# Patient Record
Sex: MALE | Race: WHITE | Employment: OTHER | URBAN - METROPOLITAN AREA
[De-identification: names, ages, dates, MRNs, and addresses within clinical notes are randomized per-mention and may not be internally consistent; named-entity substitution may affect disease eponyms.]

---

## 2017-06-30 ENCOUNTER — HOSPITAL ENCOUNTER (INPATIENT)
Age: 62
LOS: 4 days | Discharge: HOME | DRG: 291 | End: 2017-07-05
Attending: EMERGENCY MEDICINE | Admitting: INTERNAL MEDICINE
Payer: MEDICARE

## 2017-06-30 DIAGNOSIS — I50.23 SYSTOLIC CHF, ACUTE ON CHRONIC (HCC): ICD-10-CM

## 2017-06-30 DIAGNOSIS — I50.33 ACUTE ON CHRONIC DIASTOLIC CONGESTIVE HEART FAILURE (HCC): Primary | ICD-10-CM

## 2017-06-30 PROCEDURE — 0BH17EZ INSERTION OF ENDOTRACHEAL AIRWAY INTO TRACHEA, VIA NATURAL OR ARTIFICIAL OPENING: ICD-10-PCS | Performed by: INTERNAL MEDICINE

## 2017-06-30 PROCEDURE — 51702 INSERT TEMP BLADDER CATH: CPT

## 2017-06-30 PROCEDURE — 99285 EMERGENCY DEPT VISIT HI MDM: CPT

## 2017-06-30 PROCEDURE — 31500 INSERT EMERGENCY AIRWAY: CPT

## 2017-06-30 PROCEDURE — 2500000003 HC RX 250 WO HCPCS

## 2017-06-30 PROCEDURE — 6360000002 HC RX W HCPCS

## 2017-06-30 PROCEDURE — 5A1935Z RESPIRATORY VENTILATION, LESS THAN 24 CONSECUTIVE HOURS: ICD-10-PCS | Performed by: INTERNAL MEDICINE

## 2017-07-01 ENCOUNTER — APPOINTMENT (OUTPATIENT)
Dept: GENERAL RADIOLOGY | Age: 62
DRG: 291 | End: 2017-07-01
Payer: MEDICARE

## 2017-07-01 PROBLEM — E87.0 HYPERNATREMIA: Status: ACTIVE | Noted: 2017-07-01

## 2017-07-01 PROBLEM — N39.0 UTI (URINARY TRACT INFECTION): Status: RESOLVED | Noted: 2017-07-01 | Resolved: 2017-07-01

## 2017-07-01 PROBLEM — N39.0 UTI (URINARY TRACT INFECTION): Status: ACTIVE | Noted: 2017-07-01

## 2017-07-01 PROBLEM — N17.9 AKI (ACUTE KIDNEY INJURY) (HCC): Status: ACTIVE | Noted: 2017-07-01

## 2017-07-01 PROBLEM — E87.0 HYPERNATREMIA: Status: RESOLVED | Noted: 2017-07-01 | Resolved: 2017-07-01

## 2017-07-01 PROBLEM — J96.02 ACUTE RESPIRATORY FAILURE WITH HYPERCAPNIA (HCC): Status: ACTIVE | Noted: 2017-07-01

## 2017-07-01 LAB
-: ABNORMAL
ABSOLUTE EOS #: 0.1 K/UL (ref 0–0.4)
ABSOLUTE LYMPH #: 2.8 K/UL (ref 1–4.8)
ABSOLUTE MONO #: 0.8 K/UL (ref 0.1–1.2)
ALLEN TEST: ABNORMAL
ALLEN TEST: ABNORMAL
ALLEN TEST: POSITIVE
AMORPHOUS: ABNORMAL
ANION GAP SERPL CALCULATED.3IONS-SCNC: 13 MMOL/L (ref 9–17)
ANION GAP SERPL CALCULATED.3IONS-SCNC: 17 MMOL/L (ref 9–17)
BACTERIA: ABNORMAL
BASOPHILS # BLD: 0 %
BASOPHILS ABSOLUTE: 0 K/UL (ref 0–0.2)
BILIRUBIN URINE: NEGATIVE
BNP INTERPRETATION: ABNORMAL
BUN BLDV-MCNC: 16 MG/DL (ref 8–23)
BUN BLDV-MCNC: 20 MG/DL (ref 8–23)
BUN/CREAT BLD: ABNORMAL (ref 9–20)
BUN/CREAT BLD: ABNORMAL (ref 9–20)
CALCIUM SERPL-MCNC: 9.2 MG/DL (ref 8.6–10.4)
CALCIUM SERPL-MCNC: 9.2 MG/DL (ref 8.6–10.4)
CASTS UA: ABNORMAL /LPF (ref 0–2)
CHLORIDE BLD-SCNC: 102 MMOL/L (ref 98–107)
CHLORIDE BLD-SCNC: 107 MMOL/L (ref 98–107)
CHOLESTEROL/HDL RATIO: 3.6
CHOLESTEROL: 147 MG/DL
CO2: 23 MMOL/L (ref 20–31)
CO2: 25 MMOL/L (ref 20–31)
COLOR: YELLOW
COMMENT UA: ABNORMAL
CREAT SERPL-MCNC: 1.04 MG/DL (ref 0.7–1.2)
CREAT SERPL-MCNC: 1.29 MG/DL (ref 0.7–1.2)
CREATININE URINE: 30.5 MG/DL (ref 39–259)
CRYSTALS, UA: ABNORMAL /HPF
DIFFERENTIAL TYPE: ABNORMAL
EOSINOPHILS RELATIVE PERCENT: 1 %
EPITHELIAL CELLS UA: ABNORMAL /HPF (ref 0–5)
FIO2: 40
FIO2: 50
FIO2: ABNORMAL
GFR AFRICAN AMERICAN: >60 ML/MIN
GFR AFRICAN AMERICAN: >60 ML/MIN
GFR NON-AFRICAN AMERICAN: 56 ML/MIN
GFR NON-AFRICAN AMERICAN: >60 ML/MIN
GFR SERPL CREATININE-BSD FRML MDRD: ABNORMAL ML/MIN/{1.73_M2}
GLUCOSE BLD-MCNC: 177 MG/DL (ref 75–110)
GLUCOSE BLD-MCNC: 200 MG/DL (ref 70–99)
GLUCOSE BLD-MCNC: 206 MG/DL (ref 75–110)
GLUCOSE BLD-MCNC: 266 MG/DL (ref 75–110)
GLUCOSE BLD-MCNC: 287 MG/DL (ref 70–99)
GLUCOSE URINE: ABNORMAL
HCO3 VENOUS: 23.7 MMOL/L (ref 22–29)
HCT VFR BLD CALC: 35.8 % (ref 41–53)
HCT VFR BLD CALC: 38.6 % (ref 41–53)
HDLC SERPL-MCNC: 41 MG/DL
HEMOGLOBIN: 11.9 G/DL (ref 13.5–17.5)
HEMOGLOBIN: 12.2 G/DL (ref 13.5–17.5)
KETONES, URINE: NEGATIVE
LDL CHOLESTEROL: 92 MG/DL (ref 0–130)
LEUKOCYTE ESTERASE, URINE: NEGATIVE
LV EF: 33 %
LVEF MODALITY: NORMAL
LYMPHOCYTES # BLD: 22 %
MAGNESIUM: 2.3 MG/DL (ref 1.6–2.6)
MCH RBC QN AUTO: 30.1 PG (ref 26–34)
MCH RBC QN AUTO: 31 PG (ref 26–34)
MCHC RBC AUTO-ENTMCNC: 31.5 G/DL (ref 31–37)
MCHC RBC AUTO-ENTMCNC: 33.2 G/DL (ref 31–37)
MCV RBC AUTO: 93.4 FL (ref 80–100)
MCV RBC AUTO: 95.5 FL (ref 80–100)
MODE: ABNORMAL
MONOCYTES # BLD: 6 %
MRSA, DNA, NASAL: NORMAL
MUCUS: ABNORMAL
NEGATIVE BASE EXCESS, ART: ABNORMAL (ref 0–2)
NEGATIVE BASE EXCESS, ART: ABNORMAL (ref 0–2)
NEGATIVE BASE EXCESS, VEN: 6 (ref 0–2)
NITRITE, URINE: NEGATIVE
O2 DEVICE/FLOW/%: ABNORMAL
O2 SAT, VEN: 80 % (ref 60–85)
OTHER OBSERVATIONS UA: ABNORMAL
PATIENT TEMP: ABNORMAL
PCO2, VEN: 63.3 MM HG (ref 41–51)
PDW BLD-RTO: 15.5 % (ref 12.5–15.4)
PDW BLD-RTO: 16.2 % (ref 12.5–15.4)
PH UA: 5.5 (ref 5–8)
PH VENOUS: 7.18 (ref 7.32–7.43)
PLATELET # BLD: 176 K/UL (ref 140–450)
PLATELET # BLD: 261 K/UL (ref 140–450)
PLATELET ESTIMATE: ABNORMAL
PMV BLD AUTO: 8.3 FL (ref 6–12)
PMV BLD AUTO: 8.4 FL (ref 6–12)
PO2, VEN: 56.4 MM HG (ref 30–50)
POC HCO3: 27.8 MMOL/L (ref 21–28)
POC HCO3: 27.9 MMOL/L (ref 21–28)
POC O2 SATURATION: 94 % (ref 94–98)
POC O2 SATURATION: 97 % (ref 94–98)
POC PCO2 TEMP: ABNORMAL MM HG
POC PCO2: 47.1 MM HG (ref 35–48)
POC PCO2: 60.3 MM HG (ref 35–48)
POC PH TEMP: ABNORMAL
POC PH: 7.27 (ref 7.35–7.45)
POC PH: 7.38 (ref 7.35–7.45)
POC PO2 TEMP: ABNORMAL MM HG
POC PO2: 106.4 MM HG (ref 83–108)
POC PO2: 72.6 MM HG (ref 83–108)
POC TROPONIN I: 0.06 NG/ML (ref 0–0.1)
POC TROPONIN INTERP: NORMAL
POSITIVE BASE EXCESS, ART: 0 (ref 0–3)
POSITIVE BASE EXCESS, ART: 2 (ref 0–3)
POSITIVE BASE EXCESS, VEN: ABNORMAL (ref 0–3)
POTASSIUM SERPL-SCNC: 5 MMOL/L (ref 3.7–5.3)
POTASSIUM SERPL-SCNC: 5.1 MMOL/L (ref 3.7–5.3)
PRO-BNP: 6216 PG/ML
PROTEIN UA: ABNORMAL
RBC # BLD: 3.83 M/UL (ref 4.5–5.9)
RBC # BLD: 4.05 M/UL (ref 4.5–5.9)
RBC # BLD: ABNORMAL 10*6/UL
RBC UA: ABNORMAL /HPF (ref 0–2)
RENAL EPITHELIAL, UA: ABNORMAL /HPF
SAMPLE SITE: ABNORMAL
SEG NEUTROPHILS: 71 %
SEGMENTED NEUTROPHILS ABSOLUTE COUNT: 9.4 K/UL (ref 1.8–7.7)
SODIUM BLD-SCNC: 138 MMOL/L (ref 135–144)
SODIUM BLD-SCNC: 149 MMOL/L (ref 135–144)
SPECIFIC GRAVITY UA: 1.02 (ref 1–1.03)
SPECIMEN DESCRIPTION: NORMAL
TCO2 (CALC), ART: 29 MMOL/L (ref 22–29)
TCO2 (CALC), ART: 30 MMOL/L (ref 22–29)
THYROXINE, FREE: 1 NG/DL (ref 0.93–1.7)
TOTAL CO2, VENOUS: 26 MMOL/L (ref 23–30)
TRICHOMONAS: ABNORMAL
TRIGL SERPL-MCNC: 72 MG/DL
TROPONIN INTERP: NORMAL
TROPONIN INTERP: NORMAL
TROPONIN T: <0.03 NG/ML
TROPONIN T: <0.03 NG/ML
TSH SERPL DL<=0.05 MIU/L-ACNC: 7.59 MIU/L (ref 0.3–5)
TURBIDITY: ABNORMAL
UREA NITROGEN, UR: 205 MG/DL
URINE HGB: ABNORMAL
UROBILINOGEN, URINE: NORMAL
VLDLC SERPL CALC-MCNC: NORMAL MG/DL (ref 1–30)
WBC # BLD: 13.2 K/UL (ref 3.5–11)
WBC # BLD: 9.4 K/UL (ref 3.5–11)
WBC # BLD: ABNORMAL 10*3/UL
WBC UA: ABNORMAL /HPF (ref 0–5)
YEAST: ABNORMAL

## 2017-07-01 PROCEDURE — 6370000000 HC RX 637 (ALT 250 FOR IP): Performed by: STUDENT IN AN ORGANIZED HEALTH CARE EDUCATION/TRAINING PROGRAM

## 2017-07-01 PROCEDURE — 6360000002 HC RX W HCPCS: Performed by: EMERGENCY MEDICINE

## 2017-07-01 PROCEDURE — 36415 COLL VENOUS BLD VENIPUNCTURE: CPT

## 2017-07-01 PROCEDURE — 82803 BLOOD GASES ANY COMBINATION: CPT

## 2017-07-01 PROCEDURE — 84156 ASSAY OF PROTEIN URINE: CPT

## 2017-07-01 PROCEDURE — 71010 XR CHEST PORTABLE: CPT

## 2017-07-01 PROCEDURE — 94770 HC ETCO2 MONITOR DAILY: CPT

## 2017-07-01 PROCEDURE — 85027 COMPLETE CBC AUTOMATED: CPT

## 2017-07-01 PROCEDURE — 80048 BASIC METABOLIC PNL TOTAL CA: CPT

## 2017-07-01 PROCEDURE — 80061 LIPID PANEL: CPT

## 2017-07-01 PROCEDURE — 94640 AIRWAY INHALATION TREATMENT: CPT

## 2017-07-01 PROCEDURE — 82570 ASSAY OF URINE CREATININE: CPT

## 2017-07-01 PROCEDURE — 6360000002 HC RX W HCPCS: Performed by: STUDENT IN AN ORGANIZED HEALTH CARE EDUCATION/TRAINING PROGRAM

## 2017-07-01 PROCEDURE — 87641 MR-STAPH DNA AMP PROBE: CPT

## 2017-07-01 PROCEDURE — 81001 URINALYSIS AUTO W/SCOPE: CPT

## 2017-07-01 PROCEDURE — 93306 TTE W/DOPPLER COMPLETE: CPT

## 2017-07-01 PROCEDURE — 94644 CONT INHLJ TX 1ST HOUR: CPT

## 2017-07-01 PROCEDURE — 96375 TX/PRO/DX INJ NEW DRUG ADDON: CPT

## 2017-07-01 PROCEDURE — 84540 ASSAY OF URINE/UREA-N: CPT

## 2017-07-01 PROCEDURE — 84166 PROTEIN E-PHORESIS/URINE/CSF: CPT

## 2017-07-01 PROCEDURE — 96368 THER/DIAG CONCURRENT INF: CPT

## 2017-07-01 PROCEDURE — 82947 ASSAY GLUCOSE BLOOD QUANT: CPT

## 2017-07-01 PROCEDURE — 83735 ASSAY OF MAGNESIUM: CPT

## 2017-07-01 PROCEDURE — 84439 ASSAY OF FREE THYROXINE: CPT

## 2017-07-01 PROCEDURE — 94645 CONT INHLJ TX EACH ADDL HOUR: CPT

## 2017-07-01 PROCEDURE — 2580000003 HC RX 258: Performed by: STUDENT IN AN ORGANIZED HEALTH CARE EDUCATION/TRAINING PROGRAM

## 2017-07-01 PROCEDURE — 2500000003 HC RX 250 WO HCPCS: Performed by: EMERGENCY MEDICINE

## 2017-07-01 PROCEDURE — 2500000003 HC RX 250 WO HCPCS: Performed by: STUDENT IN AN ORGANIZED HEALTH CARE EDUCATION/TRAINING PROGRAM

## 2017-07-01 PROCEDURE — 99291 CRITICAL CARE FIRST HOUR: CPT | Performed by: INTERNAL MEDICINE

## 2017-07-01 PROCEDURE — 83036 HEMOGLOBIN GLYCOSYLATED A1C: CPT

## 2017-07-01 PROCEDURE — 87086 URINE CULTURE/COLONY COUNT: CPT

## 2017-07-01 PROCEDURE — 85025 COMPLETE CBC W/AUTO DIFF WBC: CPT

## 2017-07-01 PROCEDURE — 93005 ELECTROCARDIOGRAM TRACING: CPT

## 2017-07-01 PROCEDURE — 84484 ASSAY OF TROPONIN QUANT: CPT

## 2017-07-01 PROCEDURE — 31500 INSERT EMERGENCY AIRWAY: CPT

## 2017-07-01 PROCEDURE — 96365 THER/PROPH/DIAG IV INF INIT: CPT

## 2017-07-01 PROCEDURE — 36600 WITHDRAWAL OF ARTERIAL BLOOD: CPT

## 2017-07-01 PROCEDURE — 2060000000 HC ICU INTERMEDIATE R&B

## 2017-07-01 PROCEDURE — 94762 N-INVAS EAR/PLS OXIMTRY CONT: CPT

## 2017-07-01 PROCEDURE — 83880 ASSAY OF NATRIURETIC PEPTIDE: CPT

## 2017-07-01 PROCEDURE — 94003 VENT MGMT INPAT SUBQ DAY: CPT

## 2017-07-01 PROCEDURE — 84443 ASSAY THYROID STIM HORMONE: CPT

## 2017-07-01 PROCEDURE — 96366 THER/PROPH/DIAG IV INF ADDON: CPT

## 2017-07-01 PROCEDURE — 94002 VENT MGMT INPAT INIT DAY: CPT

## 2017-07-01 PROCEDURE — 94660 CPAP INITIATION&MGMT: CPT

## 2017-07-01 RX ORDER — METHYLPREDNISOLONE SODIUM SUCCINATE 125 MG/2ML
40 INJECTION, POWDER, LYOPHILIZED, FOR SOLUTION INTRAMUSCULAR; INTRAVENOUS EVERY 12 HOURS
Status: DISCONTINUED | OUTPATIENT
Start: 2017-07-01 | End: 2017-07-02

## 2017-07-01 RX ORDER — LISINOPRIL 2.5 MG/1
2.5 TABLET ORAL DAILY
Status: DISCONTINUED | OUTPATIENT
Start: 2017-07-01 | End: 2017-07-03

## 2017-07-01 RX ORDER — ALBUTEROL SULFATE 2.5 MG/3ML
2.5 SOLUTION RESPIRATORY (INHALATION) EVERY 4 HOURS PRN
Status: DISCONTINUED | OUTPATIENT
Start: 2017-07-01 | End: 2017-07-03

## 2017-07-01 RX ORDER — SODIUM CHLORIDE 0.9 % (FLUSH) 0.9 %
10 SYRINGE (ML) INJECTION PRN
Status: DISCONTINUED | OUTPATIENT
Start: 2017-07-01 | End: 2017-07-05 | Stop reason: HOSPADM

## 2017-07-01 RX ORDER — FUROSEMIDE 10 MG/ML
40 INJECTION INTRAMUSCULAR; INTRAVENOUS ONCE
Status: COMPLETED | OUTPATIENT
Start: 2017-07-01 | End: 2017-07-01

## 2017-07-01 RX ORDER — MAGNESIUM SULFATE 1 G/100ML
1 INJECTION INTRAVENOUS PRN
Status: DISCONTINUED | OUTPATIENT
Start: 2017-07-01 | End: 2017-07-05 | Stop reason: HOSPADM

## 2017-07-01 RX ORDER — CARVEDILOL 3.12 MG/1
3.12 TABLET ORAL 2 TIMES DAILY
Status: DISCONTINUED | OUTPATIENT
Start: 2017-07-01 | End: 2017-07-01

## 2017-07-01 RX ORDER — NITROGLYCERIN 20 MG/100ML
5 INJECTION INTRAVENOUS CONTINUOUS
Status: DISCONTINUED | OUTPATIENT
Start: 2017-07-01 | End: 2017-07-01

## 2017-07-01 RX ORDER — ACETAMINOPHEN 325 MG/1
650 TABLET ORAL EVERY 4 HOURS PRN
Status: DISCONTINUED | OUTPATIENT
Start: 2017-07-01 | End: 2017-07-05 | Stop reason: HOSPADM

## 2017-07-01 RX ORDER — ONDANSETRON 2 MG/ML
4 INJECTION INTRAMUSCULAR; INTRAVENOUS EVERY 6 HOURS PRN
Status: DISCONTINUED | OUTPATIENT
Start: 2017-07-01 | End: 2017-07-05 | Stop reason: HOSPADM

## 2017-07-01 RX ORDER — POTASSIUM CHLORIDE 20MEQ/15ML
40 LIQUID (ML) ORAL PRN
Status: DISCONTINUED | OUTPATIENT
Start: 2017-07-01 | End: 2017-07-05 | Stop reason: HOSPADM

## 2017-07-01 RX ORDER — DEXTROSE MONOHYDRATE 50 MG/ML
100 INJECTION, SOLUTION INTRAVENOUS PRN
Status: DISCONTINUED | OUTPATIENT
Start: 2017-07-01 | End: 2017-07-05 | Stop reason: HOSPADM

## 2017-07-01 RX ORDER — ATORVASTATIN CALCIUM 20 MG/1
20 TABLET, FILM COATED ORAL NIGHTLY
Status: DISCONTINUED | OUTPATIENT
Start: 2017-07-01 | End: 2017-07-05 | Stop reason: HOSPADM

## 2017-07-01 RX ORDER — NICOTINE POLACRILEX 4 MG
15 LOZENGE BUCCAL PRN
Status: DISCONTINUED | OUTPATIENT
Start: 2017-07-01 | End: 2017-07-05 | Stop reason: HOSPADM

## 2017-07-01 RX ORDER — FUROSEMIDE 10 MG/ML
40 INJECTION INTRAMUSCULAR; INTRAVENOUS 2 TIMES DAILY
Status: DISCONTINUED | OUTPATIENT
Start: 2017-07-01 | End: 2017-07-02

## 2017-07-01 RX ORDER — HEPARIN SODIUM 5000 [USP'U]/ML
5000 INJECTION, SOLUTION INTRAVENOUS; SUBCUTANEOUS EVERY 8 HOURS SCHEDULED
Status: DISCONTINUED | OUTPATIENT
Start: 2017-07-01 | End: 2017-07-05 | Stop reason: HOSPADM

## 2017-07-01 RX ORDER — PROPOFOL 10 MG/ML
INJECTION, EMULSION INTRAVENOUS
Status: DISCONTINUED
Start: 2017-07-01 | End: 2017-07-01

## 2017-07-01 RX ORDER — METHYLPREDNISOLONE SODIUM SUCCINATE 125 MG/2ML
40 INJECTION, POWDER, LYOPHILIZED, FOR SOLUTION INTRAMUSCULAR; INTRAVENOUS EVERY 8 HOURS
Status: DISCONTINUED | OUTPATIENT
Start: 2017-07-01 | End: 2017-07-01

## 2017-07-01 RX ORDER — ALBUTEROL SULFATE 2.5 MG/3ML
2.5 SOLUTION RESPIRATORY (INHALATION) 2 TIMES DAILY
Status: DISCONTINUED | OUTPATIENT
Start: 2017-07-02 | End: 2017-07-02

## 2017-07-01 RX ORDER — SODIUM CHLORIDE 9 MG/ML
INJECTION, SOLUTION INTRAVENOUS CONTINUOUS
Status: DISCONTINUED | OUTPATIENT
Start: 2017-07-01 | End: 2017-07-02

## 2017-07-01 RX ORDER — POTASSIUM CHLORIDE 20 MEQ/1
40 TABLET, EXTENDED RELEASE ORAL PRN
Status: DISCONTINUED | OUTPATIENT
Start: 2017-07-01 | End: 2017-07-05 | Stop reason: HOSPADM

## 2017-07-01 RX ORDER — ACETAMINOPHEN 325 MG/1
650 TABLET ORAL EVERY 4 HOURS PRN
Status: DISCONTINUED | OUTPATIENT
Start: 2017-07-01 | End: 2017-07-01 | Stop reason: SDUPTHER

## 2017-07-01 RX ORDER — IPRATROPIUM BROMIDE AND ALBUTEROL SULFATE 2.5; .5 MG/3ML; MG/3ML
1 SOLUTION RESPIRATORY (INHALATION) 4 TIMES DAILY
Status: DISCONTINUED | OUTPATIENT
Start: 2017-07-01 | End: 2017-07-03

## 2017-07-01 RX ORDER — ASPIRIN 81 MG/1
81 TABLET ORAL DAILY
Status: DISCONTINUED | OUTPATIENT
Start: 2017-07-01 | End: 2017-07-05 | Stop reason: HOSPADM

## 2017-07-01 RX ORDER — POTASSIUM CHLORIDE 7.45 MG/ML
10 INJECTION INTRAVENOUS PRN
Status: DISCONTINUED | OUTPATIENT
Start: 2017-07-01 | End: 2017-07-05 | Stop reason: HOSPADM

## 2017-07-01 RX ORDER — ATORVASTATIN CALCIUM 20 MG/1
20 TABLET, FILM COATED ORAL
Status: DISCONTINUED | OUTPATIENT
Start: 2017-07-01 | End: 2017-07-01

## 2017-07-01 RX ORDER — PROPOFOL 10 MG/ML
10 INJECTION, EMULSION INTRAVENOUS
Status: DISCONTINUED | OUTPATIENT
Start: 2017-07-01 | End: 2017-07-01

## 2017-07-01 RX ORDER — SODIUM CHLORIDE 0.9 % (FLUSH) 0.9 %
10 SYRINGE (ML) INJECTION EVERY 12 HOURS SCHEDULED
Status: DISCONTINUED | OUTPATIENT
Start: 2017-07-01 | End: 2017-07-05 | Stop reason: HOSPADM

## 2017-07-01 RX ORDER — DEXTROSE MONOHYDRATE 25 G/50ML
12.5 INJECTION, SOLUTION INTRAVENOUS PRN
Status: DISCONTINUED | OUTPATIENT
Start: 2017-07-01 | End: 2017-07-05 | Stop reason: HOSPADM

## 2017-07-01 RX ADMIN — IPRATROPIUM BROMIDE AND ALBUTEROL SULFATE 1 AMPULE: .5; 3 SOLUTION RESPIRATORY (INHALATION) at 20:32

## 2017-07-01 RX ADMIN — NITROGLYCERIN 50 MCG/MIN: 20 INJECTION INTRAVENOUS at 01:07

## 2017-07-01 RX ADMIN — HEPARIN SODIUM 5000 UNITS: 5000 INJECTION, SOLUTION INTRAVENOUS; SUBCUTANEOUS at 21:52

## 2017-07-01 RX ADMIN — HEPARIN SODIUM 5000 UNITS: 5000 INJECTION, SOLUTION INTRAVENOUS; SUBCUTANEOUS at 13:55

## 2017-07-01 RX ADMIN — METHYLPREDNISOLONE SODIUM SUCCINATE 40 MG: 125 INJECTION, POWDER, FOR SOLUTION INTRAMUSCULAR; INTRAVENOUS at 04:57

## 2017-07-01 RX ADMIN — PROPOFOL 30 MCG/KG/MIN: 10 INJECTION, EMULSION INTRAVENOUS at 01:22

## 2017-07-01 RX ADMIN — LISINOPRIL 2.5 MG: 2.5 TABLET ORAL at 08:43

## 2017-07-01 RX ADMIN — SODIUM CHLORIDE 250 ML: 4.5 INJECTION, SOLUTION INTRAVENOUS at 10:22

## 2017-07-01 RX ADMIN — INSULIN LISPRO 4 UNITS: 100 INJECTION, SOLUTION INTRAVENOUS; SUBCUTANEOUS at 08:51

## 2017-07-01 RX ADMIN — ALBUTEROL SULFATE 2.5 MG: 2.5 SOLUTION RESPIRATORY (INHALATION) at 23:45

## 2017-07-01 RX ADMIN — Medication 10 ML: at 08:45

## 2017-07-01 RX ADMIN — INSULIN LISPRO 2 UNITS: 100 INJECTION, SOLUTION INTRAVENOUS; SUBCUTANEOUS at 18:16

## 2017-07-01 RX ADMIN — METHYLPREDNISOLONE SODIUM SUCCINATE 40 MG: 125 INJECTION, POWDER, FOR SOLUTION INTRAMUSCULAR; INTRAVENOUS at 20:22

## 2017-07-01 RX ADMIN — CARVEDILOL 3.12 MG: 3.12 TABLET, FILM COATED ORAL at 09:22

## 2017-07-01 RX ADMIN — ALBUTEROL SULFATE 5 MG: 5 SOLUTION RESPIRATORY (INHALATION) at 03:00

## 2017-07-01 RX ADMIN — FUROSEMIDE 40 MG: 10 INJECTION, SOLUTION INTRAMUSCULAR; INTRAVENOUS at 20:22

## 2017-07-01 RX ADMIN — HEPARIN SODIUM 5000 UNITS: 5000 INJECTION, SOLUTION INTRAVENOUS; SUBCUTANEOUS at 08:51

## 2017-07-01 RX ADMIN — ALBUTEROL SULFATE 15 MG/HR: 5 SOLUTION RESPIRATORY (INHALATION) at 00:31

## 2017-07-01 RX ADMIN — INSULIN LISPRO 3 UNITS: 100 INJECTION, SOLUTION INTRAVENOUS; SUBCUTANEOUS at 20:30

## 2017-07-01 RX ADMIN — MICONAZORB AF ANTIFUNGAL POWDER: 1.42 POWDER TOPICAL at 20:22

## 2017-07-01 RX ADMIN — FUROSEMIDE 40 MG: 10 INJECTION, SOLUTION INTRAMUSCULAR; INTRAVENOUS at 00:59

## 2017-07-01 RX ADMIN — ASPIRIN 81 MG: 81 TABLET, COATED ORAL at 08:42

## 2017-07-01 RX ADMIN — INSULIN LISPRO 2 UNITS: 100 INJECTION, SOLUTION INTRAVENOUS; SUBCUTANEOUS at 13:53

## 2017-07-01 RX ADMIN — FUROSEMIDE 40 MG: 10 INJECTION, SOLUTION INTRAMUSCULAR; INTRAVENOUS at 08:42

## 2017-07-01 RX ADMIN — ATORVASTATIN CALCIUM 20 MG: 20 TABLET, FILM COATED ORAL at 21:50

## 2017-07-01 RX ADMIN — IPRATROPIUM BROMIDE AND ALBUTEROL SULFATE 1 AMPULE: .5; 3 SOLUTION RESPIRATORY (INHALATION) at 11:55

## 2017-07-01 RX ADMIN — Medication 5 MG/HR: at 03:29

## 2017-07-01 RX ADMIN — IPRATROPIUM BROMIDE AND ALBUTEROL SULFATE 1 AMPULE: .5; 3 SOLUTION RESPIRATORY (INHALATION) at 08:15

## 2017-07-01 RX ADMIN — Medication 10 ML: at 20:23

## 2017-07-01 RX ADMIN — IPRATROPIUM BROMIDE AND ALBUTEROL SULFATE 1 AMPULE: .5; 3 SOLUTION RESPIRATORY (INHALATION) at 15:14

## 2017-07-01 RX ADMIN — METOPROLOL TARTRATE 12.5 MG: 25 TABLET ORAL at 20:23

## 2017-07-01 RX ADMIN — SODIUM CHLORIDE: 9 INJECTION, SOLUTION INTRAVENOUS at 08:42

## 2017-07-01 ASSESSMENT — PULMONARY FUNCTION TESTS
PIF_VALUE: 12
PIF_VALUE: 31
PIF_VALUE: 21
PIF_VALUE: 12
PIF_VALUE: 31
PIF_VALUE: 12
PIF_VALUE: 21

## 2017-07-01 ASSESSMENT — PAIN SCALES - GENERAL
PAINLEVEL_OUTOF10: 2
PAINLEVEL_OUTOF10: 4

## 2017-07-01 ASSESSMENT — PAIN DESCRIPTION - PAIN TYPE: TYPE: CHRONIC PAIN

## 2017-07-01 ASSESSMENT — PAIN DESCRIPTION - LOCATION: LOCATION: BACK

## 2017-07-02 ENCOUNTER — APPOINTMENT (OUTPATIENT)
Dept: GENERAL RADIOLOGY | Age: 62
DRG: 291 | End: 2017-07-02
Payer: MEDICARE

## 2017-07-02 LAB
ANION GAP SERPL CALCULATED.3IONS-SCNC: 14 MMOL/L (ref 9–17)
BUN BLDV-MCNC: 27 MG/DL (ref 8–23)
BUN/CREAT BLD: ABNORMAL (ref 9–20)
CALCIUM SERPL-MCNC: 9.2 MG/DL (ref 8.6–10.4)
CHLORIDE BLD-SCNC: 97 MMOL/L (ref 98–107)
CO2: 29 MMOL/L (ref 20–31)
CREAT SERPL-MCNC: 1.12 MG/DL (ref 0.7–1.2)
CULTURE: NO GROWTH
CULTURE: NORMAL
ESTIMATED AVERAGE GLUCOSE: 143 MG/DL
GFR AFRICAN AMERICAN: >60 ML/MIN
GFR NON-AFRICAN AMERICAN: >60 ML/MIN
GFR SERPL CREATININE-BSD FRML MDRD: ABNORMAL ML/MIN/{1.73_M2}
GFR SERPL CREATININE-BSD FRML MDRD: ABNORMAL ML/MIN/{1.73_M2}
GLUCOSE BLD-MCNC: 185 MG/DL (ref 75–110)
GLUCOSE BLD-MCNC: 208 MG/DL (ref 70–99)
GLUCOSE BLD-MCNC: 235 MG/DL (ref 75–110)
GLUCOSE BLD-MCNC: 294 MG/DL (ref 75–110)
GLUCOSE BLD-MCNC: 393 MG/DL (ref 75–110)
HBA1C MFR BLD: 6.6 % (ref 4–6)
Lab: NORMAL
POTASSIUM SERPL-SCNC: 4.3 MMOL/L (ref 3.7–5.3)
SODIUM BLD-SCNC: 140 MMOL/L (ref 135–144)
SPECIMEN DESCRIPTION: NORMAL
STATUS: NORMAL

## 2017-07-02 PROCEDURE — 94762 N-INVAS EAR/PLS OXIMTRY CONT: CPT

## 2017-07-02 PROCEDURE — 97161 PT EVAL LOW COMPLEX 20 MIN: CPT

## 2017-07-02 PROCEDURE — 6370000000 HC RX 637 (ALT 250 FOR IP): Performed by: STUDENT IN AN ORGANIZED HEALTH CARE EDUCATION/TRAINING PROGRAM

## 2017-07-02 PROCEDURE — 6370000000 HC RX 637 (ALT 250 FOR IP): Performed by: EMERGENCY MEDICINE

## 2017-07-02 PROCEDURE — 2580000003 HC RX 258: Performed by: STUDENT IN AN ORGANIZED HEALTH CARE EDUCATION/TRAINING PROGRAM

## 2017-07-02 PROCEDURE — G8978 MOBILITY CURRENT STATUS: HCPCS

## 2017-07-02 PROCEDURE — 71010 XR CHEST PORTABLE: CPT

## 2017-07-02 PROCEDURE — 86376 MICROSOMAL ANTIBODY EACH: CPT

## 2017-07-02 PROCEDURE — 97530 THERAPEUTIC ACTIVITIES: CPT

## 2017-07-02 PROCEDURE — 6370000000 HC RX 637 (ALT 250 FOR IP): Performed by: INTERNAL MEDICINE

## 2017-07-02 PROCEDURE — 6360000002 HC RX W HCPCS: Performed by: STUDENT IN AN ORGANIZED HEALTH CARE EDUCATION/TRAINING PROGRAM

## 2017-07-02 PROCEDURE — 94660 CPAP INITIATION&MGMT: CPT

## 2017-07-02 PROCEDURE — 1200000000 HC SEMI PRIVATE

## 2017-07-02 PROCEDURE — 99291 CRITICAL CARE FIRST HOUR: CPT | Performed by: INTERNAL MEDICINE

## 2017-07-02 PROCEDURE — 36415 COLL VENOUS BLD VENIPUNCTURE: CPT

## 2017-07-02 PROCEDURE — 80048 BASIC METABOLIC PNL TOTAL CA: CPT

## 2017-07-02 PROCEDURE — 82947 ASSAY GLUCOSE BLOOD QUANT: CPT

## 2017-07-02 PROCEDURE — 94640 AIRWAY INHALATION TREATMENT: CPT

## 2017-07-02 PROCEDURE — G8979 MOBILITY GOAL STATUS: HCPCS

## 2017-07-02 RX ORDER — ALBUTEROL SULFATE 90 UG/1
2 AEROSOL, METERED RESPIRATORY (INHALATION) EVERY 6 HOURS PRN
Status: DISCONTINUED | OUTPATIENT
Start: 2017-07-02 | End: 2017-07-03

## 2017-07-02 RX ORDER — METOPROLOL TARTRATE 50 MG/1
50 TABLET, FILM COATED ORAL 2 TIMES DAILY
Status: DISCONTINUED | OUTPATIENT
Start: 2017-07-02 | End: 2017-07-03

## 2017-07-02 RX ORDER — SPIRONOLACTONE 25 MG/1
25 TABLET ORAL DAILY
Status: DISCONTINUED | OUTPATIENT
Start: 2017-07-02 | End: 2017-07-04

## 2017-07-02 RX ORDER — DOCUSATE SODIUM 100 MG/1
100 CAPSULE, LIQUID FILLED ORAL 2 TIMES DAILY PRN
Status: DISCONTINUED | OUTPATIENT
Start: 2017-07-02 | End: 2017-07-05 | Stop reason: HOSPADM

## 2017-07-02 RX ORDER — METFORMIN HYDROCHLORIDE 500 MG/1
2000 TABLET, EXTENDED RELEASE ORAL
Status: DISCONTINUED | OUTPATIENT
Start: 2017-07-02 | End: 2017-07-02

## 2017-07-02 RX ORDER — PREDNISONE 20 MG/1
40 TABLET ORAL DAILY
Status: DISCONTINUED | OUTPATIENT
Start: 2017-07-02 | End: 2017-07-02

## 2017-07-02 RX ORDER — POLYETHYLENE GLYCOL 3350 17 G/17G
17 POWDER, FOR SOLUTION ORAL DAILY PRN
Status: DISCONTINUED | OUTPATIENT
Start: 2017-07-02 | End: 2017-07-05 | Stop reason: HOSPADM

## 2017-07-02 RX ORDER — FUROSEMIDE 40 MG/1
40 TABLET ORAL 2 TIMES DAILY
Status: DISCONTINUED | OUTPATIENT
Start: 2017-07-02 | End: 2017-07-04

## 2017-07-02 RX ORDER — PREDNISONE 20 MG/1
20 TABLET ORAL DAILY
Status: COMPLETED | OUTPATIENT
Start: 2017-07-03 | End: 2017-07-04

## 2017-07-02 RX ORDER — HYDROCHLOROTHIAZIDE 25 MG/1
25 TABLET ORAL DAILY
Status: DISCONTINUED | OUTPATIENT
Start: 2017-07-02 | End: 2017-07-02

## 2017-07-02 RX ADMIN — METOPROLOL TARTRATE 12.5 MG: 25 TABLET ORAL at 08:06

## 2017-07-02 RX ADMIN — MOMETASONE FUROATE AND FORMOTEROL FUMARATE DIHYDRATE 2 PUFF: 200; 5 AEROSOL RESPIRATORY (INHALATION) at 21:50

## 2017-07-02 RX ADMIN — INSULIN LISPRO 5 UNITS: 100 INJECTION, SOLUTION INTRAVENOUS; SUBCUTANEOUS at 20:07

## 2017-07-02 RX ADMIN — HEPARIN SODIUM 5000 UNITS: 5000 INJECTION, SOLUTION INTRAVENOUS; SUBCUTANEOUS at 06:18

## 2017-07-02 RX ADMIN — MICONAZORB AF ANTIFUNGAL POWDER: 1.42 POWDER TOPICAL at 08:06

## 2017-07-02 RX ADMIN — Medication 10 ML: at 20:11

## 2017-07-02 RX ADMIN — ASPIRIN 81 MG: 81 TABLET, COATED ORAL at 08:03

## 2017-07-02 RX ADMIN — ATORVASTATIN CALCIUM 20 MG: 20 TABLET, FILM COATED ORAL at 20:11

## 2017-07-02 RX ADMIN — FUROSEMIDE 40 MG: 10 INJECTION, SOLUTION INTRAMUSCULAR; INTRAVENOUS at 08:12

## 2017-07-02 RX ADMIN — PREDNISONE 40 MG: 20 TABLET ORAL at 08:04

## 2017-07-02 RX ADMIN — MOMETASONE FUROATE AND FORMOTEROL FUMARATE DIHYDRATE 2 PUFF: 200; 5 AEROSOL RESPIRATORY (INHALATION) at 08:57

## 2017-07-02 RX ADMIN — IPRATROPIUM BROMIDE AND ALBUTEROL SULFATE 1 AMPULE: .5; 3 SOLUTION RESPIRATORY (INHALATION) at 21:50

## 2017-07-02 RX ADMIN — IPRATROPIUM BROMIDE AND ALBUTEROL SULFATE 1 AMPULE: .5; 3 SOLUTION RESPIRATORY (INHALATION) at 13:45

## 2017-07-02 RX ADMIN — SPIRONOLACTONE 25 MG: 25 TABLET ORAL at 08:58

## 2017-07-02 RX ADMIN — Medication 10 ML: at 08:05

## 2017-07-02 RX ADMIN — INSULIN LISPRO 4 UNITS: 100 INJECTION, SOLUTION INTRAVENOUS; SUBCUTANEOUS at 12:33

## 2017-07-02 RX ADMIN — HEPARIN SODIUM 5000 UNITS: 5000 INJECTION, SOLUTION INTRAVENOUS; SUBCUTANEOUS at 13:09

## 2017-07-02 RX ADMIN — LISINOPRIL 2.5 MG: 2.5 TABLET ORAL at 08:03

## 2017-07-02 RX ADMIN — METHYLPREDNISOLONE SODIUM SUCCINATE 40 MG: 125 INJECTION, POWDER, FOR SOLUTION INTRAMUSCULAR; INTRAVENOUS at 05:13

## 2017-07-02 RX ADMIN — METOPROLOL TARTRATE 50 MG: 50 TABLET, FILM COATED ORAL at 20:11

## 2017-07-02 RX ADMIN — INSULIN LISPRO 6 UNITS: 100 INJECTION, SOLUTION INTRAVENOUS; SUBCUTANEOUS at 17:55

## 2017-07-02 RX ADMIN — ALBUTEROL SULFATE 2.5 MG: 2.5 SOLUTION RESPIRATORY (INHALATION) at 03:11

## 2017-07-02 RX ADMIN — INSULIN LISPRO 2 UNITS: 100 INJECTION, SOLUTION INTRAVENOUS; SUBCUTANEOUS at 08:00

## 2017-07-02 RX ADMIN — FUROSEMIDE 40 MG: 40 TABLET ORAL at 17:57

## 2017-07-02 RX ADMIN — IPRATROPIUM BROMIDE AND ALBUTEROL SULFATE 1 AMPULE: .5; 3 SOLUTION RESPIRATORY (INHALATION) at 08:55

## 2017-07-02 RX ADMIN — HEPARIN SODIUM 5000 UNITS: 5000 INJECTION, SOLUTION INTRAVENOUS; SUBCUTANEOUS at 21:52

## 2017-07-02 RX ADMIN — MICONAZORB AF ANTIFUNGAL POWDER: 1.42 POWDER TOPICAL at 20:12

## 2017-07-02 ASSESSMENT — PULMONARY FUNCTION TESTS: PIF_VALUE: 10

## 2017-07-02 ASSESSMENT — PAIN SCALES - GENERAL
PAINLEVEL_OUTOF10: 0
PAINLEVEL_OUTOF10: 0

## 2017-07-03 LAB
ANION GAP SERPL CALCULATED.3IONS-SCNC: 12 MMOL/L (ref 9–17)
BUN BLDV-MCNC: 36 MG/DL (ref 8–23)
BUN/CREAT BLD: ABNORMAL (ref 9–20)
CALCIUM SERPL-MCNC: 9.3 MG/DL (ref 8.6–10.4)
CHLORIDE BLD-SCNC: 93 MMOL/L (ref 98–107)
CO2: 32 MMOL/L (ref 20–31)
CREAT SERPL-MCNC: 1.05 MG/DL (ref 0.7–1.2)
EKG ATRIAL RATE: 97 BPM
EKG P AXIS: 72 DEGREES
EKG P-R INTERVAL: 158 MS
EKG Q-T INTERVAL: 376 MS
EKG QRS DURATION: 102 MS
EKG QTC CALCULATION (BAZETT): 477 MS
EKG R AXIS: -48 DEGREES
EKG T AXIS: 93 DEGREES
EKG VENTRICULAR RATE: 97 BPM
GFR AFRICAN AMERICAN: >60 ML/MIN
GFR NON-AFRICAN AMERICAN: >60 ML/MIN
GFR SERPL CREATININE-BSD FRML MDRD: ABNORMAL ML/MIN/{1.73_M2}
GFR SERPL CREATININE-BSD FRML MDRD: ABNORMAL ML/MIN/{1.73_M2}
GLUCOSE BLD-MCNC: 198 MG/DL (ref 75–110)
GLUCOSE BLD-MCNC: 220 MG/DL (ref 70–99)
GLUCOSE BLD-MCNC: 228 MG/DL (ref 75–110)
GLUCOSE BLD-MCNC: 307 MG/DL (ref 75–110)
GLUCOSE BLD-MCNC: 386 MG/DL (ref 75–110)
P E INTERPRETATION, U: NORMAL
PATHOLOGIST: NORMAL
POTASSIUM SERPL-SCNC: 4.1 MMOL/L (ref 3.7–5.3)
SODIUM BLD-SCNC: 137 MMOL/L (ref 135–144)
SPECIMEN TYPE: NORMAL
THYROID PEROXIDASE (TPO) AB: >1000 IU/ML (ref 0–35)
URINE TOTAL PROTEIN: 9 MG/DL

## 2017-07-03 PROCEDURE — 6370000000 HC RX 637 (ALT 250 FOR IP): Performed by: STUDENT IN AN ORGANIZED HEALTH CARE EDUCATION/TRAINING PROGRAM

## 2017-07-03 PROCEDURE — 94762 N-INVAS EAR/PLS OXIMTRY CONT: CPT

## 2017-07-03 PROCEDURE — 99233 SBSQ HOSP IP/OBS HIGH 50: CPT | Performed by: INTERNAL MEDICINE

## 2017-07-03 PROCEDURE — 80048 BASIC METABOLIC PNL TOTAL CA: CPT

## 2017-07-03 PROCEDURE — 82947 ASSAY GLUCOSE BLOOD QUANT: CPT

## 2017-07-03 PROCEDURE — 94640 AIRWAY INHALATION TREATMENT: CPT

## 2017-07-03 PROCEDURE — 6370000000 HC RX 637 (ALT 250 FOR IP): Performed by: EMERGENCY MEDICINE

## 2017-07-03 PROCEDURE — 6370000000 HC RX 637 (ALT 250 FOR IP): Performed by: INTERNAL MEDICINE

## 2017-07-03 PROCEDURE — 97530 THERAPEUTIC ACTIVITIES: CPT

## 2017-07-03 PROCEDURE — 6370000000 HC RX 637 (ALT 250 FOR IP): Performed by: HOSPITALIST

## 2017-07-03 PROCEDURE — 36415 COLL VENOUS BLD VENIPUNCTURE: CPT

## 2017-07-03 PROCEDURE — 2580000003 HC RX 258: Performed by: STUDENT IN AN ORGANIZED HEALTH CARE EDUCATION/TRAINING PROGRAM

## 2017-07-03 PROCEDURE — 6360000002 HC RX W HCPCS: Performed by: STUDENT IN AN ORGANIZED HEALTH CARE EDUCATION/TRAINING PROGRAM

## 2017-07-03 PROCEDURE — 99406 BEHAV CHNG SMOKING 3-10 MIN: CPT

## 2017-07-03 PROCEDURE — 1200000000 HC SEMI PRIVATE

## 2017-07-03 RX ORDER — INSULIN GLARGINE 100 [IU]/ML
7 INJECTION, SOLUTION SUBCUTANEOUS NIGHTLY
Status: DISCONTINUED | OUTPATIENT
Start: 2017-07-03 | End: 2017-07-03

## 2017-07-03 RX ORDER — METOPROLOL SUCCINATE 50 MG/1
50 TABLET, EXTENDED RELEASE ORAL DAILY
Status: DISCONTINUED | OUTPATIENT
Start: 2017-07-03 | End: 2017-07-03

## 2017-07-03 RX ORDER — INSULIN GLARGINE 100 [IU]/ML
7 INJECTION, SOLUTION SUBCUTANEOUS EVERY MORNING
Status: DISCONTINUED | OUTPATIENT
Start: 2017-07-03 | End: 2017-07-04

## 2017-07-03 RX ORDER — LISINOPRIL 5 MG/1
5 TABLET ORAL DAILY
Status: DISCONTINUED | OUTPATIENT
Start: 2017-07-04 | End: 2017-07-04

## 2017-07-03 RX ORDER — ALBUTEROL SULFATE 2.5 MG/3ML
2.5 SOLUTION RESPIRATORY (INHALATION) EVERY 6 HOURS PRN
Status: DISCONTINUED | OUTPATIENT
Start: 2017-07-03 | End: 2017-07-05 | Stop reason: HOSPADM

## 2017-07-03 RX ORDER — INSULIN GLARGINE 100 [IU]/ML
5 INJECTION, SOLUTION SUBCUTANEOUS NIGHTLY
Status: DISCONTINUED | OUTPATIENT
Start: 2017-07-03 | End: 2017-07-04

## 2017-07-03 RX ORDER — METOPROLOL SUCCINATE 25 MG/1
25 TABLET, EXTENDED RELEASE ORAL DAILY
Status: DISCONTINUED | OUTPATIENT
Start: 2017-07-03 | End: 2017-07-04

## 2017-07-03 RX ADMIN — MOMETASONE FUROATE AND FORMOTEROL FUMARATE DIHYDRATE 2 PUFF: 200; 5 AEROSOL RESPIRATORY (INHALATION) at 19:50

## 2017-07-03 RX ADMIN — METOPROLOL SUCCINATE 25 MG: 25 TABLET, FILM COATED, EXTENDED RELEASE ORAL at 09:42

## 2017-07-03 RX ADMIN — ATORVASTATIN CALCIUM 20 MG: 20 TABLET, FILM COATED ORAL at 21:22

## 2017-07-03 RX ADMIN — INSULIN GLARGINE 5 UNITS: 100 INJECTION, SOLUTION SUBCUTANEOUS at 21:22

## 2017-07-03 RX ADMIN — FUROSEMIDE 40 MG: 40 TABLET ORAL at 16:50

## 2017-07-03 RX ADMIN — HEPARIN SODIUM 5000 UNITS: 5000 INJECTION, SOLUTION INTRAVENOUS; SUBCUTANEOUS at 13:27

## 2017-07-03 RX ADMIN — FUROSEMIDE 40 MG: 40 TABLET ORAL at 09:42

## 2017-07-03 RX ADMIN — IPRATROPIUM BROMIDE AND ALBUTEROL SULFATE 1 AMPULE: .5; 3 SOLUTION RESPIRATORY (INHALATION) at 12:10

## 2017-07-03 RX ADMIN — ASPIRIN 81 MG: 81 TABLET, COATED ORAL at 09:43

## 2017-07-03 RX ADMIN — INSULIN LISPRO 10 UNITS: 100 INJECTION, SOLUTION INTRAVENOUS; SUBCUTANEOUS at 16:51

## 2017-07-03 RX ADMIN — LISINOPRIL 2.5 MG: 2.5 TABLET ORAL at 09:42

## 2017-07-03 RX ADMIN — Medication 10 ML: at 21:31

## 2017-07-03 RX ADMIN — PREDNISONE 20 MG: 20 TABLET ORAL at 09:42

## 2017-07-03 RX ADMIN — SPIRONOLACTONE 25 MG: 25 TABLET ORAL at 09:43

## 2017-07-03 RX ADMIN — INSULIN LISPRO 4 UNITS: 100 INJECTION, SOLUTION INTRAVENOUS; SUBCUTANEOUS at 09:45

## 2017-07-03 RX ADMIN — Medication 10 ML: at 09:43

## 2017-07-03 RX ADMIN — HEPARIN SODIUM 5000 UNITS: 5000 INJECTION, SOLUTION INTRAVENOUS; SUBCUTANEOUS at 06:33

## 2017-07-03 RX ADMIN — IPRATROPIUM BROMIDE AND ALBUTEROL SULFATE 1 AMPULE: .5; 3 SOLUTION RESPIRATORY (INHALATION) at 19:50

## 2017-07-03 RX ADMIN — MICONAZORB AF ANTIFUNGAL POWDER: 1.42 POWDER TOPICAL at 21:26

## 2017-07-03 RX ADMIN — INSULIN GLARGINE 7 UNITS: 100 INJECTION, SOLUTION SUBCUTANEOUS at 09:44

## 2017-07-03 RX ADMIN — HEPARIN SODIUM 5000 UNITS: 5000 INJECTION, SOLUTION INTRAVENOUS; SUBCUTANEOUS at 21:30

## 2017-07-03 RX ADMIN — INSULIN LISPRO 2 UNITS: 100 INJECTION, SOLUTION INTRAVENOUS; SUBCUTANEOUS at 13:03

## 2017-07-03 RX ADMIN — INSULIN LISPRO 4 UNITS: 100 INJECTION, SOLUTION INTRAVENOUS; SUBCUTANEOUS at 21:22

## 2017-07-03 RX ADMIN — IPRATROPIUM BROMIDE AND ALBUTEROL SULFATE 1 AMPULE: .5; 3 SOLUTION RESPIRATORY (INHALATION) at 16:00

## 2017-07-03 RX ADMIN — MICONAZORB AF ANTIFUNGAL POWDER: 1.42 POWDER TOPICAL at 09:44

## 2017-07-03 ASSESSMENT — PAIN SCALES - GENERAL
PAINLEVEL_OUTOF10: 0
PAINLEVEL_OUTOF10: 5

## 2017-07-03 ASSESSMENT — PAIN DESCRIPTION - PAIN TYPE: TYPE: CHRONIC PAIN

## 2017-07-03 ASSESSMENT — PAIN DESCRIPTION - LOCATION: LOCATION: GENERALIZED

## 2017-07-03 ASSESSMENT — PAIN DESCRIPTION - DESCRIPTORS: DESCRIPTORS: ACHING

## 2017-07-03 ASSESSMENT — PAIN DESCRIPTION - FREQUENCY: FREQUENCY: CONTINUOUS

## 2017-07-03 ASSESSMENT — PAIN DESCRIPTION - PROGRESSION: CLINICAL_PROGRESSION: NOT CHANGED

## 2017-07-04 LAB
GLUCOSE BLD-MCNC: 176 MG/DL (ref 75–110)
GLUCOSE BLD-MCNC: 187 MG/DL (ref 75–110)
GLUCOSE BLD-MCNC: 229 MG/DL (ref 75–110)
GLUCOSE BLD-MCNC: 316 MG/DL (ref 75–110)

## 2017-07-04 PROCEDURE — 2580000003 HC RX 258: Performed by: STUDENT IN AN ORGANIZED HEALTH CARE EDUCATION/TRAINING PROGRAM

## 2017-07-04 PROCEDURE — 6370000000 HC RX 637 (ALT 250 FOR IP): Performed by: INTERNAL MEDICINE

## 2017-07-04 PROCEDURE — 99233 SBSQ HOSP IP/OBS HIGH 50: CPT | Performed by: INTERNAL MEDICINE

## 2017-07-04 PROCEDURE — 6360000002 HC RX W HCPCS: Performed by: STUDENT IN AN ORGANIZED HEALTH CARE EDUCATION/TRAINING PROGRAM

## 2017-07-04 PROCEDURE — 6370000000 HC RX 637 (ALT 250 FOR IP): Performed by: NURSE PRACTITIONER

## 2017-07-04 PROCEDURE — 6370000000 HC RX 637 (ALT 250 FOR IP): Performed by: STUDENT IN AN ORGANIZED HEALTH CARE EDUCATION/TRAINING PROGRAM

## 2017-07-04 PROCEDURE — 6370000000 HC RX 637 (ALT 250 FOR IP): Performed by: EMERGENCY MEDICINE

## 2017-07-04 PROCEDURE — 94761 N-INVAS EAR/PLS OXIMETRY MLT: CPT

## 2017-07-04 PROCEDURE — 1200000000 HC SEMI PRIVATE

## 2017-07-04 PROCEDURE — 94762 N-INVAS EAR/PLS OXIMTRY CONT: CPT

## 2017-07-04 PROCEDURE — 82947 ASSAY GLUCOSE BLOOD QUANT: CPT

## 2017-07-04 PROCEDURE — 94640 AIRWAY INHALATION TREATMENT: CPT

## 2017-07-04 RX ORDER — LISINOPRIL 20 MG/1
20 TABLET ORAL DAILY
Status: DISCONTINUED | OUTPATIENT
Start: 2017-07-04 | End: 2017-07-05 | Stop reason: HOSPADM

## 2017-07-04 RX ORDER — SPIRONOLACTONE 25 MG/1
25 TABLET ORAL DAILY
Status: DISCONTINUED | OUTPATIENT
Start: 2017-07-05 | End: 2017-07-05 | Stop reason: HOSPADM

## 2017-07-04 RX ORDER — FUROSEMIDE 20 MG/1
20 TABLET ORAL DAILY
Status: DISCONTINUED | OUTPATIENT
Start: 2017-07-04 | End: 2017-07-04

## 2017-07-04 RX ORDER — SPIRONOLACTONE 25 MG/1
25 TABLET ORAL DAILY
Status: DISCONTINUED | OUTPATIENT
Start: 2017-07-04 | End: 2017-07-04

## 2017-07-04 RX ORDER — CARVEDILOL 12.5 MG/1
12.5 TABLET ORAL 2 TIMES DAILY WITH MEALS
Status: DISCONTINUED | OUTPATIENT
Start: 2017-07-04 | End: 2017-07-05 | Stop reason: HOSPADM

## 2017-07-04 RX ORDER — FUROSEMIDE 20 MG/1
20 TABLET ORAL DAILY
Status: DISCONTINUED | OUTPATIENT
Start: 2017-07-05 | End: 2017-07-05 | Stop reason: HOSPADM

## 2017-07-04 RX ORDER — METFORMIN HYDROCHLORIDE 500 MG/1
2000 TABLET, EXTENDED RELEASE ORAL
Status: DISCONTINUED | OUTPATIENT
Start: 2017-07-04 | End: 2017-07-05 | Stop reason: HOSPADM

## 2017-07-04 RX ADMIN — HEPARIN SODIUM 5000 UNITS: 5000 INJECTION, SOLUTION INTRAVENOUS; SUBCUTANEOUS at 14:48

## 2017-07-04 RX ADMIN — Medication 10 ML: at 08:23

## 2017-07-04 RX ADMIN — ASPIRIN 81 MG: 81 TABLET, COATED ORAL at 08:23

## 2017-07-04 RX ADMIN — FUROSEMIDE 40 MG: 40 TABLET ORAL at 08:23

## 2017-07-04 RX ADMIN — ATORVASTATIN CALCIUM 20 MG: 20 TABLET, FILM COATED ORAL at 21:11

## 2017-07-04 RX ADMIN — Medication 10 ML: at 21:11

## 2017-07-04 RX ADMIN — MOMETASONE FUROATE AND FORMOTEROL FUMARATE DIHYDRATE 2 PUFF: 200; 5 AEROSOL RESPIRATORY (INHALATION) at 21:27

## 2017-07-04 RX ADMIN — MOMETASONE FUROATE AND FORMOTEROL FUMARATE DIHYDRATE 2 PUFF: 200; 5 AEROSOL RESPIRATORY (INHALATION) at 08:11

## 2017-07-04 RX ADMIN — METOPROLOL SUCCINATE 25 MG: 25 TABLET, FILM COATED, EXTENDED RELEASE ORAL at 08:23

## 2017-07-04 RX ADMIN — TIOTROPIUM BROMIDE 18 MCG: 18 CAPSULE ORAL; RESPIRATORY (INHALATION) at 15:26

## 2017-07-04 RX ADMIN — HEPARIN SODIUM 5000 UNITS: 5000 INJECTION, SOLUTION INTRAVENOUS; SUBCUTANEOUS at 05:13

## 2017-07-04 RX ADMIN — METFORMIN HYDROCHLORIDE 2000 MG: 500 TABLET, EXTENDED RELEASE ORAL at 17:12

## 2017-07-04 RX ADMIN — MICONAZORB AF ANTIFUNGAL POWDER: 1.42 POWDER TOPICAL at 08:25

## 2017-07-04 RX ADMIN — INSULIN GLARGINE 7 UNITS: 100 INJECTION, SOLUTION SUBCUTANEOUS at 08:26

## 2017-07-04 RX ADMIN — HEPARIN SODIUM 5000 UNITS: 5000 INJECTION, SOLUTION INTRAVENOUS; SUBCUTANEOUS at 21:11

## 2017-07-04 RX ADMIN — LISINOPRIL 5 MG: 5 TABLET ORAL at 08:23

## 2017-07-04 RX ADMIN — LISINOPRIL 20 MG: 20 TABLET ORAL at 12:29

## 2017-07-04 RX ADMIN — CARVEDILOL 12.5 MG: 12.5 TABLET, FILM COATED ORAL at 12:29

## 2017-07-04 RX ADMIN — INSULIN LISPRO 2 UNITS: 100 INJECTION, SOLUTION INTRAVENOUS; SUBCUTANEOUS at 08:27

## 2017-07-04 RX ADMIN — MICONAZORB AF ANTIFUNGAL POWDER: 1.42 POWDER TOPICAL at 21:11

## 2017-07-04 RX ADMIN — INSULIN LISPRO 2 UNITS: 100 INJECTION, SOLUTION INTRAVENOUS; SUBCUTANEOUS at 21:11

## 2017-07-04 RX ADMIN — INSULIN LISPRO 8 UNITS: 100 INJECTION, SOLUTION INTRAVENOUS; SUBCUTANEOUS at 17:13

## 2017-07-04 RX ADMIN — CARVEDILOL 12.5 MG: 12.5 TABLET, FILM COATED ORAL at 17:12

## 2017-07-04 RX ADMIN — INSULIN LISPRO 2 UNITS: 100 INJECTION, SOLUTION INTRAVENOUS; SUBCUTANEOUS at 12:30

## 2017-07-04 RX ADMIN — PREDNISONE 20 MG: 20 TABLET ORAL at 08:23

## 2017-07-04 RX ADMIN — SPIRONOLACTONE 25 MG: 25 TABLET ORAL at 08:23

## 2017-07-05 ENCOUNTER — APPOINTMENT (OUTPATIENT)
Dept: ULTRASOUND IMAGING | Age: 62
DRG: 291 | End: 2017-07-05
Payer: MEDICARE

## 2017-07-05 VITALS
DIASTOLIC BLOOD PRESSURE: 95 MMHG | WEIGHT: 178.3 LBS | SYSTOLIC BLOOD PRESSURE: 164 MMHG | HEART RATE: 75 BPM | OXYGEN SATURATION: 91 % | TEMPERATURE: 98.4 F | BODY MASS INDEX: 30.44 KG/M2 | HEIGHT: 64 IN | RESPIRATION RATE: 18 BRPM

## 2017-07-05 LAB
ANION GAP SERPL CALCULATED.3IONS-SCNC: 12 MMOL/L (ref 9–17)
BUN BLDV-MCNC: 26 MG/DL (ref 8–23)
BUN/CREAT BLD: ABNORMAL (ref 9–20)
CALCIUM SERPL-MCNC: 9.2 MG/DL (ref 8.6–10.4)
CHLORIDE BLD-SCNC: 99 MMOL/L (ref 98–107)
CO2: 32 MMOL/L (ref 20–31)
CREAT SERPL-MCNC: 0.88 MG/DL (ref 0.7–1.2)
GFR AFRICAN AMERICAN: >60 ML/MIN
GFR NON-AFRICAN AMERICAN: >60 ML/MIN
GFR SERPL CREATININE-BSD FRML MDRD: ABNORMAL ML/MIN/{1.73_M2}
GFR SERPL CREATININE-BSD FRML MDRD: ABNORMAL ML/MIN/{1.73_M2}
GLUCOSE BLD-MCNC: 131 MG/DL (ref 75–110)
GLUCOSE BLD-MCNC: 146 MG/DL (ref 75–110)
GLUCOSE BLD-MCNC: 171 MG/DL (ref 70–99)
HCT VFR BLD CALC: 39.5 % (ref 41–53)
HEMOGLOBIN: 13.2 G/DL (ref 13.5–17.5)
MCH RBC QN AUTO: 30.9 PG (ref 26–34)
MCHC RBC AUTO-ENTMCNC: 33.3 G/DL (ref 31–37)
MCV RBC AUTO: 92.8 FL (ref 80–100)
PDW BLD-RTO: 15.5 % (ref 12.5–15.4)
PLATELET # BLD: 188 K/UL (ref 140–450)
PMV BLD AUTO: 9.2 FL (ref 6–12)
POTASSIUM SERPL-SCNC: 4.4 MMOL/L (ref 3.7–5.3)
RBC # BLD: 4.26 M/UL (ref 4.5–5.9)
SODIUM BLD-SCNC: 143 MMOL/L (ref 135–144)
WBC # BLD: 9.1 K/UL (ref 3.5–11)

## 2017-07-05 PROCEDURE — 80048 BASIC METABOLIC PNL TOTAL CA: CPT

## 2017-07-05 PROCEDURE — 94640 AIRWAY INHALATION TREATMENT: CPT

## 2017-07-05 PROCEDURE — 76775 US EXAM ABDO BACK WALL LIM: CPT

## 2017-07-05 PROCEDURE — 36415 COLL VENOUS BLD VENIPUNCTURE: CPT

## 2017-07-05 PROCEDURE — 85027 COMPLETE CBC AUTOMATED: CPT

## 2017-07-05 PROCEDURE — 99233 SBSQ HOSP IP/OBS HIGH 50: CPT | Performed by: INTERNAL MEDICINE

## 2017-07-05 PROCEDURE — 6370000000 HC RX 637 (ALT 250 FOR IP): Performed by: INTERNAL MEDICINE

## 2017-07-05 PROCEDURE — 6370000000 HC RX 637 (ALT 250 FOR IP): Performed by: STUDENT IN AN ORGANIZED HEALTH CARE EDUCATION/TRAINING PROGRAM

## 2017-07-05 PROCEDURE — 82947 ASSAY GLUCOSE BLOOD QUANT: CPT

## 2017-07-05 PROCEDURE — 2580000003 HC RX 258: Performed by: STUDENT IN AN ORGANIZED HEALTH CARE EDUCATION/TRAINING PROGRAM

## 2017-07-05 PROCEDURE — 6360000002 HC RX W HCPCS: Performed by: STUDENT IN AN ORGANIZED HEALTH CARE EDUCATION/TRAINING PROGRAM

## 2017-07-05 RX ORDER — CARVEDILOL 25 MG/1
25 TABLET ORAL 2 TIMES DAILY
Qty: 30 TABLET | Refills: 3 | Status: SHIPPED | OUTPATIENT
Start: 2017-07-05 | End: 2018-01-02 | Stop reason: SDUPTHER

## 2017-07-05 RX ORDER — CARVEDILOL 12.5 MG/1
25 TABLET ORAL 2 TIMES DAILY
Qty: 60 TABLET | Refills: 3 | Status: SHIPPED | OUTPATIENT
Start: 2017-07-05 | End: 2017-07-05

## 2017-07-05 RX ORDER — AMLODIPINE BESYLATE 10 MG/1
10 TABLET ORAL DAILY
Qty: 30 TABLET | Refills: 3 | Status: SHIPPED | OUTPATIENT
Start: 2017-07-05 | End: 2017-07-05 | Stop reason: HOSPADM

## 2017-07-05 RX ORDER — FUROSEMIDE 20 MG/1
20 TABLET ORAL DAILY
Qty: 60 TABLET | Refills: 3 | Status: SHIPPED | OUTPATIENT
Start: 2017-07-05 | End: 2018-01-02 | Stop reason: SDUPTHER

## 2017-07-05 RX ORDER — SPIRONOLACTONE 25 MG/1
25 TABLET ORAL DAILY
Qty: 30 TABLET | Refills: 3 | Status: SHIPPED | OUTPATIENT
Start: 2017-07-05 | End: 2018-01-02 | Stop reason: SDUPTHER

## 2017-07-05 RX ADMIN — MOMETASONE FUROATE AND FORMOTEROL FUMARATE DIHYDRATE 2 PUFF: 200; 5 AEROSOL RESPIRATORY (INHALATION) at 07:47

## 2017-07-05 RX ADMIN — CARVEDILOL 12.5 MG: 12.5 TABLET, FILM COATED ORAL at 08:31

## 2017-07-05 RX ADMIN — FUROSEMIDE 20 MG: 20 TABLET ORAL at 08:31

## 2017-07-05 RX ADMIN — TIOTROPIUM BROMIDE 18 MCG: 18 CAPSULE ORAL; RESPIRATORY (INHALATION) at 07:47

## 2017-07-05 RX ADMIN — HEPARIN SODIUM 5000 UNITS: 5000 INJECTION, SOLUTION INTRAVENOUS; SUBCUTANEOUS at 06:15

## 2017-07-05 RX ADMIN — MICONAZORB AF ANTIFUNGAL POWDER: 1.42 POWDER TOPICAL at 08:32

## 2017-07-05 RX ADMIN — ASPIRIN 81 MG: 81 TABLET, COATED ORAL at 08:31

## 2017-07-05 RX ADMIN — LISINOPRIL 20 MG: 20 TABLET ORAL at 08:31

## 2017-07-05 RX ADMIN — INSULIN LISPRO 2 UNITS: 100 INJECTION, SOLUTION INTRAVENOUS; SUBCUTANEOUS at 08:33

## 2017-07-05 RX ADMIN — Medication 10 ML: at 08:31

## 2017-07-05 RX ADMIN — SPIRONOLACTONE 25 MG: 25 TABLET ORAL at 09:25

## 2017-07-20 ENCOUNTER — HOSPITAL ENCOUNTER (OUTPATIENT)
Dept: OTHER | Age: 62
Discharge: HOME | End: 2017-07-20
Payer: MEDICARE

## 2017-07-20 VITALS
WEIGHT: 164.5 LBS | DIASTOLIC BLOOD PRESSURE: 55 MMHG | HEART RATE: 56 BPM | OXYGEN SATURATION: 95 % | RESPIRATION RATE: 18 BRPM | SYSTOLIC BLOOD PRESSURE: 107 MMHG | BODY MASS INDEX: 28.24 KG/M2

## 2017-07-20 PROCEDURE — G0463 HOSPITAL OUTPT CLINIC VISIT: HCPCS

## 2017-07-20 PROCEDURE — 99212 OFFICE O/P EST SF 10 MIN: CPT

## 2017-07-20 ASSESSMENT — PAIN SCALES - GENERAL: PAINLEVEL_OUTOF10: 0

## 2017-08-17 ENCOUNTER — HOSPITAL ENCOUNTER (OUTPATIENT)
Dept: OTHER | Age: 62
Discharge: HOME | End: 2017-08-17
Payer: MEDICARE

## 2017-08-17 VITALS
WEIGHT: 167 LBS | RESPIRATION RATE: 18 BRPM | OXYGEN SATURATION: 96 % | BODY MASS INDEX: 28.67 KG/M2 | HEART RATE: 60 BPM | SYSTOLIC BLOOD PRESSURE: 107 MMHG | DIASTOLIC BLOOD PRESSURE: 56 MMHG

## 2017-08-17 PROCEDURE — 99211 OFF/OP EST MAY X REQ PHY/QHP: CPT

## 2017-08-17 ASSESSMENT — PAIN SCALES - GENERAL: PAINLEVEL_OUTOF10: 0

## 2017-09-14 ENCOUNTER — HOSPITAL ENCOUNTER (OUTPATIENT)
Dept: OTHER | Age: 62
Discharge: HOME | End: 2017-09-14
Payer: MEDICARE

## 2017-09-14 VITALS
RESPIRATION RATE: 18 BRPM | SYSTOLIC BLOOD PRESSURE: 115 MMHG | BODY MASS INDEX: 29.39 KG/M2 | DIASTOLIC BLOOD PRESSURE: 58 MMHG | WEIGHT: 171.25 LBS | HEART RATE: 59 BPM | OXYGEN SATURATION: 95 %

## 2017-09-14 PROCEDURE — 99211 OFF/OP EST MAY X REQ PHY/QHP: CPT

## 2017-09-14 ASSESSMENT — PAIN SCALES - GENERAL: PAINLEVEL_OUTOF10: 0

## 2017-09-28 ENCOUNTER — HOSPITAL ENCOUNTER (OUTPATIENT)
Age: 62
Discharge: HOME | End: 2017-09-28
Payer: MEDICARE

## 2017-09-28 ENCOUNTER — HOSPITAL ENCOUNTER (OUTPATIENT)
Dept: OTHER | Age: 62
Discharge: HOME | End: 2017-09-28
Payer: MEDICARE

## 2017-09-28 LAB
ANION GAP SERPL CALCULATED.3IONS-SCNC: 11 MMOL/L (ref 9–17)
BUN BLDV-MCNC: 18 MG/DL (ref 8–23)
BUN/CREAT BLD: ABNORMAL (ref 9–20)
CALCIUM SERPL-MCNC: 9 MG/DL (ref 8.6–10.4)
CHLORIDE BLD-SCNC: 105 MMOL/L (ref 98–107)
CO2: 24 MMOL/L (ref 20–31)
CREAT SERPL-MCNC: 0.66 MG/DL (ref 0.7–1.2)
GFR AFRICAN AMERICAN: >60 ML/MIN
GFR NON-AFRICAN AMERICAN: >60 ML/MIN
GFR SERPL CREATININE-BSD FRML MDRD: ABNORMAL ML/MIN/{1.73_M2}
GFR SERPL CREATININE-BSD FRML MDRD: ABNORMAL ML/MIN/{1.73_M2}
GLUCOSE BLD-MCNC: 51 MG/DL (ref 70–99)
POTASSIUM SERPL-SCNC: 4.5 MMOL/L (ref 3.7–5.3)
SODIUM BLD-SCNC: 140 MMOL/L (ref 135–144)

## 2017-09-28 PROCEDURE — 99211 OFF/OP EST MAY X REQ PHY/QHP: CPT

## 2017-09-28 PROCEDURE — 80048 BASIC METABOLIC PNL TOTAL CA: CPT

## 2017-09-28 RX ORDER — CETIRIZINE HYDROCHLORIDE 10 MG/1
10 TABLET ORAL DAILY
COMMUNITY
End: 2018-01-02 | Stop reason: SDUPTHER

## 2017-10-06 ENCOUNTER — TELEPHONE (OUTPATIENT)
Dept: FAMILY MEDICINE CLINIC | Age: 62
End: 2017-10-06

## 2017-10-06 VITALS — HEART RATE: 55 BPM | SYSTOLIC BLOOD PRESSURE: 134 MMHG | DIASTOLIC BLOOD PRESSURE: 69 MMHG | RESPIRATION RATE: 18 BRPM

## 2017-10-06 RX ORDER — AMLODIPINE BESYLATE 10 MG/1
10 TABLET ORAL DAILY
COMMUNITY
End: 2018-01-05 | Stop reason: SDUPTHER

## 2017-10-06 RX ORDER — GLIMEPIRIDE 4 MG/1
4 TABLET ORAL
COMMUNITY
End: 2018-01-05 | Stop reason: SDUPTHER

## 2017-10-06 RX ORDER — ALBUTEROL SULFATE 2.5 MG/3ML
2.5 SOLUTION RESPIRATORY (INHALATION) EVERY 6 HOURS PRN
COMMUNITY
End: 2018-01-05 | Stop reason: SDUPTHER

## 2017-10-06 ASSESSMENT — PATIENT HEALTH QUESTIONNAIRE - PHQ9
5. POOR APPETITE OR OVEREATING: 0
8. MOVING OR SPEAKING SO SLOWLY THAT OTHER PEOPLE COULD HAVE NOTICED. OR THE OPPOSITE, BEING SO FIGETY OR RESTLESS THAT YOU HAVE BEEN MOVING AROUND A LOT MORE THAN USUAL: 0
3. TROUBLE FALLING OR STAYING ASLEEP: 0
10. IF YOU CHECKED OFF ANY PROBLEMS, HOW DIFFICULT HAVE THESE PROBLEMS MADE IT FOR YOU TO DO YOUR WORK, TAKE CARE OF THINGS AT HOME, OR GET ALONG WITH OTHER PEOPLE: 1
6. FEELING BAD ABOUT YOURSELF - OR THAT YOU ARE A FAILURE OR HAVE LET YOURSELF OR YOUR FAMILY DOWN: 0
9. THOUGHTS THAT YOU WOULD BE BETTER OFF DEAD, OR OF HURTING YOURSELF: 0
1. LITTLE INTEREST OR PLEASURE IN DOING THINGS: 2
SUM OF ALL RESPONSES TO PHQ9 QUESTIONS 1 & 2: 3
2. FEELING DOWN, DEPRESSED OR HOPELESS: 1
SUM OF ALL RESPONSES TO PHQ QUESTIONS 1-9: 6
4. FEELING TIRED OR HAVING LITTLE ENERGY: 3
7. TROUBLE CONCENTRATING ON THINGS, SUCH AS READING THE NEWSPAPER OR WATCHING TELEVISION: 0
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
## 2017-10-19 ENCOUNTER — CARE COORDINATION (OUTPATIENT)
Dept: FAMILY MEDICINE CLINIC | Age: 62
End: 2017-10-19

## 2017-10-24 ENCOUNTER — CARE COORDINATOR VISIT (OUTPATIENT)
Dept: FAMILY MEDICINE CLINIC | Age: 62
End: 2017-10-24

## 2017-10-26 ENCOUNTER — HOSPITAL ENCOUNTER (OUTPATIENT)
Dept: OTHER | Age: 62
Discharge: HOME | End: 2017-10-26
Payer: MEDICARE

## 2017-10-26 VITALS
WEIGHT: 167.4 LBS | HEART RATE: 52 BPM | DIASTOLIC BLOOD PRESSURE: 70 MMHG | BODY MASS INDEX: 28.73 KG/M2 | SYSTOLIC BLOOD PRESSURE: 138 MMHG | RESPIRATION RATE: 20 BRPM | OXYGEN SATURATION: 98 %

## 2017-10-26 PROCEDURE — 99211 OFF/OP EST MAY X REQ PHY/QHP: CPT

## 2017-10-26 RX ORDER — LISINOPRIL 20 MG/1
20 TABLET ORAL DAILY
COMMUNITY
End: 2018-01-02 | Stop reason: SDUPTHER

## 2017-10-26 NOTE — PROGRESS NOTES
Date:  10/26/2017  Time:  10:46 AM    CHF Clinic at [facility]    Office: [phone] Fax: [phone]    Re:  Gregoria Zhou   Patient : 1955    Vital Signs: /70   Pulse 52   Resp 20   Wt 167 lb 6.4 oz (75.9 kg)   SpO2 98%   BMI 28.73 kg/m²                       O2 Device: None (Room air)                           No results for input(s): CBC, HGB, HCT, WBC, PLATELET, NA, K, CL, CO2, BUN, CREATININE, GLUCOSE, BNP, INR in the last 72 hours. Respiratory:    Assessment  Charting Type: Reassessment    Breath Sounds  Right Upper Lobe: Clear  Right Middle Lobe: Clear  Right Lower Lobe: Clear, Diminished  Left Upper Lobe: Clear  Left Lower Lobe: Clear, Diminished    Cough/Sputum  Cough: None         Peripheral Vascular  RLE Edema: +1, Non-pitting  LLE Edema: +1, Non-pitting      Complaints: Nothing new        Comment : Weight stable. Denies increased SOB or swelling. No new or acute s/s CHF. States he has and is taking his meds. Phoned [facility], he does have his meds but only 3 have refills. Will call Ashlyn Jaramillo,, [facility], to check on next PCP visit and need for some refills. Reminded of low salt diet and fluid limits. Next visit here 17, 0900.     Electronically signed by Colin Mckeon RN on 10/26/2017 at 10:46 AM

## 2017-10-26 NOTE — CARE COORDINATION
Mr Cliff Luna met with Ashlyn Jaramillo RN and Diane Olson,  in [facility] office    Diane Olson discussed  [address] application process and senior housing options with patient. He was given an extensive list of housing options but was unable to make a decision to apply for housing at this time    Writer enlisted on [employer] assistance for [employer] options with a benefit counselor. He was given printed materials to review on various health plans and the telephone contact information to contact the benefit counselor for follow up. Plan-  Return visit at [facility] office in one week for follow up.     Burgess Aguilar RN, BSN  [employer] [facility]

## 2017-11-07 ENCOUNTER — TELEPHONE (OUTPATIENT)
Dept: FAMILY MEDICINE CLINIC | Age: 62
End: 2017-11-07

## 2017-11-14 ENCOUNTER — CARE COORDINATION (OUTPATIENT)
Dept: FAMILY MEDICINE CLINIC | Age: 62
End: 2017-11-14

## 2017-11-22 ENCOUNTER — HOSPITAL ENCOUNTER (OUTPATIENT)
Dept: OTHER | Age: 62
Discharge: HOME | End: 2017-11-22
Payer: MEDICARE

## 2017-11-22 VITALS
BODY MASS INDEX: 29.01 KG/M2 | WEIGHT: 169 LBS | OXYGEN SATURATION: 97 % | DIASTOLIC BLOOD PRESSURE: 80 MMHG | SYSTOLIC BLOOD PRESSURE: 158 MMHG

## 2017-11-22 PROCEDURE — 99211 OFF/OP EST MAY X REQ PHY/QHP: CPT

## 2017-11-22 NOTE — PROGRESS NOTES
Date:  2017  Time:  9:56 AM    CHF Clinic at [address] [place] [address]    Office: [phone] Fax: [phone]    Re:  Kymberly Zhou   Patient : 1955    Vital Signs: BP (!) 158/80   Wt 169 lb (76.7 kg)   SpO2 97%   BMI 29.01 kg/m²                       O2 Device: None (Room air)                           No results for input(s): CBC, HGB, HCT, WBC, PLATELET, NA, K, CL, CO2, BUN, CREATININE, GLUCOSE, BNP, INR in the last 72 hours. Respiratory:         Breath Sounds  Right Upper Lobe: Clear  Right Middle Lobe: Clear  Right Lower Lobe: Clear, Diminished  Left Upper Lobe: Clear  Left Lower Lobe: Clear, Diminished    Cough/Sputum  Cough: None  Frequency: Infrequent  Sputum Amount: Small  Sputum Color: Clear, Yellow         Peripheral Vascular  RLE Edema: +1, Pitting  LLE Edema: +1, Pitting      Complaints: Denies shortness of breath. Feels ok    Physician Orders none    Comment : Weight up 2 lbs in one month. States he feels ok . Denies shortness of breath. /80. States he didn't take his a.m. Medications yet today. He has some edema to lower extremities 1+ bilaterally. He states he is taking his Lasix 20 mg once daily but thinks he needs refills on his medications for next month. Writer called and spoke with [facility], they stated he has refills available on 6 medications that he didn't  for November. Pt reminded of importance of taking his medications daily as ordered. Pt states he will   the refills for his medications as soon as he gets money around December 3 rd. He states he's working on getting a PCP through the [facility]. Reminded to follow low sodium diet and fluid limits of 2 liters daily. Next f/u here in one month.     Electronically signed by Kandy Kate, MARIANNA on 2017 at 9:56 AM

## 2017-11-28 NOTE — TELEPHONE ENCOUNTER
Met with patient in the office to complete an application for [employer]. Patient stated that has all necessary documents except for birth certificate. Completed an application for birth certificate during visit. Patient stated that he is unable to pay at the moment but possibly will be able to pay the $25 fee next month. Patient stated that he made a decision regarding Medicare insurance but needs phone number. Relayed information to [facility] for follow up.

## 2017-12-11 ENCOUNTER — CARE COORDINATION (OUTPATIENT)
Dept: CARE COORDINATION | Age: 62
End: 2017-12-11

## 2017-12-13 NOTE — CARE COORDINATION
to walk or obtain bus transportation to the medical appointment in Jan.      Housing-  He has applied for HUD housing in the past 2 months. He reports he has not received follow up regarding status of the application. [address] [place] is assisting with housing and community resources. Navigation at initial PCP appointment on 1/2/18. Phone follow up in one week.     Mariaelena Dolan RN, BSN  [employer]

## 2018-01-02 ENCOUNTER — OFFICE VISIT (OUTPATIENT)
Dept: FAMILY MEDICINE CLINIC | Age: 63
End: 2018-01-02
Payer: MEDICARE

## 2018-01-02 ENCOUNTER — HOSPITAL ENCOUNTER (OUTPATIENT)
Age: 63
Setting detail: SPECIMEN
Discharge: HOME | End: 2018-01-02
Payer: MEDICARE

## 2018-01-02 VITALS
WEIGHT: 169 LBS | HEIGHT: 65 IN | BODY MASS INDEX: 28.16 KG/M2 | SYSTOLIC BLOOD PRESSURE: 138 MMHG | HEART RATE: 61 BPM | DIASTOLIC BLOOD PRESSURE: 70 MMHG | TEMPERATURE: 96.5 F

## 2018-01-02 DIAGNOSIS — I10 HYPERTENSION GOAL BP (BLOOD PRESSURE) < 140/80: Chronic | ICD-10-CM

## 2018-01-02 DIAGNOSIS — I50.22 CHRONIC SYSTOLIC HEART FAILURE (HCC): Primary | ICD-10-CM

## 2018-01-02 DIAGNOSIS — I50.22 CHRONIC SYSTOLIC HEART FAILURE (HCC): ICD-10-CM

## 2018-01-02 DIAGNOSIS — F17.209 TOBACCO USE DISORDER, CONTINUOUS: Chronic | ICD-10-CM

## 2018-01-02 DIAGNOSIS — E11.8 TYPE 2 DIABETES MELLITUS WITH COMPLICATION, WITHOUT LONG-TERM CURRENT USE OF INSULIN (HCC): ICD-10-CM

## 2018-01-02 LAB
ANION GAP SERPL CALCULATED.3IONS-SCNC: 13 MMOL/L (ref 9–17)
BUN BLDV-MCNC: 24 MG/DL (ref 8–23)
BUN/CREAT BLD: ABNORMAL (ref 9–20)
CALCIUM SERPL-MCNC: 9 MG/DL (ref 8.6–10.4)
CHLORIDE BLD-SCNC: 104 MMOL/L (ref 98–107)
CO2: 27 MMOL/L (ref 20–31)
CREAT SERPL-MCNC: 1 MG/DL (ref 0.7–1.2)
GFR AFRICAN AMERICAN: >60 ML/MIN
GFR NON-AFRICAN AMERICAN: >60 ML/MIN
GFR SERPL CREATININE-BSD FRML MDRD: ABNORMAL ML/MIN/{1.73_M2}
GFR SERPL CREATININE-BSD FRML MDRD: ABNORMAL ML/MIN/{1.73_M2}
GLUCOSE BLD-MCNC: 105 MG/DL (ref 70–99)
HBA1C MFR BLD: 6.2 %
POTASSIUM SERPL-SCNC: 4.4 MMOL/L (ref 3.7–5.3)
SODIUM BLD-SCNC: 144 MMOL/L (ref 135–144)

## 2018-01-02 PROCEDURE — 1036F TOBACCO NON-USER: CPT | Performed by: FAMILY MEDICINE

## 2018-01-02 PROCEDURE — G8427 DOCREV CUR MEDS BY ELIG CLIN: HCPCS | Performed by: FAMILY MEDICINE

## 2018-01-02 PROCEDURE — 83036 HEMOGLOBIN GLYCOSYLATED A1C: CPT | Performed by: FAMILY MEDICINE

## 2018-01-02 PROCEDURE — 3044F HG A1C LEVEL LT 7.0%: CPT | Performed by: FAMILY MEDICINE

## 2018-01-02 PROCEDURE — G8419 CALC BMI OUT NRM PARAM NOF/U: HCPCS | Performed by: FAMILY MEDICINE

## 2018-01-02 PROCEDURE — 3017F COLORECTAL CA SCREEN DOC REV: CPT | Performed by: FAMILY MEDICINE

## 2018-01-02 PROCEDURE — G8484 FLU IMMUNIZE NO ADMIN: HCPCS | Performed by: FAMILY MEDICINE

## 2018-01-02 PROCEDURE — 99214 OFFICE O/P EST MOD 30 MIN: CPT | Performed by: FAMILY MEDICINE

## 2018-01-02 RX ORDER — ALBUTEROL SULFATE 90 UG/1
AEROSOL, METERED RESPIRATORY (INHALATION)
Qty: 8.5 INHALER | Refills: 5 | Status: SHIPPED | OUTPATIENT
Start: 2018-01-02 | End: 2018-08-02 | Stop reason: SDUPTHER

## 2018-01-02 RX ORDER — CARVEDILOL 25 MG/1
25 TABLET ORAL 2 TIMES DAILY
Qty: 60 TABLET | Refills: 5 | Status: SHIPPED | OUTPATIENT
Start: 2018-01-02 | End: 2018-08-02 | Stop reason: SDUPTHER

## 2018-01-02 RX ORDER — METFORMIN HYDROCHLORIDE 500 MG/1
2000 TABLET, EXTENDED RELEASE ORAL
Qty: 360 TABLET | Refills: 3 | Status: CANCELLED | OUTPATIENT
Start: 2018-01-02

## 2018-01-02 RX ORDER — ALBUTEROL SULFATE 2.5 MG/3ML
2.5 SOLUTION RESPIRATORY (INHALATION) EVERY 6 HOURS PRN
Qty: 120 EACH | Status: CANCELLED | OUTPATIENT
Start: 2018-01-02

## 2018-01-02 RX ORDER — FUROSEMIDE 20 MG/1
20 TABLET ORAL DAILY
Qty: 30 TABLET | Refills: 5 | Status: SHIPPED | OUTPATIENT
Start: 2018-01-02 | End: 2018-08-02 | Stop reason: SDUPTHER

## 2018-01-02 RX ORDER — LISINOPRIL 20 MG/1
20 TABLET ORAL DAILY
Qty: 30 TABLET | Refills: 5 | Status: SHIPPED | OUTPATIENT
Start: 2018-01-02 | End: 2018-08-02 | Stop reason: SDUPTHER

## 2018-01-02 RX ORDER — ATORVASTATIN CALCIUM 20 MG/1
20 TABLET, FILM COATED ORAL
Qty: 30 TABLET | Refills: 5 | Status: SHIPPED | OUTPATIENT
Start: 2018-01-02 | End: 2018-08-02 | Stop reason: SDUPTHER

## 2018-01-02 RX ORDER — GLIMEPIRIDE 4 MG/1
4 TABLET ORAL
Qty: 30 TABLET | Status: CANCELLED | OUTPATIENT
Start: 2018-01-02

## 2018-01-02 RX ORDER — SPIRONOLACTONE 25 MG/1
25 TABLET ORAL DAILY
Qty: 30 TABLET | Refills: 5 | Status: SHIPPED | OUTPATIENT
Start: 2018-01-02 | End: 2018-08-02 | Stop reason: SDUPTHER

## 2018-01-02 RX ORDER — CETIRIZINE HYDROCHLORIDE 10 MG/1
10 TABLET ORAL DAILY
Qty: 30 TABLET | Refills: 5 | Status: SHIPPED | OUTPATIENT
Start: 2018-01-02 | End: 2018-05-03 | Stop reason: ALTCHOICE

## 2018-01-02 ASSESSMENT — ENCOUNTER SYMPTOMS
WHEEZING: 0
DIARRHEA: 0
VOMITING: 0
BLURRED VISION: 0
CONSTIPATION: 0
ABDOMINAL PAIN: 0
SHORTNESS OF BREATH: 0
NAUSEA: 0
COUGH: 0

## 2018-01-03 NOTE — PROGRESS NOTES
I have reviewed and discussed key elements of [address] [facility] with the resident including plan of care and follow up and agree with the care amaury plan.
11/22/17 (!) 158/80   10/26/17 138/70     Physical Exam   Constitutional: He is oriented to person, place, and time and well-developed, well-nourished, and in no distress. No distress. HENT:   Head: Normocephalic and atraumatic. Eyes: Pupils are equal, round, and reactive to light. Cardiovascular: Normal rate, regular rhythm, normal heart sounds and intact distal pulses. No murmur heard. Pulmonary/Chest: Effort normal and breath sounds normal. He has no wheezes. He has no rales. Musculoskeletal: He exhibits no edema. Lymphadenopathy:     He has no cervical adenopathy. Neurological: He is alert and oriented to person, place, and time. Skin: He is not diaphoretic. Nursing note and vitals reviewed. BP Readings from Last 3 Encounters:   01/02/18 138/70   11/22/17 (!) 158/80   10/26/17 138/70     /70 (Site: Left Arm, Position: Sitting, Cuff Size: Large Adult) Comment: Manually  Pulse 61   Temp 96.5 °F (35.8 °C) (Temporal)   Ht 5' 5\" (1.651 m)   Wt 169 lb (76.7 kg)   BMI 28.12 kg/m²   Lab Results   Component Value Date    WBC 9.1 07/05/2017    HGB 13.2 (L) 07/05/2017    HCT 39.5 (L) 07/05/2017     07/05/2017    CHOL 147 07/01/2017    TRIG 72 07/01/2017    HDL 41 07/01/2017    ALT 18 12/23/2016    AST 19 12/23/2016     09/28/2017    K 4.5 09/28/2017     09/28/2017    CREATININE 0.66 (L) 09/28/2017    BUN 18 09/28/2017    CO2 24 09/28/2017    TSH 7.59 (H) 07/01/2017    PSA 0.77 06/21/2012    LABA1C 6.2 01/02/2018    LABMICR 146 04/22/2013     Lab Results   Component Value Date    CALCIUM 9.0 09/28/2017     Lab Results   Component Value Date    LDLCALC 24 07/01/2016    LDLCHOLESTEROL 92 07/01/2017       Assessment and Plan:    1. Chronic systolic heart failure ([address]  Echo from 54/5981 shows systolic dysfunction with EF 33%  Patient follows with CHF clinic; has an appointment scheduled for next week.   Condition stable; continue current medical management  Will refill all

## 2018-01-04 ENCOUNTER — TELEPHONE (OUTPATIENT)
Dept: FAMILY MEDICINE CLINIC | Age: 63
End: 2018-01-04

## 2018-01-04 ENCOUNTER — CARE COORDINATION (OUTPATIENT)
Dept: CARE COORDINATION | Age: 63
End: 2018-01-04

## 2018-01-04 DIAGNOSIS — I10 HYPERTENSION, UNSPECIFIED TYPE: ICD-10-CM

## 2018-01-04 DIAGNOSIS — J44.9 CHRONIC OBSTRUCTIVE PULMONARY DISEASE, UNSPECIFIED COPD TYPE (HCC): Primary | ICD-10-CM

## 2018-01-04 DIAGNOSIS — E11.9 CONTROLLED TYPE 2 DIABETES MELLITUS WITHOUT COMPLICATION, WITHOUT LONG-TERM CURRENT USE OF INSULIN (HCC): Chronic | ICD-10-CM

## 2018-01-04 NOTE — TELEPHONE ENCOUNTER
Phone call to patient. Spoke with mgr Rudolph at [facility] [place] and was informed that patient's application at [facility] has been approved. He is currently on the waiting list and an apartment should be available by the end of this month. Relayed information to patient. Patient expressed concern as he had already paid his rent for this month. Educated patient on what to expect regarding the move in process and encouraged him to start getting prepared. He expressed his gratitude. Reminded patient about upcoming CHF appointment and informed him that his transportation had been scheduled. He was aware as he received a reminder text from [employer]. Patient complained in regards to an issue with receiving his prescription medication.  reassured him that staff is currently working on the issue. Will continue to keep patient informed regarding housing process.

## 2018-01-04 NOTE — CARE COORDINATION
Mr Marly Kruger phoned [facility] office and stated he had not received medications from his [address] on [place]. He reports he picked up prescriptions for atorvastatin, carvedilol, sprinolactone, and lisinopril after his physician office appointment on Dec 2. [facility] phoned and reports Mr Marly Kruger has 2 prescriptions that will be delivered today- Spiriva inhaler and Albuterol Inhaler. The pharmacy was not able to fill scripts for aspirin 81 mg and cetirizine 10 mg because these medications are not covered on the patient formulary. Scripts for amlodipine 10 mg, albuterol nebulizer solution, glimepiride 4 mg, and metformin 500 mg tabs need new orders for refill.        Pended order sent to Dr Hood Duke for reorder for amlodipine, albuterol nebulizer solution glimepiride, and metformin       Camilla Alvarez RN, [facility]

## 2018-01-05 DIAGNOSIS — Z76.0 MEDICATION REFILL: Primary | ICD-10-CM

## 2018-01-05 DIAGNOSIS — E11.9 CONTROLLED TYPE 2 DIABETES MELLITUS WITHOUT COMPLICATION, WITHOUT LONG-TERM CURRENT USE OF INSULIN (HCC): Chronic | ICD-10-CM

## 2018-01-05 RX ORDER — ALBUTEROL SULFATE 2.5 MG/3ML
2.5 SOLUTION RESPIRATORY (INHALATION) EVERY 6 HOURS PRN
Qty: 120 EACH | Status: CANCELLED
Start: 2018-01-05

## 2018-01-05 RX ORDER — GLIMEPIRIDE 4 MG/1
4 TABLET ORAL
Qty: 30 TABLET | Refills: 2 | Status: SHIPPED | OUTPATIENT
Start: 2018-01-05 | End: 2018-05-03 | Stop reason: SDUPTHER

## 2018-01-05 RX ORDER — ALBUTEROL SULFATE 2.5 MG/3ML
2.5 SOLUTION RESPIRATORY (INHALATION) EVERY 6 HOURS PRN
Qty: 120 EACH | Refills: 3 | Status: SHIPPED | OUTPATIENT
Start: 2018-01-05 | End: 2018-05-03 | Stop reason: ALTCHOICE

## 2018-01-05 RX ORDER — GLIMEPIRIDE 4 MG/1
4 TABLET ORAL
Qty: 30 TABLET | Refills: 0 | Status: CANCELLED | OUTPATIENT
Start: 2018-01-05

## 2018-01-05 RX ORDER — METFORMIN HYDROCHLORIDE 500 MG/1
2000 TABLET, EXTENDED RELEASE ORAL
Qty: 360 TABLET | Refills: 3 | Status: CANCELLED
Start: 2018-01-05

## 2018-01-05 RX ORDER — AMLODIPINE BESYLATE 10 MG/1
10 TABLET ORAL DAILY
Qty: 30 TABLET | Refills: 3 | Status: SHIPPED | OUTPATIENT
Start: 2018-01-05 | End: 2018-08-02 | Stop reason: SDUPTHER

## 2018-01-05 RX ORDER — AMLODIPINE BESYLATE 10 MG/1
10 TABLET ORAL DAILY
Qty: 30 TABLET | Status: CANCELLED
Start: 2018-01-05

## 2018-01-05 RX ORDER — METFORMIN HYDROCHLORIDE 500 MG/1
2000 TABLET, EXTENDED RELEASE ORAL
Qty: 360 TABLET | Refills: 3 | Status: SHIPPED | OUTPATIENT
Start: 2018-01-05 | End: 2018-08-02 | Stop reason: DRUGHIGH

## 2018-01-08 ENCOUNTER — TELEPHONE (OUTPATIENT)
Dept: PHARMACY | Age: 63
End: 2018-01-08

## 2018-01-09 ENCOUNTER — HOSPITAL ENCOUNTER (OUTPATIENT)
Dept: OTHER | Age: 63
Discharge: HOME | End: 2018-01-09
Payer: MEDICARE

## 2018-01-09 VITALS
RESPIRATION RATE: 16 BRPM | OXYGEN SATURATION: 98 % | BODY MASS INDEX: 28.42 KG/M2 | DIASTOLIC BLOOD PRESSURE: 62 MMHG | SYSTOLIC BLOOD PRESSURE: 136 MMHG | WEIGHT: 170.8 LBS | HEART RATE: 49 BPM

## 2018-01-09 PROCEDURE — 99211 OFF/OP EST MAY X REQ PHY/QHP: CPT

## 2018-02-22 ENCOUNTER — TELEPHONE (OUTPATIENT)
Dept: FAMILY MEDICINE CLINIC | Age: 63
End: 2018-02-22

## 2018-03-22 ENCOUNTER — HOSPITAL ENCOUNTER (OUTPATIENT)
Dept: OTHER | Age: 63
Discharge: HOME | End: 2018-03-22
Payer: MEDICARE

## 2018-03-22 VITALS
DIASTOLIC BLOOD PRESSURE: 80 MMHG | OXYGEN SATURATION: 97 % | BODY MASS INDEX: 29.69 KG/M2 | HEART RATE: 57 BPM | SYSTOLIC BLOOD PRESSURE: 160 MMHG | WEIGHT: 178.4 LBS | RESPIRATION RATE: 18 BRPM

## 2018-03-22 PROCEDURE — 99211 OFF/OP EST MAY X REQ PHY/QHP: CPT

## 2018-03-22 ASSESSMENT — PAIN DESCRIPTION - PROGRESSION: CLINICAL_PROGRESSION: NOT CHANGED

## 2018-03-22 ASSESSMENT — PAIN DESCRIPTION - ORIENTATION: ORIENTATION: LEFT

## 2018-03-22 ASSESSMENT — PAIN DESCRIPTION - PAIN TYPE: TYPE: CHRONIC PAIN

## 2018-03-22 ASSESSMENT — PAIN DESCRIPTION - FREQUENCY: FREQUENCY: INTERMITTENT

## 2018-03-22 ASSESSMENT — PAIN DESCRIPTION - LOCATION: LOCATION: KNEE

## 2018-03-22 ASSESSMENT — PAIN DESCRIPTION - DESCRIPTORS: DESCRIPTORS: ACHING

## 2018-03-22 NOTE — PROGRESS NOTES
at least  his diuretic and CoReg now and  the rest of his medications when his monthly check arrived. Writer spoke with  Kate Shrestha, pharmacist at [facility] pharmacy,  [facility]. Who stated his Lasix  and Coreg dose would cost pt $2 each. Pt states he doesn't have the money right now, but will try to  as soon as he can. Reminded pt to follow low sodium diet and fluid limits of 2 liters daily. Next f/u here in one month.      Electronically signed by Scott Fairbanks RN on 3/22/2018 at 1:34 PM

## 2018-04-17 ENCOUNTER — HOSPITAL ENCOUNTER (OUTPATIENT)
Dept: OTHER | Age: 63
Discharge: HOME | End: 2018-04-17
Payer: MEDICARE

## 2018-04-17 VITALS
BODY MASS INDEX: 29.39 KG/M2 | RESPIRATION RATE: 18 BRPM | HEART RATE: 51 BPM | SYSTOLIC BLOOD PRESSURE: 129 MMHG | OXYGEN SATURATION: 97 % | WEIGHT: 176.6 LBS | DIASTOLIC BLOOD PRESSURE: 60 MMHG

## 2018-04-17 PROCEDURE — 99211 OFF/OP EST MAY X REQ PHY/QHP: CPT

## 2018-04-17 ASSESSMENT — PAIN DESCRIPTION - DESCRIPTORS: DESCRIPTORS: SHARP

## 2018-04-17 ASSESSMENT — PAIN DESCRIPTION - ONSET: ONSET: ON-GOING

## 2018-04-17 ASSESSMENT — PAIN DESCRIPTION - PAIN TYPE: TYPE: CHRONIC PAIN

## 2018-04-17 ASSESSMENT — PAIN DESCRIPTION - FREQUENCY: FREQUENCY: CONTINUOUS

## 2018-04-17 ASSESSMENT — PAIN DESCRIPTION - PROGRESSION: CLINICAL_PROGRESSION: NOT CHANGED

## 2018-04-17 ASSESSMENT — PAIN SCALES - GENERAL: PAINLEVEL_OUTOF10: 10

## 2018-04-17 ASSESSMENT — PAIN DESCRIPTION - LOCATION: LOCATION: BACK

## 2018-04-17 ASSESSMENT — PAIN DESCRIPTION - ORIENTATION: ORIENTATION: RIGHT;LOWER

## 2018-05-03 ENCOUNTER — OFFICE VISIT (OUTPATIENT)
Dept: FAMILY MEDICINE CLINIC | Age: 63
End: 2018-05-03
Payer: MEDICARE

## 2018-05-03 VITALS
HEIGHT: 65 IN | TEMPERATURE: 98.1 F | OXYGEN SATURATION: 94 % | SYSTOLIC BLOOD PRESSURE: 122 MMHG | HEART RATE: 51 BPM | BODY MASS INDEX: 29.49 KG/M2 | DIASTOLIC BLOOD PRESSURE: 57 MMHG | WEIGHT: 177 LBS

## 2018-05-03 DIAGNOSIS — Z13.6 SCREENING FOR AAA (ABDOMINAL AORTIC ANEURYSM): ICD-10-CM

## 2018-05-03 DIAGNOSIS — J44.9 CHRONIC OBSTRUCTIVE PULMONARY DISEASE, UNSPECIFIED COPD TYPE (HCC): ICD-10-CM

## 2018-05-03 DIAGNOSIS — I10 HYPERTENSION GOAL BP (BLOOD PRESSURE) < 140/80: Chronic | ICD-10-CM

## 2018-05-03 DIAGNOSIS — I50.42 CHRONIC COMBINED SYSTOLIC AND DIASTOLIC CONGESTIVE HEART FAILURE (HCC): ICD-10-CM

## 2018-05-03 DIAGNOSIS — E78.5 HYPERLIPIDEMIA WITH TARGET LDL LESS THAN 70: Chronic | ICD-10-CM

## 2018-05-03 DIAGNOSIS — Z12.11 COLON CANCER SCREENING: ICD-10-CM

## 2018-05-03 DIAGNOSIS — Z76.0 MEDICATION REFILL: ICD-10-CM

## 2018-05-03 DIAGNOSIS — E11.9 CONTROLLED TYPE 2 DIABETES MELLITUS WITHOUT COMPLICATION, WITHOUT LONG-TERM CURRENT USE OF INSULIN (HCC): Primary | Chronic | ICD-10-CM

## 2018-05-03 PROBLEM — N17.9 AKI (ACUTE KIDNEY INJURY) (HCC): Status: RESOLVED | Noted: 2017-07-01 | Resolved: 2018-05-03

## 2018-05-03 PROBLEM — J96.02 ACUTE RESPIRATORY FAILURE WITH HYPERCAPNIA (HCC): Status: RESOLVED | Noted: 2017-07-01 | Resolved: 2018-05-03

## 2018-05-03 PROBLEM — I50.40 COMBINED SYSTOLIC AND DIASTOLIC CONGESTIVE HEART FAILURE (HCC): Status: ACTIVE | Noted: 2018-05-03

## 2018-05-03 LAB — HBA1C MFR BLD: 5.8 %

## 2018-05-03 PROCEDURE — 83036 HEMOGLOBIN GLYCOSYLATED A1C: CPT | Performed by: FAMILY MEDICINE

## 2018-05-03 PROCEDURE — G8419 CALC BMI OUT NRM PARAM NOF/U: HCPCS | Performed by: FAMILY MEDICINE

## 2018-05-03 PROCEDURE — 99213 OFFICE O/P EST LOW 20 MIN: CPT | Performed by: FAMILY MEDICINE

## 2018-05-03 PROCEDURE — G8428 CUR MEDS NOT DOCUMENT: HCPCS | Performed by: FAMILY MEDICINE

## 2018-05-03 PROCEDURE — 99214 OFFICE O/P EST MOD 30 MIN: CPT | Performed by: FAMILY MEDICINE

## 2018-05-03 PROCEDURE — 2022F DILAT RTA XM EVC RTNOPTHY: CPT | Performed by: FAMILY MEDICINE

## 2018-05-03 PROCEDURE — 3017F COLORECTAL CA SCREEN DOC REV: CPT | Performed by: FAMILY MEDICINE

## 2018-05-03 PROCEDURE — 1036F TOBACCO NON-USER: CPT | Performed by: FAMILY MEDICINE

## 2018-05-03 PROCEDURE — G8926 SPIRO NO PERF OR DOC: HCPCS | Performed by: FAMILY MEDICINE

## 2018-05-03 PROCEDURE — 1123F ACP DISCUSS/DSCN MKR DOCD: CPT | Performed by: FAMILY MEDICINE

## 2018-05-03 PROCEDURE — 3023F SPIROM DOC REV: CPT | Performed by: FAMILY MEDICINE

## 2018-05-03 PROCEDURE — 4040F PNEUMOC VAC/ADMIN/RCVD: CPT | Performed by: FAMILY MEDICINE

## 2018-05-03 PROCEDURE — 3044F HG A1C LEVEL LT 7.0%: CPT | Performed by: FAMILY MEDICINE

## 2018-05-03 RX ORDER — GLIMEPIRIDE 4 MG/1
TABLET ORAL
Qty: 30 TABLET | Refills: 1 | Status: SHIPPED | OUTPATIENT
Start: 2018-05-03 | End: 2018-08-02 | Stop reason: SDUPTHER

## 2018-05-03 ASSESSMENT — ENCOUNTER SYMPTOMS
DIARRHEA: 0
WHEEZING: 0
CONSTIPATION: 0
BLURRED VISION: 0
NAUSEA: 0
VOMITING: 0
SHORTNESS OF BREATH: 1
COUGH: 0
ABDOMINAL PAIN: 0

## 2018-05-29 ENCOUNTER — HOSPITAL ENCOUNTER (OUTPATIENT)
Dept: VASCULAR LAB | Age: 63
Discharge: HOME | End: 2018-05-29
Payer: MEDICARE

## 2018-05-29 ENCOUNTER — HOSPITAL ENCOUNTER (OUTPATIENT)
Dept: NON INVASIVE DIAGNOSTICS | Age: 63
Discharge: HOME | End: 2018-05-29
Payer: MEDICARE

## 2018-05-29 ENCOUNTER — TELEPHONE (OUTPATIENT)
Dept: OTHER | Age: 63
End: 2018-05-29

## 2018-05-29 ENCOUNTER — HOSPITAL ENCOUNTER (OUTPATIENT)
Dept: PULMONOLOGY | Age: 63
Discharge: HOME | End: 2018-05-29
Payer: MEDICARE

## 2018-05-29 DIAGNOSIS — I50.42 CHRONIC COMBINED SYSTOLIC AND DIASTOLIC CONGESTIVE HEART FAILURE (HCC): ICD-10-CM

## 2018-05-29 DIAGNOSIS — Z13.6 SCREENING FOR AAA (ABDOMINAL AORTIC ANEURYSM): ICD-10-CM

## 2018-05-29 DIAGNOSIS — J44.9 CHRONIC OBSTRUCTIVE PULMONARY DISEASE, UNSPECIFIED COPD TYPE (HCC): ICD-10-CM

## 2018-05-29 LAB
LV EF: 33 %
LVEF MODALITY: NORMAL

## 2018-05-29 PROCEDURE — 94726 PLETHYSMOGRAPHY LUNG VOLUMES: CPT

## 2018-05-29 PROCEDURE — 93306 TTE W/DOPPLER COMPLETE: CPT

## 2018-05-29 PROCEDURE — 93976 VASCULAR STUDY: CPT

## 2018-05-29 PROCEDURE — 94060 EVALUATION OF WHEEZING: CPT

## 2018-05-29 PROCEDURE — 94664 DEMO&/EVAL PT USE INHALER: CPT

## 2018-05-29 PROCEDURE — 94640 AIRWAY INHALATION TREATMENT: CPT

## 2018-05-29 PROCEDURE — 88738 HGB QUANT TRANSCUTANEOUS: CPT

## 2018-05-29 PROCEDURE — 94760 N-INVAS EAR/PLS OXIMETRY 1: CPT

## 2018-05-29 PROCEDURE — 94729 DIFFUSING CAPACITY: CPT

## 2018-06-01 ENCOUNTER — CARE COORDINATION (OUTPATIENT)
Dept: CARE COORDINATION | Age: 63
End: 2018-06-01

## 2018-07-19 ENCOUNTER — HOSPITAL ENCOUNTER (OUTPATIENT)
Age: 63
Setting detail: OBSERVATION
Discharge: HOME | End: 2018-07-20
Attending: EMERGENCY MEDICINE | Admitting: FAMILY MEDICINE
Payer: MEDICARE

## 2018-07-19 ENCOUNTER — APPOINTMENT (OUTPATIENT)
Dept: GENERAL RADIOLOGY | Age: 63
End: 2018-07-19
Payer: MEDICARE

## 2018-07-19 DIAGNOSIS — J44.1 COPD EXACERBATION (HCC): Primary | ICD-10-CM

## 2018-07-19 DIAGNOSIS — Z91.14 HISTORY OF MEDICATION NONCOMPLIANCE: ICD-10-CM

## 2018-07-19 PROBLEM — R07.81 PLEURITIC CHEST PAIN: Status: ACTIVE | Noted: 2018-07-19

## 2018-07-19 LAB
ABSOLUTE EOS #: 0.05 K/UL (ref 0–0.44)
ABSOLUTE IMMATURE GRANULOCYTE: <0.03 K/UL (ref 0–0.3)
ABSOLUTE LYMPH #: 1.03 K/UL (ref 1.1–3.7)
ABSOLUTE MONO #: 0.41 K/UL (ref 0.1–1.2)
ANION GAP SERPL CALCULATED.3IONS-SCNC: 11 MMOL/L (ref 9–17)
BASOPHILS # BLD: 0 % (ref 0–2)
BASOPHILS ABSOLUTE: <0.03 K/UL (ref 0–0.2)
BNP INTERPRETATION: ABNORMAL
BUN BLDV-MCNC: 21 MG/DL (ref 8–23)
BUN/CREAT BLD: NORMAL (ref 9–20)
CALCIUM SERPL-MCNC: 9.3 MG/DL (ref 8.6–10.4)
CHLORIDE BLD-SCNC: 105 MMOL/L (ref 98–107)
CO2: 25 MMOL/L (ref 20–31)
CREAT SERPL-MCNC: 1.07 MG/DL (ref 0.7–1.2)
DIFFERENTIAL TYPE: ABNORMAL
EKG ATRIAL RATE: 73 BPM
EKG P-R INTERVAL: 132 MS
EKG Q-T INTERVAL: 388 MS
EKG QRS DURATION: 86 MS
EKG QTC CALCULATION (BAZETT): 427 MS
EKG R AXIS: -22 DEGREES
EKG T AXIS: 62 DEGREES
EKG VENTRICULAR RATE: 73 BPM
EOSINOPHILS RELATIVE PERCENT: 1 % (ref 1–4)
GFR AFRICAN AMERICAN: >60 ML/MIN
GFR NON-AFRICAN AMERICAN: >60 ML/MIN
GFR SERPL CREATININE-BSD FRML MDRD: NORMAL ML/MIN/{1.73_M2}
GFR SERPL CREATININE-BSD FRML MDRD: NORMAL ML/MIN/{1.73_M2}
GLUCOSE BLD-MCNC: 93 MG/DL (ref 70–99)
HCT VFR BLD CALC: 38.5 % (ref 40.7–50.3)
HEMOGLOBIN: 12.4 G/DL (ref 13–17)
IMMATURE GRANULOCYTES: 0 %
LYMPHOCYTES # BLD: 18 % (ref 24–43)
MCH RBC QN AUTO: 31.1 PG (ref 25.2–33.5)
MCHC RBC AUTO-ENTMCNC: 32.2 G/DL (ref 28.4–34.8)
MCV RBC AUTO: 96.5 FL (ref 82.6–102.9)
MONOCYTES # BLD: 7 % (ref 3–12)
NRBC AUTOMATED: 0 PER 100 WBC
PDW BLD-RTO: 13.8 % (ref 11.8–14.4)
PLATELET # BLD: 242 K/UL (ref 138–453)
PLATELET ESTIMATE: ABNORMAL
PMV BLD AUTO: 10.7 FL (ref 8.1–13.5)
POC TROPONIN I: 0.01 NG/ML (ref 0–0.1)
POC TROPONIN I: 0.01 NG/ML (ref 0–0.1)
POC TROPONIN INTERP: NORMAL
POC TROPONIN INTERP: NORMAL
POTASSIUM SERPL-SCNC: 4.6 MMOL/L (ref 3.7–5.3)
PRO-BNP: 380 PG/ML
RBC # BLD: 3.99 M/UL (ref 4.21–5.77)
RBC # BLD: ABNORMAL 10*6/UL
SEG NEUTROPHILS: 74 % (ref 36–65)
SEGMENTED NEUTROPHILS ABSOLUTE COUNT: 4.1 K/UL (ref 1.5–8.1)
SODIUM BLD-SCNC: 141 MMOL/L (ref 135–144)
WBC # BLD: 5.6 K/UL (ref 3.5–11.3)
WBC # BLD: ABNORMAL 10*3/UL

## 2018-07-19 PROCEDURE — 94640 AIRWAY INHALATION TREATMENT: CPT

## 2018-07-19 PROCEDURE — 99285 EMERGENCY DEPT VISIT HI MDM: CPT

## 2018-07-19 PROCEDURE — 85025 COMPLETE CBC W/AUTO DIFF WBC: CPT

## 2018-07-19 PROCEDURE — 71046 X-RAY EXAM CHEST 2 VIEWS: CPT

## 2018-07-19 PROCEDURE — G0378 HOSPITAL OBSERVATION PER HR: HCPCS

## 2018-07-19 PROCEDURE — 84484 ASSAY OF TROPONIN QUANT: CPT

## 2018-07-19 PROCEDURE — 80048 BASIC METABOLIC PNL TOTAL CA: CPT

## 2018-07-19 PROCEDURE — 93005 ELECTROCARDIOGRAM TRACING: CPT

## 2018-07-19 PROCEDURE — 94664 DEMO&/EVAL PT USE INHALER: CPT

## 2018-07-19 PROCEDURE — 83036 HEMOGLOBIN GLYCOSYLATED A1C: CPT

## 2018-07-19 PROCEDURE — 6360000002 HC RX W HCPCS: Performed by: EMERGENCY MEDICINE

## 2018-07-19 PROCEDURE — 83880 ASSAY OF NATRIURETIC PEPTIDE: CPT

## 2018-07-19 PROCEDURE — 6370000000 HC RX 637 (ALT 250 FOR IP): Performed by: EMERGENCY MEDICINE

## 2018-07-19 RX ORDER — DEXTROSE MONOHYDRATE 50 MG/ML
100 INJECTION, SOLUTION INTRAVENOUS PRN
Status: DISCONTINUED | OUTPATIENT
Start: 2018-07-19 | End: 2018-07-20 | Stop reason: HOSPADM

## 2018-07-19 RX ORDER — PREDNISONE 20 MG/1
40 TABLET ORAL DAILY
Status: DISCONTINUED | OUTPATIENT
Start: 2018-07-20 | End: 2018-07-20 | Stop reason: HOSPADM

## 2018-07-19 RX ORDER — ATORVASTATIN CALCIUM 20 MG/1
20 TABLET, FILM COATED ORAL NIGHTLY
Status: DISCONTINUED | OUTPATIENT
Start: 2018-07-19 | End: 2018-07-20 | Stop reason: HOSPADM

## 2018-07-19 RX ORDER — SPIRONOLACTONE 25 MG/1
25 TABLET ORAL DAILY
Status: DISCONTINUED | OUTPATIENT
Start: 2018-07-20 | End: 2018-07-20 | Stop reason: HOSPADM

## 2018-07-19 RX ORDER — DEXTROSE MONOHYDRATE 25 G/50ML
12.5 INJECTION, SOLUTION INTRAVENOUS PRN
Status: DISCONTINUED | OUTPATIENT
Start: 2018-07-19 | End: 2018-07-20 | Stop reason: HOSPADM

## 2018-07-19 RX ORDER — LEVOFLOXACIN 500 MG/1
500 TABLET, FILM COATED ORAL DAILY
Status: DISCONTINUED | OUTPATIENT
Start: 2018-07-20 | End: 2018-07-20 | Stop reason: HOSPADM

## 2018-07-19 RX ORDER — GLIMEPIRIDE 2 MG/1
4 TABLET ORAL
Status: DISCONTINUED | OUTPATIENT
Start: 2018-07-20 | End: 2018-07-20 | Stop reason: HOSPADM

## 2018-07-19 RX ORDER — IPRATROPIUM BROMIDE AND ALBUTEROL SULFATE 2.5; .5 MG/3ML; MG/3ML
1 SOLUTION RESPIRATORY (INHALATION)
Status: DISCONTINUED | OUTPATIENT
Start: 2018-07-20 | End: 2018-07-20

## 2018-07-19 RX ORDER — ALBUTEROL SULFATE 90 UG/1
2 AEROSOL, METERED RESPIRATORY (INHALATION)
Status: DISCONTINUED | OUTPATIENT
Start: 2018-07-19 | End: 2018-07-20

## 2018-07-19 RX ORDER — NICOTINE POLACRILEX 4 MG
15 LOZENGE BUCCAL PRN
Status: DISCONTINUED | OUTPATIENT
Start: 2018-07-19 | End: 2018-07-20 | Stop reason: HOSPADM

## 2018-07-19 RX ORDER — ASPIRIN 81 MG/1
324 TABLET, CHEWABLE ORAL ONCE
Status: COMPLETED | OUTPATIENT
Start: 2018-07-19 | End: 2018-07-19

## 2018-07-19 RX ORDER — CARVEDILOL 25 MG/1
25 TABLET ORAL 2 TIMES DAILY
Status: DISCONTINUED | OUTPATIENT
Start: 2018-07-19 | End: 2018-07-20 | Stop reason: HOSPADM

## 2018-07-19 RX ORDER — LISINOPRIL 20 MG/1
20 TABLET ORAL DAILY
Status: DISCONTINUED | OUTPATIENT
Start: 2018-07-20 | End: 2018-07-20 | Stop reason: HOSPADM

## 2018-07-19 RX ORDER — ASPIRIN 81 MG/1
81 TABLET ORAL DAILY
Status: DISCONTINUED | OUTPATIENT
Start: 2018-07-20 | End: 2018-07-20 | Stop reason: HOSPADM

## 2018-07-19 RX ORDER — ALBUTEROL SULFATE 90 UG/1
2 AEROSOL, METERED RESPIRATORY (INHALATION)
Status: DISCONTINUED | OUTPATIENT
Start: 2018-07-19 | End: 2018-07-19

## 2018-07-19 RX ORDER — FUROSEMIDE 20 MG/1
20 TABLET ORAL DAILY
Status: DISCONTINUED | OUTPATIENT
Start: 2018-07-20 | End: 2018-07-20 | Stop reason: HOSPADM

## 2018-07-19 RX ORDER — FAMOTIDINE 20 MG/1
20 TABLET, FILM COATED ORAL 2 TIMES DAILY
Status: DISCONTINUED | OUTPATIENT
Start: 2018-07-19 | End: 2018-07-20 | Stop reason: HOSPADM

## 2018-07-19 RX ORDER — ALBUTEROL SULFATE 2.5 MG/3ML
5 SOLUTION RESPIRATORY (INHALATION)
Status: DISCONTINUED | OUTPATIENT
Start: 2018-07-19 | End: 2018-07-19

## 2018-07-19 RX ORDER — ALBUTEROL SULFATE 90 UG/1
2 AEROSOL, METERED RESPIRATORY (INHALATION) EVERY 6 HOURS PRN
Status: DISCONTINUED | OUTPATIENT
Start: 2018-07-19 | End: 2018-07-20 | Stop reason: HOSPADM

## 2018-07-19 RX ORDER — AMLODIPINE BESYLATE 10 MG/1
10 TABLET ORAL DAILY
Status: DISCONTINUED | OUTPATIENT
Start: 2018-07-20 | End: 2018-07-20 | Stop reason: HOSPADM

## 2018-07-19 RX ORDER — GUAIFENESIN 600 MG/1
600 TABLET, EXTENDED RELEASE ORAL 2 TIMES DAILY
Status: DISCONTINUED | OUTPATIENT
Start: 2018-07-19 | End: 2018-07-20 | Stop reason: HOSPADM

## 2018-07-19 RX ORDER — PREDNISONE 20 MG/1
60 TABLET ORAL ONCE
Status: COMPLETED | OUTPATIENT
Start: 2018-07-19 | End: 2018-07-19

## 2018-07-19 RX ORDER — IPRATROPIUM BROMIDE AND ALBUTEROL SULFATE 2.5; .5 MG/3ML; MG/3ML
1 SOLUTION RESPIRATORY (INHALATION)
Status: DISCONTINUED | OUTPATIENT
Start: 2018-07-19 | End: 2018-07-19

## 2018-07-19 RX ADMIN — ALBUTEROL SULFATE 5 MG: 5 SOLUTION RESPIRATORY (INHALATION) at 17:26

## 2018-07-19 RX ADMIN — PREDNISONE 60 MG: 20 TABLET ORAL at 17:24

## 2018-07-19 RX ADMIN — ASPIRIN 81 MG 324 MG: 81 TABLET ORAL at 17:24

## 2018-07-19 ASSESSMENT — ENCOUNTER SYMPTOMS
SHORTNESS OF BREATH: 1
COUGH: 1
RHINORRHEA: 0
HEARTBURN: 0
BACK PAIN: 0
ABDOMINAL PAIN: 0
COUGH: 0
COLOR CHANGE: 0
HEMOPTYSIS: 0
DOUBLE VISION: 0
BLURRED VISION: 1
DIARRHEA: 1
CONSTIPATION: 0
EYE PAIN: 0
ORTHOPNEA: 0
NAUSEA: 0
PHOTOPHOBIA: 0
VOMITING: 0
SPUTUM PRODUCTION: 1
WHEEZING: 0
SORE THROAT: 0

## 2018-07-19 ASSESSMENT — PAIN DESCRIPTION - LOCATION: LOCATION: CHEST

## 2018-07-19 ASSESSMENT — PAIN SCALES - GENERAL
PAINLEVEL_OUTOF10: 3
PAINLEVEL_OUTOF10: 0

## 2018-07-19 ASSESSMENT — PAIN DESCRIPTION - DESCRIPTORS: DESCRIPTORS: ACHING

## 2018-07-19 ASSESSMENT — PAIN DESCRIPTION - PAIN TYPE: TYPE: ACUTE PAIN

## 2018-07-19 NOTE — H&P
[facility]  History & Physical Examination Note              Date:   7/19/2018  Patient name:  Tk London  Date of admission:  7/19/2018  4:33 PM  MRN:   [id number]  YOB: 1955    CHIEF COMPLAINT:     Chief Complaint   Patient presents with    Shortness of Breath    Chest Pain       History Obtained From:  Patient and chart review. HPI:   This is a 69-year-old male patient with a past medical history of COPD, CHF, hypertension, hyperlipidemia and diabetes presents to the ER with shortness of breath for 3 days. Patient states shortness of breath occurs at rest and with exertion without any alleviating or aggravating factors. Patient also complains of productive cough that is yellow in color, nonbloody, non-odorous and tablespoon in quantity. Pt also complains of chest pain that is pleuritic and tender in nature. Describes the pain radiating to left arm and left leg. Patient also complains of associated headaches, blurry vision, and diarrhea however denies sore throat, abdominal pain, urinary symptoms, rash, swelling, or or fever. Patient states to have had smoked 2 packs of cigarettes a day for 50 years but quit last year in July. Patient currently in no acute distress and stable. At ER, patient was given breathing treatments and prednisone dose which relieved chest pain and shortness of breath. Chest X-ray shows lungs are without acute focal process. Minimal sub-segmental atelectatic changes in the left upper lobe. There is no effusion or pneumothorax. The cardiomediastinal silhouette is stable. EKG shows normal sinus rhythm. Troponins are negative ×1. ProBNP is 380. Echo from May 29, 2018 showed normal LV size and wall thickness, reduced LV systolic function with LVEF 30-35%. PAST MEDICAL HISTORY:      has a past medical history of COPD (chronic obstructive pulmonary disease) ([facility] [address]  Diabetes mellitus ([facility] [address] never used smokeless tobacco. He reports that he does not drink alcohol or use drugs. REVIEW OF SYSTEMS:     Review of Systems   Constitutional: Negative for chills and fever. HENT: Positive for congestion. Negative for sore throat. Eyes: Positive for blurred vision. Negative for double vision, photophobia and pain. Respiratory: Positive for cough, sputum production and shortness of breath. Negative for hemoptysis and wheezing. Cardiovascular: Positive for chest pain. Negative for palpitations, orthopnea, leg swelling and PND. Gastrointestinal: Positive for diarrhea. Negative for abdominal pain, constipation, heartburn, nausea and vomiting. Genitourinary: Negative for frequency and urgency. Musculoskeletal: Negative for back pain and joint pain. Skin: Negative for rash. Neurological: Positive for headaches. Negative for dizziness and loss of consciousness. PHYSICAL EXAM:     Vitals:    07/19/18 1639   BP: (!) 170/75   Pulse: 74   Resp: 21   Temp: 98.2 °F (36.8 °C)   TempSrc: Oral   SpO2: 97%   Weight: 168 lb (76.2 kg)       No intake or output data in the 24 hours ending 07/19/18 1959    Physical Exam   Constitutional: He is oriented to person, place, and time. No distress. Cardiovascular: Normal rate, regular rhythm and normal heart sounds. Exam reveals no gallop and no friction rub. No murmur heard. Pulmonary/Chest: No respiratory distress. He has wheezes. He exhibits tenderness. Abdominal: Soft. Bowel sounds are normal. He exhibits no distension. There is no tenderness. Musculoskeletal: He exhibits no edema. Neurological: He is alert and oriented to person, place, and time. Skin: Skin is warm. No rash noted. He is not diaphoretic. Psychiatric: He has a normal mood and affect.  His behavior is normal.       DIAGNOSTICS:      Imaging/Diagonstics:  Xr Chest Standard (2 Vw)    Result Date: 7/19/2018  EXAMINATION: TWO VIEWS OF THE CHEST 7/19/2018 5:07 pm COMPARISON:

## 2018-07-19 NOTE — ED PROVIDER NOTES
[address] [facility]  ED  Emergency Department Encounter  Emergency Medicine Resident     Pt Name: Tad Brown  MRN: [id number]  [place] 1955  Date of evaluation: 7/19/18  PCP:  Lucina Lucero MD    [address] [place] [address]       Chief Complaint   Patient presents with    Shortness of Breath    Chest Pain       HISTORY OF PRESENT ILLNESS  (Location/Symptom, Timing/Onset, Context/Setting, Quality, Duration, Modifying Factors, Severity.)      Tad Brown is a 70 y.o. male who presents with Shortness of breath ongoing for the past week and chest pain for the past several days. Patient states that he has a history of COPD and congestive heart failure. Stated he has been able to take his home medications for the past 2 months due to social reasons. Patient states that the shortness of breath since worsened. Complaining of shortness of breath when lying down as well as shortness of breath with exertion. Echocardiography shows ejection fraction of 35-40%. Patient denies any associated nausea, vomiting, diaphoresis or radiation of this chest pain. Denies any history of DVT/PE, hemoptysis, pleurisy, oral intake of medications taking estrogen a prolonged period of immobilization. PAST MEDICAL / SURGICAL / SOCIAL / FAMILY HISTORY      has a past medical history of COPD (chronic obstructive pulmonary disease) ([address] Diabetes mellitus ([address] Erectile dysfunction due to arterial insufficiency; Hypertension; Microalbuminuria due to type 2 diabetes mellitus ([address] and Overweight (BMI 25.0-29.9). has a past surgical history that includes Appendectomy and Total ankle arthroplasty. Social History     Social History    Marital status:      Spouse name: N/A    Number of children: N/A    Years of education: N/A     Occupational History    Not on file.      Social History Main Topics    Smoking status: Former Smoker     Packs/day: 0.50     Types: Cigarettes     Quit date: 07/2017   Katya Sandoval and sore throat. Eyes: Negative for pain and visual disturbance. Respiratory: Positive for shortness of breath. Negative for cough. Cardiovascular: Positive for chest pain. Negative for palpitations. Gastrointestinal: Negative for abdominal pain, nausea and vomiting. Genitourinary: Negative for difficulty urinating and dysuria. Musculoskeletal: Negative for arthralgias and myalgias. Skin: Negative for color change and wound. Neurological: Negative for weakness, numbness and headaches. Psychiatric/Behavioral: Negative for behavioral problems and dysphoric mood. PHYSICAL EXAM   (up to 7 for level 4, 8 or more for level 5)      INITIAL VITALS:   BP (!) 170/75 Comment: Simultaneous filing. User may not have seen previous data. Pulse 74 Comment: Simultaneous filing. User may not have seen previous data. Temp 98.2 °F (36.8 °C) (Oral)   Resp 21 Comment: Simultaneous filing. User may not have seen previous data. Wt 168 lb (76.2 kg)   SpO2 97% Comment: Simultaneous filing. User may not have seen previous data. BMI 27.96 kg/m²     Physical Exam   Constitutional: He is oriented to person, place, and time. He appears well-developed and well-nourished. No distress. HENT:   Head: Normocephalic and atraumatic. Mouth/Throat: Oropharynx is clear and moist.   Eyes: EOM are normal. Pupils are equal, round, and reactive to light. Neck: Normal range of motion. Cardiovascular: Normal rate and regular rhythm. Pulmonary/Chest: Effort normal. No respiratory distress. He has wheezes. He has no rales. Get an expiratory wheezes with some rales at the right lower lung field   Abdominal: Soft. There is no tenderness. There is no rebound and no guarding. Musculoskeletal: Normal range of motion. Neurological: He is alert and oriented to person, place, and time. He has normal strength. GCS eye subscore is 4. GCS verbal subscore is 5. GCS motor subscore is 6. Skin: Skin is warm and dry. Psychiatric: His behavior is normal.       DIFFERENTIAL  DIAGNOSIS     PLAN (LABS / IMAGING / EKG):  Orders Placed This Encounter   Procedures    XR CHEST STANDARD (2 VW)    CBC Auto Differential    Basic Metabolic Panel    Brain Natriuretic Peptide    Inpatient consult to Social Work    Inpatient consult to [facility] Initiate ED Aerosol Protocol    Respiratory care evaluation only    HHN Treatment    MDI Treatment    POCT troponin    POCT troponin    EKG 12 Lead    PATIENT STATUS (FROM ED OR OR/PROCEDURAL) Observation       MEDICATIONS ORDERED:  Orders Placed This Encounter   Medications    aspirin chewable tablet 324 mg    albuterol (PROVENTIL) nebulizer solution 5 mg    DISCONTD: ipratropium-albuterol (DUONEB) nebulizer solution 1 ampule    DISCONTD: albuterol (PROVENTIL) nebulizer solution 5 mg    albuterol sulfate  (90 Base) MCG/ACT inhaler 2 puff    DISCONTD: albuterol sulfate  (90 Base) MCG/ACT inhaler 2 puff    DISCONTD: ipratropium (ATROVENT HFA) 17 MCG/ACT inhaler 2 puff    ipratropium (ATROVENT) 0.02 % nebulizer solution 0.5 mg    predniSONE (DELTASONE) tablet 60 mg       DIAGNOSTIC RESULTS / EMERGENCY DEPARTMENT COURSE / MDM     LABS:  Results for orders placed or performed during the hospital encounter of 07/19/18   CBC Auto Differential   Result Value Ref Range    WBC 5.6 3.5 - 11.3 k/uL    RBC 3.99 (L) 4.21 - 5.77 m/uL    Hemoglobin 12.4 (L) 13.0 - 17.0 g/dL    Hematocrit 38.5 (L) 40.7 - 50.3 %    MCV 96.5 82.6 - 102.9 fL    MCH 31.1 25.2 - 33.5 pg    MCHC 32.2 28.4 - 34.8 g/dL    RDW 13.8 11.8 - 14.4 %    Platelets 970 196 - 089 k/uL    MPV 10.7 8.1 - 13.5 fL    NRBC Automated 0.0 0.0 per 100 WBC    Differential Type NOT REPORTED     Seg Neutrophils 74 (H) 36 - 65 %    Lymphocytes 18 (L) 24 - 43 %    Monocytes 7 3 - 12 %    Eosinophils % 1 1 - 4 %    Basophils 0 0 - 2 %    Immature Granulocytes 0 0 %    Segs Absolute 4.10 1.50 - 8.10 k/uL    Absolute

## 2018-07-19 NOTE — ED PROVIDER NOTES
[facility] [facility]     Emergency Department     Faculty Attestation        I performed a history and physical examination of the patient and discussed management with the resident. I reviewed the residents note and agree with the documented findings and plan of care. Any areas of disagreement are noted on the chart. I was personally present for the key portions of any procedures. I have documented in the chart those procedures where I was not present during the key portions. I have reviewed the emergency nurses triage note. I agree with the chief complaint, past medical history, past surgical history, allergies, medications, social and family history as documented unless otherwise noted below. For Physician Assistant/ Nurse Practitioner cases/documentation I have personally evaluated this patient and have completed at least one if not all key elements of the E/M (history, physical exam, and MDM). Additional findings are as noted. Vital Signs: BP: (!) 170/75  Pulse: 73  Resp: 28  Temp: 98.2 °F (36.8 °C) SpO2: 96 %  PCP:  Shaye Crawford MD    Pertinent Comments:       Patient is a 18-year-old male with what appears to be some congestive heart failure with EF of 30-35% on echo from 5/29/2018. Patient also has history of COPD  with hypertension and diabetes. Patient's been having shortness of breath over the last for 5 days associated with some mild left-sided chest pain as well and positive orthopnea. Also states she's been having some wheezing intermittently. Patient is been out of his medications secondary to cost for the last 2 months. Physical Exam   Cardiovascular: Regular rhythm and normal heart sounds. Exam reveals no gallop. No murmur heard. Pulmonary/Chest: No respiratory distress. He has wheezes (mild and expiratory wheeze with relatively preserved air exchange).  He has rales (mild bibasilar walls as well one third up each lung field). Abdominal: Soft. Bowel sounds are normal. He exhibits no distension and no mass (No obvious pulsatile masses palpated). There is no tenderness. There is no rebound and no guarding. Musculoskeletal: He exhibits edema (1+ to 2+ pitting edema symmetric bilaterally up to the knees with no tenderness). Strong DP pulses palpated and equal bilaterally     Assessment/Plan   We'll initiate cardiac workup given probable CHF component as well as aerosols and after workup is done and admission for CHF tuneup and social work to see to that the patient obtain his medications. EKG Interpretation    Interpreted by emergency department physician    Rhythm: normal sinus   Rate: normal at 73 bpm  Axis: normal  Conduction: normal  ST Segments: no acute change  T Waves: no acute change  Q Waves: no acute change    Clinical Impression:  nonspecific EKG. Critical Care  None      (Please note that portions of this note were completed with a voice recognition program. Efforts were made to edit the dictations but occasionally words are mis-transcribed.  Whenever words are used in this note in any gender, they shall be construed as though they were used in the gender appropriate to the circumstances; and whenever words are used in this note in the singular or plural form, they shall be construed as though they were used in the form appropriate to the circumstances.)    MD Claribel Mejia  Attending Emergency Medicine Physician            Naman Bingham MD  07/19/18 [address] [place] Michelle Loya MD  07/19/18 1796

## 2018-07-20 VITALS
HEIGHT: 65 IN | BODY MASS INDEX: 27.95 KG/M2 | TEMPERATURE: 98.1 F | OXYGEN SATURATION: 97 % | RESPIRATION RATE: 18 BRPM | WEIGHT: 167.77 LBS | DIASTOLIC BLOOD PRESSURE: 80 MMHG | HEART RATE: 52 BPM | SYSTOLIC BLOOD PRESSURE: 179 MMHG

## 2018-07-20 LAB
ABSOLUTE EOS #: 0 K/UL (ref 0–0.44)
ABSOLUTE IMMATURE GRANULOCYTE: 0 K/UL (ref 0–0.3)
ABSOLUTE LYMPH #: 0.62 K/UL (ref 1.1–3.7)
ABSOLUTE MONO #: 0.23 K/UL (ref 0.1–1.2)
ANION GAP SERPL CALCULATED.3IONS-SCNC: 11 MMOL/L (ref 9–17)
BASOPHILS # BLD: 0 % (ref 0–2)
BASOPHILS ABSOLUTE: 0 K/UL (ref 0–0.2)
BUN BLDV-MCNC: 20 MG/DL (ref 8–23)
BUN/CREAT BLD: ABNORMAL (ref 9–20)
CALCIUM SERPL-MCNC: 9.1 MG/DL (ref 8.6–10.4)
CHLORIDE BLD-SCNC: 104 MMOL/L (ref 98–107)
CO2: 23 MMOL/L (ref 20–31)
CREAT SERPL-MCNC: 0.87 MG/DL (ref 0.7–1.2)
DIFFERENTIAL TYPE: ABNORMAL
EOSINOPHILS RELATIVE PERCENT: 0 % (ref 1–4)
ESTIMATED AVERAGE GLUCOSE: 134 MG/DL
GFR AFRICAN AMERICAN: >60 ML/MIN
GFR NON-AFRICAN AMERICAN: >60 ML/MIN
GFR SERPL CREATININE-BSD FRML MDRD: ABNORMAL ML/MIN/{1.73_M2}
GFR SERPL CREATININE-BSD FRML MDRD: ABNORMAL ML/MIN/{1.73_M2}
GLUCOSE BLD-MCNC: 200 MG/DL (ref 70–99)
GLUCOSE BLD-MCNC: 66 MG/DL (ref 75–110)
HBA1C MFR BLD: 6.3 % (ref 4–6)
HCT VFR BLD CALC: 37.1 % (ref 40.7–50.3)
HEMOGLOBIN: 12.1 G/DL (ref 13–17)
IMMATURE GRANULOCYTES: 0 %
LYMPHOCYTES # BLD: 8 % (ref 24–43)
MCH RBC QN AUTO: 32 PG (ref 25.2–33.5)
MCHC RBC AUTO-ENTMCNC: 32.6 G/DL (ref 28.4–34.8)
MCV RBC AUTO: 98.1 FL (ref 82.6–102.9)
MONOCYTES # BLD: 3 % (ref 3–12)
MORPHOLOGY: NORMAL
NRBC AUTOMATED: 0 PER 100 WBC
PDW BLD-RTO: 13.4 % (ref 11.8–14.4)
PLATELET # BLD: 224 K/UL (ref 138–453)
PLATELET ESTIMATE: ABNORMAL
PMV BLD AUTO: 10.9 FL (ref 8.1–13.5)
POTASSIUM SERPL-SCNC: 4.7 MMOL/L (ref 3.7–5.3)
RBC # BLD: 3.78 M/UL (ref 4.21–5.77)
RBC # BLD: ABNORMAL 10*6/UL
SEG NEUTROPHILS: 89 % (ref 36–65)
SEGMENTED NEUTROPHILS ABSOLUTE COUNT: 6.95 K/UL (ref 1.5–8.1)
SODIUM BLD-SCNC: 138 MMOL/L (ref 135–144)
WBC # BLD: 7.8 K/UL (ref 3.5–11.3)
WBC # BLD: ABNORMAL 10*3/UL

## 2018-07-20 PROCEDURE — 96372 THER/PROPH/DIAG INJ SC/IM: CPT

## 2018-07-20 PROCEDURE — 97162 PT EVAL MOD COMPLEX 30 MIN: CPT

## 2018-07-20 PROCEDURE — 6370000000 HC RX 637 (ALT 250 FOR IP): Performed by: STUDENT IN AN ORGANIZED HEALTH CARE EDUCATION/TRAINING PROGRAM

## 2018-07-20 PROCEDURE — G8978 MOBILITY CURRENT STATUS: HCPCS

## 2018-07-20 PROCEDURE — 97165 OT EVAL LOW COMPLEX 30 MIN: CPT

## 2018-07-20 PROCEDURE — G0378 HOSPITAL OBSERVATION PER HR: HCPCS

## 2018-07-20 PROCEDURE — G8988 SELF CARE GOAL STATUS: HCPCS

## 2018-07-20 PROCEDURE — 87205 SMEAR GRAM STAIN: CPT

## 2018-07-20 PROCEDURE — 94640 AIRWAY INHALATION TREATMENT: CPT

## 2018-07-20 PROCEDURE — 36415 COLL VENOUS BLD VENIPUNCTURE: CPT

## 2018-07-20 PROCEDURE — 87070 CULTURE OTHR SPECIMN AEROBIC: CPT

## 2018-07-20 PROCEDURE — 97530 THERAPEUTIC ACTIVITIES: CPT

## 2018-07-20 PROCEDURE — 99236 HOSP IP/OBS SAME DATE HI 85: CPT | Performed by: FAMILY MEDICINE

## 2018-07-20 PROCEDURE — 97535 SELF CARE MNGMENT TRAINING: CPT

## 2018-07-20 PROCEDURE — 80048 BASIC METABOLIC PNL TOTAL CA: CPT

## 2018-07-20 PROCEDURE — 85025 COMPLETE CBC W/AUTO DIFF WBC: CPT

## 2018-07-20 PROCEDURE — 6360000002 HC RX W HCPCS: Performed by: STUDENT IN AN ORGANIZED HEALTH CARE EDUCATION/TRAINING PROGRAM

## 2018-07-20 PROCEDURE — G8987 SELF CARE CURRENT STATUS: HCPCS

## 2018-07-20 PROCEDURE — 82947 ASSAY GLUCOSE BLOOD QUANT: CPT

## 2018-07-20 PROCEDURE — G8979 MOBILITY GOAL STATUS: HCPCS

## 2018-07-20 RX ORDER — PREDNISONE 20 MG/1
40 TABLET ORAL DAILY
Qty: 10 TABLET | Refills: 0 | Status: SHIPPED | OUTPATIENT
Start: 2018-07-21 | End: 2018-07-26

## 2018-07-20 RX ORDER — GUAIFENESIN 600 MG/1
600 TABLET, EXTENDED RELEASE ORAL 2 TIMES DAILY
Qty: 20 TABLET | Refills: 0 | Status: SHIPPED | OUTPATIENT
Start: 2018-07-20 | End: 2018-07-30

## 2018-07-20 RX ORDER — ALBUTEROL SULFATE 90 UG/1
2 AEROSOL, METERED RESPIRATORY (INHALATION) 3 TIMES DAILY
Status: DISCONTINUED | OUTPATIENT
Start: 2018-07-20 | End: 2018-07-20 | Stop reason: HOSPADM

## 2018-07-20 RX ORDER — LEVOFLOXACIN 500 MG/1
500 TABLET, FILM COATED ORAL DAILY
Qty: 9 TABLET | Refills: 0 | Status: SHIPPED | OUTPATIENT
Start: 2018-07-21 | End: 2018-07-30

## 2018-07-20 RX ADMIN — SPIRONOLACTONE 25 MG: 25 TABLET ORAL at 09:08

## 2018-07-20 RX ADMIN — LISINOPRIL 20 MG: 20 TABLET ORAL at 09:11

## 2018-07-20 RX ADMIN — PREDNISONE 40 MG: 20 TABLET ORAL at 09:08

## 2018-07-20 RX ADMIN — AMLODIPINE BESYLATE 10 MG: 10 TABLET ORAL at 09:08

## 2018-07-20 RX ADMIN — METFORMIN HYDROCHLORIDE 500 MG: 500 TABLET ORAL at 09:08

## 2018-07-20 RX ADMIN — FAMOTIDINE 20 MG: 20 TABLET, FILM COATED ORAL at 09:08

## 2018-07-20 RX ADMIN — GLIMEPIRIDE 4 MG: 2 TABLET ORAL at 09:08

## 2018-07-20 RX ADMIN — LEVOFLOXACIN 500 MG: 500 TABLET, FILM COATED ORAL at 09:08

## 2018-07-20 RX ADMIN — ALBUTEROL SULFATE 2 PUFF: 90 AEROSOL, METERED RESPIRATORY (INHALATION) at 09:44

## 2018-07-20 RX ADMIN — ALBUTEROL SULFATE 2 PUFF: 90 AEROSOL, METERED RESPIRATORY (INHALATION) at 15:10

## 2018-07-20 RX ADMIN — FUROSEMIDE 20 MG: 20 TABLET ORAL at 09:08

## 2018-07-20 RX ADMIN — ENOXAPARIN SODIUM 40 MG: 40 INJECTION SUBCUTANEOUS at 09:08

## 2018-07-20 RX ADMIN — GUAIFENESIN 600 MG: 600 TABLET, EXTENDED RELEASE ORAL at 09:08

## 2018-07-20 RX ADMIN — ASPIRIN 81 MG: 81 TABLET, COATED ORAL at 09:10

## 2018-07-20 RX ADMIN — TIOTROPIUM BROMIDE 18 MCG: 18 CAPSULE ORAL; RESPIRATORY (INHALATION) at 09:44

## 2018-07-20 ASSESSMENT — PAIN SCALES - GENERAL: PAINLEVEL_OUTOF10: 0

## 2018-07-20 NOTE — DISCHARGE SUMMARY
[facility]  Discharge Summary      NAME:  Sal Castillo  :  1955  MRN:  [id number]    Admit date:  2018  Discharge date:  2018    Admitting Physician:  Amado Urban MD    Primary Diagnosis on Admission:   Present on Admission:   COPD exacerbation ([facility]   Controlled type 2 diabetes mellitus without complication, without long-term current use of insulin ([facility]   Hyperlipidemia with target LDL less than 70   Hypertension goal BP (blood pressure) < 140/80   Combined systolic and diastolic congestive heart failure (HCC)   Pleuritic chest pain      Secondary Diagnoses:   does not have any pertinent problems on file. Admission Condition:  fair     Discharged Condition: good    Hospital Course: The patient was admitted for the management of COPD exacerbation secondary to medication non-adherence. Patient was unable to fill his home medications for approximately 2 months prior to admission because of financial concerns. Patient responded well to oral prednisone, inhalers, and respiratory therapy treatment. Case management and social work were involved with the patient to attempt to find resources for patient to receive home medications, however patient has previously received temporary financial assistance and has continued to be unable to fill his prescriptions. Today on day of discharge pt feels better with no further complaints. Vitals and Labs are at pts baseline. All consultants involved during this admission are agreeable to d/c.     Consults:  none    Significant Diagnostic/theraputic interventions: Respiratory therapy      Lab Results   Component Value Date    WBC 7.8 2018    HGB 12.1 (L) 2018    HCT 37.1 (L) 2018     2018    CHOL 147 2017    TRIG 72 2017    HDL 41 2017    ALT 18 2016    AST 19 2016     2018    K 4.7 2018     07/20/2018    CREATININE 0.87 07/20/2018    BUN 20 07/20/2018    CO2 23 07/20/2018    TSH 7.59 (H) 07/01/2017    PSA 0.77 06/21/2012    LABA1C 6.3 (H) 07/19/2018    LABMICR 146 04/22/2013         Disposition:   home    Instructions to Patient:     Follow up with Lucina Lucero MD in  1 week    Lucina Lucero MD  [address] [facility] [address]  [phone]    Schedule an appointment as soon as possible for a visit in 1 week      Sybil Malave MD  .  [facility]  [address] [facility]    Schedule an appointment as soon as possible for a visit in 1 week  Call for follow up appointment    [facility]  [address] [place] [address]  [phone]    As needed, If symptoms worsen      Discharge Medications:     [facility] Medication Instructions POO:[id number]    Printed on:07/20/18 5060   Medication Information                      albuterol sulfate HFA (PROAIR HFA) 108 (90 Base) MCG/ACT inhaler  inhale 2 puffs by mouth every 6 hours if needed for wheezing             amLODIPine (NORVASC) 10 MG tablet  Take 1 tablet by mouth daily             aspirin 81 MG tablet  Take 1 tablet by mouth daily             atorvastatin (LIPITOR) 20 MG tablet  Take 1 tablet by mouth continuous             carvedilol (COREG) 25 MG tablet  Take 1 tablet by mouth 2 times daily             furosemide (LASIX) 20 MG tablet  Take 1 tablet by mouth daily             glimepiride (AMARYL) 4 MG tablet  TAKE ONE TABLET BY MOUTH EVERY MORNING BEFORE BREAKFAST             guaiFENesin (MUCINEX) 600 MG extended release tablet  Take 1 tablet by mouth 2 times daily for 10 days             levofloxacin (LEVAQUIN) 500 MG tablet  Take 1 tablet by mouth daily for 9 days             lisinopril (PRINIVIL;ZESTRIL) 20 MG tablet  Take 1 tablet by mouth daily             metFORMIN (GLUCOPHAGE-XR) 500 MG extended release tablet  Take 4 tablets by mouth Daily with supper             nicotine (NICODERM CQ) 14

## 2018-07-20 NOTE — PROGRESS NOTES
Discharge paperwork given to patient. All questions answered. Patient ambulated off unit by self without problem.

## 2018-07-20 NOTE — PROGRESS NOTES
assessment at level  2  · Hyperinflation assessment at level   · Secretion Management assessment at level    ·   · [x]    Bronchodilator Assessment  BRONCHODILATOR ASSESSMENT SCORE  Score 0 1 2 3 4 5   Breath Sounds   []  Patient Baseline []  No Wheeze good aeration []  Faint, scattered wheezing, good aeration [x]  Expiratory Wheezing and or moderately diminished []  Insp/Exp wheeze and/or very diminished []  Insp/Exp and/ or marked distress   Respiratory Rate   []  Patient Baseline [x]  Less than 20 []  Less than 20 []  20-25 []  Greater than 25 []  Greater than 25   Peak flow % of Pred or PB [x]  NA   []  Greater than 90%  []  81-90% []  71-80% []  Less than or equal to 70%  or unable to perform []  Unable due to Respiratory Distress   Dyspnea re []  Patient Baseline [x]  No SOB []  No SOB []  SOB on exertion []  SOB min activity []  At rest/acute   e FEV% Predicted       [x]  NA []  Above 69%  []  Unable []  Above 60-69%  []  Unable []  Above 50-59%  []  Unable []  Above 35-49%  []  Unable []  Less than 35%  []  Unable                 []  Hyperinflation Assessment  Score 1 2 3   CXR and Breath Sounds   []  Clear []  No atelectasis  Basilar aeration []  Atelectasis or absent basilar breath sounds   Incentive Spirometry Volume  (Per IBW)   []  Greater than or equal to 15ml/Kg []  less than 15ml/Kg []  less than 15ml/Kg   Surgery within last 2 weeks []  None or general   []  Abdominal or thoracic surgery  []  Abdominal or thoracic   Chronic Pulmonary Historyre []  No []  Yes []  Yes     []  Secretion Management Assessment  Score 1 2 3   Bilateral Breath Sounds   []  Occasional Rhonchi []  Scattered Rhonchi []  Course Rhonchi and/or poor aeration   Sputum    []  Small amount of thin secretions []  Moderate amount of viscous secretions []  Copius, Viscious Yellow/ Secretions   CXR as reported by physician []  clear  []  Unavailable []  Infiltrates and/or consolidation  []  Unavailable []  Mucus Plugging and or lobar consolidation  []  Unavailable   Cough []  Strong, productive cough []  Weak productive cough []  No cough or weak non-productive cough   Pitary Mandujano  12:31 AM                            FEMALE                                  MALE                            FEV1 Predicted Normal Values                        FEV1 Predicted Normal Values          Age                                     Height in Feet and Inches       Age                                     Height in Feet and Inches       4' 11\" 5' 1\" 5' 3\" 5' 5\" 5' 7\" 5' 9\" 5' 11\" 6' 1\"  4' 11\" 5' 1\" 5' 3\" 5' 5\" 5' 7\" 5' 9\" 5' 11\" 6' 1\"   42 - 45 2.49 2.66 2.84 3.03 3.22 3.42 3.62 3.83 42 - 45 2.82 3.03 3.26 3.49 3.72 3.96 4.22 4.47   46 - 49 2.40 2.57 2.76 2.94 3.14 3.33 3.54 3.75 46 - 49 2.70 2.92 3.14 3.37 3.61 3.85 4.10 4.36   50 - 53 2.31 2.48 2.66 2.85 3.04 3.24 3.45 3.66 50 - 53 2.58 2.80 3.02 3.25 3.49 3.73 3.98 4.24   54 - 57 2.21 2.38 2.57 2.75 2.95 3.14 3.35 3.56 54 - 57 2.46 2.67 2.89 3.12 3.36 3.60 3.85 4.11   58 - 61 2.10 2.28 2.46 2.65 2.84 3.04 3.24 3.45 58 - 61 2.32 2.54 2.76 2.99 3.23 3.47 3.72 3.98   62 - 65 1.99 2.17 2.35 2.54 2.73 2.93 3.13 3.34 62 - 65 2.19 2.40 2.62 2.85 3.09 3.33 3.58 3.84   66 - 69 1.88 2.05 2.23 2.42 2.61 2.81 3.02 3.23 66 - 69 2.04 2.26 2.48 2.71 2.95 3.19 3.44 3.70   70+ 1.82 1.99 2.17 2.36 2.55 2.75 2.95 3.16 70+ 1.97 2.19 2.41 2.64 2.87 3.12 3.37 3.62             Predicted Peak Expiratory Flow Rate                                       Height (in)  Female       Height (in) Male           Age 58 63 61 63 56 77 78 74 Age            20 120 205 284 946 827 489 014 521  30 97 06 28 72 70 72 74 76   25 337 352 366 381 396 411 426 441 25 447 476 505 533 562 591 [phone]   30 329 344 359 374 389 404 419 434 30 437 466 494 523 552 580 609 638 667   35 322 337 351 366 381 396 411 426 35 426 455 484 [phone] 657   40 314 329 344 359 374 389 404 419 40 416 445 473 502 531 559 588 617 647   45 307 322 336 351 366 35 66 48 460 489 517 546 575 605   65 277 292 306 321 336 351 366 381 65 363 392 421 449 478 507 535 564 594   70 269 284 299 314 329 344 359 374 70 353 382 410 439 468 496 525 554 583   75 261 274 289 305 319 334 348 364 75 344 372 400 429 458 487 515 544 573   80 253 266 282 296 312 327 342 356 80 335 362 390 419 448 476 [phone]

## 2018-07-20 NOTE — PROGRESS NOTES
Spoke to the patient regarding which pharmacy would he like his prescriptions sent to. Patient was upset and frustrated stating that he has no transport and was unable to afford his prescriptions. He stated he would like his prescriptions sent to [facility] at [address] [place] [address], where his physician is based out off. As per patient he lives with his son who is on [place] and has no help both financially and in transportation in order for him to get his medications.

## 2018-07-20 NOTE — PROGRESS NOTES
Metabolic Panel    Collection Time: 07/19/18  5:16 PM   Result Value Ref Range    Glucose 93 70 - 99 mg/dL    BUN 21 8 - 23 mg/dL    CREATININE 1.07 0.70 - 1.20 mg/dL    Bun/Cre Ratio NOT REPORTED 9 - 20    Calcium 9.3 8.6 - 10.4 mg/dL    Sodium 141 135 - 144 mmol/L    Potassium 4.6 3.7 - 5.3 mmol/L    Chloride 105 98 - 107 mmol/L    CO2 25 20 - 31 mmol/L    Anion Gap 11 9 - 17 mmol/L    GFR Non-African American >60 >60 mL/min    GFR African American >60 >60 mL/min    GFR Comment          GFR Staging NOT REPORTED    Brain Natriuretic Peptide    Collection Time: 07/19/18  5:16 PM   Result Value Ref Range    Pro- (H) <300 pg/mL    BNP Interpretation         Hemoglobin A1C    Collection Time: 07/19/18  5:16 PM   Result Value Ref Range    Hemoglobin A1C 6.3 (H) 4.0 - 6.0 %    Estimated Avg Glucose 134 mg/dL   POCT troponin    Collection Time: 07/19/18  7:24 PM   Result Value Ref Range    POC Troponin I 0.01 0.00 - 0.10 ng/mL    POC Troponin Interp       The Troponin-I (POC) results cannot be compared to the Troponin-T results.    Basic Metabolic Panel w/ Reflex to MG    Collection Time: 07/20/18  6:27 AM   Result Value Ref Range    Glucose 200 (H) 70 - 99 mg/dL    BUN 20 8 - 23 mg/dL    CREATININE 0.87 0.70 - 1.20 mg/dL    Bun/Cre Ratio NOT REPORTED 9 - 20    Calcium 9.1 8.6 - 10.4 mg/dL    Sodium 138 135 - 144 mmol/L    Potassium 4.7 3.7 - 5.3 mmol/L    Chloride 104 98 - 107 mmol/L    CO2 23 20 - 31 mmol/L    Anion Gap 11 9 - 17 mmol/L    GFR Non-African American >60 >60 mL/min    GFR African American >60 >60 mL/min    GFR Comment          GFR Staging NOT REPORTED    CBC auto differential    Collection Time: 07/20/18  6:27 AM   Result Value Ref Range    WBC 7.8 3.5 - 11.3 k/uL    RBC 3.78 (L) 4.21 - 5.77 m/uL    Hemoglobin 12.1 (L) 13.0 - 17.0 g/dL    Hematocrit 37.1 (L) 40.7 - 50.3 %    MCV 98.1 82.6 - 102.9 fL    MCH 32.0 25.2 - 33.5 pg    MCHC 32.6 28.4 - 34.8 g/dL    RDW 13.4 11.8 - 14.4 %    Platelets [facility]) inhalation capsule 18 mcg  18 mcg Inhalation Daily Rajesh Delcid MD   18 mcg at 07/20/18 0944    enoxaparin (LOVENOX) injection 40 mg  40 mg Subcutaneous Daily Rajesh Delcid MD   40 mg at 07/20/18 0908    famotidine (PEPCID) tablet 20 mg  20 mg Oral BID Rajesh Delcid MD   20 mg at 07/20/18 0908    predniSONE (DELTASONE) tablet 40 mg  40 mg Oral Daily Rajesh Delcid MD   40 mg at 07/20/18 0908    levofloxacin (LEVAQUIN) tablet 500 mg  500 mg Oral Daily Rajesh Delcid MD   500 mg at 07/20/18 0908    guaiFENesin [facility]) extended release tablet 600 mg  600 mg Oral BID Rajesh Delcid MD   600 mg at 07/20/18 0908    metFORMIN (GLUCOPHAGE) tablet 500 mg  500 mg Oral BID WC Rajesh Delcid MD   500 mg at 07/20/18 0908    glucose (GLUTOSE) 40 % oral gel 15 g  15 g Oral PRN Rajesh Delcid MD        dextrose 50 % solution 12.5 g  12.5 g Intravenous PRN Rajesh Delcid MD        glucagon (rDNA) injection 1 mg  1 mg Intramuscular PRN Rajesh Delcid MD        dextrose 5 % solution  100 mL/hr Intravenous PRN Rajesh Delcid MD           ASSESSMENT:     Principal Problem:    COPD exacerbation ([address]  Active Problems:    Hypertension goal BP (blood pressure) < 140/80    Hyperlipidemia with target LDL less than 70    Controlled type 2 diabetes mellitus without complication, without long-term current use of insulin (HCC)    Combined systolic and diastolic congestive heart failure ([address]    Pleuritic chest pain  Resolved Problems:    * No resolved hospital problems. *      PLAN:       1. COPD exacerbation- improving . Continue steroids and levaquin    2. CHF with chest pain  bnp was at 380  Resumed home meds  Last EF - 33 % ON MAY 2018  EKG -SINUS rhythm , trops x 2 are negative  Needs cardiology follow up on discharge       3. Diabetes T2  - a1c awaited    Above plan discussed with the patient, who agreed to the above plan     Plan will be discussed with the attending, Dr. Patel Earing    The patient needs to continue his medications, however has been unable to afford them. Will touch base with social work regarding how we can help him. Lyndon Bejarano MD  Family Medicine Resident  7/20/2018 2:11 PM     Attending Physician Statement  I have discussed the case of Tk [place], including pertinent history and exam findings with the resident. I have seen and examined the patient and the key elements of the encounter have been performed by me. I agree with the assessment, plan and orders as documented by the resident with changes made to the note as needed. (GC Modifier)  Review of Systems:   In addition to the pertinent positives and negatives as stated within HPI and the review of systems as documented in their notes, all other systems were reviewed when able to and are reported negative. Admitted from ER yesterday evening  Seen today on rounds. Breathing improved. Good air exchange. No wheezing heard. Labs reviewed A1c 6.3  Trops negative, nonspecific EKG. No further acute inpatient cardiac work up warranted. [facility] at [phone] to confirm patient's status. No showed at last appt in May 2018. Nonischemic Cardiomyopathy  Last Cath on 05/11/15:  EF 35%. LM-normal  LAD-luminal irregularities on 10-20%. LCX-luminal irregularities 20%  RCA-mid lesion of 30-40%. Strongly advised the need to follow up with cardiology in order to obtain MUGA scan and further facilitate ICD placement if necessary. Discussed R/B/A. He clearly acknowledges that his heart can stop, but states \"he will see if he can get in to the office at some point\". Was able to confirm understanding of discussion with teachback method.   Case was discussed with  who stated patient has both Medicare disability and [facility] supplemental.  Attempted to get further assistance from medication mgmt pharmacist, who reached out to [facility] [facility] that had worked with patient in the past.  No further resources available at this time and patient was advised to adhere to care plan. Stable for discharge.   55 min spent

## 2018-07-20 NOTE — CARE COORDINATION
Case Management Initial Discharge Plan  Rosalio Marks,         Readmission Risk              Risk of Unplanned Readmission:        12               Met with:patient to discuss discharge plans. Information verified: address, contacts, phone number, , insurance Yes  PCP: Jillian Restrepo MD  Date of last visit: 2 months ago     Insurance Provider: [facility] Medicare     Discharge Planning    Living Arrangements:  Alone   Support Systems:  None    Home has 2 stories  10 stairs to climb to get into front door, flight stairs to climb to reach second floor  Location of bedroom/bathroom in home up     Patient able to perform ADL's:Independent    Current Services (outpatient & in home) none  DME equipment: none  DME provider: none    Pharmacy: [facility]    Potential Assistance Purchasing Medications:  Yes  Does patient want to participate in local refill/ meds to beds program?  N/A    Potential Assistance Needed:  N/A    Patient agreeable to home care: No  [place] of choice provided:  n/a    Prior SNF/Rehab Placement and Facility: no   Agreeable to SNF/Rehab: No  [place] of choice provided: n/a   Evaluation: n/a    Expected Discharge date:  18  Patient expects to be discharged to:  home  Follow Up Appointment: Best Day/ Time: Friday AM    Transportation provider: walk   Transportation arrangements needed for discharge: No    Discharge Plan: Plan to discharge to home independently; has established PCP care.          Electronically signed by Marichuy Noel RN on 18 at 7:55 AM

## 2018-07-22 LAB
CULTURE: ABNORMAL
DIRECT EXAM: ABNORMAL
Lab: ABNORMAL
SPECIMEN DESCRIPTION: ABNORMAL
STATUS: ABNORMAL

## 2018-08-02 ENCOUNTER — OFFICE VISIT (OUTPATIENT)
Dept: FAMILY MEDICINE CLINIC | Age: 63
End: 2018-08-02
Payer: MEDICARE

## 2018-08-02 VITALS
DIASTOLIC BLOOD PRESSURE: 90 MMHG | BODY MASS INDEX: 28.96 KG/M2 | TEMPERATURE: 98.2 F | HEIGHT: 65 IN | WEIGHT: 173.8 LBS | SYSTOLIC BLOOD PRESSURE: 170 MMHG | HEART RATE: 67 BPM

## 2018-08-02 DIAGNOSIS — I10 HYPERTENSION GOAL BP (BLOOD PRESSURE) < 140/80: Primary | Chronic | ICD-10-CM

## 2018-08-02 DIAGNOSIS — J44.9 CHRONIC OBSTRUCTIVE PULMONARY DISEASE, UNSPECIFIED COPD TYPE (HCC): ICD-10-CM

## 2018-08-02 DIAGNOSIS — I50.42 CHRONIC COMBINED SYSTOLIC AND DIASTOLIC CONGESTIVE HEART FAILURE (HCC): ICD-10-CM

## 2018-08-02 DIAGNOSIS — Z12.11 COLON CANCER SCREENING: ICD-10-CM

## 2018-08-02 DIAGNOSIS — E78.5 HYPERLIPIDEMIA WITH TARGET LDL LESS THAN 70: Chronic | ICD-10-CM

## 2018-08-02 DIAGNOSIS — Z23 NEED FOR VACCINATION: ICD-10-CM

## 2018-08-02 PROBLEM — R07.81 PLEURITIC CHEST PAIN: Status: RESOLVED | Noted: 2018-07-19 | Resolved: 2018-08-02

## 2018-08-02 PROBLEM — J44.1 COPD EXACERBATION (HCC): Status: RESOLVED | Noted: 2018-07-19 | Resolved: 2018-08-02

## 2018-08-02 PROCEDURE — G0009 ADMIN PNEUMOCOCCAL VACCINE: HCPCS | Performed by: FAMILY MEDICINE

## 2018-08-02 PROCEDURE — 90715 TDAP VACCINE 7 YRS/> IM: CPT | Performed by: FAMILY MEDICINE

## 2018-08-02 PROCEDURE — 1111F DSCHRG MED/CURRENT MED MERGE: CPT | Performed by: FAMILY MEDICINE

## 2018-08-02 PROCEDURE — 99213 OFFICE O/P EST LOW 20 MIN: CPT | Performed by: FAMILY MEDICINE

## 2018-08-02 PROCEDURE — G0008 ADMIN INFLUENZA VIRUS VAC: HCPCS | Performed by: FAMILY MEDICINE

## 2018-08-02 RX ORDER — METFORMIN HYDROCHLORIDE 500 MG/1
2000 TABLET, EXTENDED RELEASE ORAL
Qty: 360 TABLET | Refills: 3 | Status: CANCELLED | OUTPATIENT
Start: 2018-08-02

## 2018-08-02 RX ORDER — ALBUTEROL SULFATE 90 UG/1
AEROSOL, METERED RESPIRATORY (INHALATION)
Qty: 8.5 INHALER | Refills: 5 | Status: SHIPPED | OUTPATIENT
Start: 2018-08-02 | End: 2019-06-07 | Stop reason: SDUPTHER

## 2018-08-02 RX ORDER — LISINOPRIL 20 MG/1
20 TABLET ORAL DAILY
Qty: 30 TABLET | Refills: 5 | Status: SHIPPED | OUTPATIENT
Start: 2018-08-02 | End: 2019-04-19 | Stop reason: SDUPTHER

## 2018-08-02 RX ORDER — FUROSEMIDE 20 MG/1
20 TABLET ORAL DAILY
Qty: 30 TABLET | Refills: 5 | Status: SHIPPED | OUTPATIENT
Start: 2018-08-02 | End: 2019-06-07 | Stop reason: SDUPTHER

## 2018-08-02 RX ORDER — CARVEDILOL 25 MG/1
25 TABLET ORAL 2 TIMES DAILY
Qty: 60 TABLET | Refills: 5 | Status: SHIPPED | OUTPATIENT
Start: 2018-08-02 | End: 2019-06-07 | Stop reason: SDUPTHER

## 2018-08-02 RX ORDER — ATORVASTATIN CALCIUM 20 MG/1
20 TABLET, FILM COATED ORAL
Qty: 30 TABLET | Refills: 5 | Status: SHIPPED | OUTPATIENT
Start: 2018-08-02 | End: 2019-04-19 | Stop reason: SDUPTHER

## 2018-08-02 RX ORDER — GLIMEPIRIDE 4 MG/1
TABLET ORAL
Qty: 30 TABLET | Refills: 5 | Status: SHIPPED | OUTPATIENT
Start: 2018-08-02 | End: 2019-06-07 | Stop reason: SDUPTHER

## 2018-08-02 RX ORDER — SPIRONOLACTONE 25 MG/1
25 TABLET ORAL DAILY
Qty: 30 TABLET | Refills: 5 | Status: SHIPPED | OUTPATIENT
Start: 2018-08-02 | End: 2019-06-07 | Stop reason: SDUPTHER

## 2018-08-02 RX ORDER — AMLODIPINE BESYLATE 10 MG/1
10 TABLET ORAL DAILY
Qty: 30 TABLET | Refills: 3 | Status: SHIPPED | OUTPATIENT
Start: 2018-08-02 | End: 2018-12-03 | Stop reason: SDUPTHER

## 2018-08-02 NOTE — PROGRESS NOTES
reconciled against medications ordered at time of hospital discharge: Yes    Chief Complaint   Patient presents with    Follow-up     copd       History of Present illness - Follow up of Hospital diagnosis(es): Patient was recently admitted for COPD exacerbation secondary to non compliance of medications. Patient was briefly treated with steroids, bronchodilators and antibiotics. He currently is at baseline but still does not has his medications. He states he will get paid tomorrow and  his medications tomorrow. No complaints today. Inpatient course: Discharge summary reviewed- see chart. Interval history/Current status: COPD stable, HTN not at goal     Vitals:    08/02/18 0813 08/02/18 0826   BP: (!) 186/80 (!) 170/90   Site: Left Arm Left Arm   Position: Sitting Sitting   Cuff Size: Large Adult Large Adult   Pulse: 67    Temp: 98.2 °F (36.8 °C)    TempSrc: Temporal    Weight: 173 lb 12.8 oz (78.8 kg)    Height: 5' 5\" (1.651 m)      Body mass index is 28.92 kg/m².    Wt Readings from Last 3 Encounters:   08/02/18 173 lb 12.8 oz (78.8 kg)   07/19/18 167 lb 12.3 oz (76.1 kg)   05/03/18 177 lb (80.3 kg)     BP Readings from Last 3 Encounters:   08/02/18 (!) 170/90   07/20/18 (!) 179/80   05/03/18 (!) 122/57      Review of Systems - General ROS: negative for - chills or fever  Respiratory ROS: positive for - cough  negative for - shortness of breath  Cardiovascular ROS: negative for - chest pain  Gastrointestinal ROS: no abdominal pain, change in bowel habits, or black or bloody stools  Neurological ROS: no TIA or stroke symptoms    Physical Exam:  General Appearance: alert and oriented to person, place and time, well developed and well- nourished, in no acute distress  Skin: warm and dry, no rash or erythema  Head: normocephalic and atraumatic  Eyes: pupils equal, round, and reactive to light, extraocular eye movements intact, conjunctivae normal  ENT: tympanic membrane, external ear and ear canal normal bilaterally, nose without deformity, nasal mucosa and turbinates normal without polyps  Neck: supple and non-tender without mass, no thyromegaly or thyroid nodules, no cervical lymphadenopathy  Pulmonary/Chest: clear to auscultation bilaterally- no wheezes, rales or rhonchi, normal air movement, no respiratory distress  Cardiovascular: normal rate, regular rhythm, normal S1 and S2, no murmurs, rubs, clicks, or gallops, distal pulses intact, no carotid bruits  Abdomen: soft, non-tender, non-distended, normal bowel sounds, no masses or organomegaly  Extremities: no cyanosis, clubbing or edema  Musculoskeletal: normal range of motion, no joint swelling, deformity or tenderness  Neurologic: reflexes normal and symmetric, no cranial nerve deficit, gait, coordination and speech normal    Assessment/Plan:  Keagan Koroma was seen today for follow-up. Diagnoses and all orders for this visit:    Tobacco use disorder, continuous  -     albuterol sulfate HFA (PROAIR HFA) 108 (90 Base) MCG/ACT inhaler; inhale 2 puffs by mouth every 6 hours if needed for wheezing  -     [place] DISCHARGE MEDS RECONCILED W/ CURRENT OUTPATIENT MED LIST    Medication refill  -     amLODIPine (NORVASC) 10 MG tablet; Take 1 tablet by mouth daily  -     glimepiride (AMARYL) 4 MG tablet;   -     [place] DISCHARGE MEDS RECONCILED W/ CURRENT OUTPATIENT MED LIST    Controlled type 2 diabetes mellitus without complication, without long-term current use of insulin (HCC)  -     metFORMIN (GLUCOPHAGE-XR) 500 MG extended release tablet; Take 4 tablets by mouth Daily with supper  -     [place] DISCHARGE MEDS RECONCILED W/ CURRENT OUTPATIENT MED LIST    Other orders  -     aspirin 81 MG tablet; Take 1 tablet by mouth daily  -     atorvastatin (LIPITOR) 20 MG tablet; Take 1 tablet by mouth continuous  -     carvedilol (COREG) 25 MG tablet; Take 1 tablet by mouth 2 times daily  -     furosemide (LASIX) 20 MG tablet;  Take 1 tablet by mouth daily  -     lisinopril (PRINIVIL;ZESTRIL) 20

## 2018-08-02 NOTE — PATIENT INSTRUCTIONS
Thank you for letting us take care of you today. We hope all your questions were addressed. If a question was overlooked or something else comes to mind after you return home, please contact a member of your Care Team listed below. Please make sure you have a routine office visit set up to follow-up on [address]. Your Care Team at [facility] is Team #2  Hong Chong DO (Faculty)  Mal Ulloa MD (Resident)  Christy Andres MD (Resident)  Elisa Diamond MD (Resident)  Jerry Ambriz MD (Resident)  Martha Green MD (Resident)  Rosas Clubs, LPN  Keyla Amin., Peg Shannondulce, [address] ([address] [facility])  Maryan Garrison RN, ([id number] [facility])  Mitchel Yi, Ph.D., (Behavioral Services)  Elsa Hooker [facility] (Clinical Pharmacist)     Office phone number: [phone]    If you need to get in right away due to illness, please be advised we have \"Same Day\" appointments available Monday-Friday. Please call us at [phone] option #3 to schedule your \"Same Day\" appointment. Patient Education   Patient Education   Patient Education   Patient Education        High Blood Pressure: Care Instructions  Your Care Instructions    If your blood pressure is usually above 130/80, you have high blood pressure, or hypertension. That means the top number is 130 or higher or the bottom number is 80 or higher, or both. Despite what a lot of people think, high blood pressure usually doesn't cause headaches or make you feel dizzy or lightheaded. It usually has no symptoms. But it does increase your risk for heart attack, stroke, and kidney or eye damage. The higher your blood pressure, the more your risk increases. Your doctor will give you a goal for your blood pressure. Your goal will be based on your health and your age. Lifestyle changes, such as eating healthy and being active, are always important to help lower blood pressure.  You might also take medicine to reach your blood pressure goal.  Follow-up care is a key part of your treatment and safety. Be sure to make and go to all appointments, and call your doctor if you are having problems. It's also a good idea to know your test results and keep a list of the medicines you take. How can you care for yourself at home? Medical treatment  · If you stop taking your medicine, your blood pressure will go back up. You may take one or more types of medicine to lower your blood pressure. Be safe with medicines. Take your medicine exactly as prescribed. Call your doctor if you think you are having a problem with your medicine. · Talk to your doctor before you start taking aspirin every day. Aspirin can help certain people lower their risk of a heart attack or stroke. But taking aspirin isn't right for everyone, because it can cause serious bleeding. · See your doctor regularly. You may need to see the doctor more often at first or until your blood pressure comes down. · If you are taking blood pressure medicine, talk to your doctor before you take decongestants or anti-inflammatory medicine, such as ibuprofen. Some of these medicines can raise blood pressure. · Learn how to check your blood pressure at home. Lifestyle changes  · Stay at a healthy weight. This is especially important if you put on weight around the waist. Losing even 10 pounds can help you lower your blood pressure. · If your doctor recommends it, get more exercise. Walking is a good choice. Bit by bit, increase the amount you walk every day. Try for at least 30 minutes on most days of the week. You also may want to swim, bike, or do other activities. · Avoid or limit alcohol. Talk to your doctor about whether you can drink any alcohol. · Try to limit how much sodium you eat to less than 2,300 milligrams (mg) a day. Your doctor may ask you to try to eat less than 1,500 mg a day.   · Eat plenty of fruits (such as bananas and oranges), vegetables, legumes, whole grains, and medicines. Take your medicines exactly as prescribed. Call your doctor if you think you are having a problem with your medicine.     · Do not take any vitamins, over-the-counter medicine, or herbal products without talking to your doctor first. Lise Singh not take ibuprofen (Advil or Motrin) and naproxen (Aleve) without talking to your doctor first. They could make your heart failure worse.     · You may be taking some of the following medicine. ¨ Beta-blockers can slow heart rate, decrease blood pressure, and improve your condition. Taking a beta-blocker may lower your chance of needing to be hospitalized. ¨ Angiotensin-converting enzyme inhibitors (ACEIs) reduce the heart's workload, lower blood pressure, and reduce swelling. Taking an ACEI may lower your chance of needing to be hospitalized again. ¨ Angiotensin II receptor blockers (ARBs) work like ACEIs. Your doctor may prescribe them instead of ACEIs. ¨ Diuretics, also called water pills, reduce swelling. ¨ Potassium supplements replace this important mineral, which is sometimes lost with diuretics. ¨ Aspirin and other blood thinners prevent blood clots, which can cause a stroke or heart attack.    You will get more details on the specific medicines your doctor prescribes. Diet    · Your doctor may suggest that you limit sodium to 2,000 milligrams (mg) a day or less. That is less than 1 teaspoon of salt a day, including all the salt you eat in cooking or in packaged foods. People get most of their sodium from processed foods. Fast food and restaurant meals also tend to be very high in sodium.     · Ask your doctor how much liquid you can drink each day. You may have to limit liquids.    Weight    · Weigh yourself without clothing at the same time each day. Record your weight. Call your doctor if you have a sudden weight gain, such as more than 2 to 3 pounds in a day or 5 pounds in a week.  (Your doctor may suggest a different range of weight gain.) A sudden medicines every day for them to work well.     · A spacer may help you get more inhaled medicine to your lungs. Ask your doctor or pharmacist if a spacer is right for you. If it is, ask how to use it properly.     · Do not take any vitamins, over-the-counter medicine, or herbal products without talking to your doctor first.     · If your doctor prescribed antibiotics, take them as directed. Do not stop taking them just because you feel better. You need to take the full course of antibiotics.     · Oxygen therapy boosts the amount of oxygen in your blood and helps you breathe easier. Use the flow rate your doctor has recommended, and do not change it without talking to your doctor first.   Activity    · Get regular exercise. Walking is an easy way to get exercise. Start out slowly, and walk a little more each day.     · Pay attention to your breathing. You are exercising too hard if you cannot talk while you are exercising.     · Take short rest breaks when doing household chores and other activities.     · Learn breathing methods-such as breathing through pursed lips-to help you become less short of breath.     · If your doctor has not set you up with a pulmonary rehabilitation program, talk to him or her about whether rehab is right for you. Rehab includes exercise programs, education about your disease and how to manage it, help with diet and other changes, and emotional support. Diet    · Eat regular, healthy meals. Use bronchodilators about 1 hour before you eat to make it easier to eat. Eat several small meals instead of three large ones. Drink beverages at the end of the meal. Avoid foods that are hard to chew.     · Eat foods that contain protein so that you do not lose muscle mass.     · Talk with your doctor if you gain too much weight or if you lose weight without trying.    Mental health    · Talk to your family, friends, or a therapist about your feelings.  It is normal to feel frightened, angry, hopeless, helpless, and even guilty. Talking openly about bad feelings can help you cope. If these feelings last, talk to your doctor. When should you call for help? Call 911 anytime you think you may need emergency care. For example, call if:    · You have severe trouble breathing.    Call your doctor now or seek immediate medical care if:    · You have new or worse trouble breathing.     · You cough up blood.     · You have a fever.    Watch closely for changes in your health, and be sure to contact your doctor if:    · You cough more deeply or more often, especially if you notice more mucus or a change in the color of your mucus.     · You have new or worse swelling in your legs or belly.     · You are not getting better as expected. Where can you learn more? Go to https://RegalisterpeDigital Dream Labseb.[email or web address]. org and sign in to your Vicarious account. Enter S612 in the Resort Gems box to learn more about \"Chronic Obstructive Pulmonary Disease (COPD): Care Instructions. \"     If you do not have an account, please click on the \"Sign Up Now\" link. Current as of: December 6, 2017  Content Version: 11.6  © 3917-9105 [employer]. Care instructions adapted under license by [facility] ([facility]). If you have questions about a medical condition or this instruction, always ask your healthcare professional. [address] any warranty or liability for your use of this information.

## 2018-08-06 LAB
CONTROL: PRESENT
HEMOCCULT STL QL: NEGATIVE

## 2018-08-08 ENCOUNTER — HOSPITAL ENCOUNTER (OUTPATIENT)
Age: 63
Discharge: HOME | End: 2018-08-08
Payer: MEDICARE

## 2018-08-08 DIAGNOSIS — Z12.11 COLON CANCER SCREENING: ICD-10-CM

## 2018-08-08 PROCEDURE — 82274 ASSAY TEST FOR BLOOD FECAL: CPT

## 2018-09-04 ENCOUNTER — HOSPITAL ENCOUNTER (OUTPATIENT)
Age: 63
Setting detail: SPECIMEN
Discharge: HOME | End: 2018-09-04
Payer: MEDICARE

## 2018-09-04 ENCOUNTER — OFFICE VISIT (OUTPATIENT)
Dept: FAMILY MEDICINE CLINIC | Age: 63
End: 2018-09-04
Payer: MEDICARE

## 2018-09-04 VITALS
BODY MASS INDEX: 29.16 KG/M2 | SYSTOLIC BLOOD PRESSURE: 122 MMHG | WEIGHT: 175 LBS | TEMPERATURE: 97.2 F | HEIGHT: 65 IN | DIASTOLIC BLOOD PRESSURE: 69 MMHG | HEART RATE: 96 BPM

## 2018-09-04 DIAGNOSIS — E78.5 HYPERLIPIDEMIA WITH TARGET LDL LESS THAN 70: Chronic | ICD-10-CM

## 2018-09-04 DIAGNOSIS — J44.9 CHRONIC OBSTRUCTIVE PULMONARY DISEASE, UNSPECIFIED COPD TYPE (HCC): ICD-10-CM

## 2018-09-04 DIAGNOSIS — I50.42 CHRONIC COMBINED SYSTOLIC AND DIASTOLIC CONGESTIVE HEART FAILURE (HCC): Primary | ICD-10-CM

## 2018-09-04 DIAGNOSIS — I10 HYPERTENSION GOAL BP (BLOOD PRESSURE) < 140/80: Chronic | ICD-10-CM

## 2018-09-04 LAB
CHOLESTEROL/HDL RATIO: 2.9
CHOLESTEROL: 107 MG/DL
HDLC SERPL-MCNC: 37 MG/DL
LDL CHOLESTEROL: 46 MG/DL (ref 0–130)
TRIGL SERPL-MCNC: 118 MG/DL
VLDLC SERPL CALC-MCNC: ABNORMAL MG/DL (ref 1–30)

## 2018-09-04 PROCEDURE — G8427 DOCREV CUR MEDS BY ELIG CLIN: HCPCS | Performed by: FAMILY MEDICINE

## 2018-09-04 PROCEDURE — G8419 CALC BMI OUT NRM PARAM NOF/U: HCPCS | Performed by: FAMILY MEDICINE

## 2018-09-04 PROCEDURE — 3023F SPIROM DOC REV: CPT | Performed by: FAMILY MEDICINE

## 2018-09-04 PROCEDURE — G8926 SPIRO NO PERF OR DOC: HCPCS | Performed by: FAMILY MEDICINE

## 2018-09-04 PROCEDURE — 99213 OFFICE O/P EST LOW 20 MIN: CPT | Performed by: FAMILY MEDICINE

## 2018-09-04 PROCEDURE — 1123F ACP DISCUSS/DSCN MKR DOCD: CPT | Performed by: FAMILY MEDICINE

## 2018-09-04 PROCEDURE — 3017F COLORECTAL CA SCREEN DOC REV: CPT | Performed by: FAMILY MEDICINE

## 2018-09-04 PROCEDURE — 4040F PNEUMOC VAC/ADMIN/RCVD: CPT | Performed by: FAMILY MEDICINE

## 2018-09-04 PROCEDURE — 1036F TOBACCO NON-USER: CPT | Performed by: FAMILY MEDICINE

## 2018-09-04 PROCEDURE — 1101F PT FALLS ASSESS-DOCD LE1/YR: CPT | Performed by: FAMILY MEDICINE

## 2018-09-04 ASSESSMENT — ENCOUNTER SYMPTOMS
COUGH: 0
NAUSEA: 0
CONSTIPATION: 0
WHEEZING: 0
DIARRHEA: 0
ABDOMINAL PAIN: 0
SHORTNESS OF BREATH: 0
VOMITING: 0
BLURRED VISION: 0

## 2018-09-04 NOTE — PATIENT INSTRUCTIONS
quitting, talk to your doctor about stop-smoking programs and medicines. These can increase your chances of quitting for good. Quitting smoking may be the most important step you can take to protect your heart.     · Limit alcohol to 2 drinks a day for men and 1 drink a day for women. Too much alcohol can cause health problems.     · Avoid getting sick from colds and the flu. Get a pneumococcal vaccine shot. If you have had one before, ask your doctor whether you need another dose. Get a flu shot each year. If you must be around people with colds or the flu, wash your hands often. When should you call for help? Call 911 if you have symptoms of sudden heart failure such as:    · You have severe trouble breathing.     · You cough up pink, foamy mucus.     · You have a new irregular or rapid heartbeat.    Call your doctor now or seek immediate medical care if:    · You have new or increased shortness of breath.     · You are dizzy or lightheaded, or you feel like you may faint.     · You have sudden weight gain, such as more than 2 to 3 pounds in a day or 5 pounds in a week. (Your doctor may suggest a different range of weight gain.)     · You have increased swelling in your legs, ankles, or feet.     · You are suddenly so tired or weak that you cannot do your usual activities.    Watch closely for changes in your health, and be sure to contact your doctor if you develop new symptoms. Where can you learn more? Go to https://Invuity.CrossReader. org and sign in to your BeiBei account. Enter I320 in the motionBEAT inc box to learn more about \"Heart Failure: Care Instructions. \"     If you do not have an account, please click on the \"Sign Up Now\" link. Current as of: May 10, 2017  Content Version: 11.7  © 6889-7838 [employer]. Care instructions adapted under license by [email or web address] ([facility]).  If you have questions about a medical condition or this instruction, always ask your healthcare professional. [address] any warranty or liability for your use of this information. DASH Diet: Care Instructions  Your Care Instructions    The DASH diet is an eating plan that can help lower your blood pressure. DASH stands for Dietary Approaches to Stop Hypertension. Hypertension is high blood pressure. The DASH diet focuses on eating foods that are high in calcium, potassium, and magnesium. These nutrients can lower blood pressure. The foods that are highest in these nutrients are fruits, vegetables, low-fat dairy products, nuts, seeds, and legumes. But taking calcium, potassium, and magnesium supplements instead of eating foods that are high in those nutrients does not have the same effect. The DASH diet also includes whole grains, fish, and poultry. The DASH diet is one of several lifestyle changes your doctor may recommend to lower your high blood pressure. Your doctor may also want you to decrease the amount of sodium in your diet. Lowering sodium while following the DASH diet can lower blood pressure even further than just the DASH diet alone. Follow-up care is a key part of your treatment and safety. Be sure to make and go to all appointments, and call your doctor if you are having problems. It's also a good idea to know your test results and keep a list of the medicines you take. How can you care for yourself at home? Following the DASH diet  · Eat 4 to 5 servings of fruit each day. A serving is 1 medium-sized piece of fruit, ½ cup chopped or canned fruit, 1/4 cup dried fruit, or 4 ounces (½ cup) of fruit juice. Choose fruit more often than fruit juice. · Eat 4 to 5 servings of vegetables each day. A serving is 1 cup of lettuce or raw leafy vegetables, ½ cup of chopped or cooked vegetables, or 4 ounces (½ cup) of vegetable juice. Choose vegetables more often than vegetable juice. · Get 2 to 3 servings of low-fat and fat-free dairy each day.  A serving is 8 ounces of medicines you take. How can you care for yourself at home? · Keep your blood sugar at a target level (which you set with your doctor). ¨ Eat a good diet that spreads carbohydrate throughout the day. Carbohydrate-the body's main source of fuel-affects blood sugar more than any other nutrient. Carbohydrate is in fruits, vegetables, milk, and yogurt. It also is in breads, cereals, vegetables such as potatoes and corn, and sugary foods such as candy and cakes. ¨ Aim for 30 minutes of exercise on most, preferably all, days of the week. Walking is a good choice. You also may want to do other activities, such as running, swimming, cycling, or playing tennis or team sports. If your doctor says it's okay, do muscle-strengthening exercises at least 2 times a week. ¨ Take your medicines exactly as prescribed. Call your doctor if you think you are having a problem with your medicine. You will get more details on the specific medicines your doctor prescribes. · Check your blood sugar as often as your doctor recommends. It is important to keep track of any symptoms you have, such as low blood sugar. Also tell your doctor if you have any changes in your activities, diet, or insulin use. · Talk to your doctor before you start taking aspirin every day. Aspirin can help certain people lower their risk of a heart attack or stroke. But taking aspirin isn't right for everyone, because it can cause serious bleeding. · Do not smoke. If you need help quitting, talk to your doctor about stop-smoking programs and medicines. These can increase your chances of quitting for good. · Keep your cholesterol and blood pressure at normal levels. You may need to take one or more medicines to reach your goals. Take them exactly as directed. Do not stop or change a medicine without talking to your doctor first.  When should you call for help? Call 911 anytime you think you may need emergency care.  For example, call if:    · You passed out over-the-counter medicine, or herbal products without talking to your doctor first.     · If your doctor prescribed antibiotics, take them as directed. Do not stop taking them just because you feel better. You need to take the full course of antibiotics.     · Oxygen therapy boosts the amount of oxygen in your blood and helps you breathe easier. Use the flow rate your doctor has recommended, and do not change it without talking to your doctor first.   Activity    · Get regular exercise. Walking is an easy way to get exercise. Start out slowly, and walk a little more each day.     · Pay attention to your breathing. You are exercising too hard if you cannot talk while you are exercising.     · Take short rest breaks when doing household chores and other activities.     · Learn breathing methods-such as breathing through pursed lips-to help you become less short of breath.     · If your doctor has not set you up with a pulmonary rehabilitation program, talk to him or her about whether rehab is right for you. Rehab includes exercise programs, education about your disease and how to manage it, help with diet and other changes, and emotional support. Diet    · Eat regular, healthy meals. Use bronchodilators about 1 hour before you eat to make it easier to eat. Eat several small meals instead of three large ones. Drink beverages at the end of the meal. Avoid foods that are hard to chew.     · Eat foods that contain protein so that you do not lose muscle mass.     · Talk with your doctor if you gain too much weight or if you lose weight without trying.    Mental health    · Talk to your family, friends, or a therapist about your feelings. It is normal to feel frightened, angry, hopeless, helpless, and even guilty. Talking openly about bad feelings can help you cope. If these feelings last, talk to your doctor. When should you call for help? Call 911 anytime you think you may need emergency care.  For example, call

## 2018-09-04 NOTE — PROGRESS NOTES
Visit Information    Have you changed or started any medications since your last visit including any over-the-counter medicines, vitamins, or herbal medicines? no   Have you stopped taking any of your medications? Is so, why? -  no  Are you having any side effects from any of your medications? - no    Have you seen any other physician or provider since your last visit? yes - cardiologist   Have you had any other diagnostic tests since your last visit?  no   Have you been seen in the emergency room and/or had an admission in a hospital since we last saw you?  no   Have you had your routine dental cleaning in the past 6 months?  no     Do you have an active MyChart account? If no, what is the barrier?   Yes    Patient Care Team:  Nacho Carpio MD as PCP - General (Family Medicine)    Medical History Review  Past Medical, Family, and Social History reviewed and does not contribute to the patient presenting condition    Health Maintenance   Topic Date Due    Shingles Vaccine (1 of 2 - 2 Dose Series) 02/15/2003    Diabetic retinal exam  03/01/2016    Diabetic foot exam  06/16/2017    Lipid screen  07/01/2018    Flu vaccine (1) 09/01/2018    A1C test (Diabetic or Prediabetic)  07/19/2019    Potassium monitoring  07/20/2019    Creatinine monitoring  07/20/2019    Pneumococcal low/med risk (2 of 2 - PPSV23) 08/02/2019    Colon Cancer Screen FIT/FOBT  08/03/2019    DTaP/Tdap/Td vaccine (2 - Td) 08/02/2028    AAA screen  Completed    Hepatitis C screen  Completed    HIV screen  Addressed
- see patient instructions. All patient questions answered. Patient voiced understanding. Requested Prescriptions      No prescriptions requested or ordered in this encounter       There are no discontinued medications.       Please note that this chart was generated using voice recognition Dragon dictation software.  Although every effort was made to ensure the accuracy of this automated transcription, some errors in transcription may have occurred

## 2018-12-03 DIAGNOSIS — I10 HYPERTENSION GOAL BP (BLOOD PRESSURE) < 140/80: Chronic | ICD-10-CM

## 2018-12-05 RX ORDER — AMLODIPINE BESYLATE 10 MG/1
10 TABLET ORAL DAILY
Qty: 30 TABLET | Refills: 5 | Status: SHIPPED | OUTPATIENT
Start: 2018-12-05 | End: 2019-06-07 | Stop reason: SDUPTHER

## 2019-04-19 DIAGNOSIS — I50.42 CHRONIC COMBINED SYSTOLIC AND DIASTOLIC CONGESTIVE HEART FAILURE (HCC): ICD-10-CM

## 2019-04-19 DIAGNOSIS — E78.5 HYPERLIPIDEMIA WITH TARGET LDL LESS THAN 70: Chronic | ICD-10-CM

## 2019-04-23 RX ORDER — ATORVASTATIN CALCIUM 20 MG/1
TABLET, FILM COATED ORAL
Qty: 30 TABLET | Refills: 0 | Status: SHIPPED | OUTPATIENT
Start: 2019-04-23 | End: 2019-06-07 | Stop reason: SDUPTHER

## 2019-04-23 RX ORDER — LISINOPRIL 20 MG/1
20 TABLET ORAL DAILY
Qty: 30 TABLET | Refills: 0 | Status: SHIPPED | OUTPATIENT
Start: 2019-04-23 | End: 2019-06-07 | Stop reason: SDUPTHER

## 2019-04-23 NOTE — TELEPHONE ENCOUNTER
Patient needs to make an appointment. Not seen since September 2018. No more refills until seen in office. Please inform patient.  Thanks and have a nice day. - Dr. Alona Gomez
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
## 2019-06-07 ENCOUNTER — TELEPHONE (OUTPATIENT)
Dept: PHARMACY | Age: 64
End: 2019-06-07

## 2019-06-07 ENCOUNTER — OFFICE VISIT (OUTPATIENT)
Dept: FAMILY MEDICINE CLINIC | Age: 64
End: 2019-06-07
Payer: MEDICARE

## 2019-06-07 VITALS
WEIGHT: 179.4 LBS | TEMPERATURE: 97 F | HEIGHT: 65 IN | DIASTOLIC BLOOD PRESSURE: 94 MMHG | BODY MASS INDEX: 29.89 KG/M2 | HEART RATE: 88 BPM | SYSTOLIC BLOOD PRESSURE: 190 MMHG

## 2019-06-07 DIAGNOSIS — Z76.0 MEDICATION REFILL: ICD-10-CM

## 2019-06-07 DIAGNOSIS — I10 HYPERTENSION GOAL BP (BLOOD PRESSURE) < 140/80: ICD-10-CM

## 2019-06-07 DIAGNOSIS — E11.9 CONTROLLED TYPE 2 DIABETES MELLITUS WITHOUT COMPLICATION, WITHOUT LONG-TERM CURRENT USE OF INSULIN (HCC): Primary | ICD-10-CM

## 2019-06-07 LAB — HBA1C MFR BLD: 7 %

## 2019-06-07 PROCEDURE — 3017F COLORECTAL CA SCREEN DOC REV: CPT | Performed by: FAMILY MEDICINE

## 2019-06-07 PROCEDURE — G8419 CALC BMI OUT NRM PARAM NOF/U: HCPCS | Performed by: FAMILY MEDICINE

## 2019-06-07 PROCEDURE — 1123F ACP DISCUSS/DSCN MKR DOCD: CPT | Performed by: FAMILY MEDICINE

## 2019-06-07 PROCEDURE — 99213 OFFICE O/P EST LOW 20 MIN: CPT | Performed by: FAMILY MEDICINE

## 2019-06-07 PROCEDURE — 2022F DILAT RTA XM EVC RTNOPTHY: CPT | Performed by: FAMILY MEDICINE

## 2019-06-07 PROCEDURE — 3045F PR MOST RECENT HEMOGLOBIN A1C LEVEL 7.0-9.0%: CPT | Performed by: FAMILY MEDICINE

## 2019-06-07 PROCEDURE — G8428 CUR MEDS NOT DOCUMENT: HCPCS | Performed by: FAMILY MEDICINE

## 2019-06-07 PROCEDURE — 1036F TOBACCO NON-USER: CPT | Performed by: FAMILY MEDICINE

## 2019-06-07 PROCEDURE — 4040F PNEUMOC VAC/ADMIN/RCVD: CPT | Performed by: FAMILY MEDICINE

## 2019-06-07 PROCEDURE — 83036 HEMOGLOBIN GLYCOSYLATED A1C: CPT | Performed by: FAMILY MEDICINE

## 2019-06-07 PROCEDURE — 99211 OFF/OP EST MAY X REQ PHY/QHP: CPT | Performed by: FAMILY MEDICINE

## 2019-06-07 RX ORDER — AMLODIPINE BESYLATE 10 MG/1
10 TABLET ORAL DAILY
Qty: 30 TABLET | Refills: 5 | Status: SHIPPED | OUTPATIENT
Start: 2019-06-07 | End: 2020-01-09 | Stop reason: SDUPTHER

## 2019-06-07 RX ORDER — CARVEDILOL 25 MG/1
25 TABLET ORAL 2 TIMES DAILY
Qty: 60 TABLET | Refills: 5 | Status: SHIPPED | OUTPATIENT
Start: 2019-06-07 | End: 2020-01-09 | Stop reason: SDUPTHER

## 2019-06-07 RX ORDER — SPIRONOLACTONE 25 MG/1
25 TABLET ORAL DAILY
Qty: 30 TABLET | Refills: 5 | Status: SHIPPED | OUTPATIENT
Start: 2019-06-07 | End: 2020-01-09 | Stop reason: SDUPTHER

## 2019-06-07 RX ORDER — ALBUTEROL SULFATE 90 UG/1
AEROSOL, METERED RESPIRATORY (INHALATION)
Qty: 8.5 INHALER | Refills: 5 | Status: SHIPPED | OUTPATIENT
Start: 2019-06-07 | End: 2020-01-09 | Stop reason: SDUPTHER

## 2019-06-07 RX ORDER — GLIMEPIRIDE 4 MG/1
TABLET ORAL
Qty: 30 TABLET | Refills: 5 | Status: SHIPPED | OUTPATIENT
Start: 2019-06-07 | End: 2021-08-16 | Stop reason: ALTCHOICE

## 2019-06-07 RX ORDER — FUROSEMIDE 20 MG/1
20 TABLET ORAL DAILY
Qty: 30 TABLET | Refills: 5 | Status: SHIPPED | OUTPATIENT
Start: 2019-06-07 | End: 2019-11-15 | Stop reason: SDUPTHER

## 2019-06-07 RX ORDER — LISINOPRIL 20 MG/1
20 TABLET ORAL DAILY
Qty: 30 TABLET | Refills: 5 | Status: SHIPPED | OUTPATIENT
Start: 2019-06-07 | End: 2020-01-09 | Stop reason: SDUPTHER

## 2019-06-07 RX ORDER — ATORVASTATIN CALCIUM 20 MG/1
TABLET, FILM COATED ORAL
Qty: 30 TABLET | Refills: 5 | Status: SHIPPED | OUTPATIENT
Start: 2019-06-07 | End: 2020-01-09 | Stop reason: SDUPTHER

## 2019-06-07 ASSESSMENT — ENCOUNTER SYMPTOMS
CONSTIPATION: 0
SORE THROAT: 0
SHORTNESS OF BREATH: 0
COUGH: 0
ABDOMINAL PAIN: 0
BACK PAIN: 0
VOMITING: 0
NAUSEA: 0
DIARRHEA: 0

## 2019-06-07 ASSESSMENT — PATIENT HEALTH QUESTIONNAIRE - PHQ9
SUM OF ALL RESPONSES TO PHQ9 QUESTIONS 1 & 2: 0
SUM OF ALL RESPONSES TO PHQ QUESTIONS 1-9: 0
2. FEELING DOWN, DEPRESSED OR HOPELESS: 0
1. LITTLE INTEREST OR PLEASURE IN DOING THINGS: 0
SUM OF ALL RESPONSES TO PHQ QUESTIONS 1-9: 0

## 2019-06-07 NOTE — PROGRESS NOTES
Attending Physician Statement  I have discussed the care of Seble Silvestre, including pertinent history and exam findings,  with the resident. I have reviewed the key elements of all parts of the encounter with the resident. I agree with the assessment, plan and orders as documented by the resident.   (GE Modifier)

## 2019-06-07 NOTE — PROGRESS NOTES
Subjective:   Melina Rosenthal is a 75 y.o. male with  has a past medical history of COPD (chronic obstructive pulmonary disease) ([facility] [address] Diabetes mellitus ([facility] [address] Erectile dysfunction due to arterial insufficiency, Hypertension, Microalbuminuria due to type 2 diabetes mellitus ([facility] [address] and Overweight (BMI 25.0-29.9). DM and HTN    Patient presents for HTN and DM follow up. For DM, A1C has increased from 6.4 to 7.0. Patient non compliant with all of his medications. No reported symptoms of polyphagia, polydipsia or polyuria. For HTN, BP not at goal. Again due to non compliance. No reported symptoms of headaches, visual changes, chest pain or shortness of breath. Review of Systems   Constitutional: Negative for chills and fever. HENT: Negative for sore throat. Eyes: Negative for visual disturbance. Respiratory: Negative for cough and shortness of breath. Cardiovascular: Negative for chest pain. Gastrointestinal: Negative for abdominal pain, constipation, diarrhea, nausea and vomiting. Genitourinary: Negative for dysuria. Musculoskeletal: Negative for back pain. Neurological: Negative for dizziness and headaches. Objective:    BP (!) 190/94 (Site: Right Upper Arm, Position: Sitting, Cuff Size: Medium Adult)   Pulse 88   Temp 97 °F (36.1 °C) (Temporal)   Ht 5' 5\" (1.651 m)   Wt 179 lb 6.4 oz (81.4 kg)   BMI 29.85 kg/m²    BP Readings from Last 3 Encounters:   06/07/19 (!) 190/94   09/04/18 122/69   08/02/18 (!) 170/90     Physical Exam   Constitutional: He is oriented to person, place, and time. No distress. HENT:   Head: Normocephalic and atraumatic. Cardiovascular: Normal rate, regular rhythm, normal heart sounds and intact distal pulses. No murmur heard. Pulmonary/Chest: Effort normal and breath sounds normal. He has no wheezes. Musculoskeletal: He exhibits no edema. Lymphadenopathy:     He has no cervical adenopathy.    Neurological: He is alert and oriented to person, place, and time. Skin: Capillary refill takes less than 2 seconds. He is not diaphoretic. Nursing note and vitals reviewed. BP Readings from Last 3 Encounters:   06/07/19 (!) 190/94   09/04/18 122/69   08/02/18 (!) 170/90     BP (!) 190/94 (Site: Right Upper Arm, Position: Sitting, Cuff Size: Medium Adult)   Pulse 88   Temp 97 °F (36.1 °C) (Temporal)   Ht 5' 5\" (1.651 m)   Wt 179 lb 6.4 oz (81.4 kg)   BMI 29.85 kg/m²   Lab Results   Component Value Date    WBC 7.8 07/20/2018    HGB 12.1 (L) 07/20/2018    HCT 37.1 (L) 07/20/2018     07/20/2018    CHOL 107 09/04/2018    TRIG 118 09/04/2018    HDL 37 (L) 09/04/2018    ALT 18 12/23/2016    AST 19 12/23/2016     07/20/2018    K 4.7 07/20/2018     07/20/2018    CREATININE 0.87 07/20/2018    BUN 20 07/20/2018    CO2 23 07/20/2018    TSH 7.59 (H) 07/01/2017    PSA 0.77 06/21/2012    LABA1C 7.0 06/07/2019    LABMICR 146 04/22/2013     Lab Results   Component Value Date    CALCIUM 9.1 07/20/2018     Lab Results   Component Value Date    LDLCALC 24 07/01/2016    LDLCHOLESTEROL 46 09/04/2018       Assessment and Plan:    1. Controlled type 2 diabetes mellitus without complication, without long-term current use of insulin (HCC)  POCT glycosylated hemoglobin (Hb A1C) - 7.0 in office today. Continue current medications. Goal A1C is 7-8. Recheck A1C in 3 months. 2. Hypertension goal BP (blood pressure) < 140/80  Patient non compliant with medications due to \"high cost\". Medications refilled and sent to [facility]. He is receiving the best available price they can offer but he still refuses to pay for them. 3. Medication refill    - albuterol sulfate HFA (PROAIR HFA) 108 (90 Base) MCG/ACT inhaler; inhale 2 puffs by mouth every 6 hours if needed for wheezing  Dispense: 8.5 Inhaler; Refill: 5  - amLODIPine (NORVASC) 10 MG tablet; Take 1 tablet by mouth daily  Dispense: 30 tablet; Refill: 5  - aspirin 81 MG tablet;  Take 1 tablet by mouth daily Dispense: 30 tablet; Refill: 5  - atorvastatin (LIPITOR) 20 MG tablet; TAKE 1 TABLET BY MOUTH DAILY IN THE EVENING  Dispense: 30 tablet; Refill: 5  - carvedilol (COREG) 25 MG tablet; Take 1 tablet by mouth 2 times daily  Dispense: 60 tablet; Refill: 5  - furosemide (LASIX) 20 MG tablet; Take 1 tablet by mouth daily  Dispense: 30 tablet; Refill: 5  - glimepiride (AMARYL) 4 MG tablet; TAKE ONE TABLET BY MOUTH EVERY MORNING BEFORE BREAKFAST  Dispense: 30 tablet; Refill: 5  - lisinopril (PRINIVIL;ZESTRIL) 20 MG tablet; Take 1 tablet by mouth daily  Dispense: 30 tablet; Refill: 5  - metFORMIN (GLUCOPHAGE) 500 MG tablet; TAKE 1 TABLET BY MOUTH TWICE A DAY WITH MEALS  Dispense: 60 tablet; Refill: 5  - spironolactone (ALDACTONE) 25 MG tablet; Take 1 tablet by mouth daily  Dispense: 30 tablet; Refill: 5  - tiotropium (SPIRIVA HANDIHALER) 18 MCG inhalation capsule; Inhale 1 capsule into the lungs daily  Dispense: 30 capsule; Refill: 5      Pavan received counseling on the following healthy behaviors: nutrition, exercise, medication adherence, tobacco cessation and decrease in alcohol consumption  Reviewed prior labs and health maintenance. Continue current medications, diet and exercise. Discussed use, benefit, and side effects of prescribed medications. Barriers to medication compliance addressed. Patient given educational materials - see patient instructions. All patient questions answered. Patient voiced understanding.      Requested Prescriptions     Signed Prescriptions Disp Refills    albuterol sulfate HFA (PROAIR HFA) 108 (90 Base) MCG/ACT inhaler 8.5 Inhaler 5     Sig: inhale 2 puffs by mouth every 6 hours if needed for wheezing    amLODIPine (NORVASC) 10 MG tablet 30 tablet 5     Sig: Take 1 tablet by mouth daily    aspirin 81 MG tablet 30 tablet 5     Sig: Take 1 tablet by mouth daily    atorvastatin (LIPITOR) 20 MG tablet 30 tablet 5     Sig: TAKE 1 TABLET BY MOUTH DAILY IN THE EVENING    carvedilol (COREG) 25 MG tablet 60 tablet 5     Sig: Take 1 tablet by mouth 2 times daily    furosemide (LASIX) 20 MG tablet 30 tablet 5     Sig: Take 1 tablet by mouth daily    glimepiride (AMARYL) 4 MG tablet 30 tablet 5     Sig: TAKE ONE TABLET BY MOUTH EVERY MORNING BEFORE BREAKFAST    lisinopril (PRINIVIL;ZESTRIL) 20 MG tablet 30 tablet 5     Sig: Take 1 tablet by mouth daily    metFORMIN (GLUCOPHAGE) 500 MG tablet 60 tablet 5     Sig: TAKE 1 TABLET BY MOUTH TWICE A DAY WITH MEALS    spironolactone (ALDACTONE) 25 MG tablet 30 tablet 5     Sig: Take 1 tablet by mouth daily    tiotropium (SPIRIVA HANDIHALER) 18 MCG inhalation capsule 30 capsule 5     Sig: Inhale 1 capsule into the lungs daily       Medications Discontinued During This Encounter   Medication Reason    albuterol sulfate HFA (PROAIR HFA) 108 (90 Base) MCG/ACT inhaler REORDER    amLODIPine (NORVASC) 10 MG tablet REORDER    aspirin 81 MG tablet REORDER    atorvastatin (LIPITOR) 20 MG tablet REORDER    carvedilol (COREG) 25 MG tablet REORDER    furosemide (LASIX) 20 MG tablet REORDER    glimepiride (AMARYL) 4 MG tablet REORDER    lisinopril (PRINIVIL;ZESTRIL) 20 MG tablet REORDER    metFORMIN (GLUCOPHAGE) 500 MG tablet REORDER    spironolactone (ALDACTONE) 25 MG tablet REORDER    tiotropium (SPIRIVA HANDIHALER) 18 MCG inhalation capsule REORDER         Please note that this chart was generated using voice recognition Dragon dictation software.  Although every effort was made to ensure the accuracy of this automated transcription, some errors in transcription may have occurred

## 2019-09-04 ENCOUNTER — APPOINTMENT (OUTPATIENT)
Dept: GENERAL RADIOLOGY | Age: 64
DRG: 194 | End: 2019-09-04
Payer: COMMERCIAL

## 2019-09-04 ENCOUNTER — HOSPITAL ENCOUNTER (INPATIENT)
Age: 64
LOS: 4 days | Discharge: HOME | DRG: 194 | End: 2019-09-09
Attending: EMERGENCY MEDICINE | Admitting: FAMILY MEDICINE
Payer: COMMERCIAL

## 2019-09-04 DIAGNOSIS — J18.9 PNEUMONIA DUE TO ORGANISM: ICD-10-CM

## 2019-09-04 DIAGNOSIS — I21.4 NSTEMI (NON-ST ELEVATED MYOCARDIAL INFARCTION) (HCC): Primary | ICD-10-CM

## 2019-09-04 DIAGNOSIS — J44.1 COPD EXACERBATION (HCC): ICD-10-CM

## 2019-09-04 DIAGNOSIS — I50.23 ACUTE ON CHRONIC SYSTOLIC CONGESTIVE HEART FAILURE (HCC): ICD-10-CM

## 2019-09-04 LAB
ABSOLUTE EOS #: <0.03 K/UL (ref 0–0.44)
ABSOLUTE IMMATURE GRANULOCYTE: 0.07 K/UL (ref 0–0.3)
ABSOLUTE LYMPH #: 1.49 K/UL (ref 1.1–3.7)
ABSOLUTE MONO #: 0.7 K/UL (ref 0.1–1.2)
ANION GAP SERPL CALCULATED.3IONS-SCNC: 13 MMOL/L (ref 9–17)
BASOPHILS # BLD: 0 % (ref 0–2)
BASOPHILS ABSOLUTE: <0.03 K/UL (ref 0–0.2)
BNP INTERPRETATION: ABNORMAL
BUN BLDV-MCNC: 16 MG/DL (ref 8–23)
BUN/CREAT BLD: ABNORMAL (ref 9–20)
CALCIUM SERPL-MCNC: 8.9 MG/DL (ref 8.6–10.4)
CHLORIDE BLD-SCNC: 102 MMOL/L (ref 98–107)
CO2: 25 MMOL/L (ref 20–31)
CREAT SERPL-MCNC: 0.95 MG/DL (ref 0.7–1.2)
DIFFERENTIAL TYPE: ABNORMAL
EOSINOPHILS RELATIVE PERCENT: 0 % (ref 1–4)
GFR AFRICAN AMERICAN: >60 ML/MIN
GFR NON-AFRICAN AMERICAN: >60 ML/MIN
GFR SERPL CREATININE-BSD FRML MDRD: ABNORMAL ML/MIN/{1.73_M2}
GFR SERPL CREATININE-BSD FRML MDRD: ABNORMAL ML/MIN/{1.73_M2}
GLUCOSE BLD-MCNC: 162 MG/DL (ref 70–99)
HCT VFR BLD CALC: 33.5 % (ref 40.7–50.3)
HEMOGLOBIN: 10.7 G/DL (ref 13–17)
IMMATURE GRANULOCYTES: 1 %
LYMPHOCYTES # BLD: 13 % (ref 24–43)
MCH RBC QN AUTO: 31.1 PG (ref 25.2–33.5)
MCHC RBC AUTO-ENTMCNC: 31.9 G/DL (ref 28.4–34.8)
MCV RBC AUTO: 97.4 FL (ref 82.6–102.9)
MONOCYTES # BLD: 6 % (ref 3–12)
NRBC AUTOMATED: 0 PER 100 WBC
PDW BLD-RTO: 13.6 % (ref 11.8–14.4)
PLATELET # BLD: 296 K/UL (ref 138–453)
PLATELET ESTIMATE: ABNORMAL
PMV BLD AUTO: 10.1 FL (ref 8.1–13.5)
POTASSIUM SERPL-SCNC: 4.2 MMOL/L (ref 3.7–5.3)
PRO-BNP: ABNORMAL PG/ML
RBC # BLD: 3.44 M/UL (ref 4.21–5.77)
RBC # BLD: ABNORMAL 10*6/UL
SEG NEUTROPHILS: 80 % (ref 36–65)
SEGMENTED NEUTROPHILS ABSOLUTE COUNT: 9.53 K/UL (ref 1.5–8.1)
SODIUM BLD-SCNC: 140 MMOL/L (ref 135–144)
TROPONIN INTERP: ABNORMAL
TROPONIN T: ABNORMAL NG/ML
TROPONIN, HIGH SENSITIVITY: 211 NG/L (ref 0–22)
WBC # BLD: 11.8 K/UL (ref 3.5–11.3)
WBC # BLD: ABNORMAL 10*3/UL

## 2019-09-04 PROCEDURE — 83880 ASSAY OF NATRIURETIC PEPTIDE: CPT

## 2019-09-04 PROCEDURE — 6360000002 HC RX W HCPCS: Performed by: STUDENT IN AN ORGANIZED HEALTH CARE EDUCATION/TRAINING PROGRAM

## 2019-09-04 PROCEDURE — 93005 ELECTROCARDIOGRAM TRACING: CPT | Performed by: STUDENT IN AN ORGANIZED HEALTH CARE EDUCATION/TRAINING PROGRAM

## 2019-09-04 PROCEDURE — 71045 X-RAY EXAM CHEST 1 VIEW: CPT

## 2019-09-04 PROCEDURE — 84484 ASSAY OF TROPONIN QUANT: CPT

## 2019-09-04 PROCEDURE — 94660 CPAP INITIATION&MGMT: CPT

## 2019-09-04 PROCEDURE — 6370000000 HC RX 637 (ALT 250 FOR IP): Performed by: STUDENT IN AN ORGANIZED HEALTH CARE EDUCATION/TRAINING PROGRAM

## 2019-09-04 PROCEDURE — 94640 AIRWAY INHALATION TREATMENT: CPT

## 2019-09-04 PROCEDURE — 99285 EMERGENCY DEPT VISIT HI MDM: CPT

## 2019-09-04 PROCEDURE — 2580000003 HC RX 258: Performed by: STUDENT IN AN ORGANIZED HEALTH CARE EDUCATION/TRAINING PROGRAM

## 2019-09-04 PROCEDURE — 36415 COLL VENOUS BLD VENIPUNCTURE: CPT

## 2019-09-04 PROCEDURE — 80048 BASIC METABOLIC PNL TOTAL CA: CPT

## 2019-09-04 PROCEDURE — 85025 COMPLETE CBC W/AUTO DIFF WBC: CPT

## 2019-09-04 RX ORDER — ALBUTEROL SULFATE 90 UG/1
2 AEROSOL, METERED RESPIRATORY (INHALATION)
Status: DISCONTINUED | OUTPATIENT
Start: 2019-09-04 | End: 2019-09-05

## 2019-09-04 RX ORDER — HEPARIN SODIUM 1000 [USP'U]/ML
4000 INJECTION, SOLUTION INTRAVENOUS; SUBCUTANEOUS PRN
Status: DISCONTINUED | OUTPATIENT
Start: 2019-09-04 | End: 2019-09-07

## 2019-09-04 RX ORDER — ALBUTEROL SULFATE 90 UG/1
2 AEROSOL, METERED RESPIRATORY (INHALATION)
Status: DISCONTINUED | OUTPATIENT
Start: 2019-09-04 | End: 2019-09-08

## 2019-09-04 RX ORDER — FUROSEMIDE 10 MG/ML
40 INJECTION INTRAMUSCULAR; INTRAVENOUS ONCE
Status: COMPLETED | OUTPATIENT
Start: 2019-09-04 | End: 2019-09-04

## 2019-09-04 RX ORDER — IPRATROPIUM BROMIDE AND ALBUTEROL SULFATE 2.5; .5 MG/3ML; MG/3ML
1 SOLUTION RESPIRATORY (INHALATION)
Status: DISCONTINUED | OUTPATIENT
Start: 2019-09-05 | End: 2019-09-05

## 2019-09-04 RX ORDER — ASPIRIN 81 MG/1
324 TABLET, CHEWABLE ORAL ONCE
Status: COMPLETED | OUTPATIENT
Start: 2019-09-04 | End: 2019-09-04

## 2019-09-04 RX ORDER — METHYLPREDNISOLONE SODIUM SUCCINATE 125 MG/2ML
125 INJECTION, POWDER, LYOPHILIZED, FOR SOLUTION INTRAMUSCULAR; INTRAVENOUS ONCE
Status: COMPLETED | OUTPATIENT
Start: 2019-09-04 | End: 2019-09-04

## 2019-09-04 RX ORDER — ALBUTEROL SULFATE 2.5 MG/3ML
5 SOLUTION RESPIRATORY (INHALATION)
Status: DISCONTINUED | OUTPATIENT
Start: 2019-09-04 | End: 2019-09-05

## 2019-09-04 RX ORDER — HEPARIN SODIUM 1000 [USP'U]/ML
2000 INJECTION, SOLUTION INTRAVENOUS; SUBCUTANEOUS PRN
Status: DISCONTINUED | OUTPATIENT
Start: 2019-09-04 | End: 2019-09-07

## 2019-09-04 RX ORDER — HEPARIN SODIUM 1000 [USP'U]/ML
4000 INJECTION, SOLUTION INTRAVENOUS; SUBCUTANEOUS ONCE
Status: COMPLETED | OUTPATIENT
Start: 2019-09-05 | End: 2019-09-05

## 2019-09-04 RX ORDER — HEPARIN SODIUM 10000 [USP'U]/100ML
12 INJECTION, SOLUTION INTRAVENOUS CONTINUOUS
Status: DISCONTINUED | OUTPATIENT
Start: 2019-09-05 | End: 2019-09-07

## 2019-09-04 RX ADMIN — CEFTRIAXONE SODIUM 1 G: 1 INJECTION, POWDER, FOR SOLUTION INTRAMUSCULAR; INTRAVENOUS at 23:38

## 2019-09-04 RX ADMIN — METHYLPREDNISOLONE SODIUM SUCCINATE 125 MG: 125 INJECTION, POWDER, FOR SOLUTION INTRAMUSCULAR; INTRAVENOUS at 22:33

## 2019-09-04 RX ADMIN — FUROSEMIDE 40 MG: 10 INJECTION, SOLUTION INTRAMUSCULAR; INTRAVENOUS at 23:38

## 2019-09-04 RX ADMIN — IPRATROPIUM BROMIDE 0.5 MG: 0.5 SOLUTION RESPIRATORY (INHALATION) at 22:42

## 2019-09-04 RX ADMIN — ALBUTEROL SULFATE 5 MG: 5 SOLUTION RESPIRATORY (INHALATION) at 22:42

## 2019-09-04 RX ADMIN — ASPIRIN 81 MG 324 MG: 81 TABLET ORAL at 23:37

## 2019-09-05 PROBLEM — I21.4 NSTEMI (NON-ST ELEVATED MYOCARDIAL INFARCTION) (HCC): Status: ACTIVE | Noted: 2019-09-05

## 2019-09-05 LAB
EKG ATRIAL RATE: 101 BPM
EKG P AXIS: 54 DEGREES
EKG P-R INTERVAL: 126 MS
EKG Q-T INTERVAL: 390 MS
EKG QRS DURATION: 90 MS
EKG QTC CALCULATION (BAZETT): 505 MS
EKG R AXIS: -8 DEGREES
EKG T AXIS: 96 DEGREES
EKG VENTRICULAR RATE: 101 BPM
ESTIMATED AVERAGE GLUCOSE: 126 MG/DL
GLUCOSE BLD-MCNC: 214 MG/DL (ref 75–110)
GLUCOSE BLD-MCNC: 263 MG/DL (ref 75–110)
GLUCOSE BLD-MCNC: 273 MG/DL
GLUCOSE BLD-MCNC: 273 MG/DL (ref 75–110)
HBA1C MFR BLD: 6 % (ref 4–6)
PARTIAL THROMBOPLASTIN TIME: 28.5 SEC (ref 20.5–30.5)
PARTIAL THROMBOPLASTIN TIME: 33 SEC (ref 20.5–30.5)
PROCALCITONIN: 0.09 NG/ML
TROPONIN INTERP: ABNORMAL
TROPONIN T: ABNORMAL NG/ML
TROPONIN, HIGH SENSITIVITY: 109 NG/L (ref 0–22)
TROPONIN, HIGH SENSITIVITY: 132 NG/L (ref 0–22)
TROPONIN, HIGH SENSITIVITY: 173 NG/L (ref 0–22)

## 2019-09-05 PROCEDURE — 6360000002 HC RX W HCPCS: Performed by: NURSE PRACTITIONER

## 2019-09-05 PROCEDURE — 6370000000 HC RX 637 (ALT 250 FOR IP): Performed by: STUDENT IN AN ORGANIZED HEALTH CARE EDUCATION/TRAINING PROGRAM

## 2019-09-05 PROCEDURE — 83036 HEMOGLOBIN GLYCOSYLATED A1C: CPT

## 2019-09-05 PROCEDURE — 85730 THROMBOPLASTIN TIME PARTIAL: CPT

## 2019-09-05 PROCEDURE — 82947 ASSAY GLUCOSE BLOOD QUANT: CPT

## 2019-09-05 PROCEDURE — 93010 ELECTROCARDIOGRAM REPORT: CPT | Performed by: INTERNAL MEDICINE

## 2019-09-05 PROCEDURE — 84145 PROCALCITONIN (PCT): CPT

## 2019-09-05 PROCEDURE — 87205 SMEAR GRAM STAIN: CPT

## 2019-09-05 PROCEDURE — 2580000003 HC RX 258: Performed by: NURSE PRACTITIONER

## 2019-09-05 PROCEDURE — 87070 CULTURE OTHR SPECIMN AEROBIC: CPT

## 2019-09-05 PROCEDURE — 84484 ASSAY OF TROPONIN QUANT: CPT

## 2019-09-05 PROCEDURE — 2060000000 HC ICU INTERMEDIATE R&B

## 2019-09-05 PROCEDURE — 6360000002 HC RX W HCPCS: Performed by: STUDENT IN AN ORGANIZED HEALTH CARE EDUCATION/TRAINING PROGRAM

## 2019-09-05 PROCEDURE — 94660 CPAP INITIATION&MGMT: CPT

## 2019-09-05 PROCEDURE — 6370000000 HC RX 637 (ALT 250 FOR IP): Performed by: NURSE PRACTITIONER

## 2019-09-05 PROCEDURE — 2580000003 HC RX 258: Performed by: STUDENT IN AN ORGANIZED HEALTH CARE EDUCATION/TRAINING PROGRAM

## 2019-09-05 PROCEDURE — 99223 1ST HOSP IP/OBS HIGH 75: CPT | Performed by: FAMILY MEDICINE

## 2019-09-05 PROCEDURE — 1200000000 HC SEMI PRIVATE

## 2019-09-05 RX ORDER — NICOTINE POLACRILEX 4 MG
15 LOZENGE BUCCAL PRN
Status: DISCONTINUED | OUTPATIENT
Start: 2019-09-05 | End: 2019-09-09 | Stop reason: HOSPADM

## 2019-09-05 RX ORDER — FUROSEMIDE 10 MG/ML
40 INJECTION INTRAMUSCULAR; INTRAVENOUS 2 TIMES DAILY
Status: DISCONTINUED | OUTPATIENT
Start: 2019-09-05 | End: 2019-09-07

## 2019-09-05 RX ORDER — LISINOPRIL 10 MG/1
20 TABLET ORAL DAILY
Status: DISCONTINUED | OUTPATIENT
Start: 2019-09-05 | End: 2019-09-09 | Stop reason: HOSPADM

## 2019-09-05 RX ORDER — AMLODIPINE BESYLATE 10 MG/1
10 TABLET ORAL DAILY
Status: DISCONTINUED | OUTPATIENT
Start: 2019-09-05 | End: 2019-09-09 | Stop reason: HOSPADM

## 2019-09-05 RX ORDER — SODIUM CHLORIDE 0.9 % (FLUSH) 0.9 %
10 SYRINGE (ML) INJECTION EVERY 12 HOURS SCHEDULED
Status: DISCONTINUED | OUTPATIENT
Start: 2019-09-05 | End: 2019-09-09 | Stop reason: HOSPADM

## 2019-09-05 RX ORDER — POTASSIUM CHLORIDE 20 MEQ/1
40 TABLET, EXTENDED RELEASE ORAL PRN
Status: DISCONTINUED | OUTPATIENT
Start: 2019-09-05 | End: 2019-09-09 | Stop reason: HOSPADM

## 2019-09-05 RX ORDER — ATORVASTATIN CALCIUM 20 MG/1
20 TABLET, FILM COATED ORAL NIGHTLY
Status: DISCONTINUED | OUTPATIENT
Start: 2019-09-05 | End: 2019-09-05

## 2019-09-05 RX ORDER — ASPIRIN 81 MG/1
81 TABLET ORAL DAILY
Status: DISCONTINUED | OUTPATIENT
Start: 2019-09-05 | End: 2019-09-06

## 2019-09-05 RX ORDER — DEXTROSE MONOHYDRATE 25 G/50ML
12.5 INJECTION, SOLUTION INTRAVENOUS PRN
Status: DISCONTINUED | OUTPATIENT
Start: 2019-09-05 | End: 2019-09-09 | Stop reason: HOSPADM

## 2019-09-05 RX ORDER — ONDANSETRON 2 MG/ML
4 INJECTION INTRAMUSCULAR; INTRAVENOUS EVERY 6 HOURS PRN
Status: DISCONTINUED | OUTPATIENT
Start: 2019-09-05 | End: 2019-09-09 | Stop reason: HOSPADM

## 2019-09-05 RX ORDER — ATORVASTATIN CALCIUM 20 MG/1
20 TABLET, FILM COATED ORAL NIGHTLY
Status: DISCONTINUED | OUTPATIENT
Start: 2019-09-05 | End: 2019-09-09 | Stop reason: HOSPADM

## 2019-09-05 RX ORDER — NITROGLYCERIN 0.4 MG/1
0.4 TABLET SUBLINGUAL EVERY 5 MIN PRN
Status: DISCONTINUED | OUTPATIENT
Start: 2019-09-05 | End: 2019-09-09 | Stop reason: HOSPADM

## 2019-09-05 RX ORDER — FAMOTIDINE 20 MG/1
20 TABLET, FILM COATED ORAL 2 TIMES DAILY
Status: DISCONTINUED | OUTPATIENT
Start: 2019-09-05 | End: 2019-09-09 | Stop reason: HOSPADM

## 2019-09-05 RX ORDER — DEXTROSE MONOHYDRATE 50 MG/ML
100 INJECTION, SOLUTION INTRAVENOUS PRN
Status: DISCONTINUED | OUTPATIENT
Start: 2019-09-05 | End: 2019-09-09 | Stop reason: HOSPADM

## 2019-09-05 RX ORDER — SODIUM CHLORIDE 0.9 % (FLUSH) 0.9 %
10 SYRINGE (ML) INJECTION PRN
Status: DISCONTINUED | OUTPATIENT
Start: 2019-09-05 | End: 2019-09-09 | Stop reason: HOSPADM

## 2019-09-05 RX ORDER — CARVEDILOL 12.5 MG/1
25 TABLET ORAL 2 TIMES DAILY
Status: DISCONTINUED | OUTPATIENT
Start: 2019-09-05 | End: 2019-09-09 | Stop reason: HOSPADM

## 2019-09-05 RX ORDER — ALBUTEROL SULFATE 2.5 MG/3ML
2.5 SOLUTION RESPIRATORY (INHALATION)
Status: DISCONTINUED | OUTPATIENT
Start: 2019-09-05 | End: 2019-09-07

## 2019-09-05 RX ORDER — ASPIRIN 81 MG/1
324 TABLET, CHEWABLE ORAL ONCE
Status: DISCONTINUED | OUTPATIENT
Start: 2019-09-05 | End: 2019-09-05

## 2019-09-05 RX ORDER — ACETAMINOPHEN 325 MG/1
650 TABLET ORAL EVERY 4 HOURS PRN
Status: DISCONTINUED | OUTPATIENT
Start: 2019-09-05 | End: 2019-09-09 | Stop reason: HOSPADM

## 2019-09-05 RX ORDER — POTASSIUM CHLORIDE 7.45 MG/ML
10 INJECTION INTRAVENOUS PRN
Status: DISCONTINUED | OUTPATIENT
Start: 2019-09-05 | End: 2019-09-09 | Stop reason: HOSPADM

## 2019-09-05 RX ORDER — SODIUM CHLORIDE 9 MG/ML
INJECTION, SOLUTION INTRAVENOUS CONTINUOUS
Status: DISCONTINUED | OUTPATIENT
Start: 2019-09-05 | End: 2019-09-08

## 2019-09-05 RX ORDER — SPIRONOLACTONE 25 MG/1
25 TABLET ORAL DAILY
Status: DISCONTINUED | OUTPATIENT
Start: 2019-09-05 | End: 2019-09-09 | Stop reason: HOSPADM

## 2019-09-05 RX ORDER — CEFTRIAXONE 1 G/1
1 INJECTION, POWDER, FOR SOLUTION INTRAMUSCULAR; INTRAVENOUS EVERY 24 HOURS
Status: DISCONTINUED | OUTPATIENT
Start: 2019-09-05 | End: 2019-09-05

## 2019-09-05 RX ORDER — MAGNESIUM SULFATE 1 G/100ML
1 INJECTION INTRAVENOUS PRN
Status: DISCONTINUED | OUTPATIENT
Start: 2019-09-05 | End: 2019-09-09 | Stop reason: HOSPADM

## 2019-09-05 RX ADMIN — ATORVASTATIN CALCIUM 20 MG: 20 TABLET, FILM COATED ORAL at 07:35

## 2019-09-05 RX ADMIN — INSULIN LISPRO 4 UNITS: 100 INJECTION, SOLUTION INTRAVENOUS; SUBCUTANEOUS at 18:09

## 2019-09-05 RX ADMIN — LISINOPRIL 20 MG: 10 TABLET ORAL at 09:23

## 2019-09-05 RX ADMIN — FAMOTIDINE 20 MG: 20 TABLET, FILM COATED ORAL at 09:21

## 2019-09-05 RX ADMIN — HEPARIN SODIUM AND DEXTROSE 12 UNITS/KG/HR: 10000; 5 INJECTION INTRAVENOUS at 00:31

## 2019-09-05 RX ADMIN — CARVEDILOL 25 MG: 12.5 TABLET, FILM COATED ORAL at 21:07

## 2019-09-05 RX ADMIN — SODIUM CHLORIDE: 9 INJECTION, SOLUTION INTRAVENOUS at 09:20

## 2019-09-05 RX ADMIN — AZITHROMYCIN MONOHYDRATE 500 MG: 500 INJECTION, POWDER, LYOPHILIZED, FOR SOLUTION INTRAVENOUS at 00:27

## 2019-09-05 RX ADMIN — CEFTRIAXONE SODIUM 1 G: 1 INJECTION, POWDER, FOR SOLUTION INTRAMUSCULAR; INTRAVENOUS at 21:08

## 2019-09-05 RX ADMIN — Medication 81 MG: at 09:21

## 2019-09-05 RX ADMIN — Medication 10 ML: at 21:07

## 2019-09-05 RX ADMIN — Medication 10 ML: at 09:31

## 2019-09-05 RX ADMIN — HEPARIN SODIUM 4000 UNITS: 1000 INJECTION, SOLUTION INTRAVENOUS; SUBCUTANEOUS at 15:15

## 2019-09-05 RX ADMIN — FUROSEMIDE 40 MG: 10 INJECTION, SOLUTION INTRAMUSCULAR; INTRAVENOUS at 09:25

## 2019-09-05 RX ADMIN — SPIRONOLACTONE 25 MG: 25 TABLET ORAL at 09:21

## 2019-09-05 RX ADMIN — CARVEDILOL 25 MG: 12.5 TABLET, FILM COATED ORAL at 09:21

## 2019-09-05 RX ADMIN — AMLODIPINE BESYLATE 10 MG: 10 TABLET ORAL at 09:21

## 2019-09-05 RX ADMIN — FUROSEMIDE 40 MG: 10 INJECTION, SOLUTION INTRAMUSCULAR; INTRAVENOUS at 18:41

## 2019-09-05 RX ADMIN — HEPARIN SODIUM 4000 UNITS: 1000 INJECTION, SOLUTION INTRAVENOUS; SUBCUTANEOUS at 00:28

## 2019-09-05 RX ADMIN — FAMOTIDINE 20 MG: 20 TABLET, FILM COATED ORAL at 21:07

## 2019-09-05 ASSESSMENT — ENCOUNTER SYMPTOMS
ABDOMINAL PAIN: 0
WHEEZING: 1
VOMITING: 0
CONSTIPATION: 0
WHEEZING: 0
COUGH: 1
EYES NEGATIVE: 1
BLOOD IN STOOL: 1
CHEST TIGHTNESS: 0
DIARRHEA: 1
COLOR CHANGE: 0
SHORTNESS OF BREATH: 1
NAUSEA: 0

## 2019-09-05 NOTE — PROGRESS NOTES
Or    potassium chloride 10 mEq/100 mL IVPB (Peripheral Line)  10 mEq Intravenous PRN Verlee Macleod, APRN - CNP        magnesium sulfate 1 g in dextrose 5% 100 mL IVPB  1 g Intravenous PRN Verlee Macleod, APRN - CNP        magnesium hydroxide (MILK OF MAGNESIA) 400 MG/5ML suspension 30 mL  30 mL Oral Daily PRN Verlee Macleod, APRN - CNP        ondansetron [facility]) injection 4 mg  4 mg Intravenous Q6H PRN Verlee Macleod, APRN - CNP        famotidine (PEPCID) tablet 20 mg  20 mg Oral BID Verlee Macleod, APRN - CNP   20 mg at 09/05/19 0921    glucose (GLUTOSE) 40 % oral gel 15 g  15 g Oral PRN Verlee Macleod, APRN - CNP        dextrose 50 % IV solution  12.5 g Intravenous PRN Verlee Macleod, APRN - CNP        glucagon (rDNA) injection 1 mg  1 mg Intramuscular PRN Verlee Macleod, APRN - CNP        dextrose 5 % solution  100 mL/hr Intravenous PRN Verlee Macleod, APRN - CNP        albuterol (PROVENTIL) nebulizer solution 2.5 mg  2.5 mg Nebulization As Directed RT PRN Verlee Macleod, APRN - CNP        [START ON 9/6/2019] aspirin EC tablet 325 mg  325 mg Oral Daily Verlee Macleod, APRN - CNP        nitroGLYCERIN (NITROSTAT) SL tablet 0.4 mg  0.4 mg Sublingual Q5 Min PRN Verlee Macleod, APRN - CNP        [START ON 9/6/2019] azithromycin (ZITHROMAX) 500 mg in dextrose 5 % 250 mL IVPB  500 mg Intravenous Q24H Verlee Macleod, APRN - CNP        0.9 % sodium chloride infusion   Intravenous Continuous Verlee Macleod, APRN - CNP 75 mL/hr at 09/05/19 0920      acetaminophen (TYLENOL) tablet 650 mg  650 mg Oral Q4H PRN Verlee Macleod, APRN - CNP        insulin lispro (HUMALOG) injection vial 0-12 Units  0-12 Units Subcutaneous TID WC Verlee Macleod, APRN - CNP   Stopped at 09/05/19 0845    insulin lispro (HUMALOG) injection vial 0-6 Units  0-6 Units Subcutaneous Nightly Magdalena Mueller

## 2019-09-05 NOTE — ED PROVIDER NOTES
His temperature is 97.9 °F (36.6 °C). His blood pressure is 152/82 (abnormal) and his pulse is 92. His respiration is 20 and oxygen saturation is 98%. Physical Exam   Constitutional: He is oriented to person, place, and time. He appears well-developed and well-nourished. HENT:   Head: Normocephalic and atraumatic. Neck: Normal range of motion. Neck supple. Cardiovascular: Regular rhythm and normal heart sounds. Borderline tachycardia   Pulmonary/Chest:   Mild increased work of breathing with tachypnea, however speaking in full sentences, somewhat diminished breath sounds with mild end expiratory wheezes, rales auscultated at the bases   Abdominal: Soft. There is no tenderness. Musculoskeletal: He exhibits edema (3+ pitting edema bilaterally). He exhibits no deformity. Neurological: He is alert and oriented to person, place, and time. Skin: Skin is warm and dry. Psychiatric: He has a normal mood and affect. His behavior is normal.   Vitals reviewed.     Vitals:    Vitals:    09/04/19 2301 09/04/19 2340 09/04/19 2342 09/05/19 0104   BP: (!) 152/87  (!) 152/82    Pulse: 97  92    Resp: 25  26 20   Temp:  97.9 °F (36.6 °C)     SpO2: 99%  98%    Weight:  180 lb (81.6 kg)     Height:  5' 5\" (1.651 m)       PLAN (LABS / IMAGING / EKG):  Orders Placed This Encounter   Procedures    XR CHEST PORTABLE    CBC WITH AUTO DIFFERENTIAL    Basic Metabolic Panel    Brain Natriuretic Peptide    Troponin    Troponin    APTT    Vital signs    Vital signs    Inpatient consult to Cardiology    Inpatient consult to Hospitalist    Inpatient consult to [facility]    Inpatient consult to [address] [place], [place] [address] Initiate ED Aerosol Protocol    Respiratory care evaluation only    HHN Treatment    HHN Treatment    HHN Treatment    MDI Treatment    MDI Treatment    HHN Treatment    BIPAP    EKG 12 Lead    PATIENT STATUS (FROM ED OR OR/PROCEDURAL) Inpatient    PATIENT STATUS (FROM ED OR OR/PROCEDURAL) sentences, breath sounds are somewhat diminished throughout with mild wheezing, there are some rales auscultated at the bases. Patient placed on BiPAP upon arrival.  Also treated with aerosols, will treat with Solu-Medrol. Will obtain labs, EKG, chest x-ray. Anticipate admission. Chest x-ray shows multifocal pneumonia. Will treat with Rocephin and azithromycin. Patient is afebrile, white count is 11.8. Do not feel patient requires sepsis work up at this time. He is afebrile, HR 90s. Troponin is elevated at 211. EKG shows new T wave inversions in the lateral leads. Will consult cardiology. Patient given 325 mg of aspirin. BNP is also elevated at 15,000. Will give 40 mg of Lasix. Discussed with cardiology fellow, recommending heparin drip for nSTEMI. We will also give patient home dose of Lipitor. Patient admitted to family medicine for further management. PROCEDURES:  Procedures    CONSULTS:  IP CONSULT TO CARDIOLOGY  IP CONSULT TO HOSPITALIST  IP CONSULT TO FAMILY MEDICINE  IP CONSULT TO FAMILY MEDICINE  IP CONSULT TO CARDIOLOGY    CRITICAL CARE:  See attending note    IMPRESSION      1. NSTEMI (non-ST elevated myocardial infarction) ([facility] [address]    2. COPD exacerbation ([facility] [address]    3. Pneumonia due to organism    4. Acute on chronic systolic congestive heart failure ([facility] [address]      DISPOSITION / PLAN     DISPOSITION Admitted 09/05/2019 01:23:50 AM    If the patient was admitted, some of the above orders, medications, labs, and consults may have been placed by the admitting team(s) and were auto populated above when I refreshed my note prior to signing it. If there is any question, please check for the responsible provider for individual orders in the EMR system. If discharged, the patient was instructed to return to the emergency department with any worsening symptoms, if new symptoms arise, or if they have any other concerns.   Patient was instructed not to drive home if discharged today and received

## 2019-09-05 NOTE — PROGRESS NOTES
Pt admitted into 2008 from ED. Came into ED last evening with c/o increasing SOB, BLE edema. Pt states he ran out of his medications last week and there were no refills and his insurance was changed so he was unable to see a new provider. He states he quit smoking 2 years ago but that his son who lives with him does smoke and smokes in the house.

## 2019-09-05 NOTE — ED TRIAGE NOTES
Pt arrived to the Ed with c/o difficulty breathing and on BiPap with EMS on arrival. Pt stated this has been going on for about a week. Pt states he is out of lasix for one week. Pt is alert and oriented x4. Pt has a hx of SOB, COPD , and CHF.

## 2019-09-05 NOTE — H&P
50.3 %    MCV 97.4 82.6 - 102.9 fL    MCH 31.1 25.2 - 33.5 pg    MCHC 31.9 28.4 - 34.8 g/dL    RDW 13.6 11.8 - 14.4 %    Platelets 299 711 - 905 k/uL    MPV 10.1 8.1 - 13.5 fL    NRBC Automated 0.0 0.0 per 100 WBC    Differential Type NOT REPORTED     Seg Neutrophils 80 (H) 36 - 65 %    Lymphocytes 13 (L) 24 - 43 %    Monocytes 6 3 - 12 %    Eosinophils % 0 (L) 1 - 4 %    Basophils 0 0 - 2 %    Immature Granulocytes 1 (H) 0 %    Segs Absolute 9.53 (H) 1.50 - 8.10 k/uL    Absolute Lymph # 1.49 1.10 - 3.70 k/uL    Absolute Mono # 0.70 0.10 - 1.20 k/uL    Absolute Eos # <0.03 0.00 - 0.44 k/uL    Basophils Absolute <0.03 0.00 - 0.20 k/uL    Absolute Immature Granulocyte 0.07 0.00 - 0.30 k/uL    WBC Morphology NOT REPORTED     RBC Morphology NOT REPORTED     Platelet Estimate NOT REPORTED    Basic Metabolic Panel    Collection Time: 09/04/19 10:45 PM   Result Value Ref Range    Glucose 162 (H) 70 - 99 mg/dL    BUN 16 8 - 23 mg/dL    CREATININE 0.95 0.70 - 1.20 mg/dL    Bun/Cre Ratio NOT REPORTED 9 - 20    Calcium 8.9 8.6 - 10.4 mg/dL    Sodium 140 135 - 144 mmol/L    Potassium 4.2 3.7 - 5.3 mmol/L    Chloride 102 98 - 107 mmol/L    CO2 25 20 - 31 mmol/L    Anion Gap 13 9 - 17 mmol/L    GFR Non-African American >60 >60 mL/min    GFR African American >60 >60 mL/min    GFR Comment          GFR Staging NOT REPORTED    Brain Natriuretic Peptide    Collection Time: 09/04/19 10:45 PM   Result Value Ref Range    Pro-BNP 15,878 (H) <300 pg/mL    BNP Interpretation Pro-BNP Reference Range:    Troponin    Collection Time: 09/04/19 10:45 PM   Result Value Ref Range    Troponin, High Sensitivity 211 (HH) 0 - 22 ng/L    Troponin T NOT REPORTED <0.03 ng/mL    Troponin Interp NOT REPORTED    Troponin    Collection Time: 09/04/19 11:45 PM   Result Value Ref Range    Troponin, High Sensitivity 173 (HH) 0 - 22 ng/L    Troponin T NOT REPORTED <0.03 ng/mL    Troponin Interp NOT REPORTED          Imaging/Diagonstics:  Xr Chest

## 2019-09-06 ENCOUNTER — APPOINTMENT (OUTPATIENT)
Dept: GENERAL RADIOLOGY | Age: 64
DRG: 194 | End: 2019-09-06
Payer: COMMERCIAL

## 2019-09-06 LAB
ALBUMIN SERPL-MCNC: 2.7 G/DL (ref 3.5–5.2)
ALBUMIN/GLOBULIN RATIO: 1 (ref 1–2.5)
ALP BLD-CCNC: 72 U/L (ref 40–129)
ALT SERPL-CCNC: 7 U/L (ref 5–41)
ANION GAP SERPL CALCULATED.3IONS-SCNC: 11 MMOL/L (ref 9–17)
AST SERPL-CCNC: 9 U/L
BILIRUB SERPL-MCNC: 0.17 MG/DL (ref 0.3–1.2)
BNP INTERPRETATION: ABNORMAL
BUN BLDV-MCNC: 24 MG/DL (ref 8–23)
BUN/CREAT BLD: ABNORMAL (ref 9–20)
CALCIUM IONIZED: 1.1 MMOL/L (ref 1.13–1.33)
CALCIUM SERPL-MCNC: 7.7 MG/DL (ref 8.6–10.4)
CHLORIDE BLD-SCNC: 102 MMOL/L (ref 98–107)
CHOLESTEROL/HDL RATIO: 2.6
CHOLESTEROL: 88 MG/DL
CO2: 24 MMOL/L (ref 20–31)
CREAT SERPL-MCNC: 0.91 MG/DL (ref 0.7–1.2)
GFR AFRICAN AMERICAN: >60 ML/MIN
GFR NON-AFRICAN AMERICAN: >60 ML/MIN
GFR SERPL CREATININE-BSD FRML MDRD: ABNORMAL ML/MIN/{1.73_M2}
GFR SERPL CREATININE-BSD FRML MDRD: ABNORMAL ML/MIN/{1.73_M2}
GLUCOSE BLD-MCNC: 127 MG/DL (ref 75–110)
GLUCOSE BLD-MCNC: 169 MG/DL (ref 75–110)
GLUCOSE BLD-MCNC: 184 MG/DL (ref 75–110)
GLUCOSE BLD-MCNC: 185 MG/DL (ref 70–99)
GLUCOSE BLD-MCNC: 231 MG/DL (ref 75–110)
GLUCOSE BLD-MCNC: 233 MG/DL (ref 75–110)
HCT VFR BLD CALC: 29.9 % (ref 40.7–50.3)
HDLC SERPL-MCNC: 34 MG/DL
HEMOGLOBIN: 9.3 G/DL (ref 13–17)
LDL CHOLESTEROL: 38 MG/DL (ref 0–130)
LV EF: 20 %
LVEF MODALITY: NORMAL
MAGNESIUM: 1.3 MG/DL (ref 1.6–2.6)
MAGNESIUM: 2.4 MG/DL (ref 1.6–2.6)
MCH RBC QN AUTO: 30.6 PG (ref 25.2–33.5)
MCHC RBC AUTO-ENTMCNC: 31.1 G/DL (ref 28.4–34.8)
MCV RBC AUTO: 98.4 FL (ref 82.6–102.9)
NRBC AUTOMATED: 0 PER 100 WBC
PARTIAL THROMBOPLASTIN TIME: 45 SEC (ref 20.5–30.5)
PARTIAL THROMBOPLASTIN TIME: 46 SEC (ref 20.5–30.5)
PARTIAL THROMBOPLASTIN TIME: 48.2 SEC (ref 20.5–30.5)
PDW BLD-RTO: 13.1 % (ref 11.8–14.4)
PLATELET # BLD: 272 K/UL (ref 138–453)
PMV BLD AUTO: 10.6 FL (ref 8.1–13.5)
POTASSIUM SERPL-SCNC: 4.3 MMOL/L (ref 3.7–5.3)
PRO-BNP: 6579 PG/ML
RBC # BLD: 3.04 M/UL (ref 4.21–5.77)
SODIUM BLD-SCNC: 137 MMOL/L (ref 135–144)
TOTAL PROTEIN: 5.4 G/DL (ref 6.4–8.3)
TRIGL SERPL-MCNC: 80 MG/DL
TROPONIN INTERP: ABNORMAL
TROPONIN T: ABNORMAL NG/ML
TROPONIN, HIGH SENSITIVITY: 151 NG/L (ref 0–22)
VLDLC SERPL CALC-MCNC: ABNORMAL MG/DL (ref 1–30)
WBC # BLD: 8.5 K/UL (ref 3.5–11.3)

## 2019-09-06 PROCEDURE — 80061 LIPID PANEL: CPT

## 2019-09-06 PROCEDURE — 2700000000 HC OXYGEN THERAPY PER DAY

## 2019-09-06 PROCEDURE — 6360000002 HC RX W HCPCS: Performed by: STUDENT IN AN ORGANIZED HEALTH CARE EDUCATION/TRAINING PROGRAM

## 2019-09-06 PROCEDURE — 6360000002 HC RX W HCPCS: Performed by: NURSE PRACTITIONER

## 2019-09-06 PROCEDURE — 71046 X-RAY EXAM CHEST 2 VIEWS: CPT

## 2019-09-06 PROCEDURE — 71045 X-RAY EXAM CHEST 1 VIEW: CPT

## 2019-09-06 PROCEDURE — 80053 COMPREHEN METABOLIC PANEL: CPT

## 2019-09-06 PROCEDURE — 2060000000 HC ICU INTERMEDIATE R&B

## 2019-09-06 PROCEDURE — 94761 N-INVAS EAR/PLS OXIMETRY MLT: CPT

## 2019-09-06 PROCEDURE — 6370000000 HC RX 637 (ALT 250 FOR IP): Performed by: NURSE PRACTITIONER

## 2019-09-06 PROCEDURE — 82947 ASSAY GLUCOSE BLOOD QUANT: CPT

## 2019-09-06 PROCEDURE — 36415 COLL VENOUS BLD VENIPUNCTURE: CPT

## 2019-09-06 PROCEDURE — 93005 ELECTROCARDIOGRAM TRACING: CPT | Performed by: NURSE PRACTITIONER

## 2019-09-06 PROCEDURE — 2580000003 HC RX 258: Performed by: NURSE PRACTITIONER

## 2019-09-06 PROCEDURE — 93005 ELECTROCARDIOGRAM TRACING: CPT | Performed by: FAMILY MEDICINE

## 2019-09-06 PROCEDURE — 6370000000 HC RX 637 (ALT 250 FOR IP): Performed by: STUDENT IN AN ORGANIZED HEALTH CARE EDUCATION/TRAINING PROGRAM

## 2019-09-06 PROCEDURE — 6360000002 HC RX W HCPCS

## 2019-09-06 PROCEDURE — 83880 ASSAY OF NATRIURETIC PEPTIDE: CPT

## 2019-09-06 PROCEDURE — 93306 TTE W/DOPPLER COMPLETE: CPT

## 2019-09-06 PROCEDURE — 83735 ASSAY OF MAGNESIUM: CPT

## 2019-09-06 PROCEDURE — 2500000003 HC RX 250 WO HCPCS: Performed by: STUDENT IN AN ORGANIZED HEALTH CARE EDUCATION/TRAINING PROGRAM

## 2019-09-06 PROCEDURE — 84484 ASSAY OF TROPONIN QUANT: CPT

## 2019-09-06 PROCEDURE — 94660 CPAP INITIATION&MGMT: CPT

## 2019-09-06 PROCEDURE — 94640 AIRWAY INHALATION TREATMENT: CPT

## 2019-09-06 PROCEDURE — 99232 SBSQ HOSP IP/OBS MODERATE 35: CPT | Performed by: FAMILY MEDICINE

## 2019-09-06 PROCEDURE — 85027 COMPLETE CBC AUTOMATED: CPT

## 2019-09-06 PROCEDURE — 85730 THROMBOPLASTIN TIME PARTIAL: CPT

## 2019-09-06 PROCEDURE — 82330 ASSAY OF CALCIUM: CPT

## 2019-09-06 RX ORDER — DOCUSATE SODIUM 100 MG/1
100 CAPSULE, LIQUID FILLED ORAL DAILY
Status: DISCONTINUED | OUTPATIENT
Start: 2019-09-06 | End: 2019-09-09 | Stop reason: HOSPADM

## 2019-09-06 RX ORDER — FUROSEMIDE 10 MG/ML
INJECTION INTRAMUSCULAR; INTRAVENOUS
Status: COMPLETED
Start: 2019-09-06 | End: 2019-09-06

## 2019-09-06 RX ORDER — FUROSEMIDE 10 MG/ML
80 INJECTION INTRAMUSCULAR; INTRAVENOUS ONCE
Status: COMPLETED | OUTPATIENT
Start: 2019-09-06 | End: 2019-09-06

## 2019-09-06 RX ORDER — CALCIUM CARBONATE/VITAMIN D3 250-3.125
1 TABLET ORAL DAILY
Status: DISCONTINUED | OUTPATIENT
Start: 2019-09-06 | End: 2019-09-09 | Stop reason: HOSPADM

## 2019-09-06 RX ORDER — NITROGLYCERIN 20 MG/100ML
5 INJECTION INTRAVENOUS CONTINUOUS
Status: DISCONTINUED | OUTPATIENT
Start: 2019-09-06 | End: 2019-09-08

## 2019-09-06 RX ORDER — LABETALOL 20 MG/4 ML (5 MG/ML) INTRAVENOUS SYRINGE
10 ONCE
Status: COMPLETED | OUTPATIENT
Start: 2019-09-06 | End: 2019-09-06

## 2019-09-06 RX ORDER — METOPROLOL TARTRATE 5 MG/5ML
INJECTION INTRAVENOUS
Status: DISCONTINUED
Start: 2019-09-06 | End: 2019-09-06 | Stop reason: WASHOUT

## 2019-09-06 RX ORDER — METHYLPREDNISOLONE SODIUM SUCCINATE 40 MG/ML
40 INJECTION, POWDER, LYOPHILIZED, FOR SOLUTION INTRAMUSCULAR; INTRAVENOUS EVERY 12 HOURS
Status: DISCONTINUED | OUTPATIENT
Start: 2019-09-06 | End: 2019-09-07

## 2019-09-06 RX ADMIN — ALBUTEROL SULFATE 2 PUFF: 90 AEROSOL, METERED RESPIRATORY (INHALATION) at 15:44

## 2019-09-06 RX ADMIN — ATORVASTATIN CALCIUM 20 MG: 20 TABLET, FILM COATED ORAL at 22:58

## 2019-09-06 RX ADMIN — AMLODIPINE BESYLATE 10 MG: 10 TABLET ORAL at 08:59

## 2019-09-06 RX ADMIN — ASPIRIN 325 MG: 325 TABLET, COATED ORAL at 12:02

## 2019-09-06 RX ADMIN — LISINOPRIL 20 MG: 10 TABLET ORAL at 09:01

## 2019-09-06 RX ADMIN — METOPROLOL TARTRATE 25 MG: 25 TABLET ORAL at 18:23

## 2019-09-06 RX ADMIN — Medication 10 ML: at 23:04

## 2019-09-06 RX ADMIN — FAMOTIDINE 20 MG: 20 TABLET, FILM COATED ORAL at 09:01

## 2019-09-06 RX ADMIN — HEPARIN SODIUM AND DEXTROSE 18.05 UNITS/KG/HR: 10000; 5 INJECTION INTRAVENOUS at 01:38

## 2019-09-06 RX ADMIN — FUROSEMIDE 40 MG: 10 INJECTION, SOLUTION INTRAMUSCULAR; INTRAVENOUS at 09:03

## 2019-09-06 RX ADMIN — METHYLPREDNISOLONE SODIUM SUCCINATE 40 MG: 40 INJECTION, POWDER, FOR SOLUTION INTRAMUSCULAR; INTRAVENOUS at 23:12

## 2019-09-06 RX ADMIN — FUROSEMIDE 80 MG: 10 INJECTION, SOLUTION INTRAMUSCULAR; INTRAVENOUS at 17:51

## 2019-09-06 RX ADMIN — HEPARIN SODIUM AND DEXTROSE 20.1 UNITS/KG/HR: 10000; 5 INJECTION INTRAVENOUS at 18:18

## 2019-09-06 RX ADMIN — ALBUTEROL SULFATE 2 PUFF: 90 AEROSOL, METERED RESPIRATORY (INHALATION) at 08:08

## 2019-09-06 RX ADMIN — INSULIN LISPRO 4 UNITS: 100 INJECTION, SOLUTION INTRAVENOUS; SUBCUTANEOUS at 13:46

## 2019-09-06 RX ADMIN — FUROSEMIDE 80 MG: 10 INJECTION, SOLUTION INTRAMUSCULAR; INTRAVENOUS at 18:38

## 2019-09-06 RX ADMIN — INSULIN LISPRO 2 UNITS: 100 INJECTION, SOLUTION INTRAVENOUS; SUBCUTANEOUS at 22:59

## 2019-09-06 RX ADMIN — ATORVASTATIN CALCIUM 20 MG: 20 TABLET, FILM COATED ORAL at 00:46

## 2019-09-06 RX ADMIN — NITROGLYCERIN 5 MCG/MIN: 20 INJECTION INTRAVENOUS at 18:04

## 2019-09-06 RX ADMIN — HEPARIN SODIUM 2000 UNITS: 1000 INJECTION, SOLUTION INTRAVENOUS; SUBCUTANEOUS at 12:32

## 2019-09-06 RX ADMIN — MAGNESIUM SULFATE HEPTAHYDRATE 1 G: 1 INJECTION, SOLUTION INTRAVENOUS at 12:03

## 2019-09-06 RX ADMIN — DOCUSATE SODIUM 100 MG: 100 CAPSULE, LIQUID FILLED ORAL at 12:02

## 2019-09-06 RX ADMIN — HEPARIN SODIUM 2000 UNITS: 1000 INJECTION, SOLUTION INTRAVENOUS; SUBCUTANEOUS at 19:52

## 2019-09-06 RX ADMIN — LABETALOL 20 MG/4 ML (5 MG/ML) INTRAVENOUS SYRINGE 10 MG: at 17:55

## 2019-09-06 RX ADMIN — MAGNESIUM SULFATE HEPTAHYDRATE 1 G: 1 INJECTION, SOLUTION INTRAVENOUS at 07:38

## 2019-09-06 RX ADMIN — CARVEDILOL 25 MG: 12.5 TABLET, FILM COATED ORAL at 22:58

## 2019-09-06 RX ADMIN — AZITHROMYCIN MONOHYDRATE 500 MG: 500 INJECTION, POWDER, LYOPHILIZED, FOR SOLUTION INTRAVENOUS at 00:46

## 2019-09-06 RX ADMIN — MAGNESIUM SULFATE HEPTAHYDRATE 1 G: 1 INJECTION, SOLUTION INTRAVENOUS at 10:44

## 2019-09-06 RX ADMIN — INSULIN LISPRO 2 UNITS: 100 INJECTION, SOLUTION INTRAVENOUS; SUBCUTANEOUS at 08:54

## 2019-09-06 RX ADMIN — CARVEDILOL 25 MG: 12.5 TABLET, FILM COATED ORAL at 09:01

## 2019-09-06 RX ADMIN — FAMOTIDINE 20 MG: 20 TABLET, FILM COATED ORAL at 22:58

## 2019-09-06 RX ADMIN — MAGNESIUM SULFATE HEPTAHYDRATE 1 G: 1 INJECTION, SOLUTION INTRAVENOUS at 08:43

## 2019-09-06 RX ADMIN — SPIRONOLACTONE 25 MG: 25 TABLET ORAL at 09:01

## 2019-09-06 RX ADMIN — NITROGLYCERIN 0.4 MG: 0.4 TABLET SUBLINGUAL at 17:32

## 2019-09-06 RX ADMIN — CALCIUM CARBONATE-CHOLECALCIFEROL TAB 250 MG-125 UNIT 250 MG: 250-125 TAB at 12:00

## 2019-09-06 RX ADMIN — METHYLPREDNISOLONE SODIUM SUCCINATE 40 MG: 40 INJECTION, POWDER, FOR SOLUTION INTRAMUSCULAR; INTRAVENOUS at 12:02

## 2019-09-06 ASSESSMENT — ENCOUNTER SYMPTOMS
BACK PAIN: 0
CHEST TIGHTNESS: 0
WHEEZING: 1
VOMITING: 0
SHORTNESS OF BREATH: 1
DIARRHEA: 0
EYES NEGATIVE: 1
NAUSEA: 0
COUGH: 1
ABDOMINAL PAIN: 0
COLOR CHANGE: 0
ABDOMINAL DISTENTION: 0
CONSTIPATION: 1

## 2019-09-06 NOTE — PROGRESS NOTES
[facility]  Progress Note    Date:   9/6/2019  Patient name:  Murel Cockayne  Date of admission:  9/4/2019 10:20 PM  MRN:   [id number]  YOB: 1955    SUBJECTIVE/Last 24 hours update:     Patient was seen and examined at bedside. He is hemodynamically stable. There were no acute events overnight. Patient says he feels significantly better than when he arrived and states that his shortness of breath has improved. He does continue to have some shortness of breath as well as a productive cough. Currently he is on 2 L nasal cannula. Currently he is on IV Lasix 40 mill grams per day as well as DuoNeb breathing treatments. He has not been on IV steroids since being in the emergency department. The patient denies any fever or chills, chest pain, palpitations, abdominal pain, dysuria, nausea or vomiting. He does admit that he has not had a bowel movement in over a week. HPI:   See H&P    REVIEW OF SYSTEMS:      Review of Systems   Constitutional: Negative. Negative for chills, fatigue and fever. HENT: Positive for congestion. Eyes: Negative. Respiratory: Positive for cough, shortness of breath and wheezing. Negative for chest tightness. Cardiovascular: Positive for leg swelling. Negative for chest pain and palpitations. Gastrointestinal: Positive for constipation. Negative for abdominal distention, abdominal pain, diarrhea, nausea and vomiting. Endocrine: Negative. Genitourinary: Negative. Negative for difficulty urinating and dysuria. Musculoskeletal: Negative. Negative for back pain. Skin: Negative. Negative for color change, rash and wound. Neurological: Negative. Negative for dizziness, weakness and headaches. Psychiatric/Behavioral: Negative for agitation and confusion.          PAST MEDICAL HISTORY:      has a past medical history of COPD (chronic obstructive pulmonary disease) ([facility] [address] Diabetes mellitus Range    POC Glucose 214 (H) 75 - 110 mg/dL   POC Glucose Fingerstick    Collection Time: 09/05/19  9:01 PM   Result Value Ref Range    POC Glucose 127 (H) 75 - 110 mg/dL   APTT    Collection Time: 09/06/19 12:46 AM   Result Value Ref Range    PTT 45.0 (H) 20.5 - 30.5 sec   Comprehensive Metabolic Panel w/ Reflex to MG    Collection Time: 09/06/19  5:33 AM   Result Value Ref Range    Glucose 185 (H) 70 - 99 mg/dL    BUN 24 (H) 8 - 23 mg/dL    CREATININE 0.91 0.70 - 1.20 mg/dL    Bun/Cre Ratio NOT REPORTED 9 - 20    Calcium 7.7 (L) 8.6 - 10.4 mg/dL    Sodium 137 135 - 144 mmol/L    Potassium 4.3 3.7 - 5.3 mmol/L    Chloride 102 98 - 107 mmol/L    CO2 24 20 - 31 mmol/L    Anion Gap 11 9 - 17 mmol/L    Alkaline Phosphatase 72 40 - 129 U/L    ALT 7 5 - 41 U/L    AST 9 <40 U/L    Total Bilirubin 0.17 (L) 0.3 - 1.2 mg/dL    Total Protein 5.4 (L) 6.4 - 8.3 g/dL    Alb 2.7 (L) 3.5 - 5.2 g/dL    Albumin/Globulin Ratio 1.0 1.0 - 2.5    GFR Non-African American >60 >60 mL/min    GFR African American >60 >60 mL/min    GFR Comment          GFR Staging NOT REPORTED    Magnesium    Collection Time: 09/06/19  5:33 AM   Result Value Ref Range    Magnesium 1.3 (L) 1.6 - 2.6 mg/dL   Brain natriuretic peptide    Collection Time: 09/06/19  5:33 AM   Result Value Ref Range    Pro-BNP 6,579 (H) <300 pg/mL    BNP Interpretation Pro-BNP Reference Range:    Lipid panel - fasting    Collection Time: 09/06/19  5:33 AM   Result Value Ref Range    Cholesterol 88 <200 mg/dL    HDL 34 (L) >40 mg/dL    LDL Cholesterol 38 0 - 130 mg/dL    Chol/HDL Ratio 2.6 <5    Triglycerides 80 <150 mg/dL    VLDL NOT REPORTED 1 - 30 mg/dL   CBC    Collection Time: 09/06/19  5:33 AM   Result Value Ref Range    WBC 8.5 3.5 - 11.3 k/uL    RBC 3.04 (L) 4.21 - 5.77 m/uL    Hemoglobin 9.3 (L) 13.0 - 17.0 g/dL    Hematocrit 29.9 (L) 40.7 - 50.3 %    MCV 98.4 82.6 - 102.9 fL    MCH 30.6 25.2 - 33.5 pg    MCHC 31.1 28.4 - 34.8 g/dL    RDW 13.1 11.8 - 14.4 %    Platelets 272 138 - 453 k/uL    MPV 10.6 8.1 - 13.5 fL    NRBC Automated 0.0 0.0 per 100 WBC   POC Glucose Fingerstick    Collection Time: 09/06/19  7:55 AM   Result Value Ref Range    POC Glucose 169 (H) 75 - 110 mg/dL   APTT    Collection Time: 09/06/19  8:14 AM   Result Value Ref Range    PTT 48.2 (H) 20.5 - 30.5 sec   CALCIUM, IONIZED    Collection Time: 09/06/19  8:14 AM   Result Value Ref Range    Calcium, Ion 1.10 (L) 1.13 - 1.33 mmol/L             Current Facility-Administered Medications   Medication Dose Route Frequency Provider Last Rate Last Dose    oyster shell calcium/vitamin D 250-125 MG-UNIT per tablet 250 mg  1 tablet Oral Daily Juan Perry MD        methylPREDNISolone sodium (SOLU-MEDROL) injection 40 mg  40 mg Intravenous Q12H Juan Perry MD        docusate sodium (COLACE) capsule 100 mg  100 mg Oral Daily Juan Perry MD        amLODIPine (NORVASC) tablet 10 mg  10 mg Oral Daily MARY Burdick - CNP   10 mg at 09/06/19 0859    atorvastatin (LIPITOR) tablet 20 mg  20 mg Oral Nightly MARY Burdick - CNP   20 mg at 09/06/19 0046    carvedilol (COREG) tablet 25 mg  25 mg Oral BID MARY Burdick - CNP   25 mg at 09/06/19 0901    lisinopril (PRINIVIL;ZESTRIL) tablet 20 mg  20 mg Oral Daily MARY Burdick - CNP   20 mg at 09/06/19 0901    spironolactone (ALDACTONE) tablet 25 mg  25 mg Oral Daily MARY Burdick - CNP   25 mg at 09/06/19 0901    furosemide (LASIX) injection 40 mg  40 mg Intravenous BID Shakira Ruiz MD   40 mg at 09/06/19 0903    sodium chloride flush 0.9 % injection 10 mL  10 mL Intravenous 2 times per day MARY Burdick CNP   10 mL at 09/05/19 2107    sodium chloride flush 0.9 % injection 10 mL  10 mL Intravenous PRN MARY Burdick CNP        potassium chloride (KLOR-CON M) extended release tablet 40 mEq  40 mEq Oral PRN Alize Aguilar APRN Nightly MARY Allen CNP        insulin lispro (HUMALOG) injection vial 0-12 Units  0-12 Units Subcutaneous TID WC Niki Mckay MD   2 Units at 09/06/19 0854    insulin lispro (HUMALOG) injection vial 0-6 Units  0-6 Units Subcutaneous Nightly Niki Mckay MD        cefTRIAXone (ROCEPHIN) 1 g IVPB in 50 mL D5W minibag  1 g Intravenous Q24H MARY Allen CNP   Stopped at 09/05/19 2138    albuterol (PROVENTIL) nebulizer solution 5 mg  5 mg Nebulization As Directed RT PRN Ileana Knowless, DO   5 mg at 09/04/19 2242    albuterol sulfate  (90 Base) MCG/ACT inhaler 2 puff  2 puff Inhalation As Directed RT PRN Ileana Knowless, DO   2 puff at 09/06/19 0808    ipratropium (ATROVENT) 0.02 % nebulizer solution 0.5 mg  0.5 mg Nebulization As Directed RT PRN Ileana Knowless, DO   0.5 mg at 09/04/19 2242    heparin (porcine) injection 4,000 Units  4,000 Units Intravenous PRN MARY Allen CNP   4,000 Units at 09/05/19 1515    heparin (porcine) injection 2,000 Units  2,000 Units Intravenous PRN MARY Allen CNP        heparin 25,000 units in dextrose 5% 250 mL infusion  12 Units/kg/hr Intravenous Continuous MARY Allen CNP 14.7 mL/hr at 09/06/19 0138 18.05 Units/kg/hr at 09/06/19 0138       ASSESSMENT:     Active Problems:    Controlled type 2 diabetes mellitus without complication, without long-term current use of insulin (HCC)    Chronic obstructive pulmonary disease (HCC)    Acute on chronic systolic congestive heart failure (HCC)    Combined systolic and diastolic congestive heart failure (HCC)    NSTEMI (non-ST elevated myocardial infarction) ([facility] [address]    Community acquired pneumonia  Resolved Problems:    * No resolved hospital problems.  *      PLAN:     DVT prophylaxis: Heparin drip   GI prophylaxis: Pepcid      EKG shows T wave inversion laterally; new from prior 7/19/18  - Cardiology consulted  - Heparin drip and aspirin

## 2019-09-06 NOTE — PROGRESS NOTES
Came for a rapid in room 2008 at around 5:30 pm    Went to patient's bedside. Patient complaining of chest pain and shortness of breath.  ekg was done. No significant changes. BP in 180s   Cardiology was paged and recommended 80 mg iv lasix. And labetalol 10 mg oral. Patient on non rebreather mask and started on BiPap. Informed the nurse about the plan and asked her to monitor. Also, advised her to keep me posted about the patient's condition. Will monitor for now, follow up on the results and treat accordingly.       Hernandez Bae MD  PGY- 1   Internal Medicine Resident  [address] [facility]  9/6/2019 5:46 PM

## 2019-09-07 LAB
ALBUMIN SERPL-MCNC: 3.2 G/DL (ref 3.5–5.2)
ALBUMIN/GLOBULIN RATIO: 1.3 (ref 1–2.5)
ALP BLD-CCNC: 75 U/L (ref 40–129)
ALT SERPL-CCNC: 8 U/L (ref 5–41)
ANION GAP SERPL CALCULATED.3IONS-SCNC: 13 MMOL/L (ref 9–17)
AST SERPL-CCNC: 12 U/L
BILIRUB SERPL-MCNC: 0.15 MG/DL (ref 0.3–1.2)
BUN BLDV-MCNC: 33 MG/DL (ref 8–23)
BUN/CREAT BLD: ABNORMAL (ref 9–20)
CALCIUM SERPL-MCNC: 8.2 MG/DL (ref 8.6–10.4)
CHLORIDE BLD-SCNC: 99 MMOL/L (ref 98–107)
CO2: 25 MMOL/L (ref 20–31)
CREAT SERPL-MCNC: 1.25 MG/DL (ref 0.7–1.2)
CULTURE: ABNORMAL
DIRECT EXAM: ABNORMAL
GFR AFRICAN AMERICAN: >60 ML/MIN
GFR NON-AFRICAN AMERICAN: 58 ML/MIN
GFR SERPL CREATININE-BSD FRML MDRD: ABNORMAL ML/MIN/{1.73_M2}
GFR SERPL CREATININE-BSD FRML MDRD: ABNORMAL ML/MIN/{1.73_M2}
GLUCOSE BLD-MCNC: 209 MG/DL (ref 75–110)
GLUCOSE BLD-MCNC: 246 MG/DL (ref 75–110)
GLUCOSE BLD-MCNC: 252 MG/DL (ref 70–99)
GLUCOSE BLD-MCNC: 405 MG/DL (ref 75–110)
Lab: ABNORMAL
PARTIAL THROMBOPLASTIN TIME: >120 SEC (ref 20.5–30.5)
PARTIAL THROMBOPLASTIN TIME: >120 SEC (ref 20.5–30.5)
PHOSPHORUS: 5.9 MG/DL (ref 2.5–4.5)
POTASSIUM SERPL-SCNC: 5.1 MMOL/L (ref 3.7–5.3)
SODIUM BLD-SCNC: 137 MMOL/L (ref 135–144)
SPECIMEN DESCRIPTION: ABNORMAL
TOTAL PROTEIN: 5.7 G/DL (ref 6.4–8.3)

## 2019-09-07 PROCEDURE — 6360000002 HC RX W HCPCS: Performed by: INTERNAL MEDICINE

## 2019-09-07 PROCEDURE — 6360000002 HC RX W HCPCS: Performed by: STUDENT IN AN ORGANIZED HEALTH CARE EDUCATION/TRAINING PROGRAM

## 2019-09-07 PROCEDURE — 99232 SBSQ HOSP IP/OBS MODERATE 35: CPT | Performed by: FAMILY MEDICINE

## 2019-09-07 PROCEDURE — 80053 COMPREHEN METABOLIC PANEL: CPT

## 2019-09-07 PROCEDURE — 82947 ASSAY GLUCOSE BLOOD QUANT: CPT

## 2019-09-07 PROCEDURE — 6370000000 HC RX 637 (ALT 250 FOR IP): Performed by: NURSE PRACTITIONER

## 2019-09-07 PROCEDURE — 6360000002 HC RX W HCPCS: Performed by: NURSE PRACTITIONER

## 2019-09-07 PROCEDURE — 36415 COLL VENOUS BLD VENIPUNCTURE: CPT

## 2019-09-07 PROCEDURE — 94640 AIRWAY INHALATION TREATMENT: CPT

## 2019-09-07 PROCEDURE — 94762 N-INVAS EAR/PLS OXIMTRY CONT: CPT

## 2019-09-07 PROCEDURE — 2580000003 HC RX 258: Performed by: NURSE PRACTITIONER

## 2019-09-07 PROCEDURE — 6370000000 HC RX 637 (ALT 250 FOR IP): Performed by: STUDENT IN AN ORGANIZED HEALTH CARE EDUCATION/TRAINING PROGRAM

## 2019-09-07 PROCEDURE — 2700000000 HC OXYGEN THERAPY PER DAY

## 2019-09-07 PROCEDURE — 85730 THROMBOPLASTIN TIME PARTIAL: CPT

## 2019-09-07 PROCEDURE — 2060000000 HC ICU INTERMEDIATE R&B

## 2019-09-07 PROCEDURE — 84100 ASSAY OF PHOSPHORUS: CPT

## 2019-09-07 RX ORDER — ASPIRIN 81 MG/1
81 TABLET ORAL DAILY
Status: DISCONTINUED | OUTPATIENT
Start: 2019-09-08 | End: 2019-09-09 | Stop reason: HOSPADM

## 2019-09-07 RX ORDER — FUROSEMIDE 10 MG/ML
80 INJECTION INTRAMUSCULAR; INTRAVENOUS 2 TIMES DAILY
Status: DISCONTINUED | OUTPATIENT
Start: 2019-09-07 | End: 2019-09-09 | Stop reason: HOSPADM

## 2019-09-07 RX ORDER — FUROSEMIDE 10 MG/ML
60 INJECTION INTRAMUSCULAR; INTRAVENOUS 2 TIMES DAILY
Status: DISCONTINUED | OUTPATIENT
Start: 2019-09-07 | End: 2019-09-07

## 2019-09-07 RX ORDER — ALBUTEROL SULFATE 2.5 MG/3ML
2.5 SOLUTION RESPIRATORY (INHALATION) EVERY 6 HOURS PRN
Status: DISCONTINUED | OUTPATIENT
Start: 2019-09-07 | End: 2019-09-08

## 2019-09-07 RX ADMIN — CALCIUM CARBONATE-CHOLECALCIFEROL TAB 250 MG-125 UNIT 250 MG: 250-125 TAB at 11:31

## 2019-09-07 RX ADMIN — DOCUSATE SODIUM 100 MG: 100 CAPSULE, LIQUID FILLED ORAL at 11:30

## 2019-09-07 RX ADMIN — FUROSEMIDE 80 MG: 10 INJECTION, SOLUTION INTRAMUSCULAR; INTRAVENOUS at 13:52

## 2019-09-07 RX ADMIN — INSULIN LISPRO 6 UNITS: 100 INJECTION, SOLUTION INTRAVENOUS; SUBCUTANEOUS at 20:28

## 2019-09-07 RX ADMIN — Medication 10 ML: at 11:33

## 2019-09-07 RX ADMIN — ASPIRIN 325 MG: 325 TABLET, COATED ORAL at 11:30

## 2019-09-07 RX ADMIN — AZITHROMYCIN MONOHYDRATE 500 MG: 500 INJECTION, POWDER, LYOPHILIZED, FOR SOLUTION INTRAVENOUS at 01:59

## 2019-09-07 RX ADMIN — ATORVASTATIN CALCIUM 20 MG: 20 TABLET, FILM COATED ORAL at 20:28

## 2019-09-07 RX ADMIN — INSULIN LISPRO 4 UNITS: 100 INJECTION, SOLUTION INTRAVENOUS; SUBCUTANEOUS at 11:28

## 2019-09-07 RX ADMIN — FAMOTIDINE 20 MG: 20 TABLET, FILM COATED ORAL at 20:28

## 2019-09-07 RX ADMIN — METHYLPREDNISOLONE SODIUM SUCCINATE 40 MG: 40 INJECTION, POWDER, FOR SOLUTION INTRAMUSCULAR; INTRAVENOUS at 11:29

## 2019-09-07 RX ADMIN — FAMOTIDINE 20 MG: 20 TABLET, FILM COATED ORAL at 11:32

## 2019-09-07 RX ADMIN — CEFTRIAXONE SODIUM 1 G: 1 INJECTION, POWDER, FOR SOLUTION INTRAMUSCULAR; INTRAVENOUS at 00:51

## 2019-09-07 RX ADMIN — CARVEDILOL 25 MG: 12.5 TABLET, FILM COATED ORAL at 20:28

## 2019-09-07 RX ADMIN — SPIRONOLACTONE 25 MG: 25 TABLET ORAL at 11:33

## 2019-09-07 RX ADMIN — ALBUTEROL SULFATE 2 PUFF: 90 AEROSOL, METERED RESPIRATORY (INHALATION) at 18:07

## 2019-09-07 RX ADMIN — HEPARIN SODIUM AND DEXTROSE 18 UNITS/KG/HR: 10000; 5 INJECTION INTRAVENOUS at 07:29

## 2019-09-07 ASSESSMENT — PAIN SCALES - GENERAL: PAINLEVEL_OUTOF10: 0

## 2019-09-07 NOTE — PROGRESS NOTES
[]  clear  []  Unavailable []  Infiltrates and/or consolidation  []  Unavailable []  Mucus Plugging and or lobar consolidation  []  Unavailable   Cough []  Strong, productive cough []  Weak productive cough []  No cough or weak non-productive cough   JOANIE PARHAM  6:03 PM                            FEMALE                                  MALE                            FEV1 Predicted Normal Values                        FEV1 Predicted Normal Values          Age                                     Height in Feet and Inches       Age                                     Height in Feet and Inches       4' 11\" 5' 1\" 5' 3\" 5' 5\" 5' 7\" 5' 9\" 5' 11\" 6' 1\"  4' 11\" 5' 1\" 5' 3\" 5' 5\" 5' 7\" 5' 9\" 5' 11\" 6' 1\"   42 - 45 2.49 2.66 2.84 3.03 3.22 3.42 3.62 3.83 42 - 45 2.82 3.03 3.26 3.49 3.72 3.96 4.22 4.47   46 - 49 2.40 2.57 2.76 2.94 3.14 3.33 3.54 3.75 46 - 49 2.70 2.92 3.14 3.37 3.61 3.85 4.10 4.36   50 - 53 2.31 2.48 2.66 2.85 3.04 3.24 3.45 3.66 50 - 53 2.58 2.80 3.02 3.25 3.49 3.73 3.98 4.24   54 - 57 2.21 2.38 2.57 2.75 2.95 3.14 3.35 3.56 54 - 57 2.46 2.67 2.89 3.12 3.36 3.60 3.85 4.11   58 - 61 2.10 2.28 2.46 2.65 2.84 3.04 3.24 3.45 58 - 61 2.32 2.54 2.76 2.99 3.23 3.47 3.72 3.98   62 - 65 1.99 2.17 2.35 2.54 2.73 2.93 3.13 3.34 62 - 65 2.19 2.40 2.62 2.85 3.09 3.33 3.58 3.84   66 - 69 1.88 2.05 2.23 2.42 2.61 2.81 3.02 3.23 66 - 69 2.04 2.26 2.48 2.71 2.95 3.19 3.44 3.70   70+ 1.82 1.99 2.17 2.36 2.55 2.75 2.95 3.16 70+ 1.97 2.19 2.41 2.64 2.87 3.12 3.37 3.62             Predicted Peak Expiratory Flow Rate                                       Height (in)  Female       Height (in) Male           Age 64 62 64 63 57 71 78 74 Age            76 808 637 735 445 651 698 943 337  79 70 66 23 08 71 57 17 62   25 337 352 366 381 396 411 426 441 25 447 476 [phone]   30 329 344 359 374 389 404 419 434 30 437 466 494 523 552 [phone] 727   35 322 337 351 366 381 396 411 426 35 426 455 [phone]

## 2019-09-07 NOTE — PROGRESS NOTES
110 mg/dL   Comprehensive Metabolic Panel w/ Reflex to MG    Collection Time: 09/07/19  3:13 AM   Result Value Ref Range    Glucose 252 (H) 70 - 99 mg/dL    BUN 33 (H) 8 - 23 mg/dL    CREATININE 1.25 (H) 0.70 - 1.20 mg/dL    Bun/Cre Ratio NOT REPORTED 9 - 20    Calcium 8.2 (L) 8.6 - 10.4 mg/dL    Sodium 137 135 - 144 mmol/L    Potassium 5.1 3.7 - 5.3 mmol/L    Chloride 99 98 - 107 mmol/L    CO2 25 20 - 31 mmol/L    Anion Gap 13 9 - 17 mmol/L    Alkaline Phosphatase 75 40 - 129 U/L    ALT 8 5 - 41 U/L    AST 12 <40 U/L    Total Bilirubin 0.15 (L) 0.3 - 1.2 mg/dL    Total Protein 5.7 (L) 6.4 - 8.3 g/dL    Alb 3.2 (L) 3.5 - 5.2 g/dL    Albumin/Globulin Ratio 1.3 1.0 - 2.5    GFR Non- 58 (L) >60 mL/min    GFR African American >60 >60 mL/min    GFR Comment          GFR Staging NOT REPORTED    Phosphorus    Collection Time: 09/07/19  3:13 AM   Result Value Ref Range    Phosphorus 5.9 (H) 2.5 - 4.5 mg/dL   APTT    Collection Time: 09/07/19  3:13 AM   Result Value Ref Range    PTT >120.0 (HH) 20.5 - 30.5 sec   APTT    Collection Time: 09/07/19  5:19 AM   Result Value Ref Range    PTT >120.0 (HH) 20.5 - 30.5 sec   POC Glucose Fingerstick    Collection Time: 09/07/19  8:00 AM   Result Value Ref Range    POC Glucose 209 (H) 75 - 110 mg/dL   POC Glucose Fingerstick    Collection Time: 09/07/19 11:02 AM   Result Value Ref Range    POC Glucose 246 (H) 75 - 110 mg/dL             Current Facility-Administered Medications   Medication Dose Route Frequency Provider Last Rate Last Dose    oyster shell calcium/vitamin D 250-125 MG-UNIT per tablet 250 mg  1 tablet Oral Daily Marichuy Tineo MD   250 mg at 09/07/19 1131    methylPREDNISolone sodium (SOLU-MEDROL) injection 40 mg  40 mg Intravenous Q12H Marcihuy Tineo MD   40 mg at 09/07/19 1129    docusate sodium (COLACE) capsule 100 mg  100 mg Oral Daily Marichuy Tineo MD   100 mg at 09/07/19 1130    nitroGLYCERIN 50 mg in dextrose 5% 250 mL infusion  5 mcg/min Intravenous Continuous Renetta Flores MD   Stopped at 09/06/19 2237    amLODIPine (NORVASC) tablet 10 mg  10 mg Oral Daily Laqueta Men, APRN - CNP   Stopped at 09/07/19 1132    atorvastatin (LIPITOR) tablet 20 mg  20 mg Oral Nightly Laqueta Men, APRN - CNP   20 mg at 09/06/19 2258    carvedilol (COREG) tablet 25 mg  25 mg Oral BID Laqueta Men, APRN - CNP   Stopped at 09/07/19 1131    lisinopril (PRINIVIL;ZESTRIL) tablet 20 mg  20 mg Oral Daily Laqueta Men, APRN - CNP   Stopped at 09/07/19 1130    spironolactone (ALDACTONE) tablet 25 mg  25 mg Oral Daily Laqueta Men, APRN - CNP   25 mg at 09/07/19 1133    furosemide (LASIX) injection 40 mg  40 mg Intravenous BID eLtty Treviño MD   Stopped at 09/07/19 1133    sodium chloride flush 0.9 % injection 10 mL  10 mL Intravenous 2 times per day Laqueta Men, APRN - CNP   10 mL at 09/07/19 1133    sodium chloride flush 0.9 % injection 10 mL  10 mL Intravenous PRN Laqueta Men, APRN - CNP        potassium chloride (KLOR-CON M) extended release tablet 40 mEq  40 mEq Oral PRN Laqueta Men, APRN - CNP        Or    potassium bicarb-citric acid (EFFER-K) effervescent tablet 40 mEq  40 mEq Oral PRN Laqueta Men, APRN - CNP        Or    potassium chloride 10 mEq/100 mL IVPB (Peripheral Line)  10 mEq Intravenous PRN Laqueta Men, APRN - CNP        magnesium sulfate 1 g in dextrose 5% 100 mL IVPB  1 g Intravenous PRN Laqueta Men, APRN - CNP   Stopped at 09/06/19 1305    magnesium hydroxide (MILK OF MAGNESIA) 400 MG/5ML suspension 30 mL  30 mL Oral Daily PRN Laqueta Men, APRN - CNP        ondansetron [facility]) injection 4 mg  4 mg Intravenous Q6H PRN Laqueta Men, APRN - CNP        famotidine (PEPCID) tablet 20 mg  20 mg Oral BID Laqueta Men, APRN - CNP   20 mg at 09/07/19 1132    glucose (GLUTOSE) 40 % oral

## 2019-09-07 NOTE — PROGRESS NOTES
[facility]   Progress Note                    Date:   9/7/2019  Patient name:  Govind Cox  Date of admission:  9/4/2019 10:20 PM  MRN:   [id number]  YOB: 1955  PCP:    No primary care provider on file. Reason for Admission:  NSTEMI (non-ST elevated myocardial infarction) ([facility] [I21.4]  NSTEMI (non-ST elevated myocardial infarction) ([facility] [I21.4]  NSTEMI (non-ST elevated myocardial infarction) ([facility] [I21.4]    Subjective:       Clinical Changes / Abnormalities:    Overnight episodes of chest tightness associated with SOB associated with high BP with SBP in 180s. CXR showed worsening pulmonary edema received IV lasix 80 mg, started on nitro infusion. He feels better, remained on BiPaP    I/O last 3 completed shifts: In: 1425.4 [I.V.:1375.4; IV Piggyback:50]  Out: 8930 [Urine:1085]  No intake/output data recorded.       In: 238.4 [I.V.:188.4]  Out: 725 [Urine:725]      Intake/Output Summary (Last 24 hours) at 9/7/2019 1549  Last data filed at 9/7/2019 1300  Gross per 24 hour   Intake 1425.4 ml   Output 1085 ml   Net 340.4 ml         I/O since admission: -1.1 liters    Medications:   Scheduled Meds:   [START ON 9/8/2019] aspirin  81 mg Oral Daily    furosemide  80 mg Intravenous BID    oyster shell calcium/vitamin D  1 tablet Oral Daily    methylPREDNISolone  40 mg Intravenous Q12H    docusate sodium  100 mg Oral Daily    amLODIPine  10 mg Oral Daily    atorvastatin  20 mg Oral Nightly    carvedilol  25 mg Oral BID    lisinopril  20 mg Oral Daily    spironolactone  25 mg Oral Daily    sodium chloride flush  10 mL Intravenous 2 times per day    famotidine  20 mg Oral BID    azithromycin  500 mg Intravenous Q24H    insulin lispro  0-6 Units Subcutaneous Nightly    insulin lispro  0-12 Units Subcutaneous TID     insulin lispro  0-6 Units Subcutaneous Nightly    cefTRIAXone (ROCEPHIN) IV  1 g Intravenous Q24H     Continuous Infusions:   nitroGLYCERIN Stopped (09/06/19 2237)    dextrose      sodium chloride 0 mL/hr at 09/07/19 0700     CBC:   Recent Labs     09/04/19 2245 09/06/19 0533   WBC 11.8* 8.5   HGB 10.7* 9.3*    272     BMP:    Recent Labs     09/04/19 2245 09/05/19  0841 09/06/19 0533 09/07/19  0313     --  137 137   K 4.2  --  4.3 5.1     --  102 99   CO2 25  --  24 25   BUN 16  --  24* 33*   CREATININE 0.95  --  0.91 1.25*   GLUCOSE 162* 273 185* 252*     Hepatic:   Recent Labs     09/06/19 0533 09/07/19  0313   AST 9 12   ALT 7 8   BILITOT 0.17* 0.15*   ALKPHOS 72 75     Troponin: No results for input(s): TROPONINI in the last 72 hours. Recent Labs     09/05/19  0548 09/05/19  1155 09/06/19  1831   TROPONINT NOT REPORTED NOT REPORTED NOT REPORTED     BNP:   Recent Labs     09/04/19 2245 09/06/19  0533   PROBNP 15,878* 6,579*      No results for input(s): BNP in the last 72 hours. Lipids:   Recent Labs     09/06/19 0533   CHOL 88   HDL 34*     INR: No results for input(s): INR in the last 72 hours. Objective:   Vitals: BP (!) 118/50   Pulse 59   Temp 98.1 °F (36.7 °C) (Oral)   Resp 21   Ht 5' 5\" (1.651 m)   Wt 180 lb 4.8 oz (81.8 kg)   SpO2 99%   BMI 30.00 kg/m²    Last Recorded Weight:  [unfilled]  General appearance: awake, alert, in no apparent distress. HEENT: Head: Normocephalic, atraumatic without any obvious abnormalities. Neck: JVD present  Lungs: b/l coarse breath sound at bases  Heart: regular rate and rhythm, S1, S2 normal, no murmur, click, rub or gallop. No pain on palpation of the anterior chest.  Abdomen: soft, nontender with bowel sounds present in all 4 quadrants. Extremities: + LE edema  Integumentum:Intact with no rashes noted.       Diagnostic Studies:     EKG:  - sinus rhythm with lateral T wave inversions    ECHO: 09/04/20119  Dilated left ventricle with severely reduced systolic function.   Severe hypokinesis to akinesis of distal inferior, inferolateral, septal,  anterior, apical

## 2019-09-08 ENCOUNTER — APPOINTMENT (OUTPATIENT)
Dept: GENERAL RADIOLOGY | Age: 64
DRG: 194 | End: 2019-09-08
Payer: COMMERCIAL

## 2019-09-08 LAB
ALBUMIN SERPL-MCNC: 3.2 G/DL (ref 3.5–5.2)
ALBUMIN/GLOBULIN RATIO: 1.5 (ref 1–2.5)
ALP BLD-CCNC: 68 U/L (ref 40–129)
ALT SERPL-CCNC: 7 U/L (ref 5–41)
ANION GAP SERPL CALCULATED.3IONS-SCNC: 8 MMOL/L (ref 9–17)
AST SERPL-CCNC: 7 U/L
BILIRUB SERPL-MCNC: 0.18 MG/DL (ref 0.3–1.2)
BUN BLDV-MCNC: 39 MG/DL (ref 8–23)
BUN/CREAT BLD: ABNORMAL (ref 9–20)
CALCIUM SERPL-MCNC: 8.4 MG/DL (ref 8.6–10.4)
CHLORIDE BLD-SCNC: 98 MMOL/L (ref 98–107)
CO2: 31 MMOL/L (ref 20–31)
CREAT SERPL-MCNC: 1.12 MG/DL (ref 0.7–1.2)
GFR AFRICAN AMERICAN: >60 ML/MIN
GFR NON-AFRICAN AMERICAN: >60 ML/MIN
GFR SERPL CREATININE-BSD FRML MDRD: ABNORMAL ML/MIN/{1.73_M2}
GFR SERPL CREATININE-BSD FRML MDRD: ABNORMAL ML/MIN/{1.73_M2}
GLUCOSE BLD-MCNC: 138 MG/DL (ref 75–110)
GLUCOSE BLD-MCNC: 156 MG/DL (ref 75–110)
GLUCOSE BLD-MCNC: 208 MG/DL (ref 75–110)
GLUCOSE BLD-MCNC: 246 MG/DL (ref 75–110)
GLUCOSE BLD-MCNC: 285 MG/DL (ref 70–99)
PHOSPHORUS: 3.5 MG/DL (ref 2.5–4.5)
POTASSIUM SERPL-SCNC: 4.5 MMOL/L (ref 3.7–5.3)
SODIUM BLD-SCNC: 137 MMOL/L (ref 135–144)
TOTAL PROTEIN: 5.4 G/DL (ref 6.4–8.3)

## 2019-09-08 PROCEDURE — 6360000002 HC RX W HCPCS: Performed by: INTERNAL MEDICINE

## 2019-09-08 PROCEDURE — 6370000000 HC RX 637 (ALT 250 FOR IP): Performed by: NURSE PRACTITIONER

## 2019-09-08 PROCEDURE — 36415 COLL VENOUS BLD VENIPUNCTURE: CPT

## 2019-09-08 PROCEDURE — 84100 ASSAY OF PHOSPHORUS: CPT

## 2019-09-08 PROCEDURE — 99232 SBSQ HOSP IP/OBS MODERATE 35: CPT | Performed by: FAMILY MEDICINE

## 2019-09-08 PROCEDURE — 6370000000 HC RX 637 (ALT 250 FOR IP): Performed by: INTERNAL MEDICINE

## 2019-09-08 PROCEDURE — 6370000000 HC RX 637 (ALT 250 FOR IP): Performed by: STUDENT IN AN ORGANIZED HEALTH CARE EDUCATION/TRAINING PROGRAM

## 2019-09-08 PROCEDURE — 80053 COMPREHEN METABOLIC PANEL: CPT

## 2019-09-08 PROCEDURE — 6360000002 HC RX W HCPCS: Performed by: NURSE PRACTITIONER

## 2019-09-08 PROCEDURE — 2580000003 HC RX 258: Performed by: NURSE PRACTITIONER

## 2019-09-08 PROCEDURE — 82947 ASSAY GLUCOSE BLOOD QUANT: CPT

## 2019-09-08 PROCEDURE — 71045 X-RAY EXAM CHEST 1 VIEW: CPT

## 2019-09-08 PROCEDURE — 2060000000 HC ICU INTERMEDIATE R&B

## 2019-09-08 RX ORDER — ALBUTEROL SULFATE 2.5 MG/3ML
2.5 SOLUTION RESPIRATORY (INHALATION) EVERY 6 HOURS PRN
Status: DISCONTINUED | OUTPATIENT
Start: 2019-09-08 | End: 2019-09-09 | Stop reason: HOSPADM

## 2019-09-08 RX ADMIN — Medication 10 ML: at 22:50

## 2019-09-08 RX ADMIN — FAMOTIDINE 20 MG: 20 TABLET, FILM COATED ORAL at 08:47

## 2019-09-08 RX ADMIN — FUROSEMIDE 80 MG: 10 INJECTION, SOLUTION INTRAMUSCULAR; INTRAVENOUS at 08:47

## 2019-09-08 RX ADMIN — Medication 10 ML: at 08:47

## 2019-09-08 RX ADMIN — CARVEDILOL 25 MG: 12.5 TABLET, FILM COATED ORAL at 08:47

## 2019-09-08 RX ADMIN — INSULIN LISPRO 2 UNITS: 100 INJECTION, SOLUTION INTRAVENOUS; SUBCUTANEOUS at 11:56

## 2019-09-08 RX ADMIN — LISINOPRIL 20 MG: 10 TABLET ORAL at 08:48

## 2019-09-08 RX ADMIN — AMLODIPINE BESYLATE 10 MG: 10 TABLET ORAL at 08:47

## 2019-09-08 RX ADMIN — DOCUSATE SODIUM 100 MG: 100 CAPSULE, LIQUID FILLED ORAL at 08:47

## 2019-09-08 RX ADMIN — ATORVASTATIN CALCIUM 20 MG: 20 TABLET, FILM COATED ORAL at 22:49

## 2019-09-08 RX ADMIN — AZITHROMYCIN MONOHYDRATE 500 MG: 500 INJECTION, POWDER, LYOPHILIZED, FOR SOLUTION INTRAVENOUS at 23:44

## 2019-09-08 RX ADMIN — AZITHROMYCIN MONOHYDRATE 500 MG: 500 INJECTION, POWDER, LYOPHILIZED, FOR SOLUTION INTRAVENOUS at 02:16

## 2019-09-08 RX ADMIN — FUROSEMIDE 80 MG: 10 INJECTION, SOLUTION INTRAMUSCULAR; INTRAVENOUS at 17:59

## 2019-09-08 RX ADMIN — INSULIN LISPRO 4 UNITS: 100 INJECTION, SOLUTION INTRAVENOUS; SUBCUTANEOUS at 17:59

## 2019-09-08 RX ADMIN — CALCIUM CARBONATE-CHOLECALCIFEROL TAB 250 MG-125 UNIT 250 MG: 250-125 TAB at 08:47

## 2019-09-08 RX ADMIN — CEFTRIAXONE SODIUM 1 G: 1 INJECTION, POWDER, FOR SOLUTION INTRAMUSCULAR; INTRAVENOUS at 01:09

## 2019-09-08 RX ADMIN — SPIRONOLACTONE 25 MG: 25 TABLET ORAL at 08:47

## 2019-09-08 RX ADMIN — INSULIN LISPRO 4 UNITS: 100 INJECTION, SOLUTION INTRAVENOUS; SUBCUTANEOUS at 08:48

## 2019-09-08 RX ADMIN — Medication 81 MG: at 08:47

## 2019-09-08 RX ADMIN — FAMOTIDINE 20 MG: 20 TABLET, FILM COATED ORAL at 22:49

## 2019-09-08 ASSESSMENT — ENCOUNTER SYMPTOMS
COUGH: 0
SORE THROAT: 0
CONSTIPATION: 0
CHEST TIGHTNESS: 0
ABDOMINAL PAIN: 0
DIARRHEA: 0
SHORTNESS OF BREATH: 1
VOMITING: 0
NAUSEA: 0

## 2019-09-08 ASSESSMENT — PAIN SCALES - GENERAL: PAINLEVEL_OUTOF10: 0

## 2019-09-08 NOTE — PROGRESS NOTES
Blood sugar elevated, coverage of 6 units given per sliding scale. Patient states that nobody checked his sugar before dinner. Reassurance given that his sugars will be checked as ordered, patient ok with this.

## 2019-09-08 NOTE — PROGRESS NOTES
Dr. Nadira Roa called writer to confirm pt is having stress test tomorrow. Dr. Nadira Roa also advised writer to hold Beta Blockers and Nitro (pt not currently receiving). Pt to be NPO at midnight, will continue to monitor.

## 2019-09-09 ENCOUNTER — APPOINTMENT (OUTPATIENT)
Dept: NUCLEAR MEDICINE | Age: 64
DRG: 194 | End: 2019-09-09
Payer: COMMERCIAL

## 2019-09-09 VITALS
HEART RATE: 61 BPM | RESPIRATION RATE: 17 BRPM | TEMPERATURE: 97.7 F | DIASTOLIC BLOOD PRESSURE: 69 MMHG | WEIGHT: 176.4 LBS | SYSTOLIC BLOOD PRESSURE: 129 MMHG | BODY MASS INDEX: 29.39 KG/M2 | HEIGHT: 65 IN | OXYGEN SATURATION: 96 %

## 2019-09-09 LAB
ALBUMIN SERPL-MCNC: 3.3 G/DL (ref 3.5–5.2)
ALBUMIN/GLOBULIN RATIO: 1.4 (ref 1–2.5)
ALP BLD-CCNC: 72 U/L (ref 40–129)
ALT SERPL-CCNC: 12 U/L (ref 5–41)
ANION GAP SERPL CALCULATED.3IONS-SCNC: 10 MMOL/L (ref 9–17)
AST SERPL-CCNC: 15 U/L
BILIRUB SERPL-MCNC: 0.26 MG/DL (ref 0.3–1.2)
BUN BLDV-MCNC: 33 MG/DL (ref 8–23)
BUN/CREAT BLD: ABNORMAL (ref 9–20)
CALCIUM SERPL-MCNC: 8.6 MG/DL (ref 8.6–10.4)
CHLORIDE BLD-SCNC: 96 MMOL/L (ref 98–107)
CO2: 37 MMOL/L (ref 20–31)
CREAT SERPL-MCNC: 1.04 MG/DL (ref 0.7–1.2)
EKG ATRIAL RATE: 51 BPM
EKG ATRIAL RATE: 92 BPM
EKG P AXIS: 16 DEGREES
EKG P AXIS: 69 DEGREES
EKG P-R INTERVAL: 136 MS
EKG P-R INTERVAL: 140 MS
EKG Q-T INTERVAL: 408 MS
EKG Q-T INTERVAL: 560 MS
EKG QRS DURATION: 96 MS
EKG QRS DURATION: 96 MS
EKG QTC CALCULATION (BAZETT): 504 MS
EKG QTC CALCULATION (BAZETT): 516 MS
EKG R AXIS: 63 DEGREES
EKG R AXIS: 66 DEGREES
EKG T AXIS: 164 DEGREES
EKG T AXIS: 180 DEGREES
EKG VENTRICULAR RATE: 51 BPM
EKG VENTRICULAR RATE: 92 BPM
GFR AFRICAN AMERICAN: >60 ML/MIN
GFR NON-AFRICAN AMERICAN: >60 ML/MIN
GFR SERPL CREATININE-BSD FRML MDRD: ABNORMAL ML/MIN/{1.73_M2}
GFR SERPL CREATININE-BSD FRML MDRD: ABNORMAL ML/MIN/{1.73_M2}
GLUCOSE BLD-MCNC: 190 MG/DL (ref 75–110)
GLUCOSE BLD-MCNC: 206 MG/DL (ref 70–99)
GLUCOSE BLD-MCNC: 290 MG/DL (ref 75–110)
GLUCOSE BLD-MCNC: 295 MG/DL (ref 75–110)
HCT VFR BLD CALC: 35.4 % (ref 40.7–50.3)
HEMOGLOBIN: 11.2 G/DL (ref 13–17)
LV EF: 27 %
LVEF MODALITY: NORMAL
MCH RBC QN AUTO: 30.4 PG (ref 25.2–33.5)
MCHC RBC AUTO-ENTMCNC: 31.6 G/DL (ref 28.4–34.8)
MCV RBC AUTO: 95.9 FL (ref 82.6–102.9)
NRBC AUTOMATED: 0 PER 100 WBC
PDW BLD-RTO: 12.9 % (ref 11.8–14.4)
PHOSPHORUS: 3.5 MG/DL (ref 2.5–4.5)
PLATELET # BLD: 314 K/UL (ref 138–453)
PMV BLD AUTO: 10.1 FL (ref 8.1–13.5)
POTASSIUM SERPL-SCNC: 4 MMOL/L (ref 3.7–5.3)
RBC # BLD: 3.69 M/UL (ref 4.21–5.77)
SODIUM BLD-SCNC: 143 MMOL/L (ref 135–144)
TOTAL PROTEIN: 5.6 G/DL (ref 6.4–8.3)
WBC # BLD: 10 K/UL (ref 3.5–11.3)

## 2019-09-09 PROCEDURE — 2580000003 HC RX 258: Performed by: NURSE PRACTITIONER

## 2019-09-09 PROCEDURE — 6360000002 HC RX W HCPCS: Performed by: NURSE PRACTITIONER

## 2019-09-09 PROCEDURE — 84100 ASSAY OF PHOSPHORUS: CPT

## 2019-09-09 PROCEDURE — 6370000000 HC RX 637 (ALT 250 FOR IP): Performed by: STUDENT IN AN ORGANIZED HEALTH CARE EDUCATION/TRAINING PROGRAM

## 2019-09-09 PROCEDURE — 78452 HT MUSCLE IMAGE SPECT MULT: CPT

## 2019-09-09 PROCEDURE — 3430000000 HC RX DIAGNOSTIC RADIOPHARMACEUTICAL: Performed by: INTERNAL MEDICINE

## 2019-09-09 PROCEDURE — 6370000000 HC RX 637 (ALT 250 FOR IP): Performed by: INTERNAL MEDICINE

## 2019-09-09 PROCEDURE — 85027 COMPLETE CBC AUTOMATED: CPT

## 2019-09-09 PROCEDURE — 80053 COMPREHEN METABOLIC PANEL: CPT

## 2019-09-09 PROCEDURE — 2700000000 HC OXYGEN THERAPY PER DAY

## 2019-09-09 PROCEDURE — 94761 N-INVAS EAR/PLS OXIMETRY MLT: CPT

## 2019-09-09 PROCEDURE — 6360000002 HC RX W HCPCS: Performed by: INTERNAL MEDICINE

## 2019-09-09 PROCEDURE — 6370000000 HC RX 637 (ALT 250 FOR IP): Performed by: NURSE PRACTITIONER

## 2019-09-09 PROCEDURE — 2580000003 HC RX 258: Performed by: INTERNAL MEDICINE

## 2019-09-09 PROCEDURE — 82947 ASSAY GLUCOSE BLOOD QUANT: CPT

## 2019-09-09 PROCEDURE — 36415 COLL VENOUS BLD VENIPUNCTURE: CPT

## 2019-09-09 PROCEDURE — A9500 TC99M SESTAMIBI: HCPCS | Performed by: INTERNAL MEDICINE

## 2019-09-09 PROCEDURE — 93017 CV STRESS TEST TRACING ONLY: CPT

## 2019-09-09 RX ORDER — SODIUM CHLORIDE 0.9 % (FLUSH) 0.9 %
10 SYRINGE (ML) INJECTION PRN
Status: DISCONTINUED | OUTPATIENT
Start: 2019-09-09 | End: 2019-09-09 | Stop reason: HOSPADM

## 2019-09-09 RX ORDER — NITROGLYCERIN 0.4 MG/1
0.4 TABLET SUBLINGUAL EVERY 5 MIN PRN
Status: DISCONTINUED | OUTPATIENT
Start: 2019-09-09 | End: 2019-09-09 | Stop reason: ALTCHOICE

## 2019-09-09 RX ORDER — AMINOPHYLLINE DIHYDRATE 25 MG/ML
50 INJECTION, SOLUTION INTRAVENOUS PRN
Status: DISCONTINUED | OUTPATIENT
Start: 2019-09-09 | End: 2019-09-09 | Stop reason: ALTCHOICE

## 2019-09-09 RX ORDER — SODIUM CHLORIDE 0.9 % (FLUSH) 0.9 %
10 SYRINGE (ML) INJECTION PRN
Status: DISCONTINUED | OUTPATIENT
Start: 2019-09-09 | End: 2019-09-09 | Stop reason: ALTCHOICE

## 2019-09-09 RX ORDER — SODIUM CHLORIDE 9 MG/ML
500 INJECTION, SOLUTION INTRAVENOUS CONTINUOUS PRN
Status: DISCONTINUED | OUTPATIENT
Start: 2019-09-09 | End: 2019-09-09 | Stop reason: ALTCHOICE

## 2019-09-09 RX ORDER — ALBUTEROL SULFATE 90 UG/1
2 AEROSOL, METERED RESPIRATORY (INHALATION) PRN
Status: DISCONTINUED | OUTPATIENT
Start: 2019-09-09 | End: 2019-09-09

## 2019-09-09 RX ORDER — ATROPINE SULFATE 0.1 MG/ML
0.5 INJECTION INTRAVENOUS EVERY 5 MIN PRN
Status: DISCONTINUED | OUTPATIENT
Start: 2019-09-09 | End: 2019-09-09 | Stop reason: ALTCHOICE

## 2019-09-09 RX ORDER — AZITHROMYCIN 500 MG/1
500 TABLET, FILM COATED ORAL DAILY
Qty: 3 TABLET | Refills: 0 | Status: SHIPPED | OUTPATIENT
Start: 2019-09-09 | End: 2019-09-12

## 2019-09-09 RX ORDER — ALBUTEROL SULFATE 90 UG/1
2 AEROSOL, METERED RESPIRATORY (INHALATION) EVERY 6 HOURS PRN
Status: DISCONTINUED | OUTPATIENT
Start: 2019-09-09 | End: 2019-09-09 | Stop reason: HOSPADM

## 2019-09-09 RX ORDER — METOPROLOL TARTRATE 5 MG/5ML
5 INJECTION INTRAVENOUS EVERY 5 MIN PRN
Status: DISCONTINUED | OUTPATIENT
Start: 2019-09-09 | End: 2019-09-09 | Stop reason: ALTCHOICE

## 2019-09-09 RX ADMIN — AMLODIPINE BESYLATE 10 MG: 10 TABLET ORAL at 08:50

## 2019-09-09 RX ADMIN — FUROSEMIDE 80 MG: 10 INJECTION, SOLUTION INTRAMUSCULAR; INTRAVENOUS at 17:23

## 2019-09-09 RX ADMIN — SODIUM CHLORIDE, PRESERVATIVE FREE 10 ML: 5 INJECTION INTRAVENOUS at 10:53

## 2019-09-09 RX ADMIN — CALCIUM CARBONATE-CHOLECALCIFEROL TAB 250 MG-125 UNIT 250 MG: 250-125 TAB at 08:50

## 2019-09-09 RX ADMIN — TETRAKIS(2-METHOXYISOBUTYLISOCYANIDE)COPPER(I) TETRAFLUOROBORATE 16 MILLICURIE: 1 INJECTION, POWDER, LYOPHILIZED, FOR SOLUTION INTRAVENOUS at 07:45

## 2019-09-09 RX ADMIN — CEFTRIAXONE SODIUM 1 G: 1 INJECTION, POWDER, FOR SOLUTION INTRAMUSCULAR; INTRAVENOUS at 01:24

## 2019-09-09 RX ADMIN — Medication 10 ML: at 10:38

## 2019-09-09 RX ADMIN — DOCUSATE SODIUM 100 MG: 100 CAPSULE, LIQUID FILLED ORAL at 08:50

## 2019-09-09 RX ADMIN — FAMOTIDINE 20 MG: 20 TABLET, FILM COATED ORAL at 08:50

## 2019-09-09 RX ADMIN — Medication 10 ML: at 08:52

## 2019-09-09 RX ADMIN — LISINOPRIL 20 MG: 10 TABLET ORAL at 08:50

## 2019-09-09 RX ADMIN — Medication 10 ML: at 10:53

## 2019-09-09 RX ADMIN — SPIRONOLACTONE 25 MG: 25 TABLET ORAL at 08:50

## 2019-09-09 RX ADMIN — REGADENOSON 0.4 MG: 0.08 INJECTION, SOLUTION INTRAVENOUS at 10:53

## 2019-09-09 RX ADMIN — INSULIN LISPRO 6 UNITS: 100 INJECTION, SOLUTION INTRAVENOUS; SUBCUTANEOUS at 17:24

## 2019-09-09 RX ADMIN — TETRAKIS(2-METHOXYISOBUTYLISOCYANIDE)COPPER(I) TETRAFLUOROBORATE 34.9 MILLICURIE: 1 INJECTION, POWDER, LYOPHILIZED, FOR SOLUTION INTRAVENOUS at 10:53

## 2019-09-09 RX ADMIN — Medication 81 MG: at 08:50

## 2019-09-09 RX ADMIN — FUROSEMIDE 80 MG: 10 INJECTION, SOLUTION INTRAMUSCULAR; INTRAVENOUS at 08:51

## 2019-09-09 RX ADMIN — SODIUM CHLORIDE, PRESERVATIVE FREE 10 ML: 5 INJECTION INTRAVENOUS at 07:45

## 2019-09-09 RX ADMIN — INSULIN LISPRO 6 UNITS: 100 INJECTION, SOLUTION INTRAVENOUS; SUBCUTANEOUS at 14:12

## 2019-09-09 ASSESSMENT — ENCOUNTER SYMPTOMS
FACIAL SWELLING: 0
ABDOMINAL DISTENTION: 0
ABDOMINAL PAIN: 0
CONSTIPATION: 0
COUGH: 1
STRIDOR: 0
NAUSEA: 0
WHEEZING: 0
SHORTNESS OF BREATH: 1
DIARRHEA: 0
VOMITING: 0

## 2019-09-09 ASSESSMENT — PAIN SCALES - GENERAL
PAINLEVEL_OUTOF10: 0

## 2019-09-09 NOTE — PROGRESS NOTES
HISTORY:     family history is not on file. HOME MEDICATIONS:      Prior to Admission medications    Medication Sig Start Date End Date Taking? Authorizing Provider   albuterol sulfate HFA (PROAIR HFA) 108 (90 Base) MCG/ACT inhaler inhale 2 puffs by mouth every 6 hours if needed for wheezing 6/7/19   Susie Rivera MD   amLODIPine (NORVASC) 10 MG tablet Take 1 tablet by mouth daily 6/7/19   Susie Rivera MD   aspirin 81 MG tablet Take 1 tablet by mouth daily 6/7/19   Susie Rivera MD   atorvastatin (LIPITOR) 20 MG tablet TAKE 1 TABLET BY MOUTH DAILY IN THE EVENING 6/7/19   Susie Rivera MD   carvedilol (COREG) 25 MG tablet Take 1 tablet by mouth 2 times daily 6/7/19   Susie Rivera MD   furosemide (LASIX) 20 MG tablet Take 1 tablet by mouth daily 6/7/19   Susie Rivera MD   glimepiride (AMARYL) 4 MG tablet TAKE ONE TABLET BY MOUTH EVERY MORNING BEFORE BREAKFAST 6/7/19   Susie Rivera MD   lisinopril (PRINIVIL;ZESTRIL) 20 MG tablet Take 1 tablet by mouth daily 6/7/19   Susie Rivera MD   metFORMIN (GLUCOPHAGE) 500 MG tablet TAKE 1 TABLET BY MOUTH TWICE A DAY WITH MEALS 6/7/19   Susie Rivera MD   spironolactone (ALDACTONE) 25 MG tablet Take 1 tablet by mouth daily 6/7/19   Susie Rivera MD   tiotropium (Jeovany Bogus) 18 MCG inhalation capsule Inhale 1 capsule into the lungs daily 6/7/19   Susie Rivera MD   nicotine (NICODERM CQ) 14 MG/24HR Place 1 patch onto the skin daily 12/25/16   Denise Love MD       ALLERGIES:     Patient has no known allergies.       OBJECTIVE:       Vitals:    09/09/19 0000 09/09/19 0400 09/09/19 0800 09/09/19 0850   BP: 137/65 (!) 149/76 (!) 158/76 (!) 153/72   Pulse: 54 59 56    Resp: 22 24 24    Temp: 97.9 °F (36.6 °C)  97.9 °F (36.6 °C)    TempSrc: Oral  Oral    SpO2: 98% 97% 95%    Weight:       Height:             Intake/Output Summary (Last 24 hours) at 9/9/2019 0856  Last data filed at 9/9/2019 6982  Gross per 24 hour   Intake 150 ml   Output 3900 ml

## 2019-09-09 NOTE — PLAN OF CARE
Problem: Discharge Planning:  Goal: Discharged to appropriate level of care  Description  Discharged to appropriate level of care  9/8/2019 1844 by Juanita Arnett RN  Outcome: Ongoing  9/8/2019 0624 by Glen Dutton RN  Note:    to monitor and discuss with doctors discharge arrangements  9/8/2019 8151 by Glen Dutton RN  Outcome: Ongoing  Goal: Participates in care planning  Description  Participates in care planning  9/8/2019 1844 by Juanita Arnett RN  Outcome: Ongoing  9/8/2019 0624 by Glen Dutton RN  Note:   Patient and family involvement with planning for discharge  9/8/2019 0621 by Glen Dutton RN  Outcome: Ongoing     Problem: Airway Clearance - Ineffective:  Goal: Clear lung sounds  Description  Clear lung sounds  9/8/2019 1844 by Juanita Arnett RN  Outcome: Ongoing  9/8/2019 0624 by Glen Dutton RN  Note:   Monitor lungs and notify doctors if any changes-oxygen as per order   9/8/2019 0621 by Glen Dutton RN  Outcome: Ongoing  Goal: Ability to maintain a clear airway will improve  Description  Ability to maintain a clear airway will improve  9/8/2019 1844 by Juanita Arnett RN  Outcome: Ongoing  9/8/2019 0621 by Glen Dutton RN  Outcome: Ongoing     Problem: Fluid Volume - Deficit:  Goal: Achieves intake and output within specified parameters  Description  Achieves intake and output within specified parameters  9/8/2019 1844 by Juanita Arnett RN  Outcome: Ongoing  9/8/2019 0621 by Glen Dutton RN  Outcome: Ongoing
Problem: Discharge Planning:  Goal: Discharged to appropriate level of care  Description  Discharged to appropriate level of care  Outcome: Ongoing  Goal: Participates in care planning  Description  Participates in care planning  Outcome: Ongoing     Problem: Fluid Volume - Deficit:  Goal: Achieves intake and output within specified parameters  Description  Achieves intake and output within specified parameters  Outcome: Ongoing     Problem: Gas Exchange - Impaired:  Goal: Levels of oxygenation will improve  Description  Levels of oxygenation will improve  Outcome: Ongoing     Problem: Hyperthermia:  Goal: Ability to maintain a body temperature in the normal range will improve  Description  Ability to maintain a body temperature in the normal range will improve  Outcome: Ongoing     Problem: Tobacco Use:  Goal: Will participate in inpatient tobacco-use cessation counseling  Description  Will participate in inpatient tobacco-use cessation counseling  Outcome: Ongoing     Problem: Falls - Risk of:  Goal: Will remain free from falls  Description  Will remain free from falls  Outcome: Ongoing  Goal: Absence of physical injury  Description  Absence of physical injury  Outcome: Ongoing     Problem:  Activity:  Goal: Ability to tolerate increased activity will improve  Description  Ability to tolerate increased activity will improve  Outcome: Ongoing
Falls - Risk of:  Goal: Will remain free from falls  Description  Will remain free from falls  Outcome: Ongoing  Goal: Absence of physical injury  Description  Absence of physical injury  Outcome: Ongoing     Problem:  Activity:  Goal: Ability to tolerate increased activity will improve  Description  Ability to tolerate increased activity will improve  Outcome: Ongoing
?  Course Rhonchi and/or poor aeration   Sputum    ? Small amount of thin secretions ? Moderate amount of viscous secretions ? Copius, Viscious Yellow/ Secretions   CXR as reported by physician ?  clear  ? Unavailable ? Infiltrates and/or consolidation  ? Unavailable ? Mucus Plugging and or lobar consolidation  ? Unavailable   Cough ? Strong, productive cough ? Weak productive cough ?   No cough or weak non-productive cough   PATY FARLEY  10:41 AM                            FEMALE                                  MALE                            FEV1 Predicted Normal Values                        FEV1 Predicted Normal Values          Age                                     Height in Feet and Inches       Age                                     Height in Feet and Inches       4' 11\" 5' 1\" 5' 3\" 5' 5\" 5' 7\" 5' 9\" 5' 11\" 6' 1\"  4' 11\" 5' 1\" 5' 3\" 5' 5\" 5' 7\" 5' 9\" 5' 11\" 6' 1\"   42 - 45 2.49 2.66 2.84 3.03 3.22 3.42 3.62 3.83 42 - 45 2.82 3.03 3.26 3.49 3.72 3.96 4.22 4.47   46 - 49 2.40 2.57 2.76 2.94 3.14 3.33 3.54 3.75 46 - 49 2.70 2.92 3.14 3.37 3.61 3.85 4.10 4.36   50 - 53 2.31 2.48 2.66 2.85 3.04 3.24 3.45 3.66 50 - 53 2.58 2.80 3.02 3.25 3.49 3.73 3.98 4.24   54 - 57 2.21 2.38 2.57 2.75 2.95 3.14 3.35 3.56 54 - 57 2.46 2.67 2.89 3.12 3.36 3.60 3.85 4.11   58 - 61 2.10 2.28 2.46 2.65 2.84 3.04 3.24 3.45 58 - 61 2.32 2.54 2.76 2.99 3.23 3.47 3.72 3.98   62 - 65 1.99 2.17 2.35 2.54 2.73 2.93 3.13 3.34 62 - 65 2.19 2.40 2.62 2.85 3.09 3.33 3.58 3.84   66 - 69 1.88 2.05 2.23 2.42 2.61 2.81 3.02 3.23 66 - 69 2.04 2.26 2.48 2.71 2.95 3.19 3.44 3.70   70+ 1.82 1.99 2.17 2.36 2.55 2.75 2.95 3.16 70+ 1.97 2.19 2.41 2.64 2.87 3.12 3.37 3.62             Predicted Peak Expiratory Flow Rate                                       Height (in)  Female       Height (in) Male           Age 64 62 64 63 57 71 78 74 Age            21 344 357 372 387 402 [phone]  60 62 64 66 68 70 72 74 76   25 337 352 366 [phone]

## 2019-09-09 NOTE — DISCHARGE SUMMARY
Department of [address] [place] [address]    Discharge Summary      NAME:  Oskar Johns  :  1955  MRN:  [id number]    Admit date:  2019  Discharge date:   2019    Admitting Physician:  Darlyn Vega MD    Primary Diagnosis on Admission:   Present on Admission:   [facility]STEMI (non-ST elevated myocardial infarction) ([facility]   Controlled type 2 diabetes mellitus without complication, without long-term current use of insulin (HCC)   Chronic obstructive pulmonary disease ([facility]   Combined systolic and diastolic congestive heart failure ([facility]   Acute on chronic systolic congestive heart failure ([facility]   Community acquired pneumonia      Secondary Diagnoses:  does not have any pertinent problems on file. Admission Condition:  poor     Discharged Condition: good    Hospital Course:     Mr. Joseph Browne is a 68-year-old male with past medical history of CHF, COPD, diabetes mellitus who was treated for NSTEMI type II secondary to CHF exacerbation who presented to the emergency department due to shortness of breath. Shortness of breath on presentation had been going on for several days but it been progressively getting worse. The patient noted that he had ran out of his Lasix and he had not taken any for about a week. In addition, the patient had rhinorrhea, chills, and a nonproductive cough. He denied any chest pain, palpitations, headaches. He did admit to leg swelling. In the emergency department, the patient had an EKG which demonstrated T wave inversion in the lateral leads. In addition troponin was elevated at 211 but trended downward. Due to this, the patient was started on a heparin drip and cardiology was consulted. BNP in the emergency department also was very elevated at 15,878. The patient was started on Lasix 80 mg twice daily as well for CHF exacerbation.   In addition, at the time of admission the patient was on BiPAP and was requiring BiPAP due to

## 2019-09-10 ENCOUNTER — TELEPHONE (OUTPATIENT)
Dept: FAMILY MEDICINE CLINIC | Age: 64
End: 2019-09-10

## 2019-09-10 NOTE — TELEPHONE ENCOUNTER
[facility] Transitions Initial Follow Up Call    Call within 2 business days of discharge: Yes     Patient: Ayad Blake Patient : 1955 MRN: [id number]    [unfilled]    RARS: Readmission Risk Score: 16       Spoke with:  Pt he states he is doing better but is currently out of all his home medications and he told the nurses before he was discharged from [facility] yesterday. Pt states he is taking his antibiotic. [facility] they do not have any medications waiting for . 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 Pt verbalized understanding of this.  Also reminded him of his 19 appointment at 3 pm    Discharge department/facility: [facility] services provided:  Scheduled appointment with PCP- 19 @ 3 pm    Follow Up  Future Appointments   Date Time Provider Nissa Guzman   2019  3:00 PM Ruth Blanca, [address], [place]

## 2019-10-02 ENCOUNTER — HOSPITAL ENCOUNTER (OUTPATIENT)
Dept: OTHER | Age: 64
Discharge: HOME | End: 2019-10-02
Payer: COMMERCIAL

## 2019-10-02 VITALS
DIASTOLIC BLOOD PRESSURE: 58 MMHG | BODY MASS INDEX: 28.69 KG/M2 | RESPIRATION RATE: 18 BRPM | WEIGHT: 172.4 LBS | SYSTOLIC BLOOD PRESSURE: 92 MMHG | OXYGEN SATURATION: 100 % | HEART RATE: 56 BPM

## 2019-10-02 PROCEDURE — 99212 OFFICE O/P EST SF 10 MIN: CPT

## 2019-10-10 ENCOUNTER — TELEPHONE (OUTPATIENT)
Dept: FAMILY MEDICINE CLINIC | Age: 64
End: 2019-10-10

## 2019-10-30 ENCOUNTER — HOSPITAL ENCOUNTER (OUTPATIENT)
Dept: OTHER | Age: 64
Discharge: HOME | End: 2019-10-30
Payer: COMMERCIAL

## 2019-10-30 VITALS
HEART RATE: 58 BPM | WEIGHT: 172.8 LBS | BODY MASS INDEX: 28.76 KG/M2 | SYSTOLIC BLOOD PRESSURE: 103 MMHG | OXYGEN SATURATION: 95 % | DIASTOLIC BLOOD PRESSURE: 48 MMHG | RESPIRATION RATE: 18 BRPM

## 2019-10-30 PROCEDURE — 99212 OFFICE O/P EST SF 10 MIN: CPT

## 2019-11-15 ENCOUNTER — OFFICE VISIT (OUTPATIENT)
Dept: FAMILY MEDICINE CLINIC | Age: 64
End: 2019-11-15
Payer: COMMERCIAL

## 2019-11-15 VITALS
DIASTOLIC BLOOD PRESSURE: 61 MMHG | BODY MASS INDEX: 29.96 KG/M2 | SYSTOLIC BLOOD PRESSURE: 98 MMHG | HEIGHT: 65 IN | HEART RATE: 60 BPM | WEIGHT: 179.8 LBS

## 2019-11-15 DIAGNOSIS — I50.42 CHRONIC COMBINED SYSTOLIC AND DIASTOLIC CONGESTIVE HEART FAILURE (HCC): Primary | ICD-10-CM

## 2019-11-15 DIAGNOSIS — J44.9 CHRONIC OBSTRUCTIVE PULMONARY DISEASE, UNSPECIFIED COPD TYPE (HCC): ICD-10-CM

## 2019-11-15 DIAGNOSIS — Z23 NEED FOR PNEUMOCOCCAL VACCINATION: ICD-10-CM

## 2019-11-15 DIAGNOSIS — Z76.0 MEDICATION REFILL: ICD-10-CM

## 2019-11-15 PROCEDURE — 99213 OFFICE O/P EST LOW 20 MIN: CPT | Performed by: STUDENT IN AN ORGANIZED HEALTH CARE EDUCATION/TRAINING PROGRAM

## 2019-11-15 PROCEDURE — 90732 PPSV23 VACC 2 YRS+ SUBQ/IM: CPT | Performed by: FAMILY MEDICINE

## 2019-11-15 RX ORDER — FUROSEMIDE 20 MG/1
40 TABLET ORAL DAILY
Qty: 30 TABLET | Refills: 5 | Status: SHIPPED | OUTPATIENT
Start: 2019-11-15 | End: 2020-01-09 | Stop reason: SDUPTHER

## 2019-11-15 ASSESSMENT — ENCOUNTER SYMPTOMS
NAUSEA: 0
CHEST TIGHTNESS: 0
SHORTNESS OF BREATH: 1
VOMITING: 0
CONSTIPATION: 0
ABDOMINAL PAIN: 0
COUGH: 0
SORE THROAT: 0
DIARRHEA: 0

## 2020-01-09 RX ORDER — AMLODIPINE BESYLATE 10 MG/1
10 TABLET ORAL DAILY
Qty: 30 TABLET | Refills: 5 | Status: SHIPPED | OUTPATIENT
Start: 2020-01-09 | End: 2020-09-04

## 2020-01-09 RX ORDER — SPIRONOLACTONE 25 MG/1
25 TABLET ORAL DAILY
Qty: 30 TABLET | Refills: 5 | Status: SHIPPED | OUTPATIENT
Start: 2020-01-09 | End: 2020-09-04

## 2020-01-09 RX ORDER — ATORVASTATIN CALCIUM 20 MG/1
TABLET, FILM COATED ORAL
Qty: 30 TABLET | Refills: 5 | Status: SHIPPED | OUTPATIENT
Start: 2020-01-09 | End: 2020-09-04

## 2020-01-09 RX ORDER — ALBUTEROL SULFATE 90 UG/1
AEROSOL, METERED RESPIRATORY (INHALATION)
Qty: 8.5 INHALER | Refills: 5 | Status: SHIPPED | OUTPATIENT
Start: 2020-01-09 | End: 2020-09-02 | Stop reason: SDUPTHER

## 2020-01-09 RX ORDER — FUROSEMIDE 20 MG/1
40 TABLET ORAL DAILY
Qty: 30 TABLET | Refills: 5 | Status: SHIPPED | OUTPATIENT
Start: 2020-01-09 | End: 2020-10-30

## 2020-01-09 RX ORDER — CARVEDILOL 25 MG/1
25 TABLET ORAL 2 TIMES DAILY
Qty: 60 TABLET | Refills: 5 | Status: SHIPPED | OUTPATIENT
Start: 2020-01-09 | End: 2020-09-04

## 2020-01-09 RX ORDER — LISINOPRIL 20 MG/1
20 TABLET ORAL DAILY
Qty: 30 TABLET | Refills: 5 | Status: SHIPPED | OUTPATIENT
Start: 2020-01-09 | End: 2020-09-04

## 2020-06-10 ENCOUNTER — TELEPHONE (OUTPATIENT)
Dept: FAMILY MEDICINE CLINIC | Age: 65
End: 2020-06-10

## 2020-06-10 NOTE — TELEPHONE ENCOUNTER
PA request for Spiriva     PA processed and submitted to pt insurance, waiting for response in regards to coverage

## 2020-06-30 ENCOUNTER — TELEPHONE (OUTPATIENT)
Dept: FAMILY MEDICINE CLINIC | Age: 65
End: 2020-06-30

## 2020-07-29 ENCOUNTER — TELEPHONE (OUTPATIENT)
Dept: FAMILY MEDICINE CLINIC | Age: 65
End: 2020-07-29

## 2020-09-01 NOTE — TELEPHONE ENCOUNTER
Pt called to have all medication that need to be refilled and plus inhalers, pt did not know the name or mg
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
## 2020-09-02 RX ORDER — ALBUTEROL SULFATE 90 UG/1
AEROSOL, METERED RESPIRATORY (INHALATION)
Qty: 8.5 INHALER | Refills: 5 | Status: SHIPPED | OUTPATIENT
Start: 2020-09-02 | End: 2021-08-16 | Stop reason: SDUPTHER

## 2020-09-04 RX ORDER — LISINOPRIL 20 MG/1
20 TABLET ORAL DAILY
Qty: 30 TABLET | Refills: 5 | Status: SHIPPED | OUTPATIENT
Start: 2020-09-04 | End: 2021-01-27

## 2020-09-04 RX ORDER — CARVEDILOL 25 MG/1
25 TABLET ORAL 2 TIMES DAILY
Qty: 60 TABLET | Refills: 5 | Status: SHIPPED | OUTPATIENT
Start: 2020-09-04 | End: 2021-01-27

## 2020-09-04 RX ORDER — SPIRONOLACTONE 25 MG/1
25 TABLET ORAL DAILY
Qty: 30 TABLET | Refills: 5 | Status: SHIPPED | OUTPATIENT
Start: 2020-09-04 | End: 2020-09-11

## 2020-09-04 RX ORDER — AMLODIPINE BESYLATE 10 MG/1
10 TABLET ORAL DAILY
Qty: 30 TABLET | Refills: 5 | Status: SHIPPED | OUTPATIENT
Start: 2020-09-04 | End: 2021-01-27

## 2020-09-04 RX ORDER — ATORVASTATIN CALCIUM 20 MG/1
TABLET, FILM COATED ORAL
Qty: 30 TABLET | Refills: 5 | Status: SHIPPED | OUTPATIENT
Start: 2020-09-04 | End: 2021-01-27
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
## 2020-09-11 ENCOUNTER — OFFICE VISIT (OUTPATIENT)
Dept: FAMILY MEDICINE CLINIC | Age: 65
End: 2020-09-11
Payer: MEDICARE

## 2020-09-11 VITALS
DIASTOLIC BLOOD PRESSURE: 55 MMHG | SYSTOLIC BLOOD PRESSURE: 96 MMHG | HEART RATE: 53 BPM | HEIGHT: 65 IN | TEMPERATURE: 97.3 F | WEIGHT: 179 LBS | BODY MASS INDEX: 29.82 KG/M2

## 2020-09-11 PROCEDURE — 83036 HEMOGLOBIN GLYCOSYLATED A1C: CPT | Performed by: STUDENT IN AN ORGANIZED HEALTH CARE EDUCATION/TRAINING PROGRAM

## 2020-09-11 PROCEDURE — 99213 OFFICE O/P EST LOW 20 MIN: CPT | Performed by: STUDENT IN AN ORGANIZED HEALTH CARE EDUCATION/TRAINING PROGRAM

## 2020-09-11 ASSESSMENT — ENCOUNTER SYMPTOMS
DIARRHEA: 0
SHORTNESS OF BREATH: 0
VOMITING: 0
NAUSEA: 0
ABDOMINAL PAIN: 0
CONSTIPATION: 0
CHEST TIGHTNESS: 0
SORE THROAT: 0
COUGH: 0

## 2020-09-11 NOTE — PROGRESS NOTES
Visit Information    Have you changed or started any medications since your last visit including any over-the-counter medicines, vitamins, or herbal medicines? no   Have you stopped taking any of your medications? Is so, why? -  no  Are you having any side effects from any of your medications? - no    Have you seen any other physician or provider since your last visit?  no   Have you had any other diagnostic tests since your last visit?  no   Have you been seen in the emergency room and/or had an admission in a hospital since we last saw you?  no   Have you had your routine dental cleaning in the past 6 months?  no     Do you have an active MyChart account? If no, what is the barrier?   Yes    Patient Care Team:  Cande Cruz MD as PCP - General (Family Medicine)    Medical History Review  Past Medical, Family, and Social History reviewed and does not contribute to the patient presenting condition    Health Maintenance   Topic Date Due    Shingles Vaccine (1 of 2) 02/15/2003    Diabetic retinal exam  03/01/2016    Diabetic foot exam  06/16/2017    Colon Cancer Screen FIT/FOBT  08/03/2019    Flu vaccine (1) 09/01/2020    A1C test (Diabetic or Prediabetic)  09/05/2020    Potassium monitoring  09/09/2020    Creatinine monitoring  09/09/2020    Lipid screen  09/06/2020    DTaP/Tdap/Td vaccine (2 - Td) 08/02/2028    Pneumococcal 65+ years Vaccine  Completed    AAA screen  Completed    Hepatitis C screen  Completed    Hepatitis A vaccine  Aged Out    Hib vaccine  Aged Out    Meningococcal (ACWY) vaccine  Aged Out

## 2020-09-11 NOTE — PROGRESS NOTES
Attending Physician Statement  I have discussed the care of RobertCarderincluding pertinent history and exam findings,  with the resident. I have reviewed the key elements of all parts of the encounter with the resident. I agree with the assessment, plan and orders as documented by the resident.   (GE Modifier)    DM- well controlled- Diet control  Hypotensive- DC Aldactone   orderd

## 2020-09-11 NOTE — PROGRESS NOTES
Subjective:    Roberto Martinez is a 77 y.o. male with  has a past medical history of COPD (chronic obstructive pulmonary disease) ([facility] Diabetes mellitus ([facility] Erectile dysfunction due to arterial insufficiency, Hypertension, Microalbuminuria due to type 2 diabetes mellitus ([facility] [address] and Overweight (BMI 25.0-29.9). No family history on file. Presented tothe office today for:  Chief Complaint   Patient presents with    Medication Refill    Check-Up       HPI     Patient presents today for follow-up of diabetes and hypertension. Patient states that he has no complaints at this time however occasionally feels dizzy after taking blood pressure medication. Patient does not have any history of falls or has fallen in the past.  Patient also states to have a diagnosis of COPD however has not followed up with a lung doctor in over 2 years. Patient states to be compliant with all medications and inhalers as prescribed. Review of Systems   Constitutional: Negative for chills, fatigue, fever and unexpected weight change. HENT: Negative for congestion, mouth sores and sore throat. Eyes: Negative for visual disturbance. Respiratory: Negative for cough, chest tightness and shortness of breath. Cardiovascular: Negative for chest pain and leg swelling. Gastrointestinal: Negative for abdominal pain, constipation, diarrhea, nausea and vomiting. Genitourinary: Negative for difficulty urinating. Musculoskeletal: Negative for joint swelling. Skin: Negative for rash. Neurological: Negative for dizziness, weakness and headaches. Objective:    BP (!) 96/55   Pulse 53   Temp 97.3 °F (36.3 °C) (Temporal)   Ht 5' 5\" (1.651 m)   Wt 179 lb (81.2 kg)   BMI 29.79 kg/m²    BP Readings from Last 3 Encounters:   09/11/20 (!) 96/55   11/15/19 98/61   10/30/19 (!) 103/48     Physical Exam  Vitals signs and nursing note reviewed. Constitutional:       Appearance: He is well-developed.    Cardiovascular: inhalers as prescribed  - Loree Batista MD, Pulmonology, [facility]    3. Benign essential HTN  -Will discontinue Aldactone from patient's medication list  -Continue with all other medications as prescribed    4. At high risk for proliferative diabetic retinopathy  - Shakila Grace MD, Ophthalmology, [facility]    5. Need for prophylactic vaccination and inoculation against varicella  - zoster recombinant adjuvanted vaccine [facility]) 50 MCG/0.5ML SUSR injection; Inject 0.5 mLs into the muscle once for 1 dose 50 MCG IM then repeat 2-6 months. Dispense: 1 each; Refill: 1    6. Colon cancer screening  - Cologuard (For External Results Only); Future    8. Toenail deformity  - Carley Rodas DPM, Podiatry, [employer] Prescriptions     Signed Prescriptions Disp Refills    zoster recombinant adjuvanted vaccine [facility]) 50 MCG/0.5ML SUSR injection 1 each 1     Sig: Inject 0.5 mLs into the muscle once for 1 dose 50 MCG IM then repeat 2-6 months. Medications Discontinued During This Encounter   Medication Reason    spironolactone (ALDACTONE) 25 MG tablet LIST CLEANUP       Return in about 3 months (around 12/11/2020) for f/u in one week for nurse visit for BP check after removing aldactone. Loulou Antony received counseling on the following healthy behaviors: nutrition and exercise  Reviewed prior labs and health maintenance  Continue current medications, diet and exercise. Discussed use, benefit, and side effects of prescribed medications. Barriers to medication compliance addressed. Patient given educational materials - see patient instructions  Was a self-tracking handout given in paper form or via [employer]? Yes    Requested Prescriptions     Signed Prescriptions Disp Refills    zoster recombinant adjuvanted vaccine (SHINGRIX) 50 MCG/0.5ML SUSR injection 1 each 1     Sig: Inject 0.5 mLs into the muscle once for 1 dose 50 MCG IM then repeat 2-6 months.        All patient questions answered. Patient voiced understanding. Quality Measures    Body mass index is 29.79 kg/m². No Treatment plan indicated. Weight control planned discussed Healthy diet and regular exercise. BP: (!) 96/55. Blood pressure is low. Treatment plan consists of Aldactone removed from patient medication list.    Fall Risk 8/2/2018   2 or more falls in past year? yes   Fall with injury in past year? no     The patient does not have a history of falls. I did not - not indicated , complete a risk assessment for falls.  A plan of care for falls No Treatment plan indicated    Lab Results   Component Value Date    LDLCALC 24 07/01/2016    LDLCHOLESTEROL 38 09/06/2019    (goal LDL reduction with dx if diabetes is 50% LDL reduction)    PHQ Scores 6/7/2019 10/6/2017 12/7/2015   PHQ2 Score 0 3 0   PHQ9 Score 0 6 0     Interpretation of Total Score Depression Severity: 1-4 = Minimal depression, 5-9 = Mild depression, 10-14 = Moderate depression, 15-19 = Moderately severe depression, 20-27 = Severe depression

## 2020-09-15 LAB — HBA1C MFR BLD: 6.6 %
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
## 2020-10-30 RX ORDER — FUROSEMIDE 20 MG/1
40 TABLET ORAL DAILY
Qty: 30 TABLET | Refills: 5 | Status: SHIPPED | OUTPATIENT
Start: 2020-10-30 | End: 2021-08-26 | Stop reason: SDUPTHER

## 2021-01-27 DIAGNOSIS — J44.9 CHRONIC OBSTRUCTIVE PULMONARY DISEASE, UNSPECIFIED COPD TYPE (HCC): ICD-10-CM

## 2021-01-27 DIAGNOSIS — Z76.0 MEDICATION REFILL: ICD-10-CM

## 2021-01-27 RX ORDER — CARVEDILOL 25 MG/1
25 TABLET ORAL 2 TIMES DAILY
Qty: 60 TABLET | Refills: 0 | Status: SHIPPED | OUTPATIENT
Start: 2021-01-27 | End: 2021-03-29

## 2021-01-27 RX ORDER — ASPIRIN 81 MG/1
TABLET, DELAYED RELEASE ORAL
Qty: 30 TABLET | Refills: 3 | Status: SHIPPED | OUTPATIENT
Start: 2021-01-27 | End: 2021-08-26 | Stop reason: SDUPTHER

## 2021-01-27 RX ORDER — ATORVASTATIN CALCIUM 20 MG/1
TABLET, FILM COATED ORAL
Qty: 30 TABLET | Refills: 0 | Status: SHIPPED | OUTPATIENT
Start: 2021-01-27 | End: 2021-03-29

## 2021-01-27 RX ORDER — LISINOPRIL 20 MG/1
20 TABLET ORAL DAILY
Qty: 30 TABLET | Refills: 0 | Status: SHIPPED | OUTPATIENT
Start: 2021-01-27 | End: 2021-03-29

## 2021-01-27 RX ORDER — AMLODIPINE BESYLATE 10 MG/1
10 TABLET ORAL DAILY
Qty: 30 TABLET | Refills: 0 | Status: SHIPPED | OUTPATIENT
Start: 2021-01-27 | End: 2021-03-29
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
## 2021-01-28 ENCOUNTER — TELEPHONE (OUTPATIENT)
Dept: FAMILY MEDICINE CLINIC | Age: 66
End: 2021-01-28

## 2021-01-30 NOTE — TELEPHONE ENCOUNTER
Approved. This drug has been approved under the Member's Medicare Part D benefit. Approved quantity: 60 inhalations per 30 day(s). You may fill up to a 90 day supply except for those on Specialty Tier 5, which can be filled up to a 30 day supply. Please call the pharmacy to process the prescription claim.

## 2021-03-29 DIAGNOSIS — Z76.0 MEDICATION REFILL: ICD-10-CM

## 2021-03-29 RX ORDER — AMLODIPINE BESYLATE 10 MG/1
10 TABLET ORAL DAILY
Qty: 30 TABLET | Refills: 0 | Status: SHIPPED | OUTPATIENT
Start: 2021-03-29 | End: 2021-04-28

## 2021-03-29 RX ORDER — LISINOPRIL 20 MG/1
20 TABLET ORAL DAILY
Qty: 30 TABLET | Refills: 0 | Status: SHIPPED | OUTPATIENT
Start: 2021-03-29 | End: 2021-04-28

## 2021-03-29 RX ORDER — CARVEDILOL 25 MG/1
25 TABLET ORAL 2 TIMES DAILY
Qty: 60 TABLET | Refills: 0 | Status: SHIPPED | OUTPATIENT
Start: 2021-03-29 | End: 2021-04-28

## 2021-03-29 RX ORDER — ATORVASTATIN CALCIUM 20 MG/1
TABLET, FILM COATED ORAL
Qty: 30 TABLET | Refills: 0 | Status: SHIPPED | OUTPATIENT
Start: 2021-03-29 | End: 2021-04-28
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
## 2021-07-21 DIAGNOSIS — Z76.0 MEDICATION REFILL: ICD-10-CM

## 2021-07-21 RX ORDER — ATORVASTATIN CALCIUM 20 MG/1
20 TABLET, FILM COATED ORAL DAILY
Qty: 30 TABLET | Refills: 5 | Status: SHIPPED | OUTPATIENT
Start: 2021-07-21 | End: 2022-02-01 | Stop reason: SDUPTHER

## 2021-07-21 RX ORDER — LISINOPRIL 20 MG/1
20 TABLET ORAL DAILY
Qty: 30 TABLET | Refills: 5 | Status: ON HOLD | OUTPATIENT
Start: 2021-07-21 | End: 2022-01-26 | Stop reason: HOSPADM
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
## 2021-08-16 ENCOUNTER — OFFICE VISIT (OUTPATIENT)
Dept: FAMILY MEDICINE CLINIC | Age: 66
End: 2021-08-16
Payer: MEDICARE

## 2021-08-16 VITALS
WEIGHT: 168 LBS | SYSTOLIC BLOOD PRESSURE: 124 MMHG | HEART RATE: 61 BPM | DIASTOLIC BLOOD PRESSURE: 59 MMHG | BODY MASS INDEX: 27.96 KG/M2

## 2021-08-16 DIAGNOSIS — Z76.0 MEDICATION REFILL: ICD-10-CM

## 2021-08-16 DIAGNOSIS — E11.9 CONTROLLED TYPE 2 DIABETES MELLITUS WITHOUT COMPLICATION, WITHOUT LONG-TERM CURRENT USE OF INSULIN (HCC): Primary | ICD-10-CM

## 2021-08-16 DIAGNOSIS — J44.9 CHRONIC OBSTRUCTIVE PULMONARY DISEASE, UNSPECIFIED COPD TYPE (HCC): ICD-10-CM

## 2021-08-16 LAB — HBA1C MFR BLD: 5.9 %

## 2021-08-16 PROCEDURE — 99213 OFFICE O/P EST LOW 20 MIN: CPT | Performed by: STUDENT IN AN ORGANIZED HEALTH CARE EDUCATION/TRAINING PROGRAM

## 2021-08-16 PROCEDURE — 83036 HEMOGLOBIN GLYCOSYLATED A1C: CPT | Performed by: STUDENT IN AN ORGANIZED HEALTH CARE EDUCATION/TRAINING PROGRAM

## 2021-08-16 RX ORDER — ALBUTEROL SULFATE 90 UG/1
AEROSOL, METERED RESPIRATORY (INHALATION)
Qty: 8.5 INHALER | Refills: 5 | Status: SHIPPED | OUTPATIENT
Start: 2021-08-16 | End: 2022-07-12 | Stop reason: SDUPTHER

## 2021-08-16 RX ORDER — CARVEDILOL 25 MG/1
25 TABLET ORAL 2 TIMES DAILY
Qty: 60 TABLET | Refills: 5 | Status: SHIPPED | OUTPATIENT
Start: 2021-08-16 | End: 2022-03-09

## 2021-08-16 RX ORDER — CARVEDILOL 25 MG/1
12.5 TABLET ORAL 2 TIMES DAILY
Qty: 60 TABLET | Refills: 5 | Status: SHIPPED | OUTPATIENT
Start: 2021-08-16 | End: 2021-08-16

## 2021-08-16 SDOH — ECONOMIC STABILITY: FOOD INSECURITY: WITHIN THE PAST 12 MONTHS, YOU WORRIED THAT YOUR FOOD WOULD RUN OUT BEFORE YOU GOT MONEY TO BUY MORE.: NEVER TRUE

## 2021-08-16 SDOH — ECONOMIC STABILITY: FOOD INSECURITY: WITHIN THE PAST 12 MONTHS, THE FOOD YOU BOUGHT JUST DIDN'T LAST AND YOU DIDN'T HAVE MONEY TO GET MORE.: NEVER TRUE

## 2021-08-16 ASSESSMENT — ENCOUNTER SYMPTOMS
VOMITING: 0
DIARRHEA: 0
BLOOD IN STOOL: 0
COLOR CHANGE: 0
NAUSEA: 0
WHEEZING: 0
SINUS PAIN: 0
ABDOMINAL PAIN: 0
SINUS PRESSURE: 0
COUGH: 0
BACK PAIN: 0
SHORTNESS OF BREATH: 1
CONSTIPATION: 0

## 2021-08-16 ASSESSMENT — PATIENT HEALTH QUESTIONNAIRE - PHQ9
SUM OF ALL RESPONSES TO PHQ QUESTIONS 1-9: 0
2. FEELING DOWN, DEPRESSED OR HOPELESS: 0
SUM OF ALL RESPONSES TO PHQ QUESTIONS 1-9: 0
SUM OF ALL RESPONSES TO PHQ9 QUESTIONS 1 & 2: 0
1. LITTLE INTEREST OR PLEASURE IN DOING THINGS: 0
SUM OF ALL RESPONSES TO PHQ QUESTIONS 1-9: 0

## 2021-08-16 ASSESSMENT — SOCIAL DETERMINANTS OF HEALTH (SDOH): HOW HARD IS IT FOR YOU TO PAY FOR THE VERY BASICS LIKE FOOD, HOUSING, MEDICAL CARE, AND HEATING?: NOT VERY HARD

## 2021-08-16 NOTE — PROGRESS NOTES
Attending Physician Statement  I  have discussed the care of Seldon Oppenheim including pertinent history and exam findings with the resident. I agree with the assessment, plan and orders as documented by the resident. BP (!) 124/59   Pulse 61   Wt 168 lb (76.2 kg)   BMI 27.96 kg/m²    BP Readings from Last 3 Encounters:   08/16/21 (!) 124/59   09/11/20 (!) 96/55   11/15/19 98/61     Wt Readings from Last 3 Encounters:   08/16/21 168 lb (76.2 kg)   09/11/20 179 lb (81.2 kg)   11/15/19 179 lb 12.8 oz (81.6 kg)          Diagnosis Orders   1. Controlled type 2 diabetes mellitus without complication, without long-term current use of insulin (HCC)  POCT glycosylated hemoglobin (Hb A1C)    Basic Metabolic Panel   2. Medication refill  albuterol sulfate HFA (PROAIR HFA) 108 (90 Base) MCG/ACT inhaler    carvedilol (COREG) 25 MG tablet    DISCONTINUED: carvedilol (COREG) 25 MG tablet   3. Chronic obstructive pulmonary disease, unspecified COPD type (HCC)  albuterol sulfate HFA (PROAIR HFA) 108 (90 Base) MCG/ACT inhaler    fluticasone-salmeterol (ADVAIR DISKUS) 100-50 MCG/DOSE diskus inhaler         See orders. RTO as noted, discussed care plan with patient and Resident Physician. Questions answered. Call results - if indicated to patient.     Jodie Garcia DO 8/16/2021 9:41 AM

## 2021-08-16 NOTE — PROGRESS NOTES
Diabetic visit information    BP Readings from Last 3 Encounters:   09/11/20 (!) 96/55   11/15/19 98/61   10/30/19 (!) 103/48       Hemoglobin A1C (%)   Date Value   09/15/2020 6.6   09/05/2019 6.0   06/07/2019 7.0     Microalb/Crt. Ratio (mcg/mg creat)   Date Value   04/22/2013 146     LDL Cholesterol (mg/dL)   Date Value   09/06/2019 38     LDL Calculated (mg/dL)   Date Value   07/01/2016 24               Have you changed or started any medications since your last visit including any over-the-counter medicines, vitamins, or herbal medicines? no   Have you stopped taking any of your medications? Is so, why? -  no  Are you having any side effects from any of your medications? - no    Have you seen any other physician or provider since your last visit?  no   Have you had any other diagnostic tests since your last visit?  no   Have you been seen in the emergency room and/or had an admission in a hospital since we last saw you?  no     Have you had your annual diabetic retinal (eye) exam? No   (ensure copy of exam is in the chart)    Have you had your routine dental cleaning in the past 6 months? no    Do you have an active MyChart account? If not, what are your barriers? Yes    Patient Care Team:  Huy Sanchez MD as PCP - General (Family Medicine)    Medical history Review  Past Medical, Family, and Social History reviewed and does not contribute to the patient presenting condition.     Health Maintenance   Topic Date Due    COVID-19 Vaccine (1) Never done    Shingles Vaccine (1 of 2) Never done    Diabetic retinal exam  03/01/2016    Colon Cancer Screen FIT/FOBT  08/03/2019    Lipid screen  09/06/2020    Potassium monitoring  09/09/2020    Creatinine monitoring  09/09/2020    Annual Wellness Visit (AWV)  Never done    Flu vaccine (1) 09/01/2021    Diabetic foot exam  09/11/2021    A1C test (Diabetic or Prediabetic)  09/15/2021    DTaP/Tdap/Td vaccine (2 - Td or Tdap) 08/02/2028    Pneumococcal 65+ years Vaccine  Completed    AAA screen  Completed    Hepatitis C screen  Completed    Hepatitis A vaccine  Aged Out    Hib vaccine  Aged Out    Meningococcal (ACWY) vaccine  Aged Out

## 2021-08-16 NOTE — PROGRESS NOTES
Subjective:    Bear Bullock is a 74 y.o. male with  has a past medical history of COPD (chronic obstructive pulmonary disease) ([address] Diabetes mellitus ([address] Erectile dysfunction due to arterial insufficiency, Hypertension, Microalbuminuria due to type 2 diabetes mellitus ([address] and Overweight (BMI 25.0-29.9). Presented to the office today for:  Chief Complaint   Patient presents with    Diabetes    Hypertension       HPI  This is a 56-year-old male with a history of COPD, congestive heart failure and hypertension came in for regular follow-up. Cc: Essential hypertension  Patient is taking amlodipine, Coreg, Lasix, lisinopril per chart reviewing. Patient endorses taking only lisinopril at this moment. Patient does not remember any other medications, patient was advised to call us and let us know what ever medication he have at home and then will adjust it, as previously patient endorses feeling dizzy after taking blood pressure medications. Cc: CHF  Patient is endorsing shortness of breath but no cough, patient said that he can only walk for a block or 2, mostly stays at home, no recent falls. Patient denies any chest pain, racing of heart, abdominal pain, blurry vision at this moment. Previous echo done in 2019 showed ejection fraction of 20%, patient did not follow-up with cardiologist as he mentioned he cannot afford the co-pay. The cardiologist note mention the patient should be on Aldactone, not the pain, beta-blocker, ACE and Lasix. Cc: COPD  Patient is endorsing smoking half a pack to a pack for almost 25 years. Patient at this moment is only endorsing shortness of breath, no cough, no phlegm, no hospital admission for COPD. Patient is on ProAir and Advair, patient is also on Spiriva but states that the medication is not helping. Patient was counseled to bring in the inhaler or go to the pharmacy if it is not working.   Patient at this time did not agree with this plan.    Review of Systems   Constitutional: Negative for chills and fever. HENT: Negative for congestion, sinus pressure and sinus pain. Respiratory: Positive for shortness of breath. Negative for cough and wheezing. Cardiovascular: Negative for chest pain, palpitations and leg swelling. Gastrointestinal: Negative for abdominal pain, blood in stool, constipation, diarrhea, nausea and vomiting. Genitourinary: Negative for difficulty urinating, flank pain and hematuria. Musculoskeletal: Negative for arthralgias, back pain and myalgias. Skin: Negative for color change and wound. Neurological: Negative for dizziness, light-headedness and headaches. Psychiatric/Behavioral: Negative for agitation and confusion. The patient has a No family history on file. Objective:    BP (!) 124/59   Pulse 61   Wt 168 lb (76.2 kg)   BMI 27.96 kg/m²    BP Readings from Last 3 Encounters:   08/16/21 (!) 124/59   09/11/20 (!) 96/55   11/15/19 98/61       Physical Exam  Vitals and nursing note reviewed. Constitutional:       Appearance: Normal appearance. HENT:      Head: Normocephalic and atraumatic. Mouth/Throat:      Mouth: Mucous membranes are moist.   Eyes:      Conjunctiva/sclera: Conjunctivae normal.   Cardiovascular:      Rate and Rhythm: Normal rate and regular rhythm. Pulmonary:      Effort: Pulmonary effort is normal.      Breath sounds: Normal breath sounds. Abdominal:      General: Bowel sounds are normal. There is no distension. Palpations: Abdomen is soft. There is no mass. Tenderness: There is no abdominal tenderness. There is no guarding. Musculoskeletal:      Right lower leg: Edema present. Left lower leg: Edema present. Neurological:      General: No focal deficit present. Mental Status: He is alert and oriented to person, place, and time.    Psychiatric:         Mood and Affect: Mood normal.         Behavior: Behavior normal.         Lab Results   Component Value Date    WBC 10.0 09/09/2019    HGB 11.2 (L) 09/09/2019    HCT 35.4 (L) 09/09/2019     09/09/2019    CHOL 88 09/06/2019    TRIG 80 09/06/2019    HDL 34 (L) 09/06/2019    ALT 12 09/09/2019    AST 15 09/09/2019     09/09/2019    K 4.0 09/09/2019    CL 96 (L) 09/09/2019    CREATININE 1.04 09/09/2019    BUN 33 (H) 09/09/2019    CO2 37 (H) 09/09/2019    TSH 7.59 (H) 07/01/2017    PSA 0.77 06/21/2012    LABA1C 5.9 08/16/2021    LABMICR 146 04/22/2013     Lab Results   Component Value Date    CALCIUM 8.6 09/09/2019    PHOS 3.5 09/09/2019     Lab Results   Component Value Date    LDLCALC 24 07/01/2016    LDLCHOLESTEROL 38 09/06/2019       Assessment and Plan:      1. Congestive heart failure with hypertension:  - BP: 124/59  - Coreg and lisinopril is only medication known to the patient. Patient said that he is compliant with the medication. Patient does not have a blood pressure machine at home. - Cardiologist note patient should be on Aldactone, lisinopril, Lasix, and Coreg and amlodipine. Aldactone was discontinued in the last visit due to concerns of dizziness with blood pressure medication. At this time patient was counseled to call us back or schedule a virtual visit and let us know what medication he is taking at home, patient agrees and mentioned that he will call us back. If the blood pressure continues to be on the lower end will probably decrease dosage of blood pressure medications with regular follow-up. 2. Controlled type 2 diabetes mellitus without complication, without long-term current use of insulin ([facility])  -HbA1c: 5.9, will discontinue Metformin and Amaryl due to concerns of hypoglycemia. Patient also endorses that he is not taking Metformin since last visit, Amaryl is unknown to the patient. - Basic Metabolic Panel; Future    3.  COPD:  - albuterol sulfate HFA (PROAIR HFA) 108 (90 Base) MCG/ACT inhaler; inhale 2 puffs by mouth every 6 hours if needed for wheezing  Dispense: 8.5 Inhaler; Refill: 5  - fluticasone-salmeterol (ADVAIR DISKUS) 100-50 MCG/DOSE diskus inhaler; INHALE 1 PUFF INTO THE LUNGS EVERY 12 HOURS  Dispense: 1 Inhaler; Refill: 3        Patient was counseled about importance of taking medication which were prescribed at today visit. Patient was also counseled that lifestyle changes including diet, smoking and use of less alcohol will benefit their health in long term. All the question asked by the patient were answered in non-medical terms. Patient understands and agree to the plan. Teach back method was used while having the conversation. Requested Prescriptions     Signed Prescriptions Disp Refills    albuterol sulfate HFA (PROAIR HFA) 108 (90 Base) MCG/ACT inhaler 8.5 Inhaler 5     Sig: inhale 2 puffs by mouth every 6 hours if needed for wheezing    fluticasone-salmeterol (ADVAIR DISKUS) 100-50 MCG/DOSE diskus inhaler 1 Inhaler 3     Sig: INHALE 1 PUFF INTO THE LUNGS EVERY 12 HOURS    carvedilol (COREG) 25 MG tablet 60 tablet 5     Sig: Take 1 tablet by mouth 2 times daily       Medications Discontinued During This Encounter   Medication Reason    albuterol sulfate HFA (PROAIR HFA) 108 (90 Base) MCG/ACT inhaler REORDER    ADVAIR DISKUS 100-50 MCG/DOSE diskus inhaler REORDER    carvedilol (COREG) 25 MG tablet     carvedilol (COREG) 25 MG tablet     metFORMIN (GLUCOPHAGE) 500 MG tablet Therapy completed    glimepiride (AMARYL) 4 MG tablet Therapy completed       Delmy Kruger received counseling on the following healthy behaviors: nutrition, exercise and medication adherence    Discussed use,benefit, and side effects of prescribed medications. Barriers to medication compliance addressed. All patient questions answered. Pt voiced understanding. Return in about 1 week (around 8/23/2021) for HTN. Disclaimer: Some orall of this note was transcribed using voice-recognition software. This may cause typographical errors occasionally. Although all effort is made to fix these errors, please do not hesitate to contact our office if there Kam Amis concern with the understanding of this note.

## 2021-08-25 DIAGNOSIS — Z76.0 MEDICATION REFILL: ICD-10-CM

## 2021-08-25 RX ORDER — SPIRONOLACTONE 25 MG/1
25 TABLET ORAL DAILY
Qty: 30 TABLET | Refills: 5 | OUTPATIENT
Start: 2021-08-25
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
## 2021-08-26 ENCOUNTER — VIRTUAL VISIT (OUTPATIENT)
Dept: FAMILY MEDICINE CLINIC | Age: 66
End: 2021-08-26
Payer: MEDICARE

## 2021-08-26 DIAGNOSIS — Z76.0 MEDICATION REFILL: ICD-10-CM

## 2021-08-26 DIAGNOSIS — I50.42 CHRONIC COMBINED SYSTOLIC AND DIASTOLIC CONGESTIVE HEART FAILURE (HCC): ICD-10-CM

## 2021-08-26 DIAGNOSIS — I10 ESSENTIAL HYPERTENSION: Primary | ICD-10-CM

## 2021-08-26 PROCEDURE — 99213 OFFICE O/P EST LOW 20 MIN: CPT | Performed by: STUDENT IN AN ORGANIZED HEALTH CARE EDUCATION/TRAINING PROGRAM

## 2021-08-26 RX ORDER — BLOOD PRESSURE TEST KIT
KIT MISCELLANEOUS
Qty: 1 KIT | Refills: 0 | Status: SHIPPED | OUTPATIENT
Start: 2021-08-26 | End: 2022-03-09

## 2021-08-26 RX ORDER — FUROSEMIDE 20 MG/1
20 TABLET ORAL DAILY
Qty: 30 TABLET | Refills: 5 | Status: ON HOLD | OUTPATIENT
Start: 2021-08-26 | End: 2022-01-26 | Stop reason: HOSPADM

## 2021-08-26 RX ORDER — ASPIRIN 81 MG/1
TABLET ORAL
Qty: 90 TABLET | Refills: 2 | Status: SHIPPED | OUTPATIENT
Start: 2021-08-26 | End: 2022-07-12 | Stop reason: SDUPTHER

## 2021-08-26 NOTE — PROGRESS NOTES
Visit Information    Have you changed or started any medications since your last visit including any over-the-counter medicines, vitamins, or herbal medicines? no   Have you stopped taking any of your medications? Is so, why? -  no  Are you having any side effects from any of your medications? - no    Have you seen any other physician or provider since your last visit?  no   Have you had any other diagnostic tests since your last visit?  no   Have you been seen in the emergency room and/or had an admission in a hospital since we last saw you?  no   Have you had your routine dental cleaning in the past 6 months?  no     Do you have an active MyChart account? If no, what is the barrier?   Yes    Patient Care Team:  Josette Joshi MD as PCP - General (Emergency Medicine)    Medical History Review  Past Medical, Family, and Social History reviewed and does not contribute to the patient presenting condition    Health Maintenance   Topic Date Due    COVID-19 Vaccine (1) Never done    Shingles Vaccine (1 of 2) Never done    Diabetic retinal exam  03/01/2016    Colon Cancer Screen FIT/FOBT  08/03/2019    Lipid screen  09/06/2020    Potassium monitoring  09/09/2020    Creatinine monitoring  09/09/2020    Annual Wellness Visit (AWV)  Never done    Flu vaccine (1) 09/01/2021    Diabetic foot exam  09/11/2021    A1C test (Diabetic or Prediabetic)  08/16/2022    DTaP/Tdap/Td vaccine (2 - Td or Tdap) 08/02/2028    Pneumococcal 65+ years Vaccine  Completed    AAA screen  Completed    Hepatitis C screen  Completed    Hepatitis A vaccine  Aged Out    Hib vaccine  Aged Out    Meningococcal (ACWY) vaccine  Aged Out

## 2021-08-26 NOTE — PROGRESS NOTES
Josué Boone is a 74 y.o. male evaluated via telephone on 8/26/2021. Consent:  He and/or health care decision maker is aware that that he may receive a bill for this telephone service, depending on his insurance coverage, and has provided verbal consent to proceed: Yes      Documentation:  I communicated with the patient and/or health care decision maker about   Details of this discussion including any medical advice provided:    Patient is a 76-year-old male telephone visit for 1 week follow-up. CHF: Patient was seen in clinic last week however was unsure about his medication since was a telephone visit to review his medication list.  Patient today states he does have shortness of breath when walking long distance but no cough. Patient denies any swelling in his lower extremities. Patient denies any chest pain, palpitations, blurry vision, or headaches. Patient is currently taking carvedilol 25 mg twice daily, lisinopril 20 mg, atorvastatin, and amlodipine. Patient is not taking aspirin or Lasix. Hypertension: Patient states he borrowed blood pressure machine and his blood pressure was less than 140/90. Patient states he is compliant with taking amlodipine, lisinopril, and carvedilol. Patient denies any lightheadedness, blurry vision, or chest pain. Hx  Cardiologist note patient should be on Aldactone, lisinopril, Lasix, and Coreg and amlodipine. Aldactone was discontinued in the last visit due to concerns of dizziness with blood pressure medication. Plan  CHF: Reviewed patient's current medication list.  We will continue lisinopril, Coreg, Lipitor. We will add Lasix 20 mg and aspirin. Pt is educated on fluid restriction and low-sodium diet. Follow-up in 1 month  Hypertension: Blood pressure machine sent to patient's pharmacy. Patient previously borrowing his son's blood pressure machine. Continue Coreg, lisinopril, amlodipine.     I affirm this is a Patient Initiated Episode with a Patient who has not had a related appointment within my department in the past 7 days or scheduled within the next 24 hours. Patient identification was verified at the start of the visit: Yes    Total Time: minutes: 11-20 minutes    The visit was conducted pursuant to the emergency declaration under the [facility], [address] [place] [address] and the [employer] Act. Patient identification was verified, and a caregiver was present when appropriate. The patient was located in a state where the provider was credentialed to provide care.     Note: not billable if this call serves to triage the patient into an appointment for the relevant concern      Lali Fowler MD

## 2021-10-18 DIAGNOSIS — Z76.0 MEDICATION REFILL: ICD-10-CM

## 2021-10-19 RX ORDER — AMLODIPINE BESYLATE 10 MG/1
10 TABLET ORAL DAILY
Qty: 30 TABLET | Refills: 5 | Status: SHIPPED | OUTPATIENT
Start: 2021-10-19 | End: 2021-10-25
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
## 2021-10-25 DIAGNOSIS — Z76.0 MEDICATION REFILL: ICD-10-CM

## 2021-10-25 RX ORDER — AMLODIPINE BESYLATE 10 MG/1
10 TABLET ORAL DAILY
Qty: 30 TABLET | Refills: 5 | Status: SHIPPED | OUTPATIENT
Start: 2021-10-25 | End: 2022-07-12 | Stop reason: SDUPTHER
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
## 2022-01-18 ENCOUNTER — HOSPITAL ENCOUNTER (INPATIENT)
Age: 67
LOS: 7 days | Discharge: HOME | DRG: 871 | End: 2022-01-26
Attending: EMERGENCY MEDICINE | Admitting: FAMILY MEDICINE
Payer: MEDICARE

## 2022-01-18 ENCOUNTER — APPOINTMENT (OUTPATIENT)
Dept: CT IMAGING | Age: 67
DRG: 871 | End: 2022-01-18
Payer: MEDICARE

## 2022-01-18 ENCOUNTER — APPOINTMENT (OUTPATIENT)
Dept: GENERAL RADIOLOGY | Age: 67
DRG: 871 | End: 2022-01-18
Payer: MEDICARE

## 2022-01-18 DIAGNOSIS — R09.02 HYPOXEMIA: ICD-10-CM

## 2022-01-18 DIAGNOSIS — R79.89 ELEVATED D-DIMER: ICD-10-CM

## 2022-01-18 DIAGNOSIS — U07.1 COVID-19: Primary | ICD-10-CM

## 2022-01-18 LAB
ALBUMIN SERPL-MCNC: 3.4 G/DL (ref 3.5–5.2)
ALBUMIN/GLOBULIN RATIO: 1.2 (ref 1–2.5)
ALP BLD-CCNC: 79 U/L (ref 40–129)
ALT SERPL-CCNC: 8 U/L (ref 5–41)
ANION GAP SERPL CALCULATED.3IONS-SCNC: 14 MMOL/L (ref 9–17)
AST SERPL-CCNC: 21 U/L
BILIRUB SERPL-MCNC: 0.6 MG/DL (ref 0.3–1.2)
BUN BLDV-MCNC: 63 MG/DL (ref 8–23)
BUN/CREAT BLD: ABNORMAL (ref 9–20)
CALCIUM SERPL-MCNC: 8.5 MG/DL (ref 8.6–10.4)
CHLORIDE BLD-SCNC: 96 MMOL/L (ref 98–107)
CO2: 22 MMOL/L (ref 20–31)
CREAT SERPL-MCNC: 2.03 MG/DL (ref 0.7–1.2)
D-DIMER QUANTITATIVE: 1.39 MG/L FEU
ETHANOL PERCENT: <0.01 %
ETHANOL: <10 MG/DL
FERRITIN: 353 UG/L (ref 30–400)
GFR AFRICAN AMERICAN: 40 ML/MIN
GFR NON-AFRICAN AMERICAN: 33 ML/MIN
GFR SERPL CREATININE-BSD FRML MDRD: ABNORMAL ML/MIN/{1.73_M2}
GFR SERPL CREATININE-BSD FRML MDRD: ABNORMAL ML/MIN/{1.73_M2}
GLUCOSE BLD-MCNC: 133 MG/DL (ref 70–99)
INR BLD: 0.9
LACTATE DEHYDROGENASE: 246 U/L (ref 135–225)
LACTIC ACID, SEPSIS WHOLE BLOOD: 1.1 MMOL/L (ref 0.5–1.9)
LACTIC ACID, SEPSIS: NORMAL MMOL/L (ref 0.5–1.9)
PARTIAL THROMBOPLASTIN TIME: 27.2 SEC (ref 20.5–30.5)
POTASSIUM SERPL-SCNC: 4.6 MMOL/L (ref 3.7–5.3)
PROTHROMBIN TIME: 10.2 SEC (ref 9.1–12.3)
SARS-COV-2, RAPID: DETECTED
SODIUM BLD-SCNC: 132 MMOL/L (ref 135–144)
SPECIMEN DESCRIPTION: ABNORMAL
TOTAL PROTEIN: 6.2 G/DL (ref 6.4–8.3)

## 2022-01-18 PROCEDURE — 84145 PROCALCITONIN (PCT): CPT

## 2022-01-18 PROCEDURE — G0480 DRUG TEST DEF 1-7 CLASSES: HCPCS

## 2022-01-18 PROCEDURE — 87205 SMEAR GRAM STAIN: CPT

## 2022-01-18 PROCEDURE — 2580000003 HC RX 258: Performed by: STUDENT IN AN ORGANIZED HEALTH CARE EDUCATION/TRAINING PROGRAM

## 2022-01-18 PROCEDURE — 83605 ASSAY OF LACTIC ACID: CPT

## 2022-01-18 PROCEDURE — 6360000002 HC RX W HCPCS: Performed by: STUDENT IN AN ORGANIZED HEALTH CARE EDUCATION/TRAINING PROGRAM

## 2022-01-18 PROCEDURE — 87150 DNA/RNA AMPLIFIED PROBE: CPT

## 2022-01-18 PROCEDURE — 87147 CULTURE TYPE IMMUNOLOGIC: CPT

## 2022-01-18 PROCEDURE — 86140 C-REACTIVE PROTEIN: CPT

## 2022-01-18 PROCEDURE — 85379 FIBRIN DEGRADATION QUANT: CPT

## 2022-01-18 PROCEDURE — 96374 THER/PROPH/DIAG INJ IV PUSH: CPT

## 2022-01-18 PROCEDURE — 6370000000 HC RX 637 (ALT 250 FOR IP): Performed by: STUDENT IN AN ORGANIZED HEALTH CARE EDUCATION/TRAINING PROGRAM

## 2022-01-18 PROCEDURE — 84484 ASSAY OF TROPONIN QUANT: CPT

## 2022-01-18 PROCEDURE — 99285 EMERGENCY DEPT VISIT HI MDM: CPT

## 2022-01-18 PROCEDURE — 71045 X-RAY EXAM CHEST 1 VIEW: CPT

## 2022-01-18 PROCEDURE — 36415 COLL VENOUS BLD VENIPUNCTURE: CPT

## 2022-01-18 PROCEDURE — 96372 THER/PROPH/DIAG INJ SC/IM: CPT

## 2022-01-18 PROCEDURE — 87186 SC STD MICRODIL/AGAR DIL: CPT

## 2022-01-18 PROCEDURE — 80053 COMPREHEN METABOLIC PANEL: CPT

## 2022-01-18 PROCEDURE — 87040 BLOOD CULTURE FOR BACTERIA: CPT

## 2022-01-18 PROCEDURE — 82728 ASSAY OF FERRITIN: CPT

## 2022-01-18 PROCEDURE — 85610 PROTHROMBIN TIME: CPT

## 2022-01-18 PROCEDURE — 83880 ASSAY OF NATRIURETIC PEPTIDE: CPT

## 2022-01-18 PROCEDURE — 70450 CT HEAD/BRAIN W/O DYE: CPT

## 2022-01-18 PROCEDURE — 83615 LACTATE (LD) (LDH) ENZYME: CPT

## 2022-01-18 PROCEDURE — 85730 THROMBOPLASTIN TIME PARTIAL: CPT

## 2022-01-18 PROCEDURE — 85025 COMPLETE CBC W/AUTO DIFF WBC: CPT

## 2022-01-18 PROCEDURE — 93005 ELECTROCARDIOGRAM TRACING: CPT | Performed by: STUDENT IN AN ORGANIZED HEALTH CARE EDUCATION/TRAINING PROGRAM

## 2022-01-18 PROCEDURE — 87635 SARS-COV-2 COVID-19 AMP PRB: CPT

## 2022-01-18 RX ORDER — SODIUM CHLORIDE 0.9 % (FLUSH) 0.9 %
5-40 SYRINGE (ML) INJECTION EVERY 12 HOURS SCHEDULED
Status: DISCONTINUED | OUTPATIENT
Start: 2022-01-18 | End: 2022-01-24 | Stop reason: SDUPTHER

## 2022-01-18 RX ORDER — DEXAMETHASONE SODIUM PHOSPHATE 10 MG/ML
6 INJECTION INTRAMUSCULAR; INTRAVENOUS ONCE
Status: COMPLETED | OUTPATIENT
Start: 2022-01-18 | End: 2022-01-18

## 2022-01-18 RX ORDER — SODIUM CHLORIDE 9 MG/ML
25 INJECTION, SOLUTION INTRAVENOUS PRN
Status: DISCONTINUED | OUTPATIENT
Start: 2022-01-18 | End: 2022-01-24 | Stop reason: SDUPTHER

## 2022-01-18 RX ORDER — 0.9 % SODIUM CHLORIDE 0.9 %
500 INTRAVENOUS SOLUTION INTRAVENOUS ONCE
Status: COMPLETED | OUTPATIENT
Start: 2022-01-18 | End: 2022-01-19

## 2022-01-18 RX ORDER — SODIUM CHLORIDE 0.9 % (FLUSH) 0.9 %
5-40 SYRINGE (ML) INJECTION PRN
Status: DISCONTINUED | OUTPATIENT
Start: 2022-01-18 | End: 2022-01-24 | Stop reason: SDUPTHER

## 2022-01-18 RX ORDER — IBUPROFEN 800 MG/1
800 TABLET ORAL ONCE
Status: COMPLETED | OUTPATIENT
Start: 2022-01-18 | End: 2022-01-18

## 2022-01-18 RX ADMIN — DEXAMETHASONE SODIUM PHOSPHATE 6 MG: 10 INJECTION INTRAMUSCULAR; INTRAVENOUS at 21:30

## 2022-01-18 RX ADMIN — ENOXAPARIN SODIUM 80 MG: 100 INJECTION SUBCUTANEOUS at 23:37

## 2022-01-18 RX ADMIN — SODIUM CHLORIDE 500 ML: 9 INJECTION, SOLUTION INTRAVENOUS at 23:36

## 2022-01-18 RX ADMIN — IBUPROFEN 800 MG: 800 TABLET, FILM COATED ORAL at 21:36

## 2022-01-18 ASSESSMENT — ENCOUNTER SYMPTOMS
NAUSEA: 0
VOMITING: 0
SORE THROAT: 0
COUGH: 0
ABDOMINAL PAIN: 0
SHORTNESS OF BREATH: 0
PHOTOPHOBIA: 0

## 2022-01-18 ASSESSMENT — PAIN SCALES - GENERAL: PAINLEVEL_OUTOF10: 0

## 2022-01-19 ENCOUNTER — APPOINTMENT (OUTPATIENT)
Dept: NUCLEAR MEDICINE | Age: 67
DRG: 871 | End: 2022-01-19
Payer: MEDICARE

## 2022-01-19 ENCOUNTER — TELEPHONE (OUTPATIENT)
Dept: FAMILY MEDICINE CLINIC | Age: 67
End: 2022-01-19

## 2022-01-19 ENCOUNTER — APPOINTMENT (OUTPATIENT)
Dept: GENERAL RADIOLOGY | Age: 67
DRG: 871 | End: 2022-01-19
Payer: MEDICARE

## 2022-01-19 PROBLEM — J96.21 ACUTE ON CHRONIC RESPIRATORY FAILURE WITH HYPOXIA (HCC): Status: ACTIVE | Noted: 2022-01-19

## 2022-01-19 LAB
ABSOLUTE EOS #: 0 K/UL (ref 0–0.4)
ABSOLUTE EOS #: 0 K/UL (ref 0–0.44)
ABSOLUTE IMMATURE GRANULOCYTE: 0 K/UL (ref 0–0.3)
ABSOLUTE IMMATURE GRANULOCYTE: 0.07 K/UL (ref 0–0.3)
ABSOLUTE LYMPH #: 0.46 K/UL (ref 1.1–3.7)
ABSOLUTE LYMPH #: 0.67 K/UL (ref 1–4.8)
ABSOLUTE MONO #: 0.2 K/UL (ref 0.1–1.2)
ABSOLUTE MONO #: 0.4 K/UL (ref 0.1–0.8)
ALBUMIN SERPL-MCNC: 3 G/DL (ref 3.5–5.2)
ALBUMIN/GLOBULIN RATIO: 1.1 (ref 1–2.5)
ALP BLD-CCNC: 78 U/L (ref 40–129)
ALT SERPL-CCNC: 8 U/L (ref 5–41)
ANION GAP SERPL CALCULATED.3IONS-SCNC: 15 MMOL/L (ref 9–17)
ANION GAP SERPL CALCULATED.3IONS-SCNC: 18 MMOL/L (ref 9–17)
AST SERPL-CCNC: 19 U/L
BASOPHILS # BLD: 0 % (ref 0–2)
BASOPHILS # BLD: 0 % (ref 0–2)
BASOPHILS ABSOLUTE: 0 K/UL (ref 0–0.2)
BASOPHILS ABSOLUTE: 0 K/UL (ref 0–0.2)
BILIRUB SERPL-MCNC: 0.46 MG/DL (ref 0.3–1.2)
BNP INTERPRETATION: ABNORMAL
BUN BLDV-MCNC: 69 MG/DL (ref 8–23)
BUN BLDV-MCNC: 71 MG/DL (ref 8–23)
BUN/CREAT BLD: ABNORMAL (ref 9–20)
BUN/CREAT BLD: ABNORMAL (ref 9–20)
C-REACTIVE PROTEIN: 209.4 MG/L (ref 0–5)
C-REACTIVE PROTEIN: 235.8 MG/L (ref 0–5)
CALCIUM SERPL-MCNC: 8.1 MG/DL (ref 8.6–10.4)
CALCIUM SERPL-MCNC: 8.5 MG/DL (ref 8.6–10.4)
CHLORIDE BLD-SCNC: 101 MMOL/L (ref 98–107)
CHLORIDE BLD-SCNC: 104 MMOL/L (ref 98–107)
CO2: 17 MMOL/L (ref 20–31)
CO2: 21 MMOL/L (ref 20–31)
CREAT SERPL-MCNC: 1.44 MG/DL (ref 0.7–1.2)
CREAT SERPL-MCNC: 1.84 MG/DL (ref 0.7–1.2)
CREATININE URINE: 140.2 MG/DL (ref 39–259)
DIFFERENTIAL TYPE: ABNORMAL
DIFFERENTIAL TYPE: ABNORMAL
EOSINOPHILS RELATIVE PERCENT: 0 % (ref 1–4)
EOSINOPHILS RELATIVE PERCENT: 0 % (ref 1–4)
GFR AFRICAN AMERICAN: 45 ML/MIN
GFR AFRICAN AMERICAN: 59 ML/MIN
GFR NON-AFRICAN AMERICAN: 37 ML/MIN
GFR NON-AFRICAN AMERICAN: 49 ML/MIN
GFR SERPL CREATININE-BSD FRML MDRD: ABNORMAL ML/MIN/{1.73_M2}
GLUCOSE BLD-MCNC: 132 MG/DL (ref 75–110)
GLUCOSE BLD-MCNC: 176 MG/DL (ref 70–99)
GLUCOSE BLD-MCNC: 186 MG/DL (ref 70–99)
GLUCOSE BLD-MCNC: 243 MG/DL (ref 75–110)
HCT VFR BLD CALC: 33.2 % (ref 40.7–50.3)
HCT VFR BLD CALC: 34.4 % (ref 40.7–50.3)
HEMOGLOBIN: 10.7 G/DL (ref 13–17)
HEMOGLOBIN: 11.1 G/DL (ref 13–17)
IMMATURE GRANULOCYTES: 0 %
IMMATURE GRANULOCYTES: 1 %
LACTIC ACID, SEPSIS WHOLE BLOOD: 0.8 MMOL/L (ref 0.5–1.9)
LACTIC ACID, SEPSIS: NORMAL MMOL/L (ref 0.5–1.9)
LYMPHOCYTES # BLD: 10 % (ref 24–44)
LYMPHOCYTES # BLD: 7 % (ref 24–43)
MAGNESIUM: 1.7 MG/DL (ref 1.6–2.6)
MCH RBC QN AUTO: 30.2 PG (ref 25.2–33.5)
MCH RBC QN AUTO: 30.4 PG (ref 25.2–33.5)
MCHC RBC AUTO-ENTMCNC: 32.2 G/DL (ref 28.4–34.8)
MCHC RBC AUTO-ENTMCNC: 32.3 G/DL (ref 28.4–34.8)
MCV RBC AUTO: 93.7 FL (ref 82.6–102.9)
MCV RBC AUTO: 94.3 FL (ref 82.6–102.9)
MONOCYTES # BLD: 3 % (ref 3–12)
MONOCYTES # BLD: 6 % (ref 1–7)
MORPHOLOGY: NORMAL
MORPHOLOGY: NORMAL
NRBC AUTOMATED: 0 PER 100 WBC
NRBC AUTOMATED: 0 PER 100 WBC
PDW BLD-RTO: 12.9 % (ref 11.8–14.4)
PDW BLD-RTO: 13 % (ref 11.8–14.4)
PLATELET # BLD: 166 K/UL (ref 138–453)
PLATELET # BLD: 182 K/UL (ref 138–453)
PLATELET ESTIMATE: ABNORMAL
PLATELET ESTIMATE: ABNORMAL
PMV BLD AUTO: 10.3 FL (ref 8.1–13.5)
PMV BLD AUTO: 10.4 FL (ref 8.1–13.5)
POTASSIUM SERPL-SCNC: 5.2 MMOL/L (ref 3.7–5.3)
POTASSIUM SERPL-SCNC: 5.5 MMOL/L (ref 3.7–5.3)
PRO-BNP: 530 PG/ML
RBC # BLD: 3.52 M/UL (ref 4.21–5.77)
RBC # BLD: 3.67 M/UL (ref 4.21–5.77)
RBC # BLD: ABNORMAL 10*6/UL
RBC # BLD: ABNORMAL 10*6/UL
SEG NEUTROPHILS: 84 % (ref 36–66)
SEG NEUTROPHILS: 89 % (ref 36–65)
SEGMENTED NEUTROPHILS ABSOLUTE COUNT: 5.63 K/UL (ref 1.8–7.7)
SEGMENTED NEUTROPHILS ABSOLUTE COUNT: 5.77 K/UL (ref 1.5–8.1)
SODIUM BLD-SCNC: 137 MMOL/L (ref 135–144)
SODIUM BLD-SCNC: 139 MMOL/L (ref 135–144)
SODIUM,UR: 39 MMOL/L
TOTAL PROTEIN: 5.8 G/DL (ref 6.4–8.3)
TROPONIN INTERP: ABNORMAL
TROPONIN INTERP: ABNORMAL
TROPONIN T: ABNORMAL NG/ML
TROPONIN T: ABNORMAL NG/ML
TROPONIN, HIGH SENSITIVITY: 70 NG/L (ref 0–22)
TROPONIN, HIGH SENSITIVITY: 73 NG/L (ref 0–22)
WBC # BLD: 6.5 K/UL (ref 3.5–11.3)
WBC # BLD: 6.7 K/UL (ref 3.5–11.3)
WBC # BLD: ABNORMAL 10*3/UL
WBC # BLD: ABNORMAL 10*3/UL

## 2022-01-19 PROCEDURE — 83735 ASSAY OF MAGNESIUM: CPT

## 2022-01-19 PROCEDURE — 6360000002 HC RX W HCPCS: Performed by: STUDENT IN AN ORGANIZED HEALTH CARE EDUCATION/TRAINING PROGRAM

## 2022-01-19 PROCEDURE — M0249 HC ADM TOCILIZU COVID-19 1ST: HCPCS

## 2022-01-19 PROCEDURE — 2500000003 HC RX 250 WO HCPCS: Performed by: FAMILY MEDICINE

## 2022-01-19 PROCEDURE — 6370000000 HC RX 637 (ALT 250 FOR IP): Performed by: STUDENT IN AN ORGANIZED HEALTH CARE EDUCATION/TRAINING PROGRAM

## 2022-01-19 PROCEDURE — 87086 URINE CULTURE/COLONY COUNT: CPT

## 2022-01-19 PROCEDURE — 94640 AIRWAY INHALATION TREATMENT: CPT

## 2022-01-19 PROCEDURE — 99222 1ST HOSP IP/OBS MODERATE 55: CPT | Performed by: INTERNAL MEDICINE

## 2022-01-19 PROCEDURE — 99222 1ST HOSP IP/OBS MODERATE 55: CPT | Performed by: FAMILY MEDICINE

## 2022-01-19 PROCEDURE — 6360000002 HC RX W HCPCS: Performed by: NURSE PRACTITIONER

## 2022-01-19 PROCEDURE — 6360000002 HC RX W HCPCS: Performed by: FAMILY MEDICINE

## 2022-01-19 PROCEDURE — 86140 C-REACTIVE PROTEIN: CPT

## 2022-01-19 PROCEDURE — 2580000003 HC RX 258: Performed by: INTERNAL MEDICINE

## 2022-01-19 PROCEDURE — 81003 URINALYSIS AUTO W/O SCOPE: CPT

## 2022-01-19 PROCEDURE — 36415 COLL VENOUS BLD VENIPUNCTURE: CPT

## 2022-01-19 PROCEDURE — 93005 ELECTROCARDIOGRAM TRACING: CPT | Performed by: INTERNAL MEDICINE

## 2022-01-19 PROCEDURE — 51702 INSERT TEMP BLADDER CATH: CPT

## 2022-01-19 PROCEDURE — 6370000000 HC RX 637 (ALT 250 FOR IP): Performed by: INTERNAL MEDICINE

## 2022-01-19 PROCEDURE — XW033H5 INTRODUCTION OF TOCILIZUMAB INTO PERIPHERAL VEIN, PERCUTANEOUS APPROACH, NEW TECHNOLOGY GROUP 5: ICD-10-PCS | Performed by: INTERNAL MEDICINE

## 2022-01-19 PROCEDURE — 82947 ASSAY GLUCOSE BLOOD QUANT: CPT

## 2022-01-19 PROCEDURE — 36556 INSERT NON-TUNNEL CV CATH: CPT

## 2022-01-19 PROCEDURE — 2580000003 HC RX 258: Performed by: STUDENT IN AN ORGANIZED HEALTH CARE EDUCATION/TRAINING PROGRAM

## 2022-01-19 PROCEDURE — 84484 ASSAY OF TROPONIN QUANT: CPT

## 2022-01-19 PROCEDURE — 36620 INSERTION CATHETER ARTERY: CPT

## 2022-01-19 PROCEDURE — 99291 CRITICAL CARE FIRST HOUR: CPT | Performed by: INTERNAL MEDICINE

## 2022-01-19 PROCEDURE — 84300 ASSAY OF URINE SODIUM: CPT

## 2022-01-19 PROCEDURE — 06HY33Z INSERTION OF INFUSION DEVICE INTO LOWER VEIN, PERCUTANEOUS APPROACH: ICD-10-PCS | Performed by: INTERNAL MEDICINE

## 2022-01-19 PROCEDURE — 2580000003 HC RX 258: Performed by: NURSE PRACTITIONER

## 2022-01-19 PROCEDURE — 80048 BASIC METABOLIC PNL TOTAL CA: CPT

## 2022-01-19 PROCEDURE — 94761 N-INVAS EAR/PLS OXIMETRY MLT: CPT

## 2022-01-19 PROCEDURE — 85025 COMPLETE CBC W/AUTO DIFF WBC: CPT

## 2022-01-19 PROCEDURE — 82570 ASSAY OF URINE CREATININE: CPT

## 2022-01-19 PROCEDURE — 2000000000 HC ICU R&B

## 2022-01-19 PROCEDURE — 2700000000 HC OXYGEN THERAPY PER DAY

## 2022-01-19 PROCEDURE — 80053 COMPREHEN METABOLIC PANEL: CPT

## 2022-01-19 PROCEDURE — 71045 X-RAY EXAM CHEST 1 VIEW: CPT

## 2022-01-19 RX ORDER — ALBUTEROL SULFATE 90 UG/1
2 AEROSOL, METERED RESPIRATORY (INHALATION) EVERY 6 HOURS PRN
Status: DISCONTINUED | OUTPATIENT
Start: 2022-01-19 | End: 2022-01-24

## 2022-01-19 RX ORDER — CARVEDILOL 25 MG/1
25 TABLET ORAL 2 TIMES DAILY
Status: DISCONTINUED | OUTPATIENT
Start: 2022-01-19 | End: 2022-01-22

## 2022-01-19 RX ORDER — VANCOMYCIN HYDROCHLORIDE 1 G/200ML
1000 INJECTION, SOLUTION INTRAVENOUS EVERY 24 HOURS
Status: DISCONTINUED | OUTPATIENT
Start: 2022-01-20 | End: 2022-01-22

## 2022-01-19 RX ORDER — ACETAMINOPHEN 650 MG/1
650 SUPPOSITORY RECTAL EVERY 6 HOURS PRN
Status: DISCONTINUED | OUTPATIENT
Start: 2022-01-19 | End: 2022-01-26 | Stop reason: HOSPADM

## 2022-01-19 RX ORDER — POLYETHYLENE GLYCOL 3350 17 G/17G
17 POWDER, FOR SOLUTION ORAL DAILY PRN
Status: DISCONTINUED | OUTPATIENT
Start: 2022-01-19 | End: 2022-01-26 | Stop reason: HOSPADM

## 2022-01-19 RX ORDER — ATROPINE SULFATE 0.1 MG/ML
1 INJECTION INTRAVENOUS ONCE
Status: COMPLETED | OUTPATIENT
Start: 2022-01-19 | End: 2022-01-19

## 2022-01-19 RX ORDER — DEXTROSE MONOHYDRATE 25 G/50ML
12.5 INJECTION, SOLUTION INTRAVENOUS PRN
Status: DISCONTINUED | OUTPATIENT
Start: 2022-01-19 | End: 2022-01-26 | Stop reason: HOSPADM

## 2022-01-19 RX ORDER — 0.9 % SODIUM CHLORIDE 0.9 %
250 INTRAVENOUS SOLUTION INTRAVENOUS ONCE
Status: COMPLETED | OUTPATIENT
Start: 2022-01-19 | End: 2022-01-19

## 2022-01-19 RX ORDER — FUROSEMIDE 20 MG/1
20 TABLET ORAL DAILY
Status: DISCONTINUED | OUTPATIENT
Start: 2022-01-19 | End: 2022-01-26 | Stop reason: HOSPADM

## 2022-01-19 RX ORDER — IPRATROPIUM BROMIDE AND ALBUTEROL SULFATE 2.5; .5 MG/3ML; MG/3ML
1 SOLUTION RESPIRATORY (INHALATION) 4 TIMES DAILY
Status: DISCONTINUED | OUTPATIENT
Start: 2022-01-19 | End: 2022-01-21

## 2022-01-19 RX ORDER — SODIUM CHLORIDE 9 MG/ML
25 INJECTION, SOLUTION INTRAVENOUS PRN
Status: DISCONTINUED | OUTPATIENT
Start: 2022-01-19 | End: 2022-01-26 | Stop reason: HOSPADM

## 2022-01-19 RX ORDER — ONDANSETRON 2 MG/ML
4 INJECTION INTRAMUSCULAR; INTRAVENOUS EVERY 6 HOURS PRN
Status: DISCONTINUED | OUTPATIENT
Start: 2022-01-19 | End: 2022-01-26 | Stop reason: HOSPADM

## 2022-01-19 RX ORDER — ACETAMINOPHEN 325 MG/1
650 TABLET ORAL EVERY 6 HOURS PRN
Status: DISCONTINUED | OUTPATIENT
Start: 2022-01-19 | End: 2022-01-26 | Stop reason: HOSPADM

## 2022-01-19 RX ORDER — ASPIRIN 81 MG/1
81 TABLET ORAL DAILY
Status: DISCONTINUED | OUTPATIENT
Start: 2022-01-19 | End: 2022-01-26 | Stop reason: HOSPADM

## 2022-01-19 RX ORDER — BUDESONIDE AND FORMOTEROL FUMARATE DIHYDRATE 80; 4.5 UG/1; UG/1
2 AEROSOL RESPIRATORY (INHALATION) 2 TIMES DAILY
Status: DISCONTINUED | OUTPATIENT
Start: 2022-01-19 | End: 2022-01-26 | Stop reason: HOSPADM

## 2022-01-19 RX ORDER — SODIUM CHLORIDE 0.9 % (FLUSH) 0.9 %
5-40 SYRINGE (ML) INJECTION EVERY 12 HOURS SCHEDULED
Status: DISCONTINUED | OUTPATIENT
Start: 2022-01-19 | End: 2022-01-26 | Stop reason: HOSPADM

## 2022-01-19 RX ORDER — AMLODIPINE BESYLATE 10 MG/1
10 TABLET ORAL DAILY
Status: DISCONTINUED | OUTPATIENT
Start: 2022-01-19 | End: 2022-01-26 | Stop reason: HOSPADM

## 2022-01-19 RX ORDER — SODIUM CHLORIDE 9 MG/ML
INJECTION, SOLUTION INTRAVENOUS CONTINUOUS
Status: DISCONTINUED | OUTPATIENT
Start: 2022-01-19 | End: 2022-01-26 | Stop reason: HOSPADM

## 2022-01-19 RX ORDER — SODIUM CHLORIDE 0.9 % (FLUSH) 0.9 %
5-40 SYRINGE (ML) INJECTION PRN
Status: DISCONTINUED | OUTPATIENT
Start: 2022-01-19 | End: 2022-01-26 | Stop reason: HOSPADM

## 2022-01-19 RX ORDER — DEXTROSE MONOHYDRATE 50 MG/ML
100 INJECTION, SOLUTION INTRAVENOUS PRN
Status: DISCONTINUED | OUTPATIENT
Start: 2022-01-19 | End: 2022-01-26 | Stop reason: HOSPADM

## 2022-01-19 RX ORDER — ALBUTEROL SULFATE 2.5 MG/3ML
2.5 SOLUTION RESPIRATORY (INHALATION)
Status: DISCONTINUED | OUTPATIENT
Start: 2022-01-19 | End: 2022-01-19

## 2022-01-19 RX ORDER — DOPAMINE HYDROCHLORIDE 160 MG/100ML
2-20 INJECTION, SOLUTION INTRAVENOUS CONTINUOUS
Status: DISCONTINUED | OUTPATIENT
Start: 2022-01-19 | End: 2022-01-25

## 2022-01-19 RX ORDER — ONDANSETRON 2 MG/ML
4 INJECTION INTRAMUSCULAR; INTRAVENOUS ONCE
Status: COMPLETED | OUTPATIENT
Start: 2022-01-19 | End: 2022-01-19

## 2022-01-19 RX ORDER — NOREPINEPHRINE BIT/0.9 % NACL 16MG/250ML
INFUSION BOTTLE (ML) INTRAVENOUS
Status: DISPENSED
Start: 2022-01-19 | End: 2022-01-20

## 2022-01-19 RX ORDER — NICOTINE POLACRILEX 4 MG
15 LOZENGE BUCCAL PRN
Status: DISCONTINUED | OUTPATIENT
Start: 2022-01-19 | End: 2022-01-26 | Stop reason: HOSPADM

## 2022-01-19 RX ORDER — ONDANSETRON 4 MG/1
4 TABLET, ORALLY DISINTEGRATING ORAL EVERY 8 HOURS PRN
Status: DISCONTINUED | OUTPATIENT
Start: 2022-01-19 | End: 2022-01-26 | Stop reason: HOSPADM

## 2022-01-19 RX ORDER — VITAMIN B COMPLEX
2000 TABLET ORAL DAILY
Status: DISCONTINUED | OUTPATIENT
Start: 2022-01-19 | End: 2022-01-26 | Stop reason: HOSPADM

## 2022-01-19 RX ORDER — LISINOPRIL 20 MG/1
20 TABLET ORAL DAILY
Status: DISCONTINUED | OUTPATIENT
Start: 2022-01-19 | End: 2022-01-26 | Stop reason: HOSPADM

## 2022-01-19 RX ORDER — ATORVASTATIN CALCIUM 20 MG/1
20 TABLET, FILM COATED ORAL DAILY
Status: DISCONTINUED | OUTPATIENT
Start: 2022-01-19 | End: 2022-01-26 | Stop reason: HOSPADM

## 2022-01-19 RX ORDER — ATROPINE SULFATE 0.1 MG/ML
1 INJECTION INTRAVENOUS PRN
Status: DISCONTINUED | OUTPATIENT
Start: 2022-01-19 | End: 2022-01-26 | Stop reason: HOSPADM

## 2022-01-19 RX ADMIN — ATROPINE SULFATE 1 MG: 0.1 INJECTION, SOLUTION INTRAVENOUS at 00:29

## 2022-01-19 RX ADMIN — ATROPINE SULFATE 1 MG: 0.1 INJECTION, SOLUTION INTRAVENOUS at 15:26

## 2022-01-19 RX ADMIN — SODIUM CHLORIDE: 9 INJECTION, SOLUTION INTRAVENOUS at 16:41

## 2022-01-19 RX ADMIN — VANCOMYCIN HYDROCHLORIDE 1500 MG: 10 INJECTION, POWDER, LYOPHILIZED, FOR SOLUTION INTRAVENOUS at 19:37

## 2022-01-19 RX ADMIN — SODIUM CHLORIDE, PRESERVATIVE FREE 10 ML: 5 INJECTION INTRAVENOUS at 09:00

## 2022-01-19 RX ADMIN — INSULIN LISPRO 1 UNITS: 100 INJECTION, SOLUTION INTRAVENOUS; SUBCUTANEOUS at 21:29

## 2022-01-19 RX ADMIN — SODIUM CHLORIDE, PRESERVATIVE FREE 10 ML: 5 INJECTION INTRAVENOUS at 20:54

## 2022-01-19 RX ADMIN — DEXAMETHASONE 6 MG: 4 TABLET ORAL at 08:59

## 2022-01-19 RX ADMIN — DOPAMINE HYDROCHLORIDE 4 MCG/KG/MIN: 160 INJECTION, SOLUTION INTRAVENOUS at 16:41

## 2022-01-19 RX ADMIN — DOPAMINE HYDROCHLORIDE 12 MCG/KG/MIN: 160 INJECTION, SOLUTION INTRAVENOUS at 19:37

## 2022-01-19 RX ADMIN — SODIUM CHLORIDE: 9 INJECTION, SOLUTION INTRAVENOUS at 15:25

## 2022-01-19 RX ADMIN — IPRATROPIUM BROMIDE AND ALBUTEROL SULFATE 1 AMPULE: .5; 3 SOLUTION RESPIRATORY (INHALATION) at 16:56

## 2022-01-19 RX ADMIN — ATROPINE SULFATE 1 MG: 0.1 INJECTION, SOLUTION INTRAVENOUS at 05:15

## 2022-01-19 RX ADMIN — SODIUM CHLORIDE, PRESERVATIVE FREE 10 ML: 5 INJECTION INTRAVENOUS at 20:53

## 2022-01-19 RX ADMIN — SODIUM CHLORIDE, PRESERVATIVE FREE 10 ML: 5 INJECTION INTRAVENOUS at 01:55

## 2022-01-19 RX ADMIN — Medication 2000 UNITS: at 08:59

## 2022-01-19 RX ADMIN — SODIUM CHLORIDE, PRESERVATIVE FREE 10 ML: 5 INJECTION INTRAVENOUS at 09:01

## 2022-01-19 RX ADMIN — ONDANSETRON 4 MG: 2 INJECTION INTRAMUSCULAR; INTRAVENOUS at 02:50

## 2022-01-19 RX ADMIN — SODIUM CHLORIDE 250 ML: 9 INJECTION, SOLUTION INTRAVENOUS at 11:47

## 2022-01-19 RX ADMIN — Medication 81 MG: at 08:59

## 2022-01-19 RX ADMIN — ATORVASTATIN CALCIUM 20 MG: 20 TABLET, FILM COATED ORAL at 08:59

## 2022-01-19 RX ADMIN — TOCILIZUMAB 600 MG: 20 INJECTION, SOLUTION, CONCENTRATE INTRAVENOUS at 11:40

## 2022-01-19 RX ADMIN — GLUCAGON HYDROCHLORIDE 1 MG: KIT at 02:50

## 2022-01-19 RX ADMIN — EPINEPHRINE 1 MCG/MIN: 1 INJECTION INTRAMUSCULAR; INTRAVENOUS; SUBCUTANEOUS at 21:53

## 2022-01-19 RX ADMIN — IPRATROPIUM BROMIDE AND ALBUTEROL SULFATE 1 AMPULE: .5; 3 SOLUTION RESPIRATORY (INHALATION) at 12:33

## 2022-01-19 RX ADMIN — SODIUM CHLORIDE: 9 INJECTION, SOLUTION INTRAVENOUS at 02:50

## 2022-01-19 RX ADMIN — DOPAMINE HYDROCHLORIDE 20 MCG/KG/MIN: 160 INJECTION, SOLUTION INTRAVENOUS at 23:27

## 2022-01-19 ASSESSMENT — ENCOUNTER SYMPTOMS
SORE THROAT: 0
SINUS PAIN: 0
EYE REDNESS: 0
CHEST TIGHTNESS: 0
PHOTOPHOBIA: 0
NAUSEA: 0
CONSTIPATION: 0
COUGH: 1
WHEEZING: 0
VOMITING: 0
ALLERGIC/IMMUNOLOGIC NEGATIVE: 1
DIARRHEA: 0
TROUBLE SWALLOWING: 0
SHORTNESS OF BREATH: 1
VOICE CHANGE: 0
ABDOMINAL PAIN: 0

## 2022-01-19 ASSESSMENT — PAIN SCALES - GENERAL
PAINLEVEL_OUTOF10: 0

## 2022-01-19 NOTE — ED NOTES
The following labs labeled with pt sticker and tubed to lab:     [] Blue     [] Lavender   [] on ice  [] Green/yellow  [x] Green/black [x] on ice  [] Yellow  [] Red  [] Pink      [] COVID-19 swab    [] Rapid  [] PCR  [] Flu swab  [] Peds Viral Panel     [] Urine Sample  [] Pelvic Cultures  [] Blood Cultures            Jules Toure RN  01/18/22 [id number]

## 2022-01-19 NOTE — ED PROVIDER NOTES
[facility]     Emergency Department     Faculty Attestation    I performed a history and physical examination of the patient and discussed management with the resident. I reviewed the residents note and agree with the documented findings and plan of care. Any areas of disagreement are noted on the chart. I was personally present for the key portions of any procedures. I have documented in the chart those procedures where I was not present during the key portions. I have reviewed the emergency nurses triage note. I agree with the chief complaint, past medical history, past surgical history, allergies, medications, social and family history as documented unless otherwise noted below. For Physician Assistant/ Nurse Practitioner cases/documentation I have personally evaluated this patient and have completed at least one if not all key elements of the E/M (history, physical exam, and MDM). Additional findings are as noted. I have personally seen and evaluated the patient. I find the patient's history and physical exam are consistent with the NP/PA documentation. I agree with the care provided, treatment rendered, disposition and follow-up plan. This patient was evaluated in the Emergency Department for symptoms described in the history of present illness. The patient was evaluated in the context of the global COVID-19 pandemic, which necessitated consideration that the patient might be at risk for infection with the SARS-CoV-2 virus that causes COVID-19. Institutional protocols and algorithms that pertain to the evaluation of patients at risk for COVID-19 are in a state of rapid change based on information released by regulatory bodies including the CDC and federal and state organizations. These policies and algorithms were followed during the patient's care in the ED. 75-year-old male with a history of COPD presenting with fatigue and altered mental status at home.   Hypoxic to 76% on room air. Tested positive for COVID 3 days ago. Patient states that he feels slightly more short of breath than usual, but denies any chest pain or fever. He has been coughing, but states it is his typical COVID cough. Exam:  General: Laying on the bed, awake, alert and in no acute distress  CV: normal rate and regular rhythm  Lungs: Breathing comfortably on nonrebreather with no tachypnea or increased work of breathing      Plan:  Labs including CBC, electrolytes, D-dimer, COVID inflammatory markers (procalcitonin, LDH, ferritin). Will obtain CT head, as patient is typically A&O x4 and is today disoriented to time. No focal deficits to suggest CVA. Steroid started for hypoxia. Signed out to oncoming physician pending results and admission.         Felicitas Burrows MD   Attending Emergency  Physician    (Please note that portions of this note were completed with a voice recognition program. Efforts were made to edit the dictations but occasionally words are mis-transcribed.)              Felicitas Burrows MD  01/19/22 0000

## 2022-01-19 NOTE — PROGRESS NOTES
[facility]  Documentation Note                Admission Dx: Hypoxemia [R09.02]  Elevated d-dimer [R79.89]  Acute on chronic respiratory failure with hypoxia (HCC) [J96.21]  COVID-19 [U07.1]    Past Medical History:   has a past medical history of COPD (chronic obstructive pulmonary disease) ([facility] Diabetes mellitus ([facility] Erectile dysfunction due to arterial insufficiency, Hypertension, Microalbuminuria due to type 2 diabetes mellitus ([facility] and Overweight (BMI 25.0-29.9). Previous Testing:     ECHO 12/10/19: EF 50%, small pericardial effusion, limited study. STRESS 9/9/19: No ischemia. Infarct of the inferoapical wall. EF 27%. ZOLTAN 2/26/16: LVSF normal. Echogenicity on the right side of atrial septum suspicious for tumor vs vegetation. CATH 5/11/15: Normal coronaries, EF 35%. Previous office/hospital visit:   SANTOSH Noonan NP 9/24/19:   1. Hypertension  2. Hyperlipidemia  3. DM  4. COPD  5. Cardiac cath on 05/11/2015: EF 35%. normal coronaries. 6. Echo on 07/01/17: EF 38%. Mild MR/TR  7. Hep C  8. Erectile dysfunction  9. AAA screen showed no evidence of aneurysmal dilatation of the aorta and common iliac arteries bilaterally  10. Echocardiogram 5/2018 showed severe global hypokinesis and LVEF of 30-35%; mod-severely enlarged LA and RA. 11. Nonischemic Cardiomyopathy  12. ZOLTAN in 2016 showed suspicious mass on the right side of the interatrial septum. 13. 9/4/19 [address] w/ CP/NSTEMI & CHF exacerbation. Ran out of Lasix  14. Echo 9/4/19- LVEF 20%, mod DD, Mod MR, mild TR.  15 Stress test 9/9/19 negative ischemia. D/C home on PO meds with repeat echo in 90 days to assess need for AICD    Plan --   1. NICMP - stable now back on PO Lasix. Long discussion with patient regarding med compliance, diet, fluid intake (avoid excessive tea/caffeine and monitor PO water intake), daily weight, and med compliance. Questions/concerns addressed.  Will check with CHF clinic regarding co-payments and see if we can get him set back up with them. Advised to monitor and call office if more than 3lb weight gain in 1 day or 5lbs in one week. Continue PO ACE, BB, aldactone & Lasix. Will plan to reassess limited echo in 90 days and discussion about need for possible AICD reviewed. Questions/concerns addressed. 2. Chronic systolic CHF - stable. No signs of volume overload on exam. Discussion as above. Continue present medications  3. HTN - stable.  Continue PO CCB, ACE, & BB.  F/U in 3 months after limited echo    Kymberly Mueller, [facility]

## 2022-01-19 NOTE — ED PROVIDER NOTES
[facility] ED  Emergency Department Encounter  EmergencyMedicine Resident     Pt Name:Pavan Lara  MRN: [id number]  [place] 1955  Date of evaluation: 22  PCP:  True Frye MD    CHIEF COMPLAINT       Chief Complaint   Patient presents with    Shortness of Breath     covid positive a few days ago, 76% on room air    Extremity Weakness       HISTORY OF PRESENT ILLNESS  (Location/Symptom, Timing/Onset, Context/Setting, Quality, Duration, Modifying Factors, Severity.)      Karen Pérez is a 74 y.o. male, with a history of COPD on no home O2, HTN, DM, and CAD, who presents with fatigue. Patient reports that 3 days ago he tested positive for COVID at home. Family notes that he was looking ill and more fatigued than normal.  Patient denies having any complaints at all including any fevers, chills, chest pain, shortness of breath, abdominal pain, nausea/vomit, numbness or tingling, weakness, falls, melena, fever, or dysuria. Patient notes that he is having his baseline COPD cough and has not worsened and there is no hemoptysis. PAST MEDICAL / SURGICAL / SOCIAL / FAMILY HISTORY      has a past medical history of COPD (chronic obstructive pulmonary disease) ([address] Diabetes mellitus ([address] Erectile dysfunction due to arterial insufficiency, Hypertension, Microalbuminuria due to type 2 diabetes mellitus ([address] and Overweight (BMI 25.0-29.9). has a past surgical history that includes Appendectomy and Total ankle arthroplasty. Social History     Socioeconomic History    Marital status:       Spouse name: Not on file    Number of children: Not on file    Years of education: Not on file    Highest education level: Not on file   Occupational History    Not on file   Tobacco Use    Smoking status: Light Tobacco Smoker     Packs/day: 0.50     Types: Cigarettes     Last attempt to quit: 2017     Years since quittin.5    Smokeless tobacco: Never Used   [employer] Tobacco comment: reports he quit smoking in the past 2 months   Substance and Sexual Activity    Alcohol use: No     Alcohol/week: 0.0 standard drinks    Drug use: No    Sexual activity: Not on file   Other Topics Concern    Not on file   Social History Narrative    Not on file     Social Determinants of Health     Financial Resource Strain: Low Risk     Difficulty of Paying Living Expenses: Not very hard   Food Insecurity: No Food Insecurity    Worried About Running Out of Food in the Last Year: Never true    Rakesh of Food in the Last Year: Never true   Transportation Needs:     Lack of Transportation (Medical): Not on file    Lack of Transportation (Non-Medical): Not on file   Physical Activity:     Days of Exercise per Week: Not on file    Minutes of Exercise per Session: Not on file   Stress:     Feeling of Stress : Not on file   Social Connections:     Frequency of Communication with Friends and Family: Not on file    Frequency of Social Gatherings with Friends and Family: Not on file    Attends Taoist Services: Not on file    Active Member of [address] [place] [address] or Organizations: Not on file    Attends Club or Organization Meetings: Not on file    Marital Status: Not on file   Intimate Partner Violence:     Fear of Current or Ex-Partner: Not on file    Emotionally Abused: Not on file    Physically Abused: Not on file    Sexually Abused: Not on file   Housing Stability:     Unable to Pay for Housing in the Last Year: Not on file    Number of [place] in the Last Year: Not on file    Unstable Housing in the Last Year: Not on file       No family history on file. Allergies:  Patient has no known allergies. Home Medications:  Prior to Admission medications    Medication Sig Start Date End Date Taking?  Authorizing Provider   amLODIPine (NORVASC) 10 MG tablet TAKE 1 TABLET BY MOUTH DAILY 10/25/21   Marino Starr MD   aspirin ( ASPIRIN EC LOW DOSE) 81 MG EC tablet TAKE 1 TABLET BY MOUTH DAILY 8/26/21   Edison Martinez MD   furosemide (LASIX) 20 MG tablet Take 1 tablet by mouth daily 8/26/21   Edison Martinez MD   Blood Pressure KIT Check blood pressure daily 8/26/21   Edison Martinez MD   albuterol sulfate HFA (PROAIR HFA) 108 (90 Base) MCG/ACT inhaler inhale 2 puffs by mouth every 6 hours if needed for wheezing 8/16/21   Te White MD   fluticasone-salmeterol (ADVAIR DISKUS) 100-50 MCG/DOSE diskus inhaler INHALE 1 PUFF INTO THE LUNGS EVERY 12 HOURS 8/16/21   Te White MD   carvedilol (COREG) 25 MG tablet Take 1 tablet by mouth 2 times daily 8/16/21   Angel Pham MD   atorvastatin (LIPITOR) 20 MG tablet Take 1 tablet by mouth daily 7/21/21   Matilde Ward MD   lisinopril (PRINIVIL;ZESTRIL) 20 MG tablet Take 1 tablet by mouth daily 7/21/21   Matilde Ward MD   ipratropium (ATROVENT HFA) 17 MCG/ACT inhaler Inhale 1 puff into the lungs 2 times daily 9/2/20 10/2/20  Nick Aquino MD   tiotropium (Buzz Lean) 18 MCG inhalation capsule Inhale 1 capsule into the lungs daily 1/9/20   Nick Aquino MD   nicotine (NICODERM CQ) 14 MG/24HR Place 1 patch onto the skin daily 12/25/16   Marilu Pepe MD       REVIEW OF SYSTEMS    (2-9 systems for level 4, 10 or more for level 5)      Review of Systems   Constitutional: Negative for chills, diaphoresis, fatigue and fever. HENT: Negative for congestion and sore throat. Eyes: Negative for photophobia and visual disturbance. Respiratory: Negative for cough and shortness of breath. Cardiovascular: Negative for chest pain and palpitations. Gastrointestinal: Negative for abdominal pain, nausea and vomiting. Genitourinary: Negative for dysuria and hematuria. Musculoskeletal: Negative for arthralgias and myalgias. Skin: Negative for rash and wound. Neurological: Negative for weakness and numbness.        PHYSICAL EXAM   (up to 7 for level 4, 8 or more for level 5)      INITIAL VITALS:   BP (!) 116/55   Pulse 67   Temp 100.7 °F (38.2 °C) (Oral)   Resp 22   Ht 5' 5\" (1.651 m)   Wt 168 lb (76.2 kg)   SpO2 95%   BMI 27.96 kg/m²     Physical Exam  Vitals and nursing note reviewed. Constitutional:       General: He is not in acute distress. Appearance: Normal appearance. He is well-developed and normal weight. He is ill-appearing (chronic). He is not toxic-appearing or diaphoretic. HENT:      Head: Normocephalic and atraumatic. Right Ear: External ear normal.      Left Ear: External ear normal.      Nose: Nose normal.   Eyes:      General: No scleral icterus. Extraocular Movements: Extraocular movements intact. Conjunctiva/sclera: Conjunctivae normal.      Pupils: Pupils are equal, round, and reactive to light. Neck:      Vascular: No JVD. Trachea: No tracheal deviation. Cardiovascular:      Rate and Rhythm: Normal rate and regular rhythm. Pulses: Normal pulses. Heart sounds: Normal heart sounds, S1 normal and S2 normal. No murmur heard. No friction rub. No gallop. Pulmonary:      Effort: Pulmonary effort is normal. No respiratory distress. Breath sounds: Normal breath sounds. Abdominal:      General: Abdomen is flat. There is no distension. Palpations: Abdomen is soft. Tenderness: There is no abdominal tenderness. There is no guarding or rebound. Musculoskeletal:         General: No swelling or tenderness. Normal range of motion. Cervical back: Normal range of motion and neck supple. No rigidity. Skin:     General: Skin is warm and dry. Capillary Refill: Capillary refill takes less than 2 seconds. Neurological:      Mental Status: He is alert. He is disoriented. Motor: No abnormal muscle tone. Comments: Alert to person and place, but not time  Following commands.   Sensation grossly intact  Moving all extremities         DIFFERENTIAL  DIAGNOSIS     PLAN (LABS / IMAGING / EKG):  Orders Placed This Encounter Procedures    Culture, Blood 1    Culture, Blood 2    COVID-19, Rapid    Culture, Urine    XR CHEST PORTABLE    CT HEAD WO CONTRAST    CBC auto differential    Comprehensive Metabolic Panel w/ Reflex to MG    Urinalysis    Lactate, Sepsis    APTT    Protime-INR    ETHANOL    C-Reactive Protein    D-Dimer, Quantitative    Ferritin    Lactate Dehydrogenase    Procalcitonin    Brain Natriuretic Peptide    Troponin    Troponin    Telemetry monitoring - continuous duration    Inpatient consult to Family Practice    Initiate Oxygen Therapy Protocol    POC Blood Gas    PATIENT STATUS (FROM ED OR OR/PROCEDURAL) Inpatient       MEDICATIONS ORDERED:  Orders Placed This Encounter   Medications    sodium chloride flush 0.9 % injection 5-40 mL    sodium chloride flush 0.9 % injection 5-40 mL    0.9 % sodium chloride infusion    dexamethasone (DECADRON) injection 6 mg    ibuprofen (ADVIL;MOTRIN) tablet 800 mg    enoxaparin (LOVENOX) injection 80 mg    0.9 % sodium chloride bolus       DDX: COVID-19, intracranial hemorrhage, PNA, ACS/MI, arrhythmia, anemia, electrolyte abnormality    DIAGNOSTIC RESULTS / EMERGENCY DEPARTMENT COURSE / MDM   LAB RESULTS:  Results for orders placed or performed during the hospital encounter of 01/18/22   Culture, Blood 1    Specimen: Blood   Result Value Ref Range    Specimen Description . BLOOD     Special Requests  R WRIST 10ML     Culture NO GROWTH <24 HRS    Culture, Blood 2    Specimen: Blood   Result Value Ref Range    Specimen Description . BLOOD     Special Requests  L AC 10ML     Culture NO GROWTH <24 HRS    COVID-19, Rapid    Specimen: Nasopharyngeal Swab   Result Value Ref Range    Specimen Description . NASOPHARYNGEAL SWAB     SARS-CoV-2, Rapid DETECTED (A) Not Detected   CBC auto differential   Result Value Ref Range    WBC 6.7 3.5 - 11.3 k/uL    RBC 3.67 (L) 4.21 - 5.77 m/uL    Hemoglobin 11.1 (L) 13.0 - 17.0 g/dL    Hematocrit 34.4 (L) 40.7 - 50.3 % MCV 93.7 82.6 - 102.9 fL    MCH 30.2 25.2 - 33.5 pg    MCHC 32.3 28.4 - 34.8 g/dL    RDW 12.9 11.8 - 14.4 %    Platelets 185 907 - 994 k/uL    MPV 10.4 8.1 - 13.5 fL    NRBC Automated 0.0 0.0 per 100 WBC    Differential Type NOT REPORTED     Seg Neutrophils PENDING %    Lymphocytes PENDING %    Monocytes PENDING %    Eosinophils % PENDING %    Basophils PENDING %    Immature Granulocytes PENDING 0 %    Segs Absolute PENDING k/uL    Absolute Lymph # PENDING k/uL    Absolute Mono # PENDING k/uL    Absolute Eos # PENDING k/uL    Basophils Absolute PENDING 0.0 - 0.2 k/uL    Absolute Immature Granulocyte PENDING 0.00 - 0.30 k/uL    WBC Morphology NOT REPORTED     RBC Morphology NOT REPORTED     Platelet Estimate NOT REPORTED    Comprehensive Metabolic Panel w/ Reflex to MG   Result Value Ref Range    Glucose 133 (H) 70 - 99 mg/dL    BUN 63 (H) 8 - 23 mg/dL    CREATININE 2.03 (H) 0.70 - 1.20 mg/dL    Bun/Cre Ratio NOT REPORTED 9 - 20    Calcium 8.5 (L) 8.6 - 10.4 mg/dL    Sodium 132 (L) 135 - 144 mmol/L    Potassium 4.6 3.7 - 5.3 mmol/L    Chloride 96 (L) 98 - 107 mmol/L    CO2 22 20 - 31 mmol/L    Anion Gap 14 9 - 17 mmol/L    Alkaline Phosphatase 79 40 - 129 U/L    ALT 8 5 - 41 U/L    AST 21 <40 U/L    Total Bilirubin 0.60 0.3 - 1.2 mg/dL    Total Protein 6.2 (L) 6.4 - 8.3 g/dL    Albumin 3.4 (L) 3.5 - 5.2 g/dL    Albumin/Globulin Ratio 1.2 1.0 - 2.5    GFR Non- 33 (L) >60 mL/min    GFR African American 40 (L) >60 mL/min    GFR Comment          GFR Staging NOT REPORTED    Lactate, Sepsis   Result Value Ref Range    Lactic Acid, Sepsis NOT REPORTED 0.5 - 1.9 mmol/L    Lactic Acid, Sepsis, Whole Blood 1.1 0.5 - 1.9 mmol/L   APTT   Result Value Ref Range    PTT 27.2 20.5 - 30.5 sec   Protime-INR   Result Value Ref Range    Protime 10.2 9.1 - 12.3 sec    INR 0.9    ETHANOL   Result Value Ref Range    Ethanol <10 <10 mg/dL    Ethanol percent <0.010 <0.010 %   D-Dimer, Quantitative   Result Value Ref Range D-Dimer, Quant 1.39 mg/L FEU   Ferritin   Result Value Ref Range    Ferritin 353 30 - 400 ug/L   Lactate Dehydrogenase   Result Value Ref Range     (H) 135 - 225 U/L   Troponin   Result Value Ref Range    Troponin, High Sensitivity PENDING 0 - 22 ng/L    Troponin T NOT REPORTED <0.03 ng/mL    Troponin Interp NOT REPORTED        IMPRESSION: 78-year-old male presents for fatigue from home after being positive for COVID for 3 days. Patient appears to be in no acute acute distress but chronically ill-appearing. Patient's vitals reveal tachypnea of 22 with a temperature of 100.7 °F but otherwise stable nonconcerning. Patient speaking in full some slight difficulty. No signs respiratory distress or adventitious lung sounds. Hemodynamically stable. Abdomen is benign with no peritoneal signs. No signs of DVT on examination. No signs of trauma. Patient is only alert to person and place but not year was otherwise following commands moving all extremities appropriately. Concern for above differential diagnosis. Will order COVID swab, cardiac work-up along with infectious work-up, CT head, ibuprofen for the fever, and Decadron for the increased O2 requirements. Plan for admission. RADIOLOGY:  CT HEAD WO CONTRAST    Result Date: 1/18/2022  EXAMINATION: CT OF THE HEAD WITHOUT CONTRAST  1/18/2022 6:31 pm TECHNIQUE: CT of the head was performed without the administration of intravenous contrast. Dose modulation, iterative reconstruction, and/or weight based adjustment of the mA/kV was utilized to reduce the radiation dose to as low as reasonably achievable.  COMPARISON: CT scan dated March 3, 2015 HISTORY: ORDERING SYSTEM PROVIDED HISTORY: AMS TECHNOLOGIST PROVIDED HISTORY: AMS Decision Support Exception - unselect if not a suspected or confirmed emergency medical condition->Emergency Medical Condition (MA) Reason for Exam: COVID+, AMS FINDINGS: BRAIN/VENTRICLES: There is no acute intracranial hemorrhage, mass effect or midline shift. No abnormal extra-axial fluid collection. The gray-white differentiation is maintained without evidence of an acute infarct. There is no evidence of hydrocephalus. Generalized atrophy is present. 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 ORBITS: The visualized portion of the orbits demonstrate no acute abnormality. SINUSES: Mild degree of mucosal thickening is present within the right maxillary sinus. Minimal mucosal thickening is present within the left maxillary sinus. SOFT TISSUES/SKULL:  No acute abnormality of the visualized skull or soft tissues. No acute intracranial abnormality. XR CHEST PORTABLE    Result Date: 1/18/2022  EXAMINATION: ONE XRAY VIEW OF THE CHEST 1/18/2022 10:02 pm COMPARISON: 09/08/2019 HISTORY: ORDERING SYSTEM PROVIDED HISTORY: hypoxic TECHNOLOGIST PROVIDED HISTORY: hypoxic FINDINGS: Stable mild cardiomegaly. Pulmonary vasculature appears normal.  Mild patchy airspace opacity is present at the bilateral lower lung zones. No pneumothorax or pleural effusion. Surrounding structures are unremarkable. Mild bilateral lower lung zone patchy airspace opacity potentially representing early COVID pneumonia. Mild stable cardiomegaly. EKG  EKG Interpretation    Interpreted by emergency department physician    Rhythm: normal sinus   Rate: normal  Axis: normal  Ectopy: none  Conduction: normal  ST Segments: no acute change  T Waves: no acute change  Q Waves: none    Clinical Impression: no acute changes and now improved with upright T waves in the lateral leads.     Jacqueline Martinez DO      All EKG's are interpreted by the Emergency Department Physician who either signs or Co-signs this chart in the absence of a cardiologist.    EMERGENCY DEPARTMENT COURSE:  ED Course as of 01/19/22 0434   Tue Jan 18, 2022   2223 Lactic Acid, Sepsis, Whole Blood: 1.1 [CS]   4810 SARS-CoV-2, Rapid(!): DETECTED [CS]   0412 D-Dimer, Quant: 1.39 [CS]   3063 Creatinine(!): 2.03  Chon, likely prerenal given BUN/creat of 3 [CS]   1787 Due to COVID-19 along with weakness and hypoxia, plan to admit patient to the medicine service. Due to elevated D-dimer and hypoxemia of 75% on room air, will talk to them about starting Lovenox until they can use the CT scanner in the morning and as there is only one active CT scan at this time that is needed for [CS]   2342 Talked to Dr. Carmita Barba who accepted admission. Will contact family medicine resident for admission. In addition he did agree the patient should be getting a VQ scan tomorrow to rule out PE and in the meantime will receive a one-time dose of Lovenox. [CS]   Wed Jan 19, 2022   0009 Still awaiting troponin. Lab notes that due to complications there is no obvious confirmed troponin running despite him being placed in pending. They will look for the tube. In addition they are updated but a repeat troponin was already sent down. Await results. [CS]   0020 Patient is now hypotensive and diaphoretic with a MAP of 57 and a heart rate of anywhere from 45-52. Will repeat EKG, order another troponin, give atropine and placed on pacer pads. [CS]   8632 Repeat EKG potential bradycardia. EKG Interpretation    Interpreted by emergency department physician    Rhythm: sinus bradycardia  Rate: 40-50  Axis: normal  Ectopy: none  Conduction: none  ST Segments: no acute change  T Waves: no acute change  Q Waves: none    Clinical Impression: Sinus bradycardia with no new ST elevations or T wave inversions from prior EKG reviewed    Carlin Anne,    [CS]   0221 Patient received 1 mg of atropine. Heart rate improved to 66 and blood pressure is now 97/62 with map of 66. While patient initially had no complaints when he was hypotensive, patient notes that he is feeling better after the atropine.  Will consult cardiology. [CS]   9064 Troponin, High Sensitivity(!!): 73 [CS]   0110 I talked to cardiology and they are aware of patient's initial troponins and symptomatic bradycardia. They recommended patient receive 1 mg of glucagon if he is on beta-blockers and or calcium channel blockers, which he is on Coreg and Norvasc. Patient was also recommended as needed atropine once his heart goes below 40 and then is to be started on dopamine. Family medicine was updated about this result and the fact that he still appropriate for the stepdown at this time. [CS]      ED Course User Index  [CS] Johnny Valentino DO         PROCEDURES:  None    CONSULTS:  IP CONSULT TO FAMILY MEDICINE    CRITICAL CARE:  Please see attending note    FINAL IMPRESSION      1. COVID-19    2. Hypoxemia    3. Elevated d-dimer          DISPOSITION / PLAN     DISPOSITION Admitted 01/19/2022 12:03:53 AM      PATIENT REFERRED TO:  No follow-up provider specified.     DISCHARGE MEDICATIONS:  New Prescriptions    No medications on file       Johnny Valentino DO  Emergency Medicine Resident    (Please note that portions of thisnote were completed with a voice recognition program.  Efforts were made to edit the dictations but occasionally words are mis-transcribed.)       Johnny Valentino DO  Resident  01/19/22 0999

## 2022-01-19 NOTE — ED NOTES
Bed: 19  Expected date:   Expected time:   Means of arrival:   Comments:  Pushpa Renae RN  01/18/22 2104

## 2022-01-19 NOTE — H&P
[facility]  History & Physical Examination Note              Date:   1/19/2022  Patient name:  Beverly Valle  Date of admission:  1/18/2022  9:04 PM  MRN:   [id number]  YOB: 1955    CHIEF COMPLAINT:       Chief Complaint   Patient presents with    Shortness of Breath     covid positive a few days ago, 76% on room air    Extremity Weakness       History Obtained From:  Patient and chart review. HPI:     The patient is a 74 y.o.  male with history of hx COPD, CHF with EF 20-30% presented with SOB, fatigue. He was diagnosed with COVID-19 3 days ago at home. Per family, he has been looking more ill over the past 3 days and so was brought to the ED. Patient was intially sat'ing 76% on RA when EMS arrived, then was sat'ing high 80's with NC so he was put on NRB on which he is currently sat'ing >90%. Patient denied any other concerns at this time. In the ED, there was concern for possible PE however unable to get CT PE so patient was given lovenox which he was agreeable with. Patient also later became hypotensive, bradycardic and diaphoretic in the ED with HR down to 42. Cardiology was consulted. Patient was given 1 mg atropine, 500 ml IVF bolus and 1 mg glucagon. Patient's BP and bradycardia improved. Patient was admitted to step-down for further management of acute on chronic hypoxic respiratory failure. PAST MEDICAL HISTORY:        has a past medical history of COPD (chronic obstructive pulmonary disease) ([facility] Diabetes mellitus ([facility] Erectile dysfunction due to arterial insufficiency, Hypertension, Microalbuminuria due to type 2 diabetes mellitus ([facility] and Overweight (BMI 25.0-29.9). PAST SURGICAL HISTORY:      has a past surgical history that includes Appendectomy and Total ankle arthroplasty. FAMILY HISTORY:     family history is not on file.     HOME MEDICATIONS:     Prior to Admission medications    Medication Sig Start Date End Date Taking? Authorizing Provider   amLODIPine (NORVASC) 10 MG tablet TAKE 1 TABLET BY MOUTH DAILY 10/25/21   Meet Mills MD   aspirin (HM ASPIRIN EC LOW DOSE) 81 MG EC tablet TAKE 1 TABLET BY MOUTH DAILY 8/26/21   Rosalia García MD   furosemide (LASIX) 20 MG tablet Take 1 tablet by mouth daily 8/26/21   Rosalia García MD   Blood Pressure KIT Check blood pressure daily 8/26/21   Rosalia García MD   albuterol sulfate HFA (PROAIR HFA) 108 (90 Base) MCG/ACT inhaler inhale 2 puffs by mouth every 6 hours if needed for wheezing 8/16/21   Te Hoyt MD   fluticasone-salmeterol (ADVAIR DISKUS) 100-50 MCG/DOSE diskus inhaler INHALE 1 PUFF INTO THE LUNGS EVERY 12 HOURS 8/16/21   Te Hoyt MD   carvedilol (COREG) 25 MG tablet Take 1 tablet by mouth 2 times daily 8/16/21   Daniel Welsh MD   atorvastatin (LIPITOR) 20 MG tablet Take 1 tablet by mouth daily 7/21/21   Meet Mills MD   lisinopril (PRINIVIL;ZESTRIL) 20 MG tablet Take 1 tablet by mouth daily 7/21/21   Meet Mills MD   ipratropium (ATROVENT HFA) 17 MCG/ACT inhaler Inhale 1 puff into the lungs 2 times daily 9/2/20 10/2/20  Heather Nye MD   tiotropium (Eric Idler) 18 MCG inhalation capsule Inhale 1 capsule into the lungs daily 1/9/20   Heather Nye MD   nicotine (NICODERM CQ) 14 MG/24HR Place 1 patch onto the skin daily 12/25/16   Ismael Astorga MD       ALLERGIES:      Patient has no known allergies. SOCIAL HISTORY:      reports that he has been smoking cigarettes. He has been smoking about 0.50 packs per day. He has never used smokeless tobacco. He reports that he does not drink alcohol and does not use drugs. REVIEW OF SYSTEMS:     Review of Systems   Constitutional: Negative for chills and fever. Eyes: Negative for visual disturbance. Respiratory: Positive for shortness of breath. Negative for chest tightness. Cardiovascular: Negative for chest pain and leg swelling. Gastrointestinal: Negative for abdominal pain, constipation, diarrhea, nausea and vomiting. Genitourinary: Negative for difficulty urinating. Neurological: Negative for headaches. PHYSICAL EXAM:     Vitals:    01/19/22 0006 01/19/22 0033 01/19/22 0150 01/19/22 0400   BP: (!) 91/46 (!) 97/52 (!) 100/53 (!) 79/41   Pulse: (!) 48 63 57 51   Resp: 18 22 16 18   Temp:   97.6 °F (36.4 °C) 97.6 °F (36.4 °C)   TempSrc:   Oral Oral   SpO2: 99% 100% 98% 99%   Weight:   152 lb 8 oz (69.2 kg)    Height:           No intake or output data in the 24 hours ending 01/19/22 0602    Physical Exam  Constitutional:       Appearance: He is well-developed. HENT:      Head: Normocephalic and atraumatic. Eyes:      Extraocular Movements: Extraocular movements intact. Cardiovascular:      Rate and Rhythm: Regular rhythm. Bradycardia present. Heart sounds: Normal heart sounds. Pulmonary:      Effort: Respiratory distress (on NRB) present. Breath sounds: No wheezing or rales. Abdominal:      General: Bowel sounds are normal.      Palpations: Abdomen is soft. Tenderness: There is no abdominal tenderness. Skin:     General: Skin is warm and dry. Neurological:      General: No focal deficit present. Mental Status: He is alert. DIAGNOSTICS:      Laboratory Testing:    Recent Results (from the past 24 hour(s))   Culture, Blood 1    Collection Time: 01/18/22  9:10 PM    Specimen: Blood   Result Value Ref Range    Specimen Description . BLOOD     Special Requests  R WRIST 10ML     Culture NO GROWTH <24 HRS    Culture, Blood 2    Collection Time: 01/18/22  9:20 PM    Specimen: Blood   Result Value Ref Range    Specimen Description . BLOOD     Special Requests  L AC 10ML     Culture NO GROWTH <24 HRS    COVID-19, Rapid    Collection Time: 01/18/22  9:38 PM    Specimen: Nasopharyngeal Swab   Result Value Ref Range    Specimen Description . NASOPHARYNGEAL SWAB     SARS-CoV-2, Rapid DETECTED (A) Not Detected   CBC auto differential    Collection Time: 01/18/22  9:41 PM   Result Value Ref Range    WBC 6.7 3.5 - 11.3 k/uL    RBC 3.67 (L) 4.21 - 5.77 m/uL    Hemoglobin 11.1 (L) 13.0 - 17.0 g/dL    Hematocrit 34.4 (L) 40.7 - 50.3 %    MCV 93.7 82.6 - 102.9 fL    MCH 30.2 25.2 - 33.5 pg    MCHC 32.3 28.4 - 34.8 g/dL    RDW 12.9 11.8 - 14.4 %    Platelets 608 119 - 104 k/uL    MPV 10.4 8.1 - 13.5 fL    NRBC Automated 0.0 0.0 per 100 WBC    Differential Type NOT REPORTED     WBC Morphology NOT REPORTED     RBC Morphology NOT REPORTED     Platelet Estimate NOT REPORTED     Immature Granulocytes 0 0 %    Seg Neutrophils 84 (H) 36 - 66 %    Lymphocytes 10 (L) 24 - 44 %    Monocytes 6 1 - 7 %    Eosinophils % 0 (L) 1 - 4 %    Basophils 0 0 - 2 %    Absolute Immature Granulocyte 0.00 0.00 - 0.30 k/uL    Segs Absolute 5.63 1.8 - 7.7 k/uL    Absolute Lymph # 0.67 (L) 1.0 - 4.8 k/uL    Absolute Mono # 0.40 0.1 - 0.8 k/uL    Absolute Eos # 0.00 0.0 - 0.4 k/uL    Basophils Absolute 0.00 0.0 - 0.2 k/uL    Morphology Normal    Comprehensive Metabolic Panel w/ Reflex to MG    Collection Time: 01/18/22  9:41 PM   Result Value Ref Range    Glucose 133 (H) 70 - 99 mg/dL    BUN 63 (H) 8 - 23 mg/dL    CREATININE 2.03 (H) 0.70 - 1.20 mg/dL    Bun/Cre Ratio NOT REPORTED 9 - 20    Calcium 8.5 (L) 8.6 - 10.4 mg/dL    Sodium 132 (L) 135 - 144 mmol/L    Potassium 4.6 3.7 - 5.3 mmol/L    Chloride 96 (L) 98 - 107 mmol/L    CO2 22 20 - 31 mmol/L    Anion Gap 14 9 - 17 mmol/L    Alkaline Phosphatase 79 40 - 129 U/L    ALT 8 5 - 41 U/L    AST 21 <40 U/L    Total Bilirubin 0.60 0.3 - 1.2 mg/dL    Total Protein 6.2 (L) 6.4 - 8.3 g/dL    Albumin 3.4 (L) 3.5 - 5.2 g/dL    Albumin/Globulin Ratio 1.2 1.0 - 2.5    GFR Non- 33 (L) >60 mL/min    GFR African American 40 (L) >60 mL/min    GFR Comment          GFR Staging NOT REPORTED    Lactate, Sepsis    Collection Time: 01/18/22  9:41 PM   Result Value Ref Range    Lactic Acid, Sepsis NOT REPORTED 0.5 - 1.9 mmol/L    Lactic Acid, Sepsis, Whole Blood 1.1 0.5 - 1.9 mmol/L   APTT    Collection Time: 01/18/22  9:41 PM   Result Value Ref Range    PTT 27.2 20.5 - 30.5 sec   Protime-INR    Collection Time: 01/18/22  9:41 PM   Result Value Ref Range    Protime 10.2 9.1 - 12.3 sec    INR 0.9    ETHANOL    Collection Time: 01/18/22  9:41 PM   Result Value Ref Range    Ethanol <10 <10 mg/dL    Ethanol percent <0.010 <0.010 %   C-Reactive Protein    Collection Time: 01/18/22  9:41 PM   Result Value Ref Range    .4 (H) 0.0 - 5.0 mg/L   D-Dimer, Quantitative    Collection Time: 01/18/22  9:41 PM   Result Value Ref Range    D-Dimer, Quant 1.39 mg/L FEU   Ferritin    Collection Time: 01/18/22  9:41 PM   Result Value Ref Range    Ferritin 353 30 - 400 ug/L   Lactate Dehydrogenase    Collection Time: 01/18/22  9:41 PM   Result Value Ref Range     (H) 135 - 225 U/L   Brain Natriuretic Peptide    Collection Time: 01/18/22  9:41 PM   Result Value Ref Range    Pro- (H) <300 pg/mL    BNP Interpretation NOT REPORTED    Troponin    Collection Time: 01/18/22  9:41 PM   Result Value Ref Range    Troponin, High Sensitivity 73 (HH) 0 - 22 ng/L    Troponin T NOT REPORTED <0.03 ng/mL    Troponin Interp NOT REPORTED    Lactate, Sepsis    Collection Time: 01/18/22 10:54 PM   Result Value Ref Range    Lactic Acid, Sepsis NOT REPORTED 0.5 - 1.9 mmol/L    Lactic Acid, Sepsis, Whole Blood 0.8 0.5 - 1.9 mmol/L   MAGNESIUM    Collection Time: 01/19/22 12:27 AM   Result Value Ref Range    Magnesium 1.7 1.6 - 2.6 mg/dL   Troponin    Collection Time: 01/19/22 12:27 AM   Result Value Ref Range    Troponin, High Sensitivity 70 (HH) 0 - 22 ng/L    Troponin T NOT REPORTED <0.03 ng/mL    Troponin Interp NOT REPORTED          Imaging/Diagonstics:  CT HEAD WO CONTRAST    Result Date: 1/18/2022  EXAMINATION: CT OF THE HEAD WITHOUT CONTRAST  1/18/2022 6:31 pm TECHNIQUE: CT of the head was performed without the administration of chronic respiratory failure with hypoxia (HCC)  Resolved Problems:    * No resolved hospital problems. *      PLAN:     Patient status: Admit the patient as Inpatient in the Progressive Unit    Acute on chronic hypoxic respiratory failure 2/2 COVID-19 pneumonia in the setting of COPD/CHF  - COVID+  - ID consulted  - NRB to maintain O2 sats, wean off as tolerated  - .4, check daily CRP  - ferritin 353  - CXR: mild bilateral lower lung zone patchy air space opacity, representing early COVID pneumonia  - concern for PE, was given lovenox 80 mg in the ER, unable to obtain CT PE 2/2 to UYEN, will order V/Q scan. Will also consider heparin gtt  - continue decadron  - RT aerosol protocol  - continue spiriva and symbicort    UYEN likely prerenal  - Cr 2, baseline ~ 1  - s/p 500 ml fluid bolus  - continue gentle hydration 75 ml/hr  - monitor carefully for fluid overload 2/2 history of CHF  - daily BMP  - calculate FeNa    Bradycardia  - HR 42 in the ER, was given atropine 1 mg and glucagon 1 mg  - holding his home betablocker  - telemetry  - cardiology consulted  - recommended atropine PRN.  If HR drops < 40 or patient becomes symptomatic rubi, recommended starting dopamine gtt    HFrEF  - no signs of fluid overload  - holding lisinopril/coreg 2/2 hypotension  - EF 20-30%      DVT prophylaxis: already anticoagulated with lovenox  GI prophylaxis: reason for no GI prophylaxis: not indicated      Consultations:   Consults: IP CONSULT TO FAMILY MEDICINE  IP CONSULT TO CARDIOLOGY  IP CONSULT TO INFECTIOUS DISEASES  PT/OT       The patient's medical condition (specify acute hypoxic repiratory failure) would be significantly and directly threatened if care was provided in a less intensive setting      Above plan discussed with the patient in room, who agreed to the above plan     Plan will be discussed with the attending, Dr. Elda Tierney MD  Family Medicine Resident  1/19/2022 6:02 AM

## 2022-01-19 NOTE — TELEPHONE ENCOUNTER
----- Message from Niravterry Maldonado sent at 1/18/2022  4:52 PM EST -----  Subject: Appointment Request    Reason for Call: Routine (Patient Request) No Script    QUESTIONS  Type of Appointment? Established Patient  Reason for appointment request? Available appointments did not meet   patient need  Additional Information for Provider? Patient son is calling get his father   in. He is not eating has COPD and he just wanted him to come in for a well   check up. he does not have a phone that can do VV so he would like to come   in.   ---------------------------------------------------------------------------  --------------  [address]  What is the best way for the office to contact you? OK to leave message on   voicemail  Preferred Call Back Phone Number? [phone]  ---------------------------------------------------------------------------  --------------  SCRIPT ANSWERS  Relationship to Patient? Other  Representative Name? Stanislaw Ricci  Additional information verified (besides Name and Date of Birth)? Address  (Is the patient requesting to see the provider for a procedure?)? No  (Is the patient requesting to see the provider urgently  today or   tomorrow. )? No  Have you been diagnosed with, awaiting test results for, or told that you   are suspected of having COVID-19 (Coronavirus)? (If patient has tested   negative or was tested as a requirement for work, school, or travel and   not based on symptoms, answer no)? No  Within the past two weeks have you developed any of the following symptoms   (answer no if symptoms have been present longer than 2 weeks or began   more than 2 weeks ago)? Fever or Chills, Cough, Shortness of breath or   difficulty breathing, Loss of taste or smell, Sore throat, Nasal   congestion, Sneezing or runny nose, Fatigue or generalized body aches   (answer no if pain is specific to a body part e.g. back pain), Diarrhea,   Headache?  Yes

## 2022-01-19 NOTE — CONSULTS
Attestation signed by      Attending Physician Statement:    I have discussed the care of  Ted Son , including pertinent history and exam findings, with the Cardiology fellow/resident. I agree with the assessment, plan and orders as documented by the fellow/resident, after I modified exam findings and plan of treatments, and the final version is my approved version of the assessment. Additional Comments:     Patient with underlying HFrEF secondary to nonischemic cardiomyopathy, last known LVEF 20% in 2019, no follow-up in chart after, now admitted with acute hypoxic respiratory failure secondary to COVID-19 infection and sepsis. Noted to be hypotensive and bradycardic this morning, improved with atropine and fluid bolus. Was on Coreg 25 mg at home. Current HR 50-60. BP is borderline. CXR shows no plueral effusions/pulmonary edema. Hold all AVN blocking agents   Glucagon 1 mg x 1   Will start dopamine infusion if Hr remains < 60  Gentle IVF  Monitor strict I/O  Repeat echocardiogram   Will likely need dual chamber ICD in near future based on LVEF and further episodes of bradycardia          [facility]   Consultation Note               Today's Date: 1/19/2022  Patient Name: Ted Son  Date of admission: 1/18/2022  9:04 PM  Patient's age: 74 y.o., 1955  Admission Dx: Hypoxemia [R09.02]  Elevated d-dimer [R79.89]  Acute on chronic respiratory failure with hypoxia ([facility] [address] [J96.21]  COVID-19 [U07.1]    Reason for Consult:  Bradycardia, HFref    Requesting Physician: Bora Conklin DO    CHIEF COMPLAINT:    Chief Complaint   Patient presents with    Shortness of Breath     covid positive a few days ago, 76% on room air    Extremity Weakness       History Obtained From:  patient, electronic medical record    HISTORY OF PRESENT ILLNESS:      The patient is a 74 y.o.   male who was admitted to the hospital for progressive dyspnea after a recent diagnosis of covid pneumonia. He was brought in by EMS and was noted to be hypoxic to 70s. He was started on non rebreather. In the ED he was noted to have significant bradycardia with HR in 40s with diaphoresis. He takes BB at home. He was given atropine, glucagon and his vitals improved. PMH significant for NICM and HFrEF. Past Medical History:   has a past medical history of COPD (chronic obstructive pulmonary disease) ([facility] [address] mellitus ([facility] Erectile dysfunction due to arterial insufficiency, Hypertension, Microalbuminuria due to type 2 diabetes mellitus ([facility] and Overweight (BMI 25.0-29.9). Past Surgical History:   has a past surgical history that includes Appendectomy and Total ankle arthroplasty. Home Medications:    Prior to Admission medications    Medication Sig Start Date End Date Taking?  Authorizing Provider   amLODIPine (NORVASC) 10 MG tablet TAKE 1 TABLET BY MOUTH DAILY 10/25/21   Isaura Sue MD   aspirin (HM ASPIRIN EC LOW DOSE) 81 MG EC tablet TAKE 1 TABLET BY MOUTH DAILY 8/26/21   Ac Collier MD   furosemide (LASIX) 20 MG tablet Take 1 tablet by mouth daily 8/26/21   Ac Collier MD   Blood Pressure KIT Check blood pressure daily 8/26/21   Ac Collier MD   albuterol sulfate HFA (PROAIR HFA) 108 (90 Base) MCG/ACT inhaler inhale 2 puffs by mouth every 6 hours if needed for wheezing 8/16/21   Te Herr MD   fluticasone-salmeterol (ADVAIR DISKUS) 100-50 MCG/DOSE diskus inhaler INHALE 1 PUFF INTO THE LUNGS EVERY 12 HOURS 8/16/21   Te Herr MD   carvedilol (COREG) 25 MG tablet Take 1 tablet by mouth 2 times daily 8/16/21   Natanael Kimball MD   atorvastatin (LIPITOR) 20 MG tablet Take 1 tablet by mouth daily 7/21/21   Isaura Sue MD   lisinopril (PRINIVIL;ZESTRIL) 20 MG tablet Take 1 tablet by mouth daily 7/21/21   Isaura Sue MD   ipratropium (ATROVENT HFA) 17 MCG/ACT inhaler Inhale 1 puff into the lungs 2 times daily 9/2/20 10/2/20 Suresh Franco MD   tiotropium (Arvella Ends) 18 MCG inhalation capsule Inhale 1 capsule into the lungs daily 1/9/20   Suresh Franco MD   nicotine (NICODERM CQ) 14 MG/24HR Place 1 patch onto the skin daily 12/25/16   Wellington Huang MD      Current Facility-Administered Medications: [Held by provider] amLODIPine (NORVASC) tablet 10 mg, 10 mg, Oral, Daily  aspirin EC tablet 81 mg, 81 mg, Oral, Daily  atorvastatin (LIPITOR) tablet 20 mg, 20 mg, Oral, Daily  [Held by provider] carvedilol (COREG) tablet 25 mg, 25 mg, Oral, BID  budesonide-formoterol (SYMBICORT) 80-4.5 MCG/ACT inhaler 2 puff, 2 puff, Inhalation, BID  [Held by provider] furosemide (LASIX) tablet 20 mg, 20 mg, Oral, Daily  [Held by provider] lisinopril (PRINIVIL;ZESTRIL) tablet 20 mg, 20 mg, Oral, Daily  tiotropium (SPIRIVA RESPIMAT) 2.5 MCG/ACT inhaler 2 puff, 2 puff, Inhalation, Daily  sodium chloride flush 0.9 % injection 5-40 mL, 5-40 mL, IntraVENous, 2 times per day  sodium chloride flush 0.9 % injection 5-40 mL, 5-40 mL, IntraVENous, PRN  0.9 % sodium chloride infusion, 25 mL, IntraVENous, PRN  ondansetron (ZOFRAN-ODT) disintegrating tablet 4 mg, 4 mg, Oral, Q8H PRN **OR** ondansetron (ZOFRAN) injection 4 mg, 4 mg, IntraVENous, Q6H PRN  polyethylene glycol (GLYCOLAX) packet 17 g, 17 g, Oral, Daily PRN  acetaminophen (TYLENOL) tablet 650 mg, 650 mg, Oral, Q6H PRN **OR** acetaminophen (TYLENOL) suppository 650 mg, 650 mg, Rectal, Q6H PRN  0.9 % sodium chloride infusion, , IntraVENous, Continuous  dexamethasone (DECADRON) tablet 6 mg, 6 mg, Oral, Daily  Vitamin D (CHOLECALCIFEROL) tablet 2,000 Units, 2,000 Units, Oral, Daily  albuterol sulfate  (90 Base) MCG/ACT inhaler 2 puff, 2 puff, Inhalation, Q6H PRN  tocilizumab (ACTEMRA) 600 mg in sodium chloride 0.9 % 100 mL IVPB, 600 mg, IntraVENous, Once  sodium chloride flush 0.9 % injection 5-40 mL, 5-40 mL, IntraVENous, 2 times per day  sodium chloride flush 0.9 % injection 5-40 mL, 5-40 mL, IntraVENous, PRN  0.9 % sodium chloride infusion, 25 mL, IntraVENous, PRN      Allergies:  Patient has no known allergies. Social History:   reports that he has been smoking cigarettes. He has been smoking about 0.50 packs per day. He has never used smokeless tobacco. He reports that he does not drink alcohol and does not use drugs. Family History: family history is not on file. No h/o sudden cardiac death. No for premature CAD      REVIEW OF SYSTEMS:    Constitutional: there has been no unanticipated weight loss. Eyes: No visual changes or diplopia. ENT: No Headaches  Cardiovascular: see above  Respiratory: No previous pulmonary problems, No cough  Gastrointestinal: No abdominal pain. No change in bowel or bladder habits. Genitourinary: No dysuria, trouble voiding, or hematuria. Musculoskeletal:  No gait disturbance, No weakness or joint complaints. Integumentary: No rash or pruritis. Neurological: No headache, diplopia      PHYSICAL EXAM:      BP (!) 89/48   Pulse 52   Temp 97.6 °F (36.4 °C) (Oral)   Resp 18   Ht 5' 5\" (1.651 m)   Wt 152 lb 8 oz (69.2 kg)   SpO2 (!) 85%   BMI 25.38 kg/m²    Deferred due to active COVID infection. Refer to primary teams note. DATA:    Diagnostics:    Labs:     CBC:   Recent Labs     01/18/22 2141 01/19/22  0551   WBC 6.7 6.5   HGB 11.1* 10.7*   HCT 34.4* 33.2*    166     BMP:   Recent Labs     01/18/22 2141 01/19/22  0551   * 137   K 4.6 5.2   CO2 22 21   BUN 63* 69*   CREATININE 2.03* 1.84*   LABGLOM 33* 37*   GLUCOSE 133* 186*     BNP: No results for input(s): BNP in the last 72 hours. PT/INR:   Recent Labs     01/18/22 2141   PROTIME 10.2   INR 0.9     APTT:  Recent Labs     01/18/22 2141   APTT 27.2     CARDIAC ENZYMES:  Recent Labs     01/18/22 2141 01/19/22  0027   TROPHS 73* 70*     No results for input(s): CKTOTAL, CKMB, CKMBINDEX, TROPONINI in the last 72 hours.     Invalid input(s):  [address] 01/18/22 2141 01/19/22  0027   TROPONINT NOT REPORTED NOT REPORTED     FASTING LIPID PANEL:  Lab Results   Component Value Date    HDL 34 09/06/2019    LDLCALC 24 07/01/2016    TRIG 80 09/06/2019     LIVER PROFILE:  Recent Labs     01/18/22 2141 01/19/22  0551   AST 21 19   ALT 8 8   LABALBU 3.4* 3.0*       ECHO 12/10/19: EF 50%, small pericardial effusion, limited study.      STRESS 9/9/19: No ischemia. Infarct of the inferoapical wall. EF 27%.      ZOLTAN 2/26/16: LVSF normal. Echogenicity on the right side of atrial septum suspicious for tumor vs vegetation.      CATH 5/11/15: Normal coronaries, EF 35%. IMPRESSION:    Acute on chronic resp failure  COVID pna  Sinus bradycardia s/p atropine and glucagon in ED  Hypotension  NICM  HFrEF  HTN  DM  UYEN on CKD        RECOMMENDATIONS:  Bradycardia likely due to increased vagal tone due to hypoxia and concurrent beta blocker use at home. Hold AV jose de jesus blocking agents including BB/CCB. Bedside prn atropine for only symptomatic bradycardia <40 bpm.   Formal echo and outpatient AICD evaluation if EF continues to be <35%. Mild troponin elevation likely in setting of UYEN on CKD and respiratory failure  Will follow. Thank you for allowing us to participate in [address].        Electronically signed on 01/19/22 at 10:16 AM by:    Sy Freedman MD,   Fellow, [id number] [facility]

## 2022-01-19 NOTE — PROGRESS NOTES
Insert Arterial Line  Date/Time:  01/19/22, 5:07 PM  Performed by: Madison Gotti RCP    Patient identity confirmed: arm band and provided demographic data   Time out: Immediately prior to procedure a \"time out\" was called to verify the correct patient, procedure, equipment, support staff. Preparation: Patient was prepped and draped in the usual sterile fashion.     Location:left radial    Oskar's test normal: yes  Needle gauge: 20     Number of attempts: 1  Post-procedure: transparent dressing applied and line secured    Patient tolerance: well

## 2022-01-19 NOTE — CONSULTS
PULMONARY & CRITICAL CARE MEDICINE CONSULT NOTE     Patient:  Marjorie Mendez  MRN: [id number]  [facility] date: 1/18/2022  Primary Care Physician: Basil Doyle MD  Consulting Physician: Esther Bai DO  CODE Status: Full Code  LOS: 0     SUBJECTIVE     CHIEF COMPLAINT/REASON FOR CONSULT:    COVID-19 pneumonia/acute hypoxic respiratory failure/bradycardia. HISTORY OF PRESENT ILLNESS:  The patient is a 74 y.o. male history of chronic systolic heart failure apparently ejection fraction of 30% HFrEF, followed in CHF clinic, nonischemic cardiomyopathy, COPD, diabetes mellitus. Presented to emergency room on 01/18/2022 with progressive dyspnea. Symptoms were present for 3 to 4 days, patient is not sure that if he was having cough, fever loss of taste or smell and denies chest pain. In the emergency room patient was found to be hypoxic requiring nonrebreather he was found to be bradycardic with heart rate in 40s and did receive atropine and apparently glucagon. Patient on Coreg 25 mg twice daily at home lisinopril and Lasix. This morning patient was hypotensive require fluid bolus and systolic blood pressure was above 90 after the fluid bolus. He was also found to have UYEN with creatinine of 2.03 and this morning was 1.84. Patient has not been eating and drinking for last 3 to 4 days as he was not feeling well. He does not complain of wheezing purulent sputum hemoptysis chest pain. He was seen by infectious disease yesterday his CRP was elevated 209 his troponin was 73 repeated was 70 proBNP was 530 WBC count was 6.7 platelet 026. He was given 1 dose of Actemra and he was started on Decadron 6 mg once daily    When I saw him patient systolic blood pressure was 90s heart rate was in 40s he was alert and awake he did not have any syncope denies dizziness lightheadedness did not have chest pain patient did not require atropine this morning.   According to primary service they have contacted cardiology and they suggest dopamine/dobutamine. Cardiology was consulted and according to cardiology note atropine as needed there was no mention of dopamine or dobutamine in their note. He has history of smoking currently patient he stopped smoking 3 4 years ago history of smoking for more than 30 years 1 pack/day. PAST MEDICAL HISTORY:        Diagnosis Date    COPD (chronic obstructive pulmonary disease) ([facility] [place] [facility]     Diabetes mellitus ([facility] [place] [facility]     Erectile dysfunction due to arterial insufficiency 12/7/2015    Hypertension     Microalbuminuria due to type 2 diabetes mellitus ([facility] [place] [facility] 7/7/2016    Overweight (BMI 25.0-29.9) 12/7/2015     PAST SURGICAL HISTORY:        Procedure Laterality Date    APPENDECTOMY      TOTAL ANKLE ARTHROPLASTY      LEFT     FAMILY HISTORY:   No family history on file. SOCIAL HISTORY:   TOBACCO:   reports that he has been smoking cigarettes. He has been smoking about 0.50 packs per day. He has never used smokeless tobacco.  ETOH:  reports no history of alcohol use. DRUGS: reports no history of drug use. ALLERGIES:    No Known Allergies      HOME MEDICATIONS:  Prior to Admission medications    Medication Sig Start Date End Date Taking?  Authorizing Provider   amLODIPine (NORVASC) 10 MG tablet TAKE 1 TABLET BY MOUTH DAILY 10/25/21   Isaura Sue MD   aspirin (HM ASPIRIN EC LOW DOSE) 81 MG EC tablet TAKE 1 TABLET BY MOUTH DAILY 8/26/21   Ac Collier MD   furosemide (LASIX) 20 MG tablet Take 1 tablet by mouth daily 8/26/21   Ac Collier MD   Blood Pressure KIT Check blood pressure daily 8/26/21   Ac Collier MD   albuterol sulfate HFA (PROAIR HFA) 108 (90 Base) MCG/ACT inhaler inhale 2 puffs by mouth every 6 hours if needed for wheezing 8/16/21   Te Herr MD   fluticasone-salmeterol (ADVAIR DISKUS) 100-50 MCG/DOSE diskus inhaler INHALE 1 PUFF INTO THE LUNGS EVERY 12 HOURS 8/16/21   Te Herr MD   carvedilol (COREG) 25 MG tablet Take 1 tablet by mouth 2 times daily 8/16/21   Yolanda MD Deb   atorvastatin (LIPITOR) 20 MG tablet Take 1 tablet by mouth daily 7/21/21   Hailey Walton MD   lisinopril (PRINIVIL;ZESTRIL) 20 MG tablet Take 1 tablet by mouth daily 7/21/21   Hailey Walton MD   ipratropium (ATROVENT HFA) 17 MCG/ACT inhaler Inhale 1 puff into the lungs 2 times daily 9/2/20 10/2/20  Octavio Dorado MD   tiotropium (Cathi ) 18 MCG inhalation capsule Inhale 1 capsule into the lungs daily 1/9/20   Octavio Dorado MD   nicotine (NICODERM CQ) 14 MG/24HR Place 1 patch onto the skin daily 12/25/16   Eryn Morgan MD     IMMUNIZATIONS:  Most Recent Immunizations   Administered Date(s) Administered    Pneumococcal Conjugate 13-valent ([id number]) 08/02/2018    Pneumococcal Polysaccharide ([id number]) 11/15/2019    Tdap (Boostrix, Adacel) 08/02/2018     REVIEW OF SYSTEMS:  Review of Systems   Constitutional: Positive for appetite change and fatigue. Negative for fever. HENT: Negative for postnasal drip, sinus pain, sore throat, trouble swallowing and voice change. Eyes: Negative for photophobia, redness and visual disturbance. Respiratory: Positive for cough and shortness of breath. Negative for wheezing. Cardiovascular: Negative for chest pain, palpitations and leg swelling. Gastrointestinal: Negative for abdominal pain, diarrhea and vomiting. Endocrine: Negative for polydipsia, polyphagia and polyuria. Genitourinary: Negative for dysuria, frequency and hematuria. Musculoskeletal: Negative. Allergic/Immunologic: Negative. Neurological: Negative for dizziness, syncope, speech difficulty, light-headedness, numbness and headaches. Hematological: Negative for adenopathy. Does not bruise/bleed easily. Psychiatric/Behavioral: Negative. OBJECTIVE     PaO2/FiO2 RATIO:  No results for input(s): POCPO2 in the last 72 hours.    FiO2 : 100 %     VITAL SIGNS:   LAST:  BP (!) 89/48   Pulse 52   Temp 97.6 °F (36.4 °C) (Oral)   Resp 20   Ht 5' 5\" (1.651 m)   Wt 152 lb 8 oz (69.2 kg)   SpO2 93%   BMI 25.38 kg/m²   8-24 HR RANGE:  TEMP Temp  Av.4 °F (36.9 °C)  Min: 97.6 °F (36.4 °C)  Max: 100.7 °F (02.9 °C)   BP Systolic (39XJA), JII:39 , Min:79 , VICKY:677      Diastolic (25PNG), PSH:71, Min:41, Max:55     PULSE Pulse  Av.8  Min: 48  Max: 67   RR Resp  Av.7  Min: 18  Max: 20   O2 SAT SpO2  Av.3 %  Min: 85 %  Max: 99 %   OXYGEN DELIVERY O2 Flow Rate (L/min)  Avg: 10.5 L/min  Min: 6 L/min  Max: 15 L/min        Physical Exam   General appearance - well appearing,  comfortable and in no acute distress  Mental status - alert, oriented to person, place, and time  Eyes - pupils equal and reactive, extraocular eye movements intact, sclera anicteric  Mouth - mucous membranes slightly dry, pharynx normal without lesions  Neck - supple, no significant adenopathy, carotids upstroke normal bilaterally, no bruits  Lymphatics - no palpable lymphadenopathy, no hepatosplenomegaly  Chest - no tachypnea, retractions or cyanosis, bilateral breath sounds present no expiratory wheezing no rhonchi distant breath sounds  Heart -bradycardia, regular rhythm, normal S1, S2, no murmurs, rubs, clicks or gallops  Abdomen - soft, nontender, nondistended, no masses or organomegaly  Neurological - motor and sensory grossly normal bilaterally  Musculoskeletal - no joint tenderness, deformity or swelling  Extremities - peripheral pulses normal, no pedal edema, no clubbing or cyanosis  Skin - normal coloration and turgor, no rashes, no suspicious skin lesions noted  DATA REVIEW     Medications: Current Inpatient  Scheduled Meds:   [Held by provider] amLODIPine  10 mg Oral Daily    aspirin  81 mg Oral Daily    atorvastatin  20 mg Oral Daily    [Held by provider] carvedilol  25 mg Oral BID    budesonide-formoterol  2 puff Inhalation BID    [Held by provider] furosemide  20 mg Oral Daily    [Held by provider] lisinopril  20 mg Oral Daily  tiotropium  2 puff Inhalation Daily    sodium chloride flush  5-40 mL IntraVENous 2 times per day    dexamethasone  6 mg Oral Daily    Vitamin D  2,000 Units Oral Daily    tocilizumab (ACTEMRA) IVPB  600 mg IntraVENous Once    sodium chloride flush  5-40 mL IntraVENous 2 times per day     Continuous Infusions:   sodium chloride      sodium chloride 75 mL/hr at 01/19/22 0515    sodium chloride         INPUT/OUTPUT:  In: 10 [I.V.:10]  Out: -   Date 01/19/22 0000 - 01/19/22 2359   Shift 8695-4630 5817-5088 7992-3847 24 Hour Total   INTAKE   I.V.(mL/kg)  10(0.1)  10(0.1)   Shift Total(mL/kg)  10(0.1)  10(0.1)   OUTPUT   Shift Total(mL/kg)       Weight (kg) 69.2 69.2 69.2 69.2        LABS:  ABGs:   No results for input(s): POCPH, POCPCO2, POCPO2, POCHCO3, YXWS4MWS in the last 72 hours. CBC:   Recent Labs     01/18/22 2141 01/19/22  0551   WBC 6.7 6.5   HGB 11.1* 10.7*   HCT 34.4* 33.2*   MCV 93.7 94.3    166   LYMPHOPCT 10* 7*   RBC 3.67* 3.52*   MCH 30.2 30.4   MCHC 32.3 32.2   RDW 12.9 13.0     CRP:   Recent Labs     01/18/22 2141 01/19/22  0551   .4* 235.8*     LDH:   Recent Labs     01/18/22 2141   *     BMP:   Recent Labs     01/18/22 2141 01/19/22  0551   * 137   K 4.6 5.2   CL 96* 101   CO2 22 21   BUN 63* 69*   CREATININE 2.03* 1.84*   GLUCOSE 133* 186*     Liver Function Test:   Recent Labs     01/18/22 2141 01/19/22  0551   PROT 6.2* 5.8*   LABALBU 3.4* 3.0*   ALT 8 8   AST 21 19   ALKPHOS 79 78   BILITOT 0.60 0.46     Coagulation Profile:   Recent Labs     01/18/22 2141   INR 0.9   PROTIME 10.2   APTT 27.2     D-Dimer:  Recent Labs     01/18/22 2141   DDIMER 1.39     Ferritin:    Recent Labs     01/18/22 2141   FERRITIN 353     Lactic Acid:  No results for input(s): LACTA in the last 72 hours. Cardiac Enzymes:  No results for input(s): CKTOTAL, CKMB, CKMBINDEX, TROPONINI in the last 72 hours.     Invalid input(s): TROPONIN, HSTROP  BNP/ProBNP:   Recent Labs 01/18/22 2141   PROBNP 530*     Triglycerides:  No results for input(s): TRIG in the last 72 hours. Microbiology:  Urine Culture:  No components found for: CURINE  Blood Culture:  No components found for: CBLOOD, CFUNGUSBL  Sputum Culture:  No components found for: [place] 11     01/18/22 2120 01/18/22 2120 01/18/22 2138   [facility] . BLOOD   < > .NASOPHARYNGEAL SWAB   SPECIAL  L AC 10ML  --   --    CULTURE NO GROWTH 12 HOURS  --   --     < > = values in this interval not displayed. Recent Labs     01/18/22 2110 01/18/22 2120   SPECIAL  R WRIST 10ML  L AC 10ML   CULTURE NO GROWTH 12 HOURS NO GROWTH 12 HOURS        Radiology Reports:  XR CHEST PORTABLE   Final Result   Mild bilateral lower lung zone patchy airspace opacity potentially   representing early COVID pneumonia. Mild stable cardiomegaly. CT HEAD WO CONTRAST   Final Result   No acute intracranial abnormality.          NM LUNG SCAN PERFUSION ONLY    (Results Pending)        Echocardiogram:   Results for orders placed during the hospital encounter of 09/04/19    Echocardiogram complete 2D with doppler with color    Narrative  Transthoracic Echocardiography Report (TTE)    Patient Name Sangita MILAN  Date of Study         09/06/2019    Date of      1955       Gender                Male  Birth    Age          75 year(s)       Race                      Room Number  2008             Height:               65 inch, 165.1 cm    Corporate ID [id number]         Weight:               180 pounds, 81.6 kg  #    Patient Acct [de-identified]        BSA:       1.89 m^2   BMI:      29.95 kg/m^2  #    MR #         [id number]          Sonographer           [facility]    Accession #  [id number]        Interpreting          Erika Dodge  Physician    Fellow                        Referring Nurse  Practitioner    Interpreting Pattie Adams  Referring Physician   Atif Garcia [employer], CNP    Type of Study    TTE procedure:2D Echocardiogram, M-Mode, Doppler, Color Doppler. Procedure Date  Date: 09/06/2019 Start: 11:05 AM    Study Location: [facility]    History / Nicolas Schuster. Comments:  High Risk Angina, COPD, CHF CAD    Patient Status: Inpatient    Height: 65 inches Weight: 180 pounds BSA: 1.89 m^2 BMI: 29.95 kg/m^2    CONCLUSIONS    Summary  Dilated left ventricle with severely reduced systolic function. Severe hypokinesis to akinesis of distal inferior, inferolateral, septal,  anterior, apical wall  Calculated ejection fraction is 20%. Evidence of moderate diastolic dysfunction. Normal right ventricular size with mildly reduced function. Moderate mitral regurgitation. Mild tricuspid regurgitation. Signature  ----------------------------------------------------------------------------  Electronically signed by Home Adkins(Sonographer) on 09/06/2019 02:24  PM  ----------------------------------------------------------------------------    ----------------------------------------------------------------------------  Electronically signed by Kerry Lynch(Interpreting physician) on  09/06/2019 05:23 PM  ----------------------------------------------------------------------------  FINDINGS  Left Atrium  Left atrium is moderately dilated. Left Ventricle  Dilated left ventricle with severely reduced systolic function. Severe hypokinesis to akinesis of distal inferior, inferolateral, septal,  anterior, apical wall  Calculated ejection fraction is 20%. Evidence of moderate diastolic dysfunction. (O/W'16 )  Right Atrium  Right atrium is normal in size. Right Ventricle  Normal right ventricular size with mildly reduced function. Mitral Valve  Mitral valve structure is normal.  Moderate mitral regurgitation. Aortic Valve  Aortic valve is trileaflet. Aortic sclerosis without stenosis. No aortic insufficiency.   Tricuspid Valve  Tricuspid valve structure is normal.  Mild tricuspid regurgitation. Pulmonic Valve  The pulmonic valve is normal in structure. Pericardial Effusion  Small anterior pericardial effusion. Miscellaneous  Normal aortic root dimension.   IVC diameter and inspiratory collapse is normal.    M-mode / 2D Measurements & Calculations:    LVIDd:6 cm(3.7 - 5.6 cm)          Diastolic CHUXKZ:730.41 ml  LVIDs:4.46 cm(2.2 - 4.0 cm)       Systolic JYCDDB:17 ml  AYXK:4.6 cm(0.6 - 1.1 cm)         Aortic Root:3.1 cm(2.0 - 3.7 cm)  LVPWd:1.2 cm(0.6 - 1.1 cm)        LA Dimension: 4 cm(1.9 - 4.0 cm)  Fractional Shortenin.67 %     LA volume/Index: 69.67 ml /37m^2  Calculated LVEF (%): 29.8 %       LVOT:2.5 cm  RVDd:2.5 cm    Mitral:                                  Aortic    Valve Area (P1/2-Time): 3.14 cm^2        Peak Velocity: 1.25 m/s  Peak E-Wave: 0.90 m/s                    Mean Velocity: 0.82 m/s  Peak A-Wave: 0.86 m/s                    Peak Gradient: 6.25 mmHg  E/A Ratio: 1.04                          Mean Gradient: 3 mmHg  Peak Gradient: 3.22 mmHg  Mean Gradient: 24 mmHg  Deceleration Time: 180 msec              Area (continuity): 2.5 cm^2  P1/2t: 70 msec                           AV VTI: 26.5 cm  MR PISA Radius: 0.6 cm    Area (continuity): 0.64 cm^2  Mean Velocity: 0.61 m/s    Tricuspid:                               Pulmonic:    Estimated RVSP: 44 mmHg                  Peak Velocity: 0.69 m/s  Peak TR Velocity: 2.95 m/s               Peak Gradient: 1.92 mmHg  Peak TR Gradient: 34.81 mmHg  Estimated RA Pressure: 10 mmHg    Estimated PASP: 44.81 mmHg    Diastology / Tissue Doppler  Septal Wall E' velocity:0.04 m/s  Septal Wall E/E':22  Lateral Wall E' velocity:0.06 m/s  Lateral Wall E/E':14       ASSESSMENT AND PLAN     Assessment:    // Acute hypoxic respiratory failure  // COVID-19 infection/pneumonia  // Sinus bradycardia likely related to beta-blocker status post 1 dose of atropine  // Hypotension likely multifactorial including hypovolemia/bradycardia/ COVID sepsis. // Sepsis due to COVID 19  // High inflammatory marker  // Troponin elevation  // UYEN on CKD    Plan:    I personally interviewed/examined the patient; reviewed interval history, interpreted all available radiographic and laboratory data at the time of service. Patient was seen by cardiology no mention of dopamine or dobutamine per cardiology. Patient is not symptomatic currently with bradycardia and cardiology recommended troponin for symptomatic bradycardia. Patient had received fluid bolus with response to systolic of 90 would recommend another small fluid bolus of 250. Will start patient on high flow O2 and monitor oxygen saturation. Will use BiPAP/NIV if needed for any increased distress worsening hypoxia or work of breathing currently patient is comfortable though hypoxic maintaining saturation above 92% nonrebreather. We will start him on DuoNeb aerosol and hold Spiriva  Would recommend patient to transfer to [facility] ICU if patient need dopamine for bradycardia or pressors for hypotension or worsening hypoxia or distress. Continue supplemental oxygen to keep oxygen saturation >90%  Encourage prone position when sleeping, incentive spirometry  Agree to continue to hold beta-blocker, ACE inhibitor, Norvasc and Lasix  Monitor I/O, renal function, electrolytes with a goal of even fluid balance  He is on Decadron recommend to give 6 mg for 10 days  He is status post Actemra on 01/18/2022  Chemical DVT prophylaxis patient had received full dose of Lovenox if no plans for full anticoagulation then would recommend heparin subcutaneous for DVT prophylaxis  Antimicrobials reviewed; continue   Monitor CRP, LDH, AST/ALT, D-Dimer, Ferritin as needed/periodically  Glycemic control per primary service  Physical/occupational therapy when able to tolerate    Discussed with respite therapist in detail.   Discussed with nursing staff and nursing charge treatment plan recommendation discussed        Patient is critically ill with illness/injury that acutely impairs one or more vital organ systems, such that there is a high probability of imminent or life threatening deterioration in the patient's condition. Critical care time of 50 minutes was spent (excluding procedures), in coordination of care during bedside rounds and discussion of patient care in detail, and recommendations of the team were adopted in the plan. Necessity of all invasive devices was also confirmed. Elaine Bae MD  Pulmonary and Critical Care Medicine           1/19/2022, 11:32 AM    This patient was evaluated in the context of the global SARS-CoV-2 (COVID-19) pandemic, which necessitated considerations that the patient either has COVID-19 infection or is at risk of infection with COVID-19. Institutional protocols and algorithms that pertain to the evaluation & management of patients with COVID-19 or those at risk for COVID-19 are in a state of rapid changes based on information released by regulatory bodies including the CDC and federal and state organizations. These policies and algorithms were followed during the patient's care. Please note that this chart was generated using voice recognition Dragon dictation software. Although every effort was made to ensure the accuracy of this automated transcription, some errors in transcription may have occurred.

## 2022-01-19 NOTE — ED NOTES
The following labs labeled with pt sticker and tubed to lab:     [x] Blue     [x] Lavender   [] on ice  [x] Green/yellow  [x] Green/black [] on ice  [] Yellow  [] Red  [] Pink      [x] COVID-19 swab    [x] Rapid  [] PCR  [] Flu swab  [] Peds Viral Panel     [] Urine Sample  [] Pelvic Cultures  [x] Blood Cultures            Geovani Landa RN  01/18/22 0965

## 2022-01-19 NOTE — PROGRESS NOTES
[facility]  Progress Note    Date:   1/19/2022  Patient name:  Brenda Nickerson  Date of admission:  1/18/2022  9:04 PM  MRN:   [id number]  YOB: 1955    SUBJECTIVE/Last 24 hours update:     Day 1 Hospitalization:     Patient was seen and examined at the bedside. BP running low responded to fluid bolus last night. Patient also had bradycardia; HR40s --> improved with atropine. Cardiology and pulm following patient. Patient is saturating 93% on 15L NRB mask. Patient is AOX2. Cr improved from admission (2.03-1.84). Will continue with gentle hydration and monitoring BMP. Hb is low (10.7 today)    REVIEW OF SYSTEMS:      Review of Systems   Constitutional: Negative for appetite change, chills and fever. Eyes: Negative for visual disturbance. Respiratory: Positive for shortness of breath. Negative for chest tightness. Cardiovascular: Negative for chest pain and leg swelling. Gastrointestinal: Negative for abdominal pain, constipation, diarrhea, nausea and vomiting. Genitourinary: Negative for difficulty urinating. Neurological: Negative for headaches. PAST MEDICAL HISTORY:      has a past medical history of COPD (chronic obstructive pulmonary disease) ([facility] Diabetes mellitus ([facility] Erectile dysfunction due to arterial insufficiency, Hypertension, Microalbuminuria due to type 2 diabetes mellitus ([facility] and Overweight (BMI 25.0-29.9). PAST SURGICAL HISTORY:      has a past surgical history that includes Appendectomy and Total ankle arthroplasty. SOCIALHISTORY:      reports that he has been smoking cigarettes. He has been smoking about 0.50 packs per day. He has never used smokeless tobacco. He reports that he does not drink alcohol and does not use drugs. FAMILY HISTORY:      family history is not on file. HOME MEDICATIONS:      Prior to Admission medications    Medication Sig Start Date End Date Taking? atraumatic. Eyes:      Extraocular Movements: Extraocular movements intact. Cardiovascular:      Rate and Rhythm: Regular rhythm. Bradycardia present. Heart sounds: Normal heart sounds. Pulmonary:      Effort: Respiratory distress (on NRB) present. Breath sounds: No wheezing or rales. Abdominal:      General: Bowel sounds are normal.      Palpations: Abdomen is soft. Tenderness: There is no abdominal tenderness. Skin:     General: Skin is warm and dry. Neurological:      General: No focal deficit present. Mental Status: He is alert. ASSESSMENT:      Principal Problem:    Acute on chronic respiratory failure with hypoxia (HCC)  Active Problems:    Controlled type 2 diabetes mellitus without complication, without long-term current use of insulin ([address]    COVID-19  Resolved Problems:    * No resolved hospital problems. *      PLAN:          Acute on chronic hypoxic respiratory failure 2/2 COVID-19 pneumonia in the setting of COPD/CHF  - COVID+  - ID consulted  - NRB to maintain O2 sats, wean off as tolerated  - .4-235, check daily CRP  - ferritin 353  - CXR: mild bilateral lower lung zone patchy air space opacity, representing early COVID pneumonia  - concern for PE, was given lovenox 80 mg in the ER, unable to obtain CT PE 2/2 to UYEN, pending V/Q scan. - continue decadron  - RT aerosol protocol  - continue spiriva and symbicort  -Pulmonary consult- appreciate recs     UYEN likely prerenal  - Cr 2, baseline ~ 1  - s/p 500 ml fluid bolus  - continue gentle hydration 75 ml/hr  - monitor carefully for fluid overload 2/2 history of CHF  - daily BMP  - pending calculate FeNa     Bradycardia  - HR 42 in the ER, was given atropine 1 mg and glucagon 1 mg  - holding his home betablocker  - telemetry  - cardiology consulted  - recommended atropine PRN.  If HR drops < 40 or patient becomes symptomatic rubi, recommended starting dopamine gtt     HFrEF  - no signs of fluid overload  -

## 2022-01-19 NOTE — TELEPHONE ENCOUNTER
Called and spoke with the son.  Patient in currently admitted to [facility] \[facility]"" - [place]. HUBER's

## 2022-01-19 NOTE — PROGRESS NOTES
Physical Therapy        Physical Therapy Cancel Note      DATE: 2022    NAME: General Pollen  MRN: [id number]   : 1955      Patient not seen this date for Physical Therapy due to:     Other: lung scan perfusion ordered to r/o PE      Electronically signed by Katarina Bridges PT on 2022 at 9:55 AM

## 2022-01-19 NOTE — CONSULTS
[facility] - Initial Consult Note COVID 19 Patient  Today's Date and Time: 1/19/2022, 9:40 AM    Impression :     COVID 19 Confirmed Infection  Covid tests:  1.18.22 Positive  1/15/22 positive at home per patient  Acute hypoxic respiratory failure  Elevated inflammatory markers  UYEN  Systolic CHF  COPD  DM II  HTN  Hep C  Erectile dysfunction    Recommendations:   Antibiotic treatment:  Monitor off antibiotics  Covid Rx:    Remdesivir-Contraindicated  Decadron-Initiated 1/18/21  Actemra-ordered 1/19/2022  Monoclonal antibodies-Out of window      Medical Decision Making/Summary/Discussion:1/19/2022     Patient admitted with COVID 19 infection    Infection Control Recommendations   [place] Precautions  Airborne isolation  Droplet Isolation    Antimicrobial Stewardship Recommendations     Discontinuation of therapy  Coordination of Outpatient Care:   Estimated Length of IV antimicrobials:TBD  Patient will need Midline Catheter Insertion: TBD  Patient will need PICC line Insertion: No  Patient will need: Home IV , [place],  SNF,  LTAC:TBD  Patient will need outpatient wound care:No    Chief complaint/reason for consultation:   Concern for COVID infection      History of Present Illness:   Umang Hutchins is a 74y.o.-year-old male who was initially admitted on 1/18/2022. Patient seen at the request of Dr. Donna Mckinnon:    Patient presented through ER with complaints of fatigue and shortness of breath. The patient has a history of COPD and congestive heart failure EF 20 to 30%. The patient stated he tested positive for COVID 3 days prior to presentation in the ED. Rapid in the hospital was positive. The patient was noted to have an SPO2 of 76% on room air with a fever of 100.7 °F.    Imaging of the chest revealed bilateral lower lung zone patchy airspace opacity potentially representing early COVID-pneumonia with stable mild cardiomegaly.     CRP was elevated at 209.4  UYEN present  Elevated D-dimer    VQ scan ordered to look for PE     Decadron was initiated    The patient had an episode of bradycardia requiring an injection of atropine. Cardiology has been consulted. The patient is also disoriented to time and place. A CT head revealed no acute abnormality    Patient admitted because of concerns with COVID 19.    CURRENT EVALUATION : 1/19/2022    Afebrile  Hypotension and bradycardia    Patient exhibiting respiratory distress. yes    Patient receiving supplemental oxygen. 6 L NC  RR:18  02 sat:88    % FIO2:   PEEP:      QTc:           NEWS Score: 0-4 Low risk group; 5-6: Medium risk group; 7 or above: High risk group  Parameters 3 2 1 0 1 2 3   Age    < 65   = 65   RR = 8  9-11 12-20  21-24 = 25   O2 Sats = 91 92-93 94-95 = 96      Suppl O2  Yes  No      SBP = 90  101-110 111-219   = 220   HR = 40  41-50 51-90  111-130 = 131   Consciousness    Alert   Drowsiness, lethargy, or confusion   Temperature = 35.0 C (95.0 F)  35.1-36.0 C 95.1-96.9 F 36.1-38.0 C 97.0-100.4 F 38.1-39.0 C 100.5-102.3 F = 39.1 C = 102.4 F      NEWS Score:  1/19/21:    Overall Daily Picture: Worsening    Presence of secondary bacterial Infection:  No   Additional antibiotics: No    Labs, X rays reviewed: 1/19/2022    BUN:69  Cr:1.84    WBC:6.5  Hb:10.7  Plat: 166    Absolute Neutrophils:5.7  Absolute Lymphocytes:0.4  Neutrophil/Lymphocyte Ratio: 14 high risk    CRP:235.8  Ferritin:353  LDH: 246    Pro Calcitonin:      Cultures:  Urine:    Blood:    Sputum :    Wound:      CXR:   1/18/21      CAT:      Discussed with patient, RN, CC, IM. I have personally reviewed the past medical history, past surgical history, medications, social history, and family history, and I have updated the database accordingly.   Past Medical History:     Past Medical History:   Diagnosis Date    [facility]) [facility]     Diabetes mellitus (HCC)     Erectile dysfunction due to arterial insufficiency 2015    Hypertension     Microalbuminuria due to type 2 diabetes mellitus ([facility] 2016    Overweight (BMI 25.0-29.9) 2015       Past Surgical  History:     Past Surgical History:   Procedure Laterality Date    APPENDECTOMY      TOTAL ANKLE ARTHROPLASTY      LEFT       Medications:      [Held by provider] amLODIPine  10 mg Oral Daily    aspirin  81 mg Oral Daily    atorvastatin  20 mg Oral Daily    [Held by provider] carvedilol  25 mg Oral BID    budesonide-formoterol  2 puff Inhalation BID    [Held by provider] furosemide  20 mg Oral Daily    [Held by provider] lisinopril  20 mg Oral Daily    tiotropium  2 puff Inhalation Daily    sodium chloride flush  5-40 mL IntraVENous 2 times per day    dexamethasone  6 mg Oral Daily    Vitamin D  2,000 Units Oral Daily    sodium chloride flush  5-40 mL IntraVENous 2 times per day       Social History:     Social History     Socioeconomic History    Marital status:       Spouse name: Not on file    Number of children: Not on file    Years of education: Not on file    Highest education level: Not on file   Occupational History    Not on file   Tobacco Use    Smoking status: Light Tobacco Smoker     Packs/day: 0.50     Types: Cigarettes     Last attempt to quit: 2017     Years since quittin.5    Smokeless tobacco: Never Used    Tobacco comment: reports he quit smoking in the past 2 months   Substance and Sexual Activity    Alcohol use: No     Alcohol/week: 0.0 standard drinks    Drug use: No    Sexual activity: Not on file   Other Topics Concern    Not on file   Social History Narrative    Not on file     Social Determinants of Health     Financial Resource Strain: Low Risk     Difficulty of Paying Living Expenses: Not very hard   Food Insecurity: No Food Insecurity    Worried About Running Out of Food in the Last Year: Never true    Rakesh of Food in the Last Year: Never true Transportation Needs:     Lack of Transportation (Medical): Not on file    Lack of Transportation (Non-Medical): Not on file   Physical Activity:     Days of Exercise per Week: Not on file    Minutes of Exercise per Session: Not on file   Stress:     Feeling of Stress : Not on file   Social Connections:     Frequency of Communication with Friends and Family: Not on file    Frequency of Social Gatherings with Friends and Family: Not on file    Attends Sabianism Services: Not on file    Active Member of [address] [place] [address] or Organizations: Not on file    Attends Club or Organization Meetings: Not on file    Marital Status: Not on file   Intimate Partner Violence:     Fear of Current or Ex-Partner: Not on file    Emotionally Abused: Not on file    Physically Abused: Not on file    Sexually Abused: Not on file   Housing Stability:     Unable to Pay for Housing in the Last Year: Not on file    Number of [place] in the Last Year: Not on file    Unstable Housing in the Last Year: Not on file       Family History:   No family history on file. Allergies:   Patient has no known allergies. Review of Systems:       Constitutional: Generalized fatigue with dyspnea  Head: No headaches  Eyes: No double vision or blurry vision. No conjunctival inflammation. ENT: No sore throat or runny nose. . No hearing loss, tinnitus or vertigo. Cardiovascular: No chest pain or palpitations. Shortness of breath. DUBON  Lung: Shortness of breath with chronic cough. Abdomen: No nausea, vomiting, diarrhea, or abdominal pain. Lynsey Maid No cramps. Genitourinary: No increased urinary frequency, or dysuria. No hematuria. No suprapubic or CVA pain  Musculoskeletal: No muscle aches or pains. No joint effusions, swelling or deformities  Hematologic: No bleeding or bruising. Neurologic: No headache, weakness, numbness, or tingling. Integument: No rash, no ulcers. Psychiatric: No depression.    Endocrine: No polyuria, no polydipsia, no polyphagia. Physical Examination :     Patient Vitals for the past 8 hrs:   BP Temp Temp src Pulse Resp SpO2 Weight   01/19/22 0830 (!) 89/48   (!) 49 18 (!) 88 %    01/19/22 0400 (!) 79/41 97.6 °F (36.4 °C) Oral 51 18 99 %    01/19/22 0150 (!) 100/53 97.6 °F (36.4 °C) Oral 57 16 98 % 152 lb 8 oz (69.2 kg)     General Appearance: Awake, alert, and in no apparent distress  Head:  Normocephalic, no trauma  Eyes: Pupils equal, round, reactive to light; sclera anicteric; conjunctivae pink. No embolic phenomena. ENT: Oropharynx clear, without erythema, exudate, or thrush. No tenderness of sinuses. Mouth/throat: mucosa pink and moist. No lesions. Dentition in good repair. Neck:Supple, without lymphadenopathy. Thyroid normal, No bruits. Pulmonary/Chest: Clear to auscultation, without wheezes, rales, or rhonchi. No dullness to percussion. Cardiovascular: Regular rate and rhythm without murmurs, rubs, or gallops. Abdomen: Soft, non tender. Bowel sounds normal. No organomegaly  All four Extremities: No cyanosis, clubbing, edema, or effusions. Neurologic: Disorientation to time and place  Skin: Warm and dry with good turgor. No signs of peripheral arterial or venous insufficiency. No ulcerations. No open wounds. Medical Decision Making -Laboratory:   I have independently reviewed/ordered the following labs:    CBC with Differential:   Recent Labs     01/18/22 2141 01/19/22  0551   WBC 6.7 6.5   HGB 11.1* 10.7*   HCT 34.4* 33.2*    166   LYMPHOPCT 10* 7*   MONOPCT 6 3     BMP:   Recent Labs     01/18/22 2141 01/19/22  0027 01/19/22  0551   *  --  137   K 4.6  --  5.2   CL 96*  --  101   CO2 22  --  21   BUN 63*  --  69*   CREATININE 2.03*  --  1.84*   MG  --  1.7  --      Hepatic Function Panel:   Recent Labs     01/18/22 2141 01/19/22  0551   PROT 6.2* 5.8*   LABALBU 3.4* 3.0*   BILITOT 0.60 0.46   ALKPHOS 79 78   ALT 8 8   AST 21 19     No results for input(s): RPR in the last 72 hours.   No results for input(s): HIV in the last 72 hours. No results for input(s): BC in the last 72 hours.   Lab Results   Component Value Date    MUCUS 2+ 07/01/2017    RBC 3.52 01/19/2022    TRICHOMONAS NOT REPORTED 07/01/2017    WBC 6.5 01/19/2022    YEAST NOT REPORTED 07/01/2017    TURBIDITY CLOUDY 07/01/2017     Lab Results   Component Value Date    CREATININE 1.84 01/19/2022    GLUCOSE 186 01/19/2022       Medical Decision Making-Imaging:     Narrative   EXAMINATION:   ONE XRAY VIEW OF THE CHEST       1/18/2022 10:02 pm       COMPARISON:   09/08/2019       HISTORY:   ORDERING SYSTEM PROVIDED HISTORY: hypoxic   TECHNOLOGIST PROVIDED HISTORY:   hypoxic       FINDINGS:   Stable mild cardiomegaly.  Pulmonary vasculature appears normal.  Mild patchy   airspace opacity is present at the bilateral lower lung zones.  No   pneumothorax or pleural effusion.  Surrounding structures are unremarkable.           Impression   Mild bilateral lower lung zone patchy airspace opacity potentially   representing early COVID pneumonia.       Mild stable cardiomegaly.         Narrative   EXAMINATION:   CT OF THE HEAD WITHOUT CONTRAST  1/18/2022 6:31 pm       TECHNIQUE:   CT of the head was performed without the administration of intravenous   contrast. Dose modulation, iterative reconstruction, and/or weight based   adjustment of the mA/kV was utilized to reduce the radiation dose to as low   as reasonably achievable.       COMPARISON:   CT scan dated March 3, 2015       HISTORY:   ORDERING SYSTEM PROVIDED HISTORY: AMS   TECHNOLOGIST PROVIDED HISTORY:       AMS   Decision Support Exception - unselect if not a suspected or confirmed   emergency medical condition->Emergency Medical Condition (MA)   Reason for Exam: COVID+, AMS       FINDINGS:   BRAIN/VENTRICLES: There is no acute intracranial hemorrhage, mass effect or   midline shift.  No abnormal extra-axial fluid collection.  The gray-white   differentiation is maintained without evidence of an acute infarct. Deya Callander is   no evidence of hydrocephalus. Generalized atrophy is present. 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       ORBITS: The visualized portion of the orbits demonstrate no acute abnormality.       SINUSES: Mild degree of mucosal thickening is present within the right   maxillary sinus.  Minimal mucosal thickening is present within the left   maxillary sinus.       SOFT TISSUES/SKULL:  No acute abnormality of the visualized skull or soft   tissues.           Impression   No acute intracranial abnormality.                   Medical Decision [id number]-[id number]:       Medical Decision Making-Other:     Note:  Labs, medications, radiologic studies were reviewed with personal review of films  Large amounts of data were reviewed  Discussed with nursing Staff, Discharge planner  Infection Control and Prevention measures reviewed  All prior entries were reviewed  Administer medications as ordered  Prognosis: Guarded  Discharge planning reviewed      Thank you for allowing us to participate in the care of this patient. Please call with questions. MARY Chen - CNP     ATTESTATION:    I have discussed the case, including pertinent history and exam findings with the APRN. I have evaluated the  History, physical findings and pictures of the patient and the key elements of the encounter have been performed by me. I have reviewed the laboratory data, other diagnostic studies and discussed them with the APRN. I have updated the medical record where necessary. I agree with the assessment, plan and orders as documented by the APRN.     Terrell Castorena MD.      Pager: [phone] - Office: [phone]

## 2022-01-19 NOTE — PROGRESS NOTES
[address] [facility] Pharmacokinetic Monitoring Service - Vancomycin     Brie Glover is a 74 y.o. male starting on vancomycin therapy for bloodstream infection. Pharmacy consulted by Marianne Douglas for monitoring and adjustment. Target Concentration: Goal AUC/JAMES 400-600 mg*hr/L    Additional Antimicrobials: No    Pertinent Laboratory Values: Wt Readings from Last 1 Encounters:   01/19/22 152 lb 8 oz (69.2 kg)     Temp Readings from Last 1 Encounters:   01/19/22 98 °F (36.7 °C) (Axillary)     Estimated Creatinine Clearance: 33 mL/min (A) (based on SCr of 1.84 mg/dL (H)). Recent Labs     01/18/22  2141 01/19/22  0551   CREATININE 2.03* 1.84*   WBC 6.7 6.5     Procalcitonin: NA    Pertinent Cultures:  Culture Date Source Results   1/18/22 blood MRSA   MRSA Nasal Swab: N/A. Non-respiratory infection.     Plan:  Dosing recommendations based on Bayesian software  Start vancomycin 1500 mg IV x 1 dose then 1000 mg IV q24h  Anticipated AUC of 536 and trough concentration of 17.2 at steady state  Renal labs as indicated   Vancomycin concentration ordered for TBD @ TBD   Pharmacy will continue to monitor patient and adjust therapy as indicated    Thank you for the consult,  Suni Hart [facility]  1/19/2022 5:33 PM

## 2022-01-19 NOTE — ED NOTES
PT started to become diaphoretic and PT. HR went down to 42. PT. Was given 1 mg of atropine and pacer pads were placed on pt.       Teddy David RN  01/19/22 2015

## 2022-01-19 NOTE — ED NOTES
Pt brought in by EMS after his children called d/t sob and generalized weakness. Pt initally did not want to come in to be seen. On arrival of EMS pt was 76% on room air. Pt denies pain, SOB. Febrile on arrival. NAD at this time. Pt placed on 4L N/C and cardiac monitor.  Will continue to monitor       Armin Boykin RN  01/18/22 5196

## 2022-01-19 NOTE — ED PROVIDER NOTES
Faculty Sign-Out Attestation  Handoff taken on the following patient from prior Attending Physician: Toshia Benton    I was available and discussed any additional care issues that arose and coordinated the management plans with the resident(s) caring for the patient during my duty period. Any areas of disagreement with residents documentation of care or procedures are noted on the chart. I was personally present for the key portions of any/all procedures during my duty period. I have documented in the chart those procedures where I was not present during the key portions.     Sob, covid +, d dimer+, getting lovenox, needing admission,     Miladis Lobo,   Attending Physician     Miladis Lobo,   01/18/22 2259    -> admitted,   // sat stable,      Miladis Lobo,   01/19/22 0002    -ekg  -Sinus bradycardia, heart rate 43, no ischemia, left axis, incomplete left bundle branch block, QT corrected [facility]  01/19/22 9761

## 2022-01-20 ENCOUNTER — APPOINTMENT (OUTPATIENT)
Dept: NUCLEAR MEDICINE | Age: 67
DRG: 871 | End: 2022-01-20
Payer: MEDICARE

## 2022-01-20 LAB
ABSOLUTE EOS #: 0 K/UL (ref 0–0.4)
ABSOLUTE IMMATURE GRANULOCYTE: 0 K/UL (ref 0–0.3)
ABSOLUTE LYMPH #: 0.15 K/UL (ref 1–4.8)
ABSOLUTE MONO #: 0.61 K/UL (ref 0.1–0.8)
ALBUMIN SERPL-MCNC: 3.1 G/DL (ref 3.5–5.2)
ALBUMIN/GLOBULIN RATIO: 1.1 (ref 1–2.5)
ALP BLD-CCNC: 84 U/L (ref 40–129)
ALT SERPL-CCNC: 10 U/L (ref 5–41)
ANION GAP SERPL CALCULATED.3IONS-SCNC: 11 MMOL/L (ref 9–17)
AST SERPL-CCNC: 25 U/L
BASOPHILS # BLD: 0 % (ref 0–2)
BASOPHILS ABSOLUTE: 0 K/UL (ref 0–0.2)
BILIRUB SERPL-MCNC: 0.39 MG/DL (ref 0.3–1.2)
BILIRUBIN URINE: NEGATIVE
BUN BLDV-MCNC: 73 MG/DL (ref 8–23)
BUN/CREAT BLD: ABNORMAL (ref 9–20)
C-REACTIVE PROTEIN: 209.4 MG/L (ref 0–5)
CALCIUM SERPL-MCNC: 8.3 MG/DL (ref 8.6–10.4)
CHLORIDE BLD-SCNC: 98 MMOL/L (ref 98–107)
CO2: 20 MMOL/L (ref 20–31)
COLOR: ABNORMAL
COMMENT UA: ABNORMAL
CREAT SERPL-MCNC: 1.61 MG/DL (ref 0.7–1.2)
CULTURE: NO GROWTH
D-DIMER QUANTITATIVE: 1.79 MG/L FEU
DIFFERENTIAL TYPE: ABNORMAL
EKG ATRIAL RATE: 43 BPM
EKG ATRIAL RATE: 68 BPM
EKG P AXIS: 24 DEGREES
EKG P AXIS: 28 DEGREES
EKG P-R INTERVAL: 146 MS
EKG P-R INTERVAL: 156 MS
EKG Q-T INTERVAL: 376 MS
EKG Q-T INTERVAL: 472 MS
EKG QRS DURATION: 110 MS
EKG QRS DURATION: 94 MS
EKG QTC CALCULATION (BAZETT): 398 MS
EKG QTC CALCULATION (BAZETT): 399 MS
EKG R AXIS: -23 DEGREES
EKG R AXIS: -29 DEGREES
EKG T AXIS: 35 DEGREES
EKG T AXIS: 44 DEGREES
EKG VENTRICULAR RATE: 43 BPM
EKG VENTRICULAR RATE: 68 BPM
EOSINOPHILS RELATIVE PERCENT: 0 % (ref 1–4)
FERRITIN: 532 UG/L (ref 30–400)
GFR AFRICAN AMERICAN: 52 ML/MIN
GFR NON-AFRICAN AMERICAN: 43 ML/MIN
GFR SERPL CREATININE-BSD FRML MDRD: ABNORMAL ML/MIN/{1.73_M2}
GFR SERPL CREATININE-BSD FRML MDRD: ABNORMAL ML/MIN/{1.73_M2}
GLUCOSE BLD-MCNC: 236 MG/DL (ref 75–110)
GLUCOSE BLD-MCNC: 284 MG/DL (ref 75–110)
GLUCOSE BLD-MCNC: 297 MG/DL (ref 75–110)
GLUCOSE BLD-MCNC: 339 MG/DL (ref 70–99)
GLUCOSE BLD-MCNC: 342 MG/DL (ref 75–110)
GLUCOSE BLD-MCNC: 382 MG/DL (ref 75–110)
GLUCOSE URINE: NEGATIVE
HCT VFR BLD CALC: 37.8 % (ref 40.7–50.3)
HEMOGLOBIN: 12.3 G/DL (ref 13–17)
IMMATURE GRANULOCYTES: 0 %
KETONES, URINE: NEGATIVE
LACTATE DEHYDROGENASE: 292 U/L (ref 135–225)
LEUKOCYTE ESTERASE, URINE: NEGATIVE
LYMPHOCYTES # BLD: 1 % (ref 24–44)
Lab: NORMAL
MCH RBC QN AUTO: 30.4 PG (ref 25.2–33.5)
MCHC RBC AUTO-ENTMCNC: 32.5 G/DL (ref 28.4–34.8)
MCV RBC AUTO: 93.3 FL (ref 82.6–102.9)
MONOCYTES # BLD: 4 % (ref 1–7)
MORPHOLOGY: NORMAL
NITRITE, URINE: NEGATIVE
NRBC AUTOMATED: 0 PER 100 WBC
PDW BLD-RTO: 12.9 % (ref 11.8–14.4)
PH UA: 5 (ref 5–8)
PLATELET # BLD: 281 K/UL (ref 138–453)
PLATELET ESTIMATE: ABNORMAL
PMV BLD AUTO: 10.1 FL (ref 8.1–13.5)
POTASSIUM SERPL-SCNC: 5.2 MMOL/L (ref 3.7–5.3)
POTASSIUM SERPL-SCNC: 5.6 MMOL/L (ref 3.7–5.3)
PROCALCITONIN: 1.37 NG/ML
PROTEIN UA: NEGATIVE
RBC # BLD: 4.05 M/UL (ref 4.21–5.77)
RBC # BLD: ABNORMAL 10*6/UL
SEG NEUTROPHILS: 95 % (ref 36–66)
SEGMENTED NEUTROPHILS ABSOLUTE COUNT: 14.54 K/UL (ref 1.8–7.7)
SODIUM BLD-SCNC: 129 MMOL/L (ref 135–144)
SPECIFIC GRAVITY UA: 1.01 (ref 1–1.03)
SPECIMEN DESCRIPTION: NORMAL
TOTAL PROTEIN: 6 G/DL (ref 6.4–8.3)
TURBIDITY: CLEAR
URINE HGB: NEGATIVE
UROBILINOGEN, URINE: NORMAL
VITAMIN D 25-HYDROXY: <5 NG/ML (ref 30–100)
WBC # BLD: 15.3 K/UL (ref 3.5–11.3)
WBC # BLD: ABNORMAL 10*3/UL

## 2022-01-20 PROCEDURE — 94761 N-INVAS EAR/PLS OXIMETRY MLT: CPT

## 2022-01-20 PROCEDURE — 78580 LUNG PERFUSION IMAGING: CPT

## 2022-01-20 PROCEDURE — 2580000003 HC RX 258: Performed by: STUDENT IN AN ORGANIZED HEALTH CARE EDUCATION/TRAINING PROGRAM

## 2022-01-20 PROCEDURE — A9540 TC99M MAA: HCPCS | Performed by: STUDENT IN AN ORGANIZED HEALTH CARE EDUCATION/TRAINING PROGRAM

## 2022-01-20 PROCEDURE — 94640 AIRWAY INHALATION TREATMENT: CPT

## 2022-01-20 PROCEDURE — 6370000000 HC RX 637 (ALT 250 FOR IP): Performed by: STUDENT IN AN ORGANIZED HEALTH CARE EDUCATION/TRAINING PROGRAM

## 2022-01-20 PROCEDURE — 93010 ELECTROCARDIOGRAM REPORT: CPT | Performed by: INTERNAL MEDICINE

## 2022-01-20 PROCEDURE — 84132 ASSAY OF SERUM POTASSIUM: CPT

## 2022-01-20 PROCEDURE — 87040 BLOOD CULTURE FOR BACTERIA: CPT

## 2022-01-20 PROCEDURE — 85379 FIBRIN DEGRADATION QUANT: CPT

## 2022-01-20 PROCEDURE — 6360000002 HC RX W HCPCS: Performed by: STUDENT IN AN ORGANIZED HEALTH CARE EDUCATION/TRAINING PROGRAM

## 2022-01-20 PROCEDURE — 85025 COMPLETE CBC W/AUTO DIFF WBC: CPT

## 2022-01-20 PROCEDURE — 86403 PARTICLE AGGLUT ANTBDY SCRN: CPT

## 2022-01-20 PROCEDURE — 6370000000 HC RX 637 (ALT 250 FOR IP): Performed by: INTERNAL MEDICINE

## 2022-01-20 PROCEDURE — 83615 LACTATE (LD) (LDH) ENZYME: CPT

## 2022-01-20 PROCEDURE — 3430000000 HC RX DIAGNOSTIC RADIOPHARMACEUTICAL: Performed by: STUDENT IN AN ORGANIZED HEALTH CARE EDUCATION/TRAINING PROGRAM

## 2022-01-20 PROCEDURE — 82306 VITAMIN D 25 HYDROXY: CPT

## 2022-01-20 PROCEDURE — 99232 SBSQ HOSP IP/OBS MODERATE 35: CPT | Performed by: FAMILY MEDICINE

## 2022-01-20 PROCEDURE — 82947 ASSAY GLUCOSE BLOOD QUANT: CPT

## 2022-01-20 PROCEDURE — 87205 SMEAR GRAM STAIN: CPT

## 2022-01-20 PROCEDURE — 82728 ASSAY OF FERRITIN: CPT

## 2022-01-20 PROCEDURE — 99233 SBSQ HOSP IP/OBS HIGH 50: CPT | Performed by: INTERNAL MEDICINE

## 2022-01-20 PROCEDURE — 2500000003 HC RX 250 WO HCPCS: Performed by: STUDENT IN AN ORGANIZED HEALTH CARE EDUCATION/TRAINING PROGRAM

## 2022-01-20 PROCEDURE — 2700000000 HC OXYGEN THERAPY PER DAY

## 2022-01-20 PROCEDURE — 99291 CRITICAL CARE FIRST HOUR: CPT | Performed by: INTERNAL MEDICINE

## 2022-01-20 PROCEDURE — 36415 COLL VENOUS BLD VENIPUNCTURE: CPT

## 2022-01-20 PROCEDURE — 2000000000 HC ICU R&B

## 2022-01-20 PROCEDURE — 86140 C-REACTIVE PROTEIN: CPT

## 2022-01-20 PROCEDURE — 87641 MR-STAPH DNA AMP PROBE: CPT

## 2022-01-20 PROCEDURE — 80053 COMPREHEN METABOLIC PANEL: CPT

## 2022-01-20 RX ORDER — HEPARIN SODIUM 5000 [USP'U]/ML
5000 INJECTION, SOLUTION INTRAVENOUS; SUBCUTANEOUS EVERY 8 HOURS SCHEDULED
Status: DISCONTINUED | OUTPATIENT
Start: 2022-01-20 | End: 2022-01-26 | Stop reason: HOSPADM

## 2022-01-20 RX ORDER — DEXTROSE MONOHYDRATE 25 G/50ML
25 INJECTION, SOLUTION INTRAVENOUS ONCE
Status: COMPLETED | OUTPATIENT
Start: 2022-01-20 | End: 2022-01-20

## 2022-01-20 RX ORDER — SODIUM CHLORIDE 0.9 % (FLUSH) 0.9 %
10 SYRINGE (ML) INJECTION PRN
Status: DISCONTINUED | OUTPATIENT
Start: 2022-01-20 | End: 2022-01-24 | Stop reason: SDUPTHER

## 2022-01-20 RX ORDER — INSULIN GLARGINE 100 [IU]/ML
10 INJECTION, SOLUTION SUBCUTANEOUS DAILY
Status: DISCONTINUED | OUTPATIENT
Start: 2022-01-20 | End: 2022-01-20

## 2022-01-20 RX ORDER — INSULIN GLARGINE 100 [IU]/ML
10 INJECTION, SOLUTION SUBCUTANEOUS 2 TIMES DAILY
Status: DISCONTINUED | OUTPATIENT
Start: 2022-01-20 | End: 2022-01-26 | Stop reason: HOSPADM

## 2022-01-20 RX ADMIN — IPRATROPIUM BROMIDE AND ALBUTEROL SULFATE 1 AMPULE: .5; 3 SOLUTION RESPIRATORY (INHALATION) at 16:26

## 2022-01-20 RX ADMIN — SODIUM CHLORIDE: 9 INJECTION, SOLUTION INTRAVENOUS at 17:37

## 2022-01-20 RX ADMIN — DOPAMINE HYDROCHLORIDE 10 MCG/KG/MIN: 160 INJECTION, SOLUTION INTRAVENOUS at 21:49

## 2022-01-20 RX ADMIN — INSULIN LISPRO 2 UNITS: 100 INJECTION, SOLUTION INTRAVENOUS; SUBCUTANEOUS at 20:58

## 2022-01-20 RX ADMIN — SODIUM CHLORIDE: 9 INJECTION, SOLUTION INTRAVENOUS at 05:01

## 2022-01-20 RX ADMIN — HEPARIN SODIUM 5000 UNITS: 5000 INJECTION INTRAVENOUS; SUBCUTANEOUS at 21:49

## 2022-01-20 RX ADMIN — ATORVASTATIN CALCIUM 20 MG: 20 TABLET, FILM COATED ORAL at 07:50

## 2022-01-20 RX ADMIN — SODIUM BICARBONATE 50 MEQ: 84 INJECTION, SOLUTION INTRAVENOUS at 05:01

## 2022-01-20 RX ADMIN — Medication 81 MG: at 07:50

## 2022-01-20 RX ADMIN — INSULIN GLARGINE 10 UNITS: 100 INJECTION, SOLUTION SUBCUTANEOUS at 20:58

## 2022-01-20 RX ADMIN — VANCOMYCIN HYDROCHLORIDE 1000 MG: 1 INJECTION, SOLUTION INTRAVENOUS at 17:45

## 2022-01-20 RX ADMIN — IPRATROPIUM BROMIDE AND ALBUTEROL SULFATE 1 AMPULE: .5; 3 SOLUTION RESPIRATORY (INHALATION) at 09:35

## 2022-01-20 RX ADMIN — INSULIN LISPRO 3 UNITS: 100 INJECTION, SOLUTION INTRAVENOUS; SUBCUTANEOUS at 12:31

## 2022-01-20 RX ADMIN — IPRATROPIUM BROMIDE AND ALBUTEROL SULFATE 1 AMPULE: .5; 3 SOLUTION RESPIRATORY (INHALATION) at 19:57

## 2022-01-20 RX ADMIN — INSULIN LISPRO 2 UNITS: 100 INJECTION, SOLUTION INTRAVENOUS; SUBCUTANEOUS at 17:46

## 2022-01-20 RX ADMIN — DEXAMETHASONE 6 MG: 4 TABLET ORAL at 07:50

## 2022-01-20 RX ADMIN — SODIUM CHLORIDE, PRESERVATIVE FREE 10 ML: 5 INJECTION INTRAVENOUS at 20:57

## 2022-01-20 RX ADMIN — INSULIN LISPRO 4 UNITS: 100 INJECTION, SOLUTION INTRAVENOUS; SUBCUTANEOUS at 08:00

## 2022-01-20 RX ADMIN — SODIUM CHLORIDE, PRESERVATIVE FREE 10 ML: 5 INJECTION INTRAVENOUS at 07:50

## 2022-01-20 RX ADMIN — DOPAMINE HYDROCHLORIDE 12.5 MCG/KG/MIN: 160 INJECTION, SOLUTION INTRAVENOUS at 14:07

## 2022-01-20 RX ADMIN — SODIUM CHLORIDE, PRESERVATIVE FREE 10 ML: 5 INJECTION INTRAVENOUS at 12:00

## 2022-01-20 RX ADMIN — Medication 4 MILLICURIE: at 12:00

## 2022-01-20 RX ADMIN — Medication 2000 UNITS: at 07:50

## 2022-01-20 RX ADMIN — EPINEPHRINE 9 MCG/MIN: 1 INJECTION INTRAMUSCULAR; INTRAVENOUS; SUBCUTANEOUS at 05:02

## 2022-01-20 RX ADMIN — SODIUM CHLORIDE, PRESERVATIVE FREE 10 ML: 5 INJECTION INTRAVENOUS at 20:56

## 2022-01-20 RX ADMIN — INSULIN HUMAN 10 UNITS: 100 INJECTION, SOLUTION PARENTERAL at 05:02

## 2022-01-20 RX ADMIN — INSULIN GLARGINE 10 UNITS: 100 INJECTION, SOLUTION SUBCUTANEOUS at 10:54

## 2022-01-20 RX ADMIN — BUDESONIDE AND FORMOTEROL FUMARATE DIHYDRATE 2 PUFF: 80; 4.5 AEROSOL RESPIRATORY (INHALATION) at 07:52

## 2022-01-20 RX ADMIN — DEXTROSE MONOHYDRATE 25 G: 25 INJECTION, SOLUTION INTRAVENOUS at 05:01

## 2022-01-20 ASSESSMENT — PAIN SCALES - GENERAL
PAINLEVEL_OUTOF10: 0

## 2022-01-20 ASSESSMENT — ENCOUNTER SYMPTOMS
WHEEZING: 0
SHORTNESS OF BREATH: 1
CONSTIPATION: 0
VOICE CHANGE: 0
EYE REDNESS: 0
ALLERGIC/IMMUNOLOGIC NEGATIVE: 1
SINUS PAIN: 0
VOMITING: 0
SORE THROAT: 0
CHEST TIGHTNESS: 0
COUGH: 1
TROUBLE SWALLOWING: 0
DIARRHEA: 0
PHOTOPHOBIA: 0
NAUSEA: 0
ABDOMINAL PAIN: 0
TACHYPNEA: 1
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
## 2022-01-20 NOTE — PROGRESS NOTES
Permission to update Radha (renu) granted from patient. Radha updated on patients hemodynamic stability. All questions/concerns answered at this time. Son called at same time, asked him to get all updates from Radha due to patient requiring testing to go to. Gave brief update. Char Alonzo (son) started screaming at RN, threatened to come up here and cause issues, then hung up phone. Charge nurse notified.

## 2022-01-20 NOTE — PLAN OF CARE
Problem: Airway Clearance - Ineffective  Goal: Achieve or maintain patent airway  1/20/2022 0620 by Faraz Smiley RN  Outcome: Met This Shift  1/19/2022 2258 by Faraz Smiley RN  Outcome: Met This Shift     Problem: Gas Exchange - Impaired  Goal: Absence of hypoxia  1/20/2022 0620 by Faraz Smiley RN  Outcome: Met This Shift  1/19/2022 2258 by Faraz Smiley RN  Outcome: Met This Shift  Goal: Promote optimal lung function  1/20/2022 0620 by Faraz Smiley RN  Outcome: Met This Shift  1/19/2022 2258 by Faraz Smiley RN  Outcome: Met This Shift     Problem: Breathing Pattern - Ineffective  Goal: Ability to achieve and maintain a regular respiratory rate  1/20/2022 0620 by Faraz Smiley RN  Outcome: Met This Shift  1/19/2022 2258 by Faraz Smiley RN  Outcome: Met This Shift     Problem:  Body Temperature -  Risk of, Imbalanced  Goal: Ability to maintain a body temperature within defined limits  Outcome: Met This Shift  Goal: Will regain or maintain usual level of consciousness  Outcome: Met This Shift  Goal: Complications related to the disease process, condition or treatment will be avoided or minimized  Outcome: Met This Shift     Problem: Isolation Precautions - Risk of Spread of Infection  Goal: Prevent transmission of infection  Outcome: Met This Shift     Problem: Patient Education: Go to Patient Education Activity  Goal: Patient/Family Education  Outcome: Met This Shift     Problem: Skin Integrity:  Goal: Will show no infection signs and symptoms  Description: Will show no infection signs and symptoms  Outcome: Met This Shift  Goal: Absence of new skin breakdown  Description: Absence of new skin breakdown  Outcome: Met This Shift

## 2022-01-20 NOTE — PROGRESS NOTES
Spoke to [facility], patient's son. Update provided. Questions and concerns addressed.

## 2022-01-20 NOTE — PROGRESS NOTES
[facility] - Progress Note COVID 19 Patient  Today's Date and Time: 1/20/2022, 12:59 PM    Impression :     COVID 19 Confirmed Infection  Covid tests:  1.18.22 Positive  1/15/22 positive at home per patient  Acute hypoxic respiratory failure  Elevated inflammatory markers  UYEN  Systolic CHF  COPD  DM II  HTN  Hep C  Erectile dysfunction    Recommendations:   Antibiotic treatment:  Vancomycin initiated 1/20/22 for 1/2 MRSA blood culture 1/19/22  Repeat blood cultures ordered 1/20/22  Covid Rx:    Remdesivir-Contraindicated  Decadron-Initiated 1/18/21  Actemra-ordered 1/19/2022  Monoclonal antibodies-Out of window      Medical Decision Making/Summary/Discussion:1/20/2022     Patient admitted with COVID 19 infection  Isolation until 1/5/22    Infection Control Recommendations   [place] Precautions  Airborne isolation  Droplet Isolation    Antimicrobial Stewardship Recommendations     Discontinuation of therapy  Coordination of Outpatient Care:   Estimated Length of IV antimicrobials:TBD  Patient will need Midline Catheter Insertion: TBD  Patient will need PICC line Insertion: No  Patient will need: Home IV , [place],  SNF,  LTAC:TBD  Patient will need outpatient wound care:No    Chief complaint/reason for consultation:   Concern for COVID infection      History of Present Illness:   Jose Cruz Fenton is a 74y.o.-year-old male who was initially admitted on 1/18/2022. Patient seen at the request of Dr. Eros Cox:    Patient presented through ER with complaints of fatigue and shortness of breath. The patient has a history of COPD and congestive heart failure EF 20 to 30%. The patient stated he tested positive for COVID 3 days prior to presentation in the ED. Rapid in the hospital was positive.     The patient was noted to have an SPO2 of 76% on room air with a fever of 100.7 °F.    Imaging of the chest revealed bilateral lower lung zone patchy airspace opacity potentially representing early COVID-pneumonia with stable mild cardiomegaly. CRP was elevated at 209.4  UYEN present  Elevated D-dimer    VQ scan ordered to look for PE     Decadron was initiated    The patient had an episode of bradycardia requiring an injection of atropine. Cardiology has been consulted. The patient is also disoriented to time and place. A CT head revealed no acute abnormality    Patient admitted because of concerns with COVID 19.    CURRENT EVALUATION : 1/20/2022    Afebrile  Hypotension and bradycardia  Dopamine and Epi gtts    Per Cardiology, he may need dual chamber ICD in near future based on LVEF and further episodes of bradycardia. MRSA on blood cultures-Vancomycin initiated    UYEN with hyponatremia and hyperkalemia  Hyperglycemia worsening    Echo and VQ scan pending    Patient exhibiting respiratory distress. yes    Patient receiving supplemental oxygen. 6 L NC--> hi flow NC  RR:18-->21  02 sat:88-->97    % FIO2: 50  Flow Rate: 30 L/min  PEEP:      QTc:       NEWS Score: 0-4 Low risk group; 5-6: Medium risk group; 7 or above: High risk group  Parameters 3 2 1 0 1 2 3   Age    < 65   = 65   RR = 8  9-11 12-20  21-24 = 25   O2 Sats = 91 92-93 94-95 = 96      Suppl O2  Yes  No      SBP = 90  101-110 111-219   = 220   HR = 40  41-50 51-90  111-130 = 131   Consciousness    Alert   Drowsiness, lethargy, or confusion   Temperature = 35.0 C (95.0 F)  35.1-36.0 C 95.1-96.9 F 36.1-38.0 C 97.0-100.4 F 38.1-39.0 C 100.5-102.3 F = 39.1 C = 102.4 F      NEWS Score:  1/19/21:    Overall Daily Picture:      Worsening    Presence of secondary bacterial Infection:  No   Additional antibiotics: Vancomycin    Labs, X rays reviewed: 1/20/2022    BUN:69-->73  Cr:1.84-->1.61  Na:129  K:5.6-->5.2    WBC:6.5-->15.3  Hb:10.7-->12.3  Plat: 166-->281    Absolute Neutrophils:5.7  Absolute Lymphocytes:0.4  Neutrophil/Lymphocyte Ratio: 14 high risk    CRP:235.8-->209.4  Ferritin:353-->523  LDH: 246-->292    Pro Calcitonin:      Cultures:  Urine:    Blood:    Sputum :    Wound:      CXR:   1/18/21      CAT:      Discussed with patient, RN, CC, IM. I have personally reviewed the past medical history, past surgical history, medications, social history, and family history, and I have updated the database accordingly. Past Medical History:     Past Medical History:   Diagnosis Date    COPD (chronic obstructive pulmonary disease) ([facility]     Diabetes mellitus ([facility]     Erectile dysfunction due to arterial insufficiency 12/7/2015    Hypertension     Microalbuminuria due to type 2 diabetes mellitus ([facility] 7/7/2016    Overweight (BMI 25.0-29.9) 12/7/2015       Past Surgical  History:     Past Surgical History:   Procedure Laterality Date    APPENDECTOMY      TOTAL ANKLE ARTHROPLASTY      LEFT       Medications:      insulin glargine  10 Units SubCUTAneous Daily    [Held by provider] amLODIPine  10 mg Oral Daily    aspirin  81 mg Oral Daily    atorvastatin  20 mg Oral Daily    [Held by provider] carvedilol  25 mg Oral BID    budesonide-formoterol  2 puff Inhalation BID    [Held by provider] furosemide  20 mg Oral Daily    [Held by provider] lisinopril  20 mg Oral Daily    [Held by provider] tiotropium  2 puff Inhalation Daily    sodium chloride flush  5-40 mL IntraVENous 2 times per day    dexamethasone  6 mg Oral Daily    Vitamin D  2,000 Units Oral Daily    ipratropium-albuterol  1 ampule Inhalation 4x daily    insulin lispro  0-6 Units SubCUTAneous TID WC    insulin lispro  0-3 Units SubCUTAneous Nightly    glucagon (rDNA)  1 mg IntraVENous Once    vancomycin (VANCOCIN) intermittent dosing (placeholder)   Other RX Placeholder    vancomycin  1,000 mg IntraVENous Q24H    sodium chloride flush  5-40 mL IntraVENous 2 times per day       Social History:     Social History     Socioeconomic History    Marital status:       Spouse name: Not on file    Number of children: Not on file    Years of education: Not on file    Highest education level: Not on file   Occupational History    Not on file   Tobacco Use    Smoking status: Light Tobacco Smoker     Packs/day: 0.50     Types: Cigarettes     Last attempt to quit: 2017     Years since quittin.5    Smokeless tobacco: Never Used    Tobacco comment: reports he quit smoking in the past 2 months   Substance and Sexual Activity    Alcohol use: No     Alcohol/week: 0.0 standard drinks    Drug use: No    Sexual activity: Not on file   Other Topics Concern    Not on file   Social History Narrative    Not on file     Social Determinants of Health     Financial Resource Strain: Low Risk     Difficulty of Paying Living Expenses: Not very hard   Food Insecurity: No Food Insecurity    Worried About Running Out of Food in the Last Year: Never true    Rakesh of Food in the Last Year: Never true   Transportation Needs:     Lack of Transportation (Medical): Not on file    Lack of Transportation (Non-Medical):  Not on file   Physical Activity:     Days of Exercise per Week: Not on file    Minutes of Exercise per Session: Not on file   Stress:     Feeling of Stress : Not on file   Social Connections:     Frequency of Communication with Friends and Family: Not on file    Frequency of Social Gatherings with Friends and Family: Not on file    Attends [employer] Services: Not on file    Active Member of [address] [place] [address] or Organizations: Not on file    Attends Club or Organization Meetings: Not on file    Marital Status: Not on file   Intimate Partner Violence:     Fear of Current or Ex-Partner: Not on file    Emotionally Abused: Not on file    Physically Abused: Not on file    Sexually Abused: Not on file   Housing Stability:     Unable to Pay for Housing in the Last Year: Not on file    Number of [place] in the Last Year: Not on file    Unstable Housing in the Last Year: Not on file Family History:   No family history on file. Allergies:   Patient has no known allergies. Review of Systems:       Constitutional: Generalized fatigue with dyspnea  Head: No headaches  Eyes: No double vision or blurry vision. No conjunctival inflammation. ENT: No sore throat or runny nose. . No hearing loss, tinnitus or vertigo. Cardiovascular: No chest pain or palpitations. Shortness of breath. DUBON  Lung: Shortness of breath with chronic cough. Abdomen: No nausea, vomiting, diarrhea, or abdominal pain. Miguel Settler No cramps. Genitourinary: No increased urinary frequency, or dysuria. No hematuria. No suprapubic or CVA pain  Musculoskeletal: No muscle aches or pains. No joint effusions, swelling or deformities  Hematologic: No bleeding or bruising. Neurologic: No headache, weakness, numbness, or tingling. Integument: No rash, no ulcers. Psychiatric: No depression. Endocrine: No polyuria, no polydipsia, no polyphagia. Physical Examination :     Patient Vitals for the past 8 hrs:   Pulse Resp SpO2   01/20/22 0935  21 95 %   01/20/22 0715 57 20 94 %   01/20/22 0700 56 20 94 %   01/20/22 0600 75 24 (!) 88 %   01/20/22 0500 59 25 93 %     General Appearance: Awake, alert, and in no apparent distress  Head:  Normocephalic, no trauma  Eyes: Pupils equal, round, reactive to light; sclera anicteric; conjunctivae pink. No embolic phenomena. ENT: Oropharynx clear, without erythema, exudate, or thrush. No tenderness of sinuses. Mouth/throat: mucosa pink and moist. No lesions. Dentition in good repair. Neck:Supple, without lymphadenopathy. Thyroid normal, No bruits. Pulmonary/Chest: Clear to auscultation, without wheezes, rales, or rhonchi. No dullness to percussion. Cardiovascular: Regular rate and rhythm without murmurs, rubs, or gallops. Abdomen: Soft, non tender. Bowel sounds normal. No organomegaly  All four Extremities: No cyanosis, clubbing, edema, or effusions.   Neurologic: Disorientation to time and place  Skin: Warm and dry with good turgor. No signs of peripheral arterial or venous insufficiency. No ulcerations. No open wounds. Medical Decision Making -Laboratory:   I have independently reviewed/ordered the following labs:    CBC with Differential:   Recent Labs     01/19/22  0551 01/20/22  0320   WBC 6.5 15.3*   HGB 10.7* 12.3*   HCT 33.2* 37.8*    281   LYMPHOPCT 7* 1*   MONOPCT 3 4     BMP:   Recent Labs     01/19/22  0027 01/19/22  0551 01/19/22 1911 01/19/22  1911 01/20/22  0320 01/20/22  1108   NA  --    < > 139  --  129*  --    K  --    < > 5.5*   < > 5.6* 5.2   CL  --    < > 104  --  98  --    CO2  --    < > 17*  --  20  --    BUN  --    < > 71*  --  73*  --    CREATININE  --    < > 1.44*  --  1.61*  --    MG 1.7  --   --   --   --   --     < > = values in this interval not displayed. Hepatic Function Panel:   Recent Labs     01/19/22  0551 01/20/22  0320   PROT 5.8* 6.0*   LABALBU 3.0* 3.1*   BILITOT 0.46 0.39   ALKPHOS 78 84   ALT 8 10   AST 19 25     No results for input(s): RPR in the last 72 hours. No results for input(s): HIV in the last 72 hours. No results for input(s): BC in the last 72 hours.   Lab Results   Component Value Date    MUCUS 2+ 07/01/2017    RBC 4.05 01/20/2022    TRICHOMONAS NOT REPORTED 07/01/2017    WBC 15.3 01/20/2022    YEAST NOT REPORTED 07/01/2017    TURBIDITY CLOUDY 07/01/2017     Lab Results   Component Value Date    CREATININE 1.61 01/20/2022    GLUCOSE 339 01/20/2022       Medical Decision Making-Imaging:     Narrative   EXAMINATION:   ONE XRAY VIEW OF THE CHEST       1/18/2022 10:02 pm       COMPARISON:   09/08/2019       HISTORY:   ORDERING SYSTEM PROVIDED HISTORY: hypoxic   TECHNOLOGIST PROVIDED HISTORY:   hypoxic       FINDINGS:   Stable mild cardiomegaly.  Pulmonary vasculature appears normal.  Mild patchy   airspace opacity is present at the bilateral lower lung zones.  No   pneumothorax or pleural effusion.  Surrounding structures are unremarkable.           Impression   Mild bilateral lower lung zone patchy airspace opacity potentially   representing early COVID pneumonia.       Mild stable cardiomegaly.         Narrative   EXAMINATION:   CT OF THE HEAD WITHOUT CONTRAST  1/18/2022 6:31 pm       TECHNIQUE:   CT of the head was performed without the administration of intravenous   contrast. Dose modulation, iterative reconstruction, and/or weight based   adjustment of the mA/kV was utilized to reduce the radiation dose to as low   as reasonably achievable.       COMPARISON:   CT scan dated March 3, 2015       HISTORY:   ORDERING SYSTEM PROVIDED HISTORY: AMS   TECHNOLOGIST PROVIDED HISTORY:       AMS   Decision Support Exception - unselect if not a suspected or confirmed   emergency medical condition->Emergency Medical Condition (MA)   Reason for Exam: COVID+, AMS       FINDINGS:   BRAIN/VENTRICLES: There is no acute intracranial hemorrhage, mass effect or   midline shift.  No abnormal extra-axial fluid collection.  The gray-white   differentiation is maintained without evidence of an acute infarct.  There is   no evidence of hydrocephalus. Generalized atrophy is present.    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       ORBITS: The visualized portion of the orbits demonstrate no acute abnormality.       SINUSES: Mild degree of mucosal thickening is present within the right   maxillary sinus.  Minimal mucosal thickening is present within the left   maxillary sinus.       SOFT TISSUES/SKULL:  No acute abnormality of the visualized skull or soft   tissues.           Impression   No acute intracranial abnormality.                   Medical Decision [id number]:       Medical Decision Making-Other:     Note:  Labs, medications, radiologic studies were reviewed with personal review of films  Large amounts of data were reviewed  Discussed with nursing Staff, Discharge planner  Infection Control and Prevention measures reviewed  All prior entries were reviewed  Administer medications as ordered  Prognosis: Guarded  Discharge planning reviewed      Thank you for allowing us to participate in the care of this patient. Please call with questions. Lou Camarillo APRN - CNP     ATTESTATION:    I have discussed the case, including pertinent history and exam findings with the APRN. I have evaluated the  History, physical findings and pictures of the patient and the key elements of the encounter have been performed by me. I have reviewed the laboratory data, other diagnostic studies and discussed them with the APRN. I have updated the medical record where necessary. I agree with the assessment, plan and orders as documented by the APRN.     Luke De Luna MD.          Pager: [phone] - Office: [phone]

## 2022-01-20 NOTE — PROGRESS NOTES
[address] Replaced by [facility]  Progress Note    Date:   1/20/2022  Patient name:  Sidney Busch  Date of admission:  1/18/2022  9:04 PM  MRN:   [id number]  YOB: 1955    SUBJECTIVE/Last 24 hours update:     Day 2 Hospitalization:     Patient was seen and examined at the bedside. Patient was transferred to ICU. Started on Dopamine drip for bradycardia. Patient was also hypotensive (MaP around 58-60). Patient was started on epinephrine drip. Patient is saturating 94% on high flow NC. Patient is AOX2. Cr improving from admission (2.03-1.84-1.61). Will continue with gentle hydration and monitoring BMP. Glucose elevated in 300s will add insulin. Vancomycin was started for MRSA positive blood culture. REVIEW OF SYSTEMS:      Review of Systems   Constitutional: Negative for appetite change, chills and fever. Eyes: Negative for visual disturbance. Respiratory: Positive for shortness of breath. Negative for chest tightness. Cardiovascular: Negative for chest pain and leg swelling. Gastrointestinal: Negative for abdominal pain, constipation, diarrhea, nausea and vomiting. Genitourinary: Negative for difficulty urinating. Neurological: Negative for headaches. PAST MEDICAL HISTORY:      has a past medical history of COPD (chronic obstructive pulmonary disease) ([facility] Diabetes mellitus ([address] Erectile dysfunction due to arterial insufficiency, Hypertension, Microalbuminuria due to type 2 diabetes mellitus ([address] and Overweight (BMI 25.0-29.9). PAST SURGICAL HISTORY:      has a past surgical history that includes Appendectomy and Total ankle arthroplasty. SOCIALHISTORY:      reports that he has been smoking cigarettes. He has been smoking about 0.50 packs per day. He has never used smokeless tobacco. He reports that he does not drink alcohol and does not use drugs. FAMILY HISTORY:      family history is not on file.     HOME MEDICATIONS:      Prior to Admission medications    Medication Sig Start Date End Date Taking? Authorizing Provider   amLODIPine (NORVASC) 10 MG tablet TAKE 1 TABLET BY MOUTH DAILY 10/25/21   Pete Reyes MD   aspirin (HM ASPIRIN EC LOW DOSE) 81 MG EC tablet TAKE 1 TABLET BY MOUTH DAILY 8/26/21   Jesus Paniagua MD   furosemide (LASIX) 20 MG tablet Take 1 tablet by mouth daily 8/26/21   Jesus Paniagua MD   Blood Pressure KIT Check blood pressure daily 8/26/21   Jesus Paniagua MD   albuterol sulfate HFA (PROAIR HFA) 108 (90 Base) MCG/ACT inhaler inhale 2 puffs by mouth every 6 hours if needed for wheezing 8/16/21   Te Giraldo MD   fluticasone-salmeterol (ADVAIR DISKUS) 100-50 MCG/DOSE diskus inhaler INHALE 1 PUFF INTO THE LUNGS EVERY 12 HOURS 8/16/21   Te Giraldo MD   carvedilol (COREG) 25 MG tablet Take 1 tablet by mouth 2 times daily 8/16/21   Manny Aquino MD   atorvastatin (LIPITOR) 20 MG tablet Take 1 tablet by mouth daily 7/21/21   Pete Reyes MD   lisinopril (PRINIVIL;ZESTRIL) 20 MG tablet Take 1 tablet by mouth daily 7/21/21   Pete Reyes MD   ipratropium (ATROVENT HFA) 17 MCG/ACT inhaler Inhale 1 puff into the lungs 2 times daily 9/2/20 10/2/20  Russ Villafuerte MD   tiotropium (Jamar Liberty) 18 MCG inhalation capsule Inhale 1 capsule into the lungs daily 1/9/20   Russ Villafuerte MD   nicotine (NICODERM CQ) 14 MG/24HR Place 1 patch onto the skin daily 12/25/16   Leobardo Estrella MD       ALLERGIES:     Patient has no known allergies.     OBJECTIVE:       Vitals:    01/20/22 0500 01/20/22 0600 01/20/22 0700 01/20/22 0715   BP:       Pulse: 59 75 56 57   Resp: 25 24 20 20   Temp:       TempSrc:       SpO2: 93% (!) 88% 94% 94%   Weight:       Height:             Intake/Output Summary (Last 24 hours) at 1/20/2022 0757  Last data filed at 1/20/2022 0500  Gross per 24 hour   Intake 1662.69 ml   Output 1025 ml   Net 637.69 ml       PHYSICAL EXAM:    Physical Exam  Vitals and nursing note reviewed. Constitutional:       General: He is not in acute distress. Appearance: He is not ill-appearing. HENT:      Head: Normocephalic and atraumatic. Mouth/Throat:      Pharynx: Oropharynx is clear. Cardiovascular:      Rate and Rhythm: Regular rhythm. Bradycardia present. Pulses: Normal pulses. Heart sounds: No murmur heard. Pulmonary:      Effort: Pulmonary effort is normal.      Breath sounds: Normal breath sounds. Abdominal:      Palpations: Abdomen is soft. There is no mass. Tenderness: There is no abdominal tenderness. Musculoskeletal:         General: No swelling or tenderness. Normal range of motion. Cervical back: Normal range of motion and neck supple. Neurological:      General: No focal deficit present. Mental Status: He is alert and oriented to person, place, and time. ASSESSMENT:      Principal Problem:    Acute on chronic respiratory failure with hypoxia (HCC)  Active Problems:    Controlled type 2 diabetes mellitus without complication, without long-term current use of insulin ([address]    COVID-19  Resolved Problems:    * No resolved hospital problems. *      PLAN:          Acute on chronic hypoxic respiratory failure 2/2 COVID-19 pneumonia in the setting of COPD/CHF  - COVID+  - ID consulted  - NRB to maintain O2 sats, wean off as tolerated  - .4-235, check daily CRP  - ferritin 353  - CXR: mild bilateral lower lung zone patchy air space opacity, representing early COVID pneumonia  - concern for PE, was given lovenox 80 mg in the ER, unable to obtain CT PE 2/2 to UYEN, pending V/Q scan.    - continue decadron  - RT aerosol protocol  - continue spiriva and symbicort  -Pulmonary consult- appreciate recs         Hypotension  MAP 58-60  Started on epinephrine drip  Continue to hold BP medications      UYEN likely prerenal  - Cr 2, baseline ~ 1  - s/p 500 ml fluid bolus  - continue gentle hydration 75 ml/hr  - monitor carefully for fluid overload 2/2 history of CHF  - daily BMP  - pending calculate FeNa     Bradycardia  - HR 42 in the ER, was given atropine 1 mg and glucagon 1 mg  - holding his home betablocker  - telemetry  - cardiology consulted  - dopamine gtt  -Echo       HFrEF  - no signs of fluid overload  - holding lisinopril/coreg 2/2 hypotension  - EF 20-30%       Plan will be discussed with the attending, Dr. Jennifer Moody MD  Family Medicine Resident  1/20/2022 7:57 AM

## 2022-01-20 NOTE — PROCEDURES
PROCEDURE NOTE - CENTRAL VENOUS LINE PLACEMENT    PATIENT NAME: [address] [place] [address] RECORD NO. [id number]  DATE: 1/19/2022  ATTENDING PHYSICIAN: Dr. Jose Schmitt DIAGNOSIS:  vascular access and centrally administered medications  POSTOPERATIVE DIAGNOSIS:  Same  PROCEDURE PERFORMED:  Right Femoral Vein Central Line Insertion  PERFORMING PHYSICIAN: Rosalee Degroot DO  ANESTHESIA:  Local utilizing 1% lidocaine  ESTIMATED BLOOD LOSS:  Less than 25 ml  COMPLICATIONS:  None immediately appreciated. DISCUSSION:  Shannen Castro is a 74y.o.-year-old male who requires central IV access vascular access and centrally administered medications. The history and physical examination were reviewed and confirmed. CONSENT: Unable to be obtained due to the emergent nature of this procedure. PROCEDURE:  A timeout was initiated by the bedside nurse and was confirmed by those present. The patient was placed in a supine position. The skin overlying the Right Femoral Vein was prepped with chlorhexadine and draped in sterile fashion. The skin was infiltrated with local anesthetic. The vessel and surrounding anatomy was visualized using ultrasound. Through the anesthetized region, the introducer needle was inserted into the femoral vein returning dark red non pulsatile blood. A guidewire was placed through the center of the needle with no resistance. Ultrasound confirmed presence of wire in the vein. A small incision made in the skin with a #11 scalpel blade. The dilator was inserted into the skin and vein over guidewire using Seldinger technique. The dilator was then removed and the 7F 20cm catheter was placed in the vein over the guidewire using Seldinger technique. The guidewire was then removed and all ports aspirated and flushed appropriately. The catheter then secured using silk suture and a temporary sterile dressing was applied. No immediate complication was evident.   All sponge, instrument and needle counts were correct at the completion of the procedure. Postprocedural chest x-ray showed good position of the catheter with no evidence of hemothorax or pneumothorax. The patient tolerated the procedure well with no immediate complication evident. Rosalee Astudillo DO  7:04 PM, 1/19/22   PROCEDURE NOTE - ARTERIAL LINE PLACEMENT    PATIENT NAME: Naman Andres  MEDICAL RECORD NO. [id number]  DATE: 1/19/2022  ATTENDING PHYSICIAN:  Dr. Lo Lundberg DIAGNOSIS:  Need for blood pressure monitoring  POSTOPERATIVE DIAGNOSIS:  Same  PROCEDURE PERFORMED: Right Femoral Arterial Line Insertion  PERFORMING PHYSICIAN:  Rosalee Astudillo DO  ESTIMATED BLOOD LOSS:  Less than 25 ml  COMPLICATIONS:  None immediately appreciated. DISCUSSION:  Naman Andres is a 74 y.o. male who requires invasive pressure monitoring. The history and physical examination were reviewed and confirmed. CONSENT: Unable to be obtained due to the emergent nature of this procedure. PROCEDURE:  A timeout was initiated by the bedside nurse and was confirmed by those present. The patient was placed in a supine position. The skin overlying the Right Femoral was prepped with chlorhexadine. Through this region, the introducer needle through catheter was inserted into until pulsatile bright blood was seen in collection tubing. Guidewire was advanced with no resistance. Catheter was advanced into the artery, wire was pulled with brisk bleeding noted. Pressure monitoring setup was connected to the catheter, it aspirated and flushed easily. The catheter was secured to the wrist with 3-0 silk. No immediate complication was evident. All sponge, instrument and needle counts were correct at the completion of the procedure.       Rosalee Astudillo DO  7:04 PM, 1/19/22

## 2022-01-20 NOTE — PROGRESS NOTES
Physician Progress Note      PATIENT:               Mario Jiménez  CSN #:                  [id number]  :                       1955  ADMIT DATE:       2022 9:04 PM  [address] [place] DATE:  RESPONDING  PROVIDER #:        Galina Alfaro DO          QUERY TEXT:    Pt admitted with Sepsis due to COVID 19. Pt noted to have hypotension and   bradycardia and was placed on pressor. If possible, please document in the   progress notes and discharge summary if you are evaluating and/or treating any   of the following: The medical record reflects the following:  Risk Factors: Sepsis due to COVID 19 PNA ,bradycardia, hypotension,  Clinical Indicators: SpO2 88> 94 on high flow NC., Patient is AOX2.   hypotensive BP 79/41>89/48 (MaP around 58-60), Temp 100.7, Pulse 38, Resp 26,  Treatment: DOPamine 400 mg  infusion, EPINEPHrine 5 mg  infusion,  0.9 %   sodium chloride 75 mL/hr IV, Vancomycin was started for MRSA positive blood   culture. Thank you, Please call if questions  Mariel GRAYSON Orozco Comment MARIANNA [facility]  [phone]  Options provided:  -- Cardiogenic Shock  -- Septic Shock  -- Hypovolemic Shock  -- Hypovolemia without Shock  -- Hypotension without Shock  -- Other - I will add my own diagnosis  -- Disagree - Not applicable / Not valid  -- Disagree - Clinically unable to determine / Unknown  -- Refer to Clinical Documentation Reviewer    PROVIDER RESPONSE TEXT:    This patient has hypovolemia without shock.     Query created by: Meriel Frankel on 2022 12:20 PM      Electronically signed by:  Galina Alfaro DO 2022 2:36 PM

## 2022-01-20 NOTE — PROGRESS NOTES
Tomer Ravi (daughter) updated on patient hemodynamic stability. All questions/concerns answered at this time.

## 2022-01-20 NOTE — PROGRESS NOTES
Patient spoke with Alexandra Marcos (daughter) on the phone and then Edelmira Medrano (son). Both Edelmira Medrano and lAexandra Marcos were updated on patient. IMPROVE-DD Assessment completed: Mar  3 2021  8:59PM

## 2022-01-20 NOTE — PROGRESS NOTES
Kassidy Nam (Son) and Radha (Daughter) updated on patients status and hemodynamic stability. All questions and concerns answered at this time.

## 2022-01-20 NOTE — CONSULTS
PULMONARY & CRITICAL CARE MEDICINE CONSULT NOTE     Patient:  Sidney Busch  MRN: [id number]  [facility] date: 1/18/2022  Primary Care Physician: Mack Maier MD  Consulting Physician: Bright Kohli DO  CODE Status: Full Code  LOS: 1     SUBJECTIVE     CHIEF COMPLAINT/REASON FOR CONSULT:    COVID-19 pneumonia/acute hypoxic respiratory failure/bradycardia. HISTORY OF PRESENT ILLNESS:  The patient is a 74 y.o. male history of chronic systolic heart failure apparently ejection fraction of 30% HFrEF, followed in CHF clinic, nonischemic cardiomyopathy, COPD, diabetes mellitus. Presented to emergency room on 01/18/2022 with progressive dyspnea. Symptoms were present for 3 to 4 days, patient is not sure that if he was having cough, fever loss of taste or smell and denies chest pain. In the emergency room patient was found to be hypoxic requiring nonrebreather he was found to be bradycardic with heart rate in 40s and did receive atropine and apparently glucagon. Patient on Coreg 25 mg twice daily at home lisinopril and Lasix. This morning patient was hypotensive require fluid bolus and systolic blood pressure was above 90 after the fluid bolus. He was also found to have UYEN with creatinine of 2.03 and this morning was 1.84. Patient has not been eating and drinking for last 3 to 4 days as he was not feeling well. He does not complain of wheezing purulent sputum hemoptysis chest pain. He was seen by infectious disease yesterday his CRP was elevated 209 his troponin was 73 repeated was 70 proBNP was 530 WBC count was 6.7 platelet 515. He was given 1 dose of Actemra and he was started on Decadron 6 mg once daily    When I saw him patient systolic blood pressure was 90s heart rate was in 40s he was alert and awake he did not have any syncope denies dizziness lightheadedness did not have chest pain patient did not require atropine this morning.   According to primary service they have contacted cardiology and they suggest dopamine/dobutamine. Cardiology was consulted and according to cardiology note atropine as needed there was no mention of dopamine or dobutamine in their note. He has history of smoking currently patient he stopped smoking 3 4 years ago history of smoking for more than 30 years 1 pack/day. Interval history:    Remains on high flow oxygen at 30 L and 50%  Continues to have shortness of breath and coughing  No wheezing  No orthopnea or PND  No hemoptysis  No chest pain or pressure  Patient still requiring pressors      PAST MEDICAL HISTORY:        Diagnosis Date    COPD [facility]chronic obstructive pulmonary disease) [facility] [address] [facility]     Diabetes mellitus ([facility]     Erectile dysfunction due to arterial insufficiency 12/7/2015    Hypertension     Microalbuminuria due to type 2 diabetes mellitus ([facility] 7/7/2016    Overweight (BMI 25.0-29.9) 12/7/2015     PAST SURGICAL HISTORY:        Procedure Laterality Date    APPENDECTOMY      TOTAL ANKLE ARTHROPLASTY      LEFT     FAMILY HISTORY:   No family history on file. SOCIAL HISTORY:   TOBACCO:   reports that he has been smoking cigarettes. He has been smoking about 0.50 packs per day. He has never used smokeless tobacco.  ETOH:  reports no history of alcohol use. DRUGS: reports no history of drug use. ALLERGIES:    No Known Allergies      HOME MEDICATIONS:  Prior to Admission medications    Medication Sig Start Date End Date Taking?  Authorizing Provider   amLODIPine (NORVASC) 10 MG tablet TAKE 1 TABLET BY MOUTH DAILY 10/25/21   Hailey Walton MD   aspirin (HM ASPIRIN EC LOW DOSE) 81 MG EC tablet TAKE 1 TABLET BY MOUTH DAILY 8/26/21   Raciel Pace MD   furosemide (LASIX) 20 MG tablet Take 1 tablet by mouth daily 8/26/21   Raciel Pace MD   Blood Pressure KIT Check blood pressure daily 8/26/21   Raciel Pace MD   albuterol sulfate HFA (PROAIR HFA) 108 (90 Base) MCG/ACT inhaler inhale 2 puffs by mouth every 6 hours if needed for wheezing 8/16/21 Ping Pleitez MD   fluticasone-salmeterol (ADVAIR DISKUS) 100-50 MCG/DOSE diskus inhaler INHALE 1 PUFF INTO THE LUNGS EVERY 12 HOURS 8/16/21   Te Frye MD   carvedilol (COREG) 25 MG tablet Take 1 tablet by mouth 2 times daily 8/16/21   Ping Pleitez MD   atorvastatin (LIPITOR) 20 MG tablet Take 1 tablet by mouth daily 7/21/21   Rivera Petersen MD   lisinopril (PRINIVIL;ZESTRIL) 20 MG tablet Take 1 tablet by mouth daily 7/21/21   Rivera Petersen MD   ipratropium (ATROVENT HFA) 17 MCG/ACT inhaler Inhale 1 puff into the lungs 2 times daily 9/2/20 10/2/20  Maria Luz Rendon MD   tiotropium (Coreen Tamia) 18 MCG inhalation capsule Inhale 1 capsule into the lungs daily 1/9/20   Maria Luz Rendon MD   nicotine (NICODERM CQ) 14 MG/24HR Place 1 patch onto the skin daily 12/25/16   Kim Sanders MD     IMMUNIZATIONS:  Most Recent Immunizations   Administered Date(s) Administered    Pneumococcal Conjugate 13-valent ([id number]) 08/02/2018    Pneumococcal Polysaccharide ([id number]) 11/15/2019    Tdap (Boostrix, Adacel) 08/02/2018     REVIEW OF SYSTEMS:  Review of Systems   Constitutional: Positive for appetite change and fatigue. Negative for fever. HENT: Negative for postnasal drip, sinus pain, sore throat, trouble swallowing and voice change. Eyes: Negative for photophobia, redness and visual disturbance. Respiratory: Positive for cough and shortness of breath. Negative for wheezing. Cardiovascular: Negative for chest pain, palpitations and leg swelling. Gastrointestinal: Negative for abdominal pain, diarrhea and vomiting. Endocrine: Negative for polydipsia, polyphagia and polyuria. Genitourinary: Negative for dysuria, frequency and hematuria. Musculoskeletal: Negative. Allergic/Immunologic: Negative. Neurological: Negative for dizziness, syncope, speech difficulty, light-headedness, numbness and headaches. Hematological: Negative for adenopathy.  Does not bruise/bleed easily. Psychiatric/Behavioral: Negative. OBJECTIVE     PaO2/FiO2 RATIO:  No results for input(s): POCPO2 in the last 72 hours.    FiO2 : (S) 50 %     VITAL SIGNS:   LAST:  BP (!) 90/47   Pulse 57   Temp 98 °F (36.7 °C) (Axillary)   Resp 20   Ht 5' 5\" (1.651 m)   Wt 152 lb 8 oz (69.2 kg)   SpO2 94%   BMI 25.38 kg/m²   8-24 HR RANGE:  TEMP Temp  Av.9 °F (36.6 °C)  Min: 97.7 °F (36.5 °C)  Max: 98 °F (14.3 °C)   BP Systolic (68YXJ), XXZ:37 , Min:90 , PNZ:83      Diastolic (14ORG), BFP:11, Min:47, Max:47     PULSE Pulse  Av.5  Min: 38  Max: 75   RR Resp  Av.5  Min: 20  Max: 26   O2 SAT SpO2  Av.6 %  Min: 88 %  Max: 99 %   OXYGEN DELIVERY O2 Flow Rate (L/min)  Av L/min  Min: 30 L/min  Max: 30 L/min        Physical Exam   General appearance - well appearing,  comfortable and in no acute distress  Mental status - alert, oriented to person, place, and time  Eyes - pupils equal and reactive, sclera anicteric  Mouth - mucous membranes slightly dry, pharynx normal without lesions  Neck - supple, no significant adenopathy, no bruits  Lymphatics - no palpable lymphadenopathy, no hepatosplenomegaly  Chest - no tachypnea, retractions or cyanosis, bilateral breath sounds present no expiratory wheezing no rhonchi distant breath sounds  Heart -bradycardia, regular rhythm, normal S1, S2, no murmurs, rubs, clicks or gallops  Abdomen - soft, nontender, nondistended, no masses or organomegaly  Neurological -awake and following commands  Musculoskeletal - no joint tenderness, deformity or swelling  Extremities - peripheral pulses normal, no pedal edema, no clubbing or cyanosis  Skin - normal coloration and turgor, no rashes, no suspicious skin lesions noted  DATA REVIEW     Medications: Current Inpatient  Scheduled Meds:   insulin glargine  10 Units SubCUTAneous Daily    [Held by provider] amLODIPine  10 mg Oral Daily    aspirin  81 mg Oral Daily    atorvastatin  20 mg Oral Daily  [Held by provider] carvedilol  25 mg Oral BID    budesonide-formoterol  2 puff Inhalation BID    [Held by provider] furosemide  20 mg Oral Daily    [Held by provider] lisinopril  20 mg Oral Daily    [Held by provider] tiotropium  2 puff Inhalation Daily    sodium chloride flush  5-40 mL IntraVENous 2 times per day    dexamethasone  6 mg Oral Daily    Vitamin D  2,000 Units Oral Daily    ipratropium-albuterol  1 ampule Inhalation 4x daily    insulin lispro  0-6 Units SubCUTAneous TID WC    insulin lispro  0-3 Units SubCUTAneous Nightly    glucagon (rDNA)  1 mg IntraVENous Once    vancomycin (VANCOCIN) intermittent dosing (placeholder)   Other RX Placeholder    vancomycin  1,000 mg IntraVENous Q24H    sodium chloride flush  5-40 mL IntraVENous 2 times per day     Continuous Infusions:   EPINEPHrine infusion 9 mcg/min (01/20/22 0679)    sodium chloride      sodium chloride 75 mL/hr at 01/20/22 0501    dextrose      DOPamine 12.5 mcg/kg/min (01/20/22 0286)    sodium chloride         INPUT/OUTPUT:  In: 1662.7 [P.O.:240; I.V.:1172.7]  Out: 1025 [Urine:1025]  Date 01/20/22 0000 - 01/20/22 2359   Shift 2119-1135 7821-7576 8127-1901 24 Hour Total   INTAKE   P.O.(mL/kg/hr) 240(0.4)   240   I. V.(mL/kg) 675.2(9.8)   675.2(9.8)   Shift Total(mL/kg) 915.2(13.2)   915.2(13.2)   OUTPUT   Urine(mL/kg/hr) 150(0.3)   150   Shift Total(mL/kg) 150(2.2)   150(2.2)   Weight (kg) 69.2 69.2 69.2 69.2        LABS:  ABGs:   No results for input(s): POCPH, POCPCO2, POCPO2, POCHCO3, WSCP0IOJ in the last 72 hours.   CBC:   Recent Labs     01/18/22  2141 01/19/22  0551 01/20/22  0320   WBC 6.7 6.5 15.3*   HGB 11.1* 10.7* 12.3*   HCT 34.4* 33.2* 37.8*   MCV 93.7 94.3 93.3    166 281   LYMPHOPCT 10* 7* 1*   RBC 3.67* 3.52* 4.05*   MCH 30.2 30.4 30.4   MCHC 32.3 32.2 32.5   RDW 12.9 13.0 12.9     CRP:   Recent Labs     01/18/22  2141 01/19/22  0551 01/20/22  0320   .4* 235.8* 209.4*     LDH:   Recent Labs 01/18/22 2141 01/20/22  0320   * 292*     BMP:   Recent Labs     01/18/22 2141 01/19/22  0551 01/19/22  1911 01/20/22  0320   * 137 139 129*   K 4.6 5.2 5.5* 5.6*   CL 96* 101 104 98   CO2 22 21 17* 20   BUN 63* 69* 71* 73*   CREATININE 2.03* 1.84* 1.44* 1.61*   GLUCOSE 133* 186* 176* 339*     Liver Function Test:   Recent Labs     01/18/22 2141 01/19/22  0551 01/20/22  0320   PROT 6.2* 5.8* 6.0*   LABALBU 3.4* 3.0* 3.1*   ALT 8 8 10   AST 21 19 25   ALKPHOS 79 78 84   BILITOT 0.60 0.46 0.39     Coagulation Profile:   Recent Labs     01/18/22 2141   INR 0.9   PROTIME 10.2   APTT 27.2     D-Dimer:  Recent Labs     01/18/22 2141 01/20/22  0320   DDIMER 1.39 1.79     Ferritin:    Recent Labs     01/18/22 2141 01/20/22  0320   FERRITIN 353 532*     Lactic Acid:  No results for input(s): LACTA in the last 72 hours. Cardiac Enzymes:  No results for input(s): CKTOTAL, CKMB, CKMBINDEX, TROPONINI in the last 72 hours. Invalid input(s): TROPONIN, HSTROP  BNP/ProBNP:   Recent Labs     01/18/22 2141   PROBNP 530*     Triglycerides:  No results for input(s): TRIG in the last 72 hours. Microbiology:  Urine Culture:  No components found for: CURINE  Blood Culture:  No components found for: CBLOOD, CFUNGUSBL  Sputum Culture:  No components found for: [place]     01/18/22 2120 01/18/22 2120 01/18/22 2138   [address] [facility] . BLOOD   < > .NASOPHARYNGEAL SWAB   SPECIAL  L AC 10ML  --   --    CULTURE NO GROWTH 1 DAY  --   --     < > = values in this interval not displayed.      Recent Labs     01/18/22 2110 01/18/22 2120   SPECIAL  R WRIST 10ML  L AC 10ML   CULTURE POSITIVE Blood Culture*  DIRECT GRAM STAIN FROM BOTTLE: GRAM POSITIVE COCCI IN CLUSTERS  Staphylococcus aureus Detected: Methodology- Polymerase Chain Reaction (PCR)*  Staphylococcus epidermidis Detected: Methodology- Polymerase Chain Reaction (PCR)*  mecA/C Gene Detected- Methicillin Resistant Organism*  (NOTE) Direct Gram Stain from bottle and Polymerase Chain Reaction (PCR) results called to and read back by:DEYANIRA AGUAYO AT 1350 ON 01/19/2022 NO GROWTH 1 DAY        Radiology Reports:  XR CHEST PORTABLE   Final Result      No pneumothorax. Stable moderate cardiomegaly. Slight worsening consolidative changes of left lower lobe and small left   pleural effusion. Worsening increased interstitial markings and ground-glass densities   throughout both lungs. The finding can be seen in setting of pulmonary edema   or viral pneumonia. XR CHEST PORTABLE   Final Result   Mild bilateral lower lung zone patchy airspace opacity potentially   representing early COVID pneumonia. Mild stable cardiomegaly. CT HEAD WO CONTRAST   Final Result   No acute intracranial abnormality. NM LUNG SCAN PERFUSION ONLY    (Results Pending)        Echocardiogram:   Results for orders placed during the hospital encounter of 09/04/19    Echocardiogram complete 2D with doppler with color    Narrative  Transthoracic Echocardiography Report (TTE)    Patient Name Lauren Solano  Date of Study         09/06/2019    Date of      1955       Gender                Male  Birth    Age          75 year(s)       Race                      Room Number  2008             Height:               65 inch, 165.1 cm    Corporate ID [id number]         Weight:               180 pounds, 81.6 kg  #    Patient Acct [de-identified]        BSA:       1.89 m^2   BMI:      29.95 kg/m^2  #    MR #         [id number]          Sonographer           Merrill Camarena    Accession #  [id number]        Interpreting          Jennifer Alvarado  Physician    Fellow                        Referring Nurse  Practitioner    Interpreting Pattie Adams  Referring Physician   [address]  Fellow       Jonathan Ulrich CNP    Type of Study    TTE procedure:2D Echocardiogram, M-Mode, Doppler, Color Doppler.     Procedure Date  Date: 09/06/2019 Start: 11:05 AM    Study Location: [facility]    Nicole / Maria Deras. Comments:  High Risk Angina, COPD, CHF CAD    Patient Status: Inpatient    Height: 65 inches Weight: 180 pounds BSA: 1.89 m^2 BMI: 29.95 kg/m^2    CONCLUSIONS    Summary  Dilated left ventricle with severely reduced systolic function. Severe hypokinesis to akinesis of distal inferior, inferolateral, septal,  anterior, apical wall  Calculated ejection fraction is 20%. Evidence of moderate diastolic dysfunction. Normal right ventricular size with mildly reduced function. Moderate mitral regurgitation. Mild tricuspid regurgitation. Signature  ----------------------------------------------------------------------------  Electronically signed by Home Bar(Sonographer) on 09/06/2019 02:24  PM  ----------------------------------------------------------------------------    ----------------------------------------------------------------------------  Electronically signed by Gloria Lynch(Interpreting physician) on  09/06/2019 05:23 PM  ----------------------------------------------------------------------------  FINDINGS  Left Atrium  Left atrium is moderately dilated. Left Ventricle  Dilated left ventricle with severely reduced systolic function. Severe hypokinesis to akinesis of distal inferior, inferolateral, septal,  anterior, apical wall  Calculated ejection fraction is 20%. Evidence of moderate diastolic dysfunction. (H/K'11 )  Right Atrium  Right atrium is normal in size. Right Ventricle  Normal right ventricular size with mildly reduced function. Mitral Valve  Mitral valve structure is normal.  Moderate mitral regurgitation. Aortic Valve  Aortic valve is trileaflet. Aortic sclerosis without stenosis. No aortic insufficiency. Tricuspid Valve  Tricuspid valve structure is normal.  Mild tricuspid regurgitation. Pulmonic Valve  The pulmonic valve is normal in structure.   Pericardial Effusion  Small anterior pericardial effusion. Miscellaneous  Normal aortic root dimension. IVC diameter and inspiratory collapse is normal.    M-mode / 2D Measurements & Calculations:    LVIDd:6 cm(3.7 - 5.6 cm)          Diastolic TJSZCJ:729.24 ml  LVIDs:4.46 cm(2.2 - 4.0 cm)       Systolic TIJAFX:11 ml  YWMR:9.1 cm(0.6 - 1.1 cm)         Aortic Root:3.1 cm(2.0 - 3.7 cm)  LVPWd:1.2 cm(0.6 - 1.1 cm)        LA Dimension: 4 cm(1.9 - 4.0 cm)  Fractional Shortenin.67 %     LA volume/Index: 69.67 ml /37m^2  Calculated LVEF (%): 29.8 %       LVOT:2.5 cm  RVDd:2.5 cm    Mitral:                                  Aortic    Valve Area (P1/2-Time): 3.14 cm^2        Peak Velocity: 1.25 m/s  Peak E-Wave: 0.90 m/s                    Mean Velocity: 0.82 m/s  Peak A-Wave: 0.86 m/s                    Peak Gradient: 6.25 mmHg  E/A Ratio: 1.04                          Mean Gradient: 3 mmHg  Peak Gradient: 3.22 mmHg  Mean Gradient: 24 mmHg  Deceleration Time: 180 msec              Area (continuity): 2.5 cm^2  P1/2t: 70 msec                           AV VTI: 26.5 cm  MR PISA Radius: 0.6 cm    Area (continuity): 0.64 cm^2  Mean Velocity: 0.61 m/s    Tricuspid:                               Pulmonic:    Estimated RVSP: 44 mmHg                  Peak Velocity: 0.69 m/s  Peak TR Velocity: 2.95 m/s               Peak Gradient: 1.92 mmHg  Peak TR Gradient: 34.81 mmHg  Estimated RA Pressure: 10 mmHg    Estimated PASP: 44.81 mmHg    Diastology / Tissue Doppler  Septal Wall E' velocity:0.04 m/s  Septal Wall E/E':22  Lateral Wall E' velocity:0.06 m/s  Lateral Wall E/E':14       ASSESSMENT AND PLAN     Assessment:    // Acute hypoxic respiratory failure  // COVID-19 infection/pneumonia  // Sinus bradycardia likely related to beta-blocker status post 1 dose of atropine  // Hypotension likely multifactorial including hypovolemia/bradycardia/ COVID sepsis.   // Sepsis due to COVID 19  // High inflammatory marker, CRP, better  // Troponin elevation  // UYEN on CKD  //Hyperkalemia, better  //Hyperglycemia, more than 200  //Anemia, better  //Leukocytosis, likely due to corticosteroid use  //Intermediate probability for pulmonary embolism on the nuclear medicine scan    Plan:    I personally interviewed/examined the patient; reviewed interval history, interpreted all available radiographic and laboratory data at the time of service. Continue on high flow O2 and monitor oxygen saturation keeping oxygen saturation 90%. Will use BiPAP/NIV if needed for any increased distress worsening hypoxia or work of breathing currently patient is comfortable though hypoxic maintaining saturation above 92% nonrebreather. On Symbicort and DuoNeb  Encourage prone position when sleeping, incentive spirometry  Monitor I/O, renal function, electrolytes with a goal of even fluid balance  He is on Decadron recommend to give 6 mg for 10 days  He is status post Actemra on 01/18/2022  On subcu heparin for DVT prophylaxis  Antimicrobials reviewed; on vancomycin  Monitor CRP, LDH, AST/ALT, D-Dimer, Ferritin as needed/periodically  Glycemic control per primary service  Physical/occupational therapy when able to tolerate  On oral nutrition    Discussed with respiratory therapist in detail. Discussed with nursing staff and nursing charge        Patient is critically ill with illness/injury that acutely impairs one or more vital organ systems, such that there is a high probability of imminent or life threatening deterioration in the patient's condition. Critical care time of 50 minutes was spent (excluding procedures), in coordination of care during bedside rounds and discussion of patient care in detail, and recommendations of the team were adopted in the plan. Necessity of all invasive devices was also confirmed.      Shasta Arvizu MD  Pulmonary and Critical Care Medicine           1/20/2022, 9:05 AM    This patient was evaluated in the context of the global SARS-CoV-2 (COVID-19) pandemic, which necessitated considerations that the patient either has COVID-19 infection or is at risk of infection with COVID-19. Institutional protocols and algorithms that pertain to the evaluation & management of patients with COVID-19 or those at risk for COVID-19 are in a state of rapid changes based on information released by regulatory bodies including the CDC and federal and state organizations. These policies and algorithms were followed during the patient's care. Please note that this chart was generated using voice recognition Dragon dictation software. Although every effort was made to ensure the accuracy of this automated transcription, some errors in transcription may have occurred.

## 2022-01-20 NOTE — PROGRESS NOTES
Bradford Sharma (son) updated on patients hemodynamic stability. All questions and concerns answered at this time.

## 2022-01-21 LAB
ABSOLUTE EOS #: 0 K/UL (ref 0–0.4)
ABSOLUTE IMMATURE GRANULOCYTE: 0.12 K/UL (ref 0–0.3)
ABSOLUTE LYMPH #: 0.12 K/UL (ref 1–4.8)
ABSOLUTE MONO #: 0.12 K/UL (ref 0.1–0.8)
ALBUMIN SERPL-MCNC: 2.7 G/DL (ref 3.5–5.2)
ALBUMIN/GLOBULIN RATIO: 1 (ref 1–2.5)
ALP BLD-CCNC: 75 U/L (ref 40–129)
ALT SERPL-CCNC: 8 U/L (ref 5–41)
ANION GAP SERPL CALCULATED.3IONS-SCNC: 8 MMOL/L (ref 9–17)
AST SERPL-CCNC: 18 U/L
BASOPHILS # BLD: 0 % (ref 0–2)
BASOPHILS ABSOLUTE: 0 K/UL (ref 0–0.2)
BILIRUB SERPL-MCNC: 0.31 MG/DL (ref 0.3–1.2)
BUN BLDV-MCNC: 62 MG/DL (ref 8–23)
BUN/CREAT BLD: ABNORMAL (ref 9–20)
C-REACTIVE PROTEIN: 90.1 MG/L (ref 0–5)
CALCIUM SERPL-MCNC: 8.2 MG/DL (ref 8.6–10.4)
CHLORIDE BLD-SCNC: 105 MMOL/L (ref 98–107)
CO2: 23 MMOL/L (ref 20–31)
CREAT SERPL-MCNC: 1.17 MG/DL (ref 0.7–1.2)
CULTURE: ABNORMAL
D-DIMER QUANTITATIVE: 2.02 MG/L FEU
DIFFERENTIAL TYPE: ABNORMAL
EOSINOPHILS RELATIVE PERCENT: 0 % (ref 1–4)
GFR AFRICAN AMERICAN: >60 ML/MIN
GFR NON-AFRICAN AMERICAN: >60 ML/MIN
GFR SERPL CREATININE-BSD FRML MDRD: ABNORMAL ML/MIN/{1.73_M2}
GFR SERPL CREATININE-BSD FRML MDRD: ABNORMAL ML/MIN/{1.73_M2}
GLUCOSE BLD-MCNC: 134 MG/DL (ref 75–110)
GLUCOSE BLD-MCNC: 139 MG/DL (ref 75–110)
GLUCOSE BLD-MCNC: 192 MG/DL (ref 75–110)
GLUCOSE BLD-MCNC: 215 MG/DL (ref 75–110)
GLUCOSE BLD-MCNC: 259 MG/DL (ref 70–99)
HCT VFR BLD CALC: 35.2 % (ref 40.7–50.3)
HEMOGLOBIN: 11.5 G/DL (ref 13–17)
IMMATURE GRANULOCYTES: 1 %
LACTATE DEHYDROGENASE: 243 U/L (ref 135–225)
LV EF: 51 %
LVEF MODALITY: NORMAL
LYMPHOCYTES # BLD: 1 % (ref 24–44)
Lab: ABNORMAL
MAGNESIUM: 1.9 MG/DL (ref 1.6–2.6)
MCH RBC QN AUTO: 30.7 PG (ref 25.2–33.5)
MCHC RBC AUTO-ENTMCNC: 32.7 G/DL (ref 28.4–34.8)
MCV RBC AUTO: 93.9 FL (ref 82.6–102.9)
MONOCYTES # BLD: 1 % (ref 1–7)
MORPHOLOGY: NORMAL
MRSA, DNA, NASAL: NEGATIVE
NRBC AUTOMATED: 0 PER 100 WBC
NUCLEATED RED BLOOD CELLS: 1 PER 100 WBC
PDW BLD-RTO: 13.2 % (ref 11.8–14.4)
PLATELET # BLD: 225 K/UL (ref 138–453)
PLATELET ESTIMATE: ABNORMAL
PMV BLD AUTO: 9.7 FL (ref 8.1–13.5)
POTASSIUM SERPL-SCNC: 5.3 MMOL/L (ref 3.7–5.3)
RBC # BLD: 3.75 M/UL (ref 4.21–5.77)
RBC # BLD: ABNORMAL 10*6/UL
SEG NEUTROPHILS: 97 % (ref 36–66)
SEGMENTED NEUTROPHILS ABSOLUTE COUNT: 11.54 K/UL (ref 1.8–7.7)
SODIUM BLD-SCNC: 136 MMOL/L (ref 135–144)
SPECIMEN DESCRIPTION: ABNORMAL
SPECIMEN DESCRIPTION: NORMAL
TOTAL PROTEIN: 5.5 G/DL (ref 6.4–8.3)
VANCOMYCIN TROUGH DATE LAST DOSE: NORMAL
VANCOMYCIN TROUGH DOSE AMOUNT: NORMAL
VANCOMYCIN TROUGH TIME LAST DOSE: NORMAL
VANCOMYCIN TROUGH: 13.5 UG/ML (ref 10–20)
WBC # BLD: 11.9 K/UL (ref 3.5–11.3)
WBC # BLD: ABNORMAL 10*3/UL

## 2022-01-21 PROCEDURE — 80202 ASSAY OF VANCOMYCIN: CPT

## 2022-01-21 PROCEDURE — 51798 US URINE CAPACITY MEASURE: CPT

## 2022-01-21 PROCEDURE — 83615 LACTATE (LD) (LDH) ENZYME: CPT

## 2022-01-21 PROCEDURE — 6370000000 HC RX 637 (ALT 250 FOR IP)

## 2022-01-21 PROCEDURE — 83735 ASSAY OF MAGNESIUM: CPT

## 2022-01-21 PROCEDURE — 99233 SBSQ HOSP IP/OBS HIGH 50: CPT | Performed by: FAMILY MEDICINE

## 2022-01-21 PROCEDURE — 6370000000 HC RX 637 (ALT 250 FOR IP): Performed by: STUDENT IN AN ORGANIZED HEALTH CARE EDUCATION/TRAINING PROGRAM

## 2022-01-21 PROCEDURE — 80053 COMPREHEN METABOLIC PANEL: CPT

## 2022-01-21 PROCEDURE — 82947 ASSAY GLUCOSE BLOOD QUANT: CPT

## 2022-01-21 PROCEDURE — 2000000000 HC ICU R&B

## 2022-01-21 PROCEDURE — 85025 COMPLETE CBC W/AUTO DIFF WBC: CPT

## 2022-01-21 PROCEDURE — 86140 C-REACTIVE PROTEIN: CPT

## 2022-01-21 PROCEDURE — 99291 CRITICAL CARE FIRST HOUR: CPT | Performed by: INTERNAL MEDICINE

## 2022-01-21 PROCEDURE — 94761 N-INVAS EAR/PLS OXIMETRY MLT: CPT

## 2022-01-21 PROCEDURE — 6360000002 HC RX W HCPCS: Performed by: STUDENT IN AN ORGANIZED HEALTH CARE EDUCATION/TRAINING PROGRAM

## 2022-01-21 PROCEDURE — 99232 SBSQ HOSP IP/OBS MODERATE 35: CPT | Performed by: INTERNAL MEDICINE

## 2022-01-21 PROCEDURE — 93306 TTE W/DOPPLER COMPLETE: CPT

## 2022-01-21 PROCEDURE — 2580000003 HC RX 258: Performed by: STUDENT IN AN ORGANIZED HEALTH CARE EDUCATION/TRAINING PROGRAM

## 2022-01-21 PROCEDURE — 85379 FIBRIN DEGRADATION QUANT: CPT

## 2022-01-21 PROCEDURE — 2700000000 HC OXYGEN THERAPY PER DAY

## 2022-01-21 RX ORDER — TAMSULOSIN HYDROCHLORIDE 0.4 MG/1
0.4 CAPSULE ORAL DAILY
Status: DISCONTINUED | OUTPATIENT
Start: 2022-01-21 | End: 2022-01-21

## 2022-01-21 RX ADMIN — TAMSULOSIN HYDROCHLORIDE 0.4 MG: 0.4 CAPSULE ORAL at 13:52

## 2022-01-21 RX ADMIN — HEPARIN SODIUM 5000 UNITS: 5000 INJECTION INTRAVENOUS; SUBCUTANEOUS at 13:52

## 2022-01-21 RX ADMIN — HEPARIN SODIUM 5000 UNITS: 5000 INJECTION INTRAVENOUS; SUBCUTANEOUS at 06:16

## 2022-01-21 RX ADMIN — INSULIN LISPRO 2 UNITS: 100 INJECTION, SOLUTION INTRAVENOUS; SUBCUTANEOUS at 07:50

## 2022-01-21 RX ADMIN — BUDESONIDE AND FORMOTEROL FUMARATE DIHYDRATE 2 PUFF: 80; 4.5 AEROSOL RESPIRATORY (INHALATION) at 21:35

## 2022-01-21 RX ADMIN — DOPAMINE HYDROCHLORIDE 10 MCG/KG/MIN: 160 INJECTION, SOLUTION INTRAVENOUS at 06:36

## 2022-01-21 RX ADMIN — Medication 81 MG: at 07:56

## 2022-01-21 RX ADMIN — SODIUM CHLORIDE, PRESERVATIVE FREE 10 ML: 5 INJECTION INTRAVENOUS at 07:56

## 2022-01-21 RX ADMIN — ATORVASTATIN CALCIUM 20 MG: 20 TABLET, FILM COATED ORAL at 07:56

## 2022-01-21 RX ADMIN — DOPAMINE HYDROCHLORIDE 8.5 MCG/KG/MIN: 160 INJECTION, SOLUTION INTRAVENOUS at 18:23

## 2022-01-21 RX ADMIN — VANCOMYCIN HYDROCHLORIDE 1000 MG: 1 INJECTION, SOLUTION INTRAVENOUS at 17:42

## 2022-01-21 RX ADMIN — SODIUM CHLORIDE: 9 INJECTION, SOLUTION INTRAVENOUS at 06:14

## 2022-01-21 RX ADMIN — SODIUM CHLORIDE, PRESERVATIVE FREE 10 ML: 5 INJECTION INTRAVENOUS at 19:39

## 2022-01-21 RX ADMIN — INSULIN GLARGINE 10 UNITS: 100 INJECTION, SOLUTION SUBCUTANEOUS at 08:00

## 2022-01-21 RX ADMIN — INSULIN GLARGINE 10 UNITS: 100 INJECTION, SOLUTION SUBCUTANEOUS at 21:08

## 2022-01-21 RX ADMIN — Medication 2000 UNITS: at 07:56

## 2022-01-21 RX ADMIN — INSULIN LISPRO 1 UNITS: 100 INJECTION, SOLUTION INTRAVENOUS; SUBCUTANEOUS at 21:40

## 2022-01-21 RX ADMIN — HEPARIN SODIUM 5000 UNITS: 5000 INJECTION INTRAVENOUS; SUBCUTANEOUS at 21:35

## 2022-01-21 RX ADMIN — DEXAMETHASONE 6 MG: 4 TABLET ORAL at 07:56

## 2022-01-21 RX ADMIN — SODIUM CHLORIDE, PRESERVATIVE FREE 10 ML: 5 INJECTION INTRAVENOUS at 19:40

## 2022-01-21 RX ADMIN — BUDESONIDE AND FORMOTEROL FUMARATE DIHYDRATE 2 PUFF: 80; 4.5 AEROSOL RESPIRATORY (INHALATION) at 07:56

## 2022-01-21 ASSESSMENT — PAIN SCALES - GENERAL
PAINLEVEL_OUTOF10: 0

## 2022-01-21 ASSESSMENT — ENCOUNTER SYMPTOMS
SHORTNESS OF BREATH: 1
CONSTIPATION: 0
DIARRHEA: 0
VOMITING: 0
VOICE CHANGE: 0
SORE THROAT: 0
CHEST TIGHTNESS: 0
NAUSEA: 0
TROUBLE SWALLOWING: 0
ABDOMINAL PAIN: 0
WHEEZING: 0
COUGH: 1
SINUS PAIN: 0
PHOTOPHOBIA: 0
ALLERGIC/IMMUNOLOGIC NEGATIVE: 1
EYE REDNESS: 0

## 2022-01-21 NOTE — CONSULTS
PULMONARY & CRITICAL CARE MEDICINE CONSULT NOTE     Patient:  Beverly Valle  MRN: [id number]  [facility] date: 1/18/2022  Primary Care Physician: Roxanna Palmer MD  Consulting Physician: Mari Irene DO  CODE Status: Full Code  LOS: 2     SUBJECTIVE     CHIEF COMPLAINT/REASON FOR CONSULT:    COVID-19 pneumonia/acute hypoxic respiratory failure/bradycardia. HISTORY OF PRESENT ILLNESS:  The patient is a 74 y.o. male history of chronic systolic heart failure apparently ejection fraction of 30% HFrEF, followed in CHF clinic, nonischemic cardiomyopathy, COPD, diabetes mellitus. Presented to emergency room on 01/18/2022 with progressive dyspnea. Symptoms were present for 3 to 4 days, patient is not sure that if he was having cough, fever loss of taste or smell and denies chest pain. In the emergency room patient was found to be hypoxic requiring nonrebreather he was found to be bradycardic with heart rate in 40s and did receive atropine and apparently glucagon. Patient on Coreg 25 mg twice daily at home lisinopril and Lasix. This morning patient was hypotensive require fluid bolus and systolic blood pressure was above 90 after the fluid bolus. He was also found to have UYEN with creatinine of 2.03 and this morning was 1.84. Patient has not been eating and drinking for last 3 to 4 days as he was not feeling well. He does not complain of wheezing purulent sputum hemoptysis chest pain. He was seen by infectious disease yesterday his CRP was elevated 209 his troponin was 73 repeated was 70 proBNP was 530 WBC count was 6.7 platelet 369. He was given 1 dose of Actemra and he was started on Decadron 6 mg once daily    When I saw him patient systolic blood pressure was 90s heart rate was in 40s he was alert and awake he did not have any syncope denies dizziness lightheadedness did not have chest pain patient did not require atropine this morning.   According to primary service they have contacted cardiology and they suggest dopamine/dobutamine. Cardiology was consulted and according to cardiology note atropine as needed there was no mention of dopamine or dobutamine in their note. He has history of smoking currently patient he stopped smoking 3 4 years ago history of smoking for more than 30 years 1 pack/day. Interval history:    Remains on high flow oxygen at 30 L and 60%  Continues to have shortness of breath and coughing  No wheezing  No orthopnea or PND  No hemoptysis  No chest pain or pressure  Patient still requiring dopamine      PAST MEDICAL HISTORY:        Diagnosis Date    COPD (chronic obstructive pulmonary disease) ([facility] [address]     Diabetes mellitus ([facility]     Erectile dysfunction due to arterial insufficiency 12/7/2015    Hypertension     Microalbuminuria due to type 2 diabetes mellitus ([facility] 7/7/2016    Overweight (BMI 25.0-29.9) 12/7/2015     PAST SURGICAL HISTORY:        Procedure Laterality Date    APPENDECTOMY      TOTAL ANKLE ARTHROPLASTY      LEFT     FAMILY HISTORY:   No family history on file. SOCIAL HISTORY:   TOBACCO:   reports that he has been smoking cigarettes. He has been smoking about 0.50 packs per day. He has never used smokeless tobacco.  ETOH:  reports no history of alcohol use. DRUGS: reports no history of drug use. ALLERGIES:    No Known Allergies      HOME MEDICATIONS:  Prior to Admission medications    Medication Sig Start Date End Date Taking?  Authorizing Provider   amLODIPine (NORVASC) 10 MG tablet TAKE 1 TABLET BY MOUTH DAILY 10/25/21   Rogerio Baumann MD   aspirin (HM ASPIRIN EC LOW DOSE) 81 MG EC tablet TAKE 1 TABLET BY MOUTH DAILY 8/26/21   Bridgette Carrasco MD   furosemide (LASIX) 20 MG tablet Take 1 tablet by mouth daily 8/26/21   Bridgette Carrasco MD   Blood Pressure KIT Check blood pressure daily 8/26/21   Bridgette Carrasco MD   albuterol sulfate HFA (PROAIR HFA) 108 (90 Base) MCG/ACT inhaler inhale 2 puffs by mouth every 6 hours if needed for wheezing 8/16/21 Minnie Vines MD   fluticasone-salmeterol (ADVAIR DISKUS) 100-50 MCG/DOSE diskus inhaler INHALE 1 PUFF INTO THE LUNGS EVERY 12 HOURS 8/16/21   Te Arora MD   carvedilol (COREG) 25 MG tablet Take 1 tablet by mouth 2 times daily 8/16/21   Minnie Vines MD   atorvastatin (LIPITOR) 20 MG tablet Take 1 tablet by mouth daily 7/21/21   Shawnee Gonzalez MD   lisinopril (PRINIVIL;ZESTRIL) 20 MG tablet Take 1 tablet by mouth daily 7/21/21   Shawnee Gonzalez MD   ipratropium (ATROVENT HFA) 17 MCG/ACT inhaler Inhale 1 puff into the lungs 2 times daily 9/2/20 10/2/20  Pablo Ganser, MD   tiotropium (Gerardo [place]) 18 MCG inhalation capsule Inhale 1 capsule into the lungs daily 1/9/20   Pablo Ganser, MD   nicotine (NICODERM CQ) 14 MG/24HR Place 1 patch onto the skin daily 12/25/16   Lissy Patton MD     IMMUNIZATIONS:  Most Recent Immunizations   Administered Date(s) Administered    Pneumococcal Conjugate 13-valent ([id number]) 08/02/2018    Pneumococcal Polysaccharide ([id number]) 11/15/2019    Tdap (Boostrix, Adacel) 08/02/2018     REVIEW OF SYSTEMS:  Review of Systems   Constitutional: Positive for appetite change and fatigue. Negative for fever. HENT: Negative for postnasal drip, sinus pain, sore throat, trouble swallowing and voice change. Eyes: Negative for photophobia, redness and visual disturbance. Respiratory: Positive for cough and shortness of breath. Negative for wheezing. Cardiovascular: Negative for chest pain, palpitations and leg swelling. Gastrointestinal: Negative for abdominal pain, diarrhea and vomiting. Endocrine: Negative for polydipsia, polyphagia and polyuria. Genitourinary: Negative for dysuria, frequency and hematuria. Musculoskeletal: Negative. Allergic/Immunologic: Negative. Neurological: Negative for dizziness, syncope, speech difficulty, light-headedness, numbness and headaches. Hematological: Negative for adenopathy.  Does not bruise/bleed easily. Psychiatric/Behavioral: Negative. OBJECTIVE     PaO2/FiO2 RATIO:  No results for input(s): POCPO2 in the last 72 hours. FiO2 : (S) 55 %     VITAL SIGNS:   LAST:  BP (!) 90/47   Pulse 52   Temp 98 °F (36.7 °C) (Axillary)   Resp 24   Ht 5' 5\" (1.651 m)   Wt 152 lb 8 oz (69.2 kg)   SpO2 90%   BMI 25.38 kg/m²   8-24 HR RANGE:  TEMP No data recorded   BP No data recorded. No data recorded.      PULSE Pulse  Av.1  Min: 52  Max: 65   RR Resp  Av.3  Min: 20  Max: 27   O2 SAT SpO2  Av.9 %  Min: 90 %  Max: 94 %   OXYGEN DELIVERY O2 Flow Rate (L/min)  Av L/min  Min: 30 L/min  Max: 30 L/min        Physical Exam   General appearance - well appearing,  comfortable and in no acute distress  Mental status - alert, oriented to person, place, and time  Eyes - pupils equal and reactive, sclera anicteric  Mouth - mucous membranes slightly dry, pharynx normal without lesions  Neck - supple, no significant adenopathy, no bruits  Lymphatics - no palpable lymphadenopathy, no hepatosplenomegaly  Chest - no tachypnea, retractions or cyanosis, bilateral breath sounds present no expiratory wheezing no rhonchi distant breath sounds  Heart -bradycardia, regular rhythm, normal S1, S2, no murmurs, rubs, clicks or gallops  Abdomen - soft, nontender, nondistended, no masses or organomegaly  Neurological -awake and following commands  Musculoskeletal - no joint tenderness, deformity or swelling  Extremities - peripheral pulses normal, no pedal edema, no clubbing or cyanosis  Skin - normal coloration and turgor, no rashes, no suspicious skin lesions noted  DATA REVIEW     Medications: Current Inpatient  Scheduled Meds:   insulin glargine  10 Units SubCUTAneous BID    heparin (porcine)  5,000 Units SubCUTAneous 3 times per day    [Held by provider] amLODIPine  10 mg Oral Daily    aspirin  81 mg Oral Daily    atorvastatin  20 mg Oral Daily    [Held by provider] carvedilol  25 mg Oral BID    budesonide-formoterol  2 puff Inhalation BID    [Held by provider] furosemide  20 mg Oral Daily    [Held by provider] lisinopril  20 mg Oral Daily    [Held by provider] tiotropium  2 puff Inhalation Daily    sodium chloride flush  5-40 mL IntraVENous 2 times per day    dexamethasone  6 mg Oral Daily    Vitamin D  2,000 Units Oral Daily    ipratropium-albuterol  1 ampule Inhalation 4x daily    insulin lispro  0-6 Units SubCUTAneous TID WC    insulin lispro  0-3 Units SubCUTAneous Nightly    glucagon (rDNA)  1 mg IntraVENous Once    vancomycin (VANCOCIN) intermittent dosing (placeholder)   Other RX Placeholder    vancomycin  1,000 mg IntraVENous Q24H    sodium chloride flush  5-40 mL IntraVENous 2 times per day     Continuous Infusions:   EPINEPHrine infusion Stopped (01/21/22 0430)    sodium chloride      sodium chloride 75 mL/hr at 01/21/22 0614    dextrose      DOPamine 10 mcg/kg/min (01/21/22 0636)    sodium chloride         INPUT/OUTPUT:  In: 4620.2 [P.O.:360; I.V.:4010.2]  Out: 8215 [Urine:1465]  Date 01/21/22 0000 - 01/21/22 2359   Shift 2398-7320 9928-7255 7289-9938 24 Hour Total   INTAKE   I.V.(mL/kg) 771. 8(11.2)   771. 8(11.2)   Shift Total(mL/kg) 771. 8(11.2)   771. 8(11.2)   OUTPUT   Urine(mL/kg/hr) 385(0.7)   385   Shift Total(mL/kg) 385(5.6)   385(5.6)   Weight (kg) 69.2 69.2 69.2 69.2        LABS:  ABGs:   No results for input(s): POCPH, POCPCO2, POCPO2, POCHCO3, TQMK4OJA in the last 72 hours.   CBC:   Recent Labs     01/18/22  2141 01/19/22  0551 01/20/22  0320 01/21/22  0351   WBC 6.7 6.5 15.3* 11.9*   HGB 11.1* 10.7* 12.3* 11.5*   HCT 34.4* 33.2* 37.8* 35.2*   MCV 93.7 94.3 93.3 93.9    166 281 225   LYMPHOPCT 10* 7* 1* 1*   RBC 3.67* 3.52* 4.05* 3.75*   MCH 30.2 30.4 30.4 30.7   MCHC 32.3 32.2 32.5 32.7   RDW 12.9 13.0 12.9 13.2     CRP:   Recent Labs     01/18/22  2141 01/19/22  0551 01/20/22  0320 01/21/22  0351   .4* 235.8* 209.4* 90.1*     LDH:   Recent Labs 01/18/22 2141 01/20/22  0320 01/21/22  0351   * 292* 243*     BMP:   Recent Labs     01/18/22 2141 01/18/22 2141 01/19/22  0551 01/19/22  1911 01/20/22  0320 01/20/22  1108 01/21/22  0351   *  --  137 139 129*  --  136   K 4.6   < > 5.2 5.5* 5.6* 5.2 5.3   CL 96*  --  101 104 98  --  105   CO2 22  --  21 17* 20  --  23   BUN 63*  --  69* 71* 73*  --  62*   CREATININE 2.03*  --  1.84* 1.44* 1.61*  --  1.17   GLUCOSE 133*  --  186* 176* 339*  --  259*    < > = values in this interval not displayed. Liver Function Test:   Recent Labs     01/18/22 2141 01/19/22  0551 01/20/22 0320 01/21/22  0351   PROT 6.2* 5.8* 6.0* 5.5*   LABALBU 3.4* 3.0* 3.1* 2.7*   ALT 8 8 10 8   AST 21 19 25 18   ALKPHOS 79 78 84 75   BILITOT 0.60 0.46 0.39 0.31     Coagulation Profile:   Recent Labs     01/18/22 2141   INR 0.9   PROTIME 10.2   APTT 27.2     D-Dimer:  Recent Labs     01/18/22 2141 01/20/22 0320 01/21/22  0351   DDIMER 1.39 1.79 2.02     Ferritin:    Recent Labs     01/18/22 2141 01/20/22  0320   FERRITIN 353 532*     Lactic Acid:  No results for input(s): LACTA in the last 72 hours. Cardiac Enzymes:  No results for input(s): CKTOTAL, CKMB, CKMBINDEX, TROPONINI in the last 72 hours. Invalid input(s): TROPONIN, HSTROP  BNP/ProBNP:   Recent Labs     01/18/22 2141   PROBNP 530*     Triglycerides:  No results for input(s): TRIG in the last 72 hours. Microbiology:  Urine Culture:  No components found for: CURINE  Blood Culture:  No components found for: CBLOOD, CFUNGUSBL  Sputum Culture:  No components found for: CSPUTUM  Recent Labs     01/20/22 1924   [facility] . BLOOD   SPECIAL NOT REPORTED   CULTURE NO GROWTH <24 HRS     Recent Labs     01/19/22 1913 01/20/22 1918 01/20/22 1924   SPECIAL NOT REPORTED NOT REPORTED NOT REPORTED   CULTURE NO GROWTH NO GROWTH <24 HRS NO GROWTH <24 HRS        Radiology Reports:  NM LUNG SCAN PERFUSION ONLY   Final Result   Suboptimal exam without more recent chest radiograph for comparison. Indeterminate probability for pulmonary embolism. XR CHEST PORTABLE   Final Result      No pneumothorax. Stable moderate cardiomegaly. Slight worsening consolidative changes of left lower lobe and small left   pleural effusion. Worsening increased interstitial markings and ground-glass densities   throughout both lungs. The finding can be seen in setting of pulmonary edema   or viral pneumonia. XR CHEST PORTABLE   Final Result   Mild bilateral lower lung zone patchy airspace opacity potentially   representing early COVID pneumonia. Mild stable cardiomegaly. CT HEAD WO CONTRAST   Final Result   No acute intracranial abnormality. Echocardiogram:   Results for orders placed during the hospital encounter of 09/04/19    Echocardiogram complete 2D with doppler with color    Narrative  Transthoracic Echocardiography Report (TTE)    Patient Name Meghan MILAN  Date of Study         09/06/2019    Date of      1955       Gender                Male  Birth    Age          75 year(s)       Race                      Room Number  2008             Height:               65 inch, 165.1 cm    Corporate ID [id number]         Weight:               180 pounds, 81.6 kg  #    Patient Acct [de-identified]        BSA:       1.89 m^2   BMI:      29.95 kg/m^2  #    MR #         [id number]          Sonographer           [facility]    Accession #  [id number]        Interpreting          Alli Holden  Physician    Fellow                        Referring Nurse  Practitioner    Interpreting Pattie Adams  Referring Physician   [facility]ESHA  Fellow       Kaivn Morrell CNP    Type of Study    TTE procedure:2D Echocardiogram, M-Mode, Doppler, Color Doppler. Procedure Date  Date: 09/06/2019 Start: 11:05 AM    Study Location: [facility]    History / Teresa Kendrick.  Comments:  High Risk Angina, COPD, CHF CAD    Patient Status: Inpatient    Height: 65 inches Weight: 180 pounds BSA: 1.89 m^2 BMI: 29.95 kg/m^2    CONCLUSIONS    Summary  Dilated left ventricle with severely reduced systolic function. Severe hypokinesis to akinesis of distal inferior, inferolateral, septal,  anterior, apical wall  Calculated ejection fraction is 20%. Evidence of moderate diastolic dysfunction. Normal right ventricular size with mildly reduced function. Moderate mitral regurgitation. Mild tricuspid regurgitation. Signature  ----------------------------------------------------------------------------  Electronically signed by Home Bar(Sonographer) on 09/06/2019 02:24  PM  ----------------------------------------------------------------------------    ----------------------------------------------------------------------------  Electronically signed by Gloria Lynch(Interpreting physician) on  09/06/2019 05:23 PM  ----------------------------------------------------------------------------  FINDINGS  Left Atrium  Left atrium is moderately dilated. Left Ventricle  Dilated left ventricle with severely reduced systolic function. Severe hypokinesis to akinesis of distal inferior, inferolateral, septal,  anterior, apical wall  Calculated ejection fraction is 20%. Evidence of moderate diastolic dysfunction. (U/S'91 )  Right Atrium  Right atrium is normal in size. Right Ventricle  Normal right ventricular size with mildly reduced function. Mitral Valve  Mitral valve structure is normal.  Moderate mitral regurgitation. Aortic Valve  Aortic valve is trileaflet. Aortic sclerosis without stenosis. No aortic insufficiency. Tricuspid Valve  Tricuspid valve structure is normal.  Mild tricuspid regurgitation. Pulmonic Valve  The pulmonic valve is normal in structure. Pericardial Effusion  Small anterior pericardial effusion. Miscellaneous  Normal aortic root dimension.   IVC diameter and inspiratory collapse is normal.    M-mode / 2D Measurements & Calculations:    LVIDd:6 cm(3.7 - 5.6 cm)          Diastolic HFREHC:808.67 ml  LVIDs:4.46 cm(2.2 - 4.0 cm)       Systolic DPZXLR:19 ml  OAUV:1.1 cm(0.6 - 1.1 cm)         Aortic Root:3.1 cm(2.0 - 3.7 cm)  LVPWd:1.2 cm(0.6 - 1.1 cm)        LA Dimension: 4 cm(1.9 - 4.0 cm)  Fractional Shortenin.67 %     LA volume/Index: 69.67 ml /37m^2  Calculated LVEF (%): 29.8 %       LVOT:2.5 cm  RVDd:2.5 cm    Mitral:                                  Aortic    Valve Area (P1/2-Time): 3.14 cm^2        Peak Velocity: 1.25 m/s  Peak E-Wave: 0.90 m/s                    Mean Velocity: 0.82 m/s  Peak A-Wave: 0.86 m/s                    Peak Gradient: 6.25 mmHg  E/A Ratio: 1.04                          Mean Gradient: 3 mmHg  Peak Gradient: 3.22 mmHg  Mean Gradient: 24 mmHg  Deceleration Time: 180 msec              Area (continuity): 2.5 cm^2  P1/2t: 70 msec                           AV VTI: 26.5 cm  MR PISA Radius: 0.6 cm    Area (continuity): 0.64 cm^2  Mean Velocity: 0.61 m/s    Tricuspid:                               Pulmonic:    Estimated RVSP: 44 mmHg                  Peak Velocity: 0.69 m/s  Peak TR Velocity: 2.95 m/s               Peak Gradient: 1.92 mmHg  Peak TR Gradient: 34.81 mmHg  Estimated RA Pressure: 10 mmHg    Estimated PASP: 44.81 mmHg    Diastology / Tissue Doppler  Septal Wall E' velocity:0.04 m/s  Septal Wall E/E':22  Lateral Wall E' velocity:0.06 m/s  Lateral Wall E/E':14       ASSESSMENT AND PLAN     Assessment:    // Acute hypoxic respiratory failure  // COVID-19 infection/pneumonia  // Sinus bradycardia likely related to beta-blocker status post 1 dose of atropine  // Hypotension likely multifactorial including hypovolemia/bradycardia/ COVID sepsis.   // Sepsis due to COVID 19  // High inflammatory marker, CRP, better  // Troponin elevation  // UYEN on CKD  //Hyperglycemia, better  //Anemia, slightly worse  //Leukocytosis, likely due to corticosteroid use, better  //Intermediate probability for pulmonary embolism on the nuclear medicine scan    Plan:    I personally interviewed/examined the patient; reviewed interval history, interpreted all available radiographic and laboratory data at the time of service. Continue on high flow O2 and monitor oxygen saturation keeping oxygen saturation 90%. Will use BiPAP/NIV if needed for any increased distress worsening hypoxia or work of breathing currently patient is comfortable though hypoxic maintaining saturation above 92% nonrebreather. On Symbicort and DuoNeb  Encourage prone position when sleeping, incentive spirometry  Monitor I/O, renal function, electrolytes with a goal of even fluid balance  Patient is 3.6 L fluid positive. Decrease intravenous fluids  He is on Decadron recommend to give 6 mg for 10 days  He is status post Actemra on 01/18/2022  On subcu heparin for DVT prophylaxis  Antimicrobials reviewed; on vancomycin  Monitor CRP, LDH, AST/ALT, D-Dimer, Ferritin as needed/periodically  Glycemic control per primary service  Physical/occupational therapy when able to tolerate  On oral nutrition  Obtain venous Dopplers of bilateral lower extremities    Discussed with respiratory therapist in detail. Discussed with nursing staff and nursing charge        Patient is critically ill with illness/injury that acutely impairs one or more vital organ systems, such that there is a high probability of imminent or life threatening deterioration in the patient's condition. Critical care time of 50 minutes was spent (excluding procedures), in coordination of care during bedside rounds and discussion of patient care in detail, and recommendations of the team were adopted in the plan. Necessity of all invasive devices was also confirmed.      Chely Mccracken MD  Pulmonary and Critical Care Medicine           1/21/2022, 8:34 AM    This patient was evaluated in the context of the global SARS-CoV-2 (COVID-19) pandemic, which necessitated considerations that the patient either has COVID-19 infection or is at risk of infection with COVID-19. Institutional protocols and algorithms that pertain to the evaluation & management of patients with COVID-19 or those at risk for COVID-19 are in a state of rapid changes based on information released by regulatory bodies including the CDC and federal and state organizations. These policies and algorithms were followed during the patient's care. Please note that this chart was generated using voice recognition Dragon dictation software. Although every effort was made to ensure the accuracy of this automated transcription, some errors in transcription may have occurred.

## 2022-01-21 NOTE — PROGRESS NOTES
[address] (son) updated on hemodynamic stability. All questions/concerns answered at this time. Total time spent speaking with [facility] - 6 minutes    [address] (daughter) updated on patient hemodynamic stability. All questions/concerns answered at this time.  Total time spent speaking with Flo Pike - 14 minutes

## 2022-01-21 NOTE — PROGRESS NOTES
0930 - Updated Essence Cortez (son) on patient hemodynamic stability and plan for an ECHO today. I spoke with him about the RNs calling to give updates around 10am and 6pm hunter if available. He is okay with me calling him around 6pm. He will bring in food and glasses for the patient sometime today. Essence Cortez also informed me that he will not be passing on the information to Gabriel Dentonpe (patient daughter) as he was before. They currently have each others numbers blocked. Total time spent speaking with Essence Cortez  - 12 minutes. [id number] -  Updated Gabriel Duarte (daughter) on patient hemodynamic stability and plan for an ECHO today. I informed her that RNs will update around 10am and 6pm if available. Patient aware of updating daughter and wants her to continue to get updates. Gabriel Dentonpe is concerned about patient situation after discharge. She is asking for a  to get involved. Total time spent speaking with Gabriel Duarte - 15 minutes.

## 2022-01-21 NOTE — PROGRESS NOTES
Spoke with Dr. Urszula Ruiz about IV fluid intake with CHF history. He stated to decrease fluids so patient is getting a total of 50ml/hr. 0.9 NS will be dropped down to 25ml/hr.
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
## 2022-01-21 NOTE — PLAN OF CARE
Problem: Airway Clearance - Ineffective  Goal: Achieve or maintain patent airway  Outcome: Ongoing     Problem: Gas Exchange - Impaired  Goal: Absence of hypoxia  Outcome: Ongoing  Goal: Promote optimal lung function  Outcome: Ongoing     Problem: Breathing Pattern - Ineffective  Goal: Ability to achieve and maintain a regular respiratory rate  Outcome: Ongoing    BRONCHOSPASM/BRONCHOCONSTRICTION     [x]         IMPROVE AERATION/BREATH SOUNDS  [x]   ADMINISTER BRONCHODILATOR THERAPY AS APPROPRIATE  [x]   ASSESS BREATH SOUNDS  [x]   IMPLEMENT AEROSOL/MDI PROTOCOL  [x]   PATIENT EDUCATION AS NEEDED      PROVIDE ADEQUATE OXYGENATION WITH ACCEPTABLE SP02/ABG'S    [x]  IDENTIFY APPROPRIATE OXYGEN THERAPY  [x]   MONITOR SP02/ABG'S AS NEEDED   [x]   PATIENT EDUCATION AS NEEDED
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
## 2022-01-21 NOTE — PROGRESS NOTES
[facility]  Progress Note    Date:   1/21/2022  Patient name:  John Vasquez  Date of admission:  1/18/2022  9:04 PM  MRN:   [id number]  YOB: 1955    SUBJECTIVE/Last 24 hours update:     Day 2 Hospitalization:     Patient was seen and examined at the bedside. Still on  Dopamine drip for bradycardia. Patient was weaned off epinephrine drip. Patient is saturating 90% on high flow NC. FiO2 55, 30L/min. Patient is AOX3. Cr improved 1.17. Will continue with gentle hydration and monitoring BMP. Vancomycin was started for MRSA positive blood culture. Pending repeat blood cultures. REVIEW OF SYSTEMS:      Review of Systems   Constitutional: Negative for appetite change, chills and fever. Eyes: Negative for visual disturbance. Respiratory: Positive for shortness of breath. Negative for chest tightness. Cardiovascular: Negative for chest pain and leg swelling. Gastrointestinal: Negative for abdominal pain, constipation, diarrhea, nausea and vomiting. Genitourinary: Negative for difficulty urinating. Neurological: Negative for headaches. All other systems reviewed and are negative. PAST MEDICAL HISTORY:      has a past medical history of COPD (chronic obstructive pulmonary disease) ([facility] [address] Diabetes mellitus ([facility] [address] Erectile dysfunction due to arterial insufficiency, Hypertension, Microalbuminuria due to type 2 diabetes mellitus ([facility] [address] and Overweight (BMI 25.0-29.9). PAST SURGICAL HISTORY:      has a past surgical history that includes Appendectomy and Total ankle arthroplasty. SOCIALHISTORY:      reports that he has been smoking cigarettes. He has been smoking about 0.50 packs per day. He has never used smokeless tobacco. He reports that he does not drink alcohol and does not use drugs. FAMILY HISTORY:      family history is not on file.     HOME MEDICATIONS:      Prior to Admission medications    Medication Sig Start Date End Date Taking? Authorizing Provider   amLODIPine (NORVASC) 10 MG tablet TAKE 1 TABLET BY MOUTH DAILY 10/25/21   Rogerio Baumann MD   aspirin (HM ASPIRIN EC LOW DOSE) 81 MG EC tablet TAKE 1 TABLET BY MOUTH DAILY 8/26/21   Bridgette Carrasco MD   furosemide (LASIX) 20 MG tablet Take 1 tablet by mouth daily 8/26/21   Bridgette Carrasco MD   Blood Pressure KIT Check blood pressure daily 8/26/21   Bridgette Carrasco MD   albuterol sulfate HFA (PROAIR HFA) 108 (90 Base) MCG/ACT inhaler inhale 2 puffs by mouth every 6 hours if needed for wheezing 8/16/21   Te Ken MD   fluticasone-salmeterol (ADVAIR DISKUS) 100-50 MCG/DOSE diskus inhaler INHALE 1 PUFF INTO THE LUNGS EVERY 12 HOURS 8/16/21   Te Ken MD   carvedilol (COREG) 25 MG tablet Take 1 tablet by mouth 2 times daily 8/16/21   Yan Douglass MD   atorvastatin (LIPITOR) 20 MG tablet Take 1 tablet by mouth daily 7/21/21   Rogerio Baumann MD   lisinopril (PRINIVIL;ZESTRIL) 20 MG tablet Take 1 tablet by mouth daily 7/21/21   Rogerio Baumann MD   ipratropium (ATROVENT HFA) 17 MCG/ACT inhaler Inhale 1 puff into the lungs 2 times daily 9/2/20 10/2/20  Serenity Bettencourt MD   tiotropium (Euna Sit) 18 MCG inhalation capsule Inhale 1 capsule into the lungs daily 1/9/20   Serenity Bettencourt MD   nicotine (NICODERM CQ) 14 MG/24HR Place 1 patch onto the skin daily 12/25/16   Paramjit Hunter MD       ALLERGIES:     Patient has no known allergies. OBJECTIVE:       Vitals:    01/21/22 0600 01/21/22 0700 01/21/22 0800 01/21/22 0811   BP:       Pulse: 63 52 63    Resp: 26 23 23 24   Temp:       TempSrc:       SpO2: 92% 92% (!) 87% 90%   Weight:       Height:             Intake/Output Summary (Last 24 hours) at 1/21/2022 0943  Last data filed at 1/21/2022 0800  Gross per 24 hour   Intake 4380.2 ml   Output 1540 ml   Net 2840.2 ml       PHYSICAL EXAM:    Physical Exam  Vitals and nursing note reviewed.    Constitutional:       General: He is not in acute distress. Appearance: He is not ill-appearing. HENT:      Head: Normocephalic and atraumatic. Mouth/Throat:      Pharynx: Oropharynx is clear. Cardiovascular:      Rate and Rhythm: Regular rhythm. Bradycardia present. Pulses: Normal pulses. Heart sounds: No murmur heard. Pulmonary:      Effort: Pulmonary effort is normal.      Breath sounds: Normal breath sounds. Abdominal:      Palpations: Abdomen is soft. There is no mass. Tenderness: There is no abdominal tenderness. Musculoskeletal:         General: No swelling or tenderness. Normal range of motion. Cervical back: Normal range of motion and neck supple. Neurological:      General: No focal deficit present. Mental Status: He is alert and oriented to person, place, and time. ASSESSMENT:      Principal Problem:    Acute on chronic respiratory failure with hypoxia (HCC)  Active Problems:    Controlled type 2 diabetes mellitus without complication, without long-term current use of insulin (HCC)    COVID-19    Hypoxemia  Resolved Problems:    * No resolved hospital problems. *      PLAN:          Acute on chronic hypoxic respiratory failure 2/2 COVID-19 pneumonia in the setting of COPD/CHF  - COVID+  -On High flow nasal cannula   - ID consulted  - .4-235, check daily CRP  - ferritin 353  - CXR: mild bilateral lower lung zone patchy air space opacity, representing early COVID pneumonia  - concern for PE, was given lovenox 80 mg in the ER, unable to obtain CT PE 2/2 to UYEN, pending V/Q scan.    - continue decadron  - RT aerosol protocol  - continue spiriva and symbicort  -Pulmonary consult- appreciate recs         Hypotension- Resolved  MAP 58-60  Dc'd epinephrine drip  Continue to hold BP medications      UYEN likely prerenal  - Cr 2, baseline ~ 1  - s/p 500 ml fluid bolus  - continue gentle hydration 75 ml/hr  - monitor carefully for fluid overload 2/2 history of CHF  - daily BMP  - FeNa is 25.5--> likely post renal  -Check bladder scan     Bradycardia  - HR 42 in the ER, was given atropine 1 mg and glucagon 1 mg  - holding his home betablocker  - telemetry  - cardiology consulted  - dopamine gtt  -Echo       HFrEF  - no signs of fluid overload  - holding lisinopril/coreg 2/2 hypotension  - EF 20-30%       Plan will be discussed with the attending, Dr. Nicol Jackson MD  Family Medicine Resident  1/21/2022 9:43 AM

## 2022-01-21 NOTE — RT PROTOCOL NOTE
[facility]. Hypoxemia [R09.02]  Elevated d-dimer [R79.89]  Acute on chronic respiratory failure with hypoxia ([facility] [J96.21]  COVID-19 [U07.1] . Vitals:    01/21/22 1100   BP:    Pulse: 60   Resp: 26   Temp:    SpO2: 91%   . Patients home meds are   Prior to Admission medications    Medication Sig Start Date End Date Taking? Authorizing Provider   amLODIPine (NORVASC) 10 MG tablet TAKE 1 TABLET BY MOUTH DAILY 10/25/21   Marla Ludwig MD   aspirin (HM ASPIRIN EC LOW DOSE) 81 MG EC tablet TAKE 1 TABLET BY MOUTH DAILY 8/26/21   Mattie Go MD   furosemide (LASIX) 20 MG tablet Take 1 tablet by mouth daily 8/26/21   Mattie Go MD   Blood Pressure KIT Check blood pressure daily 8/26/21   Mtatie Go MD   albuterol sulfate HFA (PROAIR HFA) 108 (90 Base) MCG/ACT inhaler inhale 2 puffs by mouth every 6 hours if needed for wheezing 8/16/21   Te Barnes MD   fluticasone-salmeterol (ADVAIR DISKUS) 100-50 MCG/DOSE diskus inhaler INHALE 1 PUFF INTO THE LUNGS EVERY 12 HOURS 8/16/21   Te Barnes MD   carvedilol (COREG) 25 MG tablet Take 1 tablet by mouth 2 times daily 8/16/21   Miguel Gamez MD   atorvastatin (LIPITOR) 20 MG tablet Take 1 tablet by mouth daily 7/21/21   Marla Ludwig MD   lisinopril (PRINIVIL;ZESTRIL) 20 MG tablet Take 1 tablet by mouth daily 7/21/21   Marla Ludwig MD   ipratropium (ATROVENT HFA) 17 MCG/ACT inhaler Inhale 1 puff into the lungs 2 times daily 9/2/20 10/2/20  Shanae Brooks MD   tiotropium (SPIRIVA HANDIHALER) 18 MCG inhalation capsule Inhale 1 capsule into the lungs daily 1/9/20   Shanae Brooks MD   nicotine (NICODERM CQ) 14 MG/24HR Place 1 patch onto the skin daily 12/25/16   Beck Prado MD   .  Assessment     Admit for Covid 19  History of COPD, not on home oxygen. Prob mian, never got tested. Currently on Duo Neb and Spiriva. No issue with wheezing. Will discontinue Duo Neb.   Change to Albuterol as needed.        RR 22  Breath Sounds: diminished      Bronchodilator assessment at level  1       [x]    Bronchodilator Assessment  BRONCHODILATOR ASSESSMENT SCORE  Score 0 1 2 3 4 5   Breath Sounds   [x]  Patient Baseline []  No Wheeze good aeration []  Faint, scattered wheezing, good aeration []  Expiratory Wheezing and or moderately diminished []  Insp/Exp wheeze and/or very diminished []  Insp/Exp and/ or marked distress   Respiratory Rate   [x]  Patient Baseline []  Less than 20 []  Less than 20 []  20-25 []  Greater than 25 []  Greater than 25   Peak flow % of Pred or PB [x]  NA   []  Greater than 90%  []  81-90% []  71-80% []  Less than or equal to 70%  or unable to perform []  Unable due to Respiratory Distress   Dyspnea re [x]  Patient Baseline []  No SOB []  No SOB []  SOB on exertion []  SOB min activity []  At rest/acute   e FEV% Predicted       [x]  NA []  Above 69%  []  Unable []  Above 60-69%  []  Unable []  Above 50-59%  []  Unable []  Above 35-49%  []  Unable []  Less than 35%  []  Unable                  PATY FARLEY RCP  11:51 AM
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
## 2022-01-22 LAB
ABSOLUTE EOS #: 0 K/UL (ref 0–0.4)
ABSOLUTE IMMATURE GRANULOCYTE: 0.11 K/UL (ref 0–0.3)
ABSOLUTE LYMPH #: 0.28 K/UL (ref 1–4.8)
ABSOLUTE MONO #: 0.28 K/UL (ref 0.1–0.8)
ALBUMIN SERPL-MCNC: 3 G/DL (ref 3.5–5.2)
ALBUMIN/GLOBULIN RATIO: 1.4 (ref 1–2.5)
ALP BLD-CCNC: 69 U/L (ref 40–129)
ALT SERPL-CCNC: 7 U/L (ref 5–41)
ANION GAP SERPL CALCULATED.3IONS-SCNC: 5 MMOL/L (ref 9–17)
AST SERPL-CCNC: 17 U/L
BASOPHILS # BLD: 0 % (ref 0–2)
BASOPHILS ABSOLUTE: 0 K/UL (ref 0–0.2)
BILIRUB SERPL-MCNC: 0.35 MG/DL (ref 0.3–1.2)
BUN BLDV-MCNC: 38 MG/DL (ref 8–23)
BUN/CREAT BLD: ABNORMAL (ref 9–20)
C-REACTIVE PROTEIN: 36 MG/L (ref 0–5)
CALCIUM SERPL-MCNC: 8.1 MG/DL (ref 8.6–10.4)
CHLORIDE BLD-SCNC: 105 MMOL/L (ref 98–107)
CO2: 26 MMOL/L (ref 20–31)
CREAT SERPL-MCNC: 0.89 MG/DL (ref 0.7–1.2)
CULTURE: ABNORMAL
D-DIMER QUANTITATIVE: 1.61 MG/L FEU
DIFFERENTIAL TYPE: ABNORMAL
EOSINOPHILS RELATIVE PERCENT: 0 % (ref 1–4)
GFR AFRICAN AMERICAN: >60 ML/MIN
GFR NON-AFRICAN AMERICAN: >60 ML/MIN
GFR SERPL CREATININE-BSD FRML MDRD: ABNORMAL ML/MIN/{1.73_M2}
GFR SERPL CREATININE-BSD FRML MDRD: ABNORMAL ML/MIN/{1.73_M2}
GLUCOSE BLD-MCNC: 117 MG/DL (ref 75–110)
GLUCOSE BLD-MCNC: 131 MG/DL (ref 75–110)
GLUCOSE BLD-MCNC: 150 MG/DL (ref 75–110)
GLUCOSE BLD-MCNC: 153 MG/DL (ref 75–110)
GLUCOSE BLD-MCNC: 171 MG/DL (ref 70–99)
HCT VFR BLD CALC: 33.4 % (ref 40.7–50.3)
HEMOGLOBIN: 10.7 G/DL (ref 13–17)
IMMATURE GRANULOCYTES: 2 %
LACTATE DEHYDROGENASE: 240 U/L (ref 135–225)
LYMPHOCYTES # BLD: 5 % (ref 24–44)
Lab: ABNORMAL
MCH RBC QN AUTO: 30.1 PG (ref 25.2–33.5)
MCHC RBC AUTO-ENTMCNC: 32 G/DL (ref 28.4–34.8)
MCV RBC AUTO: 93.8 FL (ref 82.6–102.9)
MONOCYTES # BLD: 5 % (ref 1–7)
MORPHOLOGY: NORMAL
NRBC AUTOMATED: 0.4 PER 100 WBC
PDW BLD-RTO: 13.1 % (ref 11.8–14.4)
PLATELET # BLD: 184 K/UL (ref 138–453)
PLATELET ESTIMATE: ABNORMAL
PMV BLD AUTO: 10.1 FL (ref 8.1–13.5)
POTASSIUM SERPL-SCNC: 5.2 MMOL/L (ref 3.7–5.3)
RBC # BLD: 3.56 M/UL (ref 4.21–5.77)
RBC # BLD: ABNORMAL 10*6/UL
SEG NEUTROPHILS: 88 % (ref 36–66)
SEGMENTED NEUTROPHILS ABSOLUTE COUNT: 4.83 K/UL (ref 1.8–7.7)
SODIUM BLD-SCNC: 136 MMOL/L (ref 135–144)
SPECIMEN DESCRIPTION: ABNORMAL
TOTAL PROTEIN: 5.1 G/DL (ref 6.4–8.3)
TSH SERPL DL<=0.05 MIU/L-ACNC: 1.24 MIU/L (ref 0.3–5)
WBC # BLD: 5.5 K/UL (ref 3.5–11.3)
WBC # BLD: ABNORMAL 10*3/UL

## 2022-01-22 PROCEDURE — 85025 COMPLETE CBC W/AUTO DIFF WBC: CPT

## 2022-01-22 PROCEDURE — 86140 C-REACTIVE PROTEIN: CPT

## 2022-01-22 PROCEDURE — 6370000000 HC RX 637 (ALT 250 FOR IP): Performed by: STUDENT IN AN ORGANIZED HEALTH CARE EDUCATION/TRAINING PROGRAM

## 2022-01-22 PROCEDURE — 83615 LACTATE (LD) (LDH) ENZYME: CPT

## 2022-01-22 PROCEDURE — 2000000000 HC ICU R&B

## 2022-01-22 PROCEDURE — 99233 SBSQ HOSP IP/OBS HIGH 50: CPT | Performed by: INTERNAL MEDICINE

## 2022-01-22 PROCEDURE — 6370000000 HC RX 637 (ALT 250 FOR IP): Performed by: NURSE PRACTITIONER

## 2022-01-22 PROCEDURE — 2580000003 HC RX 258: Performed by: STUDENT IN AN ORGANIZED HEALTH CARE EDUCATION/TRAINING PROGRAM

## 2022-01-22 PROCEDURE — 2700000000 HC OXYGEN THERAPY PER DAY

## 2022-01-22 PROCEDURE — 6360000002 HC RX W HCPCS: Performed by: STUDENT IN AN ORGANIZED HEALTH CARE EDUCATION/TRAINING PROGRAM

## 2022-01-22 PROCEDURE — 99291 CRITICAL CARE FIRST HOUR: CPT | Performed by: INTERNAL MEDICINE

## 2022-01-22 PROCEDURE — 99233 SBSQ HOSP IP/OBS HIGH 50: CPT | Performed by: FAMILY MEDICINE

## 2022-01-22 PROCEDURE — 93970 EXTREMITY STUDY: CPT

## 2022-01-22 PROCEDURE — 85379 FIBRIN DEGRADATION QUANT: CPT

## 2022-01-22 PROCEDURE — 80053 COMPREHEN METABOLIC PANEL: CPT

## 2022-01-22 PROCEDURE — 84443 ASSAY THYROID STIM HORMONE: CPT

## 2022-01-22 PROCEDURE — 82947 ASSAY GLUCOSE BLOOD QUANT: CPT

## 2022-01-22 PROCEDURE — 94761 N-INVAS EAR/PLS OXIMETRY MLT: CPT

## 2022-01-22 RX ORDER — HYDRALAZINE HYDROCHLORIDE 25 MG/1
25 TABLET, FILM COATED ORAL EVERY 8 HOURS SCHEDULED
Status: DISCONTINUED | OUTPATIENT
Start: 2022-01-22 | End: 2022-01-22

## 2022-01-22 RX ORDER — ALPRAZOLAM 0.5 MG/1
0.5 TABLET ORAL ONCE
Status: COMPLETED | OUTPATIENT
Start: 2022-01-23 | End: 2022-01-23

## 2022-01-22 RX ORDER — HYDRALAZINE HYDROCHLORIDE 25 MG/1
25 TABLET, FILM COATED ORAL EVERY 8 HOURS SCHEDULED
Status: DISCONTINUED | OUTPATIENT
Start: 2022-01-22 | End: 2022-01-23

## 2022-01-22 RX ADMIN — INSULIN LISPRO 1 UNITS: 100 INJECTION, SOLUTION INTRAVENOUS; SUBCUTANEOUS at 12:15

## 2022-01-22 RX ADMIN — HEPARIN SODIUM 5000 UNITS: 5000 INJECTION INTRAVENOUS; SUBCUTANEOUS at 21:01

## 2022-01-22 RX ADMIN — INSULIN GLARGINE 10 UNITS: 100 INJECTION, SOLUTION SUBCUTANEOUS at 08:00

## 2022-01-22 RX ADMIN — BUDESONIDE AND FORMOTEROL FUMARATE DIHYDRATE 2 PUFF: 80; 4.5 AEROSOL RESPIRATORY (INHALATION) at 08:11

## 2022-01-22 RX ADMIN — Medication 81 MG: at 08:12

## 2022-01-22 RX ADMIN — DEXAMETHASONE 6 MG: 4 TABLET ORAL at 08:12

## 2022-01-22 RX ADMIN — SODIUM CHLORIDE, PRESERVATIVE FREE 10 ML: 5 INJECTION INTRAVENOUS at 20:41

## 2022-01-22 RX ADMIN — HEPARIN SODIUM 5000 UNITS: 5000 INJECTION INTRAVENOUS; SUBCUTANEOUS at 13:34

## 2022-01-22 RX ADMIN — DOPAMINE HYDROCHLORIDE 8.5 MCG/KG/MIN: 160 INJECTION, SOLUTION INTRAVENOUS at 06:01

## 2022-01-22 RX ADMIN — INSULIN LISPRO 2 UNITS: 100 INJECTION, SOLUTION INTRAVENOUS; SUBCUTANEOUS at 08:00

## 2022-01-22 RX ADMIN — SODIUM CHLORIDE, PRESERVATIVE FREE 10 ML: 5 INJECTION INTRAVENOUS at 08:12

## 2022-01-22 RX ADMIN — ATORVASTATIN CALCIUM 20 MG: 20 TABLET, FILM COATED ORAL at 08:12

## 2022-01-22 RX ADMIN — HEPARIN SODIUM 5000 UNITS: 5000 INJECTION INTRAVENOUS; SUBCUTANEOUS at 06:01

## 2022-01-22 RX ADMIN — BUDESONIDE AND FORMOTEROL FUMARATE DIHYDRATE 2 PUFF: 80; 4.5 AEROSOL RESPIRATORY (INHALATION) at 20:41

## 2022-01-22 RX ADMIN — Medication 2000 UNITS: at 08:12

## 2022-01-22 RX ADMIN — HYDRALAZINE HYDROCHLORIDE 25 MG: 25 TABLET ORAL at 18:16

## 2022-01-22 RX ADMIN — INSULIN GLARGINE 10 UNITS: 100 INJECTION, SOLUTION SUBCUTANEOUS at 20:42

## 2022-01-22 ASSESSMENT — ENCOUNTER SYMPTOMS
DIARRHEA: 0
NAUSEA: 0
CHEST TIGHTNESS: 0
WHEEZING: 0
EYE REDNESS: 0
TROUBLE SWALLOWING: 0
SHORTNESS OF BREATH: 1
VOICE CHANGE: 0
CONSTIPATION: 0
SINUS PAIN: 0
PHOTOPHOBIA: 0
ABDOMINAL PAIN: 0
ALLERGIC/IMMUNOLOGIC NEGATIVE: 1
SORE THROAT: 0
COUGH: 1
VOMITING: 0

## 2022-01-22 ASSESSMENT — PAIN SCALES - GENERAL
PAINLEVEL_OUTOF10: 0

## 2022-01-22 NOTE — PROGRESS NOTES
[facility] - Progress Note COVID 19 Patient  Today's Date and Time: 1/22/2022, 2:41 PM    Impression :     COVID 19 Confirmed Infection  Covid tests:  1.18.22 Positive  1/15/22 positive at home per patient  Acute hypoxic respiratory failure  Elevated inflammatory markers  UYEN  Systolic CHF  COPD  DM II  HTN  Hep C  Erectile dysfunction    Recommendations:   Antibiotic treatment:  Vancomycin initiated 1/20/22 for 1/2 MRSE blood culture 1/19/22. This is likely a contaminant  Repeat blood cultures ordered 1/20/22  Covid Rx:    Remdesivir-Contraindicated  Decadron-Initiated 1/18/21  Actemra-ordered 1/19/2022  Monoclonal antibodies-Out of window      Medical Decision Making/Summary/Discussion:1/22/2022     Patient admitted with COVID 19 infection  Isolation until 1/5/22    Infection Control Recommendations   [place] Precautions  Airborne isolation  Droplet Isolation    Antimicrobial Stewardship Recommendations     Discontinuation of therapy  Coordination of Outpatient Care:   Estimated Length of IV antimicrobials:TBD  Patient will need Midline Catheter Insertion: TBD  Patient will need PICC line Insertion: No  Patient will need: Home IV , [place],  SNF,  LTAC:TBD  Patient will need outpatient wound care:No    Chief complaint/reason for consultation:   Concern for COVID infection      History of Present Illness:   Brie Glover is a 74y.o.-year-old male who was initially admitted on 1/18/2022. Patient seen at the request of Dr. Xavier Shannon:    Patient presented through ER with complaints of fatigue and shortness of breath. The patient has a history of COPD and congestive heart failure EF 20 to 30%. The patient stated he tested positive for COVID 3 days prior to presentation in the ED. Rapid in the hospital was positive.     The patient was noted to have an SPO2 of 76% on room air with a fever of 100.7 °F.    Imaging of the chest revealed bilateral lower lung zone patchy airspace opacity potentially representing early COVID-pneumonia with stable mild cardiomegaly. CRP was elevated at 209.4  UYEN present  Elevated D-dimer    VQ scan ordered to look for PE     Decadron was initiated    The patient had an episode of bradycardia requiring an injection of atropine. Cardiology has been consulted. The patient is also disoriented to time and place. A CT head revealed no acute abnormality    Patient admitted because of concerns with COVID 19.    CURRENT EVALUATION : 1/22/2022    Afebrile  VS stable   Off Levophed and Dopamine gtt    Per Cardiology, he may need dual chamber ICD in near future based on LVEF and further episodes of bradycardia. MRSE on blood cultures x 1 -Vancomycin initiated  Repeat blood cultures pending    Hyponatremia and hyperkalemia resolved this morning  Creatinine level is back to normal    CRP is decreasing    Echocardiogram pending  VQ scan was unable to determine probability of pulmonary emboli    Patient exhibiting respiratory distress. yes    Patient receiving supplemental oxygen.  6 L NC--> hi flow  % FIO2: 50-->55  Flow Rate: 30 L/min  RR:18-->21-->25  02 sat:88-->97-->92      NEWS Score: 0-4 Low risk group; 5-6: Medium risk group; 7 or above: High risk group  Parameters 3 2 1 0 1 2 3   Age    < 65   = 65   RR = 8  9-11 12-20  21-24 = 25   O2 Sats = 91 92-93 94-95 = 96      Suppl O2  Yes  No      SBP = 90  101-110 111-219   = 220   HR = 40  41-50 51-90  111-130 = 131   Consciousness    Alert   Drowsiness, lethargy, or confusion   Temperature = 35.0 C (95.0 F)  35.1-36.0 C 95.1-96.9 F 36.1-38.0 C 97.0-100.4 F 38.1-39.0 C 100.5-102.3 F = 39.1 C = 102.4 F      NEWS Score:  1/19/21:    Overall Daily Picture:     Unchanged    Presence of secondary bacterial Infection:  No   Additional antibiotics: Vancomycin    Labs, X rays reviewed: 1/22/2022    BUN:69-->73-->62-->38  Cr:1.84-->1.61-->1.17-->0.89  Na:129-->136  K:5.6-->5.3-->5.2    WBC:6.5-->15.3-->11.9-->5.5  Hb:10.7-->12.3-->11.5-->10.7  Plat: 166-->281-->225-->184    Absolute Neutrophils:5.7  Absolute Lymphocytes:0.4  Neutrophil/Lymphocyte Ratio: 14 high risk    CRP:235.8-->209.4-->90.1-->36  Ferritin:353-->523  LDH: 246-->292-->243    Pro Calcitonin:    MRSA probe: negative     Cultures:  Urine:    Blood:  1-20-22: 1/2 MRSE  Sputum :    Wound:      CXR:   1/18/21      CAT:      Discussed with patient, RN, CC, IM. I have personally reviewed the past medical history, past surgical history, medications, social history, and family history, and I have updated the database accordingly.   Past Medical History:     Past Medical History:   Diagnosis Date    COPD (chronic obstructive pulmonary disease) ([facility]     Diabetes mellitus ([facility]     Erectile dysfunction due to arterial insufficiency 12/7/2015    Hypertension     Microalbuminuria due to type 2 diabetes mellitus ([facility] 7/7/2016    Overweight (BMI 25.0-29.9) 12/7/2015       Past Surgical  History:     Past Surgical History:   Procedure Laterality Date    APPENDECTOMY      TOTAL ANKLE ARTHROPLASTY      LEFT       Medications:      insulin glargine  10 Units SubCUTAneous BID    heparin (porcine)  5,000 Units SubCUTAneous 3 times per day    [Held by provider] amLODIPine  10 mg Oral Daily    aspirin  81 mg Oral Daily    atorvastatin  20 mg Oral Daily    [Held by provider] carvedilol  25 mg Oral BID    budesonide-formoterol  2 puff Inhalation BID    [Held by provider] furosemide  20 mg Oral Daily    [Held by provider] lisinopril  20 mg Oral Daily    tiotropium  2 puff Inhalation Daily    sodium chloride flush  5-40 mL IntraVENous 2 times per day    dexamethasone  6 mg Oral Daily    Vitamin D  2,000 Units Oral Daily    insulin lispro  0-6 Units SubCUTAneous TID     insulin lispro  0-3 Units SubCUTAneous Nightly    glucagon (rDNA)  1 mg IntraVENous Once    vancomycin (VANCOCIN) intermittent dosing (placeholder)   Other RX Placeholder    vancomycin  1,000 mg IntraVENous Q24H    sodium chloride flush  5-40 mL IntraVENous 2 times per day       Social History:     Social History     Socioeconomic History    Marital status:      Spouse name: Not on file    Number of children: Not on file    Years of education: Not on file    Highest education level: Not on file   Occupational History    Not on file   Tobacco Use    Smoking status: Light Tobacco Smoker     Packs/day: 0.50     Types: Cigarettes     Last attempt to quit: 2017     Years since quittin.5    Smokeless tobacco: Never Used    Tobacco comment: reports he quit smoking in the past 2 months   Substance and Sexual Activity    Alcohol use: No     Alcohol/week: 0.0 standard drinks    Drug use: No    Sexual activity: Not on file   Other Topics Concern    Not on file   Social History Narrative    Not on file     Social Determinants of Health     Financial Resource Strain: Low Risk     Difficulty of Paying Living Expenses: Not very hard   Food Insecurity: No Food Insecurity    Worried About Running Out of Food in the Last Year: Never true    Rakesh of Food in the Last Year: Never true   Transportation Needs:     Lack of Transportation (Medical): Not on file    Lack of Transportation (Non-Medical):  Not on file   Physical Activity:     Days of Exercise per Week: Not on file    Minutes of Exercise per Session: Not on file   Stress:     Feeling of Stress : Not on file   Social Connections:     Frequency of Communication with Friends and Family: Not on file    Frequency of Social Gatherings with Friends and Family: Not on file    Attends [employer] Services: Not on file    Active Member of Clubs or Organizations: Not on file    Attends Club or Organization Meetings: Not on file    Marital Status: Not on file   Intimate Partner Violence:     Fear of Current or Ex-Partner: Not on file    Emotionally Abused: Not on file    Physically Abused: Not on file    Sexually Abused: Not on file   Housing Stability:     Unable to Pay for Housing in the Last Year: Not on file    Number of Places Lived in the Last Year: Not on file    Unstable Housing in the Last Year: Not on file       Family History:   No family history on file. Allergies:   Patient has no known allergies. Review of Systems:       Constitutional: Generalized fatigue with dyspnea  Head: No headaches  Eyes: No double vision or blurry vision. No conjunctival inflammation. ENT: No sore throat or runny nose. . No hearing loss, tinnitus or vertigo. Cardiovascular: No chest pain or palpitations. Shortness of breath. DUBON  Lung: Shortness of breath with chronic cough. Abdomen: No nausea, vomiting, diarrhea, or abdominal pain. Rometta Favre No cramps. Genitourinary: No increased urinary frequency, or dysuria. No hematuria. No suprapubic or CVA pain  Musculoskeletal: No muscle aches or pains. No joint effusions, swelling or deformities  Hematologic: No bleeding or bruising. Neurologic: No headache, weakness, numbness, or tingling. Integument: No rash, no ulcers. Psychiatric: No depression. Endocrine: No polyuria, no polydipsia, no polyphagia. Physical Examination :     Patient Vitals for the past 8 hrs:   Pulse Resp SpO2   01/22/22 1400 67 25 92 %   01/22/22 1300 56 23 94 %   01/22/22 1200 58 26 93 %   01/22/22 1100 58 26 93 %   01/22/22 1000 55 24 93 %   01/22/22 0923  25 91 %   01/22/22 0900 55 27 92 %   01/22/22 0800 62 23 93 %   01/22/22 0700 59 26 91 %     General Appearance: Awake, alert, and in no apparent distress  Head:  Normocephalic, no trauma  Eyes: Pupils equal, round, reactive to light; sclera anicteric; conjunctivae pink. No embolic phenomena. ENT: Oropharynx clear, without erythema, exudate, or thrush. No tenderness of sinuses. Mouth/throat: mucosa pink and moist. No lesions.  Dentition in good repair. Neck:Supple, without lymphadenopathy. Thyroid normal, No bruits. Pulmonary/Chest: Clear to auscultation, without wheezes, rales, or rhonchi. No dullness to percussion. Cardiovascular: Regular rate and rhythm without murmurs, rubs, or gallops. Abdomen: Soft, non tender. Bowel sounds normal. No organomegaly  All four Extremities: No cyanosis, clubbing, edema, or effusions. Neurologic: Disorientation to time and place  Skin: Warm and dry with good turgor. No signs of peripheral arterial or venous insufficiency. No ulcerations. No open wounds. Medical Decision Making -Laboratory:   I have independently reviewed/ordered the following labs:    CBC with Differential:   Recent Labs     01/21/22  0351 01/22/22  0454   WBC 11.9* 5.5   HGB 11.5* 10.7*   HCT 35.2* 33.4*    184   LYMPHOPCT 1* 5*   MONOPCT 1 5     BMP:   Recent Labs     01/21/22  0351 01/22/22  0454    136   K 5.3 5.2    105   CO2 23 26   BUN 62* 38*   CREATININE 1.17 0.89   MG 1.9  --      Hepatic Function Panel:   Recent Labs     01/21/22  0351 01/22/22  0454   PROT 5.5* 5.1*   LABALBU 2.7* 3.0*   BILITOT 0.31 0.35   ALKPHOS 75 69   ALT 8 7   AST 18 17     No results for input(s): RPR in the last 72 hours. No results for input(s): HIV in the last 72 hours. No results for input(s): BC in the last 72 hours.   Lab Results   Component Value Date    MUCUS 2+ 07/01/2017    RBC 3.56 01/22/2022    TRICHOMONAS NOT REPORTED 07/01/2017    WBC 5.5 01/22/2022    YEAST NOT REPORTED 07/01/2017    TURBIDITY Clear 01/19/2022     Lab Results   Component Value Date    CREATININE 0.89 01/22/2022    GLUCOSE 171 01/22/2022       Medical Decision Making-Imaging:     Narrative   EXAMINATION:   ONE XRAY VIEW OF THE CHEST       1/18/2022 10:02 pm       COMPARISON:   09/08/2019       HISTORY:   ORDERING SYSTEM PROVIDED HISTORY: hypoxic   TECHNOLOGIST PROVIDED HISTORY:   hypoxic       FINDINGS:   Stable mild cardiomegaly.  Pulmonary vasculature appears normal.  Mild patchy   airspace opacity is present at the bilateral lower lung zones.  No   pneumothorax or pleural effusion.  Surrounding structures are unremarkable.           Impression   Mild bilateral lower lung zone patchy airspace opacity potentially   representing early COVID pneumonia.       Mild stable cardiomegaly.         Narrative   EXAMINATION:   CT OF THE HEAD WITHOUT CONTRAST  1/18/2022 6:31 pm       TECHNIQUE:   CT of the head was performed without the administration of intravenous   contrast. Dose modulation, iterative reconstruction, and/or weight based   adjustment of the mA/kV was utilized to reduce the radiation dose to as low   as reasonably achievable.       COMPARISON:   CT scan dated March 3, 2015       HISTORY:   ORDERING SYSTEM PROVIDED HISTORY: AMS   TECHNOLOGIST PROVIDED HISTORY:       AMS   Decision Support Exception - unselect if not a suspected or confirmed   emergency medical condition->Emergency Medical Condition (MA)   Reason for Exam: COVID+, AMS       FINDINGS:   BRAIN/VENTRICLES: There is no acute intracranial hemorrhage, mass effect or   midline shift.  No abnormal extra-axial fluid collection.  The gray-white   differentiation is maintained without evidence of an acute infarct.  There is   no evidence of hydrocephalus. Generalized atrophy is present.    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       ORBITS: The visualized portion of the orbits demonstrate no acute abnormality.       SINUSES: Mild degree of mucosal thickening is present within the right   maxillary sinus.  Minimal mucosal thickening is present within the left   maxillary sinus.       SOFT TISSUES/SKULL:  No acute abnormality of the visualized skull or soft   tissues.           Impression   No acute intracranial abnormality.                   Medical Decision [id number]-[id number]:       Medical Decision Making-Other:     Note:  Labs, medications, radiologic studies were reviewed with personal review of films  Large amounts of data were reviewed  Discussed with nursing Staff, Discharge planner  Infection Control and Prevention measures reviewed  All prior entries were reviewed  Administer medications as ordered  Prognosis: Guarded  Discharge planning reviewed      Thank you for allowing us to participate in the care of this patient. Please call with questions.     Carl Walker MD             Pager: [phone] - Office: [phone]

## 2022-01-22 NOTE — FLOWSHEET NOTE
Advance Care Planning     Advance Care Planning Inpatient Note  [facility] Department    Today's Date: 1/22/2022  Unit: DI[facility]AN[facility] 3    Received request from rounding. Upon review of chart and communication with care team, patient's decision making abilities are not in question. . Patient was/were present in the room during visit. Goals of ACP Conversation:  Discuss advance care planning documents    Health Care Decision Makers:     No healthcare decision makers have been documented. Click here to complete [address] including selection of the Healthcare Decision Maker Relationship (ie \"Primary\")  Summary:  Completed [address] Planning Documents (Patient Wishes):  Healthcare Power of /Advance Directive Appointment of Health Care Agent     Assessment:  I had the pleasure of speaking with Randolph Harrison this morning via telephone/window as he was awake and lying in bed in his isolation room. Pt is experiencing great distress over the discord between his children and himself. He states that oldest son has an alcohol problem; he is concerned that his daughter is angry at him; and his youngest son lives with him. Pt states that he has no Buddhist affiliation. Pt expressed distress that he has no money and no insurance and can't pay for this care. Interventions:  Provided education on documents for clarity and greater understanding  Discussed and provided education on state decision maker hierarchy  Assisted in the completion of documents according to patient's wishes at this time  Encouraged ongoing ACP conversation with future decision makers and loved ones    Outcomes/Plan:  ACP Discussion: Completed  New advance directive completed. Returned original document(s) to patient, as well as copies for distribution to appointed agents  Copy of advance directive given to staff to scan into medical record.   Pt was clear that he wanted to name his italo Sasha Carlson as his HC-POA. He declined naming others as alternatives, and did not have another person that he wanted to name as alternative. Electronically signed by Jose Mahmood on 1/22/2022 at 11:09 AM           01/22/22 0954   Encounter Summary   Services provided to: Patient   Referral/Consult From: [facility] Family members   Continue Visiting   (1/22)   Complexity of Encounter Moderate   Length of Encounter 1 hour   Spiritual Assessment Completed Yes   Advance Care Planning Yes   Routine   Type Initial   Assessment Approachable; Anxious   Intervention Active listening;Explored feelings, thoughts, concerns;Explored coping resources; Discussed illness/injury and it's impact; Discussed belief system/Pentecostalism practices/luzmaria   Outcome Engaged in conversation

## 2022-01-22 NOTE — CONSULTS
they suggest dopamine/dobutamine. Cardiology was consulted and according to cardiology note atropine as needed there was no mention of dopamine or dobutamine in their note. He has history of smoking currently patient he stopped smoking 3 4 years ago history of smoking for more than 30 years 1 pack/day. Interval history:    Remains on high flow oxygen at 30 L and 60%  Continues to have shortness of breath and coughing  No wheezing  No orthopnea or PND  No hemoptysis  No chest pain or pressure  Patient came off of dopamine      PAST MEDICAL HISTORY:        Diagnosis Date    COPD [facility]chronic obstructive pulmonary disease) ([facility] [address] [facility]     Diabetes mellitus ([facility]     Erectile dysfunction due to arterial insufficiency 12/7/2015    Hypertension     Microalbuminuria due to type 2 diabetes mellitus ([facility] 7/7/2016    Overweight (BMI 25.0-29.9) 12/7/2015     PAST SURGICAL HISTORY:        Procedure Laterality Date    APPENDECTOMY      TOTAL ANKLE ARTHROPLASTY      LEFT     FAMILY HISTORY:   No family history on file. SOCIAL HISTORY:   TOBACCO:   reports that he has been smoking cigarettes. He has been smoking about 0.50 packs per day. He has never used smokeless tobacco.  ETOH:  reports no history of alcohol use. DRUGS: reports no history of drug use. ALLERGIES:    No Known Allergies      HOME MEDICATIONS:  Prior to Admission medications    Medication Sig Start Date End Date Taking?  Authorizing Provider   amLODIPine (NORVASC) 10 MG tablet TAKE 1 TABLET BY MOUTH DAILY 10/25/21   Huey Ruiz MD   aspirin (HM ASPIRIN EC LOW DOSE) 81 MG EC tablet TAKE 1 TABLET BY MOUTH DAILY 8/26/21   Dianne Zuniga MD   furosemide (LASIX) 20 MG tablet Take 1 tablet by mouth daily 8/26/21   Dianne Zuniga MD   Blood Pressure KIT Check blood pressure daily 8/26/21   Dianne Zuniga MD   albuterol sulfate HFA (PROAIR HFA) 108 (90 Base) MCG/ACT inhaler inhale 2 puffs by mouth every 6 hours if needed for wheezing 8/16/21 Paige Walsh MD   fluticasone-salmeterol (ADVAIR DISKUS) 100-50 MCG/DOSE diskus inhaler INHALE 1 PUFF INTO THE LUNGS EVERY 12 HOURS 8/16/21   Te Ha MD   carvedilol (COREG) 25 MG tablet Take 1 tablet by mouth 2 times daily 8/16/21   Paige Walsh MD   atorvastatin (LIPITOR) 20 MG tablet Take 1 tablet by mouth daily 7/21/21   Hany Mayen MD   lisinopril (PRINIVIL;ZESTRIL) 20 MG tablet Take 1 tablet by mouth daily 7/21/21   Hany Mayen MD   ipratropium (ATROVENT HFA) 17 MCG/ACT inhaler Inhale 1 puff into the lungs 2 times daily 9/2/20 10/2/20  Kelly Petty MD   tiotropium (Derotha Roll) 18 MCG inhalation capsule Inhale 1 capsule into the lungs daily 1/9/20   Kelly Petty MD   nicotine (NICODERM CQ) 14 MG/24HR Place 1 patch onto the skin daily 12/25/16   Andi Young MD     IMMUNIZATIONS:  Most Recent Immunizations   Administered Date(s) Administered    Pneumococcal Conjugate 13-valent ([id number]) 08/02/2018    Pneumococcal Polysaccharide ([id number]) 11/15/2019    Tdap (Boostrix, Adacel) 08/02/2018     REVIEW OF SYSTEMS:  Review of Systems   Constitutional: Positive for appetite change and fatigue. Negative for fever. HENT: Negative for postnasal drip, sinus pain, sore throat, trouble swallowing and voice change. Eyes: Negative for photophobia, redness and visual disturbance. Respiratory: Positive for cough and shortness of breath. Negative for wheezing. Cardiovascular: Negative for chest pain, palpitations and leg swelling. Gastrointestinal: Negative for abdominal pain, diarrhea and vomiting. Endocrine: Negative for polydipsia, polyphagia and polyuria. Genitourinary: Negative for dysuria, frequency and hematuria. Musculoskeletal: Negative. Allergic/Immunologic: Negative. Neurological: Negative for dizziness, syncope, speech difficulty, light-headedness, numbness and headaches. Hematological: Negative for adenopathy.  Does not bruise/bleed easily. Psychiatric/Behavioral: Negative. OBJECTIVE     PaO2/FiO2 RATIO:  No results for input(s): POCPO2 in the last 72 hours.    FiO2 : 60 %     VITAL SIGNS:   LAST:  BP (!) 152/59   Pulse 60   Temp 97.3 °F (36.3 °C) (Bladder)   Resp 25   Ht 5' 5\" (1.651 m)   Wt 156 lb 4.8 oz (70.9 kg)   SpO2 90%   BMI 26.01 kg/m²   8-24 HR RANGE:  TEMP Temp  Av.3 °F (36.3 °C)  Min: 97.3 °F (36.3 °C)  Max: 97.3 °F (64.6 °C)   BP Systolic (40XKG), ROE:997 , Min:143 , GAETANO:237      Diastolic (43QAG), VUD:68, Min:51, Max:59     PULSE Pulse  Av.9  Min: 49  Max: 67   RR Resp  Av.8  Min: 21  Max: 29   O2 SAT SpO2  Av.2 %  Min: 89 %  Max: 94 %   OXYGEN DELIVERY O2 Flow Rate (L/min)  Av L/min  Min: 30 L/min  Max: 30 L/min        Physical Exam   General appearance - well appearing,  comfortable and in no acute distress  Mental status - alert, oriented to person, place, and time  Eyes - pupils equal and reactive, sclera anicteric  Mouth - mucous membranes slightly dry, pharynx normal without lesions  Neck - supple, no significant adenopathy, no bruits  Lymphatics - no palpable lymphadenopathy, no hepatosplenomegaly  Chest - no tachypnea, retractions or cyanosis, bilateral breath sounds present no expiratory wheezing no rhonchi distant breath sounds  Heart -bradycardia, regular rhythm, normal S1, S2, no murmurs, rubs, clicks or gallops  Abdomen - soft, nontender, nondistended, no masses or organomegaly  Neurological -awake and following commands  Musculoskeletal - no joint tenderness, deformity or swelling  Extremities -no pedal edema, no clubbing or cyanosis  Skin - normal coloration and turgor, no rashes, no suspicious skin lesions noted  DATA REVIEW     Medications: Current Inpatient  Scheduled Meds:   insulin glargine  10 Units SubCUTAneous BID    heparin (porcine)  5,000 Units SubCUTAneous 3 times per day    [Held by provider] amLODIPine  10 mg Oral Daily    aspirin  81 mg Oral Daily    atorvastatin  20 mg Oral Daily    [Held by provider] carvedilol  25 mg Oral BID    budesonide-formoterol  2 puff Inhalation BID    [Held by provider] furosemide  20 mg Oral Daily    [Held by provider] lisinopril  20 mg Oral Daily    tiotropium  2 puff Inhalation Daily    sodium chloride flush  5-40 mL IntraVENous 2 times per day    dexamethasone  6 mg Oral Daily    Vitamin D  2,000 Units Oral Daily    insulin lispro  0-6 Units SubCUTAneous TID WC    insulin lispro  0-3 Units SubCUTAneous Nightly    glucagon (rDNA)  1 mg IntraVENous Once    vancomycin (VANCOCIN) intermittent dosing (placeholder)   Other RX Placeholder    vancomycin  1,000 mg IntraVENous Q24H    sodium chloride flush  5-40 mL IntraVENous 2 times per day     Continuous Infusions:   EPINEPHrine infusion Stopped (01/21/22 0430)    sodium chloride      sodium chloride 25 mL/hr at 01/21/22 0815    dextrose      DOPamine 8.5 mcg/kg/min (01/22/22 0601)    sodium chloride         INPUT/OUTPUT:  In: 1465.7 [P.O.:400; I.V.:1065.7]  Out: 2540 [Urine:2540]  Date 01/22/22 0000 - 01/22/22 2359   Shift 1325-2672 9123-1960 2653-3944 24 Hour Total   INTAKE   I.V.(mL/kg) 432(6.1)   432(6.1)   Shift Total(mL/kg) 432(6.1)   432(6.1)   OUTPUT   Urine(mL/kg/hr) 950(1.7) 185  1135   Shift Total(mL/kg) 950(13.4) 185(2.6)  1135(16)   Weight (kg) 70.9 70.9 70.9 70.9        LABS:  ABGs:   No results for input(s): POCPH, POCPCO2, POCPO2, POCHCO3, [id number] in the last 72 hours.   CBC:   Recent Labs     01/20/22  0320 01/21/22  0351 01/22/22  0454   WBC 15.3* 11.9* 5.5   HGB 12.3* 11.5* 10.7*   HCT 37.8* 35.2* 33.4*   MCV 93.3 93.9 93.8    225 184   LYMPHOPCT 1* 1* 5*   RBC 4.05* 3.75* 3.56*   MCH 30.4 30.7 30.1   MCHC 32.5 32.7 32.0   RDW 12.9 13.2 13.1     CRP:   Recent Labs     01/20/22  0320 01/21/22  0351   .4* 90.1*     LDH:   Recent Labs     01/20/22  0320 01/21/22  0351 01/22/22  0454   * 243* 240*     BMP:   Recent Labs 01/19/22  1911 01/20/22  0320 01/20/22  1108 01/21/22  0351 01/22/22  0454    129*  --  136 136   K 5.5* 5.6* 5.2 5.3 5.2    98  --  105 105   CO2 17* 20  --  23 26   BUN 71* 73*  --  62* 38*   CREATININE 1.44* 1.61*  --  1.17 0.89   GLUCOSE 176* 339*  --  259* 171*     Liver Function Test:   Recent Labs     01/20/22  0320 01/21/22  0351 01/22/22  0454   PROT 6.0* 5.5* 5.1*   LABALBU 3.1* 2.7* 3.0*   ALT 10 8 7   AST 25 18 17   ALKPHOS 84 75 69   BILITOT 0.39 0.31 0.35     Coagulation Profile:   No results for input(s): INR, PROTIME, APTT in the last 72 hours. D-Dimer:  Recent Labs     01/20/22 0320 01/21/22  0351 01/22/22  0454   DDIMER 1.79 2.02 1.61     Ferritin:    Recent Labs     01/20/22 0320   FERRITIN 532*     Lactic Acid:  No results for input(s): LACTA in the last 72 hours. Cardiac Enzymes:  No results for input(s): CKTOTAL, CKMB, CKMBINDEX, TROPONINI in the last 72 hours. Invalid input(s): TROPONIN, HSTROP  BNP/ProBNP:   No results for input(s): BNP, PROBNP in the last 72 hours. Triglycerides:  No results for input(s): TRIG in the last 72 hours. Microbiology:  Urine Culture:  No components found for: CURINE  Blood Culture:  No components found for: CBLOOD, CFUNGUSBL  Sputum Culture:  No components found for: CSPUTUM  Recent Labs     01/20/22 1924   [address] [facility] . BLOOD   SPECIAL NOT REPORTED   CULTURE POSITIVE Blood Culture*  DIRECT GRAM STAIN FROM BOTTLE: GRAM POSITIVE COCCI IN CLUSTERS  (NOTE) Direct Gram Stain from bottle result called to and read back by:MELE PABLO AT 1320 ON 01/21/2022     Recent Labs     01/19/22 1913 01/20/22 1918 01/20/22 1924   SPECIAL NOT REPORTED NOT REPORTED NOT REPORTED   CULTURE NO GROWTH NO GROWTH 1 DAY POSITIVE Blood Culture*  DIRECT GRAM STAIN FROM BOTTLE: GRAM POSITIVE COCCI IN CLUSTERS  (NOTE) Direct Gram Stain from bottle result called to and read back by:MELE PABLO AT 1320 ON 01/21/2022        Radiology Reports:  NM LUNG SCAN PERFUSION ONLY   Final Result   Addendum 1 of 1   ADDENDUM:   Upon further review, the perfusion defect at the left lung base is much   smaller than the corresponding opacity on the chest x-ray. This is low   probability for pulmonary embolism. Findings were discussed with Dr. Sonia Yi at 10:15 a.m. 01/21/2022. Final   Suboptimal exam without more recent chest radiograph for comparison. Indeterminate probability for pulmonary embolism. XR CHEST PORTABLE   Final Result      No pneumothorax. Stable moderate cardiomegaly. Slight worsening consolidative changes of left lower lobe and small left   pleural effusion. Worsening increased interstitial markings and ground-glass densities   throughout both lungs. The finding can be seen in setting of pulmonary edema   or viral pneumonia. XR CHEST PORTABLE   Final Result   Mild bilateral lower lung zone patchy airspace opacity potentially   representing early COVID pneumonia. Mild stable cardiomegaly. CT HEAD WO CONTRAST   Final Result   No acute intracranial abnormality.          VL DUP LOWER EXTREMITY VENOUS BILATERAL    (Results Pending)        Echocardiogram:   Results for orders placed during the hospital encounter of 09/04/19    Echocardiogram complete 2D with doppler with color    Narrative  Transthoracic Echocardiography Report (TTE)    Patient Name Susu Factor F  Date of Study         09/06/2019    Date of      1955       Gender                Male  Birth    Age          75 year(s)       Race                      Room Number  2008             Height:               65 inch, 165.1 cm    Corporate ID [id number]         Weight:               180 pounds, 81.6 kg  #    Patient Acct [de-identified]        BSA:       1.89 m^2   BMI:      29.95 kg/m^2  #    MR #         [id number]          Sonographer           Justin Garvin    Accession #  [id number]        [id number]  Physician    Fellow Referring Nurse  Practitioner    Interpreting Pattie Adams  Referring Physician   [address]  Fellow       Andry Green CNP    Type of Study    TTE procedure:2D Echocardiogram, M-Mode, Doppler, Color Doppler. Procedure Date  Date: 09/06/2019 Start: 11:05 AM    Study Location: [facility]    Nicole / Lily Solano. Comments:  High Risk Angina, COPD, CHF CAD    Patient Status: Inpatient    Height: 65 inches Weight: 180 pounds BSA: 1.89 m^2 BMI: 29.95 kg/m^2    CONCLUSIONS    Summary  Dilated left ventricle with severely reduced systolic function. Severe hypokinesis to akinesis of distal inferior, inferolateral, septal,  anterior, apical wall  Calculated ejection fraction is 20%. Evidence of moderate diastolic dysfunction. Normal right ventricular size with mildly reduced function. Moderate mitral regurgitation. Mild tricuspid regurgitation. Signature  ----------------------------------------------------------------------------  Electronically signed by Home Ibarra(Sonographer) on 09/06/2019 02:24  PM  ----------------------------------------------------------------------------    ----------------------------------------------------------------------------  Electronically signed by Jose Lynch(Interpreting physician) on  09/06/2019 05:23 PM  ----------------------------------------------------------------------------  FINDINGS  Left Atrium  Left atrium is moderately dilated. Left Ventricle  Dilated left ventricle with severely reduced systolic function. Severe hypokinesis to akinesis of distal inferior, inferolateral, septal,  anterior, apical wall  Calculated ejection fraction is 20%. Evidence of moderate diastolic dysfunction. (I/A'12 )  Right Atrium  Right atrium is normal in size. Right Ventricle  Normal right ventricular size with mildly reduced function.   Mitral Valve  Mitral valve structure is normal.  Moderate mitral regurgitation. Aortic Valve  Aortic valve is trileaflet. Aortic sclerosis without stenosis. No aortic insufficiency. Tricuspid Valve  Tricuspid valve structure is normal.  Mild tricuspid regurgitation. Pulmonic Valve  The pulmonic valve is normal in structure. Pericardial Effusion  Small anterior pericardial effusion. Miscellaneous  Normal aortic root dimension.   IVC diameter and inspiratory collapse is normal.    M-mode / 2D Measurements & Calculations:    LVIDd:6 cm(3.7 - 5.6 cm)          Diastolic OHJHTY:316.67 ml  LVIDs:4.46 cm(2.2 - 4.0 cm)       Systolic BOVZGF:56 ml  CPPE:0.6 cm(0.6 - 1.1 cm)         Aortic Root:3.1 cm(2.0 - 3.7 cm)  LVPWd:1.2 cm(0.6 - 1.1 cm)        LA Dimension: 4 cm(1.9 - 4.0 cm)  Fractional Shortenin.67 %     LA volume/Index: 69.67 ml /37m^2  Calculated LVEF (%): 29.8 %       LVOT:2.5 cm  RVDd:2.5 cm    Mitral:                                  Aortic    Valve Area (P1/2-Time): 3.14 cm^2        Peak Velocity: 1.25 m/s  Peak E-Wave: 0.90 m/s                    Mean Velocity: 0.82 m/s  Peak A-Wave: 0.86 m/s                    Peak Gradient: 6.25 mmHg  E/A Ratio: 1.04                          Mean Gradient: 3 mmHg  Peak Gradient: 3.22 mmHg  Mean Gradient: 24 mmHg  Deceleration Time: 180 msec              Area (continuity): 2.5 cm^2  P1/2t: 70 msec                           AV VTI: 26.5 cm  MR PISA Radius: 0.6 cm    Area (continuity): 0.64 cm^2  Mean Velocity: 0.61 m/s    Tricuspid:                               Pulmonic:    Estimated RVSP: 44 mmHg                  Peak Velocity: 0.69 m/s  Peak TR Velocity: 2.95 m/s               Peak Gradient: 1.92 mmHg  Peak TR Gradient: 34.81 mmHg  Estimated RA Pressure: 10 mmHg    Estimated PASP: 44.81 mmHg    Diastology / Tissue Doppler  Septal Wall E' velocity:0.04 m/s  Septal Wall E/E':22  Lateral Wall E' velocity:0.06 m/s  Lateral Wall E/E':14       ASSESSMENT AND PLAN     Assessment:    // Acute hypoxic respiratory failure  // COVID-19 infection/pneumonia  // Sinus bradycardia likely related to beta-blocker status post 1 dose of atropine  // Hypotension likely multifactorial including hypovolemia/bradycardia/ COVID sepsis. // Sepsis due to COVID 19  // High inflammatory marker, CRP, better  // Troponin elevation  // UYEN on CKD  //Hyperglycemia, better, acceptable  //Anemia, slightly worse  //Leukocytosis, resolved  //Intermediate probability for pulmonary embolism on the nuclear medicine scan    Plan:    I personally interviewed/examined the patient; reviewed interval history, interpreted all available radiographic and laboratory data at the time of service. Patient is in a positive fluid balance of 2.6 L  Continue on high flow O2 and monitor oxygen saturation keeping oxygen saturation 90%. Will use BiPAP/NIV if needed for any increased distress worsening hypoxia or work of breathing currently patient is comfortable though hypoxic maintaining saturation above 92% nonrebreather. On Symbicort and DuoNeb  Encourage prone position when sleeping, incentive spirometry  Monitor I/O, renal function, electrolytes with a goal of even fluid balance  Patient is 2.6 L fluid positive. Decrease intravenous fluids  He is on Decadron recommend to give 6 mg for 10 days  He is status post Actemra on 01/18/2022  On subcu heparin for DVT prophylaxis  Antimicrobials reviewed; on vancomycin  Monitor CRP, LDH, AST/ALT, D-Dimer, Ferritin as needed/periodically  Glycemic control per primary service  Physical/occupational therapy when able to tolerate  On oral nutrition  Follow-up on venous Dopplers of bilateral lower extremities    Discussed with respiratory therapist in detail. Discussed with nursing staff and nursing charge        Patient is critically ill with illness/injury that acutely impairs one or more vital organ systems, such that there is a high probability of imminent or life threatening deterioration in the patient's condition.  Critical care time of 50 minutes was spent (excluding procedures), in coordination of care during bedside rounds and discussion of patient care in detail, and recommendations of the team were adopted in the plan. Necessity of all invasive devices was also confirmed. Iraida Mcfadden MD  Pulmonary and Critical Care Medicine           1/22/2022, 8:18 AM    This patient was evaluated in the context of the global SARS-CoV-2 (COVID-19) pandemic, which necessitated considerations that the patient either has COVID-19 infection or is at risk of infection with COVID-19. Institutional protocols and algorithms that pertain to the evaluation & management of patients with COVID-19 or those at risk for COVID-19 are in a state of rapid changes based on information released by regulatory bodies including the CDC and federal and state organizations. These policies and algorithms were followed during the patient's care. Please note that this chart was generated using voice recognition Dragon dictation software. Although every effort was made to ensure the accuracy of this automated transcription, some errors in transcription may have occurred.

## 2022-01-22 NOTE — PROGRESS NOTES
Point of care note:    Blood culture 1/18/22 shows: Staph aureus, staph epidermidis likely a contamination. Repeat Blood culture  1/20/22 shows staph species, coagulase negative, likely a contamination. Will discontinue Vancomycin upon discussion with ID.     Electronically signed by Harry Driscoll MD on 1/22/2022 at 4:14 PM

## 2022-01-22 NOTE — PLAN OF CARE
Problem: Airway Clearance - Ineffective  Goal: Achieve or maintain patent airway  1/21/2022 1918 by Joycelyn Quick RN  Outcome: Ongoing  1/21/2022 0643 by Romana Blacksmith, RN  Outcome: Met This Shift     Problem: Gas Exchange - Impaired  Goal: Absence of hypoxia  1/21/2022 1918 by Joycelyn Quick RN  Outcome: Ongoing  1/21/2022 0643 by Romana Blacksmith, RN  Outcome: Met This Shift  Goal: Promote optimal lung function  1/21/2022 1918 by Joycelyn Quick RN  Outcome: Ongoing  1/21/2022 0643 by Romana Blacksmith, RN  Outcome: Met This Shift     Problem: Breathing Pattern - Ineffective  Goal: Ability to achieve and maintain a regular respiratory rate  Outcome: Ongoing     Problem:  Body Temperature -  Risk of, Imbalanced  Goal: Ability to maintain a body temperature within defined limits  1/21/2022 1918 by Joycelyn Quick RN  Outcome: Ongoing  1/21/2022 0643 by Romana Blacksmith, RN  Outcome: Met This Shift  Goal: Will regain or maintain usual level of consciousness  1/21/2022 1918 by Joycelyn Quick RN  Outcome: Ongoing  1/21/2022 0643 by Romana Blacksmith, RN  Outcome: Met This Shift  Goal: Complications related to the disease process, condition or treatment will be avoided or minimized  1/21/2022 1918 by Joycelyn Quick RN  Outcome: Ongoing  1/21/2022 0643 by Romana Blacksmith, RN  Outcome: Met This Shift     Problem: Isolation Precautions - Risk of Spread of Infection  Goal: Prevent transmission of infection  1/21/2022 1918 by Joycelyn Quick RN  Outcome: Ongoing  1/21/2022 0643 by Romana Blacksmith, RN  Outcome: Met This Shift     Problem: Nutrition Deficits  Goal: Optimize nutritional status  Outcome: Ongoing     Problem: Risk for Fluid Volume Deficit  Goal: Maintain normal heart rhythm  1/21/2022 1918 by Joycelyn Quick RN  Outcome: Ongoing  1/21/2022 0643 by Romana Blacksmith, RN  Outcome: Met This Shift  Goal: Maintain absence of muscle cramping  1/21/2022 1918 by Joycelyn Quick RN  Outcome: Ongoing  1/21/2022 0643 by Tyson Askew Yan Teague RN  Outcome: Met This Shift  Goal: Maintain normal serum potassium, sodium, calcium, phosphorus, and pH  1/21/2022 1918 by Jayne Moore RN  Outcome: Ongoing  1/21/2022 0643 by Keanu Vallejo RN  Outcome: Met This Shift     Problem: Loneliness or Risk for Loneliness  Goal: Demonstrate positive use of time alone when socialization is not possible  Outcome: Ongoing     Problem: Fatigue  Goal: Verbalize increase energy and improved vitality  1/21/2022 1918 by Jayne Moore RN  Outcome: Ongoing  1/21/2022 0643 by Keanu Vallejo RN  Outcome: Met This Shift     Problem: Patient Education: Go to Patient Education Activity  Goal: Patient/Family Education  Outcome: Ongoing     Problem: Skin Integrity:  Goal: Will show no infection signs and symptoms  Description: Will show no infection signs and symptoms  1/21/2022 1918 by Jayne Moore RN  Outcome: Ongoing  1/21/2022 0643 by Keanu Vallejo RN  Outcome: Met This Shift  Goal: Absence of new skin breakdown  Description: Absence of new skin breakdown  1/21/2022 1918 by Jayne Moore RN  Outcome: Ongoing  1/21/2022 0643 by Keanu Vallejo RN  Outcome: Met This Shift     Problem: Falls - Risk of:  Goal: Will remain free from falls  Description: Will remain free from falls  1/21/2022 1918 by Jayne Moore RN  Outcome: Ongoing  1/21/2022 0643 by Keanu Vallejo RN  Outcome: Met This Shift  Goal: Absence of physical injury  Description: Absence of physical injury  1/21/2022 1918 by Jayne Moore RN  Outcome: Ongoing  1/21/2022 0643 by Keanu Vallejo RN  Outcome: Met This Shift

## 2022-01-22 NOTE — PROGRESS NOTES
0501 - Updated Rachael Nunez (son) on patient hemodynamic stability. All questions/concerns answered at this time. Total time spent speaking with Rachael Nunez - 4 minutes    0507- Update Flo Pike (daughter) on patient hemodynamic stability. All questions/concerns answered at this time.  Total time spent speaking with Flo Pike - 15 minutes
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
## 2022-01-22 NOTE — PROGRESS NOTES
[address] [facility] Pharmacokinetic Monitoring Service - Vancomycin    Consulting Provider: Chely Castillo   Indication: bloodstream infection   Target Concentration: Goal AUC/JAMES 400-600 mg*hr/L  Day of Therapy: 3  Additional Antimicrobials: None    Pertinent Laboratory Values: Wt Readings from Last 1 Encounters:   01/19/22 152 lb 8 oz (69.2 kg)     Temp Readings from Last 1 Encounters:   01/19/22 98 °F (36.7 °C) (Axillary)     Estimated Creatinine Clearance: 53 mL/min (based on SCr of 1.17 mg/dL). Recent Labs     01/20/22  0320 01/21/22  0351   CREATININE 1.61* 1.17   WBC 15.3* 11.9*     Procalcitonin: 1.37 1/18/22    Pertinent Cultures:  Culture Date Source Results   1/18/22 Blood MRSA    MRSA Nasal Swab: N/A. Non-respiratory infection.     Recent vancomycin administrations                     vancomycin (VANCOCIN) 1000 mg in dextrose 5% 200 mL IVPB (mg) 1,000 mg New Bag 01/21/22 1742     1,000 mg New Bag 01/20/22 1745    vancomycin (VANCOCIN) 1500 mg in dextrose 5 % 250 mL IVPB (mg) 1,500 mg New Bag 01/19/22 1937                    Assessment:  Date/Time Current Dose Concentration Timing of Concentration (h) AUC   1/21/22 1900 1000 13.5 1734 434   Note: Serum concentrations collected for AUC dosing may appear elevated if collected in close proximity to the dose administered, this is not necessarily an indication of toxicity    Plan:  Current dosing regimen is therapeutic  Continue current dose  Repeat vancomycin concentration ordered for TBD @ TBD   Pharmacy will continue to monitor patient and adjust therapy as indicated    Thank you for the consult,  Colby [facility]  1/21/2022 7:18 PM

## 2022-01-22 NOTE — PROGRESS NOTES
7125 Mimi Carmichael on patient hemodynamic stability. All questions/concerns answered at this time. Total time speaking to [facility] - 2 minutes    0636 - Updated Rohith Goad on patient hemodynamic stability. All questions/concerns answered at this time.  Total time speaking to [facility] - 11 minutes

## 2022-01-22 NOTE — PROGRESS NOTES
[address] [facility]  Progress Note    Date:   1/22/2022  Patient name:  John Vasquez  Date of admission:  1/18/2022  9:04 PM  MRN:   [id number]  YOB: 1955    SUBJECTIVE/Last 24 hours update:     Day 2 Hospitalization:     Patient was seen and examined at the bedside. Still on  Dopamine drip for bradycardia. Patient is saturating 90% on high flow NC. FiO2 60, 30L/min. Patient is AOX3. Afebrile. VS stable, BP on the higher side with dopamine drip. Echo done yesterday, showed EF is 51%. Pending cardiology recommendations regarding AICD. Cr improved 0.89. I/O's shows low output. Will get a bladder scan. Vancomycin was started for MRSA positive blood culture. Pending repeat blood cultures. REVIEW OF SYSTEMS:      Review of Systems   Constitutional: Negative for appetite change, chills and fever. Eyes: Negative for visual disturbance. Respiratory: Positive for shortness of breath. Negative for chest tightness. Cardiovascular: Negative for chest pain and leg swelling. Gastrointestinal: Negative for abdominal pain, constipation, diarrhea, nausea and vomiting. Genitourinary: Negative for difficulty urinating. Neurological: Negative for headaches. All other systems reviewed and are negative. PAST MEDICAL HISTORY:      has a past medical history of COPD (chronic obstructive pulmonary disease) ([facility] [address] Diabetes mellitus ([facility] [address] Erectile dysfunction due to arterial insufficiency, Hypertension, Microalbuminuria due to type 2 diabetes mellitus ([facility] [address] and Overweight (BMI 25.0-29.9). PAST SURGICAL HISTORY:      has a past surgical history that includes Appendectomy and Total ankle arthroplasty. SOCIALHISTORY:      reports that he has been smoking cigarettes. He has been smoking about 0.50 packs per day. He has never used smokeless tobacco. He reports that he does not drink alcohol and does not use drugs.      FAMILY HISTORY: family history is not on file. HOME MEDICATIONS:      Prior to Admission medications    Medication Sig Start Date End Date Taking? Authorizing Provider   amLODIPine (NORVASC) 10 MG tablet TAKE 1 TABLET BY MOUTH DAILY 10/25/21   Baldomero Latham MD   aspirin (HM ASPIRIN EC LOW DOSE) 81 MG EC tablet TAKE 1 TABLET BY MOUTH DAILY 8/26/21   Adam Galvan MD   furosemide (LASIX) 20 MG tablet Take 1 tablet by mouth daily 8/26/21   Adam Galvan MD   Blood Pressure KIT Check blood pressure daily 8/26/21   Adam Galvan MD   albuterol sulfate HFA (PROAIR HFA) 108 (90 Base) MCG/ACT inhaler inhale 2 puffs by mouth every 6 hours if needed for wheezing 8/16/21   Te Wild MD   fluticasone-salmeterol (ADVAIR DISKUS) 100-50 MCG/DOSE diskus inhaler INHALE 1 PUFF INTO THE LUNGS EVERY 12 HOURS 8/16/21   Te Wild MD   carvedilol (COREG) 25 MG tablet Take 1 tablet by mouth 2 times daily 8/16/21   Kiana Heredia MD   atorvastatin (LIPITOR) 20 MG tablet Take 1 tablet by mouth daily 7/21/21   Baldomero Latham MD   lisinopril (PRINIVIL;ZESTRIL) 20 MG tablet Take 1 tablet by mouth daily 7/21/21   Baldomero Latham MD   ipratropium (ATROVENT HFA) 17 MCG/ACT inhaler Inhale 1 puff into the lungs 2 times daily 9/2/20 10/2/20  Milka Dang MD   tiotropium (Lilia Viviana) 18 MCG inhalation capsule Inhale 1 capsule into the lungs daily 1/9/20   Milka Dang MD   nicotine (NICODERM CQ) 14 MG/24HR Place 1 patch onto the skin daily 12/25/16   Archie Thompson MD       ALLERGIES:     Patient has no known allergies.     OBJECTIVE:       Vitals:    01/22/22 0545 01/22/22 0600 01/22/22 0615 01/22/22 0630   BP:       Pulse: 58 62 63 60   Resp: 23 25 27 25   Temp:  97.3 °F (36.3 °C)     TempSrc:  Bladder     SpO2: 93% 93% (!) 89% 90%   Weight:  156 lb 4.8 oz (70.9 kg)     Height:             Intake/Output Summary (Last 24 hours) at 1/22/2022 0908  Last data filed at 1/22/2022 0800  Gross per 24 hour Intake 1465.65 ml   Output 2465 ml   Net -999.35 ml       PHYSICAL EXAM:    Physical Exam  Vitals and nursing note reviewed. Constitutional:       General: He is not in acute distress. Appearance: He is not ill-appearing. HENT:      Head: Normocephalic and atraumatic. Mouth/Throat:      Pharynx: Oropharynx is clear. Cardiovascular:      Rate and Rhythm: Normal rate and regular rhythm. Pulses: Normal pulses. Heart sounds: No murmur heard. Pulmonary:      Effort: Pulmonary effort is normal.      Breath sounds: Normal breath sounds. Abdominal:      Palpations: Abdomen is soft. There is no mass. Tenderness: There is no abdominal tenderness. Musculoskeletal:         General: No swelling or tenderness. Normal range of motion. Cervical back: Normal range of motion and neck supple. Neurological:      General: No focal deficit present. Mental Status: He is alert and oriented to person, place, and time. ASSESSMENT:      Principal Problem:    Acute on chronic respiratory failure with hypoxia (HCC)  Active Problems:    Controlled type 2 diabetes mellitus without complication, without long-term current use of insulin (HCC)    COVID-19    Hypoxemia    Arterial hypotension  Resolved Problems:    * No resolved hospital problems. *      PLAN:          Acute on chronic hypoxic respiratory failure 2/2 COVID-19 pneumonia in the setting of COPD/CHF  - COVID+  -On High flow nasal cannula   - ID consulted  - CRP trending down 209.4-235- 90.1, check CRP every other day   - ferritin 353  - CXR: mild bilateral lower lung zone patchy air space opacity, representing early COVID pneumonia  - concern for PE, was given lovenox 80 mg in the ER, unable to obtain CT PE 2/2 to UYEN,  V/Q scan showed intermediate probability. Venous doppler lower extremity ordered.   - continue decadron  - RT aerosol protocol  - continue spiriva and symbicort  -Pulmonary consult- appreciate recs         Hypotension- Resolved  MAP>65  Still on dopamine drip for bradycardia  Continue to hold BP medications      UYEN likely prerenal  - Cr 2, baseline ~ 1  - s/p 500 ml fluid bolus  - continue gentle hydration 75 ml/hr  - monitor carefully for fluid overload 2/2 history of CHF  - daily BMP  - FeNa is 25.5--> likely post renal  -Check bladder scan     Bradycardia  - HR 42 in the ER, was given atropine 1 mg and glucagon 1 mg  - holding his home betablocker  - telemetry  - cardiology consulted  - dopamine gtt  -Echo howed EF of 51%. Pending cardiology recs regarding AICD.       HFrEF  - no signs of fluid overload  - holding lisinopril/coreg / lasix 2/2 hypotension  - EF 20-30%  -Repeat Echo showed EF of 51%.  Pending cardiology recs regarding AICD.       Plan will be discussed with the attending, Dr. Sera Antony MD  Family Medicine Resident  1/22/2022 9:08 AM

## 2022-01-23 LAB
ABSOLUTE EOS #: 0 K/UL (ref 0–0.44)
ABSOLUTE IMMATURE GRANULOCYTE: 0.05 K/UL (ref 0–0.3)
ABSOLUTE LYMPH #: 0.3 K/UL (ref 1.1–3.7)
ABSOLUTE MONO #: 0.25 K/UL (ref 0.1–1.2)
ALBUMIN SERPL-MCNC: 2.7 G/DL (ref 3.5–5.2)
ALBUMIN/GLOBULIN RATIO: 1.4 (ref 1–2.5)
ALP BLD-CCNC: 59 U/L (ref 40–129)
ALT SERPL-CCNC: 30 U/L (ref 5–41)
ANION GAP SERPL CALCULATED.3IONS-SCNC: 6 MMOL/L (ref 9–17)
AST SERPL-CCNC: 59 U/L
BASOPHILS # BLD: 0 % (ref 0–2)
BASOPHILS ABSOLUTE: 0 K/UL (ref 0–0.2)
BILIRUB SERPL-MCNC: 0.42 MG/DL (ref 0.3–1.2)
BUN BLDV-MCNC: 25 MG/DL (ref 8–23)
BUN/CREAT BLD: ABNORMAL (ref 9–20)
CALCIUM SERPL-MCNC: 7.6 MG/DL (ref 8.6–10.4)
CHLORIDE BLD-SCNC: 106 MMOL/L (ref 98–107)
CO2: 26 MMOL/L (ref 20–31)
CREAT SERPL-MCNC: 0.68 MG/DL (ref 0.7–1.2)
CULTURE: NORMAL
D-DIMER QUANTITATIVE: 2.89 MG/L FEU
DIFFERENTIAL TYPE: ABNORMAL
EOSINOPHILS RELATIVE PERCENT: 0 % (ref 1–4)
GFR AFRICAN AMERICAN: >60 ML/MIN
GFR NON-AFRICAN AMERICAN: >60 ML/MIN
GFR SERPL CREATININE-BSD FRML MDRD: ABNORMAL ML/MIN/{1.73_M2}
GFR SERPL CREATININE-BSD FRML MDRD: ABNORMAL ML/MIN/{1.73_M2}
GLUCOSE BLD-MCNC: 104 MG/DL (ref 70–99)
GLUCOSE BLD-MCNC: 112 MG/DL (ref 75–110)
GLUCOSE BLD-MCNC: 149 MG/DL (ref 75–110)
GLUCOSE BLD-MCNC: 99 MG/DL (ref 75–110)
HCT VFR BLD CALC: 31.6 % (ref 40.7–50.3)
HEMOGLOBIN: 10.3 G/DL (ref 13–17)
IMMATURE GRANULOCYTES: 1 %
LACTATE DEHYDROGENASE: 298 U/L (ref 135–225)
LYMPHOCYTES # BLD: 6 % (ref 24–43)
Lab: NORMAL
MCH RBC QN AUTO: 30.5 PG (ref 25.2–33.5)
MCHC RBC AUTO-ENTMCNC: 32.6 G/DL (ref 28.4–34.8)
MCV RBC AUTO: 93.5 FL (ref 82.6–102.9)
MONOCYTES # BLD: 5 % (ref 3–12)
MORPHOLOGY: NORMAL
NRBC AUTOMATED: 0 PER 100 WBC
PDW BLD-RTO: 13.2 % (ref 11.8–14.4)
PLATELET # BLD: 155 K/UL (ref 138–453)
PLATELET ESTIMATE: ABNORMAL
PMV BLD AUTO: 9.7 FL (ref 8.1–13.5)
POTASSIUM SERPL-SCNC: 4.7 MMOL/L (ref 3.7–5.3)
RBC # BLD: 3.38 M/UL (ref 4.21–5.77)
RBC # BLD: ABNORMAL 10*6/UL
SEG NEUTROPHILS: 88 % (ref 36–65)
SEGMENTED NEUTROPHILS ABSOLUTE COUNT: 4.4 K/UL (ref 1.5–8.1)
SODIUM BLD-SCNC: 138 MMOL/L (ref 135–144)
SPECIMEN DESCRIPTION: NORMAL
TOTAL PROTEIN: 4.6 G/DL (ref 6.4–8.3)
WBC # BLD: 5 K/UL (ref 3.5–11.3)
WBC # BLD: ABNORMAL 10*3/UL

## 2022-01-23 PROCEDURE — 6370000000 HC RX 637 (ALT 250 FOR IP): Performed by: STUDENT IN AN ORGANIZED HEALTH CARE EDUCATION/TRAINING PROGRAM

## 2022-01-23 PROCEDURE — 87040 BLOOD CULTURE FOR BACTERIA: CPT

## 2022-01-23 PROCEDURE — 99233 SBSQ HOSP IP/OBS HIGH 50: CPT | Performed by: FAMILY MEDICINE

## 2022-01-23 PROCEDURE — 80053 COMPREHEN METABOLIC PANEL: CPT

## 2022-01-23 PROCEDURE — 2580000003 HC RX 258: Performed by: STUDENT IN AN ORGANIZED HEALTH CARE EDUCATION/TRAINING PROGRAM

## 2022-01-23 PROCEDURE — 94640 AIRWAY INHALATION TREATMENT: CPT

## 2022-01-23 PROCEDURE — 99291 CRITICAL CARE FIRST HOUR: CPT | Performed by: INTERNAL MEDICINE

## 2022-01-23 PROCEDURE — 2700000000 HC OXYGEN THERAPY PER DAY

## 2022-01-23 PROCEDURE — 6360000002 HC RX W HCPCS: Performed by: STUDENT IN AN ORGANIZED HEALTH CARE EDUCATION/TRAINING PROGRAM

## 2022-01-23 PROCEDURE — 94761 N-INVAS EAR/PLS OXIMETRY MLT: CPT

## 2022-01-23 PROCEDURE — 83615 LACTATE (LD) (LDH) ENZYME: CPT

## 2022-01-23 PROCEDURE — 82947 ASSAY GLUCOSE BLOOD QUANT: CPT

## 2022-01-23 PROCEDURE — 2060000000 HC ICU INTERMEDIATE R&B

## 2022-01-23 PROCEDURE — 85025 COMPLETE CBC W/AUTO DIFF WBC: CPT

## 2022-01-23 PROCEDURE — 99232 SBSQ HOSP IP/OBS MODERATE 35: CPT | Performed by: INTERNAL MEDICINE

## 2022-01-23 PROCEDURE — 6370000000 HC RX 637 (ALT 250 FOR IP): Performed by: NURSE PRACTITIONER

## 2022-01-23 PROCEDURE — 85379 FIBRIN DEGRADATION QUANT: CPT

## 2022-01-23 PROCEDURE — 36415 COLL VENOUS BLD VENIPUNCTURE: CPT

## 2022-01-23 RX ORDER — HYDRALAZINE HYDROCHLORIDE 50 MG/1
50 TABLET, FILM COATED ORAL EVERY 8 HOURS SCHEDULED
Status: DISCONTINUED | OUTPATIENT
Start: 2022-01-23 | End: 2022-01-26 | Stop reason: HOSPADM

## 2022-01-23 RX ADMIN — INSULIN GLARGINE 10 UNITS: 100 INJECTION, SOLUTION SUBCUTANEOUS at 21:50

## 2022-01-23 RX ADMIN — HEPARIN SODIUM 5000 UNITS: 5000 INJECTION INTRAVENOUS; SUBCUTANEOUS at 21:48

## 2022-01-23 RX ADMIN — ATORVASTATIN CALCIUM 20 MG: 20 TABLET, FILM COATED ORAL at 07:41

## 2022-01-23 RX ADMIN — HEPARIN SODIUM 5000 UNITS: 5000 INJECTION INTRAVENOUS; SUBCUTANEOUS at 05:36

## 2022-01-23 RX ADMIN — HYDRALAZINE HYDROCHLORIDE 25 MG: 25 TABLET ORAL at 05:36

## 2022-01-23 RX ADMIN — Medication 81 MG: at 07:41

## 2022-01-23 RX ADMIN — INSULIN LISPRO 1 UNITS: 100 INJECTION, SOLUTION INTRAVENOUS; SUBCUTANEOUS at 17:15

## 2022-01-23 RX ADMIN — Medication 2000 UNITS: at 07:40

## 2022-01-23 RX ADMIN — BUDESONIDE AND FORMOTEROL FUMARATE DIHYDRATE 2 PUFF: 80; 4.5 AEROSOL RESPIRATORY (INHALATION) at 19:59

## 2022-01-23 RX ADMIN — BUDESONIDE AND FORMOTEROL FUMARATE DIHYDRATE 2 PUFF: 80; 4.5 AEROSOL RESPIRATORY (INHALATION) at 07:41

## 2022-01-23 RX ADMIN — ALPRAZOLAM 0.5 MG: 0.5 TABLET ORAL at 03:35

## 2022-01-23 RX ADMIN — SODIUM CHLORIDE, PRESERVATIVE FREE 10 ML: 5 INJECTION INTRAVENOUS at 07:41

## 2022-01-23 RX ADMIN — SODIUM CHLORIDE, PRESERVATIVE FREE 10 ML: 5 INJECTION INTRAVENOUS at 21:48

## 2022-01-23 RX ADMIN — DEXAMETHASONE 6 MG: 4 TABLET ORAL at 07:40

## 2022-01-23 RX ADMIN — HEPARIN SODIUM 5000 UNITS: 5000 INJECTION INTRAVENOUS; SUBCUTANEOUS at 13:24

## 2022-01-23 RX ADMIN — HYDRALAZINE HYDROCHLORIDE 50 MG: 50 TABLET, FILM COATED ORAL at 21:47

## 2022-01-23 RX ADMIN — HYDRALAZINE HYDROCHLORIDE 25 MG: 25 TABLET ORAL at 13:24

## 2022-01-23 ASSESSMENT — ENCOUNTER SYMPTOMS
VOICE CHANGE: 0
SORE THROAT: 0
SINUS PAIN: 0
ALLERGIC/IMMUNOLOGIC NEGATIVE: 1
SHORTNESS OF BREATH: 1
VOMITING: 0
TROUBLE SWALLOWING: 0
DIARRHEA: 0
EYE REDNESS: 0
WHEEZING: 0
COUGH: 1
PHOTOPHOBIA: 0
ABDOMINAL PAIN: 0

## 2022-01-23 ASSESSMENT — PAIN SCALES - GENERAL
PAINLEVEL_OUTOF10: 0

## 2022-01-23 NOTE — PROGRESS NOTES
[facility] - Progress Note COVID 19 Patient  Today's Date and Time: 1/23/2022, 11:42 AM    Impression :     COVID 19 Confirmed Infection  Covid tests:  1.18.22 Positive  1/15/22 positive at home per patient  Acute hypoxic respiratory failure  Elevated inflammatory markers  UYEN  Systolic CHF  COPD  DM II  HTN  Hep C  Erectile dysfunction    Recommendations:   Antibiotic treatment:  Vancomycin initiated 1/20/22 for 1/2 MRSE blood culture 1/18/22. This is likely a contaminant-Discontinued 1/22/22  Repeat blood cultures ordered 1/20/22->1/2 staph epi  Repeat cultures 1/23/22  Covid Rx:    Remdesivir-Contraindicated  Decadron-Initiated 1/18/21  Actemra-ordered 1/19/2022  Monoclonal antibodies-Out of window      Medical Decision Making/Summary/Discussion:1/23/2022     Patient admitted with COVID 19 infection  Isolation until 1/5/22    Infection Control Recommendations   [place] Precautions  Airborne isolation  Droplet Isolation    Antimicrobial Stewardship Recommendations     Discontinuation of therapy  Coordination of Outpatient Care:   Estimated Length of IV antimicrobials:TBD  Patient will need Midline Catheter Insertion: TBD  Patient will need PICC line Insertion: No  Patient will need: Home IV , [place],  SNF,  LTAC:TBD  Patient will need outpatient wound care:No    Chief complaint/reason for consultation:   Concern for COVID infection      History of Present Illness:   Eloy Corey is a 74y.o.-year-old male who was initially admitted on 1/18/2022. Patient seen at the request of Dr. Kat Yu:    Patient presented through ER with complaints of fatigue and shortness of breath. The patient has a history of COPD and congestive heart failure EF 20 to 30%. The patient stated he tested positive for COVID 3 days prior to presentation in the ED. Rapid in the hospital was positive.     The patient was noted to have an SPO2 of 76% on room air with a fever of 100.7 °F.    Imaging of the chest revealed bilateral lower lung zone patchy airspace opacity potentially representing early COVID-pneumonia with stable mild cardiomegaly. CRP was elevated at 209.4  UYEN present  Elevated D-dimer    VQ scan ordered to look for PE     Decadron was initiated    The patient had an episode of bradycardia requiring an injection of atropine. Cardiology has been consulted. The patient is also disoriented to time and place. A CT head revealed no acute abnormality    Patient admitted because of concerns with COVID 19.    CURRENT EVALUATION : 1/23/2022    Afebrile  VS stable     Patient is on high flow nasal cannula with 60% FiO2    Per Cardiology, he may need dual chamber ICD in near future based on LVEF and further episodes of bradycardia. MRSE on blood cultures x 1 -Vancomycin initiated  Repeat blood cultures pending    Hyponatremia and hyperkalemia resolved this morning  Creatinine level is back to normal    CRP is decreasing    Echocardiogram pending  VQ scan was unable to determine probability of pulmonary emboli    Patient exhibiting respiratory distress. yes    Patient receiving supplemental oxygen.  6 L NC--> hi flow  % FIO2: 50-->55-->60  Flow Rate: 30-->50 L/min  RR:25  02 sat:94    NEWS Score: 0-4 Low risk group; 5-6: Medium risk group; 7 or above: High risk group  Parameters 3 2 1 0 1 2 3   Age    < 65   = 65   RR = 8  9-11 12-20  21-24 = 25   O2 Sats = 91 92-93 94-95 = 96      Suppl O2  Yes  No      SBP = 90  101-110 111-219   = 220   HR = 40  41-50 51-90  111-130 = 131   Consciousness    Alert   Drowsiness, lethargy, or confusion   Temperature = 35.0 C (95.0 F)  35.1-36.0 C 95.1-96.9 F 36.1-38.0 C 97.0-100.4 F 38.1-39.0 C 100.5-102.3 F = 39.1 C = 102.4 F      NEWS Score:  1/19/21:    Overall Daily Picture:     Unchanged    Presence of secondary bacterial Infection:  No   Additional antibiotics: Vancomycin    Labs, X rays reviewed: 1/23/2022    BUN:25  Cr:0.68    WBC:15.3-->11.9-->5.5-->5.0  Hb:10.3  Plat: 155    Absolute Neutrophils:5.7  Absolute Lymphocytes:0.4  Neutrophil/Lymphocyte Ratio: 14 high risk    CRP:235.8-->209.4-->90.1-->36  Ferritin:353-->523  LDH: 246-->292-->243    Pro Calcitonin:    MRSA probe: negative     Cultures:  Urine:    Blood:  1-18-22: 1/2 MRSE  1/20/22: 1/2 staph epri  Sputum :    Wound:      CXR:   1/18/21      CAT:      Discussed with patient, RN, CC, IM. I have personally reviewed the past medical history, past surgical history, medications, social history, and family history, and I have updated the database accordingly.   Past Medical History:     Past Medical History:   Diagnosis Date    COPD (chronic obstructive pulmonary disease) ([facility]     Diabetes mellitus ([facility]     Erectile dysfunction due to arterial insufficiency 12/7/2015    Hypertension     Microalbuminuria due to type 2 diabetes mellitus ([facility] 7/7/2016    Overweight (BMI 25.0-29.9) 12/7/2015       Past Surgical  History:     Past Surgical History:   Procedure Laterality Date    APPENDECTOMY      TOTAL ANKLE ARTHROPLASTY      LEFT       Medications:      hydrALAZINE  25 mg Oral 3 times per day    insulin glargine  10 Units SubCUTAneous BID    heparin (porcine)  5,000 Units SubCUTAneous 3 times per day    [Held by provider] amLODIPine  10 mg Oral Daily    aspirin  81 mg Oral Daily    atorvastatin  20 mg Oral Daily    budesonide-formoterol  2 puff Inhalation BID    [Held by provider] furosemide  20 mg Oral Daily    [Held by provider] lisinopril  20 mg Oral Daily    tiotropium  2 puff Inhalation Daily    sodium chloride flush  5-40 mL IntraVENous 2 times per day    dexamethasone  6 mg Oral Daily    Vitamin D  2,000 Units Oral Daily    insulin lispro  0-6 Units SubCUTAneous TID WC    insulin lispro  0-3 Units SubCUTAneous Nightly    glucagon (rDNA)  1 mg IntraVENous Once    sodium chloride flush  5-40 mL IntraVENous 2 times per day       Social History:     Social History     Socioeconomic History    Marital status:      Spouse name: Not on file    Number of children: Not on file    Years of education: Not on file    Highest education level: Not on file   Occupational History    Not on file   Tobacco Use    Smoking status: Light Tobacco Smoker     Packs/day: 0.50     Types: Cigarettes     Last attempt to quit: 2017     Years since quittin.5    Smokeless tobacco: Never Used    Tobacco comment: reports he quit smoking in the past 2 months   Substance and Sexual Activity    Alcohol use: No     Alcohol/week: 0.0 standard drinks    Drug use: No    Sexual activity: Not on file   Other Topics Concern    Not on file   Social History Narrative    Not on file     Social Determinants of Health     Financial Resource Strain: Low Risk     Difficulty of Paying Living Expenses: Not very hard   Food Insecurity: No Food Insecurity    Worried About Running Out of Food in the Last Year: Never true    Rakesh of Food in the Last Year: Never true   Transportation Needs:     Lack of Transportation (Medical): Not on file    Lack of Transportation (Non-Medical):  Not on file   Physical Activity:     Days of Exercise per Week: Not on file    Minutes of Exercise per Session: Not on file   Stress:     Feeling of Stress : Not on file   Social Connections:     Frequency of Communication with Friends and Family: Not on file    Frequency of Social Gatherings with Friends and Family: Not on file    Attends [employer] Services: Not on file    Active Member of Clubs or Organizations: Not on file    Attends Club or Organization Meetings: Not on file    Marital Status: Not on file   Intimate Partner Violence:     Fear of Current or Ex-Partner: Not on file    Emotionally Abused: Not on file    Physically Abused: Not on file    Sexually Abused: Not on file   Housing Stability:     Unable to Pay for Housing in the Last Year: Not on file    Number of Places Lived in the Last Year: Not on file    Unstable Housing in the Last Year: Not on file       Family History:   No family history on file. Allergies:   Patient has no known allergies. Review of Systems:       Constitutional: Generalized fatigue with dyspnea  Head: No headaches  Eyes: No double vision or blurry vision. No conjunctival inflammation. ENT: No sore throat or runny nose. . No hearing loss, tinnitus or vertigo. Cardiovascular: No chest pain or palpitations. Shortness of breath. DUBON  Lung: Shortness of breath with chronic cough. Abdomen: No nausea, vomiting, diarrhea, or abdominal pain. Christina Rued No cramps. Genitourinary: No increased urinary frequency, or dysuria. No hematuria. No suprapubic or CVA pain  Musculoskeletal: No muscle aches or pains. No joint effusions, swelling or deformities  Hematologic: No bleeding or bruising. Neurologic: No headache, weakness, numbness, or tingling. Integument: No rash, no ulcers. Psychiatric: No depression. Endocrine: No polyuria, no polydipsia, no polyphagia.     Physical Examination :     Patient Vitals for the past 8 hrs:   BP Temp Temp src Pulse Resp SpO2   01/23/22 1000 (!) 133/56   69 24 96 %   01/23/22 0945     30 96 %   01/23/22 0900    67 27 96 %   01/23/22 0800 137/66   69 22 94 %   01/23/22 0700    64 26 96 %   01/23/22 0645    64 28 94 %   01/23/22 0630    66 22 92 %   01/23/22 0615    62 23 93 %   01/23/22 0600  97.7 °F (36.5 °C) Bladder 59 25 90 %   01/23/22 0545    59 25 91 %   01/23/22 0530    62 27 91 %   01/23/22 0515    58 24 91 %   01/23/22 0500    59 24 91 %   01/23/22 0445    68 23 (!) 87 %   01/23/22 0430    69 28 97 %   01/23/22 0415    61 26 95 %   01/23/22 0400 (!) 151/64 98.2 °F (36.8 °C) Bladder 63 25 95 %   01/23/22 0345    65 (!) 31 95 %     General Appearance: Awake, alert, and in no apparent distress  Head:  Normocephalic, no trauma  Eyes: Pupils equal, round, reactive to light; sclera anicteric; conjunctivae pink. No embolic phenomena. ENT: Oropharynx clear, without erythema, exudate, or thrush. No tenderness of sinuses. Mouth/throat: mucosa pink and moist. No lesions. Dentition in good repair. Neck:Supple, without lymphadenopathy. Thyroid normal, No bruits. Pulmonary/Chest: Clear to auscultation, without wheezes, rales, or rhonchi. No dullness to percussion. Cardiovascular: Regular rate and rhythm without murmurs, rubs, or gallops. Abdomen: Soft, non tender. Bowel sounds normal. No organomegaly  All four Extremities: No cyanosis, clubbing, edema, or effusions. Neurologic: Disorientation to time and place  Skin: Warm and dry with good turgor. No signs of peripheral arterial or venous insufficiency. No ulcerations. No open wounds. Medical Decision Making -Laboratory:   I have independently reviewed/ordered the following labs:    CBC with Differential:   Recent Labs     01/22/22  0454 01/23/22  0508   WBC 5.5 5.0   HGB 10.7* 10.3*   HCT 33.4* 31.6*    155   LYMPHOPCT 5* 6*   MONOPCT 5 5     BMP:   Recent Labs     01/21/22  0351 01/21/22  0351 01/22/22  0454 01/23/22  0508      < > 136 138   K 5.3   < > 5.2 4.7      < > 105 106   CO2 23   < > 26 26   BUN 62*   < > 38* 25*   CREATININE 1.17   < > 0.89 0.68*   MG 1.9  --   --   --     < > = values in this interval not displayed. Hepatic Function Panel:   Recent Labs     01/22/22  0454 01/23/22  0508   PROT 5.1* 4.6*   LABALBU 3.0* 2.7*   BILITOT 0.35 0.42   ALKPHOS 69 59   ALT 7 30   AST 17 59*     No results for input(s): RPR in the last 72 hours. No results for input(s): HIV in the last 72 hours. No results for input(s): BC in the last 72 hours.   Lab Results   Component Value Date    MUCUS 2+ 07/01/2017    RBC 3.38 01/23/2022    TRICHOMONAS NOT REPORTED 07/01/2017    WBC 5.0 01/23/2022    YEAST NOT REPORTED 07/01/2017    TURBIDITY Clear 01/19/2022     Lab Results   Component Value demonstrate no acute abnormality.       SINUSES: Mild degree of mucosal thickening is present within the right   maxillary sinus.  Minimal mucosal thickening is present within the left   maxillary sinus.       SOFT TISSUES/SKULL:  No acute abnormality of the visualized skull or soft   tissues.           Impression   No acute intracranial abnormality.                   Medical Decision Poshpn-Hrprbkhv-Ymeri:       Medical Decision Making-Other:     Note:  Labs, medications, radiologic studies were reviewed with personal review of films  Large amounts of data were reviewed  Discussed with nursing Staff, Discharge planner  Infection Control and Prevention measures reviewed  All prior entries were reviewed  Administer medications as ordered  Prognosis: Guarded  Discharge planning reviewed      Thank you for allowing us to participate in the care of this patient. Please call with questions. Sharad Duenas, APRN - CNP       ATTESTATION:    I have discussed the case, including pertinent history and exam findings with the APRN. I have evaluated the  History, physical findings and pictures of the patient and the key elements of the encounter have been performed by me. I have reviewed the laboratory data, other diagnostic studies and discussed them with the APRN. I have updated the medical record where necessary. I agree with the assessment, plan and orders as documented by the APRN.     Jevon Moreno MD.          Pager: [phone] - Office: [phone]
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
## 2022-01-23 NOTE — PROGRESS NOTES
[facility]    Progress Note    Date:   1/23/2022  Patient name:  Sidney Busch  Date of admission:  1/18/2022  9:04 PM  MRN:   [id number]  YOB: 1955    SUBJECTIVE/Last 24 hours update:     Patient seen and examined at bedside this morning. No acute events overnight, patient currently saturating on FiO2 60, 30 L high flow nasal cannula. Will attempt to wean today. Afebrile, vital signs stable. Patient receiving IV fluids 50 cc an hour, down from 75. LDH up trended from 2 42-98 today, D-dimer 2.89. Central line and Art line to be discontinued today, patient no longer on pressors. No plan for AICD at the moment per cardiology. Notes from nursing staff and Consults had been reviewed, and the overnight progress had been checked with the nursing staff as well. HPI:   Please see H&P    REVIEW OF SYSTEMS:      CONSTITUTIONAL:  no fevers, no headcahes  EYES: negative for blury vision  HEENT: No headaches, No nasal congestion, no difficulty swallowing  RESPIRATORY:negative for dyspnea, no wheezing, no Cough  CARDIOVASCULAR: negative for chest pain, no palpitations  GASTROINTESTINAL: no nausea, no vomiting, no change in bowel habits, no abdominal pain   GENITOURINARY: negative for dysuria, no hematuria   MUSCULOSKELETAL: no joint pains, no muscle aches, no swelling of joints or extremities  NEUROLOGICAL: No  Weakness or numbness      PAST MEDICAL HISTORY:      has a past medical history of COPD (chronic obstructive pulmonary disease) ([facility] [address] Diabetes mellitus ([facility] [address] Erectile dysfunction due to arterial insufficiency, Hypertension, Microalbuminuria due to type 2 diabetes mellitus ([facility] [address] and Overweight (BMI 25.0-29.9). PAST SURGICAL HISTORY:      has a past surgical history that includes Appendectomy and Total ankle arthroplasty. SOCIAL HISTORY:      reports that he has been smoking cigarettes.  He has been smoking about 0.50 packs per day. He has never used smokeless tobacco. He reports that he does not drink alcohol and does not use drugs. FAMILY HISTORY:     family history is not on file. HOME MEDICATIONS:      Prior to Admission medications    Medication Sig Start Date End Date Taking? Authorizing Provider   amLODIPine (NORVASC) 10 MG tablet TAKE 1 TABLET BY MOUTH DAILY 10/25/21   Xavier Mcleod MD   aspirin (HM ASPIRIN EC LOW DOSE) 81 MG EC tablet TAKE 1 TABLET BY MOUTH DAILY 8/26/21   Sadaf Pitts MD   furosemide (LASIX) 20 MG tablet Take 1 tablet by mouth daily 8/26/21   Sadaf Pitts MD   Blood Pressure KIT Check blood pressure daily 8/26/21   Sadaf Pitts MD   albuterol sulfate HFA (PROAIR HFA) 108 (90 Base) MCG/ACT inhaler inhale 2 puffs by mouth every 6 hours if needed for wheezing 8/16/21   Te Trevizo MD   fluticasone-salmeterol (ADVAIR DISKUS) 100-50 MCG/DOSE diskus inhaler INHALE 1 PUFF INTO THE LUNGS EVERY 12 HOURS 8/16/21   Te Trevizo MD   carvedilol (COREG) 25 MG tablet Take 1 tablet by mouth 2 times daily 8/16/21   True Frye MD   atorvastatin (LIPITOR) 20 MG tablet Take 1 tablet by mouth daily 7/21/21   Xavier Mcleod MD   lisinopril (PRINIVIL;ZESTRIL) 20 MG tablet Take 1 tablet by mouth daily 7/21/21   Xavier Mcleod MD   ipratropium (ATROVENT HFA) 17 MCG/ACT inhaler Inhale 1 puff into the lungs 2 times daily 9/2/20 10/2/20  Clinton Godfrey MD   tiotropium (Diane Mayfield) 18 MCG inhalation capsule Inhale 1 capsule into the lungs daily 1/9/20   Clinton Godfrey MD   nicotine (NICODERM CQ) 14 MG/24HR Place 1 patch onto the skin daily 12/25/16   Rmaan Croft MD       ALLERGIES:     Patient has no known allergies.       OBJECTIVE:       Vitals:    01/23/22 0645 01/23/22 0700 01/23/22 0800 01/23/22 0900   BP:   137/66    Pulse: 64 64 69 67   Resp: 28 26 22 27   Temp:       TempSrc:       SpO2: 94% 96% 94% 96%   Weight:       Height:             Intake/Output Summary (Last 24 hours) at 1/23/2022 0910  Last data filed at 1/23/2022 0800  Gross per 24 hour   Intake 1406.11 ml   Output 1710 ml   Net -303.89 ml       PHYSICAL EXAM:  General Appearance  Alert , awake , not in acute distress  HEENT - Head is normocephalic, atraumatic. Lungs - Bilateral equal air entry , no wheezes, rales or rhonchi, aeration good  Cardiovascular - Heart sounds are normal.  Regular rhythm, normal rate without murmur, gallop or rub.   Abdomen - Soft, nontender, nondistended, no masses or organomegaly  Neurologic - There are no new focal motor or sensory deficits  Skin - No bruising or bleeding on exposed skin area  Extremities - No cyanosis, clubbing or edema      DIAGNOSTICS:     Laboratory Testing:    Recent Results (from the past 24 hour(s))   POC Glucose Fingerstick    Collection Time: 01/22/22 11:14 AM   Result Value Ref Range    POC Glucose 150 (H) 75 - 110 mg/dL   C-REACTIVE PROTEIN    Collection Time: 01/22/22 12:30 PM   Result Value Ref Range    CRP 36.0 (H) 0.0 - 5.0 mg/L   POC Glucose Fingerstick    Collection Time: 01/22/22  4:05 PM   Result Value Ref Range    POC Glucose 131 (H) 75 - 110 mg/dL   TSH with Reflex    Collection Time: 01/22/22  6:27 PM   Result Value Ref Range    TSH 1.24 0.30 - 5.00 mIU/L   POC Glucose Fingerstick    Collection Time: 01/22/22  8:02 PM   Result Value Ref Range    POC Glucose 117 (H) 75 - 110 mg/dL   CBC Auto Differential    Collection Time: 01/23/22  5:08 AM   Result Value Ref Range    WBC 5.0 3.5 - 11.3 k/uL    RBC 3.38 (L) 4.21 - 5.77 m/uL    Hemoglobin 10.3 (L) 13.0 - 17.0 g/dL    Hematocrit 31.6 (L) 40.7 - 50.3 %    MCV 93.5 82.6 - 102.9 fL    MCH 30.5 25.2 - 33.5 pg    MCHC 32.6 28.4 - 34.8 g/dL    RDW 13.2 11.8 - 14.4 %    Platelets 704 467 - 058 k/uL    MPV 9.7 8.1 - 13.5 fL    NRBC Automated 0.0 0.0 per 100 WBC    Differential Type NOT REPORTED     WBC Morphology NOT REPORTED     RBC Morphology NOT REPORTED     Platelet Estimate NOT REPORTED Immature Granulocytes 1 (H) 0 %    Seg Neutrophils 88 (H) 36 - 65 %    Lymphocytes 6 (L) 24 - 43 %    Monocytes 5 3 - 12 %    Eosinophils % 0 (L) 1 - 4 %    Basophils 0 0 - 2 %    Absolute Immature Granulocyte 0.05 0.00 - 0.30 k/uL    Segs Absolute 4.40 1.50 - 8.10 k/uL    Absolute Lymph # 0.30 (L) 1.10 - 3.70 k/uL    Absolute Mono # 0.25 0.10 - 1.20 k/uL    Absolute Eos # 0.00 0.00 - 0.44 k/uL    Basophils Absolute 0.00 0.00 - 0.20 k/uL    Morphology Normal    Comprehensive Metabolic Panel w/ Reflex to MG    Collection Time: 01/23/22  5:08 AM   Result Value Ref Range    Glucose 104 (H) 70 - 99 mg/dL    BUN 25 (H) 8 - 23 mg/dL    CREATININE 0.68 (L) 0.70 - 1.20 mg/dL    Bun/Cre Ratio NOT REPORTED 9 - 20    Calcium 7.6 (L) 8.6 - 10.4 mg/dL    Sodium 138 135 - 144 mmol/L    Potassium 4.7 3.7 - 5.3 mmol/L    Chloride 106 98 - 107 mmol/L    CO2 26 20 - 31 mmol/L    Anion Gap 6 (L) 9 - 17 mmol/L    Alkaline Phosphatase 59 40 - 129 U/L    ALT 30 5 - 41 U/L    AST 59 (H) <40 U/L    Total Bilirubin 0.42 0.3 - 1.2 mg/dL    Total Protein 4.6 (L) 6.4 - 8.3 g/dL    Albumin 2.7 (L) 3.5 - 5.2 g/dL    Albumin/Globulin Ratio 1.4 1.0 - 2.5    GFR Non-African American >60 >60 mL/min    GFR African American >60 >60 mL/min    GFR Comment          GFR Staging NOT REPORTED    LACTATE DEHYDROGENASE    Collection Time: 01/23/22  5:08 AM   Result Value Ref Range     (H) 135 - 225 U/L   D-DIMER, QUANTITATIVE    Collection Time: 01/23/22  5:08 AM   Result Value Ref Range    D-Dimer, Quant 2.89 mg/L FEU   POC Glucose Fingerstick    Collection Time: 01/23/22  7:45 AM   Result Value Ref Range    POC Glucose 99 75 - 110 mg/dL         Imaging/Diagonstics:    ECHO Complete 2D W Doppler W Color    Result Date: 1/21/2022  Transthoracic Echocardiography Report (TTE)  Patient Name  Buster [place] Vincenzo      Date of Study                   01/21/2022                Marie Young   Date of Birth 1955  Gender                          Male   Age           74 year(s)  Race                               Room Number   3007   Corporate ID  [id number]  #   Chelsey Yan  [de-identified]  #   MR #          [id number]     Sonographer                     Lui Ochoa   Accession #   [id number]  Interpreting Physician          [facility]   Fellow                    Referring Nurse Practitioner   Interpreting              Referring Physician             Farnaz Valencia  Fellow  Type of Study   TTE procedure:2D Echocardiogram, M-Mode, Doppler, Color Doppler. Procedure Date Date: 01/21/2022 Start: 02:27 PM Study Location: [facility] Technical Quality: Adequate visualization Indications:Bradycardia. History / Tech. Comments: Procedure explained to patient. Echo completed at the bedside. PMHx: Covid-19 Hypertension, Hyperlipidemia, Diabetes Patient Status: Inpatient CONCLUSIONS Summary Left ventricle is normal in size, global left ventricular systolic function is low normal, calculated ejection fraction is 51%. Aortic valve sclerosis without stenosis. Mitral valve sclerosis without stenosis. No significant valvular regurgitation or stenosis seen. Signature ----------------------------------------------------------------------------  Electronically signed by Wanda Durbin(Sonographer) on 01/21/2022 03:26 PM ---------------------------------------------------------------------------- ----------------------------------------------------------------------------  Electronically signed by Delvis Maki(Interpreting physician) on 01/21/2022  04:11 PM ---------------------------------------------------------------------------- FINDINGS Left Atrium Left atrium is at upper limits of normal. Inter-atrial septum is intact with no evidence for an atrial septal defect, by color doppler. Left Ventricle Left ventricle is normal in size, global left ventricular systolic function is low normal, calculated ejection fraction is 51%. Right Atrium Right atrium is normal in size.  Right Ventricle Normal right ventricular size and function. Mitral Valve Normal mitral valve structure. Mitral valve sclerosis without stenosis. No evidence of mitral regurgitation. Aortic Valve Aortic valve is trileaflet. Aortic valve sclerosis without stenosis. No aortic insufficiency. Tricuspid Valve Tricuspid valve was not well visualized. No tricuspid regurgitation was seen. Pulmonic Valve Pulmonic valve not well visualized but Doppler velocities are normal. Pericardial Effusion No significant pericardial effusion is seen. Miscellaneous Normal aortic root dimension. E/E' average = 10.4. IVC normal diameter & inspiratory collapse indicating normal RA filling pressure .  M-mode / 2D Measurements & Calculations:   LVIDd:5.7 cm(3.7 - 5.6 cm)       Diastolic ZGAJYK:746.43 ml  IVSd:0.8 cm(0.6 - 1.1 cm)        Systolic EFHCRE:61.25 ml  LVPWd:0.8 cm(0.6 - 1.1 cm)       Aortic Root:3.3 cm(2.0 - 3.7 cm)                                   LA Dimension: 4.5 cm(1.9 - 4.0 cm)  Calculated LVEF (%): 50.8 %      LA volume/Index: 50.58 ml                                   LVOT:2.2 cm                                   RVDd:4 cm   Mitral:                                 Aortic   Valve Area (P1/2-Time): 3.86 cm^2       Peak Velocity: 1.66 m/s  Peak E-Wave: 1.14 m/s                   Mean Velocity: 1.04 m/s  Peak A-Wave: 1.08 m/s                   Peak Gradient: 11.02 mmHg  E/A Ratio: 1.06                         Mean Gradient: 5 mmHg  Peak Gradient: 5.2 mmHg  Mean Gradient: 2 mmHg  Deceleration Time: 201 msec             Area (continuity): 2.71 cm^2  P1/2t: 57 msec                          AV VTI: 40.1 cm   Area (continuity): 3.26 cm^2  Mean Velocity: 0.51 m/s                                           Pulmonic:                                           Peak Velocity: 0.75 m/s                                          Peak Gradient: 2.23 mmHg  Diastology / Tissue Doppler Septal Wall E' velocity:0.09 m/s Septal Wall E/E':12.2 Lateral Wall E' velocity:0.13 m/s Lateral Wall E/E':8.6    CT HEAD WO CONTRAST    Result Date: 1/18/2022  EXAMINATION: CT OF THE HEAD WITHOUT CONTRAST  1/18/2022 6:31 pm TECHNIQUE: CT of the head was performed without the administration of intravenous contrast. Dose modulation, iterative reconstruction, and/or weight based adjustment of the mA/kV was utilized to reduce the radiation dose to as low as reasonably achievable. COMPARISON: CT scan dated March 3, 2015 HISTORY: ORDERING SYSTEM PROVIDED HISTORY: AMS TECHNOLOGIST PROVIDED HISTORY: AMS Decision Support Exception - unselect if not a suspected or confirmed emergency medical condition->Emergency Medical Condition (MA) Reason for Exam: COVID+, AMS FINDINGS: BRAIN/VENTRICLES: There is no acute intracranial hemorrhage, mass effect or midline shift. No abnormal extra-axial fluid collection. The gray-white differentiation is maintained without evidence of an acute infarct. There is no evidence of hydrocephalus. Generalized atrophy is present. 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 ORBITS: The visualized portion of the orbits demonstrate no acute abnormality. SINUSES: Mild degree of mucosal thickening is present within the right maxillary sinus. Minimal mucosal thickening is present within the left maxillary sinus. SOFT TISSUES/SKULL:  No acute abnormality of the visualized skull or soft tissues. No acute intracranial abnormality. NM LUNG SCAN PERFUSION ONLY    Addendum Date: 1/21/2022    ADDENDUM: Upon further review, the perfusion defect at the left lung base is much smaller than the corresponding opacity on the chest x-ray. This is low probability for pulmonary embolism. Findings were discussed with Dr. Geovany Salazar at 10:15 a.m. 01/21/2022.      Result Date: 1/21/2022  EXAMINATION: NUCLEAR MEDICINE PERFUSION SCAN. 1/20/2022 TECHNIQUE: Ventilation not performed as part of COVID-19 safety precautions. 4 millicuries of Tc 84G MAA was administered intravenously prior to planar imaging of the lungs in multiple projections. COMPARISON: Chest radiograph 01/19/2022 at 7:14 p.m.. HISTORY: ORDERING SYSTEM PROVIDED HISTORY: SOB, COVID + FINDINGS: PERFUSION: Diminished segmental perfusion left lower lobe corresponding to area of consolidation on chest radiograph. Distribution of radiotracer is otherwise homogeneous. No other segmental defects identified. CHEST RADIOGRAPH: Dense left lower lobe consolidation on recent chest radiograph. Suboptimal exam without more recent chest radiograph for comparison. Indeterminate probability for pulmonary embolism. XR CHEST PORTABLE    Result Date: 1/19/2022  EXAMINATION: ONE XRAY VIEW OF THE CHEST 1/19/2022 7:29 pm COMPARISON: 01/18/2022 HISTORY: ORDERING SYSTEM PROVIDED HISTORY: rule out ptx TECHNOLOGIST PROVIDED HISTORY: rule out ptx Reason for Exam: port urpt FINDINGS: Chest radiograph: The cardiomediastinal silhouette and hilar contours are stable with moderate cardiomegaly. Slight worsening consolidative changes of left lower lobe and small left pleural effusion. Worsening increased interstitial markings and ground-glass densities throughout both lungs. No pneumothorax. The overlying soft tissue and osseous structures do not demonstrate acute abnormality. No pneumothorax. Stable moderate cardiomegaly. Slight worsening consolidative changes of left lower lobe and small left pleural effusion. Worsening increased interstitial markings and ground-glass densities throughout both lungs. The finding can be seen in setting of pulmonary edema or viral pneumonia.      XR CHEST PORTABLE    Result Date: 1/18/2022  EXAMINATION: ONE XRAY VIEW OF THE CHEST 1/18/2022 10:02 pm COMPARISON: 09/08/2019 HISTORY: ORDERING SYSTEM PROVIDED HISTORY: hypoxic TECHNOLOGIST PROVIDED HISTORY: hypoxic FINDINGS: Stable mild cardiomegaly. Pulmonary vasculature appears normal.  Mild patchy airspace opacity is present at the bilateral lower lung zones. No pneumothorax or pleural effusion. Surrounding structures are unremarkable. Mild bilateral lower lung zone patchy airspace opacity potentially representing early COVID pneumonia. Mild stable cardiomegaly. VL DUP LOWER EXTREMITY VENOUS BILATERAL    Result Date: 1/22/2022    [facility]  Vascular Lower Extremities DVT Study Procedure   Patient Name Buster Simmons Culver      Date of Study           01/22/2022               Santo Sieving   Date of      1955  Gender                  Male  Birth   Age          74 year(s)  Race                       Room Number  3007   Corporate ID [id number]  #   Patient Farrah [de-identified]  #   MR #         [id number]     Sonographer             [facility]   Accession #  [id number]  Interpreting Physician  Emi Coronel   Referring                Referring Physician     Deangelo Bob *  Nurse  Practitioner  Procedure Type of Study:   Veins: Lower Extremities DVT Study, Venous Scan Lower Bilateral.  Patient Status: In Patient. Technical Quality:Limited visualization. Limitation reason:I.V. placement from groin to mid thigh with bandaging. .  Conclusions   Summary   Simultaneous real time imaging utilizing B-Mode, color doppler and  spectral waveform analysis was performed on the bilateral lower  extremities for venous examination of the deep and superficial systems. Findings are:   Right:  Technically limited study as stated above however in the visualized areas  no superficial or deep vein thrombosis was identified. Left:  No evidence of deep or superficial venous thrombosis.    Signature   ----------------------------------------------------------------  Electronically signed by Rui Mari RVT(Sonographer)  on 01/22/2022 11:27 AM  ---------------------------------------------------------------- ----------------------------------------------------------------  Electronically signed by Stacy Long(Interpreting  physician) on 01/22/2022 06:30 PM  ----------------------------------------------------------------  Findings:   Right Impression:                       Left Impression:  The common femoral, deep profunda,      The common femoral, femoral,  proximal femoral vein and proximal      popliteal and tibial veins  greater saphenous vein could not be     demonstrate normal compressibility  visualized due to I.V. placement. and augmentation. The proximal common femoral, mid to     Normal compressibility of the  distal femoral, popliteal and tibial    great saphenous vein. veins demonstrate normal                Normal compressibility of the  compressibility and augmentation. great saphenous vein. Normal compressibility of the great  saphenous vein. Normal compressibility of the small  saphenous vein. Risk Factors History +--------------------------+---------------------+-------------------------+ ! Diagnosis                 ! Date                 ! Comments                 ! +--------------------------+---------------------+-------------------------+ ! CHF                       ! !                         ! +--------------------------+---------------------+-------------------------+   - The patient's risk factor(s) include: previous angina/MI, diabetes     mellitus and arterial hypertension. Comments: COVID positive Velocities are measured in cm/s ; Diameters are measured in cm Right Lower Extremities DVT Study Measurements Right 2D Measurements +------------------------------------+----------+---------------+----------+ ! Location                            ! Visualized! Compressibility! Thrombosis! +------------------------------------+----------+---------------+----------+ ! Common Femoral                      !Yes       ! Yes            ! None      ! +------------------------------------+----------+---------------+----------+ ! Prox Femoral                        !No        !               !          ! +------------------------------------+----------+---------------+----------+ ! Mid Femoral                         !Yes       ! Yes            ! None      ! +------------------------------------+----------+---------------+----------+ ! Dist Femoral                        !Yes       ! Yes            ! None      ! +------------------------------------+----------+---------------+----------+ ! Deep Femoral                        !No        !               !          ! +------------------------------------+----------+---------------+----------+ ! Popliteal                           !Yes       ! Yes            ! None      ! +------------------------------------+----------+---------------+----------+ ! Sapheno Femoral Junction            ! Yes       ! Yes            ! None      ! +------------------------------------+----------+---------------+----------+ ! PTV                                 ! Yes       ! Yes            ! None      ! +------------------------------------+----------+---------------+----------+ ! Peroneal                            !Yes       ! Yes            ! None      ! +------------------------------------+----------+---------------+----------+ ! Gastroc                             ! Yes       ! Yes            ! None      ! +------------------------------------+----------+---------------+----------+ ! GSV Thigh                           ! No        !               !          ! +------------------------------------+----------+---------------+----------+ ! GSV Knee                            ! Yes       ! Yes            ! None      ! +------------------------------------+----------+---------------+----------+ ! GSV Ankle                           ! Yes       ! Yes            ! None      ! +------------------------------------+----------+---------------+----------+ ! SSV !Yes       !Yes            ! None      ! +------------------------------------+----------+---------------+----------+ Right Doppler Measurements +---------------------------+------+------+--------------------------------+ ! Location                   ! Signal!Reflux! Reflux (msec)                   ! +---------------------------+------+------+--------------------------------+ ! Common Femoral             !Phasic!      !                                ! +---------------------------+------+------+--------------------------------+ ! Popliteal                  !Phasic!      !                                ! +---------------------------+------+------+--------------------------------+ Left Lower Extremities DVT Study Measurements Left 2D Measurements +------------------------------------+----------+---------------+----------+ ! Location                            ! Visualized! Compressibility! Thrombosis! +------------------------------------+----------+---------------+----------+ ! Common Femoral                      !Yes       ! Yes            ! None      ! +------------------------------------+----------+---------------+----------+ ! Prox Femoral                        !Yes       ! Yes            ! None      ! +------------------------------------+----------+---------------+----------+ ! Mid Femoral                         !Yes       ! Yes            ! None      ! +------------------------------------+----------+---------------+----------+ ! Dist Femoral                        !Yes       ! Yes            ! None      ! +------------------------------------+----------+---------------+----------+ ! Deep Femoral                        !No        !               !          ! +------------------------------------+----------+---------------+----------+ ! Popliteal                           !Yes       ! Yes            ! None      ! +------------------------------------+----------+---------------+----------+ ! Sapheno Femoral Junction !Yes       !Yes            ! None      ! +------------------------------------+----------+---------------+----------+ ! PTV                                 ! Yes       ! Yes            ! None      ! +------------------------------------+----------+---------------+----------+ ! Peroneal                            !Yes       ! Yes            ! None      ! +------------------------------------+----------+---------------+----------+ ! Gastroc                             ! Yes       ! Yes            ! None      ! +------------------------------------+----------+---------------+----------+ ! GSV Thigh                           ! Yes       ! Yes            ! None      ! +------------------------------------+----------+---------------+----------+ ! GSV Knee                            ! Yes       ! Yes            ! None      ! +------------------------------------+----------+---------------+----------+ ! GSV Ankle                           ! Yes       ! Yes            ! None      ! +------------------------------------+----------+---------------+----------+ ! SSV                                 ! Yes       ! Yes            ! None      ! +------------------------------------+----------+---------------+----------+ Left Doppler Measurements +---------------------------+------+------+--------------------------------+ ! Location                   ! Signal!Reflux! Reflux (msec)                   ! +---------------------------+------+------+--------------------------------+ ! Common Femoral             !Phasic!      !                                ! +---------------------------+------+------+--------------------------------+ ! Prox Femoral               !Phasic!      !                                ! +---------------------------+------+------+--------------------------------+ ! Popliteal                  !Phasic!      !                                ! +---------------------------+------+------+--------------------------------+      Scheduled Meds:   hydrALAZINE  25 mg Oral 3 times per day    insulin glargine  10 Units SubCUTAneous BID    heparin (porcine)  5,000 Units SubCUTAneous 3 times per day    [Held by provider] amLODIPine  10 mg Oral Daily    aspirin  81 mg Oral Daily    atorvastatin  20 mg Oral Daily    budesonide-formoterol  2 puff Inhalation BID    [Held by provider] furosemide  20 mg Oral Daily    [Held by provider] lisinopril  20 mg Oral Daily    tiotropium  2 puff Inhalation Daily    sodium chloride flush  5-40 mL IntraVENous 2 times per day    dexamethasone  6 mg Oral Daily    Vitamin D  2,000 Units Oral Daily    insulin lispro  0-6 Units SubCUTAneous TID WC    insulin lispro  0-3 Units SubCUTAneous Nightly    glucagon (rDNA)  1 mg IntraVENous Once    sodium chloride flush  5-40 mL IntraVENous 2 times per day     Continuous Infusions:   EPINEPHrine infusion Stopped (01/21/22 0430)    sodium chloride      sodium chloride 25 mL/hr at 01/21/22 0815    dextrose      DOPamine Stopped (01/22/22 1310)    sodium chloride       PRN Meds:.sodium chloride flush, sodium chloride flush, sodium chloride, ondansetron **OR** ondansetron, polyethylene glycol, acetaminophen **OR** acetaminophen, albuterol sulfate HFA, atropine, glucose, dextrose, glucagon (rDNA), dextrose, sodium chloride flush, sodium chloride    ASSESSMENT:     Principal Problem:    Acute on chronic respiratory failure with hypoxia (HCC)  Active Problems:    Controlled type 2 diabetes mellitus without complication, without long-term current use of insulin (HCC)    COVID-19    Hypoxemia    Arterial hypotension  Resolved Problems:    * No resolved hospital problems.  *      PLAN:        Acute on chronic hypoxic respiratory failure 2/2 COVID-19 pneumonia in the setting of COPD/CHF  - COVID+  -On High flow nasal cannula 30L, FIO2 60  - ID consulted  - CRP trending down 209.4-235- 90.1>36.0, check CRP every other day   - ferritin 353>532  - CXR: mild bilateral lower lung zone patchy air space opacity, representing early COVID pneumonia  - concern for PE, was given lovenox 80 mg in the ER, unable to obtain CT PE 2/2 to UYEN,  V/Q scan showed intermediate probability. Venous doppler lower extremity ordered- unremarkable   - continue decadron 6 mg PO daily   - RT aerosol protocol  - continue spiriva and symbicort  -Pulmonary consult- recs to contnue HF NC, bipap PRN     Hypotension- Resolved  MAP>65  Dopamine drip for bradycardia discontinued   Continue to hold BP medications     UYEN likely prerenal - resolved   - Cr 0.89>0.68, baseline ~ 1  - s/p 500 ml fluid bolus  - continue gentle hydration 50 ml/hr  - monitor carefully for fluid overload 2/2 history of CHF  - daily BMP  - FeNa is 25.5--> likely post renal     Bradycardia  - HR 42 in the ER, was given atropine 1 mg and glucagon 1 mg  - holding his home betablocker  - telemetry  - cardiology consulted  - dopamine gtt  -Echo howed EF of 51%. Pending cardiology recs regarding AICD.        HFrEF  - no signs of fluid overload  - holding lisinopril/coreg / lasix 2/2 hypotension  - EF 20-30%  -Repeat Echo showed EF of 51%.  Pending cardiology recs regarding AICD.        Plan will be discussed with the attending, Dr. Tyler Gallardo MD  Family Medicine Resident  1/23/2022 9:10 AM

## 2022-01-23 NOTE — PROGRESS NOTES
1015 - Updated Jamee Ng (son) on patient hemodynamic stability. All questions/concerns answered at this time. Total time speaking with Jamee Ng - 5 minutes    1021 - Update Randy Gallegos (daughter) on patient hemodynamic stability.  Total time speaking with Randy Gallegos - 8 minutes

## 2022-01-23 NOTE — CONSULTS
PULMONARY & CRITICAL CARE MEDICINE CONSULT NOTE     Patient:  Ashley Feldman  MRN: [id number]  [facility] date: 1/18/2022  Primary Care Physician: Jeremiah Rivera MD  Consulting Physician: Gianni Cortze DO  CODE Status: Full Code  LOS: 4     SUBJECTIVE     CHIEF COMPLAINT/REASON FOR CONSULT:    COVID-19 pneumonia/acute hypoxic respiratory failure/bradycardia. HISTORY OF PRESENT ILLNESS:  The patient is a 74 y.o. male history of chronic systolic heart failure apparently ejection fraction of 30% HFrEF, followed in CHF clinic, nonischemic cardiomyopathy, COPD, diabetes mellitus. Presented to emergency room on 01/18/2022 with progressive dyspnea. Symptoms were present for 3 to 4 days, patient is not sure that if he was having cough, fever loss of taste or smell and denies chest pain. In the emergency room patient was found to be hypoxic requiring nonrebreather he was found to be bradycardic with heart rate in 40s and did receive atropine and apparently glucagon. Patient on Coreg 25 mg twice daily at home lisinopril and Lasix. This morning patient was hypotensive require fluid bolus and systolic blood pressure was above 90 after the fluid bolus. He was also found to have UYEN with creatinine of 2.03 and this morning was 1.84. Patient has not been eating and drinking for last 3 to 4 days as he was not feeling well. He does not complain of wheezing purulent sputum hemoptysis chest pain. He was seen by infectious disease yesterday his CRP was elevated 209 his troponin was 73 repeated was 70 proBNP was 530 WBC count was 6.7 platelet 282. He was given 1 dose of Actemra and he was started on Decadron 6 mg once daily    When I saw him patient systolic blood pressure was 90s heart rate was in 40s he was alert and awake he did not have any syncope denies dizziness lightheadedness did not have chest pain patient did not require atropine this morning.   According to primary service they have contacted cardiology and they suggest dopamine/dobutamine. Cardiology was consulted and according to cardiology note atropine as needed there was no mention of dopamine or dobutamine in their note. He has history of smoking currently patient he stopped smoking 3 4 years ago history of smoking for more than 30 years 1 pack/day. Interval history:  1/23/2022    Remains on high flow oxygen at 50 L and 55 % during my rounding  Continues to have shortness of breath and coughing  No wheezing  No orthopnea or PND  No hemoptysis  No chest pain or pressure  No longer requiring pressors    PAST MEDICAL HISTORY:        Diagnosis Date    [facility] obstructive [facility] disease) [facility]     [facility] mellitus [facility]     Erectile dysfunction due to arterial insufficiency 12/7/2015    Hypertension     Microalbuminuria due to type 2 diabetes mellitus ([facility] 7/7/2016    Overweight (BMI 25.0-29.9) 12/7/2015     PAST SURGICAL HISTORY:        Procedure Laterality Date    APPENDECTOMY      TOTAL ANKLE ARTHROPLASTY      LEFT     FAMILY HISTORY:   No family history on file. SOCIAL HISTORY:   TOBACCO:   reports that he has been smoking cigarettes. He has been smoking about 0.50 packs per day. He has never used smokeless tobacco.  ETOH:  reports no history of alcohol use. DRUGS: reports no history of drug use. ALLERGIES:    No Known Allergies      HOME MEDICATIONS:  Prior to Admission medications    Medication Sig Start Date End Date Taking?  Authorizing Provider   amLODIPine (NORVASC) 10 MG tablet TAKE 1 TABLET BY MOUTH DAILY 10/25/21   Roxann Cummings MD   aspirin (HM ASPIRIN EC LOW DOSE) 81 MG EC tablet TAKE 1 TABLET BY MOUTH DAILY 8/26/21   Tayo Benitez MD   furosemide (LASIX) 20 MG tablet Take 1 tablet by mouth daily 8/26/21   Tayo Benitez MD   Blood Pressure KIT Check blood pressure daily 8/26/21   Tayo Benitez MD   albuterol sulfate HFA (PROAIR HFA) 108 (90 Base) MCG/ACT inhaler inhale 2 puffs by mouth every 6 hours if needed for wheezing 8/16/21   Lizzie Godfrey MD   fluticasone-salmeterol (ADVAIR DISKUS) 100-50 MCG/DOSE diskus inhaler INHALE 1 PUFF INTO THE LUNGS EVERY 12 HOURS 8/16/21   Te Gonzalez MD   carvedilol (COREG) 25 MG tablet Take 1 tablet by mouth 2 times daily 8/16/21   Lizzie Godfrey MD   atorvastatin (LIPITOR) 20 MG tablet Take 1 tablet by mouth daily 7/21/21   Ekaterina Banda MD   lisinopril (PRINIVIL;ZESTRIL) 20 MG tablet Take 1 tablet by mouth daily 7/21/21   Ekaterina Banda MD   ipratropium (ATROVENT HFA) 17 MCG/ACT inhaler Inhale 1 puff into the lungs 2 times daily 9/2/20 10/2/20  Micaela Brewer MD   tiotropium (Lonell Schilder) 18 MCG inhalation capsule Inhale 1 capsule into the lungs daily 1/9/20   Micaela Brewer MD   nicotine (NICODERM CQ) 14 MG/24HR Place 1 patch onto the skin daily 12/25/16   Ijeoma Sanders MD     IMMUNIZATIONS:  Most Recent Immunizations   Administered Date(s) Administered    Pneumococcal Conjugate 13-valent ([id number]) 08/02/2018    Pneumococcal Polysaccharide ([id number]) 11/15/2019    Tdap (Boostrix, Adacel) 08/02/2018     REVIEW OF SYSTEMS:  Review of Systems   Constitutional: Positive for appetite change and fatigue. Negative for fever. HENT: Negative for postnasal drip, sinus pain, sore throat, trouble swallowing and voice change. Eyes: Negative for photophobia, redness and visual disturbance. Respiratory: Positive for cough and shortness of breath. Negative for wheezing. Cardiovascular: Negative for chest pain, palpitations and leg swelling. Gastrointestinal: Negative for abdominal pain, diarrhea and vomiting. Endocrine: Negative for polydipsia, polyphagia and polyuria. Genitourinary: Negative for dysuria, frequency and hematuria. Musculoskeletal: Negative. Allergic/Immunologic: Negative. Neurological: Negative for dizziness, syncope, speech difficulty, light-headedness, numbness and headaches.    Hematological: Negative for adenopathy. Does not bruise/bleed easily. Psychiatric/Behavioral: Negative. OBJECTIVE     PaO2/FiO2 RATIO:  No results for input(s): POCPO2 in the last 72 hours.    FiO2 : 60 %     VITAL SIGNS:   LAST:  BP (!) 151/64   Pulse 64   Temp 97.7 °F (36.5 °C) (Bladder)   Resp 26   Ht 5' 5\" (1.651 m)   Wt 156 lb 4.8 oz (70.9 kg)   SpO2 96%   BMI 26.01 kg/m²   8-24 HR RANGE:  TEMP Temp  Av °F (36.7 °C)  Min: 97.7 °F (36.5 °C)  Max: 98.2 °F (22.3 °C)   BP Systolic (87EKH), WNH:387 , Min:151 , JWC:327      Diastolic (31QUF), GWEN:91, Min:58, Max:64     PULSE Pulse  Av.5  Min: 55  Max: 75   RR Resp  Av.3  Min: 22  Max: 31   O2 SAT SpO2  Av.3 %  Min: 87 %  Max: 98 %   OXYGEN DELIVERY O2 Flow Rate (L/min)  Av L/min  Min: 30 L/min  Max: 30 L/min        Physical Exam   General appearance - well appearing,  comfortable and in no acute distress  Mental status - alert, oriented to person, place, and time  Eyes - pupils equal and reactive, sclera anicteric  Mouth - mucous membranes slightly dry, pharynx normal without lesions  Neck - supple, no significant adenopathy, no bruits  Lymphatics - no palpable lymphadenopathy, no hepatosplenomegaly  Chest - no tachypnea, retractions or cyanosis, bilateral breath sounds present no expiratory wheezing no rhonchi distant breath sounds  Heart -bradycardia, regular rhythm, normal S1, S2, no murmurs, rubs, clicks or gallops  Abdomen - soft, nontender, nondistended, no masses or organomegaly  Neurological -awake and following commands  Musculoskeletal - no joint tenderness, deformity or swelling  Extremities -no pedal edema, no clubbing or cyanosis  Skin - normal coloration and turgor, no rashes, no suspicious skin lesions noted  DATA REVIEW     Medications: Current Inpatient  Scheduled Meds:   hydrALAZINE  25 mg Oral 3 times per day    insulin glargine  10 Units SubCUTAneous BID    heparin (porcine)  5,000 Units SubCUTAneous 3 times per day    [Held by provider] amLODIPine  10 mg Oral Daily    aspirin  81 mg Oral Daily    atorvastatin  20 mg Oral Daily    budesonide-formoterol  2 puff Inhalation BID    [Held by provider] furosemide  20 mg Oral Daily    [Held by provider] lisinopril  20 mg Oral Daily    tiotropium  2 puff Inhalation Daily    sodium chloride flush  5-40 mL IntraVENous 2 times per day    dexamethasone  6 mg Oral Daily    Vitamin D  2,000 Units Oral Daily    insulin lispro  0-6 Units SubCUTAneous TID WC    insulin lispro  0-3 Units SubCUTAneous Nightly    glucagon (rDNA)  1 mg IntraVENous Once    sodium chloride flush  5-40 mL IntraVENous 2 times per day     Continuous Infusions:   EPINEPHrine infusion Stopped (01/21/22 0430)    sodium chloride      sodium chloride 25 mL/hr at 01/21/22 0815    dextrose      DOPamine Stopped (01/22/22 1310)    sodium chloride         INPUT/OUTPUT:  In: 1556.1 [P.O.:840; I.V.:716.1]  Out: 2799 [Urine:1720]  Date 01/23/22 0000 - 01/23/22 2359   Shift 8829-1514 5350-7894 8309-6461 24 Hour Total   INTAKE   I.V.(mL/kg) 202.3(2.9)   202.3(2.9)   Shift Total(mL/kg) 202.3(2.9)   202.3(2.9)   OUTPUT   Urine(mL/kg/hr) 535(0.9)   535   Shift Total(mL/kg) 535(7.5)   535(7.5)   Weight (kg) 70.9 70.9 70.9 70.9        LABS:  ABGs:   No results for input(s): POCPH, POCPCO2, POCPO2, POCHCO3, LBTD4SAF in the last 72 hours.   CBC:   Recent Labs     01/21/22  0351 01/22/22  0454 01/23/22  0508   WBC 11.9* 5.5 5.0   HGB 11.5* 10.7* 10.3*   HCT 35.2* 33.4* 31.6*   MCV 93.9 93.8 93.5    184 155   LYMPHOPCT 1* 5* 6*   RBC 3.75* 3.56* 3.38*   MCH 30.7 30.1 30.5   MCHC 32.7 32.0 32.6   RDW 13.2 13.1 13.2     CRP:   Recent Labs     01/21/22  0351 01/22/22  1230   CRP 90.1* 36.0*     LDH:   Recent Labs     01/21/22  0351 01/22/22  0454 01/23/22  0508   * 240* 298*     BMP:   Recent Labs     01/20/22  1108 01/21/22  0351 01/22/22  0454 01/23/22  0508   NA  --  136 136 138   K 5.2 5.3 5.2 4.7   CL  --  105 105 106 CO2  --  23 26 26   BUN  --  62* 38* 25*   CREATININE  --  1.17 0.89 0.68*   GLUCOSE  --  259* 171* 104*     Liver Function Test:   Recent Labs     01/21/22  0351 01/22/22  0454 01/23/22  0508   PROT 5.5* 5.1* 4.6*   LABALBU 2.7* 3.0* 2.7*   ALT 8 7 30   AST 18 17 59*   ALKPHOS 75 69 59   BILITOT 0.31 0.35 0.42     Coagulation Profile:   No results for input(s): INR, PROTIME, APTT in the last 72 hours. D-Dimer:  Recent Labs     01/21/22  0351 01/22/22  0454 01/23/22  0508   DDIMER 2.02 1.61 2.89     Ferritin:    No results for input(s): FERRITIN in the last 72 hours. Lactic Acid:  No results for input(s): LACTA in the last 72 hours. Cardiac Enzymes:  No results for input(s): CKTOTAL, CKMB, CKMBINDEX, TROPONINI in the last 72 hours. Invalid input(s): TROPONIN, HSTROP  BNP/ProBNP:   No results for input(s): BNP, PROBNP in the last 72 hours. Triglycerides:  No results for input(s): TRIG in the last 72 hours. Microbiology:  Urine Culture:  No components found for: CURINE  Blood Culture:  No components found for: CBLOOD, CFUNGUSBL  Sputum Culture:  No components found for: CSPUTUM  Recent Labs     01/20/22 1924   [facility] . BLOOD   SPECIAL NOT REPORTED   CULTURE POSITIVE Blood Culture*  DIRECT GRAM STAIN FROM BOTTLE: GRAM POSITIVE COCCI IN CLUSTERS  STAPHYLOCOCCUS SPECIES, COAGULASE NEGATIVE A single positive blood culture of coagulase negative Staphylocci, diphtheroids,micrococci, Cutibacterium, viridans Streptocci, Bacillus, or Lactobacillus species should be interpreted with caution and viewed as a likely skin contaminant. *  (NOTE) Direct Gram Stain from bottle result called to and read back by:MELE PABLO AT [id number] ON 01/21/2022     Recent Labs     01/20/22  1918 01/20/22 1924   SPECIAL NOT REPORTED NOT REPORTED   CULTURE NO GROWTH 2 DAYS POSITIVE Blood Culture*  DIRECT GRAM STAIN FROM BOTTLE: GRAM POSITIVE COCCI IN CLUSTERS  STAPHYLOCOCCUS SPECIES, COAGULASE NEGATIVE A single positive blood culture of coagulase negative Staphylocci, diphtheroids,micrococci, Cutibacterium, viridans Streptocci, Bacillus, or Lactobacillus species should be interpreted with caution and viewed as a likely skin contaminant. *  (NOTE) Direct Gram Stain from bottle result called to and read back by:MELE PABLO AT [id number] ON 01/21/2022        Radiology Reports:  VL DUP LOWER EXTREMITY VENOUS BILATERAL   Final Result      NM LUNG SCAN PERFUSION ONLY   Final Result   Addendum 1 of 1   ADDENDUM:   Upon further review, the perfusion defect at the left lung base is much   smaller than the corresponding opacity on the chest x-ray. This is low   probability for pulmonary embolism. Findings were discussed with Dr. Lucho Coelho at 10:15 a.m. 01/21/2022. Final   Suboptimal exam without more recent chest radiograph for comparison. Indeterminate probability for pulmonary embolism. XR CHEST PORTABLE   Final Result      No pneumothorax. Stable moderate cardiomegaly. Slight worsening consolidative changes of left lower lobe and small left   pleural effusion. Worsening increased interstitial markings and ground-glass densities   throughout both lungs. The finding can be seen in setting of pulmonary edema   or viral pneumonia. XR CHEST PORTABLE   Final Result   Mild bilateral lower lung zone patchy airspace opacity potentially   representing early COVID pneumonia. Mild stable cardiomegaly. CT HEAD WO CONTRAST   Final Result   No acute intracranial abnormality.               Echocardiogram:   Results for orders placed during the hospital encounter of 09/04/19    Echocardiogram complete 2D with doppler with color    Narrative  Transthoracic Echocardiography Report (TTE)    Patient Name Tayler Beckford  Date of Study         09/06/2019    Date of      1955       Gender                Male  Birth    Age          75 year(s)       Race                      Room Number  2008             Height: 65 inch, 165.1 cm    Corporate ID [id number]         Weight:               180 pounds, 81.6 kg  #    Patient Acct [de-identified]        BSA:       1.89 m^2   BMI:      29.95 kg/m^2  #    MR #         [id number]          Sonographer           Emperatriz Arreola    Accession #  [id number]        Interpreting          Shannen Delaneyident  Physician    Fellow                        Referring Nurse  Practitioner    Interpreting Pattie Adams  Referring Physician   [address]  Fellow       Joshua Bustos CNP    Type of Study    TTE procedure:2D Echocardiogram, M-Mode, Doppler, Color Doppler. Procedure Date  Date: 09/06/2019 Start: 11:05 AM    Study Location: [facility]    History / Rosalinda Travis. Comments:  High Risk Angina, COPD, CHF CAD    Patient Status: Inpatient    Height: 65 inches Weight: 180 pounds BSA: 1.89 m^2 BMI: 29.95 kg/m^2    CONCLUSIONS    Summary  Dilated left ventricle with severely reduced systolic function. Severe hypokinesis to akinesis of distal inferior, inferolateral, septal,  anterior, apical wall  Calculated ejection fraction is 20%. Evidence of moderate diastolic dysfunction. Normal right ventricular size with mildly reduced function. Moderate mitral regurgitation. Mild tricuspid regurgitation. Signature  ----------------------------------------------------------------------------  Electronically signed by Garnet Romberg, RoseMary(Sonographer) on 09/06/2019 02:24  PM  ----------------------------------------------------------------------------    ----------------------------------------------------------------------------  Electronically signed by Liz ContrerasInterpreting physician) on  09/06/2019 05:23 PM  ----------------------------------------------------------------------------  FINDINGS  Left Atrium  Left atrium is moderately dilated. Left Ventricle  Dilated left ventricle with severely reduced systolic function.   Severe hypokinesis to akinesis of distal inferior, inferolateral, septal,  anterior, apical wall  Calculated ejection fraction is 20%. Evidence of moderate diastolic dysfunction. (L/P'24 )  Right Atrium  Right atrium is normal in size. Right Ventricle  Normal right ventricular size with mildly reduced function. Mitral Valve  Mitral valve structure is normal.  Moderate mitral regurgitation. Aortic Valve  Aortic valve is trileaflet. Aortic sclerosis without stenosis. No aortic insufficiency. Tricuspid Valve  Tricuspid valve structure is normal.  Mild tricuspid regurgitation. Pulmonic Valve  The pulmonic valve is normal in structure. Pericardial Effusion  Small anterior pericardial effusion. Miscellaneous  Normal aortic root dimension.   IVC diameter and inspiratory collapse is normal.    M-mode / 2D Measurements & Calculations:    LVIDd:6 cm(3.7 - 5.6 cm)          Diastolic CVRHTN:657.66 ml  LVIDs:4.46 cm(2.2 - 4.0 cm)       Systolic RNCUJN:70 ml  KZTV:2.1 cm(0.6 - 1.1 cm)         Aortic Root:3.1 cm(2.0 - 3.7 cm)  LVPWd:1.2 cm(0.6 - 1.1 cm)        LA Dimension: 4 cm(1.9 - 4.0 cm)  Fractional Shortenin.67 %     LA volume/Index: 69.67 ml /37m^2  Calculated LVEF (%): 29.8 %       LVOT:2.5 cm  RVDd:2.5 cm    Mitral:                                  Aortic    Valve Area (P1/2-Time): 3.14 cm^2        Peak Velocity: 1.25 m/s  Peak E-Wave: 0.90 m/s                    Mean Velocity: 0.82 m/s  Peak A-Wave: 0.86 m/s                    Peak Gradient: 6.25 mmHg  E/A Ratio: 1.04                          Mean Gradient: 3 mmHg  Peak Gradient: 3.22 mmHg  Mean Gradient: 24 mmHg  Deceleration Time: 180 msec              Area (continuity): 2.5 cm^2  P1/2t: 70 msec                           AV VTI: 26.5 cm  MR PISA Radius: 0.6 cm    Area (continuity): 0.64 cm^2  Mean Velocity: 0.61 m/s    Tricuspid:                               Pulmonic:    Estimated RVSP: 44 mmHg                  Peak Velocity: 0.69 m/s  Peak TR Velocity: 2.95 m/s Peak Gradient: 1.92 mmHg  Peak TR Gradient: 34.81 mmHg  Estimated RA Pressure: 10 mmHg    Estimated PASP: 44.81 mmHg    Diastology / Tissue Doppler  Septal Wall E' velocity:0.04 m/s  Septal Wall E/E':22  Lateral Wall E' velocity:0.06 m/s  Lateral Wall E/E':14       ASSESSMENT AND PLAN     Assessment:    // Acute hypoxic respiratory failure  // COVID-19 infection/pneumonia  // Sinus bradycardia likely related to beta-blocker status post 1 dose of atropine  // Hypotension likely multifactorial including hypovolemia/bradycardia/ COVID sepsis. // Sepsis due to COVID 19  // High inflammatory marker, CRP, better  // Troponin elevation  // UYEN on CKD  //Hyperglycemia, better, acceptable  //Anemia, stable  //Intermediate probability for pulmonary embolism on the nuclear medicine scan    Plan:    I personally interviewed/examined the patient; reviewed interval history, interpreted all available radiographic and laboratory data at the time of service. Venous Dopplers with no DVT  Patient is in a positive fluid balance of 2.6 L  Continue on high flow O2 and monitor oxygen saturation keeping oxygen saturation 90%. Will use BiPAP/NIV if needed for any increased distress worsening hypoxia or work of breathing currently patient is comfortable though hypoxic maintaining saturation above 92% nonrebreather. On Symbicort and Spiriva  Encourage prone position when sleeping, incentive spirometry  Monitor I/O, renal function, electrolytes with a goal of even fluid balance  Patient is 2.6 L fluid positive.   Decrease intravenous fluids  He is on Decadron recommend to give 6 mg for 10 days  He is status post Actemra on 01/18/2022  On subcu heparin for DVT prophylaxis  Antimicrobials reviewed; on vancomycin  Monitor CRP, LDH, AST/ALT, D-Dimer, Ferritin as needed/periodically  Glycemic control per primary service  Physical/occupational therapy when able to tolerate  On oral nutrition    Discussed with respiratory therapist in detail. Discussed with nursing staff and nursing charge        Patient is critically ill with illness/injury that acutely impairs one or more vital organ systems, such that there is a high probability of imminent or life threatening deterioration in the patient's condition. Critical care time of 50 minutes was spent (excluding procedures), in coordination of care during bedside rounds and discussion of patient care in detail, and recommendations of the team were adopted in the plan. Necessity of all invasive devices was also confirmed. Graham Ricci MD  Pulmonary and Critical Care Medicine           1/23/2022, 8:05 AM    This patient was evaluated in the context of the global SARS-CoV-2 (COVID-19) pandemic, which necessitated considerations that the patient either has COVID-19 infection or is at risk of infection with COVID-19. Institutional protocols and algorithms that pertain to the evaluation & management of patients with COVID-19 or those at risk for COVID-19 are in a state of rapid changes based on information released by regulatory bodies including the CDC and federal and state organizations. These policies and algorithms were followed during the patient's care. Please note that this chart was generated using voice recognition Dragon dictation software. Although every effort was made to ensure the accuracy of this automated transcription, some errors in transcription may have occurred.

## 2022-01-24 ENCOUNTER — APPOINTMENT (OUTPATIENT)
Dept: GENERAL RADIOLOGY | Age: 67
DRG: 871 | End: 2022-01-24
Payer: MEDICARE

## 2022-01-24 LAB
ABSOLUTE EOS #: <0.03 K/UL (ref 0–0.44)
ABSOLUTE IMMATURE GRANULOCYTE: 0.11 K/UL (ref 0–0.3)
ABSOLUTE LYMPH #: 0.52 K/UL (ref 1.1–3.7)
ABSOLUTE MONO #: 0.26 K/UL (ref 0.1–1.2)
ALBUMIN SERPL-MCNC: 3 G/DL (ref 3.5–5.2)
ALBUMIN/GLOBULIN RATIO: 1.4 (ref 1–2.5)
ALP BLD-CCNC: 70 U/L (ref 40–129)
ALT SERPL-CCNC: 29 U/L (ref 5–41)
ANION GAP SERPL CALCULATED.3IONS-SCNC: 6 MMOL/L (ref 9–17)
AST SERPL-CCNC: 46 U/L
BASOPHILS # BLD: 0 % (ref 0–2)
BASOPHILS ABSOLUTE: <0.03 K/UL (ref 0–0.2)
BILIRUB SERPL-MCNC: 0.56 MG/DL (ref 0.3–1.2)
BUN BLDV-MCNC: 22 MG/DL (ref 8–23)
BUN/CREAT BLD: ABNORMAL (ref 9–20)
C-REACTIVE PROTEIN: 16 MG/L (ref 0–5)
CALCIUM SERPL-MCNC: 8.2 MG/DL (ref 8.6–10.4)
CHLORIDE BLD-SCNC: 106 MMOL/L (ref 98–107)
CO2: 27 MMOL/L (ref 20–31)
CREAT SERPL-MCNC: 0.76 MG/DL (ref 0.7–1.2)
D-DIMER QUANTITATIVE: 3.12 MG/L FEU
DIFFERENTIAL TYPE: ABNORMAL
EOSINOPHILS RELATIVE PERCENT: 0 % (ref 1–4)
GFR AFRICAN AMERICAN: >60 ML/MIN
GFR NON-AFRICAN AMERICAN: >60 ML/MIN
GFR SERPL CREATININE-BSD FRML MDRD: ABNORMAL ML/MIN/{1.73_M2}
GFR SERPL CREATININE-BSD FRML MDRD: ABNORMAL ML/MIN/{1.73_M2}
GLUCOSE BLD-MCNC: 129 MG/DL (ref 75–110)
GLUCOSE BLD-MCNC: 132 MG/DL (ref 75–110)
GLUCOSE BLD-MCNC: 137 MG/DL (ref 75–110)
GLUCOSE BLD-MCNC: 97 MG/DL (ref 75–110)
GLUCOSE BLD-MCNC: 98 MG/DL (ref 70–99)
HCT VFR BLD CALC: 36.5 % (ref 40.7–50.3)
HEMOGLOBIN: 11.9 G/DL (ref 13–17)
IMMATURE GRANULOCYTES: 2 %
LACTATE DEHYDROGENASE: 386 U/L (ref 135–225)
LYMPHOCYTES # BLD: 9 % (ref 24–43)
MAGNESIUM: 1.8 MG/DL (ref 1.6–2.6)
MCH RBC QN AUTO: 30.4 PG (ref 25.2–33.5)
MCHC RBC AUTO-ENTMCNC: 32.6 G/DL (ref 28.4–34.8)
MCV RBC AUTO: 93.1 FL (ref 82.6–102.9)
MONOCYTES # BLD: 5 % (ref 3–12)
NRBC AUTOMATED: 0.3 PER 100 WBC
PDW BLD-RTO: 13.1 % (ref 11.8–14.4)
PLATELET # BLD: 159 K/UL (ref 138–453)
PLATELET ESTIMATE: ABNORMAL
PMV BLD AUTO: 10.5 FL (ref 8.1–13.5)
POTASSIUM SERPL-SCNC: 4.8 MMOL/L (ref 3.7–5.3)
RBC # BLD: 3.92 M/UL (ref 4.21–5.77)
RBC # BLD: ABNORMAL 10*6/UL
SEG NEUTROPHILS: 85 % (ref 36–65)
SEGMENTED NEUTROPHILS ABSOLUTE COUNT: 4.93 K/UL (ref 1.5–8.1)
SODIUM BLD-SCNC: 139 MMOL/L (ref 135–144)
TOTAL PROTEIN: 5.1 G/DL (ref 6.4–8.3)
WBC # BLD: 5.8 K/UL (ref 3.5–11.3)
WBC # BLD: ABNORMAL 10*3/UL

## 2022-01-24 PROCEDURE — 2700000000 HC OXYGEN THERAPY PER DAY

## 2022-01-24 PROCEDURE — 6360000002 HC RX W HCPCS: Performed by: STUDENT IN AN ORGANIZED HEALTH CARE EDUCATION/TRAINING PROGRAM

## 2022-01-24 PROCEDURE — 86140 C-REACTIVE PROTEIN: CPT

## 2022-01-24 PROCEDURE — 6370000000 HC RX 637 (ALT 250 FOR IP): Performed by: STUDENT IN AN ORGANIZED HEALTH CARE EDUCATION/TRAINING PROGRAM

## 2022-01-24 PROCEDURE — 2060000000 HC ICU INTERMEDIATE R&B

## 2022-01-24 PROCEDURE — 6370000000 HC RX 637 (ALT 250 FOR IP): Performed by: NURSE PRACTITIONER

## 2022-01-24 PROCEDURE — 80053 COMPREHEN METABOLIC PANEL: CPT

## 2022-01-24 PROCEDURE — 85025 COMPLETE CBC W/AUTO DIFF WBC: CPT

## 2022-01-24 PROCEDURE — 97535 SELF CARE MNGMENT TRAINING: CPT

## 2022-01-24 PROCEDURE — 97166 OT EVAL MOD COMPLEX 45 MIN: CPT

## 2022-01-24 PROCEDURE — 99232 SBSQ HOSP IP/OBS MODERATE 35: CPT | Performed by: INTERNAL MEDICINE

## 2022-01-24 PROCEDURE — 83735 ASSAY OF MAGNESIUM: CPT

## 2022-01-24 PROCEDURE — 36415 COLL VENOUS BLD VENIPUNCTURE: CPT

## 2022-01-24 PROCEDURE — 71045 X-RAY EXAM CHEST 1 VIEW: CPT

## 2022-01-24 PROCEDURE — 99233 SBSQ HOSP IP/OBS HIGH 50: CPT | Performed by: FAMILY MEDICINE

## 2022-01-24 PROCEDURE — 6370000000 HC RX 637 (ALT 250 FOR IP): Performed by: INTERNAL MEDICINE

## 2022-01-24 PROCEDURE — 85379 FIBRIN DEGRADATION QUANT: CPT

## 2022-01-24 PROCEDURE — 97530 THERAPEUTIC ACTIVITIES: CPT

## 2022-01-24 PROCEDURE — 2580000003 HC RX 258: Performed by: STUDENT IN AN ORGANIZED HEALTH CARE EDUCATION/TRAINING PROGRAM

## 2022-01-24 PROCEDURE — 99291 CRITICAL CARE FIRST HOUR: CPT | Performed by: INTERNAL MEDICINE

## 2022-01-24 PROCEDURE — 94761 N-INVAS EAR/PLS OXIMETRY MLT: CPT

## 2022-01-24 PROCEDURE — 83615 LACTATE (LD) (LDH) ENZYME: CPT

## 2022-01-24 PROCEDURE — 97162 PT EVAL MOD COMPLEX 30 MIN: CPT

## 2022-01-24 PROCEDURE — 82947 ASSAY GLUCOSE BLOOD QUANT: CPT

## 2022-01-24 PROCEDURE — 94640 AIRWAY INHALATION TREATMENT: CPT

## 2022-01-24 RX ORDER — ALBUTEROL SULFATE 2.5 MG/3ML
2.5 SOLUTION RESPIRATORY (INHALATION) 2 TIMES DAILY
Status: DISCONTINUED | OUTPATIENT
Start: 2022-01-25 | End: 2022-01-26 | Stop reason: HOSPADM

## 2022-01-24 RX ORDER — IPRATROPIUM BROMIDE AND ALBUTEROL SULFATE 2.5; .5 MG/3ML; MG/3ML
1 SOLUTION RESPIRATORY (INHALATION) 4 TIMES DAILY
Status: DISCONTINUED | OUTPATIENT
Start: 2022-01-24 | End: 2022-01-26 | Stop reason: HOSPADM

## 2022-01-24 RX ORDER — IPRATROPIUM BROMIDE AND ALBUTEROL SULFATE 2.5; .5 MG/3ML; MG/3ML
1 SOLUTION RESPIRATORY (INHALATION) 4 TIMES DAILY
Status: DISCONTINUED | OUTPATIENT
Start: 2022-01-24 | End: 2022-01-24

## 2022-01-24 RX ADMIN — INSULIN GLARGINE 10 UNITS: 100 INJECTION, SOLUTION SUBCUTANEOUS at 09:30

## 2022-01-24 RX ADMIN — HEPARIN SODIUM 5000 UNITS: 5000 INJECTION INTRAVENOUS; SUBCUTANEOUS at 06:15

## 2022-01-24 RX ADMIN — HYDRALAZINE HYDROCHLORIDE 50 MG: 50 TABLET, FILM COATED ORAL at 06:15

## 2022-01-24 RX ADMIN — SODIUM CHLORIDE, PRESERVATIVE FREE 10 ML: 5 INJECTION INTRAVENOUS at 20:57

## 2022-01-24 RX ADMIN — Medication 2000 UNITS: at 09:31

## 2022-01-24 RX ADMIN — BUDESONIDE AND FORMOTEROL FUMARATE DIHYDRATE 2 PUFF: 80; 4.5 AEROSOL RESPIRATORY (INHALATION) at 09:32

## 2022-01-24 RX ADMIN — Medication 81 MG: at 09:32

## 2022-01-24 RX ADMIN — SODIUM CHLORIDE, PRESERVATIVE FREE 10 ML: 5 INJECTION INTRAVENOUS at 09:32

## 2022-01-24 RX ADMIN — HEPARIN SODIUM 5000 UNITS: 5000 INJECTION INTRAVENOUS; SUBCUTANEOUS at 15:10

## 2022-01-24 RX ADMIN — DEXAMETHASONE 6 MG: 4 TABLET ORAL at 09:31

## 2022-01-24 RX ADMIN — IPRATROPIUM BROMIDE AND ALBUTEROL SULFATE 1 AMPULE: .5; 3 SOLUTION RESPIRATORY (INHALATION) at 20:48

## 2022-01-24 RX ADMIN — IPRATROPIUM BROMIDE AND ALBUTEROL SULFATE 1 AMPULE: .5; 3 SOLUTION RESPIRATORY (INHALATION) at 07:51

## 2022-01-24 RX ADMIN — HYDRALAZINE HYDROCHLORIDE 50 MG: 50 TABLET, FILM COATED ORAL at 20:57

## 2022-01-24 RX ADMIN — ATORVASTATIN CALCIUM 20 MG: 20 TABLET, FILM COATED ORAL at 09:31

## 2022-01-24 RX ADMIN — IPRATROPIUM BROMIDE AND ALBUTEROL SULFATE 1 AMPULE: .5; 3 SOLUTION RESPIRATORY (INHALATION) at 11:51

## 2022-01-24 RX ADMIN — HYDRALAZINE HYDROCHLORIDE 50 MG: 50 TABLET, FILM COATED ORAL at 15:10

## 2022-01-24 RX ADMIN — HEPARIN SODIUM 5000 UNITS: 5000 INJECTION INTRAVENOUS; SUBCUTANEOUS at 20:56

## 2022-01-24 RX ADMIN — BUDESONIDE AND FORMOTEROL FUMARATE DIHYDRATE 2 PUFF: 80; 4.5 AEROSOL RESPIRATORY (INHALATION) at 20:53

## 2022-01-24 ASSESSMENT — ENCOUNTER SYMPTOMS
WHEEZING: 1
COUGH: 1
GASTROINTESTINAL NEGATIVE: 1
SHORTNESS OF BREATH: 1

## 2022-01-24 ASSESSMENT — PAIN SCALES - GENERAL
PAINLEVEL_OUTOF10: 0

## 2022-01-24 ASSESSMENT — PAIN SCALES - WONG BAKER
WONGBAKER_NUMERICALRESPONSE: 0

## 2022-01-24 NOTE — PROGRESS NOTES
Progress Note    Patient - Luciana Jordan  Date of Admission -  2022  9:04 PM  Date of Evaluation -  2022  Room and Bed Number -  3414/3120-57   Hospital Day - 5    CHIEF COMPLAINT : ACUTE HYPOXIC RESPIRATORY FAILURE DUE TO COVID -19 PNEUMONIA   SUBJECTIVE:     OVERNIGHT EVENTS:         Continues to require high flow oxygen fluctuating from 30 to 40 L and FiO2 55 to 60%  He sitting up in the chair  Has conversational dyspnea  Mild amount of sputum  Afebrile  WBC normal            Actemra:             [] No    [x] Yes 2022    DEXAMETHASONE : [] No    [x] Yes started 2022      Review of Systems   Constitutional: Positive for fatigue. Negative for fever. HENT: Positive for congestion. Respiratory: Positive for cough, shortness of breath and wheezing. Cardiovascular: Negative. Gastrointestinal: Negative. Neurological: Negative.           OBJECTIVE:     VITAL SIGNS:  BP (!) 140/67   Pulse 102   Temp 98.2 °F (36.8 °C) (Axillary)   Resp 26   Ht 5' 5\" (1.651 m)   Wt 156 lb 4.8 oz (70.9 kg)   SpO2 96%   BMI 26.01 kg/m²   Tmax over 24 hours:  Temp (24hrs), Av.9 °F (36.6 °C), Min:97.6 °F (36.4 °C), Max:98.2 °F (36.8 °C)      Patient Vitals for the past 8 hrs:   BP Temp Temp src Pulse Resp SpO2   22 1152     26 96 %   22 1151     26 96 %   22 1016      94 %   22 0754    102 (!) 32 92 %   22 0745      (!) 89 %   22 0735 (!) 140/67 98.2 °F (36.8 °C) Axillary 103 27 91 %         Intake/Output Summary (Last 24 hours) at 2022 1323  Last data filed at 2022 1000  Gross per 24 hour   Intake 300 ml   Output 1100 ml   Net -800 ml     Date 22 0000 - 22 2359   Shift 5394-1754 6386-1145 2708-2186 24 Hour Total   INTAKE   P.O.(mL/kg/hr)  300  300   Shift Total(mL/kg)  300(4.2)  300(4.2)   OUTPUT   Urine(mL/kg/hr) 500(0.9) 200  700   Shift Total(mL/kg) 500(7.1) 200(2.8)  700(9.9)   Weight (kg) 70.9 70.9 70.9 70.9     Wt Readings from Last 3 Encounters:   01/22/22 156 lb 4.8 oz (70.9 kg)   08/16/21 168 lb (76.2 kg)   09/11/20 179 lb (81.2 kg)     Body mass index is 26.01 kg/m². PHYSICAL EXAM:  Head and neck atraumatic, normocephalic    Lymph nodes-no cervical, supraclavicular lymphadenopathy    Neck-no JVP elevation    Lungs - AP diameter of chest increased. Thoracic expansion and diaphragmatic excursion diminished. BS diminished and expiratory phase prolonged. No dullness to percussion or tenderness to palpation. No bronchial breath sounds . CVS- S1, S2 regular. No S3 no S4, no murmurs    Abdomen-nontender, nondistended. Bowel sounds are present. No organomegaly    Lower extremity-no edema    Upper extremity-no edema    Neurological-grossly normal cranial nerves.   No overt motor deficit      MEDICATIONS:  Scheduled Meds:   [START ON 1/25/2022] albuterol  2.5 mg Nebulization BID    ipratropium-albuterol  1 ampule Inhalation 4x daily    hydrALAZINE  50 mg Oral 3 times per day    insulin glargine  10 Units SubCUTAneous BID    heparin (porcine)  5,000 Units SubCUTAneous 3 times per day    [Held by provider] amLODIPine  10 mg Oral Daily    aspirin  81 mg Oral Daily    atorvastatin  20 mg Oral Daily    budesonide-formoterol  2 puff Inhalation BID    [Held by provider] furosemide  20 mg Oral Daily    [Held by provider] lisinopril  20 mg Oral Daily    sodium chloride flush  5-40 mL IntraVENous 2 times per day    dexamethasone  6 mg Oral Daily    Vitamin D  2,000 Units Oral Daily    insulin lispro  0-6 Units SubCUTAneous TID     insulin lispro  0-3 Units SubCUTAneous Nightly    glucagon (rDNA)  1 mg IntraVENous Once     Continuous Infusions:   EPINEPHrine infusion Stopped (01/21/22 0430)    sodium chloride      sodium chloride 25 mL/hr at 01/21/22 0815    dextrose      DOPamine Stopped (01/22/22 1310)     PRN Meds:   sodium chloride flush, 5-40 mL, PRN  sodium chloride, 25 mL, PRN  ondansetron, 4 mg, Q8H PRN   Or  ondansetron, 4 mg, Q6H PRN  polyethylene glycol, 17 g, Daily PRN  acetaminophen, 650 mg, Q6H PRN   Or  acetaminophen, 650 mg, Q6H PRN  atropine, 1 mg, PRN  glucose, 15 g, PRN  dextrose, 12.5 g, PRN  glucagon (rDNA), 1 mg, PRN  dextrose, 100 mL/hr, PRN        SUPPORT DEVICES: [] Ventilator [] BIPAP  [x]high flow nasal Cannula [] Room Air      ABGs:     Lab Results   Component Value Date    HTA7FKU 29 07/01/2017    FIO2 40.0 07/01/2017       DATA:  Complete Blood Count:   Recent Labs     01/22/22  0454 01/23/22  0508 01/24/22  0359   WBC 5.5 5.0 5.8   RBC 3.56* 3.38* 3.92*   HGB 10.7* 10.3* 11.9*   HCT 33.4* 31.6* 36.5*   MCV 93.8 93.5 93.1   MCH 30.1 30.5 30.4   MCHC 32.0 32.6 32.6   RDW 13.1 13.2 13.1    155 159   MPV 10.1 9.7 10.5        Last 3 Blood Glucose:   Recent Labs     01/22/22  0454 01/23/22  0508 01/24/22  0359   GLUCOSE 171* 104* 98        PT/INR:    Lab Results   Component Value Date    PROTIME 10.2 01/18/2022    INR 0.9 01/18/2022     PTT:    Lab Results   Component Value Date    APTT 27.2 01/18/2022       Comprehensive Metabolic Profile:   Recent Labs     01/22/22  0454 01/23/22  0508 01/24/22  0359    138 139   K 5.2 4.7 4.8    106 106   CO2 26 26 27   BUN 38* 25* 22   CREATININE 0.89 0.68* 0.76   GLUCOSE 171* 104* 98   CALCIUM 8.1* 7.6* 8.2*   PROT 5.1* 4.6* 5.1*   LABALBU 3.0* 2.7* 3.0*   BILITOT 0.35 0.42 0.56   ALKPHOS 69 59 70   AST 17 59* 46*   ALT 7 30 29      Magnesium:   Lab Results   Component Value Date    MG 1.8 01/24/2022    MG 1.9 01/21/2022    MG 1.7 01/19/2022     Phosphorus:   Lab Results   Component Value Date    PHOS 3.5 09/09/2019    PHOS 3.5 09/08/2019    PHOS 5.9 09/07/2019     Ionized Calcium:   Lab Results   Component Value Date    CAION 1.10 09/06/2019        Urinalysis:   Lab Results   Component Value Date    NITRU NEGATIVE 01/19/2022    COLORU Dark Yellow 01/19/2022    PHUR 5.0 01/19/2022    WBCUA 5 TO 10 07/01/2017    RBCUA 5 TO 10 07/01/2017    MUCUS 2+ 07/01/2017    TRICHOMONAS NOT REPORTED 07/01/2017    YEAST NOT REPORTED 07/01/2017    BACTERIA FEW 07/01/2017    SPECGRAV 1.015 01/19/2022    LEUKOCYTESUR NEGATIVE 01/19/2022    UROBILINOGEN Normal 01/19/2022    BILIRUBINUR NEGATIVE 01/19/2022    GLUCOSEU NEGATIVE 01/19/2022    KETUA NEGATIVE 01/19/2022    AMORPHOUS 2+ 07/01/2017               CT HEAD WO CONTRAST    Result Date: 1/18/2022  No acute intracranial abnormality. NM LUNG SCAN PERFUSION ONLY    Addendum Date: 1/21/2022    ADDENDUM: Upon further review, the perfusion defect at the left lung base is much smaller than the corresponding opacity on the chest x-ray. This is low probability for pulmonary embolism. Findings were discussed with Dr. Alexandru Shah at 10:15 a.m. 01/21/2022. Result Date: 1/21/2022  Suboptimal exam without more recent chest radiograph for comparison. Indeterminate probability for pulmonary embolism. XR CHEST PORTABLE    Result Date: 1/19/2022  No pneumothorax. Stable moderate cardiomegaly. Slight worsening consolidative changes of left lower lobe and small left pleural effusion. Worsening increased interstitial markings and ground-glass densities throughout both lungs. The finding can be seen in setting of pulmonary edema or viral pneumonia. XR CHEST PORTABLE    Result Date: 1/18/2022  Mild bilateral lower lung zone patchy airspace opacity potentially representing early COVID pneumonia. Mild stable cardiomegaly. VENTILATOR SETTINGS:  Vent Information  Skin Assessment: Clean, dry, & intact  Equipment Changed: Humidification  FiO2 : 60 %  SpO2: 96 %  SpO2/FiO2 ratio: 160  Humidification Source: Heated wire  Humidification Temp: 31  Humidification Temp Measured: 31  Circuit Condensation: Drained     PaO2/FiO2 RATIO:  No results for input(s): POCPO2 in the last 72 hours.    FiO2 : 60 %       LABS:  ABGs:   No results for input(s): POCPH, POCPCO2, POCPO2, POCHCO3, FRSQ2MII in the last 72 hours.         ASSESSMENT:     Principal Problem:    Acute on chronic respiratory failure with hypoxia (HCC)  Active Problems:    Controlled type 2 diabetes mellitus without complication, without long-term current use of insulin (HCC)    COVID-19    Hypoxemia    Arterial hypotension  Resolved Problems:    * No resolved hospital problems. *              Acute hypoxic respiratory failure secondary to COVID 19   Bilateral multifocal pneumonia due to COVID 19 infection   Covid -19 pandemic emergency     LOS: 5  PLAN:    Continue high flow oxygen and wean to keep saturation greater than 90%. Use BiPAP as needed. Continue Decadron and complete 10 days of treatment. Continue Symbicort and DuoNeb. He did need LTAC placement  Will obtain chest x-ray today  Encourage prone positioning          Treatment plan Discussed with nursing staff in detail , all questions answered . Total critical care time caring for this patient with life threatening, unstable organ failure, including direct patient contact, management of life support systems, review of data including imaging and labs, discussions with other team members and physicians at least 27  Min so far today, excluding procedures. Electronically signed by Harris Naidu MD on 1/24/2022 at 1:23 PM       This patient was evaluated in the context of the global SARS-CoV-2 (COVID-19) pandemic, which necessitated considerations that the patient either has COVID-19 infection or is at risk of infection with COVID-19. Institutional protocols and algorithms that pertain to the evaluation & management of patients with COVID-19 or those at risk for COVID-19 are in a state of rapid changes based on information released by regulatory bodies including the CDC and federal and state organizations. These policies and algorithms were followed during the patient's care. Please note that this chart was generated using voice recognition Dragon dictation software.   Although every effort was made to ensure the accuracy of this automated transcription, some errors in transcription may have occurred.
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
## 2022-01-24 NOTE — PROGRESS NOTES
Physical Therapy    Facility/Department: [facility] CAR 3  Initial Assessment    NAME: Shannen Castro  : 1955  MRN: [id number]  Chief Complaint   Patient presents with    Shortness of Breath     covid positive a few days ago, 76% on room air    Extremity Weakness     Date of Service: 2022    Discharge Recommendations: Further therapy recommended at discharge. PT Equipment Recommendations  Equipment Needed: No    Assessment   Body structures, Functions, Activity limitations: Decreased functional mobility ; Decreased balance;Decreased endurance;Decreased strength  Assessment: The pt ambulated 5ft with RW to bedside chair on 50 L O2 via HFNC, SPO2 decreasing to 89%. Recommend continued therapy to address deficits as the pt would currently be unable to climb the stairs required to enter the second floor apartment. Prognosis: Good  Decision Making: Medium Complexity  PT Education: Goals;PT Role;Plan of Care; Functional Mobility Training  REQUIRES PT FOLLOW UP: Yes  Activity Tolerance  Activity Tolerance: Patient limited by endurance       Patient Diagnosis(es): The primary encounter diagnosis was COVID-19. Diagnoses of Hypoxemia and Elevated d-dimer were also pertinent to this visit. has a past medical history of COPD (chronic obstructive pulmonary disease) ([address] Diabetes mellitus ([address] Erectile dysfunction due to arterial insufficiency, Hypertension, Microalbuminuria due to type 2 diabetes mellitus ([address] and Overweight (BMI 25.0-29.9). has a past surgical history that includes Appendectomy and Total ankle arthroplasty.     Restrictions  Restrictions/Precautions  Restrictions/Precautions: Fall Risk,Up as Tolerated,Isolation (COVID+)  Required Braces or Orthoses?: No  Vision/Hearing  Vision: Impaired  Vision Exceptions: Wears glasses for reading  Hearing: Within functional limits     Subjective  General  Patient assessed for rehabilitation services?: Yes  Response To Previous Treatment: Not applicable  Family / Caregiver Present: No  Follows Commands: Within Functional Limits  Subjective  Subjective: RN and pt agreeable to PT. Pt supine in bed upon arrival, pleasant and cooperative throughout. Pt on 50L high flow via NC. Pain Screening  Patient Currently in Pain: Denies  Vital Signs  Patient Currently in Pain: Denies       Orientation  Orientation  Overall Orientation Status: Within Functional Limits  Social/Functional History  Social/Functional History  Lives With: Son  Type of Home: Apartment  Home Layout: One level  Home Access: Stairs to enter with rails  Entrance Stairs - Number of Steps: 8 stairs to get into building w/ rail on L side. On the second floor apartment requiring to climb full flight of stairs to reach front door once inside w/ rail. Entrance Stairs - Rails: Left  Bathroom Shower/Tub: Tub/Shower unit  Bathroom Toilet: Standard  Bathroom Equipment:  (No additional bathroom equiptment)  Home Equipment: Rolling walker,Cane (Uses cane daily per pt report.)  ADL Assistance: [address]: Needs assistance  Homemaking Responsibilities: Yes (Shares with son, son completes heavy responsibilities)  Meal Prep Responsibility: Secondary  Laundry Responsibility: Secondary  Cleaning Responsibility: Secondary  Shopping Responsibility: No (Son completes. [employer] delivery for groceries typically)  Active : No  Patient's  Info: Walk, reports cannot afford cab transportation  Occupation: Retired  Leisure & Hobbies: sleeping  Additional Comments: Son is not currently working and is able to provide 24/7 if needed upon discharge.   Cognition   Cognition  Overall Cognitive Status: Exceptions  Insights: Decreased awareness of deficits  Initiation: Requires cues for some  Sequencing: Requires cues for some    Objective          Joint Mobility  Spine: WFL  ROM RLE: WFL  ROM LLE: WFL  ROM RUE: WFL  ROM LUE: WFL  Strength RLE  Strength RLE: WFL  Strength LLE  Strength LLE: WFL  Strength RUE  Strength RUE: WFL  Strength LUE  Strength LUE: WFL  Tone RLE  RLE Tone: Normotonic  Tone LLE  LLE Tone: Normotonic  Motor Control  Gross Motor?: WFL  Sensation  Overall Sensation Status: WFL  Bed mobility  Supine to Sit: Stand by assistance  Sit to Supine:  (retired to bedside chair following ambulation)  Scooting: Stand by assistance  Transfers  Sit to Stand: Contact guard assistance  Stand to sit: Contact guard assistance  Stand Pivot Transfers: Contact guard assistance  Comment: transfers performed with RW, verbal cues for UE placement with poor return  Ambulation  Ambulation?: Yes  Ambulation 1  Surface: level tile  Device: Rolling Walker  Other Apparatus: O2 (50L)  Assistance: Contact guard assistance  Gait Deviations: Slow Patricia;Decreased step length;Decreased step height  Distance: 5ft  Comments: SPO2 89% following ambulation  Stairs/Curb  Stairs?: No     Balance  Posture: Good  Sitting - Static: Good;-  Sitting - Dynamic: Fair;+  Standing - Static: Fair  Standing - Dynamic: Fair  Comments: standing balance assessed with RW        Plan   Plan  Times per week: 5x/wk  Current Treatment Recommendations: Strengthening,ROM,Balance Training,Functional Mobility Training,Transfer Training,Gait Training,Stair training,Endurance Training,Home Exercise Program,Safety Education & Training,Patient/Caregiver Education & Training  Safety Devices  Type of devices: Nurse notified,Call light within reach,Gait belt,Left in chair  Restraints  Initially in place: No    AM-PAC Score  AM-PAC Inpatient Mobility Raw Score : 18 (01/24/22 1456)  AM-PAC Inpatient T-Scale Score : 43.63 (01/24/22 1456)  Mobility Inpatient CMS 0-100% Score: 46.58 (01/24/22 1456)  Mobility Inpatient CMS G-Code Modifier : CK (01/24/22 1456)          Goals  Short term goals  Time Frame for Short term goals: 14 visits  Short term goal 1: Perform bed mobility and functional transfers independently  Short term goal 2: Ambulate 300ft independently with SPO2 >90%  Short term goal 3: Demo Good- dynamic standing balance to decrease risk of falls  Short term goal 4: Ascend/descend 20 steps with HR and SBA       Therapy Time   Individual Concurrent Group Co-treatment   Time In 0937         Time Out 1003         Minutes 26         Timed Code Treatment Minutes: 8 Minutes       Daisy Vivar, PT

## 2022-01-24 NOTE — PLAN OF CARE
Problem: RESPIRATORY  Intervention: Administer treatments as ordered  Note: BRONCHOSPASM/BRONCHOCONSTRICTION     [x]         IMPROVE AERATION/BREATH SOUNDS  [x]   ADMINISTER BRONCHODILATOR THERAPY AS APPROPRIATE  [x]   ASSESS BREATH SOUNDS    Problem: OXYGENATION/RESPIRATORY FUNCTION  Goal: Patient will maintain patent airway  Outcome: Ongoing  Goal: Patient will achieve/maintain normal respiratory rate/effort  Description: Respiratory rate and effort will be within normal limits for the patient  Outcome: Ongoing      IMPLEMENT AEROSOL/MDI PROTOCOL  [x]   PATIENT EDUCATION AS NEEDED

## 2022-01-24 NOTE — FLOWSHEET NOTE
Pt's wallet, cigarettes, and lighter were sent home with the patients son, per the patients request. The patient's slippers, pants, shirt, sweatshirt, and undergarments were left with the patient.

## 2022-01-24 NOTE — PLAN OF CARE
Manny Beltran RN  Outcome: Ongoing  Goal: Maintain absence of muscle cramping  1/24/2022 1805 by Gisela Rainey RN  Outcome: Ongoing  1/24/2022 0529 by Manny Beltran RN  Outcome: Ongoing  Goal: Maintain normal serum potassium, sodium, calcium, phosphorus, and pH  1/24/2022 1805 by Gisela Rainey RN  Outcome: Ongoing  1/24/2022 0529 by Manny Beltran RN  Outcome: Ongoing     Problem: Loneliness or Risk for Loneliness  Goal: Demonstrate positive use of time alone when socialization is not possible  1/24/2022 1805 by Gisela Rainey RN  Outcome: Ongoing  1/24/2022 0529 by Manny Beltran RN  Outcome: Ongoing     Problem: Fatigue  Goal: Verbalize increase energy and improved vitality  1/24/2022 1805 by Gisela Rainey RN  Outcome: Ongoing  1/24/2022 0529 by Manny Beltran RN  Outcome: Ongoing     Problem: Patient Education: Go to Patient Education Activity  Goal: Patient/Family Education  1/24/2022 1805 by Gisela Rainey RN  Outcome: Ongoing  1/24/2022 0529 by Manny Beltran RN  Outcome: Ongoing

## 2022-01-24 NOTE — PROGRESS NOTES
[place], RCPPatient Assessment complete. Hypoxemia [R09.02]  Elevated d-dimer [R79.89]  Acute on chronic respiratory failure with hypoxia ([facility] [J96.21]  COVID-19 [U07.1] . Vitals:    01/24/22 0735   BP: (!) 140/67   Pulse: 103   Resp: 27   Temp:    SpO2: 91%   . Patients home meds are   Prior to Admission medications    Medication Sig Start Date End Date Taking?  Authorizing Provider   amLODIPine (NORVASC) 10 MG tablet TAKE 1 TABLET BY MOUTH DAILY 10/25/21   Marla Ludwig MD   aspirin (HM ASPIRIN EC LOW DOSE) 81 MG EC tablet TAKE 1 TABLET BY MOUTH DAILY 8/26/21   Mattie Go MD   furosemide (LASIX) 20 MG tablet Take 1 tablet by mouth daily 8/26/21   Mattie Go MD   Blood Pressure KIT Check blood pressure daily 8/26/21   Mattie Go MD   albuterol sulfate HFA (PROAIR HFA) 108 (90 Base) MCG/ACT inhaler inhale 2 puffs by mouth every 6 hours if needed for wheezing 8/16/21   Te Barnes MD   fluticasone-salmeterol (ADVAIR DISKUS) 100-50 MCG/DOSE diskus inhaler INHALE 1 PUFF INTO THE LUNGS EVERY 12 HOURS 8/16/21   Te Barnes MD   carvedilol (COREG) 25 MG tablet Take 1 tablet by mouth 2 times daily 8/16/21   Miguel Gamez MD   atorvastatin (LIPITOR) 20 MG tablet Take 1 tablet by mouth daily 7/21/21   Marla Ludwig MD   lisinopril (PRINIVIL;ZESTRIL) 20 MG tablet Take 1 tablet by mouth daily 7/21/21   Marla Ludwig MD   ipratropium (ATROVENT HFA) 17 MCG/ACT inhaler Inhale 1 puff into the lungs 2 times daily 9/2/20 10/2/20  Shanae Brooks MD   tiotropium (Ranelle Joseph) 18 MCG inhalation capsule Inhale 1 capsule into the lungs daily 1/9/20   Shanae Brooks MD   nicotine (NICODERM CQ) 14 MG/24HR Place 1 patch onto the skin daily 12/25/16   Beck Prado MD   .    RR 32  Breath Sounds: diminished exp wheezes      Bronchodilator assessment at level  4  Hyperinflation assessment at level   Secretion Management assessment at level      []    Bronchodilator Assessment  BRONCHODILATOR ASSESSMENT SCORE  Score 0 1 2 3 4 5   Breath Sounds   []  Patient Baseline []  No Wheeze good aeration []  Faint, scattered wheezing, good aeration []  Expiratory Wheezing and or moderately diminished [x]  Insp/Exp wheeze and/or very diminished []  Insp/Exp and/ or marked distress   Respiratory Rate   []  Patient Baseline []  Less than 20 []  Less than 20 []  20-25 [x]  Greater than 25 []  Greater than 25   Peak flow % of Pred or PB []  NA   []  Greater than 90%  []  81-90% []  71-80% []  Less than or equal to 70%  or unable to perform []  Unable due to Respiratory Distress   Dyspnea re []  Patient Baseline []  No SOB []  No SOB []  SOB on exertion []  SOB min activity [x]  At rest/acute   e FEV% Predicted       []  NA []  Above 69%  []  Unable []  Above 60-69%  []  Unable []  Above 50-59%  []  Unable []  Above 35-49%  []  Unable []  Less than 35%  []  Unable                 []  Hyperinflation Assessment  Score 1 2 3   CXR and Breath Sounds   []  Clear []  No atelectasis  Basilar aeration []  Atelectasis or absent basilar breath sounds   Incentive Spirometry Volume  (Per IBW)   []  Greater than or equal to 15ml/Kg []  less than 15ml/Kg []  less than 15ml/Kg   Surgery within last 2 weeks []  None or general   []  Abdominal or thoracic surgery  []  Abdominal or thoracic   Chronic Pulmonary Historyre []  No []  Yes []  Yes     []  Secretion Management Assessment  Score 1 2 3   Bilateral Breath Sounds   []  Occasional Rhonchi []  Scattered Rhonchi []  Course Rhonchi and/or poor aeration   Sputum    []  Small amount of thin secretions []  Moderate amount of viscous secretions []  Copius, Viscious Yellow/ Secretions   CXR as reported by physician []  clear  []  Unavailable []  Infiltrates and/or consolidation  []  Unavailable []  Mucus Plugging and or lobar consolidation  []  Unavailable   Cough []  Strong, productive cough []  Weak productive cough []  No cough or weak non-productive cough Jeremiah Wells, [facility]  7:42 AM                            FEMALE                                  MALE                            FEV1 Predicted Normal Values                        FEV1 Predicted Normal Values          Age                                     Height in Feet and Inches       Age                                     Height in Feet and Inches       4' 11\" 5' 1\" 5' 3\" 5' 5\" 5' 7\" 5' 9\" 5' 11\" 6' 1\"  4' 11\" 5' 1\" 5' 3\" 5' 5\" 5' 7\" 5' 9\" 5' 11\" 6' 1\"   42 - 45 2.49 2.66 2.84 3.03 3.22 3.42 3.62 3.83 42 - 45 2.82 3.03 3.26 3.49 3.72 3.96 4.22 4.47   46 - 49 2.40 2.57 2.76 2.94 3.14 3.33 3.54 3.75 46 - 49 2.70 2.92 3.14 3.37 3.61 3.85 4.10 4.36   50 - 53 2.31 2.48 2.66 2.85 3.04 3.24 3.45 3.66 50 - 53 2.58 2.80 3.02 3.25 3.49 3.73 3.98 4.24   54 - 57 2.21 2.38 2.57 2.75 2.95 3.14 3.35 3.56 54 - 57 2.46 2.67 2.89 3.12 3.36 3.60 3.85 4.11   58 - 61 2.10 2.28 2.46 2.65 2.84 3.04 3.24 3.45 58 - 61 2.32 2.54 2.76 2.99 3.23 3.47 3.72 3.98   62 - 65 1.99 2.17 2.35 2.54 2.73 2.93 3.13 3.34 62 - 65 2.19 2.40 2.62 2.85 3.09 3.33 3.58 3.84   66 - 69 1.88 2.05 2.23 2.42 2.61 2.81 3.02 3.23 66 - 69 2.04 2.26 2.48 2.71 2.95 3.19 3.44 3.70   70+ 1.82 1.99 2.17 2.36 2.55 2.75 2.95 3.16 70+ 1.97 2.19 2.41 2.64 2.87 3.12 3.37 3.62             Predicted Peak Expiratory Flow Rate                                       Height (in)  Female       Height (in) Male           Age 81 60 09 84 66 90 78 74 Age            38 376 225 200 669 871 685 857 438  07 66 73 83 68 70 72 74 76   25 337 352 366 381 396 411 426 441 25 447 476 505 533 562 591 619 648 677   30 329 344 359 374 389 404 419 434 30 437 466 494 523 552 580 609 638 667   35 322 337 351 366 381 396 411 426 35 426 455 484 512 541 570 598 627 657   40 314 329 344 359 374 389 404 419 40 416 445 473 502 531 559 588 617 647   45 307 322 336 351 366 381 396 411 45 405 434 463 491 520 549 577 606 636   50 299 314 329 344 359 374 389 404 50 395 424 452 481 510 538 567 596 625   55 915 861 448 284 867 117 818 584 83 032 798 677 808 951 678 346 214 287   31 391 514 640 315 662 398 229 909 18 409 898 705 471 584 450 097 519 662   06 106 842 036 858 230 470 129 139 52 822 656 469 565 048 309 175 016 497   56 678 284 299 314 329 344 359 374 70 353 382 410 439 468 496 525 554 583   75 261 274 289 305 319 334 348 364 75 344 372 400 429 458 487 515 544 573   80 253 266 282 296 312 327 342 356 80 335 362 390 419 448 476 505 534 562

## 2022-01-24 NOTE — CARE COORDINATION
Call to pt's room to discuss transitional planning, no answer. Spoke to pt's daughter, Dave Boggs, via telephone to discuss transitional planning. She would like [facility] if pt needs LTACH. Referral sent.  Davy Trevino updated

## 2022-01-24 NOTE — PLAN OF CARE
Problem: Airway Clearance - Ineffective  Goal: Achieve or maintain patent airway  1/24/2022 0529 by Holly Camara RN  Outcome: Ongoing  1/23/2022 2120 by Michael Briscoe RCP  Outcome: Ongoing     Problem: Gas Exchange - Impaired  Goal: Absence of hypoxia  1/24/2022 0529 by Holly Camara RN  Outcome: Ongoing  1/23/2022 2120 by Michael Briscoe RCP  Outcome: Ongoing  Goal: Promote optimal lung function  1/24/2022 0529 by Holly Camara RN  Outcome: Ongoing  1/23/2022 2120 by Michael Briscoe RCP  Outcome: Ongoing     Problem: Breathing Pattern - Ineffective  Goal: Ability to achieve and maintain a regular respiratory rate  1/24/2022 0529 by Holly Camara RN  Outcome: Ongoing  1/23/2022 2120 by Michael Briscoe RCP  Outcome: Ongoing     Problem:  Body Temperature -  Risk of, Imbalanced  Goal: Ability to maintain a body temperature within defined limits  Outcome: Ongoing  Goal: Will regain or maintain usual level of consciousness  Outcome: Ongoing  Goal: Complications related to the disease process, condition or treatment will be avoided or minimized  Outcome: Ongoing     Problem: Isolation Precautions - Risk of Spread of Infection  Goal: Prevent transmission of infection  Outcome: Ongoing     Problem: Nutrition Deficits  Goal: Optimize nutritional status  Outcome: Ongoing     Problem: Risk for Fluid Volume Deficit  Goal: Maintain normal heart rhythm  Outcome: Ongoing  Goal: Maintain absence of muscle cramping  Outcome: Ongoing  Goal: Maintain normal serum potassium, sodium, calcium, phosphorus, and pH  Outcome: Ongoing     Problem: Loneliness or Risk for Loneliness  Goal: Demonstrate positive use of time alone when socialization is not possible  Outcome: Ongoing     Problem: Fatigue  Goal: Verbalize increase energy and improved vitality  Outcome: Ongoing     Problem: Patient Education: Go to Patient Education Activity  Goal: Patient/Family Education  Outcome: Ongoing     Problem: Skin Integrity:  Goal: Will show no infection signs and symptoms  Description: Will show no infection signs and symptoms  Outcome: Ongoing  Goal: Absence of new skin breakdown  Description: Absence of new skin breakdown  Outcome: Ongoing     Problem: Falls - Risk of:  Goal: Will remain free from falls  Description: Will remain free from falls  Outcome: Ongoing  Goal: Absence of physical injury  Description: Absence of physical injury  Outcome: Ongoing

## 2022-01-24 NOTE — PROGRESS NOTES
[facility] - Progress Note COVID 19 Patient  Today's Date and Time: 1/24/2022, 10:04 AM    Impression :     COVID 19 Confirmed Infection  Covid tests:  1.18.22 Positive  1/15/22 positive at home per patient  Acute hypoxic respiratory failure  Elevated inflammatory markers  UYEN  Systolic CHF  COPD  DM II  HTN  Hep C  Erectile dysfunction    Recommendations:   Antibiotic treatment:  Vancomycin initiated 1/20/22 for 1/2 MRSE blood culture 1/18/22. This is likely a contaminant-Discontinued 1/22/22  Repeat blood cultures ordered 1/20/22->1/2 staph epi  Repeat cultures 1/23/22  Covid Rx:    Remdesivir-Contraindicated  Decadron-Initiated 1/18/21  Actemra-ordered 1/19/2022  Monoclonal antibodies-Out of window      Medical Decision Making/Summary/Discussion:1/24/2022     Patient admitted with COVID 19 infection  Isolation until 2/5/22    Infection Control Recommendations   [place] Precautions  Airborne isolation  Droplet Isolation    Antimicrobial Stewardship Recommendations     Discontinuation of therapy  Coordination of Outpatient Care:   Estimated Length of IV antimicrobials:TBD  Patient will need Midline Catheter Insertion: TBD  Patient will need PICC line Insertion: No  Patient will need: Home IV , [place],  SNF,  LTAC:TBD  Patient will need outpatient wound care:No    Chief complaint/reason for consultation:   Concern for COVID infection      History of Present Illness:   Alysia Echols is a 74y.o.-year-old male who was initially admitted on 1/18/2022. Patient seen at the request of Dr. Cindy Munoz:    Patient presented through ER with complaints of fatigue and shortness of breath. The patient has a history of COPD and congestive heart failure EF 20 to 30%. The patient stated he tested positive for COVID 3 days prior to presentation in the ED. Rapid in the hospital was positive.     The patient was noted to have an SPO2 of 76% on room air with a fever of 100.7 °F.    Imaging of the chest revealed bilateral lower lung zone patchy airspace opacity potentially representing early COVID-pneumonia with stable mild cardiomegaly. CRP was elevated at 209.4  UYEN present  Elevated D-dimer    VQ scan ordered to look for PE     Decadron was initiated    The patient had an episode of bradycardia requiring an injection of atropine. Cardiology has been consulted. The patient is also disoriented to time and place. A CT head revealed no acute abnormality    Patient admitted because of concerns with COVID 19.    CURRENT EVALUATION : 1/24/2022    Afebrile  VS stable     Patient is on high flow nasal cannula with 60-->70% FiO2    Per Cardiology, he may need dual chamber ICD in near future based on LVEF and further episodes of bradycardia. MRSE on blood cultures x 1 -Vancomycin initiated  Repeat blood cultures pending    Hyponatremia and hyperkalemia resolved this morning  Creatinine level is back to normal    CRP is decreasing    Echocardiogram pending  VQ scan was unable to determine probability of pulmonary emboli    Patient exhibiting respiratory distress. yes    Patient receiving supplemental oxygen.  6 L NC--> hi flow  % FIO2: 50-->55-->60-->70  Flow Rate: 30-->50 L/min  RR:25-->30  02 sat:94-->93    NEWS Score: 0-4 Low risk group; 5-6: Medium risk group; 7 or above: High risk group  Parameters 3 2 1 0 1 2 3   Age    < 65   = 65   RR = 8  9-11 12-20  21-24 = 25   O2 Sats = 91 92-93 94-95 = 96      Suppl O2  Yes  No      SBP = 90  101-110 111-219   = 220   HR = 40  41-50 51-90  111-130 = 131   Consciousness    Alert   Drowsiness, lethargy, or confusion   Temperature = 35.0 C (95.0 F)  35.1-36.0 C 95.1-96.9 F 36.1-38.0 C 97.0-100.4 F 38.1-39.0 C 100.5-102.3 F = 39.1 C = 102.4 F      NEWS Score:  1/19/21:    Overall Daily Picture:     Unchanged    Presence of secondary bacterial Infection:  No   Additional antibiotics: Vancomycin    Labs, X rays reviewed: 1/24/2022    BUN:25-->22  Cr:0.68-->0.72    WBC:15.3-->11.9-->5.5-->5.0-->5.8  Hb:10.3-->11.9  Plat: 155-->159    Absolute Neutrophils:5.7  Absolute Lymphocytes:0.4  Neutrophil/Lymphocyte Ratio: 14 high risk    CRP:235.8-->209.4-->90.1-->36  Ferritin:353-->523  LDH: 246-->292-->243-->383    Pro Calcitonin:    MRSA probe: negative     Cultures:  Urine:    Blood:  1-18-22: 1/2 MRSE  1/20/22: 1/2 staph epri  Sputum :    Wound:      CXR:   1/18/21      CAT:      Discussed with patient, RN, CC, IM. I have personally reviewed the past medical history, past surgical history, medications, social history, and family history, and I have updated the database accordingly.   Past Medical History:     Past Medical History:   Diagnosis Date    [facility]     Diabetes mellitus ([facility]     Erectile dysfunction due to arterial insufficiency 12/7/2015    Hypertension     Microalbuminuria due to type 2 diabetes mellitus ([facility] 7/7/2016    Overweight (BMI 25.0-29.9) 12/7/2015       Past Surgical  History:     Past Surgical History:   Procedure Laterality Date    APPENDECTOMY      TOTAL ANKLE ARTHROPLASTY      LEFT       Medications:      [START ON 1/25/2022] albuterol  2.5 mg Nebulization BID    ipratropium-albuterol  1 ampule Inhalation 4x daily    hydrALAZINE  50 mg Oral 3 times per day    insulin glargine  10 Units SubCUTAneous BID    heparin (porcine)  5,000 Units SubCUTAneous 3 times per day    [Held by provider] amLODIPine  10 mg Oral Daily    aspirin  81 mg Oral Daily    atorvastatin  20 mg Oral Daily    budesonide-formoterol  2 puff Inhalation BID    [Held by provider] furosemide  20 mg Oral Daily    [Held by provider] lisinopril  20 mg Oral Daily    sodium chloride flush  5-40 mL IntraVENous 2 times per day    dexamethasone  6 mg Oral Daily    Vitamin D  2,000 Units Oral Daily    insulin lispro  0-6 Units SubCUTAneous TID     insulin lispro  0-3 Units SubCUTAneous Nightly    glucagon (rDNA)  1 mg IntraVENous Once       Social History:     Social History     Socioeconomic History    Marital status:      Spouse name: Not on file    Number of children: Not on file    Years of education: Not on file    Highest education level: Not on file   Occupational History    Not on file   Tobacco Use    Smoking status: Light Tobacco Smoker     Packs/day: 0.50     Types: Cigarettes     Last attempt to quit: 2017     Years since quittin.5    Smokeless tobacco: Never Used    Tobacco comment: reports he quit smoking in the past 2 months   Substance and Sexual Activity    Alcohol use: No     Alcohol/week: 0.0 standard drinks    Drug use: No    Sexual activity: Not on file   Other Topics Concern    Not on file   Social History Narrative    Not on file     Social Determinants of Health     Financial Resource Strain: Low Risk     Difficulty of Paying Living Expenses: Not very hard   Food Insecurity: No Food Insecurity    Worried About Running Out of Food in the Last Year: Never true    Rakesh of Food in the Last Year: Never true   Transportation Needs:     Lack of Transportation (Medical): Not on file    Lack of Transportation (Non-Medical):  Not on file   Physical Activity:     Days of Exercise per Week: Not on file    Minutes of Exercise per Session: Not on file   Stress:     Feeling of Stress : Not on file   Social Connections:     Frequency of Communication with Friends and Family: Not on file    Frequency of Social Gatherings with Friends and Family: Not on file    Attends Baptism Services: Not on file    Active Member of Clubs or Organizations: Not on file    Attends Club or Organization Meetings: Not on file    Marital Status: Not on file   Intimate Partner Violence:     Fear of Current or Ex-Partner: Not on file    Emotionally Abused: Not on file    Physically Abused: Not on file    Sexually Abused: Not on file   Housing Stability:     Unable to Pay for Housing in the Last Year: Not on file    Number of Places Lived in the Last Year: Not on file    Unstable Housing in the Last Year: Not on file       Family History:   No family history on file. Allergies:   Patient has no known allergies. Review of Systems:       Constitutional: Generalized fatigue with dyspnea  Head: No headaches  Eyes: No double vision or blurry vision. No conjunctival inflammation. ENT: No sore throat or runny nose. . No hearing loss, tinnitus or vertigo. Cardiovascular: No chest pain or palpitations. Shortness of breath. DUBON  Lung: Shortness of breath with chronic cough. Abdomen: No nausea, vomiting, diarrhea, or abdominal pain. Judah Awkward No cramps. Genitourinary: No increased urinary frequency, or dysuria. No hematuria. No suprapubic or CVA pain  Musculoskeletal: No muscle aches or pains. No joint effusions, swelling or deformities  Hematologic: No bleeding or bruising. Neurologic: No headache, weakness, numbness, or tingling. Integument: No rash, no ulcers. Psychiatric: No depression. Endocrine: No polyuria, no polydipsia, no polyphagia. Physical Examination :     Patient Vitals for the past 8 hrs:   BP Temp Temp src Pulse Resp SpO2   01/24/22 0754    102 (!) 32 92 %   01/24/22 0745      (!) 89 %   01/24/22 0735 (!) 140/67 98.2 °F (36.8 °C) Axillary 103 27 91 %   01/24/22 0434     30 92 %   01/24/22 0405 139/68 97.9 °F (36.6 °C) Oral 79 26    01/24/22 0300 134/62   63 23      General Appearance: Awake, alert, and in no apparent distress  Head:  Normocephalic, no trauma  Eyes: Pupils equal, round, reactive to light; sclera anicteric; conjunctivae pink. No embolic phenomena. ENT: Oropharynx clear, without erythema, exudate, or thrush. No tenderness of sinuses. Mouth/throat: mucosa pink and moist. No lesions. Dentition in good repair. Neck:Supple, without lymphadenopathy. Thyroid normal, No bruits.   Pulmonary/Chest: Clear to auscultation, without wheezes, rales, or rhonchi. No dullness to percussion. Cardiovascular: Regular rate and rhythm without murmurs, rubs, or gallops. Abdomen: Soft, non tender. Bowel sounds normal. No organomegaly  All four Extremities: No cyanosis, clubbing, edema, or effusions. Neurologic: Disorientation to time and place  Skin: Warm and dry with good turgor. No signs of peripheral arterial or venous insufficiency. No ulcerations. No open wounds. Medical Decision Making -Laboratory:   I have independently reviewed/ordered the following labs:    CBC with Differential:   Recent Labs     01/23/22  0508 01/24/22  0359   WBC 5.0 5.8   HGB 10.3* 11.9*   HCT 31.6* 36.5*    159   LYMPHOPCT 6* 9*   MONOPCT 5 5     BMP:   Recent Labs     01/23/22  0508 01/24/22  0359    139   K 4.7 4.8    106   CO2 26 27   BUN 25* 22   CREATININE 0.68* 0.76     Hepatic Function Panel:   Recent Labs     01/23/22  0508 01/24/22  0359   PROT 4.6* 5.1*   LABALBU 2.7* 3.0*   BILITOT 0.42 0.56   ALKPHOS 59 70   ALT 30 29   AST 59* 46*     No results for input(s): RPR in the last 72 hours. No results for input(s): HIV in the last 72 hours. No results for input(s): BC in the last 72 hours.   Lab Results   Component Value Date    MUCUS 2+ 07/01/2017    RBC 3.92 01/24/2022    TRICHOMONAS NOT REPORTED 07/01/2017    WBC 5.8 01/24/2022    YEAST NOT REPORTED 07/01/2017    TURBIDITY Clear 01/19/2022     Lab Results   Component Value Date    CREATININE 0.76 01/24/2022    GLUCOSE 98 01/24/2022       Medical Decision Making-Imaging:     Narrative   EXAMINATION:   ONE XRAY VIEW OF THE CHEST       1/18/2022 10:02 pm       COMPARISON:   09/08/2019       HISTORY:   ORDERING SYSTEM PROVIDED HISTORY: hypoxic   TECHNOLOGIST PROVIDED HISTORY:   hypoxic       FINDINGS:   Stable mild cardiomegaly.  Pulmonary vasculature appears normal.  Mild patchy   airspace opacity is present at the bilateral lower lung zones.  No   pneumothorax or pleural effusion.  Surrounding structures are unremarkable.           Impression   Mild bilateral lower lung zone patchy airspace opacity potentially   representing early COVID pneumonia.       Mild stable cardiomegaly.         Narrative   EXAMINATION:   CT OF THE HEAD WITHOUT CONTRAST  1/18/2022 6:31 pm       TECHNIQUE:   CT of the head was performed without the administration of intravenous   contrast. Dose modulation, iterative reconstruction, and/or weight based   adjustment of the mA/kV was utilized to reduce the radiation dose to as low   as reasonably achievable.       COMPARISON:   CT scan dated March 3, 2015       HISTORY:   ORDERING SYSTEM PROVIDED HISTORY: AMS   TECHNOLOGIST PROVIDED HISTORY:       AMS   Decision Support Exception - unselect if not a suspected or confirmed   emergency medical condition->Emergency Medical Condition (MA)   Reason for Exam: COVID+, AMS       FINDINGS:   BRAIN/VENTRICLES: There is no acute intracranial hemorrhage, mass effect or   midline shift.  No abnormal extra-axial fluid collection.  The gray-white   differentiation is maintained without evidence of an acute infarct.  There is   no evidence of hydrocephalus. Generalized atrophy is present.    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       ORBITS: The visualized portion of the orbits demonstrate no acute abnormality.       SINUSES: Mild degree of mucosal thickening is present within the right   maxillary sinus.  Minimal mucosal thickening is present within the left   maxillary sinus.       SOFT TISSUES/SKULL:  No acute abnormality of the visualized skull or soft   tissues.           Impression   No acute intracranial abnormality.                   Medical Decision [id number]:       Medical Decision Making-Other:     Note:  Labs, medications, radiologic studies were reviewed with personal review of films  Large amounts of data were reviewed  Discussed with nursing Staff, Discharge planner  Infection Control and Prevention measures reviewed  All prior entries were reviewed  Administer medications as ordered  Prognosis: Guarded  Discharge planning reviewed      Thank you for allowing us to participate in the care of this patient. Please call with questions. Sherlean Klinefelter, MARY - CNP       ATTESTATION:    I have discussed the case, including pertinent history and exam findings with the APRN. I have evaluated the  History, physical findings and pictures of the patient and the key elements of the encounter have been performed by me. I have reviewed the laboratory data, other diagnostic studies and discussed them with the APRN. I have updated the medical record where necessary. I agree with the assessment, plan and orders as documented by the APRN.     Waldo Dakin, MD.                Pager: [phone] - Office: [phone]

## 2022-01-24 NOTE — PROGRESS NOTES
Occupational Therapy   Occupational Therapy Initial Assessment  Date: 2022   Patient Name: Lisa Tejeda  MRN: [id number]     : 1955    Date of Service: 2022     Chief Complaint   Patient presents with    Shortness of Breath     covid positive a few days ago, 76% on room air    Extremity Weakness       Discharge Recommendations:  Patient would benefit from continued therapy after discharge  OT Equipment Recommendations  Equipment Needed: Yes  Mobility Devices: ADL Assistive Devices  ADL Assistive Devices: Shower Chair with back;Grab Bars - shower    Assessment   Performance deficits / Impairments: Decreased functional mobility ; Decreased ADL status; Decreased endurance;Decreased high-level IADLs;Decreased safe awareness;Decreased cognition  Assessment: Pt supine in bed upon arrival and completed supine to sit transfer with SBA. Pt completed sit<>stand functional transfers and functional mobility EOB to bedside recliner with CGA. Use of RW. Pt limited by fatigue. OT facilitated grooming task seated at bedside chair. Pt completed IND. Declined to complete further ADLs/functional tasks d/t fatigue. Pt limited by decreased endurance. Pt is expected to require skilled OT services during their acute hospitalization stay to address the above noted deficits through skilled occupational therapy intervention for promotion of increased independence throughout ADLs, IADLs and functional mobility tasks. Prognosis: Good  Decision Making: Medium Complexity  Patient Education: Pt educated on OT role, OT POC, activity promotion, energy conservation, transfer training-good return  REQUIRES OT FOLLOW UP: Yes  Activity Tolerance  Activity Tolerance: Patient Tolerated treatment well  Safety Devices  Safety Devices in place: Yes  Type of devices: Gait belt;Call light within reach; Left in chair;Nurse notified  Restraints  Initially in place: No           Patient Diagnosis(es): The primary encounter diagnosis was COVID-19. Diagnoses of Hypoxemia and Elevated d-dimer were also pertinent to this visit. has a past medical history of COPD (chronic obstructive pulmonary disease) ([facility] [address] Diabetes mellitus ([facility] [address] Erectile dysfunction due to arterial insufficiency, Hypertension, Microalbuminuria due to type 2 diabetes mellitus ([facility] [address] and Overweight (BMI 25.0-29.9). has a past surgical history that includes Appendectomy and Total ankle arthroplasty. Restrictions  Restrictions/Precautions  Restrictions/Precautions: Fall Risk,Up as Tolerated,Isolation  Required Braces or Orthoses?: No    Subjective   General  Patient assessed for rehabilitation services?: Yes  Family / Caregiver Present: No  General Comment  Comments: RN ok'd for OT/PT eval this AM. Pt agreeable to session, pleasent/cooperative throughout. Patient Currently in Pain: Denies    Social/Functional History  Social/Functional History  Lives With: Son  Type of Home: Apartment  Home Layout: One level  Home Access: Stairs to enter with rails  Entrance Stairs - Number of Steps: 8 stairs to get into building w/ rail on L side. On the second floor apartment requiring to climb full flight of stairs to reach front door once inside w/ rail. Entrance Stairs - Rails: Left  Bathroom Shower/Tub: Tub/Shower unit  Bathroom Toilet: Standard  Bathroom Equipment:  (No additional bathroom equiptment)  Home Equipment: Rolling walker,Cane (Uses cane daily per pt report.)  ADL Assistance: [address]: Needs assistance  Homemaking Responsibilities: Yes (Shares with son, son completes heavy responsibilities)  Meal Prep Responsibility: Secondary  Laundry Responsibility: Secondary  Cleaning Responsibility: Secondary  Shopping Responsibility: No (Son completes.  [employer] delivery for groceries typically)  Active : No  Patient's  Info: Walk, reports cannot afford cab transportation  Occupation: Retired  Leisure & Hobbies: sleeping  Additional Comments: Son is not currently working and is able to provide 24/7 if needed upon discharge. Objective   Vision: Impaired  Vision Exceptions: Wears glasses for reading    Hearing: Within functional limits      Orientation  Overall Orientation Status: Within Functional Limits     Balance  Sitting Balance: Stand by assistance (Seated EOB unsupported for ROM/MMT ~5 min)  Standing Balance: Contact guard assistance  Standing Balance  Time: 2 min  Activity: static standing  Comment: no unsteadiness/LOB    Functional Mobility  Functional - Mobility Device: Rolling Walker  Activity: Other  Assist Level: Contact guard assistance  Functional Mobility Comments: Completed functional mobility EOB to bedside recliner. Use of RW. Steady, but fatigued quickly. SPO2 dropped to 89%, but recovered quickly. ADL  Feeding: Independent;Setup  Grooming: Independent;Setup  UE Bathing: Stand by assistance;Setup; Increased time to complete  LE Bathing: Contact guard assistance;Setup; Increased time to complete;Verbal cueing  UE Dressing: Stand by assistance  LE Dressing: Setup; Increased time to complete;Verbal cueing;Contact guard assistance  Toileting: Contact guard assistance;Setup; Increased time to complete  Additional Comments: OT facilitated grooming task seated in bedside chair. Pt washed face with setup. D/t fatigue, pt declined all further ADL/functional tasks despite encouragement. Tone RUE  RUE Tone: Normotonic  Tone LUE  LUE Tone: Normotonic  Coordination  Movements Are Fluid And Coordinated: Yes     Bed mobility  Supine to Sit: Stand by assistance  Sit to Supine:  (Retired to bedside recliner at end of session)  Scooting: Stand by assistance  Comment: HOB elevated ~30 degrees     Transfers  Sit to stand: Contact guard assistance  Stand to sit: Contact guard assistance  Transfer Comments: Use of RW. Increased time to follow writer's directions for hand placement but did not require second cue.      Cognition  Overall Cognitive Status: Exceptions  Arousal/Alertness: Appropriate responses to stimuli  Following Commands: Follows one step commands consistently; Follows multistep commands with increased time  Attention Span: Appears intact  Memory: Appears intact  Safety Judgement: Decreased awareness of need for safety  Insights: Decreased awareness of deficits  Initiation: Requires cues for some  Sequencing: Requires cues for some        Sensation  Overall Sensation Status: WFL (Denies any numbness/tingling)        LUE AROM (degrees)  LUE AROM : WFL  Left Hand AROM (degrees)  Left Hand AROM: WFL  RUE AROM (degrees)  RUE AROM : WFL  Right Hand AROM (degrees)  Right Hand AROM: WFL  LUE Strength  Gross LUE Strength: WFL  L Hand General: 4+/5  LUE Strength Comment: Grossly 4+/5  RUE Strength  Gross RUE Strength: WFL  R Hand General: 4+/5  RUE Strength Comment: Grossy 4+5          Plan   Plan  Times per week: 3-4x/wk  Current Treatment Recommendations: Safety Education & Training,Patient/Caregiver Education & Training,Self-Care / ADL,Balance Training,Functional Mobility Training,Home Management Training,Endurance Training      AM-PAC Score        -[facility] Inpatient Daily Activity Raw Score: 20 (01/24/22 1537)  AM-PAC Inpatient ADL T-Scale Score : 42.03 (01/24/22 1537)  ADL Inpatient CMS 0-100% Score: 38.32 (01/24/22 1537)  ADL Inpatient CMS G-Code Modifier : CJ (01/24/22 1537)    Goals  Short term goals  Time Frame for Short term goals: By discharge, pt will:  Short term goal 1: Demo functional sit<>stand transfers with SUP and LRD  Short term goal 2: Demo functional mobility with SBA and use of LRD  Short term goal 3: Demo +8 minutes of dynamic standing balance with unilateral hand release at SBA to promote increased engagement in ADLs  Short term goal 4: Demo UB ADLs with SUP and DME  Short term goal 5: Demo LB ADLs with SBA, setup and use of DME PRN  Short term goal 6: Demo +30 minutes of acitivity tolerance throughout ADLs/functional tasks to promote increased endurance  Short term goal 7: Demo IND use of energy conservation techniques to promote increased engagement in ADLs/IADLs       Therapy Time   Individual Concurrent Group Co-treatment   Time In 0937         Time Out 1003         Minutes 26         Timed Code Treatment Minutes: 9 Minutes       Alina Manuel OTR/L

## 2022-01-24 NOTE — PROGRESS NOTES
[facility]    Progress Note    Date:   1/24/2022  Patient name:  Alex Mckeon  Date of admission:  1/18/2022  9:04 PM  MRN:   [id number]  YOB: 1955    SUBJECTIVE/Last 24 hours update:     Patient seen and examined at bedside this morning. No acute events overnight, patient currently saturating on FiO2 70, 50 L high flow nasal cannula. Afebrile, slightly tachycardic and tachypnic. Patient receiving IV fluids 75 cc an hour. LDH up trended from 298-386 today, D-dimer 3.12. Will get CXR, pending pulm recs. Notes from nursing staff and Consults had been reviewed, and the overnight progress had been checked with the nursing staff as well. HPI:   Please see H&P    REVIEW OF SYSTEMS:      CONSTITUTIONAL:  no fevers, no headcahes  EYES: negative for blury vision  HEENT: No headaches, No nasal congestion, no difficulty swallowing  RESPIRATORY:negative for dyspnea, no wheezing, no Cough  CARDIOVASCULAR: negative for chest pain, no palpitations  GASTROINTESTINAL: no nausea, no vomiting, no change in bowel habits, no abdominal pain   GENITOURINARY: negative for dysuria, no hematuria   MUSCULOSKELETAL: no joint pains, no muscle aches, no swelling of joints or extremities  NEUROLOGICAL: No  Weakness or numbness      PAST MEDICAL HISTORY:      has a past medical history of COPD (chronic obstructive pulmonary disease) ([facility] [address] Diabetes mellitus ([facility] [address] Erectile dysfunction due to arterial insufficiency, Hypertension, Microalbuminuria due to type 2 diabetes mellitus ([facility] [address] and Overweight (BMI 25.0-29.9). PAST SURGICAL HISTORY:      has a past surgical history that includes Appendectomy and Total ankle arthroplasty. SOCIAL HISTORY:      reports that he has been smoking cigarettes. He has been smoking about 0.50 packs per day. He has never used smokeless tobacco. He reports that he does not drink alcohol and does not use drugs. FAMILY HISTORY:     family history is not on file. HOME MEDICATIONS:      Prior to Admission medications    Medication Sig Start Date End Date Taking? Authorizing Provider   amLODIPine (NORVASC) 10 MG tablet TAKE 1 TABLET BY MOUTH DAILY 10/25/21   Ekaterina Banda MD   aspirin (HM ASPIRIN EC LOW DOSE) 81 MG EC tablet TAKE 1 TABLET BY MOUTH DAILY 8/26/21   Liseth Ritter MD   furosemide (LASIX) 20 MG tablet Take 1 tablet by mouth daily 8/26/21   Liseth Ritter MD   Blood Pressure KIT Check blood pressure daily 8/26/21   Liseth Ritter MD   albuterol sulfate HFA (PROAIR HFA) 108 (90 Base) MCG/ACT inhaler inhale 2 puffs by mouth every 6 hours if needed for wheezing 8/16/21   Te Gonzalez MD   fluticasone-salmeterol (ADVAIR DISKUS) 100-50 MCG/DOSE diskus inhaler INHALE 1 PUFF INTO THE LUNGS EVERY 12 HOURS 8/16/21   Te Gonzalez MD   carvedilol (COREG) 25 MG tablet Take 1 tablet by mouth 2 times daily 8/16/21   Lizzie Godfrey MD   atorvastatin (LIPITOR) 20 MG tablet Take 1 tablet by mouth daily 7/21/21   Ekaterina Banda MD   lisinopril (PRINIVIL;ZESTRIL) 20 MG tablet Take 1 tablet by mouth daily 7/21/21   Ekaterina Banda MD   ipratropium (ATROVENT HFA) 17 MCG/ACT inhaler Inhale 1 puff into the lungs 2 times daily 9/2/20 10/2/20  Micaela Brewer MD   tiotropium (Lonell Schilder) 18 MCG inhalation capsule Inhale 1 capsule into the lungs daily 1/9/20   Micaela Brewer MD   nicotine (NICODERM CQ) 14 MG/24HR Place 1 patch onto the skin daily 12/25/16   Ijeoma Sanders MD       ALLERGIES:     Patient has no known allergies.       OBJECTIVE:       Vitals:    01/24/22 0405 01/24/22 0434 01/24/22 0735 01/24/22 0745   BP: 139/68  (!) 140/67    Pulse: 79  103    Resp: 26 30 27    Temp: 97.9 °F (36.6 °C)  98.2 °F (36.8 °C)    TempSrc: Oral  Axillary    SpO2:  92% 91% (!) 89%   Weight:       Height:             Intake/Output Summary (Last 24 hours) at 1/24/2022 2056  Last data filed at 1/24/2022 0430  Gross per 24 hour   Intake 370 ml   Output 1225 ml   Net -855 ml       PHYSICAL EXAM:  General Appearance  Alert , awake , not in acute distress  HEENT - Head is normocephalic, atraumatic. Lungs - Bilateral equal air entry , no wheezes, rales or rhonchi, aeration good  Cardiovascular - Heart sounds are normal.  Regular rhythm, normal rate without murmur, gallop or rub. Abdomen - Soft, nontender, nondistended, no masses or organomegaly  Neurologic - There are no new focal motor or sensory deficits  Skin - No bruising or bleeding on exposed skin area  Extremities - No cyanosis, clubbing or edema      DIAGNOSTICS:     Laboratory Testing:    Recent Results (from the past 24 hour(s))   Culture, Blood 1    Collection Time: 01/23/22  3:10 PM    Specimen: Blood   Result Value Ref Range    Specimen Description . BLOOD     Special Requests NOT REPORTED     Culture NO GROWTH 12 HOURS    Culture, Blood 1    Collection Time: 01/23/22  3:10 PM    Specimen: Blood   Result Value Ref Range    Specimen Description . BLOOD     Special Requests NOT REPORTED     Culture NO GROWTH 12 HOURS    POC Glucose Fingerstick    Collection Time: 01/23/22  4:47 PM   Result Value Ref Range    POC Glucose 149 (H) 75 - 110 mg/dL   POC Glucose Fingerstick    Collection Time: 01/23/22  9:41 PM   Result Value Ref Range    POC Glucose 112 (H) 75 - 110 mg/dL   CBC Auto Differential    Collection Time: 01/24/22  3:59 AM   Result Value Ref Range    WBC 5.8 3.5 - 11.3 k/uL    RBC 3.92 (L) 4.21 - 5.77 m/uL    Hemoglobin 11.9 (L) 13.0 - 17.0 g/dL    Hematocrit 36.5 (L) 40.7 - 50.3 %    MCV 93.1 82.6 - 102.9 fL    MCH 30.4 25.2 - 33.5 pg    MCHC 32.6 28.4 - 34.8 g/dL    RDW 13.1 11.8 - 14.4 %    Platelets 696 524 - 886 k/uL    MPV 10.5 8.1 - 13.5 fL    NRBC Automated 0.3 (H) 0.0 per 100 WBC    Differential Type NOT REPORTED     WBC Morphology NOT REPORTED     RBC Morphology NOT REPORTED     Platelet Estimate NOT REPORTED     Seg Neutrophils 85 (H) 36 - 65 %    Lymphocytes 9 (L) 24 - 43 %    Monocytes 5 3 - 12 %    Eosinophils % 0 (L) 1 - 4 %    Basophils 0 0 - 2 %    Immature Granulocytes 2 (H) 0 %    Segs Absolute 4.93 1.50 - 8.10 k/uL    Absolute Lymph # 0.52 (L) 1.10 - 3.70 k/uL    Absolute Mono # 0.26 0.10 - 1.20 k/uL    Absolute Eos # <0.03 0.00 - 0.44 k/uL    Basophils Absolute <0.03 0.00 - 0.20 k/uL    Absolute Immature Granulocyte 0.11 0.00 - 0.30 k/uL   Comprehensive Metabolic Panel w/ Reflex to MG    Collection Time: 01/24/22  3:59 AM   Result Value Ref Range    Glucose 98 70 - 99 mg/dL    BUN 22 8 - 23 mg/dL    CREATININE 0.76 0.70 - 1.20 mg/dL    Bun/Cre Ratio NOT REPORTED 9 - 20    Calcium 8.2 (L) 8.6 - 10.4 mg/dL    Sodium 139 135 - 144 mmol/L    Potassium 4.8 3.7 - 5.3 mmol/L    Chloride 106 98 - 107 mmol/L    CO2 27 20 - 31 mmol/L    Anion Gap 6 (L) 9 - 17 mmol/L    Alkaline Phosphatase 70 40 - 129 U/L    ALT 29 5 - 41 U/L    AST 46 (H) <40 U/L    Total Bilirubin 0.56 0.3 - 1.2 mg/dL    Total Protein 5.1 (L) 6.4 - 8.3 g/dL    Albumin 3.0 (L) 3.5 - 5.2 g/dL    Albumin/Globulin Ratio 1.4 1.0 - 2.5    GFR Non-African American >60 >60 mL/min    GFR African American >60 >60 mL/min    GFR Comment          GFR Staging NOT REPORTED    LACTATE DEHYDROGENASE    Collection Time: 01/24/22  3:59 AM   Result Value Ref Range     (H) 135 - 225 U/L   D-DIMER, QUANTITATIVE    Collection Time: 01/24/22  3:59 AM   Result Value Ref Range    D-Dimer, Quant 3.12 mg/L FEU         Imaging/Diagonstics:    ECHO Complete 2D W Doppler W Color    Result Date: 1/21/2022  Transthoracic Echocardiography Report (TTE)  Patient Name  [address] [facility]      Date of Study                   01/21/2022                [facility]   Date of Birth 1955  Gender                          Male   Age           74 year(s)  Race                               Room Number   3007   Corporate ID  [id number]  #   Patient Ade Ellis  [de-identified]  #   MR #          [id number]     AKUZARCDXZB Yeyoalvin Cruz   Accession #   [id number]  Interpreting Physician          [address] [place] [address]   Fellow                    Referring Nurse Practitioner   Interpreting              Referring Physician             Jen Rhoades  Fellow  Type of Study   TTE procedure:2D Echocardiogram, M-Mode, Doppler, Color Doppler. Procedure Date Date: 01/21/2022 Start: 02:27 PM Study Location: [facility] Technical Quality: Adequate visualization Indications:Bradycardia. History / Tech. Comments: Procedure explained to patient. Echo completed at the bedside. PMHx: Covid-19 Hypertension, Hyperlipidemia, Diabetes Patient Status: Inpatient CONCLUSIONS Summary Left ventricle is normal in size, global left ventricular systolic function is low normal, calculated ejection fraction is 51%. Aortic valve sclerosis without stenosis. Mitral valve sclerosis without stenosis. No significant valvular regurgitation or stenosis seen. Signature ----------------------------------------------------------------------------  Electronically signed by Wanda Bolton(Sonographer) on 01/21/2022 03:26 PM ---------------------------------------------------------------------------- ----------------------------------------------------------------------------  Electronically signed by Delvis Maki(Interpreting physician) on 01/21/2022  04:11 PM ---------------------------------------------------------------------------- FINDINGS Left Atrium Left atrium is at upper limits of normal. Inter-atrial septum is intact with no evidence for an atrial septal defect, by color doppler. Left Ventricle Left ventricle is normal in size, global left ventricular systolic function is low normal, calculated ejection fraction is 51%. Right Atrium Right atrium is normal in size. Right Ventricle Normal right ventricular size and function. Mitral Valve Normal mitral valve structure. Mitral valve sclerosis without stenosis. No evidence of mitral regurgitation.  Aortic Valve Aortic valve is trileaflet. Aortic valve sclerosis without stenosis. No aortic insufficiency. Tricuspid Valve Tricuspid valve was not well visualized. No tricuspid regurgitation was seen. Pulmonic Valve Pulmonic valve not well visualized but Doppler velocities are normal. Pericardial Effusion No significant pericardial effusion is seen. Miscellaneous Normal aortic root dimension. E/E' average = 10.4. IVC normal diameter & inspiratory collapse indicating normal RA filling pressure .  M-mode / 2D Measurements & Calculations:   LVIDd:5.7 cm(3.7 - 5.6 cm)       Diastolic SKECKW:479.40 ml  IVSd:0.8 cm(0.6 - 1.1 cm)        Systolic SSHNQ.52 ml  LVPWd:0.8 cm(0.6 - 1.1 cm)       Aortic Root:3.3 cm(2.0 - 3.7 cm)                                   LA Dimension: 4.5 cm(1.9 - 4.0 cm)  Calculated LVEF (%): 50.8 %      LA volume/Index: 50.58 ml                                   LVOT:2.2 cm                                   RVDd:4 cm   Mitral:                                 Aortic   Valve Area (P1/2-Time): 3.86 cm^2       Peak Velocity: 1.66 m/s  Peak E-Wave: 1.14 m/s                   Mean Velocity: 1.04 m/s  Peak A-Wave: 1.08 m/s                   Peak Gradient: 11.02 mmHg  E/A Ratio: 1.06                         Mean Gradient: 5 mmHg  Peak Gradient: 5.2 mmHg  Mean Gradient: 2 mmHg  Deceleration Time: 201 msec             Area (continuity): 2.71 cm^2  P1/2t: 57 msec                          AV VTI: 40.1 cm   Area (continuity): 3.26 cm^2  Mean Velocity: 0.51 m/s                                           Pulmonic:                                           Peak Velocity: 0.75 m/s                                          Peak Gradient: 2.23 mmHg  Diastology / Tissue Doppler Septal Wall E' velocity:0.09 m/s Septal Wall E/E':12.2 Lateral Wall E' velocity:0.13 m/s Lateral Wall E/E':8.6    CT HEAD WO CONTRAST    Result Date: 2022  EXAMINATION: CT OF THE HEAD WITHOUT CONTRAST  2022 6:31 pm TECHNIQUE: CT of the head was performed without the administration of intravenous contrast. Dose modulation, iterative reconstruction, and/or weight based adjustment of the mA/kV was utilized to reduce the radiation dose to as low as reasonably achievable. COMPARISON: CT scan dated March 3, 2015 HISTORY: ORDERING SYSTEM PROVIDED HISTORY: [facility] TECHNOLOGIST PROVIDED HISTORY: AMS Decision Support Exception - unselect if not a suspected or confirmed emergency medical condition->Emergency Medical Condition (MA) Reason for Exam: COVID+, AMS FINDINGS: BRAIN/VENTRICLES: There is no acute intracranial hemorrhage, mass effect or midline shift. No abnormal extra-axial fluid collection. The gray-white differentiation is maintained without evidence of an acute infarct. There is no evidence of hydrocephalus. Generalized atrophy is present. 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 ORBITS: The visualized portion of the orbits demonstrate no acute abnormality. SINUSES: Mild degree of mucosal thickening is present within the right maxillary sinus. Minimal mucosal thickening is present within the left maxillary sinus. SOFT TISSUES/SKULL:  No acute abnormality of the visualized skull or soft tissues. No acute intracranial abnormality. NM LUNG SCAN PERFUSION ONLY    Addendum Date: 1/21/2022    ADDENDUM: Upon further review, the perfusion defect at the left lung base is much smaller than the corresponding opacity on the chest x-ray. This is low probability for pulmonary embolism. Findings were discussed with Dr. Geovany Salazar at 10:15 a.m. 01/21/2022. Result Date: 1/21/2022  EXAMINATION: NUCLEAR MEDICINE PERFUSION SCAN. 1/20/2022 TECHNIQUE: Ventilation not performed as part of COVID-19 safety precautions.   4 millicuries of Tc 00K MAA was administered intravenously prior to planar imaging of the lungs in multiple projections. COMPARISON: Chest radiograph 01/19/2022 at 7:14 p.m.. HISTORY: ORDERING SYSTEM PROVIDED HISTORY: SOB, COVID + FINDINGS: PERFUSION: Diminished segmental perfusion left lower lobe corresponding to area of consolidation on chest radiograph. Distribution of radiotracer is otherwise homogeneous. No other segmental defects identified. CHEST RADIOGRAPH: Dense left lower lobe consolidation on recent chest radiograph. Suboptimal exam without more recent chest radiograph for comparison. Indeterminate probability for pulmonary embolism. XR CHEST PORTABLE    Result Date: 1/19/2022  EXAMINATION: ONE XRAY VIEW OF THE CHEST 1/19/2022 7:29 pm COMPARISON: 01/18/2022 HISTORY: ORDERING SYSTEM PROVIDED HISTORY: rule out ptx TECHNOLOGIST PROVIDED HISTORY: rule out ptx Reason for Exam: port urpt FINDINGS: Chest radiograph: The cardiomediastinal silhouette and hilar contours are stable with moderate cardiomegaly. Slight worsening consolidative changes of left lower lobe and small left pleural effusion. Worsening increased interstitial markings and ground-glass densities throughout both lungs. No pneumothorax. The overlying soft tissue and osseous structures do not demonstrate acute abnormality. No pneumothorax. Stable moderate cardiomegaly. Slight worsening consolidative changes of left lower lobe and small left pleural effusion. Worsening increased interstitial markings and ground-glass densities throughout both lungs. The finding can be seen in setting of pulmonary edema or viral pneumonia. XR CHEST PORTABLE    Result Date: 1/18/2022  EXAMINATION: ONE XRAY VIEW OF THE CHEST 1/18/2022 10:02 pm COMPARISON: 09/08/2019 HISTORY: ORDERING SYSTEM PROVIDED HISTORY: hypoxic TECHNOLOGIST PROVIDED HISTORY: hypoxic FINDINGS: Stable mild cardiomegaly. Pulmonary vasculature appears normal.  Mild patchy airspace opacity is present at the bilateral lower lung zones. No pneumothorax or pleural effusion.   Surrounding structures are unremarkable. Mild bilateral lower lung zone patchy airspace opacity potentially representing early COVID pneumonia. Mild stable cardiomegaly. VL DUP LOWER EXTREMITY VENOUS BILATERAL    Result Date: 2022    [facility]  Vascular Lower Extremities DVT Study Procedure   Patient Name Buster Loya      Date of Study           2022               Renee Anne   Date of      1955  Gender                  Male  Birth   Age          74 year(s)  Race                       Room Number  3007   Corporate ID [id number]  #   Patient Farrah [de-identified]  #   MR #         [id number]     Sonographer             Mendez Valencia RVT   Accession #  [id number]  Interpreting Physician  Kevin Flores   Referring                Referring Physician     Milton Hairston *  Nurse  Practitioner  Procedure Type of Study:   Veins: Lower Extremities DVT Study, Venous Scan Lower Bilateral.  Patient Status: In Patient. Technical Quality:Limited visualization. Limitation reason:I.V. placement from groin to mid thigh with bandaging. .  Conclusions   Summary   Simultaneous real time imaging utilizing B-Mode, color doppler and  spectral waveform analysis was performed on the bilateral lower  extremities for venous examination of the deep and superficial systems. Findings are:   Right:  Technically limited study as stated above however in the visualized areas  no superficial or deep vein thrombosis was identified. Left:  No evidence of deep or superficial venous thrombosis.    Signature   ----------------------------------------------------------------  Electronically signed by Fab Callejas RVT(Sonographer)  on 2022 11:27 AM  ----------------------------------------------------------------   ----------------------------------------------------------------  Electronically signed by Stacy Collins(Interpreting  physician) on 2022 06:30 PM ----------------------------------------------------------------  Findings:   Right Impression:                       Left Impression:  The common femoral, deep profunda,      The common femoral, femoral,  proximal femoral vein and proximal      popliteal and tibial veins  greater saphenous vein could not be     demonstrate normal compressibility  visualized due to I.V. placement. and augmentation. The proximal common femoral, mid to     Normal compressibility of the  distal femoral, popliteal and tibial    great saphenous vein. veins demonstrate normal                Normal compressibility of the  compressibility and augmentation. great saphenous vein. Normal compressibility of the great  saphenous vein. Normal compressibility of the small  saphenous vein. Risk Factors History +--------------------------+---------------------+-------------------------+ ! Diagnosis                 ! Date                 ! Comments                 ! +--------------------------+---------------------+-------------------------+ ! CHF                       ! !                         ! +--------------------------+---------------------+-------------------------+   - The patient's risk factor(s) include: previous angina/MI, diabetes     mellitus and arterial hypertension. Comments: COVID positive Velocities are measured in cm/s ; Diameters are measured in cm Right Lower Extremities DVT Study Measurements Right 2D Measurements +------------------------------------+----------+---------------+----------+ ! Location                            ! Visualized! Compressibility! Thrombosis! +------------------------------------+----------+---------------+----------+ ! Common Femoral                      !Yes       ! Yes            ! None      ! +------------------------------------+----------+---------------+----------+ ! Prox Femoral                        !No        !               !          ! +------------------------------------+----------+---------------+----------+ ! Mid Femoral                         !Yes       ! Yes            ! None      ! +------------------------------------+----------+---------------+----------+ ! Dist Femoral                        !Yes       ! Yes            ! None      ! +------------------------------------+----------+---------------+----------+ ! Deep Femoral                        !No        !               !          ! +------------------------------------+----------+---------------+----------+ ! Popliteal                           !Yes       ! Yes            ! None      ! +------------------------------------+----------+---------------+----------+ ! Sapheno Femoral Junction            ! Yes       ! Yes            ! None      ! +------------------------------------+----------+---------------+----------+ ! PTV                                 ! Yes       ! Yes            ! None      ! +------------------------------------+----------+---------------+----------+ ! Peroneal                            !Yes       ! Yes            ! None      ! +------------------------------------+----------+---------------+----------+ ! Gastroc                             ! Yes       ! Yes            ! None      ! +------------------------------------+----------+---------------+----------+ ! GSV Thigh                           ! No        !               !          ! +------------------------------------+----------+---------------+----------+ ! GSV Knee                            ! Yes       ! Yes            ! None      ! +------------------------------------+----------+---------------+----------+ ! GSV Ankle                           ! Yes       ! Yes            ! None      ! +------------------------------------+----------+---------------+----------+ ! SSV                                 ! Yes       ! Yes            ! None      ! +------------------------------------+----------+---------------+----------+ Right Doppler Measurements +---------------------------+------+------+--------------------------------+ ! Location                   ! Signal!Reflux! Reflux (msec)                   ! +---------------------------+------+------+--------------------------------+ ! Common Femoral             !Phasic!      !                                ! +---------------------------+------+------+--------------------------------+ ! Popliteal                  !Phasic!      !                                ! +---------------------------+------+------+--------------------------------+ Left Lower Extremities DVT Study Measurements Left 2D Measurements +------------------------------------+----------+---------------+----------+ ! Location                            ! Visualized! Compressibility! Thrombosis! +------------------------------------+----------+---------------+----------+ ! Common Femoral                      !Yes       ! Yes            ! None      ! +------------------------------------+----------+---------------+----------+ ! Prox Femoral                        !Yes       ! Yes            ! None      ! +------------------------------------+----------+---------------+----------+ ! Mid Femoral                         !Yes       ! Yes            ! None      ! +------------------------------------+----------+---------------+----------+ ! Dist Femoral                        !Yes       ! Yes            ! None      ! +------------------------------------+----------+---------------+----------+ ! Deep Femoral                        !No        !               !          ! +------------------------------------+----------+---------------+----------+ ! Popliteal                           !Yes       ! Yes            ! None      ! +------------------------------------+----------+---------------+----------+ ! Sapheno Femoral Junction            ! Yes       ! Yes            ! None      ! +------------------------------------+----------+---------------+----------+ ! PTV !Yes       !Yes            ! None      ! +------------------------------------+----------+---------------+----------+ ! Peroneal                            !Yes       ! Yes            ! None      ! +------------------------------------+----------+---------------+----------+ ! Gastroc                             ! Yes       ! Yes            ! None      ! +------------------------------------+----------+---------------+----------+ ! GSV Thigh                           ! Yes       ! Yes            ! None      ! +------------------------------------+----------+---------------+----------+ ! GSV Knee                            ! Yes       ! Yes            ! None      ! +------------------------------------+----------+---------------+----------+ ! GSV Ankle                           ! Yes       ! Yes            ! None      ! +------------------------------------+----------+---------------+----------+ ! SSV                                 ! Yes       ! Yes            ! None      ! +------------------------------------+----------+---------------+----------+ Left Doppler Measurements +---------------------------+------+------+--------------------------------+ ! Location                   ! Signal!Reflux! Reflux (msec)                   ! +---------------------------+------+------+--------------------------------+ ! Common Femoral             !Phasic!      !                                ! +---------------------------+------+------+--------------------------------+ ! Prox Femoral               !Phasic!      !                                ! +---------------------------+------+------+--------------------------------+ ! Popliteal                  !Phasic!      !                                ! +---------------------------+------+------+--------------------------------+      Scheduled Meds:   ipratropium-albuterol  1 ampule Inhalation 4x daily    [START ON 1/25/2022] albuterol  2.5 mg Nebulization BID    hydrALAZINE  50 mg Oral 3 times 80 mg in the ER, unable to obtain CT PE 2/2 to UYEN,  V/Q scan showed intermediate probability. Venous doppler lower extremity ordered- unremarkable   - continue decadron 6 mg PO daily   - RT aerosol protocol  - continue spiriva and symbicort  -Pulmonary consult- recs to contnue HF NC, bipap PRN     Hypotension- Resolved  MAP>65  Dopamine drip for bradycardia discontinued   Continue to hold BP medications     UYEN likely prerenal - resolved   - Cr 0.89>0.68, baseline ~ 1  - s/p 500 ml fluid bolus  - continue gentle hydration 50 ml/hr  - monitor carefully for fluid overload 2/2 history of CHF  - daily BMP  - FeNa is 25.5--> likely post renal     Bradycardia  - HR 42 in the ER, was given atropine 1 mg and glucagon 1 mg  - holding his home betablocker  - telemetry  - cardiology consulted  - dopamine gtt  -Echo howed EF of 51%. Pending cardiology recs regarding AICD.        HFrEF  - no signs of fluid overload  - holding lisinopril/coreg / lasix 2/2 hypotension  - EF 20-30%  -Repeat Echo showed EF of 51%.  Pending cardiology recs regarding AICD.        Plan will be discussed with the attending, Dr. Alivia Vergara MD  Family Medicine Resident  1/24/2022 7:48 AM

## 2022-01-25 LAB
ABSOLUTE EOS #: 0 K/UL (ref 0–0.44)
ABSOLUTE IMMATURE GRANULOCYTE: 0.12 K/UL (ref 0–0.3)
ABSOLUTE LYMPH #: 0.81 K/UL (ref 1.1–3.7)
ABSOLUTE MONO #: 0.31 K/UL (ref 0.1–1.2)
ALBUMIN SERPL-MCNC: 3 G/DL (ref 3.5–5.2)
ALBUMIN/GLOBULIN RATIO: 1.6 (ref 1–2.5)
ALP BLD-CCNC: 65 U/L (ref 40–129)
ALT SERPL-CCNC: 32 U/L (ref 5–41)
ANION GAP SERPL CALCULATED.3IONS-SCNC: 10 MMOL/L (ref 9–17)
AST SERPL-CCNC: 52 U/L
BASOPHILS # BLD: 1 % (ref 0–2)
BASOPHILS ABSOLUTE: 0.06 K/UL (ref 0–0.2)
BILIRUB SERPL-MCNC: 0.64 MG/DL (ref 0.3–1.2)
BUN BLDV-MCNC: 26 MG/DL (ref 8–23)
BUN/CREAT BLD: ABNORMAL (ref 9–20)
CALCIUM SERPL-MCNC: 8.1 MG/DL (ref 8.6–10.4)
CHLORIDE BLD-SCNC: 104 MMOL/L (ref 98–107)
CO2: 25 MMOL/L (ref 20–31)
CREAT SERPL-MCNC: 0.9 MG/DL (ref 0.7–1.2)
CULTURE: NORMAL
D-DIMER QUANTITATIVE: 2.13 MG/L FEU
DIFFERENTIAL TYPE: ABNORMAL
EOSINOPHILS RELATIVE PERCENT: 0 % (ref 1–4)
GFR AFRICAN AMERICAN: >60 ML/MIN
GFR NON-AFRICAN AMERICAN: >60 ML/MIN
GFR SERPL CREATININE-BSD FRML MDRD: ABNORMAL ML/MIN/{1.73_M2}
GFR SERPL CREATININE-BSD FRML MDRD: ABNORMAL ML/MIN/{1.73_M2}
GLUCOSE BLD-MCNC: 100 MG/DL (ref 70–99)
GLUCOSE BLD-MCNC: 103 MG/DL (ref 75–110)
GLUCOSE BLD-MCNC: 159 MG/DL (ref 75–110)
GLUCOSE BLD-MCNC: 168 MG/DL (ref 75–110)
GLUCOSE BLD-MCNC: 248 MG/DL (ref 75–110)
HCT VFR BLD CALC: 36.4 % (ref 40.7–50.3)
HEMOGLOBIN: 11.4 G/DL (ref 13–17)
IMMATURE GRANULOCYTES: 2 %
LACTATE DEHYDROGENASE: 320 U/L (ref 135–225)
LYMPHOCYTES # BLD: 13 % (ref 24–43)
Lab: NORMAL
MAGNESIUM: 1.8 MG/DL (ref 1.6–2.6)
MCH RBC QN AUTO: 29.8 PG (ref 25.2–33.5)
MCHC RBC AUTO-ENTMCNC: 31.3 G/DL (ref 28.4–34.8)
MCV RBC AUTO: 95 FL (ref 82.6–102.9)
MONOCYTES # BLD: 5 % (ref 3–12)
MORPHOLOGY: NORMAL
NRBC AUTOMATED: 0 PER 100 WBC
PDW BLD-RTO: 13.1 % (ref 11.8–14.4)
PLATELET # BLD: ABNORMAL K/UL (ref 138–453)
PLATELET ESTIMATE: ABNORMAL
PLATELET, FLUORESCENCE: NORMAL K/UL (ref 138–453)
PLATELET, IMMATURE FRACTION: NORMAL % (ref 1.1–10.3)
PMV BLD AUTO: ABNORMAL FL (ref 8.1–13.5)
POTASSIUM SERPL-SCNC: 4.5 MMOL/L (ref 3.7–5.3)
PROCALCITONIN: 0.1 NG/ML
RBC # BLD: 3.83 M/UL (ref 4.21–5.77)
RBC # BLD: ABNORMAL 10*6/UL
SEG NEUTROPHILS: 79 % (ref 36–65)
SEGMENTED NEUTROPHILS ABSOLUTE COUNT: 4.9 K/UL (ref 1.5–8.1)
SODIUM BLD-SCNC: 139 MMOL/L (ref 135–144)
SPECIMEN DESCRIPTION: NORMAL
TOTAL PROTEIN: 4.9 G/DL (ref 6.4–8.3)
WBC # BLD: 6.2 K/UL (ref 3.5–11.3)
WBC # BLD: ABNORMAL 10*3/UL

## 2022-01-25 PROCEDURE — 99232 SBSQ HOSP IP/OBS MODERATE 35: CPT | Performed by: FAMILY MEDICINE

## 2022-01-25 PROCEDURE — 6360000002 HC RX W HCPCS: Performed by: STUDENT IN AN ORGANIZED HEALTH CARE EDUCATION/TRAINING PROGRAM

## 2022-01-25 PROCEDURE — 84145 PROCALCITONIN (PCT): CPT

## 2022-01-25 PROCEDURE — 83735 ASSAY OF MAGNESIUM: CPT

## 2022-01-25 PROCEDURE — 94761 N-INVAS EAR/PLS OXIMETRY MLT: CPT

## 2022-01-25 PROCEDURE — 2580000003 HC RX 258: Performed by: STUDENT IN AN ORGANIZED HEALTH CARE EDUCATION/TRAINING PROGRAM

## 2022-01-25 PROCEDURE — 82947 ASSAY GLUCOSE BLOOD QUANT: CPT

## 2022-01-25 PROCEDURE — 36415 COLL VENOUS BLD VENIPUNCTURE: CPT

## 2022-01-25 PROCEDURE — 94664 DEMO&/EVAL PT USE INHALER: CPT

## 2022-01-25 PROCEDURE — 2060000000 HC ICU INTERMEDIATE R&B

## 2022-01-25 PROCEDURE — 6370000000 HC RX 637 (ALT 250 FOR IP): Performed by: STUDENT IN AN ORGANIZED HEALTH CARE EDUCATION/TRAINING PROGRAM

## 2022-01-25 PROCEDURE — 83615 LACTATE (LD) (LDH) ENZYME: CPT

## 2022-01-25 PROCEDURE — 85025 COMPLETE CBC W/AUTO DIFF WBC: CPT

## 2022-01-25 PROCEDURE — 80053 COMPREHEN METABOLIC PANEL: CPT

## 2022-01-25 PROCEDURE — 6370000000 HC RX 637 (ALT 250 FOR IP)

## 2022-01-25 PROCEDURE — 6370000000 HC RX 637 (ALT 250 FOR IP): Performed by: NURSE PRACTITIONER

## 2022-01-25 PROCEDURE — 6370000000 HC RX 637 (ALT 250 FOR IP): Performed by: INTERNAL MEDICINE

## 2022-01-25 PROCEDURE — 6360000002 HC RX W HCPCS: Performed by: INTERNAL MEDICINE

## 2022-01-25 PROCEDURE — 99232 SBSQ HOSP IP/OBS MODERATE 35: CPT | Performed by: INTERNAL MEDICINE

## 2022-01-25 PROCEDURE — 94660 CPAP INITIATION&MGMT: CPT

## 2022-01-25 PROCEDURE — 85055 RETICULATED PLATELET ASSAY: CPT

## 2022-01-25 PROCEDURE — 85379 FIBRIN DEGRADATION QUANT: CPT

## 2022-01-25 PROCEDURE — 2700000000 HC OXYGEN THERAPY PER DAY

## 2022-01-25 PROCEDURE — 99291 CRITICAL CARE FIRST HOUR: CPT | Performed by: INTERNAL MEDICINE

## 2022-01-25 PROCEDURE — 94640 AIRWAY INHALATION TREATMENT: CPT

## 2022-01-25 RX ORDER — BUSPIRONE HYDROCHLORIDE 5 MG/1
5 TABLET ORAL 3 TIMES DAILY
Status: DISCONTINUED | OUTPATIENT
Start: 2022-01-25 | End: 2022-01-26 | Stop reason: HOSPADM

## 2022-01-25 RX ORDER — MAGNESIUM SULFATE 1 G/100ML
1000 INJECTION INTRAVENOUS PRN
Status: DISCONTINUED | OUTPATIENT
Start: 2022-01-25 | End: 2022-01-26 | Stop reason: HOSPADM

## 2022-01-25 RX ADMIN — ALBUTEROL SULFATE 2.5 MG: 2.5 SOLUTION RESPIRATORY (INHALATION) at 01:00

## 2022-01-25 RX ADMIN — HEPARIN SODIUM 5000 UNITS: 5000 INJECTION INTRAVENOUS; SUBCUTANEOUS at 06:00

## 2022-01-25 RX ADMIN — HYDRALAZINE HYDROCHLORIDE 50 MG: 50 TABLET, FILM COATED ORAL at 17:23

## 2022-01-25 RX ADMIN — INSULIN GLARGINE 10 UNITS: 100 INJECTION, SOLUTION SUBCUTANEOUS at 21:39

## 2022-01-25 RX ADMIN — IPRATROPIUM BROMIDE AND ALBUTEROL SULFATE 1 AMPULE: .5; 3 SOLUTION RESPIRATORY (INHALATION) at 12:26

## 2022-01-25 RX ADMIN — INSULIN LISPRO 1 UNITS: 100 INJECTION, SOLUTION INTRAVENOUS; SUBCUTANEOUS at 12:00

## 2022-01-25 RX ADMIN — HEPARIN SODIUM 5000 UNITS: 5000 INJECTION INTRAVENOUS; SUBCUTANEOUS at 21:39

## 2022-01-25 RX ADMIN — BUSPIRONE HYDROCHLORIDE 5 MG: 5 TABLET ORAL at 21:39

## 2022-01-25 RX ADMIN — ATORVASTATIN CALCIUM 20 MG: 20 TABLET, FILM COATED ORAL at 08:22

## 2022-01-25 RX ADMIN — INSULIN LISPRO 1 UNITS: 100 INJECTION, SOLUTION INTRAVENOUS; SUBCUTANEOUS at 17:58

## 2022-01-25 RX ADMIN — HEPARIN SODIUM 5000 UNITS: 5000 INJECTION INTRAVENOUS; SUBCUTANEOUS at 17:23

## 2022-01-25 RX ADMIN — HYDRALAZINE HYDROCHLORIDE 50 MG: 50 TABLET, FILM COATED ORAL at 21:39

## 2022-01-25 RX ADMIN — BUSPIRONE HYDROCHLORIDE 5 MG: 5 TABLET ORAL at 17:23

## 2022-01-25 RX ADMIN — ALBUTEROL SULFATE 2.5 MG: 2.5 SOLUTION RESPIRATORY (INHALATION) at 03:52

## 2022-01-25 RX ADMIN — HYDRALAZINE HYDROCHLORIDE 50 MG: 50 TABLET, FILM COATED ORAL at 06:30

## 2022-01-25 RX ADMIN — IPRATROPIUM BROMIDE AND ALBUTEROL SULFATE 1 AMPULE: .5; 3 SOLUTION RESPIRATORY (INHALATION) at 16:18

## 2022-01-25 RX ADMIN — SODIUM CHLORIDE, PRESERVATIVE FREE 10 ML: 5 INJECTION INTRAVENOUS at 21:39

## 2022-01-25 RX ADMIN — BUDESONIDE AND FORMOTEROL FUMARATE DIHYDRATE 2 PUFF: 80; 4.5 AEROSOL RESPIRATORY (INHALATION) at 08:23

## 2022-01-25 RX ADMIN — INSULIN LISPRO 1 UNITS: 100 INJECTION, SOLUTION INTRAVENOUS; SUBCUTANEOUS at 21:39

## 2022-01-25 RX ADMIN — Medication 81 MG: at 08:22

## 2022-01-25 RX ADMIN — IPRATROPIUM BROMIDE AND ALBUTEROL SULFATE 1 AMPULE: .5; 3 SOLUTION RESPIRATORY (INHALATION) at 08:30

## 2022-01-25 RX ADMIN — Medication 2000 UNITS: at 08:22

## 2022-01-25 RX ADMIN — IPRATROPIUM BROMIDE AND ALBUTEROL SULFATE 1 AMPULE: .5; 3 SOLUTION RESPIRATORY (INHALATION) at 20:17

## 2022-01-25 RX ADMIN — BUDESONIDE AND FORMOTEROL FUMARATE DIHYDRATE 2 PUFF: 80; 4.5 AEROSOL RESPIRATORY (INHALATION) at 20:00

## 2022-01-25 RX ADMIN — DEXAMETHASONE 6 MG: 4 TABLET ORAL at 08:22

## 2022-01-25 RX ADMIN — SODIUM CHLORIDE, PRESERVATIVE FREE 10 ML: 5 INJECTION INTRAVENOUS at 08:25

## 2022-01-25 RX ADMIN — INSULIN GLARGINE 10 UNITS: 100 INJECTION, SOLUTION SUBCUTANEOUS at 08:30

## 2022-01-25 ASSESSMENT — PAIN SCALES - WONG BAKER

## 2022-01-25 ASSESSMENT — ENCOUNTER SYMPTOMS
WHEEZING: 1
GASTROINTESTINAL NEGATIVE: 1
SHORTNESS OF BREATH: 1
COUGH: 1

## 2022-01-25 ASSESSMENT — PAIN SCALES - GENERAL
PAINLEVEL_OUTOF10: 0

## 2022-01-25 NOTE — PROGRESS NOTES
[address] [facility]    Progress Note    Date:   1/25/2022  Patient name:  Eloy Corey  Date of admission:  1/18/2022  9:04 PM  MRN:   [id number]  YOB: 1955    SUBJECTIVE/Last 24 hours update:     Patient seen and examined at bedside this morning. No acute events overnight, patient currently saturating on FiO2 50, 30 L high flow nasal cannula. Afebrile. Saturating 90%. Stable VS.     Patient receiving IV fluids 75 cc an hour. LDH down trended from [phone] today, D-dimer 3.12- 2.13. CXR: new patchy opacity in right mid lung. Will get procal and CRP. Pt is afebrile, no leukocytosis. Pulm is consulted, appreciate recs. Notes from nursing staff and Consults had been reviewed, and the overnight progress had been checked with the nursing staff as well. HPI:   Please see H&P    REVIEW OF SYSTEMS:      CONSTITUTIONAL:  no fevers, no headcahes  EYES: negative for blury vision  HEENT: No headaches, No nasal congestion, no difficulty swallowing  RESPIRATORY:negative for dyspnea, no wheezing, no Cough  CARDIOVASCULAR: negative for chest pain, no palpitations  GASTROINTESTINAL: no nausea, no vomiting, no change in bowel habits, no abdominal pain   GENITOURINARY: negative for dysuria, no hematuria   MUSCULOSKELETAL: no joint pains, no muscle aches, no swelling of joints or extremities  NEUROLOGICAL: No  Weakness or numbness      PAST MEDICAL HISTORY:      has a past medical history of COPD (chronic obstructive pulmonary disease) ([facility] [address] Diabetes mellitus ([facility] [address] Erectile dysfunction due to arterial insufficiency, Hypertension, Microalbuminuria due to type 2 diabetes mellitus ([facility] [address] and Overweight (BMI 25.0-29.9). PAST SURGICAL HISTORY:      has a past surgical history that includes Appendectomy and Total ankle arthroplasty. SOCIAL HISTORY:      reports that he has been smoking cigarettes. He has been smoking about 0.50 packs per day. He has never used smokeless tobacco. He reports that he does not drink alcohol and does not use drugs. FAMILY HISTORY:     family history is not on file. HOME MEDICATIONS:      Prior to Admission medications    Medication Sig Start Date End Date Taking? Authorizing Provider   amLODIPine (NORVASC) 10 MG tablet TAKE 1 TABLET BY MOUTH DAILY 10/25/21   Baldomero Latham MD   aspirin (HM ASPIRIN EC LOW DOSE) 81 MG EC tablet TAKE 1 TABLET BY MOUTH DAILY 8/26/21   Adam Galvan MD   furosemide (LASIX) 20 MG tablet Take 1 tablet by mouth daily 8/26/21   Adam Galvan MD   Blood Pressure KIT Check blood pressure daily 8/26/21   Adam Galvan MD   albuterol sulfate HFA (PROAIR HFA) 108 (90 Base) MCG/ACT inhaler inhale 2 puffs by mouth every 6 hours if needed for wheezing 8/16/21   Te Wild MD   fluticasone-salmeterol (ADVAIR DISKUS) 100-50 MCG/DOSE diskus inhaler INHALE 1 PUFF INTO THE LUNGS EVERY 12 HOURS 8/16/21   Te Wild MD   carvedilol (COREG) 25 MG tablet Take 1 tablet by mouth 2 times daily 8/16/21   Kiana Heredia MD   atorvastatin (LIPITOR) 20 MG tablet Take 1 tablet by mouth daily 7/21/21   Baldomero Latham MD   lisinopril (PRINIVIL;ZESTRIL) 20 MG tablet Take 1 tablet by mouth daily 7/21/21   Baldomero Latham MD   ipratropium (ATROVENT HFA) 17 MCG/ACT inhaler Inhale 1 puff into the lungs 2 times daily 9/2/20 10/2/20  Milka Dang MD   tiotropium (Lilia Viviana) 18 MCG inhalation capsule Inhale 1 capsule into the lungs daily 1/9/20   Milka Dang MD   nicotine (NICODERM CQ) 14 MG/24HR Place 1 patch onto the skin daily 12/25/16   Archie Thompson MD       ALLERGIES:     Patient has no known allergies.       OBJECTIVE:       Vitals:    01/25/22 0018 01/25/22 0100 01/25/22 0237 01/25/22 0400   BP: 121/77   136/66   Pulse: 78   81   Resp: 22 18 23 22   Temp: 98.2 °F (36.8 °C)   97.8 °F (36.6 °C)   TempSrc: Axillary   Oral   SpO2: 94% 95% 96% 92%   Weight: Height:             Intake/Output Summary (Last 24 hours) at 1/25/2022 0736  Last data filed at 1/25/2022 0000  Gross per 24 hour   Intake 1200 ml   Output 850 ml   Net 350 ml       PHYSICAL EXAM:  General Appearance  Alert , awake , not in acute distress  HEENT - Head is normocephalic, atraumatic. Lungs - Bilateral equal air entry , no wheezes, rales or rhonchi, aeration good  Cardiovascular - Heart sounds are normal.  Regular rhythm, normal rate without murmur, gallop or rub.   Abdomen - Soft, nontender, nondistended, no masses or organomegaly  Neurologic - There are no new focal motor or sensory deficits  Skin - No bruising or bleeding on exposed skin area  Extremities - No cyanosis, clubbing or edema      DIAGNOSTICS:     Laboratory Testing:    Recent Results (from the past 24 hour(s))   POC Glucose Fingerstick    Collection Time: 01/24/22 11:22 AM   Result Value Ref Range    POC Glucose 97 75 - 110 mg/dL   POC Glucose Fingerstick    Collection Time: 01/24/22  4:19 PM   Result Value Ref Range    POC Glucose 137 (H) 75 - 110 mg/dL   POC Glucose Fingerstick    Collection Time: 01/24/22  7:47 PM   Result Value Ref Range    POC Glucose 129 (H) 75 - 110 mg/dL   CBC Auto Differential    Collection Time: 01/25/22  6:33 AM   Result Value Ref Range    WBC 6.2 3.5 - 11.3 k/uL    RBC 3.83 (L) 4.21 - 5.77 m/uL    Hemoglobin 11.4 (L) 13.0 - 17.0 g/dL    Hematocrit 36.4 (L) 40.7 - 50.3 %    MCV 95.0 82.6 - 102.9 fL    MCH 29.8 25.2 - 33.5 pg    MCHC 31.3 28.4 - 34.8 g/dL    RDW 13.1 11.8 - 14.4 %    Platelets See Reflexed IPF Result 138 - 453 k/uL    MPV NOT REPORTED 8.1 - 13.5 fL    NRBC Automated 0.0 0.0 per 100 WBC    Differential Type NOT REPORTED     Seg Neutrophils PENDING %    Lymphocytes PENDING %    Monocytes PENDING %    Eosinophils % PENDING %    Basophils PENDING %    Immature Granulocytes PENDING 0 %    Segs Absolute PENDING k/uL    Absolute Lymph # PENDING k/uL    Absolute Mono # PENDING k/uL    Absolute Eos # PENDING k/uL    Basophils Absolute PENDING 0.0 - 0.2 k/uL    Absolute Immature Granulocyte PENDING 0.00 - 0.30 k/uL    WBC Morphology NOT REPORTED     RBC Morphology NOT REPORTED     Platelet Estimate NOT REPORTED          Imaging/Diagonstics:    ECHO Complete 2D W Doppler W Color    Result Date: 1/21/2022  Transthoracic Echocardiography Report (TTE)  Patient Name  Buster Loya      Date of Study                   01/21/2022                Román Glez   Date of Birth 1955  Gender                          Male   Age           74 year(s)  Race                               Room Number   3007   Corporate ID  [id number]  #   Patient Farrah  [de-identified]  #   MR #          [id number]     [facility]   Accession #   [id number]  Interpreting Physician          [facility]   Fellow                    Referring Nurse Practitioner   Interpreting              Referring Physician             Jaclyn Galeazzi  Fellow  Type of Study   TTE procedure:2D Echocardiogram, M-Mode, Doppler, Color Doppler. Procedure Date Date: 01/21/2022 Start: 02:27 PM Study Location: [facility] Technical Quality: Adequate visualization Indications:Bradycardia. History / Tech. Comments: Procedure explained to patient. Echo completed at the bedside. PMHx: Covid-19 Hypertension, Hyperlipidemia, Diabetes Patient Status: Inpatient CONCLUSIONS Summary Left ventricle is normal in size, global left ventricular systolic function is low normal, calculated ejection fraction is 51%. Aortic valve sclerosis without stenosis. Mitral valve sclerosis without stenosis. No significant valvular regurgitation or stenosis seen.  Signature ----------------------------------------------------------------------------  Electronically signed by Wanda Fernandes(Sonographer) on 01/21/2022 03:26 PM ---------------------------------------------------------------------------- ----------------------------------------------------------------------------  Electronically signed by Cecy MakiInterpreting physician) on 01/21/2022  04:11 PM ---------------------------------------------------------------------------- FINDINGS Left Atrium Left atrium is at upper limits of normal. Inter-atrial septum is intact with no evidence for an atrial septal defect, by color doppler. Left Ventricle Left ventricle is normal in size, global left ventricular systolic function is low normal, calculated ejection fraction is 51%. Right Atrium Right atrium is normal in size. Right Ventricle Normal right ventricular size and function. Mitral Valve Normal mitral valve structure. Mitral valve sclerosis without stenosis. No evidence of mitral regurgitation. Aortic Valve Aortic valve is trileaflet. Aortic valve sclerosis without stenosis. No aortic insufficiency. Tricuspid Valve Tricuspid valve was not well visualized. No tricuspid regurgitation was seen. Pulmonic Valve Pulmonic valve not well visualized but Doppler velocities are normal. Pericardial Effusion No significant pericardial effusion is seen. Miscellaneous Normal aortic root dimension. E/E' average = 10.4. IVC normal diameter & inspiratory collapse indicating normal RA filling pressure .  M-mode / 2D Measurements & Calculations:   LVIDd:5.7 cm(3.7 - 5.6 cm)       Diastolic PYGHBV:259.15 ml  IVSd:0.8 cm(0.6 - 1.1 cm)        Systolic HKBQLI:30.25 ml  LVPWd:0.8 cm(0.6 - 1.1 cm)       Aortic Root:3.3 cm(2.0 - 3.7 cm)                                   LA Dimension: 4.5 cm(1.9 - 4.0 cm)  Calculated LVEF (%): 50.8 %      LA volume/Index: 50.58 ml                                   LVOT:2.2 cm                                   RVDd:4 cm   Mitral:                                 Aortic   Valve Area (P1/2-Time): 3.86 cm^2       Peak Velocity: 1.66 m/s  Peak E-Wave: 1.14 m/s                   Mean Velocity: 1.04 m/s  Peak A-Wave: 1.08 m/s                   Peak Gradient: 11.02 mmHg  E/A Ratio: 1.06                         Mean Gradient: 5 mmHg  Peak Gradient: 5.2 mmHg  Mean Gradient: 2 mmHg  Deceleration Time: 201 msec             Area (continuity): 2.71 cm^2  P1/2t: 57 msec                          AV VTI: 40.1 cm   Area (continuity): 3.26 cm^2  Mean Velocity: 0.51 m/s                                           Pulmonic:                                           Peak Velocity: 0.75 m/s                                          Peak Gradient: 2.23 mmHg  Diastology / Tissue Doppler Septal Wall E' velocity:0.09 m/s Septal Wall E/E':12.2 Lateral Wall E' velocity:0.13 m/s Lateral Wall E/E':8.6    CT HEAD WO CONTRAST    Result Date: 1/18/2022  EXAMINATION: CT OF THE HEAD WITHOUT CONTRAST  1/18/2022 6:31 pm TECHNIQUE: CT of the head was performed without the administration of intravenous contrast. Dose modulation, iterative reconstruction, and/or weight based adjustment of the mA/kV was utilized to reduce the radiation dose to as low as reasonably achievable. COMPARISON: CT scan dated March 3, 2015 HISTORY: ORDERING SYSTEM PROVIDED HISTORY: AMS TECHNOLOGIST PROVIDED HISTORY: AMS Decision Support Exception - unselect if not a suspected or confirmed emergency medical condition->Emergency Medical Condition (MA) Reason for Exam: COVID+, AMS FINDINGS: BRAIN/VENTRICLES: There is no acute intracranial hemorrhage, mass effect or midline shift. No abnormal extra-axial fluid collection. The gray-white differentiation is maintained without evidence of an acute infarct. There is no evidence of hydrocephalus. Generalized atrophy is present. 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 ORBITS: The visualized portion of the orbits demonstrate no acute abnormality. SINUSES: Mild degree of mucosal thickening is present within the right maxillary sinus.   Minimal mucosal thickening is present within the left maxillary sinus. SOFT TISSUES/SKULL:  No acute abnormality of the visualized skull or soft tissues. No acute intracranial abnormality. NM LUNG SCAN PERFUSION ONLY    Addendum Date: 1/21/2022    ADDENDUM: Upon further review, the perfusion defect at the left lung base is much smaller than the corresponding opacity on the chest x-ray. This is low probability for pulmonary embolism. Findings were discussed with Dr. Chas Perez at 10:15 a.m. 01/21/2022. Result Date: 1/21/2022  EXAMINATION: NUCLEAR MEDICINE PERFUSION SCAN. 1/20/2022 TECHNIQUE: Ventilation not performed as part of COVID-19 safety precautions. 4 millicuries of Tc 38Y MAA was administered intravenously prior to planar imaging of the lungs in multiple projections. COMPARISON: Chest radiograph 01/19/2022 at 7:14 p.m.. HISTORY: ORDERING SYSTEM PROVIDED HISTORY: SOB, COVID + FINDINGS: PERFUSION: Diminished segmental perfusion left lower lobe corresponding to area of consolidation on chest radiograph. Distribution of radiotracer is otherwise homogeneous. No other segmental defects identified. CHEST RADIOGRAPH: Dense left lower lobe consolidation on recent chest radiograph. Suboptimal exam without more recent chest radiograph for comparison. Indeterminate probability for pulmonary embolism. XR CHEST PORTABLE    Result Date: 1/19/2022  EXAMINATION: ONE XRAY VIEW OF THE CHEST 1/19/2022 7:29 pm COMPARISON: 01/18/2022 HISTORY: ORDERING SYSTEM PROVIDED HISTORY: rule out ptx TECHNOLOGIST PROVIDED HISTORY: rule out ptx Reason for Exam: port urpt FINDINGS: Chest radiograph: The cardiomediastinal silhouette and hilar contours are stable with moderate cardiomegaly. Slight worsening consolidative changes of left lower lobe and small left pleural effusion. Worsening increased interstitial markings and ground-glass densities throughout both lungs. No pneumothorax.   The overlying soft tissue and osseous structures do not demonstrate acute abnormality. No pneumothorax. Stable moderate cardiomegaly. Slight worsening consolidative changes of left lower lobe and small left pleural effusion. Worsening increased interstitial markings and ground-glass densities throughout both lungs. The finding can be seen in setting of pulmonary edema or viral pneumonia. XR CHEST PORTABLE    Result Date: 1/18/2022  EXAMINATION: ONE XRAY VIEW OF THE CHEST 1/18/2022 10:02 pm COMPARISON: 09/08/2019 HISTORY: ORDERING SYSTEM PROVIDED HISTORY: hypoxic TECHNOLOGIST PROVIDED HISTORY: hypoxic FINDINGS: Stable mild cardiomegaly. Pulmonary vasculature appears normal.  Mild patchy airspace opacity is present at the bilateral lower lung zones. No pneumothorax or pleural effusion. Surrounding structures are unremarkable. Mild bilateral lower lung zone patchy airspace opacity potentially representing early COVID pneumonia. Mild stable cardiomegaly. VL DUP LOWER EXTREMITY VENOUS BILATERAL    Result Date: 1/22/2022    [facility]  Vascular Lower Extremities DVT Study Procedure   Patient Name [address]      Date of Study           01/22/2022               Emil Fisher   Date of      1955  Gender                  Male  Birth   Age          74 year(s)  Race                       Room Number  3007   Corporate ID [id number]  #   Patient [place] [de-identified]  #   MR #         [id number]     Sonographer             Mendez Ivan RVT   Accession #  [id number]  Interpreting Physician  Ban Younger   Referring                Referring Physician     Ariel Hoffmann *  Nurse  Practitioner  Procedure Type of Study:   Veins: Lower Extremities DVT Study, Venous Scan Lower Bilateral.  Patient Status: In Patient. Technical Quality:Limited visualization. Limitation reason:I.V. placement from groin to mid thigh with bandaging. .  Conclusions   Summary   Simultaneous real time imaging utilizing B-Mode, color doppler and  spectral waveform analysis was performed on the bilateral lower  extremities for venous examination of the deep and superficial systems. Findings are:   Right:  Technically limited study as stated above however in the visualized areas  no superficial or deep vein thrombosis was identified. Left:  No evidence of deep or superficial venous thrombosis. Signature   ----------------------------------------------------------------  Electronically signed by Thais Donohue RVT(Sonographer)  on 01/22/2022 11:27 AM  ----------------------------------------------------------------   ----------------------------------------------------------------  Electronically signed by Stacy Justice(Interpreting  physician) on 01/22/2022 06:30 PM  ----------------------------------------------------------------  Findings:   Right Impression:                       Left Impression:  The common femoral, deep profunda,      The common femoral, femoral,  proximal femoral vein and proximal      popliteal and tibial veins  greater saphenous vein could not be     demonstrate normal compressibility  visualized due to I.V. placement. and augmentation. The proximal common femoral, mid to     Normal compressibility of the  distal femoral, popliteal and tibial    great saphenous vein. veins demonstrate normal                Normal compressibility of the  compressibility and augmentation. great saphenous vein. Normal compressibility of the great  saphenous vein. Normal compressibility of the small  saphenous vein. Risk Factors History +--------------------------+---------------------+-------------------------+ ! Diagnosis                 ! Date                 ! Comments                 ! +--------------------------+---------------------+-------------------------+ ! CHF                       !                      !                         ! +--------------------------+---------------------+-------------------------+   - The patient's risk factor(s) include: previous angina/MI, diabetes     mellitus and arterial hypertension. Comments: COVID positive Velocities are measured in cm/s ; Diameters are measured in cm Right Lower Extremities DVT Study Measurements Right 2D Measurements +------------------------------------+----------+---------------+----------+ ! Location                            ! Visualized! Compressibility! Thrombosis! +------------------------------------+----------+---------------+----------+ ! Common Femoral                      !Yes       ! Yes            ! None      ! +------------------------------------+----------+---------------+----------+ ! Prox Femoral                        !No        !               !          ! +------------------------------------+----------+---------------+----------+ ! Mid Femoral                         !Yes       ! Yes            ! None      ! +------------------------------------+----------+---------------+----------+ ! Dist Femoral                        !Yes       ! Yes            ! None      ! +------------------------------------+----------+---------------+----------+ ! Deep Femoral                        !No        !               !          ! +------------------------------------+----------+---------------+----------+ ! Popliteal                           !Yes       ! Yes            ! None      ! +------------------------------------+----------+---------------+----------+ ! Sapheno Femoral Junction            ! Yes       ! Yes            ! None      ! +------------------------------------+----------+---------------+----------+ ! PTV                                 ! Yes       ! Yes            ! None      ! +------------------------------------+----------+---------------+----------+ ! Peroneal                            !Yes       ! Yes            ! None      ! +------------------------------------+----------+---------------+----------+ ! Gastroc                             ! Yes       ! Yes            ! None      ! +------------------------------------+----------+---------------+----------+ ! GSV Thigh                           ! No        !               !          ! +------------------------------------+----------+---------------+----------+ ! GSV Knee                            ! Yes       ! Yes            ! None      ! +------------------------------------+----------+---------------+----------+ ! GSV Ankle                           ! Yes       ! Yes            ! None      ! +------------------------------------+----------+---------------+----------+ ! SSV                                 ! Yes       ! Yes            ! None      ! +------------------------------------+----------+---------------+----------+ Right Doppler Measurements +---------------------------+------+------+--------------------------------+ ! Location                   ! Signal!Reflux! Reflux (msec)                   ! +---------------------------+------+------+--------------------------------+ ! Common Femoral             !Phasic!      !                                ! +---------------------------+------+------+--------------------------------+ ! Popliteal                  !Phasic!      !                                ! +---------------------------+------+------+--------------------------------+ Left Lower Extremities DVT Study Measurements Left 2D Measurements +------------------------------------+----------+---------------+----------+ ! Location                            ! Visualized! Compressibility! Thrombosis! +------------------------------------+----------+---------------+----------+ ! Common Femoral                      !Yes       ! Yes            ! None      ! +------------------------------------+----------+---------------+----------+ ! Prox Femoral                        !Yes       ! Yes            ! None      ! +------------------------------------+----------+---------------+----------+ ! Mid Femoral                         !Yes       ! Yes            ! None      ! +------------------------------------+----------+---------------+----------+ ! Dist Femoral                        !Yes       ! Yes            ! None      ! +------------------------------------+----------+---------------+----------+ ! Deep Femoral                        !No        !               !          ! +------------------------------------+----------+---------------+----------+ ! Popliteal                           !Yes       ! Yes            ! None      ! +------------------------------------+----------+---------------+----------+ ! Sapheno Femoral Junction            ! Yes       ! Yes            ! None      ! +------------------------------------+----------+---------------+----------+ ! PTV                                 ! Yes       ! Yes            ! None      ! +------------------------------------+----------+---------------+----------+ ! Peroneal                            !Yes       ! Yes            ! None      ! +------------------------------------+----------+---------------+----------+ ! Gastroc                             ! Yes       ! Yes            ! None      ! +------------------------------------+----------+---------------+----------+ ! GSV Thigh                           ! Yes       ! Yes            ! None      ! +------------------------------------+----------+---------------+----------+ ! GSV Knee                            ! Yes       ! Yes            ! None      ! +------------------------------------+----------+---------------+----------+ ! GSV Ankle                           ! Yes       ! Yes            ! None      ! +------------------------------------+----------+---------------+----------+ ! SSV                                 ! Yes       ! Yes            ! None      ! +------------------------------------+----------+---------------+----------+ Left Doppler Measurements +---------------------------+------+------+--------------------------------+ ! Location                   ! Signal!Reflux! Reflux (msec) ! +---------------------------+------+------+--------------------------------+ ! Common Femoral             !Phasic!      !                                ! +---------------------------+------+------+--------------------------------+ ! Prox Femoral               !Phasic!      !                                ! +---------------------------+------+------+--------------------------------+ ! Popliteal                  !Phasic!      !                                ! +---------------------------+------+------+--------------------------------+      Scheduled Meds:   albuterol  2.5 mg Nebulization BID    ipratropium-albuterol  1 ampule Inhalation 4x daily    hydrALAZINE  50 mg Oral 3 times per day    insulin glargine  10 Units SubCUTAneous BID    heparin (porcine)  5,000 Units SubCUTAneous 3 times per day    [Held by provider] amLODIPine  10 mg Oral Daily    aspirin  81 mg Oral Daily    atorvastatin  20 mg Oral Daily    budesonide-formoterol  2 puff Inhalation BID    [Held by provider] furosemide  20 mg Oral Daily    [Held by provider] lisinopril  20 mg Oral Daily    sodium chloride flush  5-40 mL IntraVENous 2 times per day    dexamethasone  6 mg Oral Daily    Vitamin D  2,000 Units Oral Daily    insulin lispro  0-6 Units SubCUTAneous TID WC    insulin lispro  0-3 Units SubCUTAneous Nightly    glucagon (rDNA)  1 mg IntraVENous Once     Continuous Infusions:   EPINEPHrine infusion Stopped (01/21/22 0430)    sodium chloride      sodium chloride 25 mL/hr at 01/21/22 0815    dextrose      DOPamine Stopped (01/22/22 1310)     PRN Meds:.sodium chloride flush, sodium chloride, ondansetron **OR** ondansetron, polyethylene glycol, acetaminophen **OR** acetaminophen, atropine, glucose, dextrose, glucagon (rDNA), dextrose    ASSESSMENT:     Principal Problem:    Acute on chronic respiratory failure with hypoxia (HCC)  Active Problems:    Controlled type 2 diabetes mellitus without complication, without long-term current use of insulin (HCC)    COVID-19    Hypoxemia    Arterial hypotension  Resolved Problems:    * No resolved hospital problems. *      PLAN:        Acute on chronic hypoxic respiratory failure 2/2 COVID-19 pneumonia in the setting of COPD/CHF  - COVID+  -On High flow nasal cannula 50L, FIO2 70  - ID consulted  - CRP trending down 209.4-235- 90.1>36.0, check CRP every other day   - ferritin 353>532  - CXR: mild bilateral lower lung zone patchy air space opacity, representing early COVID pneumonia  - concern for PE, was given lovenox 80 mg in the ER, unable to obtain CT PE 2/2 to UYEN,  V/Q scan showed intermediate probability. Venous doppler lower extremity ordered- unremarkable   - continue decadron 6 mg PO daily   - RT aerosol protocol  - continue spiriva and symbicort  -Pulmonary consult- recs to contnue HF NC, bipap PRN     Hypotension- Resolved  MAP>65  Dopamine drip for bradycardia discontinued   Continue to hold BP medications     UYEN likely prerenal - resolved   - Cr 0.89>0.68, baseline ~ 1  - s/p 500 ml fluid bolus  - continue gentle hydration 50 ml/hr  - monitor carefully for fluid overload 2/2 history of CHF  - daily BMP  - FeNa is 25.5--> likely post renal     Bradycardia  - HR 42 in the ER, was given atropine 1 mg and glucagon 1 mg  - holding his home betablocker  - telemetry  - cardiology consulted  - dopamine gtt  -Echo howed EF of 51%. Pending cardiology recs regarding AICD.        HFrEF  - no signs of fluid overload  - holding lisinopril/coreg / lasix 2/2 hypotension  - EF 20-30%  -Repeat Echo showed EF of 51%.  Pending cardiology recs regarding AICD.        Plan will be discussed with the attending, Dr. Tiburcio Perez MD  Family Medicine Resident  1/25/2022 7:36 AM

## 2022-01-25 NOTE — PLAN OF CARE
Problem: Airway Clearance - Ineffective  Goal: Achieve or maintain patent airway  1/25/2022 0129 by Whitney Mora RN  Outcome: Ongoing  1/24/2022 1805 by Keo Ray RN  Outcome: Ongoing     Problem: Gas Exchange - Impaired  Goal: Absence of hypoxia  1/25/2022 0129 by Whitney Mora RN  Outcome: Ongoing  1/24/2022 1805 by Keo Ray RN  Outcome: Ongoing  Goal: Promote optimal lung function  1/25/2022 0129 by Whitney Mora RN  Outcome: Ongoing  1/24/2022 1805 by Keo Ray RN  Outcome: Ongoing     Problem: Breathing Pattern - Ineffective  Goal: Ability to achieve and maintain a regular respiratory rate  1/25/2022 0129 by Whitney Mora RN  Outcome: Ongoing  1/24/2022 1805 by Keo Ray RN  Outcome: Ongoing     Problem:  Body Temperature -  Risk of, Imbalanced  Goal: Ability to maintain a body temperature within defined limits  1/25/2022 0129 by Whitney Mora RN  Outcome: Ongoing  1/24/2022 1805 by Keo Ray RN  Outcome: Ongoing  Goal: Will regain or maintain usual level of consciousness  1/25/2022 0129 by Whitney Mora RN  Outcome: Ongoing  1/24/2022 1805 by Keo Ray RN  Outcome: Ongoing  Goal: Complications related to the disease process, condition or treatment will be avoided or minimized  1/25/2022 0129 by Whitney Mora RN  Outcome: Ongoing  1/24/2022 1805 by Keo Ray RN  Outcome: Ongoing     Problem: Isolation Precautions - Risk of Spread of Infection  Goal: Prevent transmission of infection  1/25/2022 0129 by Whitney Mora RN  Outcome: Ongoing  1/24/2022 1805 by Keo Ray RN  Outcome: Ongoing     Problem: Nutrition Deficits  Goal: Optimize nutritional status  1/25/2022 0129 by Whitney Mora RN  Outcome: Ongoing  1/24/2022 1805 by Keo Ray RN  Outcome: Ongoing     Problem: Risk for Fluid Volume Deficit  Goal: Maintain normal heart rhythm  1/25/2022 0129 by Whitney Mora RN  Outcome: Ongoing  1/24/2022 1805 by Radha Prieto RN  Outcome: Ongoing  Goal: Maintain absence of muscle cramping  1/25/2022 0129 by Gilberto Arvizu RN  Outcome: Ongoing  1/24/2022 1805 by Radha Prieto RN  Outcome: Ongoing  Goal: Maintain normal serum potassium, sodium, calcium, phosphorus, and pH  1/25/2022 0129 by Gilberto Arvizu RN  Outcome: Ongoing  1/24/2022 1805 by Radha Prieto RN  Outcome: Ongoing     Problem: Loneliness or Risk for Loneliness  Goal: Demonstrate positive use of time alone when socialization is not possible  1/25/2022 0129 by Gilberto Arvizu RN  Outcome: Ongoing  1/24/2022 1805 by Radha Prieto RN  Outcome: Ongoing     Problem: Fatigue  Goal: Verbalize increase energy and improved vitality  1/25/2022 0129 by Gilberto Arvizu RN  Outcome: Ongoing  1/24/2022 1805 by Radha Prieto RN  Outcome: Ongoing     Problem: Patient Education: Go to Patient Education Activity  Goal: Patient/Family Education  1/25/2022 0129 by Gilberto Arvizu RN  Outcome: Ongoing  1/24/2022 1805 by Radha Prieto RN  Outcome: Ongoing     Problem: Skin Integrity:  Goal: Will show no infection signs and symptoms  Description: Will show no infection signs and symptoms  1/25/2022 0129 by Gilberto Arvizu RN  Outcome: Ongoing  1/24/2022 1805 by Radha Prieto RN  Outcome: Ongoing  Goal: Absence of new skin breakdown  Description: Absence of new skin breakdown  1/25/2022 0129 by Gilberto Arvizu RN  Outcome: Ongoing  1/24/2022 1805 by Radha Prieto RN  Outcome: Ongoing     Problem: Falls - Risk of:  Goal: Will remain free from falls  Description: Will remain free from falls  1/25/2022 0129 by Gilberto Arvizu RN  Outcome: Ongoing  1/24/2022 1805 by Radha Prieto RN  Outcome: Ongoing  Goal: Absence of physical injury  Description: Absence of physical injury  1/25/2022 0129 by Gilberto Arvizu RN  Outcome: Ongoing  1/24/2022 1805 by Radha Prieto RN  Outcome: Ongoing     Problem: OXYGENATION/RESPIRATORY FUNCTION  Goal: Patient will maintain patent airway  1/25/2022 0129 by Lina Gill RN  Outcome: Ongoing  1/24/2022 1805 by Lety Ventura RN  Outcome: Ongoing  Goal: Patient will achieve/maintain normal respiratory rate/effort  Description: Respiratory rate and effort will be within normal limits for the patient  1/25/2022 0129 by Lina Gill RN  Outcome: Ongoing  1/24/2022 1805 by Lety Ventura RN  Outcome: Ongoing
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
## 2022-01-25 NOTE — PROGRESS NOTES
[facility] - Progress Note COVID 19 Patient  Today's Date and Time: 1/25/2022, 11:34 AM    Impression :     COVID 19 Confirmed Infection  Covid tests:  1.18.22 Positive  1/15/22 positive at home per patient  Acute hypoxic respiratory failure  Elevated inflammatory markers  UYEN  Systolic CHF  COPD  DM II  HTN  Hep C  Erectile dysfunction    Recommendations:   Antibiotic treatment:  Vancomycin initiated 1/20/22 for 1/2 MRSE blood culture 1/18/22. This is likely a contaminant-Discontinued 1/22/22  Repeat blood cultures ordered 1/20/22->1/2 staph epi  Repeat cultures 1/23/22  Covid Rx:    Remdesivir-Contraindicated  Decadron-Initiated 1/18/21  Actemra-ordered 1/19/2022  Monoclonal antibodies-Out of window      Medical Decision Making/Summary/Discussion:1/25/2022     Patient admitted with COVID 19 infection  Isolation until 2/5/22    Infection Control Recommendations   [place] Precautions  Airborne isolation  Droplet Isolation    Antimicrobial Stewardship Recommendations     Discontinuation of therapy  Coordination of Outpatient Care:   Estimated Length of IV antimicrobials:TBD  Patient will need Midline Catheter Insertion: TBD  Patient will need PICC line Insertion: No  Patient will need: Home IV , [place],  SNF,  LTAC:TBD  Patient will need outpatient wound care:No    Chief complaint/reason for consultation:   Concern for COVID infection      History of Present Illness:   Naman Andres is a 74y.o.-year-old male who was initially admitted on 1/18/2022. Patient seen at the request of Dr. Farzaneh Longo:    Patient presented through ER with complaints of fatigue and shortness of breath. The patient has a history of COPD and congestive heart failure EF 20 to 30%. The patient stated he tested positive for COVID 3 days prior to presentation in the ED. Rapid in the hospital was positive.     The patient was noted to have an SPO2 of 76% on room air with a fever of 100.7 °F.    Imaging of the chest revealed bilateral lower lung zone patchy airspace opacity potentially representing early COVID-pneumonia with stable mild cardiomegaly. CRP was elevated at 209.4  UYEN present  Elevated D-dimer    VQ scan ordered to look for PE     Decadron was initiated    The patient had an episode of bradycardia requiring an injection of atropine. Cardiology has been consulted. The patient is also disoriented to time and place. A CT head revealed no acute abnormality    Patient admitted because of concerns with COVID 19.    CURRENT EVALUATION : 1/25/2022    Afebrile  VS stable     Patient is on high flow nasal cannula with 60-->70-->50% FiO2    Per Cardiology, he may need dual chamber ICD in near future based on LVEF and further episodes of bradycardia. MRSE on blood cultures x 1 -Vancomycin initiated  Repeat blood cultures pending    Hyponatremia and hyperkalemia resolved this morning  Creatinine level is back to normal    CRP is decreasing    Echocardiogram pending  VQ scan was unable to determine probability of pulmonary emboli    Patient exhibiting respiratory distress. yes    Patient receiving supplemental oxygen.  6 L NC--> hi flow  % FIO2: 50-->55-->60-->70-->50  Flow Rate: 30-->50-->30 L/min  RR:25-->30-->26  02 sat:94-->93-->90    NEWS Score: 0-4 Low risk group; 5-6: Medium risk group; 7 or above: High risk group  Parameters 3 2 1 0 1 2 3   Age    < 65   = 65   RR = 8  9-11 12-20  21-24 = 25   O2 Sats = 91 92-93 94-95 = 96      Suppl O2  Yes  No      SBP = 90  101-110 111-219   = 220   HR = 40  41-50 51-90  111-130 = 131   Consciousness    Alert   Drowsiness, lethargy, or confusion   Temperature = 35.0 C (95.0 F)  35.1-36.0 C 95.1-96.9 F 36.1-38.0 C 97.0-100.4 F 38.1-39.0 C 100.5-102.3 F = 39.1 C = 102.4 F      NEWS Score:  1/19/21:    Overall Daily Picture:     Unchanged    Presence of secondary bacterial Infection:  No   Additional antibiotics: Vancomycin    Labs, X rays reviewed: 1/25/2022    BUN:25-->22-->26  Cr:0.68-->0.72-->0.90    WBC:15.3-->11.9-->5.5-->5.0-->5.8-->6.2  Hb:10.3-->11.9  Plat: 155-->159    Absolute Neutrophils:5.7  Absolute Lymphocytes:0.4  Neutrophil/Lymphocyte Ratio: 14 high risk    CRP:235.8-->209.4-->90.1-->36-->16  Ferritin:353-->523  LDH: 246-->292-->243-->383    Pro Calcitonin:    MRSA probe: negative     Cultures:  Urine:    Blood:  1-18-22: 1/2 MRSE  1/20/22: 1/2 staph epi  1/23/22: No growth thus far  Sputum :    Wound:      CXR:   1/18/21      CAT:      Discussed with patient, RN, CC, IM. I have personally reviewed the past medical history, past surgical history, medications, social history, and family history, and I have updated the database accordingly.   Past Medical History:     Past Medical History:   Diagnosis Date    COPD (chronic obstructive pulmonary disease) ([facility]     Diabetes mellitus ([facility]     Erectile dysfunction due to arterial insufficiency 12/7/2015    Hypertension     Microalbum[facility] due to type [facility] 7/7/2016    Overweight (BMI 25.0-29.9) 12/7/2015       Past Surgical  History:     Past Surgical History:   Procedure Laterality Date    APPENDECTOMY      TOTAL ANKLE ARTHROPLASTY      LEFT       Medications:      albuterol  2.5 mg Nebulization BID    ipratropium-albuterol  1 ampule Inhalation 4x daily    hydrALAZINE  50 mg Oral 3 times per day    insulin glargine  10 Units SubCUTAneous BID    heparin (porcine)  5,000 Units SubCUTAneous 3 times per day    [Held by provider] amLODIPine  10 mg Oral Daily    aspirin  81 mg Oral Daily    atorvastatin  20 mg Oral Daily    budesonide-formoterol  2 puff Inhalation BID    [Held by provider] furosemide  20 mg Oral Daily    [Held by provider] lisinopril  20 mg Oral Daily    sodium chloride flush  5-40 mL IntraVENous 2 times per day    dexamethasone  6 mg Oral Daily    Vitamin D  2,000 Units Oral Daily    insulin lispro  0-6 Units SubCUTAneous TID WC    insulin lispro  0-3 Units SubCUTAneous Nightly    glucagon (rDNA)  1 mg IntraVENous Once       Social History:     Social History     Socioeconomic History    Marital status:      Spouse name: Not on file    Number of children: Not on file    Years of education: Not on file    Highest education level: Not on file   Occupational History    Not on file   Tobacco Use    Smoking status: Light Tobacco Smoker     Packs/day: 0.50     Types: Cigarettes     Last attempt to quit: 2017     Years since quittin.5    Smokeless tobacco: Never Used    Tobacco comment: reports he quit smoking in the past 2 months   Substance and Sexual Activity    Alcohol use: No     Alcohol/week: 0.0 standard drinks    Drug use: No    Sexual activity: Not on file   Other Topics Concern    Not on file   Social History Narrative    Not on file     Social Determinants of Health     Financial Resource Strain: Low Risk     Difficulty of Paying Living Expenses: Not very hard   Food Insecurity: No Food Insecurity    Worried About Running Out of Food in the Last Year: Never true    Rakesh of Food in the Last Year: Never true   Transportation Needs:     Lack of Transportation (Medical): Not on file    Lack of Transportation (Non-Medical):  Not on file   Physical Activity:     Days of Exercise per Week: Not on file    Minutes of Exercise per Session: Not on file   Stress:     Feeling of Stress : Not on file   Social Connections:     Frequency of Communication with Friends and Family: Not on file    Frequency of Social Gatherings with Friends and Family: Not on file    Attends Mu-ism Services: Not on file    Active Member of Clubs or Organizations: Not on file    Attends Club or Organization Meetings: Not on file    Marital Status: Not on file   Intimate Partner Violence:     Fear of Current or Ex-Partner: Not on file    Emotionally Abused: Not on file    Physically Abused: Not on file    Sexually Abused: Not on file   Housing Stability:     Unable to Pay for Housing in the Last Year: Not on file    Number of Places Lived in the Last Year: Not on file    Unstable Housing in the Last Year: Not on file       Family History:   No family history on file. Allergies:   Patient has no known allergies. Review of Systems:       Constitutional: Generalized fatigue with dyspnea  Head: No headaches  Eyes: No double vision or blurry vision. No conjunctival inflammation. ENT: No sore throat or runny nose. . No hearing loss, tinnitus or vertigo. Cardiovascular: No chest pain or palpitations. Shortness of breath. DUBON  Lung: Shortness of breath with chronic cough. Abdomen: No nausea, vomiting, diarrhea, or abdominal pain. Farley Lever No cramps. Genitourinary: No increased urinary frequency, or dysuria. No hematuria. No suprapubic or CVA pain  Musculoskeletal: No muscle aches or pains. No joint effusions, swelling or deformities  Hematologic: No bleeding or bruising. Neurologic: No headache, weakness, numbness, or tingling. Integument: No rash, no ulcers. Psychiatric: No depression. Endocrine: No polyuria, no polydipsia, no polyphagia. Physical Examination :     Patient Vitals for the past 8 hrs:   BP Temp Temp src Pulse Resp SpO2   01/25/22 0830     22 90 %   01/25/22 0814 (!) 153/76 97.7 °F (36.5 °C) Oral 93 23 90 %   01/25/22 0400 136/66 97.8 °F (36.6 °C) Oral 81 22 92 %     General Appearance: Awake, alert, and in no apparent distress  Head:  Normocephalic, no trauma  Eyes: Pupils equal, round, reactive to light; sclera anicteric; conjunctivae pink. No embolic phenomena. ENT: Oropharynx clear, without erythema, exudate, or thrush. No tenderness of sinuses. Mouth/throat: mucosa pink and moist. No lesions. Dentition in good repair. Neck:Supple, without lymphadenopathy. Thyroid normal, No bruits. Pulmonary/Chest: Clear to auscultation, without wheezes, rales, or rhonchi.   No dullness to percussion. Cardiovascular: Regular rate and rhythm without murmurs, rubs, or gallops. Abdomen: Soft, non tender. Bowel sounds normal. No organomegaly  All four Extremities: No cyanosis, clubbing, edema, or effusions. Neurologic: Disorientation to time and place  Skin: Warm and dry with good turgor. No signs of peripheral arterial or venous insufficiency. No ulcerations. No open wounds. Medical Decision Making -Laboratory:   I have independently reviewed/ordered the following labs:    CBC with Differential:   Recent Labs     01/24/22  0359 01/25/22  0633   WBC 5.8 6.2   HGB 11.9* 11.4*   HCT 36.5* 36.4*    See Reflexed IPF Result   LYMPHOPCT 9* 13*   MONOPCT 5 5     BMP:   Recent Labs     01/24/22  0359 01/25/22  0633    139   K 4.8 4.5    104   CO2 27 25   BUN 22 26*   CREATININE 0.76 0.90   MG 1.8 1.8     Hepatic Function Panel:   Recent Labs     01/24/22  0359 01/25/22  0633   PROT 5.1* 4.9*   LABALBU 3.0* 3.0*   BILITOT 0.56 0.64   ALKPHOS 70 65   ALT 29 32   AST 46* 52*     No results for input(s): RPR in the last 72 hours. No results for input(s): HIV in the last 72 hours. No results for input(s): BC in the last 72 hours.   Lab Results   Component Value Date    MUCUS 2+ 07/01/2017    RBC 3.83 01/25/2022    TRICHOMONAS NOT REPORTED 07/01/2017    WBC 6.2 01/25/2022    YEAST NOT REPORTED 07/01/2017    TURBIDITY Clear 01/19/2022     Lab Results   Component Value Date    CREATININE 0.90 01/25/2022    GLUCOSE 100 01/25/2022       Medical Decision Making-Imaging:     Narrative   EXAMINATION:   ONE XRAY VIEW OF THE CHEST       1/18/2022 10:02 pm       COMPARISON:   09/08/2019       HISTORY:   ORDERING SYSTEM PROVIDED HISTORY: hypoxic   TECHNOLOGIST PROVIDED HISTORY:   hypoxic       FINDINGS:   Stable mild cardiomegaly.  Pulmonary vasculature appears normal.  Mild patchy   airspace opacity is present at the bilateral lower lung zones.  No   pneumothorax or pleural effusion.  Surrounding structures are unremarkable.           Impression   Mild bilateral lower lung zone patchy airspace opacity potentially   representing early COVID pneumonia.       Mild stable cardiomegaly.         Narrative   EXAMINATION:   CT OF THE HEAD WITHOUT CONTRAST  1/18/2022 6:31 pm       TECHNIQUE:   CT of the head was performed without the administration of intravenous   contrast. Dose modulation, iterative reconstruction, and/or weight based   adjustment of the mA/kV was utilized to reduce the radiation dose to as low   as reasonably achievable.       COMPARISON:   CT scan dated March 3, 2015       HISTORY:   ORDERING SYSTEM PROVIDED HISTORY: AMS   TECHNOLOGIST PROVIDED HISTORY:       AMS   Decision Support Exception - unselect if not a suspected or confirmed   emergency medical condition->Emergency Medical Condition (MA)   Reason for Exam: COVID+, AMS       FINDINGS:   BRAIN/VENTRICLES: There is no acute intracranial hemorrhage, mass effect or   midline shift.  No abnormal extra-axial fluid collection.  The gray-white   differentiation is maintained without evidence of an acute infarct.  There is   no evidence of hydrocephalus. Generalized atrophy is present.    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       ORBITS: The visualized portion of the orbits demonstrate no acute abnormality.       SINUSES: Mild degree of mucosal thickening is present within the right   maxillary sinus.  Minimal mucosal thickening is present within the left   maxillary sinus.       SOFT TISSUES/SKULL:  No acute abnormality of the visualized skull or soft   tissues.           Impression   No acute intracranial abnormality.                   Medical Decision [id number]-Bghspieo-Ykudl:       Medical Decision Making-Other:     Note:  Labs, medications, radiologic studies were reviewed with personal review of films  Large amounts of data were reviewed  Discussed with nursing Staff, Discharge planner  Infection Control and Prevention measures reviewed  All prior entries were reviewed  Administer medications as ordered  Prognosis: Guarded  Discharge planning reviewed      Thank you for allowing us to participate in the care of this patient. Please call with questions. Simone Pace, APRN - CNP       ATTESTATION:    I have discussed the case, including pertinent history and exam findings with the APRN. I have evaluated the  History, physical findings and pictures of the patient and the key elements of the encounter have been performed by me. I have reviewed the laboratory data, other diagnostic studies and discussed them with the APRN. I have updated the medical record where necessary. I agree with the assessment, plan and orders as documented by the APRN.     Kaylie Muñoz MD.        Pager: [phone] - Office: [phone]

## 2022-01-25 NOTE — PROGRESS NOTES
Progress Note    Chelsey - Elizabeth Lou  Date of Admission -  2022  9:04 PM  Date of Evaluation -  2022  Room and Bed Number -  6015/1143-61   Hospital Day - 6    CHIEF COMPLAINT : ACUTE HYPOXIC RESPIRATORY FAILURE DUE TO COVID -19 PNEUMONIA   SUBJECTIVE:     OVERNIGHT EVENTS:         On high flow   intermitent tachypnea   Xray chest noted   Wbc normal , no fever             Actemra:             [] No    [x] Yes 2022    DEXAMETHASONE : [] No    [x] Yes started 2022      Review of Systems   Constitutional: Positive for fatigue. Negative for fever. HENT: Positive for congestion. Respiratory: Positive for cough, shortness of breath and wheezing. Cardiovascular: Negative. Gastrointestinal: Negative. Neurological: Negative. OBJECTIVE:     VITAL SIGNS:  BP (!) 153/76   Pulse 93   Temp 97.7 °F (36.5 °C) (Oral)   Resp 22   Ht 5' 5\" (1.651 m)   Wt 156 lb 4.8 oz (70.9 kg)   SpO2 90%   BMI 26.01 kg/m²   Tmax over 24 hours:  Temp (24hrs), Av °F (36.7 °C), Min:97.7 °F (36.5 °C), Max:98.4 °F (36.9 °C)      Patient Vitals for the past 8 hrs:   BP Temp Temp src Pulse Resp SpO2   22 0830     22 90 %   22 0814 (!) 153/76 97.7 °F (36.5 °C) Oral 93 23 90 %   22 0400 136/66 97.8 °F (36.6 °C) Oral 81 22 92 %   22 0237     23 96 %         Intake/Output Summary (Last 24 hours) at 2022 1010  Last data filed at 2022 2747  Gross per 24 hour   Intake 900 ml   Output 850 ml   Net 50 ml     Date 22 0000 - 22 2359   Shift [id number] 24 Hour Total   INTAKE   P.O.(mL/kg/hr) 100(0.2)   100   Shift Total(mL/kg) 100(1.4)   100(1.4)   OUTPUT   Urine(mL/kg/hr) 350(0.6) 200  550   Shift Total(mL/kg) 350(4.9) 200(2.8)  550(7.8)   Weight (kg) 70.9 70.9 70.9 70.9     Wt Readings from Last 3 Encounters:   22 156 lb 4.8 oz (70.9 kg)   21 168 lb (76.2 kg)   20 179 lb (81.2 kg)     Body mass index is 26.01 kg/m². PHYSICAL EXAM:  Head and neck atraumatic, normocephalic    Lymph nodes-no cervical, supraclavicular lymphadenopathy    Neck-no JVP elevation    Lungs - AP diameter of chest increased. Thoracic expansion and diaphragmatic excursion diminished. BS diminished and expiratory phase prolonged. No dullness to percussion or tenderness to palpation. No bronchial breath sounds . CVS- S1, S2 regular. No S3 no S4, no murmurs    Abdomen-nontender, nondistended. Bowel sounds are present. No organomegaly    Lower extremity-no edema    Upper extremity-no edema    Neurological-grossly normal cranial nerves.   No overt motor deficit      MEDICATIONS:  Scheduled Meds:   albuterol  2.5 mg Nebulization BID    ipratropium-albuterol  1 ampule Inhalation 4x daily    hydrALAZINE  50 mg Oral 3 times per day    insulin glargine  10 Units SubCUTAneous BID    heparin (porcine)  5,000 Units SubCUTAneous 3 times per day    [Held by provider] amLODIPine  10 mg Oral Daily    aspirin  81 mg Oral Daily    atorvastatin  20 mg Oral Daily    budesonide-formoterol  2 puff Inhalation BID    [Held by provider] furosemide  20 mg Oral Daily    [Held by provider] lisinopril  20 mg Oral Daily    sodium chloride flush  5-40 mL IntraVENous 2 times per day    dexamethasone  6 mg Oral Daily    Vitamin D  2,000 Units Oral Daily    insulin lispro  0-6 Units SubCUTAneous TID WC    insulin lispro  0-3 Units SubCUTAneous Nightly    glucagon (rDNA)  1 mg IntraVENous Once     Continuous Infusions:   sodium chloride      sodium chloride 25 mL/hr at 01/21/22 0815    dextrose       PRN Meds:   magnesium sulfate, 1,000 mg, PRN  sodium chloride flush, 5-40 mL, PRN  sodium chloride, 25 mL, PRN  ondansetron, 4 mg, Q8H PRN   Or  ondansetron, 4 mg, Q6H PRN  polyethylene glycol, 17 g, Daily PRN  acetaminophen, 650 mg, Q6H PRN   Or  acetaminophen, 650 mg, Q6H PRN  atropine, 1 mg, PRN  glucose, 15 g, PRN  dextrose, 12.5 g, PRN  glucagon (rDNA), 1 mg, PRN  dextrose, 100 mL/hr, PRN        SUPPORT DEVICES: [] Ventilator [] BIPAP  [x]high flow nasal Cannula [] Room Air      ABGs:     Lab Results   Component Value Date    AAZ9SDG 29 07/01/2017    FIO2 40.0 07/01/2017       DATA:  Complete Blood Count:   Recent Labs     01/23/22  0508 01/24/22  0359 01/25/22  0633   WBC 5.0 5.8 6.2   RBC 3.38* 3.92* 3.83*   HGB 10.3* 11.9* 11.4*   HCT 31.6* 36.5* 36.4*   MCV 93.5 93.1 95.0   MCH 30.5 30.4 29.8   MCHC 32.6 32.6 31.3   RDW 13.2 13.1 13.1    159 See Reflexed IPF Result   MPV 9.7 10.5 NOT REPORTED        Last 3 Blood Glucose:   Recent Labs     01/23/22  0508 01/24/22  0359 01/25/22  0633   GLUCOSE 104* 98 100*        PT/INR:    Lab Results   Component Value Date    PROTIME 10.2 01/18/2022    INR 0.9 01/18/2022     PTT:    Lab Results   Component Value Date    APTT 27.2 01/18/2022       Comprehensive Metabolic Profile:   Recent Labs     01/23/22  0508 01/24/22  0359 01/25/22  0633    139 139   K 4.7 4.8 4.5    106 104   CO2 26 27 25   BUN 25* 22 26*   CREATININE 0.68* 0.76 0.90   GLUCOSE 104* 98 100*   CALCIUM 7.6* 8.2* 8.1*   PROT 4.6* 5.1* 4.9*   LABALBU 2.7* 3.0* 3.0*   BILITOT 0.42 0.56 0.64   ALKPHOS 59 70 65   AST 59* 46* 52*   ALT 30 29 32      Magnesium:   Lab Results   Component Value Date    MG 1.8 01/24/2022    MG 1.9 01/21/2022    MG 1.7 01/19/2022     Phosphorus:   Lab Results   Component Value Date    PHOS 3.5 09/09/2019    PHOS 3.5 09/08/2019    PHOS 5.9 09/07/2019     Ionized Calcium:   Lab Results   Component Value Date    CAION 1.10 09/06/2019        Urinalysis:   Lab Results   Component Value Date    NITRU NEGATIVE 01/19/2022    COLORU Dark Yellow 01/19/2022    PHUR 5.0 01/19/2022    WBCUA 5 TO 10 07/01/2017    RBCUA 5 TO 10 07/01/2017    MUCUS 2+ 07/01/2017    TRICHOMONAS NOT REPORTED 07/01/2017    YEAST NOT REPORTED 07/01/2017    BACTERIA FEW 07/01/2017    SPECGRAV 1.015 01/19/2022    LEUKOCYTESUR NEGATIVE 01/19/2022    UROBILINOGEN Normal 01/19/2022    BILIRUBINUR NEGATIVE 01/19/2022    GLUCOSEU NEGATIVE 01/19/2022    KETUA NEGATIVE 01/19/2022    AMORPHOUS 2+ 07/01/2017               CT HEAD WO CONTRAST    Result Date: 1/18/2022  No acute intracranial abnormality. NM LUNG SCAN PERFUSION ONLY    Addendum Date: 1/21/2022    ADDENDUM: Upon further review, the perfusion defect at the left lung base is much smaller than the corresponding opacity on the chest x-ray. This is low probability for pulmonary embolism. Findings were discussed with Dr. Kat Lund at 10:15 a.m. 01/21/2022. Result Date: 1/21/2022  Suboptimal exam without more recent chest radiograph for comparison. Indeterminate probability for pulmonary embolism. XR CHEST PORTABLE    Result Date: 1/19/2022  No pneumothorax. Stable moderate cardiomegaly. Slight worsening consolidative changes of left lower lobe and small left pleural effusion. Worsening increased interstitial markings and ground-glass densities throughout both lungs. The finding can be seen in setting of pulmonary edema or viral pneumonia. XR CHEST PORTABLE    Result Date: 1/18/2022  Mild bilateral lower lung zone patchy airspace opacity potentially representing early COVID pneumonia. Mild stable cardiomegaly. VENTILATOR SETTINGS:  Vent Information  Skin Assessment: Clean, dry, & intact  Equipment Changed: Humidification  FiO2 : 50 %  SpO2: 90 %  SpO2/FiO2 ratio: 180  Humidification Source: Heated wire  Humidification Temp: 31  Humidification Temp Measured: 31  Circuit Condensation: Drained     PaO2/FiO2 RATIO:  No results for input(s): POCPO2 in the last 72 hours. FiO2 : 50 %       LABS:  ABGs:   No results for input(s): POCPH, POCPCO2, POCPO2, POCHCO3, NCCG7JDT in the last 72 hours.          ASSESSMENT:     Principal Problem:    Acute on chronic respiratory failure with hypoxia (HCC)  Active Problems:    Controlled type 2 diabetes mellitus without complication, without long-term current use of insulin (HCC)    COVID-19    Hypoxemia    Arterial hypotension  Resolved Problems:    * No resolved hospital problems. *              Acute hypoxic respiratory failure secondary to COVID 19   Bilateral multifocal pneumonia due to COVID 19 infection   Covid -19 pandemic emergency     LOS: 6  PLAN:    Continue high flow oxygen and wean to keep saturation greater than 90%. Use BiPAP as needed. Monitor off antibiotics   Continue Decadron and complete 10 days of treatment. Continue Symbicort and DuoNeb. He did need LTAC placement  Will obtain chest x-ray today  Encourage prone positioning          Treatment plan Discussed with nursing staff in detail , all questions answered . Total critical care time caring for this patient with life threatening, unstable organ failure, including direct patient contact, management of life support systems, review of data including imaging and labs, discussions with other team members and physicians at least 27  Min so far today, excluding procedures. Electronically signed by Allean Hammans, MD on 1/25/2022 at 10:10 AM       This patient was evaluated in the context of the global SARS-CoV-2 (COVID-19) pandemic, which necessitated considerations that the patient either has COVID-19 infection or is at risk of infection with COVID-19. Institutional protocols and algorithms that pertain to the evaluation & management of patients with COVID-19 or those at risk for COVID-19 are in a state of rapid changes based on information released by regulatory bodies including the CDC and federal and state organizations. These policies and algorithms were followed during the patient's care. Please note that this chart was generated using voice recognition Dragon dictation software. Although every effort was made to ensure the accuracy of this automated transcription, some errors in transcription may have occurred.

## 2022-01-26 VITALS
WEIGHT: 155 LBS | HEIGHT: 65 IN | TEMPERATURE: 97.9 F | BODY MASS INDEX: 25.83 KG/M2 | HEART RATE: 84 BPM | RESPIRATION RATE: 24 BRPM | DIASTOLIC BLOOD PRESSURE: 73 MMHG | SYSTOLIC BLOOD PRESSURE: 118 MMHG | OXYGEN SATURATION: 92 %

## 2022-01-26 LAB
ABSOLUTE EOS #: 0 K/UL (ref 0–0.4)
ABSOLUTE IMMATURE GRANULOCYTE: 0 K/UL (ref 0–0.3)
ABSOLUTE LYMPH #: 0.47 K/UL (ref 1–4.8)
ABSOLUTE MONO #: 0.08 K/UL (ref 0.1–0.8)
ALBUMIN SERPL-MCNC: 3.1 G/DL (ref 3.5–5.2)
ALBUMIN/GLOBULIN RATIO: 1.6 (ref 1–2.5)
ALP BLD-CCNC: 66 U/L (ref 40–129)
ALT SERPL-CCNC: 54 U/L (ref 5–41)
ANION GAP SERPL CALCULATED.3IONS-SCNC: 8 MMOL/L (ref 9–17)
AST SERPL-CCNC: 82 U/L
BASOPHILS # BLD: 0 % (ref 0–2)
BASOPHILS ABSOLUTE: 0 K/UL (ref 0–0.2)
BILIRUB SERPL-MCNC: 0.66 MG/DL (ref 0.3–1.2)
BUN BLDV-MCNC: 27 MG/DL (ref 8–23)
BUN/CREAT BLD: ABNORMAL (ref 9–20)
C-REACTIVE PROTEIN: 7.9 MG/L (ref 0–5)
CALCIUM SERPL-MCNC: 8.4 MG/DL (ref 8.6–10.4)
CHLORIDE BLD-SCNC: 100 MMOL/L (ref 98–107)
CO2: 24 MMOL/L (ref 20–31)
CREAT SERPL-MCNC: 0.89 MG/DL (ref 0.7–1.2)
D-DIMER QUANTITATIVE: 1.47 MG/L FEU
DIFFERENTIAL TYPE: ABNORMAL
EOSINOPHILS RELATIVE PERCENT: 0 % (ref 1–4)
GFR AFRICAN AMERICAN: >60 ML/MIN
GFR NON-AFRICAN AMERICAN: >60 ML/MIN
GFR SERPL CREATININE-BSD FRML MDRD: ABNORMAL ML/MIN/{1.73_M2}
GFR SERPL CREATININE-BSD FRML MDRD: ABNORMAL ML/MIN/{1.73_M2}
GLUCOSE BLD-MCNC: 109 MG/DL (ref 70–99)
GLUCOSE BLD-MCNC: 127 MG/DL (ref 75–110)
GLUCOSE BLD-MCNC: 184 MG/DL (ref 75–110)
HCT VFR BLD CALC: 35.6 % (ref 40.7–50.3)
HEMOGLOBIN: 11.3 G/DL (ref 13–17)
IMMATURE GRANULOCYTES: 0 %
LACTATE DEHYDROGENASE: 358 U/L (ref 135–225)
LYMPHOCYTES # BLD: 6 % (ref 24–44)
MCH RBC QN AUTO: 29.9 PG (ref 25.2–33.5)
MCHC RBC AUTO-ENTMCNC: 31.7 G/DL (ref 28.4–34.8)
MCV RBC AUTO: 94.2 FL (ref 82.6–102.9)
MONOCYTES # BLD: 1 % (ref 1–7)
MORPHOLOGY: NORMAL
NRBC AUTOMATED: 0.3 PER 100 WBC
PDW BLD-RTO: 13.1 % (ref 11.8–14.4)
PLATELET # BLD: 160 K/UL (ref 138–453)
PLATELET ESTIMATE: ABNORMAL
PMV BLD AUTO: 10.7 FL (ref 8.1–13.5)
POTASSIUM SERPL-SCNC: 4.3 MMOL/L (ref 3.7–5.3)
RBC # BLD: 3.78 M/UL (ref 4.21–5.77)
RBC # BLD: ABNORMAL 10*6/UL
SEG NEUTROPHILS: 93 % (ref 36–66)
SEGMENTED NEUTROPHILS ABSOLUTE COUNT: 7.35 K/UL (ref 1.8–7.7)
SODIUM BLD-SCNC: 132 MMOL/L (ref 135–144)
TOTAL PROTEIN: 5.1 G/DL (ref 6.4–8.3)
WBC # BLD: 7.9 K/UL (ref 3.5–11.3)
WBC # BLD: ABNORMAL 10*3/UL

## 2022-01-26 PROCEDURE — 85025 COMPLETE CBC W/AUTO DIFF WBC: CPT

## 2022-01-26 PROCEDURE — 6370000000 HC RX 637 (ALT 250 FOR IP)

## 2022-01-26 PROCEDURE — 36415 COLL VENOUS BLD VENIPUNCTURE: CPT

## 2022-01-26 PROCEDURE — 6370000000 HC RX 637 (ALT 250 FOR IP): Performed by: NURSE PRACTITIONER

## 2022-01-26 PROCEDURE — 6370000000 HC RX 637 (ALT 250 FOR IP): Performed by: STUDENT IN AN ORGANIZED HEALTH CARE EDUCATION/TRAINING PROGRAM

## 2022-01-26 PROCEDURE — 6360000002 HC RX W HCPCS: Performed by: STUDENT IN AN ORGANIZED HEALTH CARE EDUCATION/TRAINING PROGRAM

## 2022-01-26 PROCEDURE — 94640 AIRWAY INHALATION TREATMENT: CPT

## 2022-01-26 PROCEDURE — 99291 CRITICAL CARE FIRST HOUR: CPT | Performed by: INTERNAL MEDICINE

## 2022-01-26 PROCEDURE — 2580000003 HC RX 258: Performed by: STUDENT IN AN ORGANIZED HEALTH CARE EDUCATION/TRAINING PROGRAM

## 2022-01-26 PROCEDURE — 6370000000 HC RX 637 (ALT 250 FOR IP): Performed by: INTERNAL MEDICINE

## 2022-01-26 PROCEDURE — 83615 LACTATE (LD) (LDH) ENZYME: CPT

## 2022-01-26 PROCEDURE — 82947 ASSAY GLUCOSE BLOOD QUANT: CPT

## 2022-01-26 PROCEDURE — 85379 FIBRIN DEGRADATION QUANT: CPT

## 2022-01-26 PROCEDURE — 80053 COMPREHEN METABOLIC PANEL: CPT

## 2022-01-26 PROCEDURE — 6360000002 HC RX W HCPCS: Performed by: INTERNAL MEDICINE

## 2022-01-26 PROCEDURE — 2700000000 HC OXYGEN THERAPY PER DAY

## 2022-01-26 PROCEDURE — 99232 SBSQ HOSP IP/OBS MODERATE 35: CPT | Performed by: FAMILY MEDICINE

## 2022-01-26 PROCEDURE — 86140 C-REACTIVE PROTEIN: CPT

## 2022-01-26 PROCEDURE — 94761 N-INVAS EAR/PLS OXIMETRY MLT: CPT

## 2022-01-26 RX ORDER — HYDRALAZINE HYDROCHLORIDE 50 MG/1
50 TABLET, FILM COATED ORAL EVERY 8 HOURS SCHEDULED
Qty: 90 TABLET | Refills: 3 | Status: ON HOLD | OUTPATIENT
Start: 2022-01-26 | End: 2022-06-18 | Stop reason: HOSPADM

## 2022-01-26 RX ORDER — CHOLECALCIFEROL (VITAMIN D3) 50 MCG
2000 TABLET ORAL DAILY
Qty: 60 TABLET | Refills: 1 | Status: SHIPPED | OUTPATIENT
Start: 2022-01-27 | End: 2022-07-12 | Stop reason: SDUPTHER

## 2022-01-26 RX ORDER — DEXAMETHASONE 6 MG/1
6 TABLET ORAL DAILY
Qty: 2 TABLET | Refills: 0 | Status: SHIPPED | OUTPATIENT
Start: 2022-01-27 | End: 2022-01-29

## 2022-01-26 RX ADMIN — INSULIN LISPRO 1 UNITS: 100 INJECTION, SOLUTION INTRAVENOUS; SUBCUTANEOUS at 12:30

## 2022-01-26 RX ADMIN — ALBUTEROL SULFATE 2.5 MG: 2.5 SOLUTION RESPIRATORY (INHALATION) at 00:16

## 2022-01-26 RX ADMIN — BUDESONIDE AND FORMOTEROL FUMARATE DIHYDRATE 2 PUFF: 80; 4.5 AEROSOL RESPIRATORY (INHALATION) at 08:15

## 2022-01-26 RX ADMIN — HEPARIN SODIUM 5000 UNITS: 5000 INJECTION INTRAVENOUS; SUBCUTANEOUS at 05:51

## 2022-01-26 RX ADMIN — SODIUM CHLORIDE, PRESERVATIVE FREE 10 ML: 5 INJECTION INTRAVENOUS at 08:25

## 2022-01-26 RX ADMIN — HYDRALAZINE HYDROCHLORIDE 50 MG: 50 TABLET, FILM COATED ORAL at 05:51

## 2022-01-26 RX ADMIN — DEXAMETHASONE 6 MG: 4 TABLET ORAL at 08:25

## 2022-01-26 RX ADMIN — ATORVASTATIN CALCIUM 20 MG: 20 TABLET, FILM COATED ORAL at 08:25

## 2022-01-26 RX ADMIN — Medication 2000 UNITS: at 08:25

## 2022-01-26 RX ADMIN — INSULIN GLARGINE 10 UNITS: 100 INJECTION, SOLUTION SUBCUTANEOUS at 08:30

## 2022-01-26 RX ADMIN — BUSPIRONE HYDROCHLORIDE 5 MG: 5 TABLET ORAL at 08:25

## 2022-01-26 RX ADMIN — Medication 81 MG: at 08:25

## 2022-01-26 RX ADMIN — IPRATROPIUM BROMIDE AND ALBUTEROL SULFATE 1 AMPULE: .5; 3 SOLUTION RESPIRATORY (INHALATION) at 08:14

## 2022-01-26 RX ADMIN — ALBUTEROL SULFATE 2.5 MG: 2.5 SOLUTION RESPIRATORY (INHALATION) at 03:49

## 2022-01-26 ASSESSMENT — PAIN SCALES - GENERAL
PAINLEVEL_OUTOF10: 0
PAINLEVEL_OUTOF10: 0

## 2022-01-26 ASSESSMENT — ENCOUNTER SYMPTOMS
COUGH: 1
GASTROINTESTINAL NEGATIVE: 1
SHORTNESS OF BREATH: 1

## 2022-01-26 NOTE — PROGRESS NOTES
Patient was evaluated today for the diagnosis of CHF. I entered a DME order for home oxygen because the diagnosis and testing requires the patient to have supplemental oxygen. Condition will improve or be benefited by oxygen use. The patient is  able to perform good mobility in a home setting and therefore does require the use of a portable oxygen system. The need for this equipment was discussed with the patient and he understands and is in agreement.

## 2022-01-26 NOTE — DISCHARGE INSTR - DIET
amount of carbohydrates  Your daily amount depends on several things, such as your weight, how active you are, which diabetes medicines you take, and what your goals are for your blood sugar levels. A registered dietitian or certified diabetes educator can help you plan how many carbs to include in each meal and snack. For most adults, a guideline for the daily amount of carbs is:  45 to 60 grams at each meal. That's about the same as 3 to 4 carbohydrate servings. 15 to 20 grams at each snack. That's about the same as 1 carbohydrate serving. Count carbs  Counting carbs lets you know how much rapid-acting insulin to take before you eat. If you use an insulin pump, you get a constant rate of insulin during the day. So the pump must be programmed at meals. This gives you extra insulin to cover the rise in blood sugar after meals. If you take insulin:  Learn your own insulin-to-carb ratio. You and your diabetes health professional will figure out the ratio. You can do this by testing your blood sugar after meals. For example, you may need a certain amount of insulin for every 15 grams of carbs. Add up the carb grams in a meal. Then you can figure out how many units of insulin to take based on your insulin-to-carb ratio. Exercise lowers blood sugar. You can use less insulin than you would if you were not doing exercise. Keep in mind that timing matters. If you exercise within 1 hour after a meal, your body may need less insulin for that meal than it would if you exercised 3 hours after the meal. Test your blood sugar to find out how exercise affects your need for insulin. If you do or don't take insulin:  Look at labels on packaged foods. This can tell you how many carbs are in a serving. You can also use guides from the American Diabetes Association. Be aware of portions, or serving sizes.  If a package has two servings and you eat the whole package, you need to double the number of grams of carbohydrate listed for one serving. Protein, fat, and fiber do not raise blood sugar as much as carbs do. If you eat a lot of these nutrients in a meal, your blood sugar will rise more slowly than it would otherwise. Eat from all food groups  Eat at least three meals a day. Plan meals to include food from all the food groups. The food groups include grains, fruits, dairy, proteins, and vegetables. Talk to your dietitian or diabetes educator about ways to add limited amounts of sweets into your meal plan. If you drink alcohol, talk to your doctor. It may not be recommended when you are taking certain diabetes medicines. Where can you learn more? Go to https://[employer]. org and sign in to your [employer] account. Enter P057 in the GLSS box to learn more about \"Counting Carbohydrates for Diabetes: Care Instructions. \"     If you do not have an account, please click on the \"Sign Up Now\" link. Current as of: July 28, 2021               Content Version: 13.1  © 2006-2021 [employer]wise[employer] Inc[employer]. Care instructions adapted under license by [facility] ([facility]). If you have questions about a medical condition or this instruction, always ask your healthcare professional. [place] [address] any warranty or liability for your use of this information.

## 2022-01-26 NOTE — CARE COORDINATION
Spoke to pt via telephone to discuss transitional planning. He is very anxious to return home with his son. He declines home care. The only thing he says he needs is oxygen    1455 oxygen order received and faxed to [facility]. Daniel Gallardo notified.  Number to call added to AVS and on portable tank    Discharge [address] [facility] Case Management Department  Written by: Phuc Ruiz RN    Patient Name: Elizabeth Lou  Attending Provider: Nina Espinoza MD  Admit Date: 2022  9:04 PM  MRN: [id number]  Account: [de-identified]                     : 1955  Discharge Date: 2022      Disposition: home    Phuc Ruiz RN

## 2022-01-26 NOTE — PROGRESS NOTES
[facility] On license of [facility]    Progress Note    Date:   1/26/2022  Patient name:  Papi Ambriz  Date of admission:  1/18/2022  9:04 PM  MRN:   [id number]  YOB: 1955    SUBJECTIVE/Last 24 hours update:     Patient seen and examined at bedside this morning. No acute events overnight, patient currently saturating on FiO2 50, 30 L high flow nasal cannula. Afebrile. Saturating 90%. Stable VS.    Patient receiving IV fluids 75 cc an hour. LDH down trended from [phone] today, D-dimer 3.12- 2.13.-1.47    CXR: new patchy opacity in right mid lung. Procal within normal. Will monitor off antibiotics per pulm recs. Continues to be afebrile, no leukocytosis. Pulm is consulted, appreciate recs. Notes from nursing staff and Consults had been reviewed, and the overnight progress had been checked with the nursing staff as well. HPI:   Please see H&P    REVIEW OF SYSTEMS:      CONSTITUTIONAL:  no fevers, no headcahes  EYES: negative for blury vision  HEENT: No headaches, No nasal congestion, no difficulty swallowing  RESPIRATORY:negative for dyspnea, no wheezing, no Cough  CARDIOVASCULAR: negative for chest pain, no palpitations  GASTROINTESTINAL: no nausea, no vomiting, no change in bowel habits, no abdominal pain   GENITOURINARY: negative for dysuria, no hematuria   MUSCULOSKELETAL: no joint pains, no muscle aches, no swelling of joints or extremities  NEUROLOGICAL: No  Weakness or numbness      PAST MEDICAL HISTORY:      has a past medical history of COPD (chronic obstructive pulmonary disease) ([facility] [place] [address] Diabetes mellitus ([address] Erectile dysfunction due to arterial insufficiency, Hypertension, Microalbuminuria due to type 2 diabetes mellitus ([address] and Overweight (BMI 25.0-29.9). PAST SURGICAL HISTORY:      has a past surgical history that includes Appendectomy and Total ankle arthroplasty.        SOCIAL HISTORY:      reports that he has been smoking cigarettes. He has been smoking about 0.50 packs per day. He has never used smokeless tobacco. He reports that he does not drink alcohol and does not use drugs. FAMILY HISTORY:     family history is not on file. HOME MEDICATIONS:      Prior to Admission medications    Medication Sig Start Date End Date Taking? Authorizing Provider   amLODIPine (NORVASC) 10 MG tablet TAKE 1 TABLET BY MOUTH DAILY 10/25/21   Cristy Walker MD   aspirin (HM ASPIRIN EC LOW DOSE) 81 MG EC tablet TAKE 1 TABLET BY MOUTH DAILY 8/26/21   Bertha Sepulveda MD   furosemide (LASIX) 20 MG tablet Take 1 tablet by mouth daily 8/26/21   Bertha Sepulveda MD   Blood Pressure KIT Check blood pressure daily 8/26/21   Bertha Sepulveda MD   albuterol sulfate HFA (PROAIR HFA) 108 (90 Base) MCG/ACT inhaler inhale 2 puffs by mouth every 6 hours if needed for wheezing 8/16/21   Te Mae MD   fluticasone-salmeterol (ADVAIR DISKUS) 100-50 MCG/DOSE diskus inhaler INHALE 1 PUFF INTO THE LUNGS EVERY 12 HOURS 8/16/21   Te Mae MD   carvedilol (COREG) 25 MG tablet Take 1 tablet by mouth 2 times daily 8/16/21   Dell Robles MD   atorvastatin (LIPITOR) 20 MG tablet Take 1 tablet by mouth daily 7/21/21   Cristy Walker MD   lisinopril (PRINIVIL;ZESTRIL) 20 MG tablet Take 1 tablet by mouth daily 7/21/21   Cristy Walker MD   ipratropium (ATROVENT HFA) 17 MCG/ACT inhaler Inhale 1 puff into the lungs 2 times daily 9/2/20 10/2/20  Neha Maher MD   tiotropium ([facility]) 18 MCG inhalation capsule Inhale 1 capsule into the lungs daily 1/9/20   Neha Maher MD   nicotine (NICODERM CQ) 14 MG/24HR Place 1 patch onto the skin daily 12/25/16   Johnny Greco MD       ALLERGIES:     Patient has no known allergies.       OBJECTIVE:       Vitals:    01/26/22 0016 01/26/22 0349 01/26/22 0415 01/26/22 0520   BP:   (!) 137/55    Pulse:   82    Resp: 23 24 22    Temp:   97.5 °F (36.4 °C)    TempSrc:   Oral    SpO2: 95% Morphology NOT REPORTED     RBC Morphology NOT REPORTED     Platelet Estimate NOT REPORTED     Immature Granulocytes 0 0 %    Seg Neutrophils 93 (H) 36 - 66 %    Lymphocytes 6 (L) 24 - 44 %    Monocytes 1 1 - 7 %    Eosinophils % 0 (L) 1 - 4 %    Basophils 0 0 - 2 %    Absolute Immature Granulocyte 0.00 0.00 - 0.30 k/uL    Segs Absolute 7.35 1.8 - 7.7 k/uL    Absolute Lymph # 0.47 (L) 1.0 - 4.8 k/uL    Absolute Mono # 0.08 (L) 0.1 - 0.8 k/uL    Absolute Eos # 0.00 0.0 - 0.4 k/uL    Basophils Absolute 0.00 0.0 - 0.2 k/uL    Morphology Normal    Comprehensive Metabolic Panel w/ Reflex to MG    Collection Time: 01/26/22  5:01 AM   Result Value Ref Range    Glucose 109 (H) 70 - 99 mg/dL    BUN 27 (H) 8 - 23 mg/dL    CREATININE 0.89 0.70 - 1.20 mg/dL    Bun/Cre Ratio NOT REPORTED 9 - 20    Calcium 8.4 (L) 8.6 - 10.4 mg/dL    Sodium 132 (L) 135 - 144 mmol/L    Potassium 4.3 3.7 - 5.3 mmol/L    Chloride 100 98 - 107 mmol/L    CO2 24 20 - 31 mmol/L    Anion Gap 8 (L) 9 - 17 mmol/L    Alkaline Phosphatase 66 40 - 129 U/L    ALT 54 (H) 5 - 41 U/L    AST 82 (H) <40 U/L    Total Bilirubin 0.66 0.3 - 1.2 mg/dL    Total Protein 5.1 (L) 6.4 - 8.3 g/dL    Albumin 3.1 (L) 3.5 - 5.2 g/dL    Albumin/Globulin Ratio 1.6 1.0 - 2.5    GFR Non-African American >60 >60 mL/min    GFR African American >60 >60 mL/min    GFR Comment          GFR Staging NOT REPORTED    LACTATE DEHYDROGENASE    Collection Time: 01/26/22  5:01 AM   Result Value Ref Range     (H) 135 - 225 U/L   D-DIMER, QUANTITATIVE    Collection Time: 01/26/22  5:01 AM   Result Value Ref Range    D-Dimer, Quant 1.47 mg/L FEU         Imaging/Diagonstics:    ECHO Complete 2D W Doppler W Color    Result Date: 1/21/2022  Transthoracic Echocardiography Report (TTE)  Patient Name  Buster Simmons Vincenzo      Date of Study                   01/21/2022                Tulio Corey   Date of Birth 1955  Gender                          Male   Age           74 year(s)  [facility] [facility]   Room Number   3007   Corporate ID  [id number]  #   Patient Arelis Blanco  [de-identified]  #   MR #          [id number]     Sonographer                     Robert Harman   Accession #   [id number]  Interpreting Physician          [address] [place] [address]   Fellow                    Referring Nurse Practitioner   Interpreting              Referring Physician             Jeff Kay  Fellow  Type of Study   TTE procedure:2D Echocardiogram, M-Mode, Doppler, Color Doppler. Procedure Date Date: 01/21/2022 Start: 02:27 PM Study Location: [facility] Technical Quality: Adequate visualization Indications:Bradycardia. History / Tech. Comments: Procedure explained to patient. Echo completed at the bedside. PMHx: Covid-19 Hypertension, Hyperlipidemia, Diabetes Patient Status: Inpatient CONCLUSIONS Summary Left ventricle is normal in size, global left ventricular systolic function is low normal, calculated ejection fraction is 51%. Aortic valve sclerosis without stenosis. Mitral valve sclerosis without stenosis. No significant valvular regurgitation or stenosis seen. Signature ----------------------------------------------------------------------------  Electronically signed by Wanda Del Rio(Sonographer) on 01/21/2022 03:26 PM ---------------------------------------------------------------------------- ----------------------------------------------------------------------------  Electronically signed by Delvis Maki(Interpreting physician) on 01/21/2022  04:11 PM ---------------------------------------------------------------------------- FINDINGS Left Atrium Left atrium is at upper limits of normal. Inter-atrial septum is intact with no evidence for an atrial septal defect, by color doppler. Left Ventricle Left ventricle is normal in size, global left ventricular systolic function is low normal, calculated ejection fraction is 51%. Right Atrium Right atrium is normal in size. Right Ventricle Normal right ventricular size and function. Mitral Valve Normal mitral valve structure. Mitral valve sclerosis without stenosis. No evidence of mitral regurgitation. Aortic Valve Aortic valve is trileaflet. Aortic valve sclerosis without stenosis. No aortic insufficiency. Tricuspid Valve Tricuspid valve was not well visualized. No tricuspid regurgitation was seen. Pulmonic Valve Pulmonic valve not well visualized but Doppler velocities are normal. Pericardial Effusion No significant pericardial effusion is seen. Miscellaneous Normal aortic root dimension. E/E' average = 10.4. IVC normal diameter & inspiratory collapse indicating normal RA filling pressure .  M-mode / 2D Measurements & Calculations:   LVIDd:5.7 cm(3.7 - 5.6 cm)       Diastolic NGKLW.07 ml  IVSd:0.8 cm(0.6 - 1.1 cm)        Systolic GTBFLV:35.13 ml  LVPWd:0.8 cm(0.6 - 1.1 cm)       Aortic Root:3.3 cm(2.0 - 3.7 cm)                                   LA Dimension: 4.5 cm(1.9 - 4.0 cm)  Calculated LVEF (%): 50.8 %      LA volume/Index: 50.58 ml                                   LVOT:2.2 cm                                   RVDd:4 cm   Mitral:                                 Aortic   Valve Area (P1/2-Time): 3.86 cm^2       Peak Velocity: 1.66 m/s  Peak E-Wave: 1.14 m/s                   Mean Velocity: 1.04 m/s  Peak A-Wave: 1.08 m/s                   Peak Gradient: 11.02 mmHg  E/A Ratio: 1.06                         Mean Gradient: 5 mmHg  Peak Gradient: 5.2 mmHg  Mean Gradient: 2 mmHg  Deceleration Time: 201 msec             Area (continuity): 2.71 cm^2  P1/2t: 57 msec                          AV VTI: 40.1 cm   Area (continuity): 3.26 cm^2  Mean Velocity: 0.51 m/s                                           Pulmonic:                                           Peak Velocity: 0.75 m/s                                          Peak Gradient: 2.23 mmHg  Diastology / Tissue Doppler Septal Wall E' velocity:0.09 m/s Septal Wall E/E':12.2 Lateral Wall E' velocity:0.13 m/s Lateral Wall E/E':8.6    CT HEAD WO precautions. 4 millicuries of Tc 92S MAA was administered intravenously prior to planar imaging of the lungs in multiple projections. COMPARISON: Chest radiograph 01/19/2022 at 7:14 p.m.. HISTORY: ORDERING SYSTEM PROVIDED HISTORY: SOB, COVID + FINDINGS: PERFUSION: Diminished segmental perfusion left lower lobe corresponding to area of consolidation on chest radiograph. Distribution of radiotracer is otherwise homogeneous. No other segmental defects identified. CHEST RADIOGRAPH: Dense left lower lobe consolidation on recent chest radiograph. Suboptimal exam without more recent chest radiograph for comparison. Indeterminate probability for pulmonary embolism. XR CHEST PORTABLE    Result Date: 1/19/2022  EXAMINATION: ONE XRAY VIEW OF THE CHEST 1/19/2022 7:29 pm COMPARISON: 01/18/2022 HISTORY: ORDERING SYSTEM PROVIDED HISTORY: rule out ptx TECHNOLOGIST PROVIDED HISTORY: rule out ptx Reason for Exam: port urpt FINDINGS: Chest radiograph: The cardiomediastinal silhouette and hilar contours are stable with moderate cardiomegaly. Slight worsening consolidative changes of left lower lobe and small left pleural effusion. Worsening increased interstitial markings and ground-glass densities throughout both lungs. No pneumothorax. The overlying soft tissue and osseous structures do not demonstrate acute abnormality. No pneumothorax. Stable moderate cardiomegaly. Slight worsening consolidative changes of left lower lobe and small left pleural effusion. Worsening increased interstitial markings and ground-glass densities throughout both lungs. The finding can be seen in setting of pulmonary edema or viral pneumonia. XR CHEST PORTABLE    Result Date: 1/18/2022  EXAMINATION: ONE XRAY VIEW OF THE CHEST 1/18/2022 10:02 pm COMPARISON: 09/08/2019 HISTORY: ORDERING SYSTEM PROVIDED HISTORY: hypoxic TECHNOLOGIST PROVIDED HISTORY: hypoxic FINDINGS: Stable mild cardiomegaly.   Pulmonary vasculature appears normal. Mild patchy airspace opacity is present at the bilateral lower lung zones. No pneumothorax or pleural effusion. Surrounding structures are unremarkable. Mild bilateral lower lung zone patchy airspace opacity potentially representing early COVID pneumonia. Mild stable cardiomegaly. VL DUP LOWER EXTREMITY VENOUS BILATERAL    Result Date: 1/22/2022    [facility]  Vascular Lower Extremities DVT Study Procedure   Patient Name Buster Loya      Date of Study           01/22/2022               [facility]   Date of      1955  Gender                  Male  Birth   Age          74 year(s)  Race                       Room Number  3007   Corporate ID [id number]  #   Patient Reyna Marie [de-identified]  #   MR #         [id number]     Sonographer             Mendez Pace RVT   Accession #  [id number]  Interpreting Physician  Dinorah Dickens   Referring                Referring Physician     Leah Valentin *  Nurse  Practitioner  Procedure Type of Study:   Veins: Lower Extremities DVT Study, Venous Scan Lower Bilateral.  Patient Status: In Patient. Technical Quality:Limited visualization. Limitation reason:I.V. placement from groin to mid thigh with bandaging. .  Conclusions   Summary   Simultaneous real time imaging utilizing B-Mode, color doppler and  spectral waveform analysis was performed on the bilateral lower  extremities for venous examination of the deep and superficial systems. Findings are:   Right:  Technically limited study as stated above however in the visualized areas  no superficial or deep vein thrombosis was identified. Left:  No evidence of deep or superficial venous thrombosis.    Signature   ----------------------------------------------------------------  Electronically signed by Ernesto Heck RVT(Sonographer)  on 01/22/2022 11:27 AM  ----------------------------------------------------------------   ---------------------------------------------------------------- Electronically signed by Stacy Burciaga(Interpreting  physician) on 01/22/2022 06:30 PM  ----------------------------------------------------------------  Findings:   Right Impression:                       Left Impression:  The common femoral, deep profunda,      The common femoral, femoral,  proximal femoral vein and proximal      popliteal and tibial veins  greater saphenous vein could not be     demonstrate normal compressibility  visualized due to I.V. placement. and augmentation. The proximal common femoral, mid to     Normal compressibility of the  distal femoral, popliteal and tibial    great saphenous vein. veins demonstrate normal                Normal compressibility of the  compressibility and augmentation. great saphenous vein. Normal compressibility of the great  saphenous vein. Normal compressibility of the small  saphenous vein. Risk Factors History +--------------------------+---------------------+-------------------------+ ! Diagnosis                 ! Date                 ! Comments                 ! +--------------------------+---------------------+-------------------------+ ! CHF                       ! !                         ! +--------------------------+---------------------+-------------------------+   - The patient's risk factor(s) include: previous angina/MI, diabetes     mellitus and arterial hypertension. Comments: COVID positive Velocities are measured in cm/s ; Diameters are measured in cm Right Lower Extremities DVT Study Measurements Right 2D Measurements +------------------------------------+----------+---------------+----------+ ! Location                            ! Visualized! Compressibility! Thrombosis! +------------------------------------+----------+---------------+----------+ ! Common Femoral                      !Yes       ! Yes            ! None      ! +------------------------------------+----------+---------------+----------+ ! Prox Femoral !No        !               !          ! +------------------------------------+----------+---------------+----------+ ! Mid Femoral                         !Yes       ! Yes            ! None      ! +------------------------------------+----------+---------------+----------+ ! Dist Femoral                        !Yes       ! Yes            ! None      ! +------------------------------------+----------+---------------+----------+ ! Deep Femoral                        !No        !               !          ! +------------------------------------+----------+---------------+----------+ ! Popliteal                           !Yes       ! Yes            ! None      ! +------------------------------------+----------+---------------+----------+ ! Sapheno Femoral Junction            ! Yes       ! Yes            ! None      ! +------------------------------------+----------+---------------+----------+ ! PTV                                 ! Yes       ! Yes            ! None      ! +------------------------------------+----------+---------------+----------+ ! Peroneal                            !Yes       ! Yes            ! None      ! +------------------------------------+----------+---------------+----------+ ! Gastroc                             ! Yes       ! Yes            ! None      ! +------------------------------------+----------+---------------+----------+ ! GSV Thigh                           ! No        !               !          ! +------------------------------------+----------+---------------+----------+ ! GSV Knee                            ! Yes       ! Yes            ! None      ! +------------------------------------+----------+---------------+----------+ ! GSV Ankle                           ! Yes       ! Yes            ! None      ! +------------------------------------+----------+---------------+----------+ ! SSV                                 ! Yes       ! Yes            ! None      ! +------------------------------------+----------+---------------+----------+ Right Doppler Measurements +---------------------------+------+------+--------------------------------+ ! Location                   ! Signal!Reflux! Reflux (msec)                   ! +---------------------------+------+------+--------------------------------+ ! Common Femoral             !Phasic!      !                                ! +---------------------------+------+------+--------------------------------+ ! Popliteal                  !Phasic!      !                                ! +---------------------------+------+------+--------------------------------+ Left Lower Extremities DVT Study Measurements Left 2D Measurements +------------------------------------+----------+---------------+----------+ ! Location                            ! Visualized! Compressibility! Thrombosis! +------------------------------------+----------+---------------+----------+ ! Common Femoral                      !Yes       ! Yes            ! None      ! +------------------------------------+----------+---------------+----------+ ! Prox Femoral                        !Yes       ! Yes            ! None      ! +------------------------------------+----------+---------------+----------+ ! Mid Femoral                         !Yes       ! Yes            ! None      ! +------------------------------------+----------+---------------+----------+ ! Dist Femoral                        !Yes       ! Yes            ! None      ! +------------------------------------+----------+---------------+----------+ ! Deep Femoral                        !No        !               !          ! +------------------------------------+----------+---------------+----------+ ! Popliteal                           !Yes       ! Yes            ! None      ! +------------------------------------+----------+---------------+----------+ ! Sapheno Femoral Junction            ! Yes       ! Yes            ! None      ! +------------------------------------+----------+---------------+----------+ ! PTV                                 ! Yes       ! Yes            ! None      ! +------------------------------------+----------+---------------+----------+ ! Peroneal                            !Yes       ! Yes            ! None      ! +------------------------------------+----------+---------------+----------+ ! Gastroc                             ! Yes       ! Yes            ! None      ! +------------------------------------+----------+---------------+----------+ ! GSV Thigh                           ! Yes       ! Yes            ! None      ! +------------------------------------+----------+---------------+----------+ ! GSV Knee                            ! Yes       ! Yes            ! None      ! +------------------------------------+----------+---------------+----------+ ! GSV Ankle                           ! Yes       ! Yes            ! None      ! +------------------------------------+----------+---------------+----------+ ! SSV                                 ! Yes       ! Yes            ! None      ! +------------------------------------+----------+---------------+----------+ Left Doppler Measurements +---------------------------+------+------+--------------------------------+ ! Location                   ! Signal!Reflux! Reflux (msec)                   ! +---------------------------+------+------+--------------------------------+ ! Common Femoral             !Phasic!      !                                ! +---------------------------+------+------+--------------------------------+ ! Prox Femoral               !Phasic!      !                                ! +---------------------------+------+------+--------------------------------+ ! Popliteal                  !Phasic!      !                                ! +---------------------------+------+------+--------------------------------+      Scheduled Meds:   busPIRone  5 mg Oral TID    albuterol  2.5 mg Nebulization BID    ipratropium-albuterol  1 ampule Inhalation 4x daily    hydrALAZINE  50 mg Oral 3 times per day    insulin glargine  10 Units SubCUTAneous BID    heparin (porcine)  5,000 Units SubCUTAneous 3 times per day    [Held by provider] amLODIPine  10 mg Oral Daily    aspirin  81 mg Oral Daily    atorvastatin  20 mg Oral Daily    budesonide-formoterol  2 puff Inhalation BID    [Held by provider] furosemide  20 mg Oral Daily    [Held by provider] lisinopril  20 mg Oral Daily    sodium chloride flush  5-40 mL IntraVENous 2 times per day    dexamethasone  6 mg Oral Daily    Vitamin D  2,000 Units Oral Daily    insulin lispro  0-6 Units SubCUTAneous TID WC    insulin lispro  0-3 Units SubCUTAneous Nightly    glucagon (rDNA)  1 mg IntraVENous Once     Continuous Infusions:   sodium chloride      sodium chloride 25 mL/hr at 01/21/22 0815    dextrose       PRN Meds:.magnesium sulfate, sodium chloride flush, sodium chloride, ondansetron **OR** ondansetron, polyethylene glycol, acetaminophen **OR** acetaminophen, atropine, glucose, dextrose, glucagon (rDNA), dextrose    ASSESSMENT:     Principal Problem:    Acute on chronic respiratory failure with hypoxia (HCC)  Active Problems:    Controlled type 2 diabetes mellitus without complication, without long-term current use of insulin (HCC)    COVID-19    Hypoxemia    Arterial hypotension  Resolved Problems:    * No resolved hospital problems. *      PLAN:        Acute on chronic hypoxic respiratory failure 2/2 COVID-19 pneumonia in the setting of COPD/CHF  - COVID+  -On High flow nasal cannula 50L, FIO2 70  - ID consulted  - CRP trending down 209.4-235- 90.1>36.0, check CRP every other day   - ferritin 353>532  - CXR: mild bilateral lower lung zone patchy air space opacity, representing early COVID pneumonia  - concern for PE, was given lovenox 80 mg in the ER, unable to obtain CT PE 2/2 to UYEN,  V/Q scan showed intermediate probability.  Venous doppler lower extremity ordered- unremarkable   - continue decadron 6 mg PO daily   - RT aerosol protocol  - continue spiriva and symbicort  -Pulmonary consult- recs to contnue HF NC, bipap PRN     Hypotension- Resolved  MAP>65  Dopamine drip for bradycardia discontinued   Continue to hold BP medications     UYEN likely prerenal - resolved   - Cr 0.89>0.68, baseline ~ 1  - s/p 500 ml fluid bolus  - continue gentle hydration 50 ml/hr  - monitor carefully for fluid overload 2/2 history of CHF  - daily BMP  - FeNa is 25.5--> likely post renal     Bradycardia  - HR 42 in the ER, was given atropine 1 mg and glucagon 1 mg  - holding his home betablocker  - telemetry  - cardiology consulted  - dopamine gtt  -Echo howed EF of 51%. Pending cardiology recs regarding AICD.        HFrEF  - no signs of fluid overload  - holding lisinopril/coreg / lasix 2/2 hypotension  - EF 20-30%  -Repeat Echo showed EF of 51%.  Pending cardiology recs regarding AICD.        Plan will be discussed with the attending, Dr. Trice Zhou MD  Family Medicine Resident  1/26/2022 7:12 AM

## 2022-01-26 NOTE — PROGRESS NOTES
Progress Note    Chelsey Lewis  Date of Admission -  2022  9:04 PM  Date of Evaluation -  2022  Room and Bed Number -  7029/2820-07   Hospital Day - 7    CHIEF COMPLAINT : ACUTE HYPOXIC RESPIRATORY FAILURE DUE TO COVID -19 PNEUMONIA   SUBJECTIVE:     OVERNIGHT EVENTS:         Is not requiring high flow oxygen. Is on low-flow oxygen 3 to 4 L via nasal cannula. Home oxygen eval shows he requires 3 L of supplemental oxygen. He sitting up in the chair without increased work of breathing. Does have shortness of breath with exertion  No wheeze            Actemra:             [] No    [x] Yes 2022    DEXAMETHASONE : [] No    [x] Yes started 2022      Review of Systems   Constitutional: Positive for fatigue. Negative for fever. HENT: Negative for congestion. Respiratory: Positive for cough and shortness of breath. Cardiovascular: Negative. Gastrointestinal: Negative. Neurological: Negative.           OBJECTIVE:     VITAL SIGNS:  /73   Pulse 84   Temp 97.9 °F (36.6 °C) (Oral)   Resp 24   Ht 5' 5\" (1.651 m)   Wt 155 lb (70.3 kg)   SpO2 92%   BMI 25.79 kg/m²   Tmax over 24 hours:  Temp (24hrs), Av.9 °F (36.6 °C), Min:97.3 °F (36.3 °C), Max:98.6 °F (37 °C)      Patient Vitals for the past 8 hrs:   BP Temp Temp src Pulse Resp SpO2 Weight   22 0828      92 %    22 0800 118/73 97.9 °F (36.6 °C) Oral 84 24 92 %    22 0520       155 lb (70.3 kg)   22 0415 (!) 137/55 97.5 °F (36.4 °C) Oral 82 22     22 0349     24 (!) 89 %          Intake/Output Summary (Last 24 hours) at 2022 1033  Last data filed at 2022 0940  Gross per 24 hour   Intake 1120 ml   Output 1250 ml   Net -130 ml     Date 22 0000 - 22 2359   Shift 5170-9690 6341-8790 3228-8032 24 Hour Total   INTAKE   P.O.(mL/kg/hr)  300  300   Shift Total(mL/kg)  300(4.3)  300(4.3)   OUTPUT   Urine(mL/kg/hr) 250(0.4)   250   Shift Total(mL/kg) 250(3.6)   250(3.6)   Weight (kg) 70.3 70.3 70.3 70.3     Wt Readings from Last 3 Encounters:   01/26/22 155 lb (70.3 kg)   08/16/21 168 lb (76.2 kg)   09/11/20 179 lb (81.2 kg)     Body mass index is 25.79 kg/m². PHYSICAL EXAM:  Head and neck atraumatic, normocephalic    Lymph nodes-no cervical, supraclavicular lymphadenopathy    Neck-no JVP elevation    Lungs - AP diameter of chest increased. Thoracic expansion and diaphragmatic excursion diminished. BS diminished and expiratory phase prolonged. No dullness to percussion or tenderness to palpation. No bronchial breath sounds . CVS- S1, S2 regular. No S3 no S4, no murmurs    Abdomen-nontender, nondistended. Bowel sounds are present. No organomegaly    Lower extremity-no edema    Upper extremity-no edema    Neurological-grossly normal cranial nerves.   No overt motor deficit      MEDICATIONS:  Scheduled Meds:   busPIRone  5 mg Oral TID    albuterol  2.5 mg Nebulization BID    ipratropium-albuterol  1 ampule Inhalation 4x daily    hydrALAZINE  50 mg Oral 3 times per day    insulin glargine  10 Units SubCUTAneous BID    heparin (porcine)  5,000 Units SubCUTAneous 3 times per day    [Held by provider] amLODIPine  10 mg Oral Daily    aspirin  81 mg Oral Daily    atorvastatin  20 mg Oral Daily    budesonide-formoterol  2 puff Inhalation BID    [Held by provider] furosemide  20 mg Oral Daily    [Held by provider] lisinopril  20 mg Oral Daily    sodium chloride flush  5-40 mL IntraVENous 2 times per day    dexamethasone  6 mg Oral Daily    Vitamin D  2,000 Units Oral Daily    insulin lispro  0-6 Units SubCUTAneous TID     insulin lispro  0-3 Units SubCUTAneous Nightly    glucagon (rDNA)  1 mg IntraVENous Once     Continuous Infusions:   sodium chloride      sodium chloride 25 mL/hr at 01/21/22 0815    dextrose       PRN Meds:   magnesium sulfate, 1,000 mg, PRN  sodium chloride flush, 5-40 mL, PRN  sodium chloride, 25 mL, PRN  ondansetron, 4 mg, Q8H PRN   Or  ondansetron, 4 mg, Q6H PRN  polyethylene glycol, 17 g, Daily PRN  acetaminophen, 650 mg, Q6H PRN   Or  acetaminophen, 650 mg, Q6H PRN  atropine, 1 mg, PRN  glucose, 15 g, PRN  dextrose, 12.5 g, PRN  glucagon (rDNA), 1 mg, PRN  dextrose, 100 mL/hr, PRN        SUPPORT DEVICES: [] Ventilator [] BIPAP  [x] nasal Cannula [] Room Air      ABGs:     Lab Results   Component Value Date    FDO7ASN 29 07/01/2017    FIO2 40.0 07/01/2017       DATA:  Complete Blood Count:   Recent Labs     01/24/22  0359 01/25/22  0633 01/26/22  0501   WBC 5.8 6.2 7.9   RBC 3.92* 3.83* 3.78*   HGB 11.9* 11.4* 11.3*   HCT 36.5* 36.4* 35.6*   MCV 93.1 95.0 94.2   MCH 30.4 29.8 29.9   MCHC 32.6 31.3 31.7   RDW 13.1 13.1 13.1    See Reflexed IPF Result 160   MPV 10.5 NOT REPORTED 10.7        Last 3 Blood Glucose:   Recent Labs     01/24/22  0359 01/25/22  0633 01/26/22  0501   GLUCOSE 98 100* 109*        PT/INR:    Lab Results   Component Value Date    PROTIME 10.2 01/18/2022    INR 0.9 01/18/2022     PTT:    Lab Results   Component Value Date    APTT 27.2 01/18/2022       Comprehensive Metabolic Profile:   Recent Labs     01/24/22  0359 01/25/22  0633 01/26/22  0501    139 132*   K 4.8 4.5 4.3    104 100   CO2 27 25 24   BUN 22 26* 27*   CREATININE 0.76 0.90 0.89   GLUCOSE 98 100* 109*   CALCIUM 8.2* 8.1* 8.4*   PROT 5.1* 4.9* 5.1*   LABALBU 3.0* 3.0* 3.1*   BILITOT 0.56 0.64 0.66   ALKPHOS 70 65 66   AST 46* 52* 82*   ALT 29 32 54*      Magnesium:   Lab Results   Component Value Date    MG 1.8 01/25/2022    MG 1.8 01/24/2022    MG 1.9 01/21/2022     Phosphorus:   Lab Results   Component Value Date    PHOS 3.5 09/09/2019    PHOS 3.5 09/08/2019    PHOS 5.9 09/07/2019     Ionized Calcium:   Lab Results   Component Value Date    CAION 1.10 09/06/2019        Urinalysis:   Lab Results   Component Value Date    NITRU NEGATIVE 01/19/2022    COLORU Dark Yellow 01/19/2022    PHUR 5.0 01/19/2022 WBCUA 5 TO 10 07/01/2017    RBCUA 5 TO 10 07/01/2017    MUCUS 2+ 07/01/2017    TRICHOMONAS NOT REPORTED 07/01/2017    YEAST NOT REPORTED 07/01/2017    BACTERIA FEW 07/01/2017    SPECGRAV 1.015 01/19/2022    LEUKOCYTESUR NEGATIVE 01/19/2022    UROBILINOGEN Normal 01/19/2022    BILIRUBINUR NEGATIVE 01/19/2022    GLUCOSEU NEGATIVE 01/19/2022    KETUA NEGATIVE 01/19/2022    AMORPHOUS 2+ 07/01/2017               CT HEAD WO CONTRAST    Result Date: 1/18/2022  No acute intracranial abnormality. NM LUNG SCAN PERFUSION ONLY    Addendum Date: 1/21/2022    ADDENDUM: Upon further review, the perfusion defect at the left lung base is much smaller than the corresponding opacity on the chest x-ray. This is low probability for pulmonary embolism. Findings were discussed with Dr. Abigail King at 10:15 a.m. 01/21/2022. Result Date: 1/21/2022  Suboptimal exam without more recent chest radiograph for comparison. Indeterminate probability for pulmonary embolism. XR CHEST PORTABLE    Result Date: 1/19/2022  No pneumothorax. Stable moderate cardiomegaly. Slight worsening consolidative changes of left lower lobe and small left pleural effusion. Worsening increased interstitial markings and ground-glass densities throughout both lungs. The finding can be seen in setting of pulmonary edema or viral pneumonia. XR CHEST PORTABLE    Result Date: 1/18/2022  Mild bilateral lower lung zone patchy airspace opacity potentially representing early COVID pneumonia. Mild stable cardiomegaly. VENTILATOR SETTINGS:  Vent Information  Skin Assessment: Clean, dry, & intact  Equipment Changed: Humidification  FiO2 : 50 %  SpO2: 92 %  SpO2/FiO2 ratio: 184  Humidification Source: Heated wire  Humidification Temp: 31  Humidification Temp Measured: 31  Circuit Condensation: Drained  Mask Type: Full face mask  Mask Size: Large     PaO2/FiO2 RATIO:  No results for input(s): POCPO2 in the last 72 hours.    FiO2 : 50 %       LABS:  ABGs:   No results for input(s): POCPH, POCPCO2, POCPO2, POCHCO3, UUMF8CBV in the last 72 hours. ASSESSMENT:     Principal Problem:    Acute on chronic respiratory failure with hypoxia (HCC)  Active Problems:    Controlled type 2 diabetes mellitus without complication, without long-term current use of insulin (HCC)    COVID-19    Hypoxemia    Arterial hypotension  Resolved Problems:    * No resolved hospital problems. *              Acute hypoxic respiratory failure secondary to COVID 19   Bilateral multifocal pneumonia due to COVID 19 infection   Covid -19 pandemic emergency     LOS: 7  PLAN:    Okay to discharge from pulmonary standpoint on supplemental oxygen as per home oxygen evaluation. Order placed. Continue DuoNeb and Symbicort on discharge. Will need PFT as outpatient  Follow-up in pulmonary clinic in 6 to 8 weeks          Treatment plan Discussed with nursing staff in detail , all questions answered . Total critical care time caring for this patient with life threatening, unstable organ failure, including direct patient contact, management of life support systems, review of data including imaging and labs, discussions with other team members and physicians at least 27  Min so far today, excluding procedures. Electronically signed by Sue Schwartz MD on 1/26/2022 at 10:33 AM       This patient was evaluated in the context of the global SARS-CoV-2 (COVID-19) pandemic, which necessitated considerations that the patient either has COVID-19 infection or is at risk of infection with COVID-19. Institutional protocols and algorithms that pertain to the evaluation & management of patients with COVID-19 or those at risk for COVID-19 are in a state of rapid changes based on information released by regulatory bodies including the CDC and federal and state organizations. These policies and algorithms were followed during the patient's care.   Please note that this chart was generated using voice recognition Dragon dictation software. Although every effort was made to ensure the accuracy of this automated transcription, some errors in transcription may have occurred.

## 2022-01-26 NOTE — PROGRESS NOTES
Nutrition Assessment     Type and Reason for Visit: Initial (LOS)    Nutrition Recommendations/Plan: Modify current diet to provide 4 Carb Choices with each meal.  Encourage/monitor PO intakes as tolerated. Monitor labs, weights, and plan of care. Nutrition Assessment:  Chart reviewed based on length of stay. Admitted fatigue, AMS, and SOB.  +Covid-19. PMH includes: CHF, COPD, DM, HTN. Pt has a good appetite and has been eating % of his meals. This morning pt ate % of breakfast.  Stable weight history per chart review noted. Labs reviewed: Na 132 mmol/L. Meds include: Decadron, Lasix (held), Lantus, Humalog SS. Malnutrition Assessment:  Malnutrition Status: No malnutrition    Estimated Daily Nutrient Needs:  Energy (kcal): 25-28 kcal/kg = 8294-8507 kcals/day; Weight Used for Energy Requirements:  Current     Protein (g): 1.2-1.5 gm/kg = 75-95 gm pro/day; Weight Used for Protein Requirements:  Ideal        Fluid (ml/day): 0863-0480 mL/day or per MD; Weight Used for Fluid Requirements:  1 ml/kcal      Nutrition Related Findings: Labs/Meds reviewed. Last BM 1/23. Current Nutrition Therapies:    ADULT DIET; Regular; 3 carb choices (45 gm/meal); Low Fat/Low Chol/High Fiber/HUGH    Anthropometric Measures:  · Height: 5' 5\" (165.1 cm)  · Current Body Wt: 155 lb (70.3 kg)   · BMI: 25.8    Nutrition Diagnosis:   No nutrition diagnosis at this time     Nutrition Interventions:   Food and/or Nutrient Delivery:  Modify Current Diet (Provide 4 Carb Choices with each meal.)  Nutrition Education/Counseling:  No recommendation at this time   Coordination of Nutrition Care:  Continue to monitor while inpatient    Goals:  Oral intakes to meet % of estimated nutrition needs.        Nutrition Monitoring and Evaluation:   Behavioral-Environmental Outcomes:  None Identified   Food/Nutrient Intake Outcomes:  Food and Nutrient Intake  Physical Signs/Symptoms Outcomes:  Biochemical Data,GI Status,Nutrition Focused Physical Findings,Skin,Weight     Discharge Planning:    Continue current diet     Electronically signed by Oh Fuentes RD, LD on 1/26/22 at 2:34 PM EST    Contact: [phone]

## 2022-01-26 NOTE — PROGRESS NOTES
[facility] - Progress Note COVID 19 Patient  Today's Date and Time: 1/26/2022, 12:51 PM    Impression :     COVID 19 Confirmed Infection  Covid tests:  1.18.22 Positive  1/15/22 positive at home per patient  Acute hypoxic respiratory failure  Elevated inflammatory markers  UYEN  Systolic CHF  COPD  DM II  HTN  Hep C  Erectile dysfunction    Recommendations:   Antibiotic treatment:  Vancomycin initiated 1/20/22 for 1/2 MRSE blood culture 1/18/22. This is likely a contaminant-Discontinued 1/22/22  Repeat blood cultures ordered 1/20/22->1/2 staph epi  Repeat cultures 1/23/22  Covid Rx:    Remdesivir-Contraindicated  Decadron-Initiated 1/18/21  Actemra-ordered 1/19/2022  Monoclonal antibodies-Out of window      Medical Decision Making/Summary/Discussion:1/26/2022     Patient admitted with COVID 19 infection  Isolation until 2/5/22    Infection Control Recommendations   [place] Precautions  Airborne isolation  Droplet Isolation    Antimicrobial Stewardship Recommendations     Discontinuation of therapy  Coordination of Outpatient Care:   Estimated Length of IV antimicrobials:TBD  Patient will need Midline Catheter Insertion: TBD  Patient will need PICC line Insertion: No  Patient will need: Home IV , [place],  SNF,  LTAC:TBD  Patient will need outpatient wound care:No    Chief complaint/reason for consultation:   Concern for COVID infection      History of Present Illness:    Pollen is a 74y.o.-year-old male who was initially admitted on 1/18/2022. Patient seen at the request of Dr. Ole Couch:    Patient presented through ER with complaints of fatigue and shortness of breath. The patient has a history of COPD and congestive heart failure EF 20 to 30%. The patient stated he tested positive for COVID 3 days prior to presentation in the ED. Rapid in the hospital was positive.     The patient was noted to have an SPO2 of 76% on room air with a fever of 100.7 °F.    Imaging of the chest revealed bilateral lower lung zone patchy airspace opacity potentially representing early COVID-pneumonia with stable mild cardiomegaly. CRP was elevated at 209.4  UYEN present  Elevated D-dimer    VQ scan ordered to look for PE     Decadron was initiated    The patient had an episode of bradycardia requiring an injection of atropine. Cardiology has been consulted. The patient is also disoriented to time and place. A CT head revealed no acute abnormality    Patient admitted because of concerns with COVID 19.    CURRENT EVALUATION : 1/26/2022    Afebrile  VS stable     The patient is on 4 L NC today    Per Cardiology, he may need dual chamber ICD in near future based on LVEF and further episodes of bradycardia. MRSE on blood cultures x 1 -Vancomycin initiated  Repeat blood cultures pending    Hyponatremia and hyperkalemia resolved this morning  Creatinine level is back to normal    CRP is decreasing    Patient exhibiting respiratory distress. yes    Patient receiving supplemental oxygen.  6 L NC--> hi flow--> 4 L NC  RR:25-->30-->26-->21  02 sat:94-->93-->90-->94    NEWS Score: 0-4 Low risk group; 5-6: Medium risk group; 7 or above: High risk group  Parameters 3 2 1 0 1 2 3   Age    < 65   = 65   RR = 8  9-11 12-20  21-24 = 25   O2 Sats = 91 92-93 94-95 = 96      Suppl O2  Yes  No      SBP = 90  101-110 111-219   = 220   HR = 40  41-50 51-90  111-130 = 131   Consciousness    Alert   Drowsiness, lethargy, or confusion   Temperature = 35.0 C (95.0 F)  35.1-36.0 C 95.1-96.9 F 36.1-38.0 C 97.0-100.4 F 38.1-39.0 C 100.5-102.3 F = 39.1 C = 102.4 F      NEWS Score:  1/19/21:    Overall Daily Picture:     Improving    Presence of secondary bacterial Infection:  No   Additional antibiotics: Vancomycin    Labs, X rays reviewed: 1/26/2022    BUN:27  Cr:0.89    WBC:7.9  Hb:11.3  Plat: 160    Absolute Neutrophils:5.7  Absolute Lymphocytes:0.4  Neutrophil/Lymphocyte Ratio: 14 high risk    CRP:235.8-->209.4-->90.1-->36-->16  Ferritin:353-->523  LDH: 246-->292-->243-->383    Pro Calcitonin:    MRSA probe: negative     Cultures:  Urine:    Blood:  1-18-22: 1/2 MRSE  1/20/22: 1/2 staph epi  1/23/22: No growth thus far  Sputum :    Wound:      CXR:   1/18/21      CAT:      Discussed with patient, RN, CC, IM. I have personally reviewed the past medical history, past surgical history, medications, social history, and family history, and I have updated the database accordingly.   Past Medical History:     Past Medical History:   Diagnosis Date    CHF [facility]congestive heart failure) ([facility]     COPD (chronic obstructive pulmonary disease) ([facility]     Diabetes mellitus ([facility]     Erectile dysfunction due to arterial insufficiency 12/07/2015    Hypertension     Microalbuminuria due to type 2 diabetes mellitus ([facility] 07/07/2016    Overweight (BMI 25.0-29.9) 12/07/2015       Past Surgical  History:     Past Surgical History:   Procedure Laterality Date    APPENDECTOMY      TOTAL ANKLE ARTHROPLASTY      LEFT       Medications:      busPIRone  5 mg Oral TID    albuterol  2.5 mg Nebulization BID    ipratropium-albuterol  1 ampule Inhalation 4x daily    hydrALAZINE  50 mg Oral 3 times per day    insulin glargine  10 Units SubCUTAneous BID    heparin (porcine)  5,000 Units SubCUTAneous 3 times per day    [Held by provider] amLODIPine  10 mg Oral Daily    aspirin  81 mg Oral Daily    atorvastatin  20 mg Oral Daily    budesonide-formoterol  2 puff Inhalation BID    [Held by provider] furosemide  20 mg Oral Daily    [Held by provider] lisinopril  20 mg Oral Daily    sodium chloride flush  5-40 mL IntraVENous 2 times per day    dexamethasone  6 mg Oral Daily    Vitamin D  2,000 Units Oral Daily    insulin lispro  0-6 Units SubCUTAneous TID WC    insulin lispro  0-3 Units SubCUTAneous Nightly    glucagon (rDNA)  1 mg IntraVENous Once       Social History:     Social History Socioeconomic History    Marital status:      Spouse name: Not on file    Number of children: Not on file    Years of education: Not on file    Highest education level: Not on file   Occupational History    Not on file   Tobacco Use    Smoking status: Light Tobacco Smoker     Packs/day: 0.50     Types: Cigarettes     Last attempt to quit: 2017     Years since quittin.5    Smokeless tobacco: Never Used    Tobacco comment: reports he quit smoking in the past 2 months   Substance and Sexual Activity    Alcohol use: No     Alcohol/week: 0.0 standard drinks    Drug use: No    Sexual activity: Not on file   Other Topics Concern    Not on file   Social History Narrative    Not on file     Social Determinants of Health     Financial Resource Strain: Low Risk     Difficulty of Paying Living Expenses: Not very hard   Food Insecurity: No Food Insecurity    Worried About Running Out of Food in the Last Year: Never true    Rakesh of Food in the Last Year: Never true   Transportation Needs:     Lack of Transportation (Medical): Not on file    Lack of Transportation (Non-Medical):  Not on file   Physical Activity:     Days of Exercise per Week: Not on file    Minutes of Exercise per Session: Not on file   Stress:     Feeling of Stress : Not on file   Social Connections:     Frequency of Communication with Friends and Family: Not on file    Frequency of Social Gatherings with Friends and Family: Not on file    Attends Scientology Services: Not on file    Active Member of Clubs or Organizations: Not on file    Attends Club or Organization Meetings: Not on file    Marital Status: Not on file   Intimate Partner Violence:     Fear of Current or Ex-Partner: Not on file    Emotionally Abused: Not on file    Physically Abused: Not on file    Sexually Abused: Not on file   Housing Stability:     Unable to Pay for Housing in the Last Year: Not on file    Number of [place] in the Last Year: Not on file    Unstable Housing in the Last Year: Not on file       Family History:   No family history on file. Allergies:   Patient has no known allergies. Review of Systems:       Constitutional: Generalized fatigue with dyspnea  Head: No headaches  Eyes: No double vision or blurry vision. No conjunctival inflammation. ENT: No sore throat or runny nose. . No hearing loss, tinnitus or vertigo. Cardiovascular: No chest pain or palpitations. Shortness of breath. DUBON  Lung: Shortness of breath with chronic cough. Abdomen: No nausea, vomiting, diarrhea, or abdominal pain. [place] No cramps. Genitourinary: No increased urinary frequency, or dysuria. No hematuria. No suprapubic or CVA pain  Musculoskeletal: No muscle aches or pains. No joint effusions, swelling or deformities  Hematologic: No bleeding or bruising. Neurologic: No headache, weakness, numbness, or tingling. Integument: No rash, no ulcers. Psychiatric: No depression. Endocrine: No polyuria, no polydipsia, no polyphagia. Physical Examination :     Patient Vitals for the past 8 hrs:   BP Temp Temp src Pulse Resp SpO2 Weight   01/26/22 0828      92 %    01/26/22 0800 118/73 97.9 °F (36.6 °C) Oral 84 24 92 %    01/26/22 0520       155 lb (70.3 kg)     General Appearance: Awake, alert, and in no apparent distress  Head:  Normocephalic, no trauma  Eyes: Pupils equal, round, reactive to light; sclera anicteric; conjunctivae pink. No embolic phenomena. ENT: Oropharynx clear, without erythema, exudate, or thrush. No tenderness of sinuses. Mouth/throat: mucosa pink and moist. No lesions. Dentition in good repair. Neck:Supple, without lymphadenopathy. Thyroid normal, No bruits. Pulmonary/Chest: Clear to auscultation, without wheezes, rales, or rhonchi. No dullness to percussion. Cardiovascular: Regular rate and rhythm without murmurs, rubs, or gallops. Abdomen: Soft, non tender.  Bowel sounds normal. No organomegaly  All four Extremities: No cyanosis, clubbing, edema, or effusions. Neurologic: Disorientation to time and place  Skin: Warm and dry with good turgor. No signs of peripheral arterial or venous insufficiency. No ulcerations. No open wounds. Medical Decision Making -Laboratory:   I have independently reviewed/ordered the following labs:    CBC with Differential:   Recent Labs     01/25/22  0633 01/26/22  0501   WBC 6.2 7.9   HGB 11.4* 11.3*   HCT 36.4* 35.6*   PLT See Reflexed IPF Result 160   LYMPHOPCT 13* 6*   MONOPCT 5 1     BMP:   Recent Labs     01/24/22  0359 01/24/22  0359 01/25/22  0633 01/26/22  0501      < > 139 132*   K 4.8   < > 4.5 4.3      < > 104 100   CO2 27   < > 25 24   BUN 22   < > 26* 27*   CREATININE 0.76   < > 0.90 0.89   MG 1.8  --  1.8  --     < > = values in this interval not displayed. Hepatic Function Panel:   Recent Labs     01/25/22  0633 01/26/22  0501   PROT 4.9* 5.1*   LABALBU 3.0* 3.1*   BILITOT 0.64 0.66   ALKPHOS 65 66   ALT 32 54*   AST 52* 82*     No results for input(s): RPR in the last 72 hours. No results for input(s): HIV in the last 72 hours. No results for input(s): BC in the last 72 hours. Lab Results   Component Value Date    MUCUS 2+ 07/01/2017    RBC 3.78 01/26/2022    TRICHOMONAS NOT REPORTED 07/01/2017    WBC 7.9 01/26/2022    YEAST NOT REPORTED 07/01/2017    TURBIDITY Clear 01/19/2022     Lab Results   Component Value Date    CREATININE 0.89 01/26/2022    GLUCOSE 109 01/26/2022       Medical Decision Making-Imaging:     Narrative   EXAMINATION:   ONE XRAY VIEW OF THE CHEST       1/18/2022 10:02 pm       COMPARISON:   09/08/2019       HISTORY:   ORDERING SYSTEM PROVIDED HISTORY: hypoxic   TECHNOLOGIST PROVIDED HISTORY:   hypoxic       FINDINGS:   Stable mild cardiomegaly. Pulmonary vasculature appears normal.  Mild patchy   airspace opacity is present at the bilateral lower lung zones. No   pneumothorax or pleural effusion.   Surrounding structures are unremarkable. Impression   Mild bilateral lower lung zone patchy airspace opacity potentially   representing early COVID pneumonia. Mild stable cardiomegaly. Narrative   EXAMINATION:   CT OF THE HEAD WITHOUT CONTRAST  1/18/2022 6:31 pm       TECHNIQUE:   CT of the head was performed without the administration of intravenous   contrast. Dose modulation, iterative reconstruction, and/or weight based   adjustment of the mA/kV was utilized to reduce the radiation dose to as low   as reasonably achievable. COMPARISON:   CT scan dated March 3, 2015       HISTORY:   ORDERING SYSTEM PROVIDED HISTORY: AMS   TECHNOLOGIST PROVIDED HISTORY:       AMS   Decision Support Exception - unselect if not a suspected or confirmed   emergency medical condition->Emergency Medical Condition (MA)   Reason for Exam: COVID+, AMS       FINDINGS:   BRAIN/VENTRICLES: There is no acute intracranial hemorrhage, mass effect or   midline shift. No abnormal extra-axial fluid collection. The gray-white   differentiation is maintained without evidence of an acute infarct. There is   no evidence of hydrocephalus. Generalized atrophy is present. 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 ORBITS: The visualized portion of the orbits demonstrate no acute abnormality. SINUSES: Mild degree of mucosal thickening is present within the right   maxillary sinus. Minimal mucosal thickening is present within the left   maxillary sinus. SOFT TISSUES/SKULL:  No acute abnormality of the visualized skull or soft   tissues. Impression   No acute intracranial abnormality.                    Medical Decision Hmbime-Fbikxauf-Fguwf:       Medical Decision Making-Other:     Note:  Labs, medications, radiologic studies were reviewed with personal review of films  Large amounts

## 2022-01-26 NOTE — DISCHARGE SUMMARY
[facility]  Discharge Summary      NAME:  Beverly Valle  :  1955  MRN:  [id number]    Admit date:  2022  Discharge date:   2022    Admitting Physician:  Romi Smith MD    Primary Diagnosis on Admission:   Present on Admission:   Acute on chronic respiratory failure with hypoxia ([facility]   COVID-19   Controlled type 2 diabetes mellitus without complication, without long-term current use of insulin ([facility]   Hypoxemia   Arterial hypotension      Secondary Diagnoses:  does not have any pertinent problems on file. Admission Condition:  serious     Discharged Condition: stable    Hospital Course: The patient was admitted for the management of acute on chronic respiratory failure secondary to COVID. On admission patient was found to be bradycardic, cardiology was consulted patient given atropine which helped for a little bit but then patient was bradycardic so he was started on dopamine drip. ICU was consulted, Patient's BP was also decreased so patient was started on vasopressor. Patient BP improved and he was taken of the vasopressor. Bradycardia also resolved. V/Q scan was done to rule out PE and showed low probability. Patient had UYEN upon admission that improved with hydration. Patient was weaned off high flow O2. Echo showed EF improvement and patient was discharged to follow up with cardiology outpatient. Today on day of discharge pt feels better with no further complaints. Vitals and Labs are at pts baseline. All consultants involved during this admission are agreeable to d/c.     Consults:  cardiology and pulmonary/intensive care    Significant Diagnostic/theraputic interventions: Echo showed EF of 51%      Lab Results   Component Value Date    WBC 7.9 2022    HGB 11.3 (L) 2022    HCT 35.6 (L) 2022     2022    CHOL 88 2019    TRIG 80 2019    HDL 34 (L) 2019    ALT 54 (H) 01/26/2022    AST 82 (H) 01/26/2022     (L) 01/26/2022    K 4.3 01/26/2022     01/26/2022    CREATININE 0.89 01/26/2022    BUN 27 (H) 01/26/2022    CO2 24 01/26/2022    TSH 1.24 01/22/2022    PSA 0.77 06/21/2012    INR 0.9 01/18/2022    LABA1C 5.9 08/16/2021    LABMICR 146 04/22/2013         Disposition:   home    Instructions to Patient: Follow up with PCP and cardiology    Follow up with Kiana Heredia MD in  1 week    [facility] [address] [place] [address]  [phone]  Go on 2/9/2022  3 pm with Jazmine Arvizu CNP follow up [facility] Bradycardia and Covid    Kiana Heredia MD  [address] [facility]  [address]  [phone]    In 1 week        Discharge Medications:       Medication List      ASK your doctor about these medications    albuterol sulfate  (90 Base) MCG/ACT inhaler  Commonly known as: ProAir HFA  inhale 2 puffs by mouth every 6 hours if needed for wheezing     amLODIPine 10 MG tablet  Commonly known as: NORVASC  TAKE 1 TABLET BY MOUTH DAILY     aspirin 81 MG EC tablet  Commonly known as: HM Aspirin EC Low Dose  TAKE 1 TABLET BY MOUTH DAILY     atorvastatin 20 MG tablet  Commonly known as: LIPITOR  Take 1 tablet by mouth daily     Blood Pressure Kit  Check blood pressure daily     carvedilol 25 MG tablet  Commonly known as: COREG  Take 1 tablet by mouth 2 times daily     fluticasone-salmeterol 100-50 MCG/DOSE diskus inhaler  Commonly known as: Advair Diskus  INHALE 1 PUFF INTO THE LUNGS EVERY 12 HOURS     furosemide 20 MG tablet  Commonly known as: LASIX  Take 1 tablet by mouth daily     ipratropium 17 MCG/ACT inhaler  Commonly known as:  Atrovent HFA  Inhale 1 puff into the lungs 2 times daily     lisinopril 20 MG tablet  Commonly known as: PRINIVIL;ZESTRIL  Take 1 tablet by mouth daily     nicotine 14 MG/24HR  Commonly known as: NICODERM CQ  Place 1 patch onto the skin daily     tiotropium 18 MCG inhalation capsule  Commonly known as: Spiriva HandiHaler  Inhale 1 capsule into the lungs daily            Send Copies to: Terence Pinedo MD Mimi Monday      Note that over 30 minutes was spent in preparing discharge papers, discussing discharge with patient and family, medication review, etc.    Signed:  Cate Medina MD  Family medicine Resident  1/26/2022 3:42 PM

## 2022-01-26 NOTE — PROGRESS NOTES
RT Evaluation for Home Oxygen    Resting (Room Air):  SpO2:  87  HR:  89    Resting (On O2):  SpO2:   93  HR:  85  O2 Device:  NC  O2 Flow Rate (L/min):  3  FIO2 (%):    Comments:      During Walk (Room Air):  SpO2:  86  HR:  99  Walk/Assistance Device:    Rate of Dyspnea:  1  Symptoms:    Comments:      During Walk (On O2):  SpO2:  86  HR:  97  O2 Device:  NC  O2 Flow Rate (L/min):    O2 Flow Rate:  1  O2 Saturation:  86  O2 Flow Rate:  2  O2 Saturation:  88  O2 Flow Rate:  3  O2 Saturation:  92  O2 Flow Rate:    O2 Saturation:    O2 Flow Rate:    O2 Saturation:    Walk/Assistance Device:    Rate of Dyspnea:  1  Symptoms:    Comments:      After Walk:  SpO2:  93  HR:  84  O2 Device:  NC  O2 Flow Rate (L/min):  3  FIO2 (%):    Rate of Dyspnea:  1  Symptoms:    Comments:    Does the Patient Qualify for Home O2:  yes  Liter Flow at Rest:  3  Liter Flow on Exertion:  3  Does the Patient Need Portable Oxygen Tanks:  yes      Additional Comments:     Electronically signed by Vernie Schirmer, RCP on 1/26/2022 at 12:06 PM

## 2022-01-27 ENCOUNTER — CARE COORDINATION (OUTPATIENT)
Dept: CASE MANAGEMENT | Age: 67
End: 2022-01-27

## 2022-01-27 DIAGNOSIS — J96.21 ACUTE ON CHRONIC RESPIRATORY FAILURE WITH HYPOXIA (HCC): Primary | ICD-10-CM

## 2022-01-27 PROCEDURE — 1111F DSCHRG MED/CURRENT MED MERGE: CPT | Performed by: STUDENT IN AN ORGANIZED HEALTH CARE EDUCATION/TRAINING PROGRAM

## 2022-01-27 NOTE — CARE COORDINATION
results were: positive 1/15 & 1/18. Patient informed of results, if available? Yes. Current Symptoms: cough, no new symptoms and no worsening symptoms    Reviewed New or Changed Meds: yes - patient's scripts were e-prescribed to [facility] pharmacy - he did not get meds at [facility] - requested that they be transferred to [address] [place] [address] who delivers his meds - LVM for pharmacy to do so. Do you have what you need at home?  Durable Medical Equipment ordered at discharge: [facility]/Outpatient orders at discharge: patient [facility] according to hospital note   Was patient discharged with a pulse oximeter? No Discussed and confirmed pulse oximeter discharge instructions and when to notify provider or seek emergency care. Patient education provided: Reviewed appropriate site of care based on symptoms and resources available to patient including: PCP, Specialist and CTN. If no appointment scheduled, scheduling offered: yes- routed to CT support for scheduling VV since patient does not have a ride. His son will assist with VV    Has appt at [facility] for cardio f/u 2/9 3pm    Interventions: Scheduled appointment with PCP-CT support to schedule VV with patient/son  Scheduled appointment with Specialist-cardio [facility] 2/9 3pm  Obtained and reviewed discharge summary and/or continuity of care documents  Reviewed discharge instructions, medical action plan and red flags with patient who verbalized understanding. Plan for follow-up call in 5-7 days based on severity of symptoms and risk factors. Plan for next call: symptom management-reassess  follow up appointment-check VV scheduled & check cardio scheduled for 2/9 3pm  medication management-review if pharmacy delivered  Provided contact information for future needs.     Phillip Carmihcael RN

## 2022-01-28 ENCOUNTER — CARE COORDINATION (OUTPATIENT)
Dept: CARE COORDINATION | Age: 67
End: 2022-01-28

## 2022-01-28 LAB
CULTURE: NORMAL
CULTURE: NORMAL
Lab: NORMAL
Lab: NORMAL
SPECIMEN DESCRIPTION: NORMAL
SPECIMEN DESCRIPTION: NORMAL

## 2022-01-28 NOTE — CARE COORDINATION
Contacted [facility] senior ride program dept and was informed that she only assists with transportation for those with [employer] however took my info to have someone call to further assist.    Contacted [employer] to inquire about max roundtrips per month. Informed pt receives two roundtrips per month. Call to [facility] to initiate SW referral for transportation. Updated him with info from [facility] and [employer]. Offered to mail him [employer] application to complete as well as sent [employer] to him by text. SS tasked to send Rakesh.

## 2022-02-01 DIAGNOSIS — Z76.0 MEDICATION REFILL: ICD-10-CM

## 2022-02-01 RX ORDER — ATORVASTATIN CALCIUM 20 MG/1
20 TABLET, FILM COATED ORAL DAILY
Qty: 30 TABLET | Refills: 5 | Status: SHIPPED | OUTPATIENT
Start: 2022-02-01 | End: 2022-07-12 | Stop reason: SDUPTHER

## 2022-02-01 RX ORDER — LISINOPRIL 20 MG/1
20 TABLET ORAL DAILY
OUTPATIENT
Start: 2022-02-01
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
## 2022-02-02 ENCOUNTER — CARE COORDINATION (OUTPATIENT)
Dept: CARE COORDINATION | Age: 67
End: 2022-02-02

## 2022-02-02 ENCOUNTER — CARE COORDINATION (OUTPATIENT)
Dept: CASE MANAGEMENT | Age: 67
End: 2022-02-02

## 2022-02-02 NOTE — CARE COORDINATION
Call to Joseph Mcgraw to assist with scheduling transportation for pt. Conferenced in [address] and arranged transportation for 2/9. Pt will be picked up at 2pm. Confirmed Joseph Mcgraw had cab info to call when appt was over, he confirmed he received text. Confirmation text from [employer] was sent to SW. Contacted them to provide Otis's info.

## 2022-02-02 NOTE — CARE COORDINATION
Transitions of Care COVID Call     Patient: Naman Andres         Patient : 1955   MRN: [id number]             Reason for Admission: COVID - early PNE - respiratory failure/hypoxia  Discharge Date: 22         RARS: Readmission Risk Score: 12.4 ( )    Patient reports doing OK. Denies SOB -does have cough with yellowish greenish expectorant which says is same as when he was discharged. His daughter is with him now helping him with lunch. Patient is upset about his copay & says he still did not get his new medications because of the copay of $10 which his son said he could take care of. I [facility] message last week and [facility] to patient's home, but patient doesn't want them until a day he has the $.    Informed patient of his appt with [facility]  3pm - he says he has no idea about getting a ride. His son, Danette Kolb can not provide transportation, but son did talk with FILIPE re: how to go about getting rides. I called Haseeb Crawford & he said he doesn't have all of the information & was still unclear on how to get transportation. I contacted FILIPE Villatoro who spoke with him last week - she will recontact patient's son so patient is able to get to [facility] appt next week, . Routed to CT support re: VV getting set up with patient / [facility] with assistance from son          Challenges to be reviewed by the provider   Additional needs identified to be addressed with provider: Yes  none                 Encounter was not routed to provider for escalation. Method of communication with provider:  none. Contacted the patient by telephone to follow up after hospital visit. Status: not changed  Interventions to address identified needs: Scheduled appointment with Specialist- 3pm cardiology  Obtained and reviewed discharge summary and/or continuity of care documents    [facility] follow up appointment(s): No future appointments.   Non-[facility] follow up appointment(s): 2/9 3pm [employer]       Provided contact information for future needs. Plan for follow-up call in 5-7 days based on severity of symptoms and risk factors.   Plan for next call: symptom management-reassess  follow up appointment-check f/u - has appt [facility] 2/9 3pm    Genaro Bailey RN

## 2022-02-09 ENCOUNTER — CARE COORDINATION (OUTPATIENT)
Dept: CARE COORDINATION | Age: 67
End: 2022-02-09

## 2022-02-09 ENCOUNTER — CARE COORDINATION (OUTPATIENT)
Dept: CASE MANAGEMENT | Age: 67
End: 2022-02-09

## 2022-02-09 NOTE — CARE COORDINATION
Request from [address] [place] [address] RN.,    Please scheduled patient for hospital f/u appt and transportation which has already been set up. Patient appt is scheduled waiting for transportation information. Spoke to patient although he was given this information he didn't know, called son. Son gave number to [employer] - this writer called and was told only can be scheduled by provider paying for ride service.       Lige Ormond, [address] [facility] Coordination Transition

## 2022-02-09 NOTE — CARE COORDINATION
Transportation arranged with [facility] [address] [phone] for appt on 2/15 @ 3pm. Pickup time is 2pm. Pt does not have any free rides left this month, will have two rides beginning March 1.      BAN Whittaker, [facility]   [phone]

## 2022-02-09 NOTE — CARE COORDINATION
[facility] Transitions Follow Up Call    2022    Patient: Naman Andres    Patient : 1955   MRN: [id number]    Reason for Admission: COVID - early PNE - respiratory failure/hypoxia  Discharge Date: 22   RARS: Readmission Risk Score: 12.4 ( )         Spoke with: patient & cardiology office    Patient reports feeling much better now - voice is much more clear - less congested post covid. Occasional cough & yellowish expectorant, but improved. Confirmed with patient that he plans to go to [facility] today at [address] assisted patient & son to get transport to cardio office via cab service. Informed cardiology office to have medications reviewed & to be aware that patient's lasix is on hold after discharge. Hx CHF, LVD. Care Transitions Subsequent and Final Call    Schedule Follow Up Appointment with PCP: Completed  Subsequent and Final Calls  Do you have any ongoing symptoms?: Yes  Patient-reported symptoms: Cough  Interventions for patient-reported symptoms: Other  Have your medications changed?: Yes  Patient Reports: patient received delivery of meds from [employer]  you have any questions related to your medications?: No  Do you currently have any active services?: No  Do you have any needs or concerns that I can assist you with?: Yes (Comment: referred to SW re: transportation to cardio appt today - he confirms he has the ride)  Care Transitions Interventions  Other Interventions:             Needs to be reviewed by the provider   Additional needs identified to be addressed with provider: Yes  medications-checking at cardio visit today re: lasix on hold - EF 20-30% hx CHF             Method of communication with provider : none      Care Transition Nurse (CTN) contacted the patient by telephone to follow up after admission  Verified name and  with patient as identifiers.     Addressed changes since last contact: COVID symptoms improved - has cardio appt today  Discussed follow-up appointments. If no appointment was previously scheduled, appointment scheduling offered: Yes. Is follow up appointment scheduled within 7 days of discharge? No.    CTN reviewed discharge instructions, medical action plan and red flags with patient and discussed any barriers to care and/or understanding of plan of care after discharge. Discussed appropriate site of care based on symptoms and resources available to patient including: PCP, Specialist and CTN. The patient agrees to contact the PCP office for questions related to their healthcare. Patients top risk factors for readmission: functional cognitive ability  lack of knowledge about disease  medical condition-CHF, covid, COPD, DM  medication management  multiple health system providers  Interventions to address risk factors: Scheduled appointment with Specialist-cardio - 2/9 and Obtained and reviewed discharge summary and/or continuity of care documents      Non-[facility] follow up appointment(s): TCC - cardio 2/9    CTN provided contact information for future needs. Plan for follow-up call in 7-10 days based on severity of symptoms and risk factors.   Plan for next call: symptom management-reassess  follow up appointment-review cardio appt 2/9  medication management-check if changes from cardio appt      Follow Up  TCC - cardio office - 2/9 3pm  Vijay Harmon RN

## 2022-02-15 ENCOUNTER — OFFICE VISIT (OUTPATIENT)
Dept: FAMILY MEDICINE CLINIC | Age: 67
End: 2022-02-15
Payer: MEDICARE

## 2022-02-15 ENCOUNTER — CARE COORDINATION (OUTPATIENT)
Dept: CASE MANAGEMENT | Age: 67
End: 2022-02-15

## 2022-02-15 ENCOUNTER — HOSPITAL ENCOUNTER (OUTPATIENT)
Age: 67
Setting detail: SPECIMEN
Discharge: HOME | End: 2022-02-15

## 2022-02-15 VITALS
HEART RATE: 63 BPM | WEIGHT: 165 LBS | HEIGHT: 65 IN | SYSTOLIC BLOOD PRESSURE: 134 MMHG | DIASTOLIC BLOOD PRESSURE: 68 MMHG | OXYGEN SATURATION: 95 % | TEMPERATURE: 97 F | BODY MASS INDEX: 27.49 KG/M2

## 2022-02-15 DIAGNOSIS — E11.9 CONTROLLED TYPE 2 DIABETES MELLITUS WITHOUT COMPLICATION, WITHOUT LONG-TERM CURRENT USE OF INSULIN (HCC): ICD-10-CM

## 2022-02-15 DIAGNOSIS — Z13.220 SCREENING FOR HYPERLIPIDEMIA: ICD-10-CM

## 2022-02-15 DIAGNOSIS — U07.1 COVID-19: Primary | ICD-10-CM

## 2022-02-15 DIAGNOSIS — Z12.11 SCREENING FOR COLON CANCER: ICD-10-CM

## 2022-02-15 DIAGNOSIS — R53.81 DEBILITY: ICD-10-CM

## 2022-02-15 LAB
-: NORMAL
CHOLESTEROL/HDL RATIO: 2.6
CHOLESTEROL: 149 MG/DL
HBA1C MFR BLD: 6.2 %
HDLC SERPL-MCNC: 58 MG/DL
LDL CHOLESTEROL: 71 MG/DL (ref 0–130)
REASON FOR REJECTION: NORMAL
TRIGL SERPL-MCNC: 101 MG/DL
VLDLC SERPL CALC-MCNC: NORMAL MG/DL (ref 1–30)
ZZ NTE CLEAN UP: ORDERED TEST: NORMAL
ZZ NTE WITH NAME CLEAN UP: SPECIMEN SOURCE: NORMAL

## 2022-02-15 PROCEDURE — 99211 OFF/OP EST MAY X REQ PHY/QHP: CPT | Performed by: FAMILY MEDICINE

## 2022-02-15 PROCEDURE — 99495 TRANSJ CARE MGMT MOD F2F 14D: CPT | Performed by: STUDENT IN AN ORGANIZED HEALTH CARE EDUCATION/TRAINING PROGRAM

## 2022-02-15 PROCEDURE — 83036 HEMOGLOBIN GLYCOSYLATED A1C: CPT | Performed by: STUDENT IN AN ORGANIZED HEALTH CARE EDUCATION/TRAINING PROGRAM

## 2022-02-15 RX ORDER — DEXAMETHASONE 2 MG/1
TABLET ORAL
COMMUNITY
Start: 2022-01-28 | End: 2022-03-09

## 2022-02-15 RX ORDER — LISINOPRIL 20 MG/1
20 TABLET ORAL DAILY
COMMUNITY
End: 2022-07-12 | Stop reason: SDUPTHER
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
## 2022-02-15 NOTE — PATIENT INSTRUCTIONS
Thank you for letting us take care of you today. We hope all your questions were addressed. If a question was overlooked or something else comes to mind after you return home, please contact a member of your Care Team listed below. Your Care Team at [place] [address] is Team #5  Mavis Singer MD (Faculty)  Philippe Castrejon MD (Resident)  Radha Alvarez MD (Resident)  Chi Gaines MD (Resident)  Rachel Martin MD (Resident)  Jefry Henderson., SHAYLA Lam., NATIVIDAD Dwyer., Katy Tamayo., [facility])  Matt Singletary, [address] (Clinical Practice Manager)  Lexy Ugarte, [address] [place] [address] (Clinical Pharmacist)       Office phone number: [phone]    If you need to get in right away due to illness, please be advised we have \"Same Day\" appointments available Monday-Friday. Please call us at [phone] option #3 to schedule your \"Same Day\" appointment.

## 2022-02-15 NOTE — CARE COORDINATION
[facility] Transitions Follow Up Call    2/15/2022    Patient: Aamnda Sweet  Patient : 1955   MRN: [id number]  Reason for Admission: COVID, CHF, COPD, DM  Discharge Date: 22 RARS: Readmission Risk Score: 12.4 ( )    Message left in compliance with HIPPA. Stated who I was, representing the [facility] SPECIALTY [facility], Care Transitions Team. Requested to place a call to their Physician with any immediate health needs/questions/concerns. Follow Up:  Lasix on hold.   Future Appointments   Date Time Provider Nissa Guzman   2/15/2022  3:00 PM Te Herr MD [address] [place] [address], RN

## 2022-02-15 NOTE — PROGRESS NOTES
Attending Physician Statement  I have discussed the care of Chitra Tristan, including pertinent history and exam findings,  with the resident. I have seen and examined the patient and the key elements of all parts of the encounter have been performed by me. I agree with the assessment, plan and orders as documented by the resident.   (True Barrio)    Asia Solano MD

## 2022-02-15 NOTE — PROGRESS NOTES
DIABETES and HYPERTENSION visit    BP Readings from Last 3 Encounters:   01/26/22 118/73   08/16/21 (!) 124/59   09/11/20 (!) 96/55        Hemoglobin A1C (%)   Date Value   08/16/2021 5.9   09/15/2020 6.6   09/05/2019 6.0     Microalb/Crt. Ratio (mcg/mg creat)   Date Value   04/22/2013 146     LDL Cholesterol (mg/dL)   Date Value   09/06/2019 38     LDL Calculated (mg/dL)   Date Value   07/01/2016 24     HDL (mg/dL)   Date Value   09/06/2019 34 (L)     BUN (mg/dL)   Date Value   01/26/2022 27 (H)     CREATININE (mg/dL)   Date Value   01/26/2022 0.89     Glucose (mg/dL)   Date Value   01/26/2022 109 (H)            Have you changed or started any medications since your last visit including any over-the-counter medicines, vitamins, or herbal medicines? no   Have you stopped taking any of your medications? Is so, why? -  no  Are you having any side effects from any of your medications? - no    Have you seen any other physician or provider since your last visit? Cardiologist 2-9-22   Have you had any other diagnostic tests since your last visit?  no   Have you been seen in the emergency room and/or had an admission in a hospital since we last saw you?  yes - [facility] 1/18[facility] to 1/26/22   Have you had your routine dental cleaning in the past 6 months?  no     Have you had your annual diabetic retinal (eye) exam? No   (ensure copy of exam is in the chart)    Do you have an active MyChart account? If no, what is the barrier?   Yes    Patient Care Team:  Marietta Raman MD as PCP - General (Emergency Medicine)  Genaro Bailey RN as Care Transitions Nurse    Medical History Review  Past Medical, Family, and Social History reviewed and does not contribute to the patient presenting condition    Health Maintenance   Topic Date Due    COVID-19 Vaccine (1) Never done    Shingles Vaccine (1 of 2) Never done    Diabetic retinal exam  03/01/2016    Diabetic microalbuminuria test  07/01/2017    Colon Cancer Screen FIT/FOBT 08/03/2019    Lipid screen  09/06/2020    Annual Wellness Visit (AWV)  Never done    Flu vaccine (1) Never done    Diabetic foot exam  09/11/2021    A1C test (Diabetic or Prediabetic)  08/16/2022    Depression Screen  08/16/2022    DTaP/Tdap/Td vaccine (2 - Td or Tdap) 08/02/2028    Pneumococcal 65+ years Vaccine  Completed    AAA screen  Completed    Hepatitis C screen  Completed    Hepatitis A vaccine  Aged Out    Hib vaccine  Aged Out    Meningococcal (ACWY) vaccine  Aged Out

## 2022-02-16 ENCOUNTER — TELEPHONE (OUTPATIENT)
Dept: FAMILY MEDICINE CLINIC | Age: 67
End: 2022-02-16

## 2022-02-17 ENCOUNTER — HOSPITAL ENCOUNTER (OUTPATIENT)
Age: 67
Setting detail: SPECIMEN
Discharge: HOME | End: 2022-02-17

## 2022-02-17 LAB
CREATININE URINE: 82.9 MG/DL (ref 39–259)
MICROALBUMIN/CREAT 24H UR: 61 MG/L
MICROALBUMIN/CREAT UR-RTO: 74 MCG/MG CREAT

## 2022-02-22 ENCOUNTER — CARE COORDINATION (OUTPATIENT)
Dept: CASE MANAGEMENT | Age: 67
End: 2022-02-22

## 2022-02-22 NOTE — CARE COORDINATION
[facility] Care/COVID Transitions Follow Up Call    2022    Patient: Marino Murray    Patient : 1955   MRN: [id number]    Reason for Admission: COVID - early PNE - respiratory failure/hypoxia -hx CHF, COPD, DM  Discharge Date: 22   RARS: Readmission Risk Score: 12.4 ( )       Spoke with: patient, [facility], [place] [facility] office, Rach Finch for future ambulatory care referral    Patient reports doing OK - wearing his oxygen 3 liters as ordered. Does not have pulse oximeter    Patient was seen at recent appts - PCP office for hospital f/u on 2/15 & cardio appt [facility] office . At hospital f/u appt, referral was sent to [facility] - patient was seen once by [facility] & patient is telling me that he was told he has a $4000 co-pay for these services? - then cancelled further home visits. I contacted [facility] office & was told the $4000 co-pay can not be correct & will further investigate these services for patient as he would benefit by home care. It is difficult for him to read he tells me & seems to have a difficult time knowing his medications. I contacted [place] [facility] office to find out what was the plan after his cardio f/u on  - the office note indicates no further bradycardia, recuperating from Rona PNE. Note says that patient is unaware of his medications - did not take his med list or actual meds with him to appt. Informed cardio office that lasix was discontinued while hospitalized for covid - hx CHF - EF 20-30%    Patient made it to appts by medical cab arranged by FILIPE. Spoke with Rach Finch, RN ACM -plan to refer patient to ambulatory care at end of transition.     Care Transitions Follow Up Call    Needs to be reviewed by the provider   Additional needs identified to be addressed with provider: No  medications-checking with [facility] [employer] re: exactly which meds patient is taking at home             Method of communication with provider : phone      Care Transition Nurse (CTN) contacted the patient by telephone to follow up after admission Verified name and  with patient as identifiers. Addressed changes since last contact: reviewed cardio & hospital f/u appts with patient  Discussed follow-up appointments. If no appointment was previously scheduled, appointment scheduling offered: Yes. Is follow up appointment scheduled within 7 days of discharge? No.      CTN reviewed discharge instructions, medical action plan and red flags with patient and discussed any barriers to care and/or understanding of plan of care after discharge. Discussed appropriate site of care based on symptoms and resources available to patient including: PCP, Specialist and CTN. The patient agrees to contact the PCP office for questions related to their healthcare. Patients top risk factors for readmission: lack of knowledge about disease  medical condition-COVID PNE - hx CHF, COPD  medication management  multiple health system providers  support system  Interventions to address risk factors: Scheduled appointment with PCP-2/15, Scheduled appointment with Specialist- cardio, Obtained and reviewed discharge summary and/or continuity of care documents and Communication with home health agencies or other community services the patient is currently using-Santiago had initial visit      [address] [address] follow up appointment(s): [facility]     CTN provided contact information for future needs. Plan for follow-up call in 1-2 days based on severity of symptoms and risk factors.   Plan for next call: symptom management-reassess  follow up appointment-review  medication management-double check meds      Follow Up - hospital f/u 2/15 @ [facility] appt -  - [facility] office    Destinee Ramirez RN

## 2022-02-23 ENCOUNTER — CARE COORDINATION (OUTPATIENT)
Dept: CASE MANAGEMENT | Age: 67
End: 2022-02-23
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
## 2022-03-09 ENCOUNTER — CARE COORDINATION (OUTPATIENT)
Dept: CARE COORDINATION | Age: 67
End: 2022-03-09

## 2022-03-09 RX ORDER — CARVEDILOL 25 MG/1
12.5 TABLET ORAL 2 TIMES DAILY WITH MEALS
Status: ON HOLD | COMMUNITY
End: 2022-06-14

## 2022-03-09 SDOH — ECONOMIC STABILITY: HOUSING INSECURITY: IN THE LAST 12 MONTHS, HOW MANY PLACES HAVE YOU LIVED?: 1

## 2022-03-09 SDOH — HEALTH STABILITY: PHYSICAL HEALTH: ON AVERAGE, HOW MANY MINUTES DO YOU ENGAGE IN EXERCISE AT THIS LEVEL?: 0 MIN

## 2022-03-09 SDOH — ECONOMIC STABILITY: TRANSPORTATION INSECURITY
IN THE PAST 12 MONTHS, HAS THE LACK OF TRANSPORTATION KEPT YOU FROM MEDICAL APPOINTMENTS OR FROM GETTING MEDICATIONS?: NO

## 2022-03-09 SDOH — ECONOMIC STABILITY: HOUSING INSECURITY
IN THE LAST 12 MONTHS, WAS THERE A TIME WHEN YOU DID NOT HAVE A STEADY PLACE TO SLEEP OR SLEPT IN A SHELTER (INCLUDING NOW)?: NO

## 2022-03-09 SDOH — HEALTH STABILITY: PHYSICAL HEALTH: ON AVERAGE, HOW MANY DAYS PER WEEK DO YOU ENGAGE IN MODERATE TO STRENUOUS EXERCISE (LIKE A BRISK WALK)?: 0 DAYS

## 2022-03-09 SDOH — ECONOMIC STABILITY: TRANSPORTATION INSECURITY
IN THE PAST 12 MONTHS, HAS LACK OF TRANSPORTATION KEPT YOU FROM MEETINGS, WORK, OR FROM GETTING THINGS NEEDED FOR DAILY LIVING?: YES

## 2022-03-09 SDOH — ECONOMIC STABILITY: INCOME INSECURITY: IN THE LAST 12 MONTHS, WAS THERE A TIME WHEN YOU WERE NOT ABLE TO PAY THE MORTGAGE OR RENT ON TIME?: NO

## 2022-03-09 ASSESSMENT — SOCIAL DETERMINANTS OF HEALTH (SDOH)
HOW OFTEN DO YOU GET TOGETHER WITH FRIENDS OR RELATIVES?: MORE THAN THREE TIMES A WEEK
HOW OFTEN DO YOU ATTEND CHURCH OR RELIGIOUS SERVICES?: NEVER
HOW OFTEN DO YOU ATTENT MEETINGS OF THE CLUB OR ORGANIZATION YOU BELONG TO?: NEVER
IN A TYPICAL WEEK, HOW MANY TIMES DO YOU TALK ON THE PHONE WITH FAMILY, FRIENDS, OR NEIGHBORS?: NEVER
DO YOU BELONG TO ANY CLUBS OR ORGANIZATIONS SUCH AS CHURCH GROUPS UNIONS, FRATERNAL OR ATHLETIC GROUPS, OR SCHOOL GROUPS?: NO

## 2022-03-09 ASSESSMENT — LIFESTYLE VARIABLES: HOW OFTEN DO YOU HAVE A DRINK CONTAINING ALCOHOL: NEVER

## 2022-03-09 ASSESSMENT — ENCOUNTER SYMPTOMS: DYSPNEA ASSOCIATED WITH: MINIMAL EXERTION

## 2022-03-10 ENCOUNTER — CARE COORDINATION (OUTPATIENT)
Dept: CARE COORDINATION | Age: 67
End: 2022-03-10

## 2022-03-10 NOTE — CARE COORDINATION
Ambulatory Care Coordination Note  3/10/2022  CM Risk Score: 7  Charlson 10 Year Mortality Risk Score: 100%     ACC: Valene Folds    Summary Note: Jadyn Gallegos notified  that she reached out to [facility] seeking where Mr. Morena Bray can go for an eye exam and was told he no longer has coverage with them. She reports his coverage was ended 2.28.22. CM spoke with Mr. Morena Bray. He was surprised to hear this. He reports he recently received paperwork and a Visa card from [facility]. He reports the [employer] card is for eye care. CM attempted to speak with someone from [facility] and could not get past the automated system initially. Upon maneuvering through the phone system CM was informed Mr. Morena Bray is not enrolled with them effective Feb. 28,2022. TC to Mr. Morena Bray requesting he call the customer phone number on the back of his card for further information and possible needed actions from him. He reports he will call as soon as our call is finished. He will reach out to [facility] for assistance if needed. Plan:  F/U with Mr. Morena Bray later today or tomorrow. Care Coordination Interventions    Program Enrollment: Complex Care  Referral from Primary Care Provider: No  Suggested Interventions and Community Resources  Registered Dietician: Completed  Zone Management Tools: Completed (Comment: CHF, DM, COPD)         Goals Addressed    None         Prior to Admission medications    Medication Sig Start Date End Date Taking?  Authorizing Provider   carvedilol (COREG) 25 MG tablet Take 25 mg by mouth 2 times daily (with meals) Take 1/2 tab BID    Historical Provider, MD   lisinopril (PRINIVIL;ZESTRIL) 20 MG tablet Take 20 mg by mouth daily    Historical Provider, MD   metFORMIN (GLUCOPHAGE) 500 MG tablet Take 500 mg by mouth 2 times daily (with meals)  2/8/22   Historical Provider, MD   atorvastatin (LIPITOR) 20 MG tablet Take 1 tablet by mouth daily 2/1/22   Marietta Raman MD   hydrALAZINE (APRESOLINE) 50 MG tablet Take 1 tablet by mouth every 8 hours 1/26/22   Melvi Ling MD   Vitamin D (CHOLECALCIFEROL) 50 MCG (2000 UT) TABS tablet Take 1 tablet by mouth daily 1/27/22   Melvi Ling MD   amLODIPine (NORVASC) 10 MG tablet TAKE 1 TABLET BY MOUTH DAILY 10/25/21   Xavier Mcleod MD   aspirin (HM ASPIRIN EC LOW DOSE) 81 MG EC tablet TAKE 1 TABLET BY MOUTH DAILY 8/26/21   Sadaf Pitts MD   albuterol sulfate HFA (PROAIR HFA) 108 (90 Base) MCG/ACT inhaler inhale 2 puffs by mouth every 6 hours if needed for wheezing 8/16/21   True Frye MD   fluticasone-salmeterol (ADVAIR DISKUS) 100-50 MCG/DOSE diskus inhaler INHALE 1 PUFF INTO THE LUNGS EVERY 12 HOURS 8/16/21   Te Trevizo MD   tiotropium (Diane Mayfield) 18 MCG inhalation capsule Inhale 1 capsule into the lungs daily  Patient not taking: Reported on 3/9/2022 1/9/20   Clinton Godfrey MD       No future appointments.

## 2022-03-10 NOTE — CARE COORDINATION
Diabetes, COPD and CHF zone tools, walk-in clinic and right care-right place-right time information sheets was mailed out to pt.

## 2022-03-10 NOTE — CARE COORDINATION
Ambulatory Care Coordination Note  3/10/2022  CM Risk Score: 7  Charlson 10 Year Mortality Risk Score: 100%     ACC: Mimi Monday    Summary Note: Mr. Humble Kimball accepts care coordination. He reports he no longer has home care d/t a \"high\" co-pay. He reports he continues with weakness to his legs. His son assists him up the stairs to their apartment. He reports he needs assistance in obtaining a list of ophthalmologists that accepts [facility] insurance and is \"close to home\". He does have difficulty with transportation. He receives 2 cab rides a month. This does not support his needs for medical care. He may be able to get PT if he had transportation to get him there. Plan:  Refer to dietician. (done)  Refer to LSW/HC to investigate increasing Mr. Fede Schuster cab rides per month. Refer to [facility]SS to assist in finding an ophthalmologist that accepts his insurance and is \"close to home\". F/U with Mr. Humble Kimball in 1 week. Diabetes Assessment    Medic Alert ID: No  Meal Planning: Avoidance of concentrated sweets   How often do you test your blood sugar?: No Testing   Do you have barriers with adherence to non-pharmacologic self-management interventions?  (Nutrition/Exercise/Self-Monitoring): Yes   Have you ever had to go to the ED for symptoms of low blood sugar?: No       No patient-reported symptoms   Do you have hyperglycemia symptoms?: No   Do you have hypoglycemia symptoms?: No   Blood Sugar Monitoring Regimen: Not Testing   Blood Sugar Trends: Other (Comment: not checking)      ,   Congestive Heart Failure Assessment    Are you currently restricting fluids?: No Restriction  Do you understand a low sodium diet?: Yes  Do you understand how to read food labels?: Yes  How many restaurant meals do you eat per week?: 0  Do you salt your food before tasting it?: No     No patient-reported symptoms      Symptoms:     Symptom course: stable  Patient-reported weight (lb):  (Comment: not weigh)  Weight trend:  (Comment: does not have a scale)  Salt intake watch compared to last visit: stable      and   COPD Assessment    Does the patient understand envrionmental exposure?: Yes  Is the patient able to verbalize Rescue vs. Long Acting medications?: No  Does the patient have a nebulizer?: No  Does the patient use a space with inhaled medications?: No     No patient-reported symptoms         Symptoms:     Symptom course: stable  Breathlessness: minimal exertion  Increase use of rapid acting/rescue inhaled medications?: No  Change in chronic cough?: No/At Baseline  Change in sputum?: No/At Baseline  Sputum characteristics: White  Self Monitoring - SaO2: No         Ambulatory Care Coordination Assessment    Care Coordination Protocol  Program Enrollment: Complex Care  Referral from Primary Care Provider: No  Week 1 - Initial Assessment     Do you have all of your prescriptions and are they filled?: Yes  Barriers to medication adherence: None  Are you able to afford your medications?: Yes  How often do you have trouble taking your medications the way you have been told to take them?: I always take them as prescribed. Do you have Home O2 Therapy?: Yes   Oxygen Regimen: Continuous Flow - Enter rate/FIO2: 3   Method of Delivery: Nasal Cannula   CPAP Use: None      Ability to seek help/take action for Emergent Urgent situations i.e. fire, crime, inclement weather or health crisis. : Independent  Ability to ambulate to restroom: Independent  Ability handle personal hygeine needs (bathing/dressing/grooming): Independent  Ability to manage Medications:  Independent  Ability to prepare Food Preparation: Needs Assistance  Ability to maintain home (clean home, laundry): Dependent  Ability to drive and/or has transportation: Dependent  Ability to do shopping: Dependent  Ability to manage finances: Needs Assistance  Is patient able to live independently?: No     Current Housing: Apartment        Per the Fall Risk Screening, did the patient have 2 or more falls or transportation or other): Some general dissatisfaction but no concern   How would you rate their social network (family, work, friends)?: Restricted participation with some degree of social isolation   How would you rate their financial resources (including ability to afford all required medical care)?: Financially secure, some resource challenges   How wells does the patient now understand their health and well-being (symptoms, signs or risk factors) and what they need to do to manage their health?: Reasonable to good understanding but do not feel able to engage with advice at this time   How well do you think your patient can engage in healthcare discussions? (Barriers include language, deafness, aphasia, alcohol or drug problems, learning difficulties, concentration): Adequate communication, with or without minor barriers   Do other services need to be involved to help this patient?: Other care/services not in place and required   Are current services involved with this patient well-coordinated? (Include coordination with other services you are now recommendation): Required care/services missing and/or fragmented   Suggested Interventions and Community Resources   Registered Dietician: Completed   Zone Management Tools: Completed                  Prior to Admission medications    Medication Sig Start Date End Date Taking?  Authorizing Provider   carvedilol (COREG) 25 MG tablet Take 25 mg by mouth 2 times daily (with meals) Take 1/2 tab BID   Yes Historical Provider, MD   lisinopril (PRINIVIL;ZESTRIL) 20 MG tablet Take 20 mg by mouth daily   Yes Historical Provider, MD   metFORMIN (GLUCOPHAGE) 500 MG tablet Take 500 mg by mouth 2 times daily (with meals)  2/8/22  Yes Historical Provider, MD   atorvastatin (LIPITOR) 20 MG tablet Take 1 tablet by mouth daily 2/1/22  Yes Te Kruger MD   hydrALAZINE (APRESOLINE) 50 MG tablet Take 1 tablet by mouth every 8 hours 1/26/22  Yes Moe Spence MD   Vitamin D (CHOLECALCIFEROL) 50 MCG (2000 UT) TABS tablet Take 1 tablet by mouth daily 1/27/22  Yes Orestes Grover MD   amLODIPine (NORVASC) 10 MG tablet TAKE 1 TABLET BY MOUTH DAILY 10/25/21  Yes Charissa Zaragoza MD   aspirin (HM ASPIRIN EC LOW DOSE) 81 MG EC tablet TAKE 1 TABLET BY MOUTH DAILY 8/26/21  Yes Ayde Amin MD   albuterol sulfate HFA (PROAIR HFA) 108 (90 Base) MCG/ACT inhaler inhale 2 puffs by mouth every 6 hours if needed for wheezing 8/16/21  Yes Te Combs MD   fluticasone-salmeterol (ADVAIR DISKUS) 100-50 MCG/DOSE diskus inhaler INHALE 1 PUFF INTO THE LUNGS EVERY 12 HOURS 8/16/21  Yes Te Combs MD   tiotropium (Elizabeth Lovelace) 18 MCG inhalation capsule Inhale 1 capsule into the lungs daily  Patient not taking: Reported on 3/9/2022 1/9/20   Jeremiah Pendleton MD       No future appointments.

## 2022-03-10 NOTE — CARE COORDINATION
1) Writer spoke to staff at [facility] and provided them with patient's updated ID number- [id number]. Ophthalmologist that are covered by [facility] are below. [address] Physician eye care- [phone].    2. [address] [place] [address]- [phone]. [facility]      2) writer spoke to patient. He was notified and wrote down the 3 ophthalmologist office phone numbers.    -Amisha Ulrich will update ACM.

## 2022-03-10 NOTE — CARE COORDINATION
1. Writer spoke to [facility] Garza [facility] to help pt find a covered ophthalmologist. Alecia Garza states pt was terminated as of February 28 th, 2022. Pt is no longer covered with them. -writer will update ACM.

## 2022-03-10 NOTE — CARE COORDINATION
Mr. Umu Blank called back to report he spoke with a representative with [facility]. He was told he has coverage with them. She shared he called [phone]. He states he was given the ID # [id number] with a user ID # of [id number].   Plan:  Share this information with [facility] to see if she can find an ophthalmologist for Mr. Umu Blank. (done)

## 2022-03-11 ENCOUNTER — CARE COORDINATION (OUTPATIENT)
Dept: CARE COORDINATION | Age: 67
End: 2022-03-11
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
## 2022-03-11 NOTE — CARE COORDINATION
Referral from [facility], Josy Kuo, RN-  I enrolled this fella yesterday afternoon. He has DM (controlled A1c 6.2 on 2.15.222), CHF and COPD. Will reach out to patient for consult.   ERNA James

## 2022-03-17 PROBLEM — Z13.220 SCREENING FOR HYPERLIPIDEMIA: Status: RESOLVED | Noted: 2022-02-15 | Resolved: 2022-03-17

## 2022-03-21 ENCOUNTER — CARE COORDINATION (OUTPATIENT)
Dept: CARE COORDINATION | Age: 67
End: 2022-03-21

## 2022-03-21 ASSESSMENT — ENCOUNTER SYMPTOMS: DYSPNEA ASSOCIATED WITH: MINIMAL EXERTION

## 2022-03-21 NOTE — CARE COORDINATION
Ambulatory Care Coordination Note  3/21/2022  CM Risk Score: 7  Charlson 10 Year Mortality Risk Score: 100%     ACC: Goldie Mata    Summary Note: Mr. Moraima Perry reports he has not called to schedule an eye exam. He states he has a $25 co-pay for specialists. He reports he is not able to pay this co-pay at this time. He also reports he does not get medical cab to an eye exam.  Mr. Moraima Perry was not aware he could get a pair of DM shoes at no charge. He again reports he is not able to pay a co-pay to see a podiatrist.   Mr. Moraima Perry continues to wear his O2 at 3L/min \"most of the time\". Plan:  Reach out to [facility] for clarification concerning transportation to an ophthalmologist.   ROZINA spoke with Natasha Godfrey at [facility]. She reports Mr. Moraima Perry does have medical cab available to go to an ophthalmologist.  TC to Mr. Moraima Perry with this information. He was glad to hear they will provide his needed transportation. Reach out to Mr. Moraima Perry in 2 weeks. Care Coordination Interventions    Program Enrollment: Complex Care  Referral from Primary Care Provider: No  Suggested Interventions and Community Resources  Registered Dietician: Completed  Zone Management Tools: Completed (Comment: CHF, DM, COPD)         Goals Addressed    None         Prior to Admission medications    Medication Sig Start Date End Date Taking?  Authorizing Provider   carvedilol (COREG) 25 MG tablet Take 25 mg by mouth 2 times daily (with meals) Take 1/2 tab BID    Historical Provider, MD   lisinopril (PRINIVIL;ZESTRIL) 20 MG tablet Take 20 mg by mouth daily    Historical Provider, MD   metFORMIN (GLUCOPHAGE) 500 MG tablet Take 500 mg by mouth 2 times daily (with meals)  2/8/22   Historical Provider, MD   atorvastatin (LIPITOR) 20 MG tablet Take 1 tablet by mouth daily 2/1/22   Yolanda Check, MD   hydrALAZINE (APRESOLINE) 50 MG tablet Take 1 tablet by mouth every 8 hours 1/26/22   Jeff Kay MD   Vitamin D (CHOLECALCIFEROL) 50 MCG (2000 UT) TABS tablet Take 1 tablet by mouth daily 1/27/22   Joshua Israel MD   amLODIPine (NORVASC) 10 MG tablet TAKE 1 TABLET BY MOUTH DAILY 10/25/21   Marla Ludwig MD   aspirin (HM ASPIRIN EC LOW DOSE) 81 MG EC tablet TAKE 1 TABLET BY MOUTH DAILY 8/26/21   Mattie Go MD   albuterol sulfate HFA (PROAIR HFA) 108 (90 Base) MCG/ACT inhaler inhale 2 puffs by mouth every 6 hours if needed for wheezing 8/16/21   Miguel Gamez MD   fluticasone-salmeterol (ADVAIR DISKUS) 100-50 MCG/DOSE diskus inhaler INHALE 1 PUFF INTO THE LUNGS EVERY 12 HOURS 8/16/21   Te Barnes MD   tiotropium (Ranelle Joseph) 18 MCG inhalation capsule Inhale 1 capsule into the lungs daily  Patient not taking: Reported on 3/9/2022 1/9/20   Shanae Brooks MD       No future appointments.

## 2022-03-28 ENCOUNTER — CARE COORDINATION (OUTPATIENT)
Dept: CARE COORDINATION | Age: 67
End: 2022-03-28

## 2022-03-28 NOTE — CARE COORDINATION
Nutrition Care Coordinator Follow-Up visit:    Food Recall: eating 2-3 meals/d    Activity Level:  Sedentary: X  Lightly Active: Moderately Active:  Very Active:    Adult BMI:  Underweight (below 18.5)  Normal Weight (18.5-24.9)  Overweight (25-29. 9) X  Obese (30-39. 9)  Morbidly Obese (>40)    Weight Change: no change    Plan:  Plan was established with patient:  Increase dietary fiber by consuming whole grains, fruits and vegetables: X  Limit dietary cholesterol to >200mg/day: Increase water intake:  Avoid added sugar: X  Avoid sweetened beverages: X  Choose lean meats:      Monitoring: Will monitor weight:  Will monitor adherence to meal plan: X  Will monitor adherence to exercise plan: Will monitor HGA1c:    Handouts Provided :  Low Carb snacking: X  Carb counting /individual meal plan: X  Portion Control: X  Food Labels: X  Physical Activity:  Low Fat/Cholesterol:  Hypo/Hyperglycemia:  Calorie Controlled Meal Plan:  DASH guidelines: X    Goals: Increase water consumption to 8oz. 6-8 times daily:  Manage blood sugars by consuming 3 meals spaced every 4-5 hours with 2-3 snacks daily:discussed  Increase fiber and decrease fat intake by consuming 1-2 fruit servings and 2-3 vegetable servings per day. Increase physical activity by:  Consume less than 2,000mg of sodium/day: discussed  Avoid consumption of sweetened beverages and added sugar by reading food labels:discussed  Monitor blood sugars by using meter to check blood glucose before morning meal and 2 hours after a meal daily:BS good, last A1c- 6.2  Decrease risk of coronary heart disease by consuming fish that contains omega-3 fatty acids at least twice a week, avoiding partially hydrogenated oil/trans fats and limiting saturated fat intake by reading food labels:discussed    Patient goals set:  1.  Reviewed working [facility] [employer], currently eating 2-3 meal/day, skips breakfast often. Reviewed and encouraged set meal times daily.  Quick/easy breakfast suggestions reviewed. Solmon Safer is 3 meals daily spaced every 4-5 hours. 2. Reviewed what foods contain carbohydrate to limit and how to use portion control by measuring out portions using a measuring cup or hand to estimate a serving size. Encouraged patient to limit carb intake to 60gms/meal, 15-30gms/snack.  Reviewed low carb snacking. 3. Reviewed plate method, encouraged patient to increase intake of non-starchy vegetables.  Goal is 1/2 of  plate to be non-starchy vegetables, 1/4 lean protein, 1/4 carbs. Foods from each category reviewed along with what a serving size is.   4. Reviewed avoiding/limiting sweets and sweetened drinks. Patient relays does drink some regular pop, encouraged to work on cutting out and replacing with water. Reviewed avoiding sugary foods and sugar free alternatives to sweets- sugar free jello, pudding, ect. Encouraged patient to keep sweets out of the house as much as possible and limit to once a week or less.   5. Reviewed reading food labels to limit sodium intake. Reviewed high sodium foods to avoid/limit- processed and prepackage foods, processed meats, ect. Encouraged to shop the outer rim of the grocery store where fresher foods are found. Goal is <2gms of sodium/day. Patient states he has not got his mail recently, encouraged him to explained sent nutrition information. Patient states he is working on his diet, started to read labels and limiting sodium intake. No questions at this time. Will follow up in 3-4 weeks to review and answer questions.   Fredi Tenorio

## 2022-04-07 ENCOUNTER — CARE COORDINATION (OUTPATIENT)
Dept: CARE COORDINATION | Age: 67
End: 2022-04-07

## 2022-04-07 ASSESSMENT — ENCOUNTER SYMPTOMS: DYSPNEA ASSOCIATED WITH: MINIMAL EXERTION

## 2022-04-07 NOTE — CARE COORDINATION
Coordination Interventions    Program Enrollment: Complex Care  Referral from Primary Care Provider: No  Suggested Interventions and Community Resources  Registered Dietician: Completed  Zone Management Tools: Completed (Comment: CHF, DM, COPD)         Goals Addressed    None         Prior to Admission medications    Medication Sig Start Date End Date Taking? Authorizing Provider   carvedilol (COREG) 25 MG tablet Take 25 mg by mouth 2 times daily (with meals) Take 1/2 tab BID    Historical Provider, MD   lisinopril (PRINIVIL;ZESTRIL) 20 MG tablet Take 20 mg by mouth daily    Historical Provider, MD   metFORMIN (GLUCOPHAGE) 500 MG tablet Take 500 mg by mouth 2 times daily (with meals)  2/8/22   Historical Provider, MD   atorvastatin (LIPITOR) 20 MG tablet Take 1 tablet by mouth daily 2/1/22   Sid Moser MD   hydrALAZINE (APRESOLINE) 50 MG tablet Take 1 tablet by mouth every 8 hours 1/26/22   Shanna Valentino MD   Vitamin D (CHOLECALCIFEROL) 50 MCG (2000 UT) TABS tablet Take 1 tablet by mouth daily 1/27/22   Shanna Valentino MD   amLODIPine (NORVASC) 10 MG tablet TAKE 1 TABLET BY MOUTH DAILY 10/25/21   Imani Carpenter MD   aspirin (HM ASPIRIN EC LOW DOSE) 81 MG EC tablet TAKE 1 TABLET BY MOUTH DAILY 8/26/21   Magali Rodriguez MD   albuterol sulfate HFA (PROAIR HFA) 108 (90 Base) MCG/ACT inhaler inhale 2 puffs by mouth every 6 hours if needed for wheezing 8/16/21   Te Magana MD   fluticasone-salmeterol (ADVAIR DISKUS) 100-50 MCG/DOSE diskus inhaler INHALE 1 PUFF INTO THE LUNGS EVERY 12 HOURS 8/16/21   Te Magana MD   tiotropium (SPIRIVA HANDIHALER) 18 MCG inhalation capsule Inhale 1 capsule into the lungs daily  Patient not taking: Reported on 3/9/2022 1/9/20   Мария Story MD       No future appointments.

## 2022-04-08 NOTE — PROGRESS NOTES
opacity potentially representing early COVID-pneumonia with stable mild cardiomegaly. CRP was elevated at 209.4  UYEN present  Elevated D-dimer    VQ scan ordered to look for PE     Decadron was initiated    The patient had an episode of bradycardia requiring an injection of atropine. Cardiology has been consulted. The patient is also disoriented to time and place. A CT head revealed no acute abnormality    Patient admitted because of concerns with COVID 19.    CURRENT EVALUATION : 1/21/2022    Afebrile  Bradycardia   Dopamine gtt continues    The patient remains on high flow nasal cannula with 55% FiO2    Per Cardiology, he may need dual chamber ICD in near future based on LVEF and further episodes of bradycardia. MRSA on blood cultures-Vancomycin initiated    Hyponatremia and hyperkalemia resolved this morning  Creatinine level is back to normal    CRP is decreasing    Echocardiogram pending  VQ scan was unable to determine probability of pulmonary emboli    Patient exhibiting respiratory distress. yes    Patient receiving supplemental oxygen.  6 L NC--> hi flow NC  RR:18-->21-->23  02 sat:88-->97-->90    % FIO2: 50-->55  Flow Rate: 30 L/min  PEEP:      QTc:       NEWS Score: 0-4 Low risk group; 5-6: Medium risk group; 7 or above: High risk group  Parameters 3 2 1 0 1 2 3   Age    < 65   = 65   RR = 8  9-11 12-20  21-24 = 25   O2 Sats = 91 92-93 94-95 = 96      Suppl O2  Yes  No      SBP = 90  101-110 111-219   = 220   HR = 40  41-50 51-90  111-130 = 131   Consciousness    Alert   Drowsiness, lethargy, or confusion   Temperature = 35.0 C (95.0 F)  35.1-36.0 C 95.1-96.9 F 36.1-38.0 C 97.0-100.4 F 38.1-39.0 C 100.5-102.3 F = 39.1 C = 102.4 F      NEWS Score:  1/19/21:    Overall Daily Picture:     Unchanged    Presence of secondary bacterial Infection:  No   Additional antibiotics: Vancomycin    Labs, X rays reviewed: 1/21/2022    BUN:69-->73-->62  Cr:1.84-->1.61-->1.17  Na:129-->136  K:5.6-->5.2-->5.3    WBC:6.5-->15.3-->11.9  Hb:10.7-->12.3-->11.5  Plat: 166-->281-->225    Absolute Neutrophils:5.7  Absolute Lymphocytes:0.4  Neutrophil/Lymphocyte Ratio: 14 high risk    CRP:235.8-->209.4-->90.1  Ferritin:353-->523  LDH: 246-->292-->243    Pro Calcitonin:      Cultures:  Urine:    Blood:    Sputum :    Wound:      CXR:   1/18/21      CAT:      Discussed with patient, RN, CC, IM. I have personally reviewed the past medical history, past surgical history, medications, social history, and family history, and I have updated the database accordingly.   Past Medical History:     Past Medical History:   Diagnosis Date    [facility]     Diabetes mellitus ([facility]     Erectile dysfunction due to arterial insufficiency 12/7/2015    Hypertension     Microalbuminuria due to type 2 diabetes mellitus ([facility] 7/7/2016    Overweight (BMI 25.0-29.9) 12/7/2015       Past Surgical  History:     Past Surgical History:   Procedure Laterality Date    APPENDECTOMY      TOTAL ANKLE ARTHROPLASTY      LEFT       Medications:      insulin glargine  10 Units SubCUTAneous BID    heparin (porcine)  5,000 Units SubCUTAneous 3 times per day    [Held by provider] amLODIPine  10 mg Oral Daily    aspirin  81 mg Oral Daily    atorvastatin  20 mg Oral Daily    [Held by provider] carvedilol  25 mg Oral BID    budesonide-formoterol  2 puff Inhalation BID    [Held by provider] furosemide  20 mg Oral Daily    [Held by provider] lisinopril  20 mg Oral Daily    [Held by provider] tiotropium  2 puff Inhalation Daily    sodium chloride flush  5-40 mL IntraVENous 2 times per day    dexamethasone  6 mg Oral Daily    Vitamin D  2,000 Units Oral Daily    ipratropium-albuterol  1 ampule Inhalation 4x daily    insulin lispro  0-6 Units SubCUTAneous TID WC    insulin lispro  0-3 Units SubCUTAneous Nightly    glucagon (rDNA)  1 mg IntraVENous Once    vancomycin (VANCOCIN) intermittent dosing (placeholder)   Other RX Placeholder    vancomycin  1,000 mg IntraVENous Q24H    sodium chloride flush  5-40 mL IntraVENous 2 times per day       Social History:     Social History     Socioeconomic History    Marital status:      Spouse name: Not on file    Number of children: Not on file    Years of education: Not on file    Highest education level: Not on file   Occupational History    Not on file   Tobacco Use    Smoking status: Light Tobacco Smoker     Packs/day: 0.50     Types: Cigarettes     Last attempt to quit: 2017     Years since quittin.5    Smokeless tobacco: Never Used    Tobacco comment: reports he quit smoking in the past 2 months   Substance and Sexual Activity    Alcohol use: No     Alcohol/week: 0.0 standard drinks    Drug use: No    Sexual activity: Not on file   Other Topics Concern    Not on file   Social History Narrative    Not on file     Social Determinants of Health     Financial Resource Strain: Low Risk     Difficulty of Paying Living Expenses: Not very hard   Food Insecurity: No Food Insecurity    Worried About Running Out of Food in the Last Year: Never true    Rakesh of Food in the Last Year: Never true   Transportation Needs:     Lack of Transportation (Medical): Not on file    Lack of Transportation (Non-Medical):  Not on file   Physical Activity:     Days of Exercise per Week: Not on file    Minutes of Exercise per Session: Not on file   Stress:     Feeling of Stress : Not on file   Social Connections:     Frequency of Communication with Friends and Family: Not on file    Frequency of Social Gatherings with Friends and Family: Not on file    Attends Temple Services: Not on file    Active Member of Clubs or Organizations: Not on file    Attends Club or Organization Meetings: Not on file    Marital Status: Not on file   Intimate Partner Violence:     Fear of Awake Current or Ex-Partner: Not on file    Emotionally Abused: Not on file    Physically Abused: Not on file    Sexually Abused: Not on file   Housing Stability:     Unable to Pay for Housing in the Last Year: Not on file    Number of Places Lived in the Last Year: Not on file    Unstable Housing in the Last Year: Not on file       Family History:   No family history on file. Allergies:   Patient has no known allergies. Review of Systems:       Constitutional: Generalized fatigue with dyspnea  Head: No headaches  Eyes: No double vision or blurry vision. No conjunctival inflammation. ENT: No sore throat or runny nose. . No hearing loss, tinnitus or vertigo. Cardiovascular: No chest pain or palpitations. Shortness of breath. DUBON  Lung: Shortness of breath with chronic cough. Abdomen: No nausea, vomiting, diarrhea, or abdominal pain. Judah Awkward No cramps. Genitourinary: No increased urinary frequency, or dysuria. No hematuria. No suprapubic or CVA pain  Musculoskeletal: No muscle aches or pains. No joint effusions, swelling or deformities  Hematologic: No bleeding or bruising. Neurologic: No headache, weakness, numbness, or tingling. Integument: No rash, no ulcers. Psychiatric: No depression. Endocrine: No polyuria, no polydipsia, no polyphagia. Physical Examination :     Patient Vitals for the past 8 hrs:   Pulse Resp SpO2   01/21/22 1000 52 22 94 %   01/21/22 0900 52 22 92 %   01/21/22 0811  24 90 %   01/21/22 0800 63 23 (!) 87 %   01/21/22 0700 52 23 92 %   01/21/22 0600 63 26 92 %   01/21/22 0500 56 22 92 %   01/21/22 0400 52 27 91 %     General Appearance: Awake, alert, and in no apparent distress  Head:  Normocephalic, no trauma  Eyes: Pupils equal, round, reactive to light; sclera anicteric; conjunctivae pink. No embolic phenomena. ENT: Oropharynx clear, without erythema, exudate, or thrush. No tenderness of sinuses. Mouth/throat: mucosa pink and moist. No lesions.  Dentition in good repair. Neck:Supple, without lymphadenopathy. Thyroid normal, No bruits. Pulmonary/Chest: Clear to auscultation, without wheezes, rales, or rhonchi. No dullness to percussion. Cardiovascular: Regular rate and rhythm without murmurs, rubs, or gallops. Abdomen: Soft, non tender. Bowel sounds normal. No organomegaly  All four Extremities: No cyanosis, clubbing, edema, or effusions. Neurologic: Disorientation to time and place  Skin: Warm and dry with good turgor. No signs of peripheral arterial or venous insufficiency. No ulcerations. No open wounds. Medical Decision Making -Laboratory:   I have independently reviewed/ordered the following labs:    CBC with Differential:   Recent Labs     01/20/22  0320 01/21/22  0351   WBC 15.3* 11.9*   HGB 12.3* 11.5*   HCT 37.8* 35.2*    225   LYMPHOPCT 1* 1*   MONOPCT 4 1     BMP:   Recent Labs     01/19/22  0027 01/19/22  0551 01/20/22  0320 01/20/22  0320 01/20/22  1108 01/21/22  0351   NA  --    < > 129*  --   --  136   K  --    < > 5.6*   < > 5.2 5.3   CL  --    < > 98  --   --  105   CO2  --    < > 20  --   --  23   BUN  --    < > 73*  --   --  62*   CREATININE  --    < > 1.61*  --   --  1.17   MG 1.7  --   --   --   --   --     < > = values in this interval not displayed. Hepatic Function Panel:   Recent Labs     01/20/22  0320 01/21/22  0351   PROT 6.0* 5.5*   LABALBU 3.1* 2.7*   BILITOT 0.39 0.31   ALKPHOS 84 75   ALT 10 8   AST 25 18     No results for input(s): RPR in the last 72 hours. No results for input(s): HIV in the last 72 hours. No results for input(s): BC in the last 72 hours.   Lab Results   Component Value Date    MUCUS 2+ 07/01/2017    RBC 3.75 01/21/2022    TRICHOMONAS NOT REPORTED 07/01/2017    WBC 11.9 01/21/2022    YEAST NOT REPORTED 07/01/2017    TURBIDITY Clear 01/19/2022     Lab Results   Component Value Date    CREATININE 1.17 01/21/2022    GLUCOSE 259 01/21/2022       Medical Decision Making-Imaging:     Narrative   EXAMINATION: ONE XRAY VIEW OF THE CHEST       1/18/2022 10:02 pm       COMPARISON:   09/08/2019       HISTORY:   ORDERING SYSTEM PROVIDED HISTORY: hypoxic   TECHNOLOGIST PROVIDED HISTORY:   hypoxic       FINDINGS:   Stable mild cardiomegaly.  Pulmonary vasculature appears normal.  Mild patchy   airspace opacity is present at the bilateral lower lung zones.  No   pneumothorax or pleural effusion.  Surrounding structures are unremarkable.           Impression   Mild bilateral lower lung zone patchy airspace opacity potentially   representing early COVID pneumonia.       Mild stable cardiomegaly.         Narrative   EXAMINATION:   CT OF THE HEAD WITHOUT CONTRAST  1/18/2022 6:31 pm       TECHNIQUE:   CT of the head was performed without the administration of intravenous   contrast. Dose modulation, iterative reconstruction, and/or weight based   adjustment of the mA/kV was utilized to reduce the radiation dose to as low   as reasonably achievable.       COMPARISON:   CT scan dated March 3, 2015       HISTORY:   ORDERING SYSTEM PROVIDED HISTORY: AMS   TECHNOLOGIST PROVIDED HISTORY:       AMS   Decision Support Exception - unselect if not a suspected or confirmed   emergency medical condition->Emergency Medical Condition (MA)   Reason for Exam: COVID+, AMS       FINDINGS:   BRAIN/VENTRICLES: There is no acute intracranial hemorrhage, mass effect or   midline shift.  No abnormal extra-axial fluid collection.  The gray-white   differentiation is maintained without evidence of an acute infarct.  There is   no evidence of hydrocephalus. Generalized atrophy is present.    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       ORBITS: The visualized portion of the orbits demonstrate no acute abnormality.       SINUSES: Mild degree of mucosal thickening is present within the right   maxillary sinus.  Minimal

## 2022-04-20 ENCOUNTER — CARE COORDINATION (OUTPATIENT)
Dept: CARE COORDINATION | Age: 67
End: 2022-04-20

## 2022-04-20 ASSESSMENT — ENCOUNTER SYMPTOMS: DYSPNEA ASSOCIATED WITH: MINIMAL EXERTION

## 2022-04-20 NOTE — CARE COORDINATION
Ambulatory Care Coordination Note  4/20/2022  CM Risk Score: 7  Charlson 10 Year Mortality Risk Score: 100%     ACC: Roxane Oviedo    Summary Note: Mr. Ronda Carpio voices no needs at the present time. He reports he will call his PCP office to be seen \"later\". He states he is wearing the O2 \"about half the time\". He states he is up walking around the apartment. He does not go outside to walk. He reports he has difficulty paying the co-pay for specialists visits. He does have the phone numbers of the specialists he can see using his insurance coverage. He reports when he can \"get some money together\" he will make appointm  Mr. Ronda Carpio has not been ordered to check his blood sugars. He is working with Cris Lemus, [address] [place] [address]. CM reviewed graduation from [address] [place] [address]. Mr. Ronda Carpio voiced an understanding and is agreeable.   Congestive Heart Failure Assessment    Are you currently restricting fluids?: No Restriction  Do you understand a low sodium diet?: Yes  Do you understand how to read food labels?: Yes  How many restaurant meals do you eat per week?: 0  Do you salt your food before tasting it?: No     No patient-reported symptoms      Symptoms:     Symptom course: stable  Patient-reported weight (lb):  (Comment: does not weigh himself, no scale)  Weight trend:  (Comment: unknown)  Salt intake watch compared to last visit: stable      and   COPD Assessment    Does the patient understand envrionmental exposure?: Yes  Is the patient able to verbalize Rescue vs. Long Acting medications?: No  Does the patient have a nebulizer?: No  Does the patient use a space with inhaled medications?: No     No patient-reported symptoms         Symptoms:  None: Yes      Symptom course: stable  Breathlessness: minimal exertion (Comment: wearing O2)  Change in chronic cough?: No/At Baseline  Change in sputum?: No/At Baseline  Sputum characteristics: White  Self Monitoring - SaO2: No           Care Coordination Interventions    Program Enrollment: Complex Care  Referral from Primary Care Provider: No  Suggested Interventions and Community Resources  Registered Dietician: Completed  Zone Management Tools: Completed (Comment: CHF, DM, COPD)         Goals Addressed    None         Prior to Admission medications    Medication Sig Start Date End Date Taking? Authorizing Provider   carvedilol (COREG) 25 MG tablet Take 25 mg by mouth 2 times daily (with meals) Take 1/2 tab BID    Historical Provider, MD   lisinopril (PRINIVIL;ZESTRIL) 20 MG tablet Take 20 mg by mouth daily    Historical Provider, MD   metFORMIN (GLUCOPHAGE) 500 MG tablet Take 500 mg by mouth 2 times daily (with meals)  2/8/22   Historical Provider, MD   atorvastatin (LIPITOR) 20 MG tablet Take 1 tablet by mouth daily 2/1/22   Rodo Hdez MD   hydrALAZINE (APRESOLINE) 50 MG tablet Take 1 tablet by mouth every 8 hours 1/26/22   Nell Del Toro MD   Vitamin D (CHOLECALCIFEROL) 50 MCG (2000 UT) TABS tablet Take 1 tablet by mouth daily 1/27/22   Nell Del Toro MD   amLODIPine (NORVASC) 10 MG tablet TAKE 1 TABLET BY MOUTH DAILY 10/25/21   Shala Cordova MD   aspirin (HM ASPIRIN EC LOW DOSE) 81 MG EC tablet TAKE 1 TABLET BY MOUTH DAILY 8/26/21   Alka Armendariz MD   albuterol sulfate HFA (PROAIR HFA) 108 (90 Base) MCG/ACT inhaler inhale 2 puffs by mouth every 6 hours if needed for wheezing 8/16/21   Te Franklin MD   fluticasone-salmeterol (ADVAIR DISKUS) 100-50 MCG/DOSE diskus inhaler INHALE 1 PUFF INTO THE LUNGS EVERY 12 HOURS 8/16/21   Te Franklin MD   tiotropium (SPIRIVA HANDIHALER) 18 MCG inhalation capsule Inhale 1 capsule into the lungs daily  Patient not taking: Reported on 3/9/2022 1/9/20   Gricel Fishman MD       No future appointments.

## 2022-04-22 ENCOUNTER — CARE COORDINATION (OUTPATIENT)
Dept: CARE COORDINATION | Age: 67
End: 2022-04-22

## 2022-04-22 NOTE — CARE COORDINATION
Nutrition Care Coordinator Follow-Up visit:    Food Recall: eating 2-3 meals/d    Activity Level:  Sedentary: X  Lightly Active: Moderately Active:  Very Active:    Adult BMI:  Underweight (below 18.5)  Normal Weight (18.5-24.9)  Overweight (25-29. 9) X  Obese (30-39. 9)  Morbidly Obese (>40)    Weight Change: no change    Plan:  Plan was established with patient:  Increase dietary fiber by consuming whole grains, fruits and vegetables:X  Limit dietary cholesterol to >200mg/day: Increase water intake:  Avoid added sugar: X  Avoid sweetened beverages:X  Choose lean meats: X  Limit sodium intake: X    Monitoring: Will monitor weight:  Will monitor adherence to meal plan:X  Will monitor adherence to exercise plan: Will monitor HGA1c:    Handouts Provided :  Low Carb snacking: X  Carb counting /individual meal plan:  Portion Control:  Food Labels: X  Physical Activity:  Low Fat/Cholesterol:  Hypo/Hyperglycemia:  Calorie Controlled Meal Plan:  DASH guidelines: X    Goals: Increase water consumption to 8oz. 6-8 times daily:  Manage blood sugars by consuming 3 meals spaced every 4-5 hours with 2-3 snacks daily:reviewed  Increase fiber and decrease fat intake by consuming 1-2 fruit servings and 2-3 vegetable servings per day.   Increase physical activity by:  Consume less than 2,000mg of sodium/day: reviewed  Avoid consumption of sweetened beverages and added sugar by reading food labels:reviewed  Monitor blood sugars by using meter to check blood glucose before morning meal and 2 hours after a meal daily:  Decrease risk of coronary heart disease by consuming fish that contains omega-3 fatty acids at least twice a week, avoiding partially hydrogenated oil/trans fats and limiting saturated fat intake by reading food labels:reviewed    Patient goals set:  1  Reviewed working [employer] [facility] [employer], currently eating 2-3 meal/day, working on eating breakfast daily. Reviewed and encouraged set meal times daily.  Quick/easy breakfast suggestions reviewed. Laymon Ame is 3 meals daily spaced every 4-5 hours. 2. Reviewed portion control by measuring out portions using a measuring cup or hand to estimate a serving size. Encouraged patient to limit carb intake to 60gms/meal, 15-30gms/snack.  Reviewed low carb snacking. 3. Encouraged patient to increase intake of non-starchy vegetables.  Goal is 1/2 of  plate to be non-starchy vegetables, 1/4 lean protein, 1/4 carbs. Foods from each category reviewed along with what a serving size is.   4. Reviewed avoiding/limiting sweets and sweetened drinks. Patient relays does drink some regular pop, encouraged to work on cutting out and replacing with water. Reviewed avoiding sugary foods and sugar free alternatives to sweets- sugar free jello, pudding, ect. Encouraged patient to keep sweets out of the house as much as possible and limit to once a week or less.   5. Reviewed reading food labels to limit sodium intake. Reviewed high sodium foods to avoid/limit- processed and prepackage foods, processed meats, ect. Encouraged to shop the outer rim of the grocery store where fresher foods are found. Goal is <2gms of sodium/day. Patient states doing well, continues to watch what he is eating. Reviewed reading labels and limiting sodium intake. No questions at this time. Will follow up in 3-4 weeks to review and answer questions.      Saji Machado
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
## 2022-05-13 NOTE — PROGRESS NOTES
Sherri Dominguez (son) updated on patient hemodynamic stability. He was informed that he is no longer ICU and moved to stepdown. He is aware the RN will only call once a day. He requested to be called around 1-2 pm. All questions/concerns answered. Total time spent speaking to Mirna Martinez - 5 minutes    1 Shane Vargas (daughter) updated on patient hemodynamic stability. She was informed that he no longer ICU and is moved to stepdown. She is aware the RN will only call once a day.  Total time spend speaking to MYNOR - 9 minutes 2-4 times/mo 3

## 2022-05-19 ENCOUNTER — CARE COORDINATION (OUTPATIENT)
Dept: CARE COORDINATION | Age: 67
End: 2022-05-19

## 2022-05-19 NOTE — CARE COORDINATION
Nutrition Care Coordinator Follow-Up visit:    Food Recall: eating 2-3 meals/d    Activity Level:  Sedentary: X  Lightly Active: Moderately Active:  Very Active:    Adult BMI:  Underweight (below 18.5)  Normal Weight (18.5-24.9)  Overweight (25-29. 9) X  Obese (30-39. 9)  Morbidly Obese (>40)    Weight Change: no change    Plan:  Plan was established with patient:  Increase dietary fiber by consuming whole grains, fruits and vegetables: X  Limit dietary cholesterol to >100mg/day: X  Increase water intake:  Avoid added sugar: X  Avoid sweetened beverages: X  Choose lean meats: X  Limit sodium intake: X    Monitoring: Will monitor weight:  Will monitor adherence to meal plan: X  Will monitor adherence to exercise plan: Will monitor HGA1c:    Handouts Provided :  Low Carb snacking: X  Carb counting /individual meal plan:  Portion Control: X  Food Labels:  Physical Activity:  Low Fat/Cholesterol:  Hypo/Hyperglycemia:  Calorie Controlled Meal Plan:  DASH guidelines: X    Goals: Increase water consumption to 8oz. 6-8 times daily:  Manage blood sugars by consuming 3 meals spaced every 4-5 hours with 2-3 snacks daily: reviewed  Increase fiber and decrease fat intake by consuming 1-2 fruit servings and 2-3 vegetable servings per day. Increase physical activity by:  Consume less than 2,000mg of sodium/day: reviewed  Avoid consumption of sweetened beverages and added sugar by reading food labels: BS good per patient last A1c- 6.2  Monitor blood sugars by using meter to check blood glucose before morning meal and 2 hours after a meal daily:  Decrease risk of coronary heart disease by consuming fish that contains omega-3 fatty acids at least twice a week, avoiding partially hydrogenated oil/trans fats and limiting saturated fat intake by reading food labels:reviewed    Patient goals set:  1.  Reviewed working on meal patten, currently eating 2-3 meal/day, working on eating breakfast daily. Reviewed and encouraged set meal times daily.  Quick/easy breakfast suggestions reviewed.  Goal is 3 meals daily spaced every 4-5 hours. 2. Reviewed portion control by measuring out portions using a measuring cup or hand to estimate a serving size. Encouraged patient to limit carb intake to 60gms/meal, 15-30gms/snack.  Reviewed low carb snacking. 3. Encouraged patient to increase intake of non-starchy vegetables.  Goal is 1/2 of  plate to be non-starchy vegetables, 1/4 lean protein, 1/4 carbs. Foods from each category reviewed along with what a serving size is.   4. Reviewed avoiding/limiting sweets and sweetened drinks. Patient relays does drink some regular pop, encouraged to work on cutting out and replacing with water. Reviewed avoiding sugary foods and sugar free alternatives to sweets- sugar free jello, pudding, ect. Encouraged patient to keep sweets out of the house as much as possible and limit to once a week or less.   5. Reviewed reading food labels to limit sodium intake. Reviewed high sodium foods to avoid/limit- processed and prepackage foods, processed meats, ect. Encouraged to shop the outer rim of the grocery store where fresher foods are found. Goal is <2gms of sodium/day.    Patient states feels he is doing well- watching what he is eating sugars staying down. Went over CHF diet guidelines focusing on reducing sodium intake. Reviewed reading labels to limit sodium intake. No questions at this time. Will follow up in 3-4 weeks to review and answer questions.     Sen Coelho
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
## 2022-06-14 ENCOUNTER — HOSPITAL ENCOUNTER (INPATIENT)
Age: 67
LOS: 5 days | Discharge: HOME | DRG: 193 | End: 2022-06-19
Attending: EMERGENCY MEDICINE | Admitting: STUDENT IN AN ORGANIZED HEALTH CARE EDUCATION/TRAINING PROGRAM
Payer: MEDICARE

## 2022-06-14 ENCOUNTER — APPOINTMENT (OUTPATIENT)
Dept: GENERAL RADIOLOGY | Age: 67
DRG: 193 | End: 2022-06-14
Payer: MEDICARE

## 2022-06-14 DIAGNOSIS — R09.02 HYPOXIA: ICD-10-CM

## 2022-06-14 DIAGNOSIS — J18.9 PNEUMONIA OF BOTH LUNGS DUE TO INFECTIOUS ORGANISM, UNSPECIFIED PART OF LUNG: Primary | ICD-10-CM

## 2022-06-14 PROBLEM — J44.1 COPD EXACERBATION (HCC): Status: ACTIVE | Noted: 2022-06-14

## 2022-06-14 LAB
-: ABNORMAL
ABSOLUTE EOS #: <0.03 K/UL (ref 0–0.44)
ABSOLUTE LYMPH #: 1.11 K/UL (ref 1.1–3.7)
ABSOLUTE MONO #: 0.56 K/UL (ref 0.1–1.2)
ALBUMIN SERPL-MCNC: 3.2 G/DL (ref 3.5–5.2)
ALBUMIN/GLOBULIN RATIO: 1.2 (ref 1–2.5)
ALP BLD-CCNC: 82 U/L (ref 40–129)
ALT SERPL-CCNC: 7 U/L (ref 5–41)
ANION GAP SERPL CALCULATED.3IONS-SCNC: 8 MMOL/L (ref 9–17)
ANION GAP: 6 MMOL/L (ref 7–16)
AST SERPL-CCNC: 10 U/L
BASOPHILS # BLD: 0 % (ref 0–2)
BASOPHILS ABSOLUTE: <0.03 K/UL (ref 0–0.2)
BILIRUB SERPL-MCNC: 0.58 MG/DL (ref 0.3–1.2)
BILIRUBIN URINE: NEGATIVE
BUN BLDV-MCNC: 24 MG/DL (ref 8–23)
CALCIUM SERPL-MCNC: 9.4 MG/DL (ref 8.6–10.4)
CASTS UA: ABNORMAL /LPF (ref 0–8)
CHLORIDE BLD-SCNC: 99 MMOL/L (ref 98–107)
CO2: 38 MMOL/L (ref 20–31)
COLOR: ABNORMAL
CREAT SERPL-MCNC: 0.76 MG/DL (ref 0.7–1.2)
D-DIMER QUANTITATIVE: 1.57 MG/L FEU
EOSINOPHILS RELATIVE PERCENT: 0 % (ref 1–4)
EPITHELIAL CELLS UA: ABNORMAL /HPF (ref 0–5)
FIO2: 40
FLU A ANTIGEN: NEGATIVE
FLU B ANTIGEN: NEGATIVE
GFR AFRICAN AMERICAN: >60 ML/MIN
GFR NON-AFRICAN AMERICAN: >60 ML/MIN
GFR NON-AFRICAN AMERICAN: >60 ML/MIN
GFR SERPL CREATININE-BSD FRML MDRD: >60 ML/MIN
GFR SERPL CREATININE-BSD FRML MDRD: ABNORMAL ML/MIN/{1.73_M2}
GFR SERPL CREATININE-BSD FRML MDRD: NORMAL ML/MIN/{1.73_M2}
GLUCOSE BLD-MCNC: 154 MG/DL (ref 74–100)
GLUCOSE BLD-MCNC: 156 MG/DL (ref 70–99)
GLUCOSE BLD-MCNC: 208 MG/DL (ref 75–110)
GLUCOSE URINE: NEGATIVE
HCO3 VENOUS: 41.7 MMOL/L (ref 22–29)
HCT VFR BLD CALC: 33.7 % (ref 40.7–50.3)
HEMOGLOBIN: 10.2 G/DL (ref 13–17)
IMMATURE GRANULOCYTES %: 1 %
IMMATURE GRANULOCYTES ABSOLUTE: 0.07 K/UL (ref 0–0.3)
KETONES, URINE: ABNORMAL
LACTIC ACID, SEPSIS WHOLE BLOOD: 1.1 MMOL/L (ref 0.5–1.9)
LEUKOCYTE ESTERASE, URINE: NEGATIVE
LYMPHOCYTES # BLD: 10 % (ref 24–43)
MAGNESIUM: 1.7 MG/DL (ref 1.6–2.6)
MCH RBC QN AUTO: 29.8 PG (ref 25.2–33.5)
MCHC RBC AUTO-ENTMCNC: 30.3 G/DL (ref 28.4–34.8)
MCV RBC AUTO: 98.5 FL (ref 82.6–102.9)
MONOCYTES # BLD: 5 % (ref 3–12)
NITRITE, URINE: NEGATIVE
NRBC AUTOMATED: 0 PER 100 WBC
O2 DEVICE/FLOW/%: ABNORMAL
O2 SAT, VEN: 83 % (ref 60–85)
PCO2, VEN: 76.9 MM HG (ref 41–51)
PDW BLD-RTO: 13.2 % (ref 11.8–14.4)
PH UA: 5.5 (ref 5–8)
PH VENOUS: 7.34 (ref 7.32–7.43)
PLATELET # BLD: 280 K/UL (ref 138–453)
PMV BLD AUTO: 9.9 FL (ref 8.1–13.5)
PO2, VEN: 53 MM HG (ref 30–50)
POC BUN: 27 MG/DL (ref 8–26)
POC CHLORIDE: 99 MMOL/L (ref 98–107)
POC CREATININE: 0.95 MG/DL (ref 0.51–1.19)
POC HCO3: 38.8 MMOL/L (ref 21–28)
POC HEMATOCRIT: 35 % (ref 41–53)
POC HEMOGLOBIN: 12 G/DL (ref 13.5–17.5)
POC IONIZED CALCIUM: 1.22 MMOL/L (ref 1.15–1.33)
POC LACTIC ACID: 0.75 MMOL/L (ref 0.56–1.39)
POC O2 SATURATION: 94 % (ref 94–98)
POC PCO2: 65.3 MM HG (ref 35–48)
POC PH: 7.38 (ref 7.35–7.45)
POC PO2: 73.4 MM HG (ref 83–108)
POC POTASSIUM: 4.2 MMOL/L (ref 3.5–4.5)
POC SODIUM: 146 MMOL/L (ref 138–146)
POC TCO2: 42 MMOL/L (ref 22–30)
POSITIVE BASE EXCESS, ART: 12 (ref 0–3)
POSITIVE BASE EXCESS, VEN: 13 (ref 0–3)
POTASSIUM SERPL-SCNC: 4.4 MMOL/L (ref 3.7–5.3)
PRO-BNP: 1628 PG/ML
PROCALCITONIN: 0.38 NG/ML
PROTEIN UA: ABNORMAL
RBC # BLD: 3.42 M/UL (ref 4.21–5.77)
RBC UA: ABNORMAL /HPF (ref 0–4)
SAMPLE SITE: ABNORMAL
SARS-COV-2, RAPID: NOT DETECTED
SEG NEUTROPHILS: 84 % (ref 36–65)
SEGMENTED NEUTROPHILS ABSOLUTE COUNT: 9.02 K/UL (ref 1.5–8.1)
SODIUM BLD-SCNC: 145 MMOL/L (ref 135–144)
SPECIFIC GRAVITY UA: 1.02 (ref 1–1.03)
SPECIMEN DESCRIPTION: NORMAL
TOTAL PROTEIN: 5.8 G/DL (ref 6.4–8.3)
TROPONIN, HIGH SENSITIVITY: 40 NG/L (ref 0–22)
TROPONIN, HIGH SENSITIVITY: 51 NG/L (ref 0–22)
TURBIDITY: ABNORMAL
URINE HGB: NEGATIVE
UROBILINOGEN, URINE: NORMAL
WBC # BLD: 10.8 K/UL (ref 3.5–11.3)
WBC UA: ABNORMAL /HPF (ref 0–5)

## 2022-06-14 PROCEDURE — 81001 URINALYSIS AUTO W/SCOPE: CPT

## 2022-06-14 PROCEDURE — 83735 ASSAY OF MAGNESIUM: CPT

## 2022-06-14 PROCEDURE — 84484 ASSAY OF TROPONIN QUANT: CPT

## 2022-06-14 PROCEDURE — 2580000003 HC RX 258: Performed by: HEALTH CARE PROVIDER

## 2022-06-14 PROCEDURE — 82565 ASSAY OF CREATININE: CPT

## 2022-06-14 PROCEDURE — 2700000000 HC OXYGEN THERAPY PER DAY

## 2022-06-14 PROCEDURE — 6360000002 HC RX W HCPCS: Performed by: HEALTH CARE PROVIDER

## 2022-06-14 PROCEDURE — 6360000002 HC RX W HCPCS: Performed by: STUDENT IN AN ORGANIZED HEALTH CARE EDUCATION/TRAINING PROGRAM

## 2022-06-14 PROCEDURE — 83880 ASSAY OF NATRIURETIC PEPTIDE: CPT

## 2022-06-14 PROCEDURE — 96365 THER/PROPH/DIAG IV INF INIT: CPT

## 2022-06-14 PROCEDURE — 6370000000 HC RX 637 (ALT 250 FOR IP): Performed by: STUDENT IN AN ORGANIZED HEALTH CARE EDUCATION/TRAINING PROGRAM

## 2022-06-14 PROCEDURE — 94640 AIRWAY INHALATION TREATMENT: CPT

## 2022-06-14 PROCEDURE — 83605 ASSAY OF LACTIC ACID: CPT

## 2022-06-14 PROCEDURE — 82947 ASSAY GLUCOSE BLOOD QUANT: CPT

## 2022-06-14 PROCEDURE — C9113 INJ PANTOPRAZOLE SODIUM, VIA: HCPCS | Performed by: STUDENT IN AN ORGANIZED HEALTH CARE EDUCATION/TRAINING PROGRAM

## 2022-06-14 PROCEDURE — 2580000003 HC RX 258: Performed by: STUDENT IN AN ORGANIZED HEALTH CARE EDUCATION/TRAINING PROGRAM

## 2022-06-14 PROCEDURE — 71045 X-RAY EXAM CHEST 1 VIEW: CPT

## 2022-06-14 PROCEDURE — 85025 COMPLETE CBC W/AUTO DIFF WBC: CPT

## 2022-06-14 PROCEDURE — 85379 FIBRIN DEGRADATION QUANT: CPT

## 2022-06-14 PROCEDURE — 94761 N-INVAS EAR/PLS OXIMETRY MLT: CPT

## 2022-06-14 PROCEDURE — 94660 CPAP INITIATION&MGMT: CPT

## 2022-06-14 PROCEDURE — 80053 COMPREHEN METABOLIC PANEL: CPT

## 2022-06-14 PROCEDURE — 84145 PROCALCITONIN (PCT): CPT

## 2022-06-14 PROCEDURE — 93005 ELECTROCARDIOGRAM TRACING: CPT | Performed by: HEALTH CARE PROVIDER

## 2022-06-14 PROCEDURE — 87899 AGENT NOS ASSAY W/OPTIC: CPT

## 2022-06-14 PROCEDURE — 87449 NOS EACH ORGANISM AG IA: CPT

## 2022-06-14 PROCEDURE — 87635 SARS-COV-2 COVID-19 AMP PRB: CPT

## 2022-06-14 PROCEDURE — 84520 ASSAY OF UREA NITROGEN: CPT

## 2022-06-14 PROCEDURE — 80051 ELECTROLYTE PANEL: CPT

## 2022-06-14 PROCEDURE — 82803 BLOOD GASES ANY COMBINATION: CPT

## 2022-06-14 PROCEDURE — 87804 INFLUENZA ASSAY W/OPTIC: CPT

## 2022-06-14 PROCEDURE — 2060000000 HC ICU INTERMEDIATE R&B

## 2022-06-14 PROCEDURE — 82330 ASSAY OF CALCIUM: CPT

## 2022-06-14 PROCEDURE — 85014 HEMATOCRIT: CPT

## 2022-06-14 PROCEDURE — 99285 EMERGENCY DEPT VISIT HI MDM: CPT

## 2022-06-14 PROCEDURE — 36600 WITHDRAWAL OF ARTERIAL BLOOD: CPT

## 2022-06-14 RX ORDER — ENOXAPARIN SODIUM 100 MG/ML
40 INJECTION SUBCUTANEOUS DAILY
Status: DISCONTINUED | OUTPATIENT
Start: 2022-06-14 | End: 2022-06-19 | Stop reason: HOSPADM

## 2022-06-14 RX ORDER — SODIUM CHLORIDE 9 MG/ML
INJECTION, SOLUTION INTRAVENOUS PRN
Status: DISCONTINUED | OUTPATIENT
Start: 2022-06-14 | End: 2022-06-19 | Stop reason: HOSPADM

## 2022-06-14 RX ORDER — HYDRALAZINE HYDROCHLORIDE 50 MG/1
50 TABLET, FILM COATED ORAL EVERY 8 HOURS SCHEDULED
Status: DISCONTINUED | OUTPATIENT
Start: 2022-06-14 | End: 2022-06-19 | Stop reason: HOSPADM

## 2022-06-14 RX ORDER — ACETAMINOPHEN 325 MG/1
650 TABLET ORAL EVERY 6 HOURS PRN
Status: DISCONTINUED | OUTPATIENT
Start: 2022-06-14 | End: 2022-06-19 | Stop reason: HOSPADM

## 2022-06-14 RX ORDER — HYDRALAZINE HYDROCHLORIDE 20 MG/ML
10 INJECTION INTRAMUSCULAR; INTRAVENOUS EVERY 6 HOURS PRN
Status: DISCONTINUED | OUTPATIENT
Start: 2022-06-14 | End: 2022-06-19 | Stop reason: HOSPADM

## 2022-06-14 RX ORDER — LORAZEPAM 2 MG/ML
0.5 INJECTION INTRAMUSCULAR EVERY 4 HOURS PRN
Status: DISCONTINUED | OUTPATIENT
Start: 2022-06-14 | End: 2022-06-19 | Stop reason: HOSPADM

## 2022-06-14 RX ORDER — HYDRALAZINE HYDROCHLORIDE 20 MG/ML
20 INJECTION INTRAMUSCULAR; INTRAVENOUS EVERY 6 HOURS PRN
Status: DISCONTINUED | OUTPATIENT
Start: 2022-06-14 | End: 2022-06-14

## 2022-06-14 RX ORDER — INSULIN LISPRO 100 [IU]/ML
0-3 INJECTION, SOLUTION INTRAVENOUS; SUBCUTANEOUS NIGHTLY
Status: DISCONTINUED | OUTPATIENT
Start: 2022-06-14 | End: 2022-06-16

## 2022-06-14 RX ORDER — ACETAMINOPHEN 650 MG/1
650 SUPPOSITORY RECTAL EVERY 6 HOURS PRN
Status: DISCONTINUED | OUTPATIENT
Start: 2022-06-14 | End: 2022-06-19 | Stop reason: HOSPADM

## 2022-06-14 RX ORDER — DEXTROSE MONOHYDRATE 50 MG/ML
100 INJECTION, SOLUTION INTRAVENOUS PRN
Status: DISCONTINUED | OUTPATIENT
Start: 2022-06-14 | End: 2022-06-19 | Stop reason: HOSPADM

## 2022-06-14 RX ORDER — HYDRALAZINE HYDROCHLORIDE 20 MG/ML
10 INJECTION INTRAMUSCULAR; INTRAVENOUS EVERY 6 HOURS PRN
Status: DISCONTINUED | OUTPATIENT
Start: 2022-06-14 | End: 2022-06-14

## 2022-06-14 RX ORDER — CARVEDILOL 12.5 MG/1
12.5 TABLET ORAL 2 TIMES DAILY WITH MEALS
Status: ON HOLD | COMMUNITY
End: 2022-06-18 | Stop reason: HOSPADM

## 2022-06-14 RX ORDER — ATORVASTATIN CALCIUM 20 MG/1
20 TABLET, FILM COATED ORAL DAILY
Status: DISCONTINUED | OUTPATIENT
Start: 2022-06-14 | End: 2022-06-19 | Stop reason: HOSPADM

## 2022-06-14 RX ORDER — FUROSEMIDE 20 MG/1
20 TABLET ORAL DAILY
COMMUNITY
End: 2022-07-12 | Stop reason: SDUPTHER

## 2022-06-14 RX ORDER — ONDANSETRON 4 MG/1
4 TABLET, ORALLY DISINTEGRATING ORAL EVERY 8 HOURS PRN
Status: DISCONTINUED | OUTPATIENT
Start: 2022-06-14 | End: 2022-06-19 | Stop reason: HOSPADM

## 2022-06-14 RX ORDER — SODIUM CHLORIDE 0.9 % (FLUSH) 0.9 %
5-40 SYRINGE (ML) INJECTION PRN
Status: DISCONTINUED | OUTPATIENT
Start: 2022-06-14 | End: 2022-06-19 | Stop reason: HOSPADM

## 2022-06-14 RX ORDER — ONDANSETRON 2 MG/ML
4 INJECTION INTRAMUSCULAR; INTRAVENOUS EVERY 6 HOURS PRN
Status: DISCONTINUED | OUTPATIENT
Start: 2022-06-14 | End: 2022-06-19 | Stop reason: HOSPADM

## 2022-06-14 RX ORDER — LISINOPRIL 20 MG/1
20 TABLET ORAL DAILY
Status: DISCONTINUED | OUTPATIENT
Start: 2022-06-14 | End: 2022-06-19 | Stop reason: HOSPADM

## 2022-06-14 RX ORDER — CARVEDILOL 25 MG/1
25 TABLET ORAL 2 TIMES DAILY WITH MEALS
Status: DISCONTINUED | OUTPATIENT
Start: 2022-06-14 | End: 2022-06-19 | Stop reason: HOSPADM

## 2022-06-14 RX ORDER — PREDNISONE 20 MG/1
40 TABLET ORAL DAILY
Status: DISCONTINUED | OUTPATIENT
Start: 2022-06-17 | End: 2022-06-14

## 2022-06-14 RX ORDER — POLYETHYLENE GLYCOL 3350 17 G/17G
17 POWDER, FOR SOLUTION ORAL DAILY PRN
Status: DISCONTINUED | OUTPATIENT
Start: 2022-06-14 | End: 2022-06-19 | Stop reason: HOSPADM

## 2022-06-14 RX ORDER — ASPIRIN 81 MG/1
81 TABLET ORAL DAILY
Status: DISCONTINUED | OUTPATIENT
Start: 2022-06-14 | End: 2022-06-19 | Stop reason: HOSPADM

## 2022-06-14 RX ORDER — IPRATROPIUM BROMIDE AND ALBUTEROL SULFATE 2.5; .5 MG/3ML; MG/3ML
1 SOLUTION RESPIRATORY (INHALATION)
Status: DISCONTINUED | OUTPATIENT
Start: 2022-06-14 | End: 2022-06-19 | Stop reason: HOSPADM

## 2022-06-14 RX ORDER — METHYLPREDNISOLONE SODIUM SUCCINATE 125 MG/2ML
60 INJECTION, POWDER, LYOPHILIZED, FOR SOLUTION INTRAMUSCULAR; INTRAVENOUS EVERY 6 HOURS
Status: COMPLETED | OUTPATIENT
Start: 2022-06-14 | End: 2022-06-16

## 2022-06-14 RX ORDER — AMLODIPINE BESYLATE 10 MG/1
10 TABLET ORAL DAILY
Status: DISCONTINUED | OUTPATIENT
Start: 2022-06-14 | End: 2022-06-19 | Stop reason: HOSPADM

## 2022-06-14 RX ORDER — PREDNISONE 20 MG/1
40 TABLET ORAL DAILY
Status: DISCONTINUED | OUTPATIENT
Start: 2022-06-17 | End: 2022-06-19 | Stop reason: HOSPADM

## 2022-06-14 RX ORDER — SODIUM CHLORIDE 0.9 % (FLUSH) 0.9 %
5-40 SYRINGE (ML) INJECTION EVERY 12 HOURS SCHEDULED
Status: DISCONTINUED | OUTPATIENT
Start: 2022-06-14 | End: 2022-06-19 | Stop reason: HOSPADM

## 2022-06-14 RX ORDER — INSULIN LISPRO 100 [IU]/ML
0-6 INJECTION, SOLUTION INTRAVENOUS; SUBCUTANEOUS
Status: DISCONTINUED | OUTPATIENT
Start: 2022-06-14 | End: 2022-06-16

## 2022-06-14 RX ORDER — METHYLPREDNISOLONE SODIUM SUCCINATE 40 MG/ML
40 INJECTION, POWDER, LYOPHILIZED, FOR SOLUTION INTRAMUSCULAR; INTRAVENOUS EVERY 6 HOURS
Status: DISCONTINUED | OUTPATIENT
Start: 2022-06-14 | End: 2022-06-14

## 2022-06-14 RX ADMIN — METHYLPREDNISOLONE SODIUM SUCCINATE 40 MG: 40 INJECTION, POWDER, FOR SOLUTION INTRAMUSCULAR; INTRAVENOUS at 16:00

## 2022-06-14 RX ADMIN — INSULIN LISPRO 1 UNITS: 100 INJECTION, SOLUTION INTRAVENOUS; SUBCUTANEOUS at 22:49

## 2022-06-14 RX ADMIN — SODIUM CHLORIDE, PRESERVATIVE FREE 10 ML: 5 INJECTION INTRAVENOUS at 20:07

## 2022-06-14 RX ADMIN — LORAZEPAM 0.5 MG: 2 INJECTION INTRAMUSCULAR; INTRAVENOUS at 19:33

## 2022-06-14 RX ADMIN — HYDRALAZINE HYDROCHLORIDE 10 MG: 20 INJECTION INTRAMUSCULAR; INTRAVENOUS at 17:32

## 2022-06-14 RX ADMIN — IPRATROPIUM BROMIDE AND ALBUTEROL SULFATE 1 AMPULE: .5; 3 SOLUTION RESPIRATORY (INHALATION) at 16:12

## 2022-06-14 RX ADMIN — ENOXAPARIN SODIUM 40 MG: 100 INJECTION SUBCUTANEOUS at 17:28

## 2022-06-14 RX ADMIN — IPRATROPIUM BROMIDE AND ALBUTEROL SULFATE 1 AMPULE: .5; 3 SOLUTION RESPIRATORY (INHALATION) at 18:36

## 2022-06-14 RX ADMIN — Medication 500 MG: at 16:18

## 2022-06-14 RX ADMIN — METHYLPREDNISOLONE SODIUM SUCCINATE 60 MG: 125 INJECTION, POWDER, FOR SOLUTION INTRAMUSCULAR; INTRAVENOUS at 22:15

## 2022-06-14 RX ADMIN — SODIUM CHLORIDE, PRESERVATIVE FREE 40 MG: 5 INJECTION INTRAVENOUS at 22:16

## 2022-06-14 RX ADMIN — CEFTRIAXONE SODIUM 1000 MG: 1 INJECTION, POWDER, FOR SOLUTION INTRAMUSCULAR; INTRAVENOUS at 14:34

## 2022-06-14 RX ADMIN — HYDRALAZINE HYDROCHLORIDE 50 MG: 50 TABLET, FILM COATED ORAL at 20:07

## 2022-06-14 ASSESSMENT — ENCOUNTER SYMPTOMS
DIARRHEA: 0
NAUSEA: 0
COUGH: 1
RHINORRHEA: 0
ABDOMINAL PAIN: 0
RHINORRHEA: 1
SHORTNESS OF BREATH: 1
EYE REDNESS: 0
VOMITING: 0
SHORTNESS OF BREATH: 0
SORE THROAT: 0
CONSTIPATION: 0

## 2022-06-14 NOTE — ED PROVIDER NOTES
[facility]  Emergency Department Encounter  EmergencyMedicine Resident     Pt Name:Pavan Newman  MRN: [id number]  Dianetrongfsindy 1955  Date of evaluation: 6/14/22  PCP:  Beverly Judd MD    This patient was evaluated in the Emergency Department for symptoms described in the history of present illness. The patient was evaluated in the context of the global COVID-19 pandemic, which necessitated consideration that the patient might be at risk for infection with the SARS-CoV-2 virus that causes COVID-19. Institutional protocols and algorithms that pertain to the evaluation of patients at risk for COVID-19 are in a state of rapid change based on information released by regulatory bodies including the CDC and federal and state organizations. These policies and algorithms were followed during the patient's care in the ED. CHIEF COMPLAINT       No chief complaint on file. Shortness of breath    HISTORY OF PRESENT ILLNESS  (Location/Symptom, Timing/Onset, Context/Setting, Quality, Duration, Modifying Factors, Severity.)      Josué Boone is a 69 y.o. male who presents via EMS with shortness of breath and cough over the past week worse today. He arrives on CPAP, initial O2 sats for EMS were in the 70s, came up to high 80s low 90s on CPAP. EMS also gave 1 dose of nitro and Solu-Medrol. On arrival transitioning from CPAP to hospital BiPAP he desats to the low 70s, quickly rebounds back to the 90s on BiPAP. He reports feeling significantly better while on CPAP or BiPAP. Spoke with his son who notes he has had significant congestion in the past week and has been more tired and sleeping more, similar to when he had COVID in January. States he took his metformin and 2 other meds this morning he isn't sure which ones.      Jan 2022 ECHO EF51%    From Hospital follow up appt in Feb:  History of Present illness - Follow up of Hospital diagnosis(es): This is a 68-year-old gentleman went to the emergency with acute on chronic respiratory failure secondary COVID-19 in the setting of COPD/CHF. Patient was seen by the pulmonologist who adjusted the medication, patient received a course of Decadron which she will complete today on 2/15/2022.     Patient was also evaluated by the infectious disease. Patient was started on vancomycin which was discontinued because of contamination, patient also received Actemra on 2022, Jamie Palacio was contraindicated, patient was started on Decadron for 10 days per note.     Patient was discharged on 3 L nasal cannula oxygen, patient at this time during the visit did not bring the oxygen. PAST MEDICAL / SURGICAL / SOCIAL / FAMILY HISTORY      has a past medical history of CHF (congestive heart failure) ([facility] [address] COPD (chronic obstructive pulmonary disease) ([facility] [address] Diabetes mellitus ([facility] [address] Erectile dysfunction due to arterial insufficiency, Hypertension, Microalbuminuria due to type 2 diabetes mellitus ([facility] [address] and Overweight (BMI 25.0-29.9). has a past surgical history that includes Appendectomy and Total ankle arthroplasty. Social History     Socioeconomic History    Marital status:       Spouse name: Not on file    Number of children: Not on file    Years of education: Not on file    Highest education level: Not on file   Occupational History    Not on file   Tobacco Use    Smoking status: Former Smoker     Packs/day: 0.50     Types: Cigarettes     Quit date: 2021     Years since quittin.5    Smokeless tobacco: Never Used    Tobacco comment: reports he quit smoking in the past 2 months   Substance and Sexual Activity    Alcohol use: No     Alcohol/week: 0.0 standard drinks    Drug use: No    Sexual activity: Not on file   Other Topics Concern    Not on file   Social History Narrative    Not on file     Social Determinants of Health     Financial Resource Strain: Low Risk     Difficulty of Paying Living Expenses: Not very hard   Food Insecurity: No Food Insecurity    Worried About Running Out of Food in the Last Year: Never true    Rakesh of Food in the Last Year: Never true   Transportation Needs: Unmet Transportation Needs    Lack of Transportation (Medical): No    Lack of Transportation (Non-Medical): Yes   Physical Activity: Inactive    Days of Exercise per Week: 0 days    Minutes of Exercise per Session: 0 min   Stress: Stress Concern Present    Feeling of Stress : Very much   Social Connections: Socially Isolated    Frequency of Communication with Friends and Family: Never    Frequency of Social Gatherings with Friends and Family: More than three times a week    Attends Restoration Services: Never    Active Member of Clubs or Organizations: No    Attends Club or Organization Meetings: Never    Marital Status:    Intimate Partner Violence:     Fear of Current or Ex-Partner: Not on file    Emotionally Abused: Not on file    Physically Abused: Not on file    Sexually Abused: Not on file   Housing Stability: [facility] [address] Unable to Pay for Housing in the Last Year: No    Number of Places Lived in the Last Year: 1    Unstable Housing in the Last Year: No       History reviewed. No pertinent family history. Allergies:  Patient has no known allergies. Home Medications:  Prior to Admission medications    Medication Sig Start Date End Date Taking?  Authorizing Provider   carvedilol (COREG) 12.5 MG tablet Take 12.5 mg by mouth 2 times daily (with meals)   Yes Historical Provider, MD   furosemide (LASIX) 20 MG tablet Take 20 mg by mouth daily   Yes Historical Provider, MD   metFORMIN (GLUCOPHAGE) 500 MG tablet Take 1 tablet by mouth 2 times daily (with meals) 5/31/22   Benjaman Dakin, MD   lisinopril (PRINIVIL;ZESTRIL) 20 MG tablet Take 20 mg by mouth daily    Historical Provider, MD   atorvastatin (LIPITOR) 20 MG tablet Take 1 tablet by mouth daily 2/1/22   Te Triana MD   hydrALAZINE (APRESOLINE) 50 MG tablet Take 1 tablet by mouth every 8 hours 1/26/22   Ela Flynn MD   Vitamin D (CHOLECALCIFEROL) 50 MCG (2000 UT) TABS tablet Take 1 tablet by mouth daily 1/27/22   Ela Flynn MD   amLODIPine (NORVASC) 10 MG tablet TAKE 1 TABLET BY MOUTH DAILY 10/25/21   Fred Spencer MD   aspirin (HM ASPIRIN EC LOW DOSE) 81 MG EC tablet TAKE 1 TABLET BY MOUTH DAILY 8/26/21   Willene Boas, MD   albuterol sulfate HFA (PROAIR HFA) 108 (90 Base) MCG/ACT inhaler inhale 2 puffs by mouth every 6 hours if needed for wheezing 8/16/21   Te Ng MD   fluticasone-salmeterol (ADVAIR DISKUS) 100-50 MCG/DOSE diskus inhaler INHALE 1 PUFF INTO THE LUNGS EVERY 12 HOURS 8/16/21   Te Ng MD   tiotropium (Verner Tellez) 18 MCG inhalation capsule Inhale 1 capsule into the lungs daily  Patient not taking: Reported on 3/9/2022 1/9/20   Ketty Dove MD       REVIEW OF SYSTEMS    (2-9 systems for level 4, 10 or more for level 5)      Review of Systems   Constitutional: Positive for fatigue. Negative for activity change, chills and fever. HENT: Positive for congestion and rhinorrhea. Negative for sore throat. Respiratory: Positive for cough and shortness of breath. Cardiovascular: Positive for chest pain. Negative for leg swelling. Gastrointestinal: Negative for abdominal pain, diarrhea, nausea and vomiting. Genitourinary: Negative for decreased urine volume and dysuria. Musculoskeletal: Negative for arthralgias and myalgias. Skin: Negative for pallor and rash. Allergic/Immunologic: Negative for environmental allergies and food allergies. Neurological: Negative for light-headedness and headaches. Psychiatric/Behavioral: Negative for confusion and decreased concentration.        PHYSICAL EXAM   (up to 7 for level 4, 8 or more for level 5)      INITIAL VITALS:   BP (!) 162/70   Pulse 73   Temp 97.9 °F (36.6 °C) (Axillary)   Resp (!) 31   Wt 165 lb (74.8 kg)   SpO2 94%   BMI 27.46 kg/m²     Physical Exam  Constitutional:       General: He is in acute distress. Appearance: Normal appearance. He is ill-appearing. He is not toxic-appearing. HENT:      Head: Normocephalic and atraumatic. Mouth/Throat:      Mouth: Mucous membranes are moist.      Pharynx: Oropharynx is clear. Eyes:      Extraocular Movements: Extraocular movements intact. Pupils: Pupils are equal, round, and reactive to light. Cardiovascular:      Rate and Rhythm: Normal rate and regular rhythm. Pulses: Normal pulses. Heart sounds: Normal heart sounds. Pulmonary:      Effort: Respiratory distress present. Breath sounds: Decreased air movement present. Abdominal:      General: Bowel sounds are normal.      Palpations: Abdomen is soft. Tenderness: There is no abdominal tenderness. There is no rebound. Musculoskeletal:         General: Normal range of motion. Skin:     General: Skin is warm and dry. Neurological:      General: No focal deficit present. Mental Status: He is alert and oriented to person, place, and time. Psychiatric:         Mood and Affect: Mood normal.         Behavior: Behavior normal.         Thought Content: Thought content normal.         Judgment: Judgment normal.       INITIAL IMPRESSION / DIFFERENTIAL  DIAGNOSIS / PLAN     INITIAL IMPRESSION / DDX:   70-year-old male with CHF and COPD presenting with acute respiratory distress requiring BiPAP. Concern for pulmonary edema, ACS, PE. Will complete chest x-ray and appropriate infectious and cardiac labs. On record review he had a left-sided pulmonary effusion and possible pneumonia in January on that prior admission. Has not had follow-up imaging for resolution or further characterization. Concern for possible mass, will obtain CT chest once able to lay flat. Plan for admission, possibly critical care if decompensates further, currently stable on BiPAP.     EMERGENCY DEPARTMENT COURSE:  ED Course as of 06/14/22 1920 Tue Jun 14, 2022   1007 Desat to 70s when moving from CPAP to BIPAP, back to 90s on BIPAP [SM]   1013 Pt states he would not want to be intubated if needed [SM]   1014 Attempted to reach son as pt would like him to be contacted and unknown if he is coming (they live together), no answer [SM]   2500 Lilli Bernardo Spoke to son, was fine up until about a week ago, started having altered mental status and increased work of breathing. 1 word dyspnea. Increase congestion.  [SM]   1048 Troponin, High Sensitivity(!): 51  Prior checks 70s, repeat ordered [SM]   1048 Pro-BNP(!): 1,628  Increase from 2 months ago [SM]   1110 Hemoglobin Quant(!): 10.2  Lower than baseline [SM]   1206 Flu and covid neg [SM]   1327 Attempted to lay flat for CT, would not tolerated, IM consulted [SM]   1400 Family medicine pt, consult placed to them []      ED Course User Index  [] Callie Mckeon MD       PLAN (LABS / Blenda Pichardo / EKG):  Orders Placed This Encounter   Procedures    COVID-19, Rapid    RAPID INFLUENZA A/B ANTIGENS    Strep Pneumoniae Antigen    Legionella antigen, urine    Culture, Respiratory    XR CHEST PORTABLE    CBC with Auto Differential    Comprehensive Metabolic Panel w/ Reflex to MG    Troponin    Brain Natriuretic Peptide    Lactate, Sepsis    Urinalysis with Microscopic    ELECTROLYTES PLUS    Hemoglobin and hematocrit, blood    CALCIUM, IONIC (POC)    D-Dimer, Quantitative    Magnesium    Procalcitonin    Troponin    Blood gas, arterial    CBC with Auto Differential    Basic Metabolic Panel w/ Reflex to MG    ADULT DIET; Regular; 3 carb choices (45 gm/meal);  Low Sodium (2 gm)    Vital signs per unit routine    Up as tolerated    Up with assistance    HYPOGLYCEMIA TREATMENT: blood glucose less than 50 mg/dL and patient  ALERT and TOLERATING PO    HYPOGLYCEMIA TREATMENT: blood glucose less than 70 mg/dL and patient ALERT and TOLERATING PO    HYPOGLYCEMIA TREATMENT: blood glucose less than 70 mg/dL and patient NOT ALERT or NPO    Full Code    Inpatient consult to Internal Medicine    Pulmonary rehab evaluation    Inpatient consult to Social Work    OT eval and treat    PT evaluation and treat    Adult NIV/Positive Airway Pressure    Pulse Oximetry Spot Check    Initiate Oxygen Therapy Protocol    Pulse oximetry, continuous    Heated/ Humidified High Flow Nasal Cannula    Venous Blood Gas, POC    Creatinine W/GFR Point of Care    POCT urea (BUN)    Lactic Acid, POC    POCT Glucose    POCT Glucose    Arterial Blood Gas, POC    POCT glucose    EKG 12 Lead    ADMIT TO INPATIENT       MEDICATIONS ORDERED:  Orders Placed This Encounter   Medications    cefTRIAXone (ROCEPHIN) 1000 mg IVPB in NS 50ml minibag     Order Specific Question:   Antimicrobial Indications     Answer:   Pneumonia (CAP)     Order Specific Question:   CAP duration of therapy     Answer: Other    azithromycin (ZITHROMAX) 500 mg in dextrose 5% 250 mL IVPB     Order Specific Question:   Antimicrobial Indications     Answer:   Pneumonia (CAP)     Order Specific Question:   CAP duration of therapy     Answer:    Other    amLODIPine (NORVASC) tablet 10 mg    aspirin EC tablet 81 mg    atorvastatin (LIPITOR) tablet 20 mg    carvedilol (COREG) tablet 25 mg    hydrALAZINE (APRESOLINE) tablet 50 mg    lisinopril (PRINIVIL;ZESTRIL) tablet 20 mg    metFORMIN (GLUCOPHAGE) tablet 500 mg    sodium chloride flush 0.9 % injection 5-40 mL    sodium chloride flush 0.9 % injection 5-40 mL    0.9 % sodium chloride infusion    OR Linked Order Group     ondansetron (ZOFRAN-ODT) disintegrating tablet 4 mg     ondansetron (ZOFRAN) injection 4 mg    polyethylene glycol (GLYCOLAX) packet 17 g    enoxaparin (LOVENOX) injection 40 mg     Order Specific Question:   Indication of Use     Answer:   Prophylaxis-DVT/PE    OR Linked Order Group     acetaminophen (TYLENOL) tablet 650 mg     acetaminophen (TYLENOL) suppository 650 mg    ipratropium-albuterol (DUONEB) nebulizer solution 1 ampule     Order Specific Question:   Initiate RT Bronchodilator Protocol     Answer: Yes    DISCONTD: methylPREDNISolone sodium (SOLU-MEDROL) injection 40 mg    DISCONTD: predniSONE (DELTASONE) tablet 40 mg    insulin lispro (HUMALOG) injection vial 0-6 Units    insulin lispro (HUMALOG) injection vial 0-3 Units    glucose chewable tablet 16 g    OR Linked Order Group     dextrose bolus 10% 125 mL     dextrose bolus 10% 250 mL    glucagon (rDNA) injection 1 mg    dextrose 5 % solution    DISCONTD: hydrALAZINE (APRESOLINE) injection 10 mg    FOLLOWED BY Linked Order Group     methylPREDNISolone sodium (SOLU-MEDROL) injection 60 mg     predniSONE (DELTASONE) tablet 40 mg    pantoprazole (PROTONIX) 40 mg in sodium chloride (PF) 10 mL injection    hydrALAZINE (APRESOLINE) injection 20 mg    LORazepam (ATIVAN) injection 0.5 mg       DIAGNOSTIC RESULTS / PROCEDURES / CONSULTS   LAB RESULTS:  Results for orders placed or performed during the hospital encounter of 06/14/22   COVID-19, Rapid    Specimen: Nasopharyngeal Swab   Result Value Ref Range    Specimen Description . NASOPHARYNGEAL SWAB     SARS-CoV-2, Rapid Not Detected Not Detected   RAPID INFLUENZA A/B ANTIGENS    Specimen: Nasopharyngeal   Result Value Ref Range    Flu A Antigen NEGATIVE NEGATIVE    Flu B Antigen NEGATIVE NEGATIVE   CBC with Auto Differential   Result Value Ref Range    WBC 10.8 3.5 - 11.3 k/uL    RBC 3.42 (L) 4.21 - 5.77 m/uL    Hemoglobin 10.2 (L) 13.0 - 17.0 g/dL    Hematocrit 33.7 (L) 40.7 - 50.3 %    MCV 98.5 82.6 - 102.9 fL    MCH 29.8 25.2 - 33.5 pg    MCHC 30.3 28.4 - 34.8 g/dL    RDW 13.2 11.8 - 14.4 %    Platelets 836 463 - 491 k/uL    MPV 9.9 8.1 - 13.5 fL    NRBC Automated 0.0 0.0 per 100 WBC    Seg Neutrophils 84 (H) 36 - 65 %    Lymphocytes 10 (L) 24 - 43 %    Monocytes 5 3 - 12 %    Eosinophils % 0 (L) 1 - 4 %    Basophils 0 0 - 2 %    Immature Granulocytes 1 (H) 0 %    Segs Absolute 9.02 (H) 1.50 - 8.10 k/uL    Absolute Lymph # 1.11 1.10 - 3.70 k/uL    Absolute Mono # 0.56 0.10 - 1.20 k/uL    Absolute Eos # <0.03 0.00 - 0.44 k/uL    Basophils Absolute <0.03 0.00 - 0.20 k/uL    Absolute Immature Granulocyte 0.07 0.00 - 0.30 k/uL   Comprehensive Metabolic Panel w/ Reflex to MG   Result Value Ref Range    Glucose 156 (H) 70 - 99 mg/dL    BUN 24 (H) 8 - 23 mg/dL    CREATININE 0.76 0.70 - 1.20 mg/dL    Calcium 9.4 8.6 - 10.4 mg/dL    Sodium 145 (H) 135 - 144 mmol/L    Potassium 4.4 3.7 - 5.3 mmol/L    Chloride 99 98 - 107 mmol/L    CO2 38 (H) 20 - 31 mmol/L    Anion Gap 8 (L) 9 - 17 mmol/L    Alkaline Phosphatase 82 40 - 129 U/L    ALT 7 5 - 41 U/L    AST 10 <40 U/L    Total Bilirubin 0.58 0.3 - 1.2 mg/dL    Total Protein 5.8 (L) 6.4 - 8.3 g/dL    Albumin 3.2 (L) 3.5 - 5.2 g/dL    Albumin/Globulin Ratio 1.2 1.0 - 2.5    GFR Non-African American >60 >60 mL/min    GFR African American >60 >60 mL/min    GFR Comment         Troponin   Result Value Ref Range    Troponin, High Sensitivity 51 (H) 0 - 22 ng/L   Brain Natriuretic Peptide   Result Value Ref Range    Pro-BNP 1,628 (H) <300 pg/mL   Lactate, Sepsis   Result Value Ref Range    Lactic Acid, Sepsis, Whole Blood 1.1 0.5 - 1.9 mmol/L   ELECTROLYTES PLUS   Result Value Ref Range    POC Sodium 146 138 - 146 mmol/L    POC Potassium 4.2 3.5 - 4.5 mmol/L    POC Chloride 99 98 - 107 mmol/L    POC TCO2 42 (HH) 22 - 30 mmol/L    Anion Gap 6 (L) 7 - 16 mmol/L   Hemoglobin and hematocrit, blood   Result Value Ref Range    POC Hemoglobin 12.0 (L) 13.5 - 17.5 g/dL    POC Hematocrit 35 (L) 41 - 53 %   CALCIUM, IONIC (POC)   Result Value Ref Range    POC Ionized Calcium 1.22 1.15 - 1.33 mmol/L   D-Dimer, Quantitative   Result Value Ref Range    D-Dimer, Quant 1.57 mg/L FEU   Magnesium   Result Value Ref Range    Magnesium 1.7 1.6 - 2.6 mg/dL   Procalcitonin   Result Value Ref Range    Procalcitonin 0.38 (H) <0.09 ng/mL   Troponin   Result Value Ref Range    Troponin, High Sensitivity 40 (H) 0 - 22 ng/L   Venous Blood Gas, POC   Result Value Ref Range    pH, Jose D 7.342 7.320 - 7.430    pCO2, Jose D 76.9 (HH) 41.0 - 51.0 mm Hg    pO2, Jose D 53.0 (H) 30.0 - 50.0 mm Hg    HCO3, Venous 41.7 (H) 22.0 - 29.0 mmol/L    Positive Base Excess, Jose D 13 (H) 0.0 - 3.0    O2 Sat, Jose D 83 60.0 - 85.0 %   Creatinine W/GFR Point of Care   Result Value Ref Range    POC Creatinine 0.95 0.51 - 1.19 mg/dL    GFR Comment >60 >60 mL/min    GFR Non-African American >60 >60 mL/min    GFR Comment         POCT urea (BUN)   Result Value Ref Range    POC BUN 27 (H) 8 - 26 mg/dL   Lactic Acid, POC   Result Value Ref Range    POC Lactic Acid 0.75 0.56 - 1.39 mmol/L   POCT Glucose   Result Value Ref Range    POC Glucose 154 (H) 74 - 100 mg/dL   Arterial Blood Gas, POC   Result Value Ref Range    POC pH 7.382 7.350 - 7.450    POC pCO2 65.3 (H) 35.0 - 48.0 mm Hg    POC PO2 73.4 (L) 83.0 - 108.0 mm Hg    POC HCO3 38.8 (H) 21.0 - 28.0 mmol/L    Positive Base Excess, Art 12 (H) 0.0 - 3.0    POC O2 SAT 94 94.0 - 98.0 %    O2 Device/Flow/% BIPAP     Sample Site Left Radial Artery     FIO2 40.0        RADIOLOGY:  XR CHEST PORTABLE    Result Date: 6/14/2022  EXAMINATION: ONE XRAY VIEW OF THE CHEST 6/14/2022 10:11 am COMPARISON: Chest x-ray dated 24 January 2022. HISTORY: ORDERING SYSTEM PROVIDED HISTORY: COPD, CHF, SOB TECHNOLOGIST PROVIDED HISTORY: COPD, CHF, SOB Reason for Exam: copd chf sob FINDINGS: Small left pleural effusion with left lower lobe airspace disease. Patchy infiltrates in the left upper lobe as well. The right lung is clear. No pneumothorax. The cardiomediastinal silhouette is within normal limits     Small left pleural effusion with scattered left lung airspace opacities. Consider pneumonia. CONSULTS:  IP CONSULT TO INTERNAL MEDICINE  PULMONARY REHAB EVALUATION  IP CONSULT TO SOCIAL WORK      FINAL IMPRESSION      1.  Pneumonia of both lungs due to infectious organism, unspecified part of lung    2. Hypoxia          DISPOSITION / PLAN     DISPOSITION Admitted 06/14/2022 03:06:51 PM    Admitted to [facility]    PATIENT REFERRED TO:  No follow-up provider specified.     DISCHARGE MEDICATIONS:  Current Discharge Medication List          Jina Montanez MD  Emergency Medicine Resident    (Please note that portions of thisnote were completed with a voice recognition program.  Efforts were made to edit the dictations but occasionally words are mis-transcribed.)     Angel Zavala MD  Resident  06/14/22 [id number]

## 2022-06-14 NOTE — ED TRIAGE NOTES
Pt arrived to the ED with c/o diff breathing. Pt states this has been going on for a couple of days and has progressively gotten worse. Pt states he wears 3l of O2 at home. Pt states he has a hx of CHF. In route pt was given solumedrol and nitro due to BP being extremely high. Pt was on Cpap upon arrival. Pt was satting 70% prior to Cpap intervention at home. Pt states he only took his metformin this am. Pt is alert and oriented 4.

## 2022-06-14 NOTE — CARE COORDINATION
Case Management Initial Discharge Plan  Ana Gregg,         Pt is aggitated with questions for initial discharge planning, appears anxious    Met with:patient to discuss discharge plans. Information verified: address, contacts, phone number, , insurance Yes  Insurance Provider: Radford Pean Medicare  :     Emergency Contact/Next of Kin name & number: Son Maria Antonia Simon as per face sheet  Who are involved in patient's support system? pts son lives with him    PCP: Mary Howard MD  Date of last visit: 2022 as per chart      Discharge Planning    Living Arrangements:    pts son lives with pt in pts apartment    Home has 2 stories  5 stairs to climb to get into front door, 2 flights of stairs to climb to reach second floor  Location of bedroom/bathroom in home main    Patient able to perform ADL's:Independent    Current Services (outpatient & in home) DME  DME equipment: Has O2 3L and concentrator  DME provider: pt unable to state    Is patient receiving oral anticoagulation therapy? No    If indicated: ASA  Physician managing anticoagulation treatment: na  Where does patient obtain lab work for ATC treatment? na    Does patient have any issues/concerns obtaining medications? No  If yes, what are patient's concerns? Is there a preferred Pharmacy after hours or on weekends? Yes    If yes, which pharmacy? Fills at [employer] [facility] [employer] Needed:       Patient agreeable to home care: No  [place] of choice provided:  n/a    Prior SNF/Rehab Placement and Facility: denies  Agreeable to SNF/Rehab: No  [place] of choice provided: monitor for potential need as pt is on FIO2 40% O2     Evaluation: no    Expected Discharge date:       Patient expects to be discharged to:   home vs SNF    If home: is the family and/or caregiver wiling & able to provide support at home? yes  Who will be providing this support?  Son lives with pt    Follow Up Appointment: Best Day/ Time:      Transportation provider: will need   Transportation arrangements needed for discharge: Yes    Readmission Risk              Risk of Unplanned Readmission:  0             Does patient have a readmission risk score greater than 14?: No  If yes, follow-up appointment must be made within 7 days of discharge. Goals of Care: self care      Educated pt on transitional options, provided freedom of choice and are agreeable with plan      Discharge Plan: monitor for potential SNF needs as pt has increased O2 needs, pts son lives with pt. If home will need transportation, lives on 2nd floor apt, has DME walker. Pt is on PAP FIO2 40%      Electronically signed by Leda Litten, RN on 6/14/22 at 10:30 AM EDT      ACP planning deferred as timing is not appropriate.
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
## 2022-06-14 NOTE — ED NOTES
Report received from [facility].  Pt a/o, attached to monitor, call light in reach, states no needs at this time     Shabnam Bragg, RN  06/14/22 0270

## 2022-06-14 NOTE — LETTER
[facility] [address]  [facility]  Phone: [phone]    Deion Francisco MD        June 18, 2022     Patient: Facundo Easley   YOB: 1955   Date of Visit: 6/14/2022       To Whom It May Concern: It is my medical opinion that Alexa Grajeda should live on first floor apartment due to mutiple medications and requiring 24 hour oxygen. .    If you have any questions or concerns, please don't hesitate to call.     Sincerely,        Deion Francisco MD

## 2022-06-14 NOTE — ED NOTES
CM contacted pt regarding elective LTKA scheduled for 11/19/2020.   Pt resides in [place] - .   CM reviewed Home/DME- to be reviewed.   Pt does not have OP PT.    Home care discussion: List/ choice: TBD   Anticoagulation will be   mg    Preferred Pain medication: TAYLER Percocet   Preferred Pharmacy: TBD  CM unable to verify: CPM machine/ cooling unit for TSA/ TKA.  Transportation: TBD   CM will continue to follow and assist with discharge planning.    Report given to Car 2 RN, all questions addressed      Isaac Gong, MARIANNA  06/14/22 7054

## 2022-06-14 NOTE — PLAN OF CARE
BRONCHOSPASM/BRONCHOCONSTRICTION     [x]         IMPROVE AERATION/BREATH SOUNDS  [x]   ADMINISTER BRONCHODILATOR THERAPY AS APPROPRIATE  [x]   ASSESS BREATH SOUNDS  []   IMPLEMENT AEROSOL/MDI PROTOCOL  [x]   PATIENT EDUCATION AS NEEDED     PROVIDE ADEQUATE OXYGENATION WITH ACCEPTABLE SP02/ABG'S    [x]  IDENTIFY APPROPRIATE OXYGEN THERAPY  [x]   MONITOR SP02/ABG'S AS NEEDED   [x]   PATIENT EDUCATION AS NEEDED     NON INVASIVE VENTILATION  PROVIDE OPTIMAL VENTILATION/ACCEPTABLE SP02  IMPLEMENT NON INVASIVE VENTILATION PROTOCOL  ASSESSMENT SKIN INTEGRITY  PATIENT EDUCATION AS NEEDED  BIPAP AS NEEDED

## 2022-06-14 NOTE — ED NOTES
The following labs labeled with pt sticker and tubed to lab:     [] Blue     [] Lavender   [] on ice  [x] Green/yellow  [] Green/black [] on ice  [] Yellow  [] Red  [] Pink      [] COVID-19 swab    [] Rapid  [] PCR  [] Flu swab  [] Peds Viral Panel     [] Urine Sample  [] Pelvic Cultures  [] Blood Cultures            Chely Gan RN  06/14/22 5194

## 2022-06-14 NOTE — H&P
[facility]  History & Physical Examination Note              Date:   6/14/2022  Patient name:  Ruth Oleary  Date of admission:  6/14/2022 10:04 AM  MRN:   [id number]  YOB: 1955    CHIEF COMPLAINT:     No chief complaint on file. History Obtained From:  Patient and chart review. HPI:     The patient is a 69 y.o.  male with past medical history of type 2 diabetes well controlled, hypertension, COPD on 3 L O2 home oxygen, COVID-19 in 01/2022 who presents to the hospital complaining of of productive cough and shortness of breath. Patient reports for the past week he has had a productive cough with green to yellow sputum, fever and chills and worsening shortness of breath. Patient required increasing oxygen that is currently on 3 L at home. During patient's stay in the hospital for COVID-19 in January 2022 he received Actemra and Decadron for 10 days. Patient called EMS this morning. Patient SaO2 was in the 70s placed on BiPAP, received nitro x1 and Solu-Medrol in route. In the ED, patient's oxygen saturation was in the high 70s placed on BiPAP. Vital signs afebrile tachypneic, hypertensive 182/74. SaO2 93% on BiPAP FiO2 of 40. BMP sodium 145, potassium 4.4, creatinine 0.67, procalcitonin 0.38.   proBNP 1628 (around baseline), troponin 51 downtrending to 40 (baseline 70), negative EKG findings. CBC no leukocytosis hemoglobin H/H 10.2/33. 7. VBG 7.342, CO2 76.9, bicarb 41.7, O2 53.0  Rapid flu and COVID were negative. D-dimer normal.    Chest x-ray: Shows small left pleural effusion scattered lung space possible to pneumonia. Patient admitted to the hospital for acute on chronic respiratory failure secondary to COPD exacerbation secondary pneumonia.       PAST MEDICAL HISTORY:        has a past medical history of CHF (congestive heart failure) ([facility] COPD (chronic obstructive pulmonary disease) ([facility] Diabetes mellitus ([facility] Erectile dysfunction due to arterial insufficiency, Hypertension, Microalbuminuria due to type 2 diabetes mellitus ([facility] and Overweight (BMI 25.0-29.9). PAST SURGICAL HISTORY:      has a past surgical history that includes Appendectomy and Total ankle arthroplasty. FAMILY HISTORY:     family history is not on file. HOME MEDICATIONS:     Prior to Admission medications    Medication Sig Start Date End Date Taking? Authorizing Provider   metFORMIN (GLUCOPHAGE) 500 MG tablet Take 1 tablet by mouth 2 times daily (with meals) 5/31/22   Melvin Adamson MD   carvedilol (COREG) 25 MG tablet Take 25 mg by mouth 2 times daily (with meals) Take 1/2 tab BID    Historical Provider, MD   lisinopril (PRINIVIL;ZESTRIL) 20 MG tablet Take 20 mg by mouth daily    Historical Provider, MD   atorvastatin (LIPITOR) 20 MG tablet Take 1 tablet by mouth daily 2/1/22   Jonathan Burr MD   hydrALAZINE (APRESOLINE) 50 MG tablet Take 1 tablet by mouth every 8 hours 1/26/22   Fortunato Xiong MD   Vitamin D (CHOLECALCIFEROL) 50 MCG (2000 UT) TABS tablet Take 1 tablet by mouth daily 1/27/22   Fortunato Xiong MD   amLODIPine (NORVASC) 10 MG tablet TAKE 1 TABLET BY MOUTH DAILY 10/25/21   Polina Bonilla MD   aspirin (HM ASPIRIN EC LOW DOSE) 81 MG EC tablet TAKE 1 TABLET BY MOUTH DAILY 8/26/21   Vita Akers MD   albuterol sulfate HFA (PROAIR HFA) 108 (90 Base) MCG/ACT inhaler inhale 2 puffs by mouth every 6 hours if needed for wheezing 8/16/21   Te Bray MD   fluticasone-salmeterol (ADVAIR DISKUS) 100-50 MCG/DOSE diskus inhaler INHALE 1 PUFF INTO THE LUNGS EVERY 12 HOURS 8/16/21   Te Bray MD   tiotropium (SPIRIVA HANDIHALER) 18 MCG inhalation capsule Inhale 1 capsule into the lungs daily  Patient not taking: Reported on 3/9/2022 1/9/20   Delwyn Homans, MD       ALLERGIES:      Patient has no known allergies. SOCIAL HISTORY:      reports that he quit smoking about 6 months ago.  His smoking use included cigarettes. He smoked 0.50 packs per day. He has never used smokeless tobacco. He reports that he does not drink alcohol and does not use drugs. REVIEW OF SYSTEMS:     Review of Systems   Constitutional: Positive for chills, diaphoresis and fever. Negative for appetite change and fatigue. HENT: Negative for ear pain, rhinorrhea and sore throat. Eyes: Negative for redness. Respiratory: Positive for cough. Negative for shortness of breath. Cardiovascular: Negative for chest pain and leg swelling. Gastrointestinal: Negative for abdominal pain, constipation, diarrhea, nausea and vomiting. Endocrine: Negative for polyuria. Genitourinary: Negative for difficulty urinating and dysuria. Musculoskeletal: Negative for arthralgias. Skin: Negative for rash. Neurological: Negative for dizziness, weakness, numbness and headaches. Psychiatric/Behavioral: Negative for agitation and behavioral problems. PHYSICAL EXAM:     Vitals:    06/14/22 1503 06/14/22 1505 06/14/22 1612 06/14/22 1616   BP: (!) 177/75      Pulse: 66 66     Resp: (!) 34 29  (!) 31   Temp: 97.9 °F (36.6 °C)      TempSrc: Axillary      SpO2: 92% 92% 92%    Weight:             Intake/Output Summary (Last 24 hours) at 6/14/2022 1654  Last data filed at 6/14/2022 1555  Gross per 24 hour   Intake 50 ml   Output 200 ml   Net -150 ml       Physical Exam  Constitutional:       Appearance: Normal appearance. HENT:      Head: Normocephalic and atraumatic. Nose: Nose normal.      Comments: Unable to assess on BiPAP     Mouth/Throat:      Mouth: Mucous membranes are moist.      Comments: Unable to assess on BiPAP  Eyes:      Conjunctiva/sclera: Conjunctivae normal.   Cardiovascular:      Rate and Rhythm: Regular rhythm. Heart sounds: Normal heart sounds. No murmur heard. No friction rub. Pulmonary:      Breath sounds: Normal breath sounds. No wheezing or rhonchi.       Comments: Tachypneic  Abdominal:      General: Bowel sounds are normal.      Tenderness: There is no abdominal tenderness. There is no guarding or rebound. Musculoskeletal:      Right lower leg: No edema. Left lower leg: No edema. Skin:     Findings: No lesion or rash. Neurological:      Mental Status: He is alert and oriented to person, place, and time.            DIAGNOSTICS:      Laboratory Testing:    Recent Results (from the past 24 hour(s))   Venous Blood Gas, POC    Collection Time: 06/14/22 10:07 AM   Result Value Ref Range    pH, Jose D 7.342 7.320 - 7.430    pCO2, Jose D 76.9 (HH) 41.0 - 51.0 mm Hg    pO2, Jose D 53.0 (H) 30.0 - 50.0 mm Hg    HCO3, Venous 41.7 (H) 22.0 - 29.0 mmol/L    Positive Base Excess, Jose D 13 (H) 0.0 - 3.0    O2 Sat, Jose D 83 60.0 - 85.0 %   ELECTROLYTES PLUS    Collection Time: 06/14/22 10:07 AM   Result Value Ref Range    POC Sodium 146 138 - 146 mmol/L    POC Potassium 4.2 3.5 - 4.5 mmol/L    POC Chloride 99 98 - 107 mmol/L    POC TCO2 42 (HH) 22 - 30 mmol/L    Anion Gap 6 (L) 7 - 16 mmol/L   Hemoglobin and hematocrit, blood    Collection Time: 06/14/22 10:07 AM   Result Value Ref Range    POC Hemoglobin 12.0 (L) 13.5 - 17.5 g/dL    POC Hematocrit 35 (L) 41 - 53 %   Creatinine W/GFR Point of Care    Collection Time: 06/14/22 10:07 AM   Result Value Ref Range    POC Creatinine 0.95 0.51 - 1.19 mg/dL    GFR Comment >60 >60 mL/min    GFR Non-African American >60 >60 mL/min    GFR Comment         CALCIUM, IONIC (POC)    Collection Time: 06/14/22 10:07 AM   Result Value Ref Range    POC Ionized Calcium 1.22 1.15 - 1.33 mmol/L   POCT urea (BUN)    Collection Time: 06/14/22 10:07 AM   Result Value Ref Range    POC BUN 27 (H) 8 - 26 mg/dL   Lactic Acid, POC    Collection Time: 06/14/22 10:07 AM   Result Value Ref Range    POC Lactic Acid 0.75 0.56 - 1.39 mmol/L   POCT Glucose    Collection Time: 06/14/22 10:07 AM   Result Value Ref Range    POC Glucose 154 (H) 74 - 100 mg/dL   CBC with Auto Differential    Collection Time: 06/14/22 10:13 AM   Result Value Ref Range    WBC 10.8 3.5 - 11.3 k/uL    RBC 3.42 (L) 4.21 - 5.77 m/uL    Hemoglobin 10.2 (L) 13.0 - 17.0 g/dL    Hematocrit 33.7 (L) 40.7 - 50.3 %    MCV 98.5 82.6 - 102.9 fL    MCH 29.8 25.2 - 33.5 pg    MCHC 30.3 28.4 - 34.8 g/dL    RDW 13.2 11.8 - 14.4 %    Platelets 135 365 - 001 k/uL    MPV 9.9 8.1 - 13.5 fL    NRBC Automated 0.0 0.0 per 100 WBC    Seg Neutrophils 84 (H) 36 - 65 %    Lymphocytes 10 (L) 24 - 43 %    Monocytes 5 3 - 12 %    Eosinophils % 0 (L) 1 - 4 %    Basophils 0 0 - 2 %    Immature Granulocytes 1 (H) 0 %    Segs Absolute 9.02 (H) 1.50 - 8.10 k/uL    Absolute Lymph # 1.11 1.10 - 3.70 k/uL    Absolute Mono # 0.56 0.10 - 1.20 k/uL    Absolute Eos # <0.03 0.00 - 0.44 k/uL    Basophils Absolute <0.03 0.00 - 0.20 k/uL    Absolute Immature Granulocyte 0.07 0.00 - 0.30 k/uL   Comprehensive Metabolic Panel w/ Reflex to MG    Collection Time: 06/14/22 10:13 AM   Result Value Ref Range    Glucose 156 (H) 70 - 99 mg/dL    BUN 24 (H) 8 - 23 mg/dL    CREATININE 0.76 0.70 - 1.20 mg/dL    Calcium 9.4 8.6 - 10.4 mg/dL    Sodium 145 (H) 135 - 144 mmol/L    Potassium 4.4 3.7 - 5.3 mmol/L    Chloride 99 98 - 107 mmol/L    CO2 38 (H) 20 - 31 mmol/L    Anion Gap 8 (L) 9 - 17 mmol/L    Alkaline Phosphatase 82 40 - 129 U/L    ALT 7 5 - 41 U/L    AST 10 <40 U/L    Total Bilirubin 0.58 0.3 - 1.2 mg/dL    Total Protein 5.8 (L) 6.4 - 8.3 g/dL    Albumin 3.2 (L) 3.5 - 5.2 g/dL    Albumin/Globulin Ratio 1.2 1.0 - 2.5    GFR Non-African American >60 >60 mL/min    GFR African American >60 >60 mL/min    GFR Comment         Troponin    Collection Time: 06/14/22 10:13 AM   Result Value Ref Range    Troponin, High Sensitivity 51 (H) 0 - 22 ng/L   Brain Natriuretic Peptide    Collection Time: 06/14/22 10:13 AM   Result Value Ref Range    Pro-BNP 1,628 (H) <300 pg/mL   Lactate, Sepsis    Collection Time: 06/14/22 10:13 AM   Result Value Ref Range    Lactic Acid, Sepsis, Whole Blood 1.1 0.5 - 1.9 mmol/L D-Dimer, Quantitative    Collection Time: 06/14/22 10:13 AM   Result Value Ref Range    D-Dimer, Quant 1.57 mg/L FEU   Magnesium    Collection Time: 06/14/22 10:13 AM   Result Value Ref Range    Magnesium 1.7 1.6 - 2.6 mg/dL   Procalcitonin    Collection Time: 06/14/22 10:13 AM   Result Value Ref Range    Procalcitonin 0.38 (H) <0.09 ng/mL   COVID-19, Rapid    Collection Time: 06/14/22 10:55 AM    Specimen: Nasopharyngeal Swab   Result Value Ref Range    Specimen Description . NASOPHARYNGEAL SWAB     SARS-CoV-2, Rapid Not Detected Not Detected   RAPID INFLUENZA A/B ANTIGENS    Collection Time: 06/14/22 11:00 AM    Specimen: Nasopharyngeal   Result Value Ref Range    Flu A Antigen NEGATIVE NEGATIVE    Flu B Antigen NEGATIVE NEGATIVE   Troponin    Collection Time: 06/14/22  3:14 PM   Result Value Ref Range    Troponin, High Sensitivity 40 (H) 0 - 22 ng/L   Arterial Blood Gas, POC    Collection Time: 06/14/22  4:12 PM   Result Value Ref Range    POC pH 7.382 7.350 - 7.450    POC pCO2 65.3 (H) 35.0 - 48.0 mm Hg    POC PO2 73.4 (L) 83.0 - 108.0 mm Hg    POC HCO3 38.8 (H) 21.0 - 28.0 mmol/L    Positive Base Excess, Art 12 (H) 0.0 - 3.0    POC O2 SAT 94 94.0 - 98.0 %    O2 Device/Flow/% BIPAP     Sample Site Left Radial Artery     FIO2 40.0          Imaging/Diagonstics:  XR CHEST PORTABLE    Result Date: 6/14/2022  EXAMINATION: ONE XRAY VIEW OF THE CHEST 6/14/2022 10:11 am COMPARISON: Chest x-ray dated 24 January 2022. HISTORY: ORDERING SYSTEM PROVIDED HISTORY: COPD, CHF, SOB TECHNOLOGIST PROVIDED HISTORY: COPD, CHF, SOB Reason for Exam: copd chf sob FINDINGS: Small left pleural effusion with left lower lobe airspace disease. Patchy infiltrates in the left upper lobe as well. The right lung is clear. No pneumothorax. The cardiomediastinal silhouette is within normal limits     Small left pleural effusion with scattered left lung airspace opacities. Consider pneumonia.          ASSESSMENT:       Principal Problem: Acute on chronic respiratory failure with hypoxia (HCC)  Active Problems:    COPD exacerbation (HCC)    Controlled type 2 diabetes mellitus without complication, without long-term current use of insulin (HCC)    Combined systolic and diastolic congestive heart failure (HCC)  Resolved Problems:    * No resolved hospital problems. *      PLAN:     Patient status: Admit the patient as Inpatient in the Progressive Unit     Acute on chronic respiratory failure due to COPD exacerbation secondary to community acquired pneumonia  Increasing oxygen requirement, on BiPAP status post Solu-Medrol in EMS  Procalcitonin 0.38, VBG  VBG 7.342, CO2 76.9, bicarb 41.7, O2 53.0  Rapid flu COVID-negative  Strep and Legionella antigen ordered  Chest x-ray: Shows small left pleural effusion scattered lung space possible to pneumonia. Started on ceftriaxone azithromycin  Started on Solu-Medrol 60 Mg IV every 6 hours  DuoNeb every 4 hours while awake  CT scan of chest once patient can tolerate spine position. May require switching to high flow and critical care consult. Hypertension  Held p.o. hydralazine, lisinopril, carvedilol, amlodipine (on BiPAP)  Started hydralazine 10 mg IV every 6 hours SBP greater than 150.     Type 2 diabetes  Held metformin (on BiPAP)  A1c 6.2 2/2022  L ISS  Hypoglycemia protocol    Code full  Diet adult regular, 3 carb choices low-sodium  DVT prophylaxis Lovenox 40 Mg subcu daily  GI prophylaxis Protonix 40 Mg IV once daily  PT/OT/SW      Consultations:   Consults: IP CONSULT TO INTERNAL MEDICINE  PULMONARY REHAB EVALUATION  IP CONSULT TO SOCIAL WORK  PT/OT    Above plan discussed with the patient, who agreed to the above plan     Plan will be discussed with the attending, Dr. Ilana Macario MD  Family Medicine Resident  6/14/2022 4:54 PM

## 2022-06-15 LAB
ABSOLUTE EOS #: 0 K/UL (ref 0–0.4)
ABSOLUTE LYMPH #: 0.43 K/UL (ref 1–4.8)
ABSOLUTE MONO #: 0.06 K/UL (ref 0.1–0.8)
ANION GAP SERPL CALCULATED.3IONS-SCNC: 12 MMOL/L (ref 9–17)
BASOPHILS # BLD: 0 % (ref 0–2)
BASOPHILS ABSOLUTE: 0 K/UL (ref 0–0.2)
BUN BLDV-MCNC: 39 MG/DL (ref 8–23)
C-REACTIVE PROTEIN: 153.8 MG/L (ref 0–5)
CALCIUM SERPL-MCNC: 9.2 MG/DL (ref 8.6–10.4)
CHLORIDE BLD-SCNC: 98 MMOL/L (ref 98–107)
CO2: 33 MMOL/L (ref 20–31)
CREAT SERPL-MCNC: 0.8 MG/DL (ref 0.7–1.2)
EKG ATRIAL RATE: 91 BPM
EKG P AXIS: 75 DEGREES
EKG P-R INTERVAL: 124 MS
EKG Q-T INTERVAL: 358 MS
EKG QRS DURATION: 92 MS
EKG QTC CALCULATION (BAZETT): 440 MS
EKG R AXIS: 50 DEGREES
EKG T AXIS: 84 DEGREES
EKG VENTRICULAR RATE: 91 BPM
EOSINOPHILS RELATIVE PERCENT: 0 % (ref 1–4)
GFR AFRICAN AMERICAN: >60 ML/MIN
GFR NON-AFRICAN AMERICAN: >60 ML/MIN
GFR SERPL CREATININE-BSD FRML MDRD: ABNORMAL ML/MIN/{1.73_M2}
GLUCOSE BLD-MCNC: 197 MG/DL (ref 70–99)
GLUCOSE BLD-MCNC: 209 MG/DL (ref 75–110)
GLUCOSE BLD-MCNC: 236 MG/DL (ref 75–110)
GLUCOSE BLD-MCNC: 243 MG/DL (ref 75–110)
GLUCOSE BLD-MCNC: 252 MG/DL (ref 75–110)
HCT VFR BLD CALC: 31.9 % (ref 40.7–50.3)
HEMOGLOBIN: 9.8 G/DL (ref 13–17)
IMMATURE GRANULOCYTES %: 0 %
IMMATURE GRANULOCYTES ABSOLUTE: 0 K/UL (ref 0–0.3)
LEGIONELLA PNEUMOPHILIA AG, URINE: NEGATIVE
LYMPHOCYTES # BLD: 7 % (ref 24–44)
MCH RBC QN AUTO: 29 PG (ref 25.2–33.5)
MCHC RBC AUTO-ENTMCNC: 30.7 G/DL (ref 28.4–34.8)
MCV RBC AUTO: 94.4 FL (ref 82.6–102.9)
MONOCYTES # BLD: 1 % (ref 1–7)
MORPHOLOGY: NORMAL
NRBC AUTOMATED: 0 PER 100 WBC
PDW BLD-RTO: 13 % (ref 11.8–14.4)
PLATELET # BLD: 249 K/UL (ref 138–453)
PMV BLD AUTO: 10.6 FL (ref 8.1–13.5)
POTASSIUM SERPL-SCNC: 5.1 MMOL/L (ref 3.7–5.3)
RBC # BLD: 3.38 M/UL (ref 4.21–5.77)
SEDIMENTATION RATE, ERYTHROCYTE: 75 MM/HR (ref 0–20)
SEG NEUTROPHILS: 92 % (ref 36–66)
SEGMENTED NEUTROPHILS ABSOLUTE COUNT: 5.71 K/UL (ref 1.8–7.7)
SODIUM BLD-SCNC: 143 MMOL/L (ref 135–144)
SOURCE: NORMAL
STREP PNEUMONIAE ANTIGEN: POSITIVE
WBC # BLD: 6.2 K/UL (ref 3.5–11.3)

## 2022-06-15 PROCEDURE — 6360000002 HC RX W HCPCS: Performed by: STUDENT IN AN ORGANIZED HEALTH CARE EDUCATION/TRAINING PROGRAM

## 2022-06-15 PROCEDURE — 99223 1ST HOSP IP/OBS HIGH 75: CPT | Performed by: STUDENT IN AN ORGANIZED HEALTH CARE EDUCATION/TRAINING PROGRAM

## 2022-06-15 PROCEDURE — 36415 COLL VENOUS BLD VENIPUNCTURE: CPT

## 2022-06-15 PROCEDURE — 6370000000 HC RX 637 (ALT 250 FOR IP): Performed by: STUDENT IN AN ORGANIZED HEALTH CARE EDUCATION/TRAINING PROGRAM

## 2022-06-15 PROCEDURE — 97162 PT EVAL MOD COMPLEX 30 MIN: CPT

## 2022-06-15 PROCEDURE — 97530 THERAPEUTIC ACTIVITIES: CPT

## 2022-06-15 PROCEDURE — 85652 RBC SED RATE AUTOMATED: CPT

## 2022-06-15 PROCEDURE — 94640 AIRWAY INHALATION TREATMENT: CPT

## 2022-06-15 PROCEDURE — 94761 N-INVAS EAR/PLS OXIMETRY MLT: CPT

## 2022-06-15 PROCEDURE — 82947 ASSAY GLUCOSE BLOOD QUANT: CPT

## 2022-06-15 PROCEDURE — 2580000003 HC RX 258: Performed by: STUDENT IN AN ORGANIZED HEALTH CARE EDUCATION/TRAINING PROGRAM

## 2022-06-15 PROCEDURE — 2500000003 HC RX 250 WO HCPCS: Performed by: STUDENT IN AN ORGANIZED HEALTH CARE EDUCATION/TRAINING PROGRAM

## 2022-06-15 PROCEDURE — 86140 C-REACTIVE PROTEIN: CPT

## 2022-06-15 PROCEDURE — 86738 MYCOPLASMA ANTIBODY: CPT

## 2022-06-15 PROCEDURE — 2060000000 HC ICU INTERMEDIATE R&B

## 2022-06-15 PROCEDURE — 85025 COMPLETE CBC W/AUTO DIFF WBC: CPT

## 2022-06-15 PROCEDURE — 2700000000 HC OXYGEN THERAPY PER DAY

## 2022-06-15 PROCEDURE — 93010 ELECTROCARDIOGRAM REPORT: CPT | Performed by: INTERNAL MEDICINE

## 2022-06-15 PROCEDURE — C9113 INJ PANTOPRAZOLE SODIUM, VIA: HCPCS | Performed by: STUDENT IN AN ORGANIZED HEALTH CARE EDUCATION/TRAINING PROGRAM

## 2022-06-15 PROCEDURE — 97535 SELF CARE MNGMENT TRAINING: CPT

## 2022-06-15 PROCEDURE — 80048 BASIC METABOLIC PNL TOTAL CA: CPT

## 2022-06-15 PROCEDURE — 97166 OT EVAL MOD COMPLEX 45 MIN: CPT

## 2022-06-15 PROCEDURE — 97116 GAIT TRAINING THERAPY: CPT

## 2022-06-15 RX ORDER — DEXTROMETHORPHAN HBR. AND GUAIFENESIN 10; 100 MG/5ML; MG/5ML
10 SOLUTION ORAL EVERY 4 HOURS PRN
Status: DISCONTINUED | OUTPATIENT
Start: 2022-06-15 | End: 2022-06-19 | Stop reason: HOSPADM

## 2022-06-15 RX ADMIN — IPRATROPIUM BROMIDE AND ALBUTEROL SULFATE 1 AMPULE: .5; 3 SOLUTION RESPIRATORY (INHALATION) at 22:12

## 2022-06-15 RX ADMIN — METHYLPREDNISOLONE SODIUM SUCCINATE 60 MG: 125 INJECTION, POWDER, FOR SOLUTION INTRAMUSCULAR; INTRAVENOUS at 04:45

## 2022-06-15 RX ADMIN — SODIUM CHLORIDE, PRESERVATIVE FREE 40 MG: 5 INJECTION INTRAVENOUS at 08:21

## 2022-06-15 RX ADMIN — CARVEDILOL 25 MG: 25 TABLET, FILM COATED ORAL at 16:09

## 2022-06-15 RX ADMIN — INSULIN LISPRO 2 UNITS: 100 INJECTION, SOLUTION INTRAVENOUS; SUBCUTANEOUS at 16:10

## 2022-06-15 RX ADMIN — CARVEDILOL 25 MG: 25 TABLET, FILM COATED ORAL at 08:22

## 2022-06-15 RX ADMIN — IPRATROPIUM BROMIDE AND ALBUTEROL SULFATE 1 AMPULE: .5; 3 SOLUTION RESPIRATORY (INHALATION) at 08:45

## 2022-06-15 RX ADMIN — Medication 81 MG: at 08:22

## 2022-06-15 RX ADMIN — INSULIN LISPRO 3 UNITS: 100 INJECTION, SOLUTION INTRAVENOUS; SUBCUTANEOUS at 11:33

## 2022-06-15 RX ADMIN — LISINOPRIL 20 MG: 20 TABLET ORAL at 08:22

## 2022-06-15 RX ADMIN — IPRATROPIUM BROMIDE AND ALBUTEROL SULFATE 1 AMPULE: .5; 3 SOLUTION RESPIRATORY (INHALATION) at 11:11

## 2022-06-15 RX ADMIN — INSULIN LISPRO 2 UNITS: 100 INJECTION, SOLUTION INTRAVENOUS; SUBCUTANEOUS at 08:36

## 2022-06-15 RX ADMIN — SODIUM CHLORIDE, PRESERVATIVE FREE 10 ML: 5 INJECTION INTRAVENOUS at 08:21

## 2022-06-15 RX ADMIN — HYDRALAZINE HYDROCHLORIDE 50 MG: 50 TABLET, FILM COATED ORAL at 20:56

## 2022-06-15 RX ADMIN — METHYLPREDNISOLONE SODIUM SUCCINATE 60 MG: 125 INJECTION, POWDER, FOR SOLUTION INTRAMUSCULAR; INTRAVENOUS at 16:53

## 2022-06-15 RX ADMIN — METHYLPREDNISOLONE SODIUM SUCCINATE 60 MG: 125 INJECTION, POWDER, FOR SOLUTION INTRAMUSCULAR; INTRAVENOUS at 08:23

## 2022-06-15 RX ADMIN — HYDRALAZINE HYDROCHLORIDE 10 MG: 20 INJECTION INTRAMUSCULAR; INTRAVENOUS at 00:30

## 2022-06-15 RX ADMIN — SODIUM CHLORIDE, PRESERVATIVE FREE 10 ML: 5 INJECTION INTRAVENOUS at 20:56

## 2022-06-15 RX ADMIN — METHYLPREDNISOLONE SODIUM SUCCINATE 60 MG: 125 INJECTION, POWDER, FOR SOLUTION INTRAMUSCULAR; INTRAVENOUS at 22:59

## 2022-06-15 RX ADMIN — HYDRALAZINE HYDROCHLORIDE 50 MG: 50 TABLET, FILM COATED ORAL at 06:36

## 2022-06-15 RX ADMIN — ENOXAPARIN SODIUM 40 MG: 100 INJECTION SUBCUTANEOUS at 08:22

## 2022-06-15 RX ADMIN — CEFTRIAXONE SODIUM 1000 MG: 1 INJECTION, POWDER, FOR SOLUTION INTRAMUSCULAR; INTRAVENOUS at 06:36

## 2022-06-15 RX ADMIN — ATORVASTATIN CALCIUM 20 MG: 20 TABLET, FILM COATED ORAL at 08:22

## 2022-06-15 RX ADMIN — AMLODIPINE BESYLATE 10 MG: 10 TABLET ORAL at 08:21

## 2022-06-15 RX ADMIN — IPRATROPIUM BROMIDE AND ALBUTEROL SULFATE 1 AMPULE: .5; 3 SOLUTION RESPIRATORY (INHALATION) at 15:45

## 2022-06-15 RX ADMIN — AZITHROMYCIN MONOHYDRATE 500 MG: 500 INJECTION, POWDER, LYOPHILIZED, FOR SOLUTION INTRAVENOUS at 08:35

## 2022-06-15 RX ADMIN — GUAIFENESIN DEXTROMETHORPHAN HYDROBROMIDE ORAL SOLUTION 10 ML: 200; 20 SOLUTION ORAL at 20:08

## 2022-06-15 RX ADMIN — INSULIN LISPRO 1 UNITS: 100 INJECTION, SOLUTION INTRAVENOUS; SUBCUTANEOUS at 21:28

## 2022-06-15 ASSESSMENT — ENCOUNTER SYMPTOMS
EYES NEGATIVE: 1
ABDOMINAL PAIN: 0
ABDOMINAL DISTENTION: 0
ALLERGIC/IMMUNOLOGIC NEGATIVE: 1
CHEST TIGHTNESS: 0
DIARRHEA: 0
COUGH: 1
APNEA: 0
CONSTIPATION: 0
NAUSEA: 0
STRIDOR: 0
SHORTNESS OF BREATH: 0

## 2022-06-15 NOTE — PLAN OF CARE
Problem: Discharge Planning  Goal: Discharge to home or other facility with appropriate resources  6/15/2022 1040 by Ben Arreola RN  Outcome: Progressing  Flowsheets (Taken 6/15/2022 0800)  Discharge to home or other facility with appropriate resources: Identify barriers to discharge with patient and caregiver  6/15/2022 0002 by Rodrigue Aguillon RN  Outcome: Progressing  Flowsheets  Taken 6/14/2022 1958 by Rodrigue Aguillon RN  Discharge to home or other facility with appropriate resources:   Identify barriers to discharge with patient and caregiver   Arrange for needed discharge resources and transportation as appropriate   Identify discharge learning needs (meds, wound care, etc)  Taken 6/14/2022 1534 by Petra Mane RN  Discharge to home or other facility with appropriate resources:   Identify barriers to discharge with patient and caregiver   Arrange for needed discharge resources and transportation as appropriate   Identify discharge learning needs (meds, wound care, etc)   Refer to discharge planning if patient needs post-hospital services based on physician order or complex needs related to functional status, cognitive ability or social support system

## 2022-06-15 NOTE — PROGRESS NOTES
[facility]  Progress Note    Date:   6/15/2022  Patient name:  Marla Cardenas  Date of admission:  6/14/2022 10:04 AM  MRN:   [id number]  YOB: 1955    SUBJECTIVE/Last 24 hours update:     Patient seen and examined at the bed side   no new acute events overnight     Patient tolerated cpap overnight and is currently on 5L of O2 Nasal Cannula   Patient still complains of productive cough (whitish yellow sputum). BP is stable now so patient can be started on home medications now (Metformin). No chest pain, abdominal pain, no fevers or chills. Complains of having no medication refills, apart from metformin. Notes from nursing staff and Consults had been reviewed, and the overnight progress had been checked with the nursing staff as well. HPI:   The patient is a 69 y.o.  male with past medical history of type 2 diabetes well controlled, hypertension, COPD on 3 L O2 home oxygen, COVID-19 in 01/2022 who presents to the hospital complaining of of productive cough and shortness of breath. Patient reports for the past week he has had a productive cough with green to yellow sputum, fever and chills and worsening shortness of breath. 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 Patient required increasing oxygen that is currently on 3 L at home. During patient's stay in the hospital for COVID-19 in January 2022 he received Actemra and Decadron for 10 days. Patient called EMS this morning. Patient SaO2 was in the 70s placed on BiPAP, received nitro x1 and Solu-Medrol in route.       In the ED, patient's oxygen saturation was in the high 70s placed on BiPAP. Vital signs afebrile tachypneic, hypertensive 182/74. SaO2 93% on BiPAP FiO2 of 40.     BMP sodium 145, potassium 4.4, creatinine 0.67, procalcitonin 0.38.   proBNP 1628 (around baseline), troponin 51 downtrending to 40 (baseline 70), negative EKG findings. CBC no leukocytosis hemoglobin H/H 10.2/33. 7. VBG 7.342, CO2 76.9, bicarb 41.7, O2 53.0  Rapid flu and COVID were negative. D-dimer normal.    Chest x-ray: Shows small left pleural effusion scattered lung space possible to pneumonia. Patient admitted to the hospital for acute on chronic respiratory failure secondary to COPD exacerbation secondary pneumonia. REVIEW OF SYSTEMS:      Review of Systems   Constitutional: Negative for chills, diaphoresis and fatigue. HENT: Negative. Eyes: Negative. Respiratory: Positive for cough. Negative for apnea, chest tightness, shortness of breath and stridor. Productive cough     Cardiovascular: Negative for chest pain, palpitations and leg swelling. Gastrointestinal: Negative for abdominal distention, abdominal pain, constipation, diarrhea and nausea. Endocrine: Negative. Genitourinary: Negative. Musculoskeletal: Negative. Skin: Negative. Allergic/Immunologic: Negative. Neurological: Negative. Hematological: Negative. Psychiatric/Behavioral: Negative. PAST MEDICAL HISTORY:      has a past medical history of CHF (congestive heart failure) ([facility] [address] COPD (chronic obstructive pulmonary disease) ([facility] [address] Diabetes mellitus ([facility] [address] Erectile dysfunction due to arterial insufficiency, Hypertension, Microalbuminuria due to type 2 diabetes mellitus ([facility] [address] and Overweight (BMI 25.0-29.9). PAST SURGICAL HISTORY:      has a past surgical history that includes Appendectomy and Total ankle arthroplasty. SOCIAL HISTORY:      reports that he quit smoking about 6 months ago. His smoking use included cigarettes. He smoked 0.50 packs per day. He has never used smokeless tobacco. He reports that he does not drink alcohol and does not use drugs. FAMILY HISTORY:     family history is not on file. HOME MEDICATIONS:      Prior to Admission medications    Medication Sig Start Date End Date Taking? Authorizing Provider   carvedilol (COREG) 12.5 MG tablet Take 12.5 mg by mouth 2 times daily (with meals)   Yes Historical Provider, MD   furosemide (LASIX) 20 MG tablet Take 20 mg by mouth daily   Yes Historical Provider, MD   metFORMIN (GLUCOPHAGE) 500 MG tablet Take 1 tablet by mouth 2 times daily (with meals) 5/31/22   Luiz Pizarro MD   lisinopril (PRINIVIL;ZESTRIL) 20 MG tablet Take 20 mg by mouth daily    Historical Provider, MD   atorvastatin (LIPITOR) 20 MG tablet Take 1 tablet by mouth daily 2/1/22   Beverly Judd MD   hydrALAZINE (APRESOLINE) 50 MG tablet Take 1 tablet by mouth every 8 hours 1/26/22   Deion Monroe MD   Vitamin D (CHOLECALCIFEROL) 50 MCG (2000 UT) TABS tablet Take 1 tablet by mouth daily 1/27/22   Deion Monroe MD   amLODIPine (NORVASC) 10 MG tablet TAKE 1 TABLET BY MOUTH DAILY 10/25/21   Darnella Aase, MD   aspirin (HM ASPIRIN EC LOW DOSE) 81 MG EC tablet TAKE 1 TABLET BY MOUTH DAILY 8/26/21   Bliar Vargas MD   albuterol sulfate HFA (PROAIR HFA) 108 (90 Base) MCG/ACT inhaler inhale 2 puffs by mouth every 6 hours if needed for wheezing 8/16/21   Te Llanos MD   fluticasone-salmeterol (ADVAIR DISKUS) 100-50 MCG/DOSE diskus inhaler INHALE 1 PUFF INTO THE LUNGS EVERY 12 HOURS 8/16/21   Te Llanos MD   tiotropium (SPIRIVA HANDIHALER) 18 MCG inhalation capsule Inhale 1 capsule into the lungs daily  Patient not taking: Reported on 3/9/2022 1/9/20   Edilma Lopez MD       ALLERGIES:     Patient has no known allergies.       OBJECTIVE:       Vitals:    06/15/22 0030 06/15/22 0318 06/15/22 0449 06/15/22 0730   BP: (!) 162/62  (!) 135/53 (!) 133/55   Pulse: 69  60 86   Resp: 28 24 24 30   Temp: 97.5 °F (36.4 °C)  97.9 °F (36.6 °C) 97.7 °F (36.5 °C)   TempSrc: Oral  Axillary Oral   SpO2: 95%  96% 96%   Weight:             Intake/Output Summary (Last 24 hours) at 6/15/2022 0810  Last data filed at 6/14/2022 1555  Gross per 24 hour   Intake 50 ml   Output 200 ml   Net -150 ml       PHYSICAL EXAM:  Physical Exam  Constitutional:       Appearance: Normal appearance. HENT:      Head: Normocephalic and atraumatic. Eyes:      Extraocular Movements: Extraocular movements intact. Pupils: Pupils are equal, round, and reactive to light. Cardiovascular:      Rate and Rhythm: Normal rate and regular rhythm. Pulses: Normal pulses. Heart sounds: Normal heart sounds. Pulmonary:      Effort: Pulmonary effort is normal.      Breath sounds: Normal breath sounds. Abdominal:      General: Bowel sounds are normal.      Palpations: Abdomen is soft. Musculoskeletal:         General: Normal range of motion. Cervical back: Normal range of motion. Skin:     General: Skin is warm. Neurological:      General: No focal deficit present. Mental Status: He is alert.    Psychiatric:         Mood and Affect: Mood normal.         Behavior: Behavior normal.          DIAGNOSTICS:     Laboratory Testing:    Recent Results (from the past 24 hour(s))   EKG 12 Lead    Collection Time: 06/14/22 10:06 AM   Result Value Ref Range    Ventricular Rate 91 BPM    Atrial Rate 91 BPM    P-R Interval 124 ms    QRS Duration 92 ms    Q-T Interval 358 ms    QTc Calculation (Bazett) 440 ms    P Axis 75 degrees    R Axis 50 degrees    T Axis 84 degrees   Venous Blood Gas, POC    Collection Time: 06/14/22 10:07 AM   Result Value Ref Range    pH, Jose D 7.342 7.320 - 7.430    pCO2, Jose D 76.9 (HH) 41.0 - 51.0 mm Hg    pO2, Jose D 53.0 (H) 30.0 - 50.0 mm Hg    HCO3, Venous 41.7 (H) 22.0 - 29.0 mmol/L    Positive Base Excess, Jose D 13 (H) 0.0 - 3.0    O2 Sat, Jose D 83 60.0 - 85.0 %   ELECTROLYTES PLUS    Collection Time: 06/14/22 10:07 AM   Result Value Ref Range    POC Sodium 146 138 - 146 mmol/L    POC Potassium 4.2 3.5 - 4.5 mmol/L    POC Chloride 99 98 - 107 mmol/L    POC TCO2 42 (HH) 22 - 30 mmol/L    Anion Gap 6 (L) 7 - 16 mmol/L   Hemoglobin and hematocrit, blood    Collection Time: 06/14/22 10:07 AM Result Value Ref Range    POC Hemoglobin 12.0 (L) 13.5 - 17.5 g/dL    POC Hematocrit 35 (L) 41 - 53 %   Creatinine W/GFR Point of Care    Collection Time: 06/14/22 10:07 AM   Result Value Ref Range    POC Creatinine 0.95 0.51 - 1.19 mg/dL    GFR Comment >60 >60 mL/min    GFR Non-African American >60 >60 mL/min    GFR Comment         CALCIUM, IONIC (POC)    Collection Time: 06/14/22 10:07 AM   Result Value Ref Range    POC Ionized Calcium 1.22 1.15 - 1.33 mmol/L   POCT urea (BUN)    Collection Time: 06/14/22 10:07 AM   Result Value Ref Range    POC BUN 27 (H) 8 - 26 mg/dL   Lactic Acid, POC    Collection Time: 06/14/22 10:07 AM   Result Value Ref Range    POC Lactic Acid 0.75 0.56 - 1.39 mmol/L   POCT Glucose    Collection Time: 06/14/22 10:07 AM   Result Value Ref Range    POC Glucose 154 (H) 74 - 100 mg/dL   CBC with Auto Differential    Collection Time: 06/14/22 10:13 AM   Result Value Ref Range    WBC 10.8 3.5 - 11.3 k/uL    RBC 3.42 (L) 4.21 - 5.77 m/uL    Hemoglobin 10.2 (L) 13.0 - 17.0 g/dL    Hematocrit 33.7 (L) 40.7 - 50.3 %    MCV 98.5 82.6 - 102.9 fL    MCH 29.8 25.2 - 33.5 pg    MCHC 30.3 28.4 - 34.8 g/dL    RDW 13.2 11.8 - 14.4 %    Platelets 866 562 - 401 k/uL    MPV 9.9 8.1 - 13.5 fL    NRBC Automated 0.0 0.0 per 100 WBC    Seg Neutrophils 84 (H) 36 - 65 %    Lymphocytes 10 (L) 24 - 43 %    Monocytes 5 3 - 12 %    Eosinophils % 0 (L) 1 - 4 %    Basophils 0 0 - 2 %    Immature Granulocytes 1 (H) 0 %    Segs Absolute 9.02 (H) 1.50 - 8.10 k/uL    Absolute Lymph # 1.11 1.10 - 3.70 k/uL    Absolute Mono # 0.56 0.10 - 1.20 k/uL    Absolute Eos # <0.03 0.00 - 0.44 k/uL    Basophils Absolute <0.03 0.00 - 0.20 k/uL    Absolute Immature Granulocyte 0.07 0.00 - 0.30 k/uL   Comprehensive Metabolic Panel w/ Reflex to MG    Collection Time: 06/14/22 10:13 AM   Result Value Ref Range    Glucose 156 (H) 70 - 99 mg/dL    BUN 24 (H) 8 - 23 mg/dL    CREATININE 0.76 0.70 - 1.20 mg/dL    Calcium 9.4 8.6 - 10.4 mg/dL Sodium 145 (H) 135 - 144 mmol/L    Potassium 4.4 3.7 - 5.3 mmol/L    Chloride 99 98 - 107 mmol/L    CO2 38 (H) 20 - 31 mmol/L    Anion Gap 8 (L) 9 - 17 mmol/L    Alkaline Phosphatase 82 40 - 129 U/L    ALT 7 5 - 41 U/L    AST 10 <40 U/L    Total Bilirubin 0.58 0.3 - 1.2 mg/dL    Total Protein 5.8 (L) 6.4 - 8.3 g/dL    Albumin 3.2 (L) 3.5 - 5.2 g/dL    Albumin/Globulin Ratio 1.2 1.0 - 2.5    GFR Non-African American >60 >60 mL/min    GFR African American >60 >60 mL/min    GFR Comment         Troponin    Collection Time: 06/14/22 10:13 AM   Result Value Ref Range    Troponin, High Sensitivity 51 (H) 0 - 22 ng/L   Brain Natriuretic Peptide    Collection Time: 06/14/22 10:13 AM   Result Value Ref Range    Pro-BNP 1,628 (H) <300 pg/mL   Lactate, Sepsis    Collection Time: 06/14/22 10:13 AM   Result Value Ref Range    Lactic Acid, Sepsis, Whole Blood 1.1 0.5 - 1.9 mmol/L   D-Dimer, Quantitative    Collection Time: 06/14/22 10:13 AM   Result Value Ref Range    D-Dimer, Quant 1.57 mg/L FEU   Magnesium    Collection Time: 06/14/22 10:13 AM   Result Value Ref Range    Magnesium 1.7 1.6 - 2.6 mg/dL   Procalcitonin    Collection Time: 06/14/22 10:13 AM   Result Value Ref Range    Procalcitonin 0.38 (H) <0.09 ng/mL   COVID-19, Rapid    Collection Time: 06/14/22 10:55 AM    Specimen: Nasopharyngeal Swab   Result Value Ref Range    Specimen Description . NASOPHARYNGEAL SWAB     SARS-CoV-2, Rapid Not Detected Not Detected   RAPID INFLUENZA A/B ANTIGENS    Collection Time: 06/14/22 11:00 AM    Specimen: Nasopharyngeal   Result Value Ref Range    Flu A Antigen NEGATIVE NEGATIVE    Flu B Antigen NEGATIVE NEGATIVE   Troponin    Collection Time: 06/14/22  3:14 PM   Result Value Ref Range    Troponin, High Sensitivity 40 (H) 0 - 22 ng/L   Arterial Blood Gas, POC    Collection Time: 06/14/22  4:12 PM   Result Value Ref Range    POC pH 7.382 7.350 - 7.450    POC pCO2 65.3 (H) 35.0 - 48.0 mm Hg    POC PO2 73.4 (L) 83.0 - 108.0 mm Hg    POC HCO3 38.8 (H) 21.0 - 28.0 mmol/L    Positive Base Excess, Art 12 (H) 0.0 - 3.0    POC O2 SAT 94 94.0 - 98.0 %    O2 Device/Flow/% BIPAP     Sample Site Left Radial Artery     FIO2 40.0    Urinalysis with Microscopic    Collection Time: 06/14/22  5:10 PM   Result Value Ref Range    Color, UA Dark Yellow (A) Yellow    Turbidity UA Turbid (A) Clear    Glucose, Ur NEGATIVE NEGATIVE    Bilirubin Urine NEGATIVE NEGATIVE    Ketones, Urine TRACE (A) NEGATIVE    Specific Gravity, UA 1.025 1.005 - 1.030    Urine Hgb NEGATIVE NEGATIVE    pH, UA 5.5 5.0 - 8.0    Protein, UA 2+ (A) NEGATIVE    Urobilinogen, Urine Normal Normal    Nitrite, Urine NEGATIVE NEGATIVE    Leukocyte Esterase, Urine NEGATIVE NEGATIVE    -          WBC, UA 0 TO 2 0 - 5 /HPF    RBC, UA 5 TO 10 0 - 4 /HPF    Casts UA  0 - 8 /LPF     10 TO 20 HYALINE Reference range defined for non-centrifuged specimen.     Epithelial Cells UA 0 TO 2 0 - 5 /HPF   POC Glucose Fingerstick    Collection Time: 06/14/22  8:49 PM   Result Value Ref Range    POC Glucose 208 (H) 75 - 110 mg/dL   CBC with Auto Differential    Collection Time: 06/15/22  6:32 AM   Result Value Ref Range    WBC 6.2 3.5 - 11.3 k/uL    RBC 3.38 (L) 4.21 - 5.77 m/uL    Hemoglobin 9.8 (L) 13.0 - 17.0 g/dL    Hematocrit 31.9 (L) 40.7 - 50.3 %    MCV 94.4 82.6 - 102.9 fL    MCH 29.0 25.2 - 33.5 pg    MCHC 30.7 28.4 - 34.8 g/dL    RDW 13.0 11.8 - 14.4 %    Platelets 504 066 - 141 k/uL    MPV 10.6 8.1 - 13.5 fL    NRBC Automated 0.0 0.0 per 100 WBC    Seg Neutrophils PENDING %    Lymphocytes PENDING %    Monocytes PENDING %    Eosinophils % PENDING %    Basophils PENDING %    Immature Granulocytes PENDING 0 %    Segs Absolute PENDING k/uL    Absolute Lymph # PENDING k/uL    Absolute Mono # PENDING k/uL    Absolute Eos # PENDING k/uL    Basophils Absolute PENDING 0.0 - 0.2 k/uL    Absolute Immature Granulocyte PENDING 0.00 - 0.30 k/uL   Basic Metabolic Panel w/ Reflex to MG    Collection Time: 06/15/22  6:32 AM Result Value Ref Range    Glucose 197 (H) 70 - 99 mg/dL    BUN 39 (H) 8 - 23 mg/dL    CREATININE 0.80 0.70 - 1.20 mg/dL    Calcium 9.2 8.6 - 10.4 mg/dL    Sodium 143 135 - 144 mmol/L    Potassium 5.1 3.7 - 5.3 mmol/L    Chloride 98 98 - 107 mmol/L    CO2 33 (H) 20 - 31 mmol/L    Anion Gap 12 9 - 17 mmol/L    GFR Non-African American >60 >60 mL/min    GFR African American >60 >60 mL/min    GFR Comment         C-Reactive Protein    Collection Time: 06/15/22  6:32 AM   Result Value Ref Range    .8 (H) 0.0 - 5.0 mg/L   POC Glucose Fingerstick    Collection Time: 06/15/22  7:38 AM   Result Value Ref Range    POC Glucose 209 (H) 75 - 110 mg/dL         Imaging/Diagonstics:    Current Facility-Administered Medications   Medication Dose Route Frequency Provider Last Rate Last Admin    amLODIPine (NORVASC) tablet 10 mg  10 mg Oral Daily Alexander Giang MD        aspirin EC tablet 81 mg  81 mg Oral Daily Alexander Giang MD        atorvastatin (LIPITOR) tablet 20 mg  20 mg Oral Daily Alexander Giang MD        carvedilol (COREG) tablet 25 mg  25 mg Oral BID JUAN MANUEL iGang MD        hydrALAZINE (APRESOLINE) tablet 50 mg  50 mg Oral 3 times per day Nadja Hurst MD   50 mg at 06/15/22 0636    lisinopril (PRINIVIL;ZESTRIL) tablet 20 mg  20 mg Oral Daily Alexander Giang MD        [Held by provider] metFORMIN (GLUCOPHAGE) tablet 500 mg  500 mg Oral BID JUAN MANUEL Giang MD        sodium chloride flush 0.9 % injection 5-40 mL  5-40 mL IntraVENous 2 times per day Alexander Giang MD   10 mL at 06/14/22 2007    sodium chloride flush 0.9 % injection 5-40 mL  5-40 mL IntraVENous PRN Alexander Giang MD        0.9 % sodium chloride infusion   IntraVENous PRN Alexander Giang MD        ondansetron (ZOFRAN-ODT) disintegrating tablet 4 mg  4 mg Oral Q8H PRN Alexander Giang MD        Or    ondansetron (ZOFRAN) injection 4 mg  4 mg IntraVENous Q6H PRN Alexander Hadleyt, MD        polyethylene glycol (GLYCOLAX) packet 17 g  17 g Oral Daily PRN Alexander Datt, MD        enoxaparin (LOVENOX) injection 40 mg  40 mg SubCUTAneous Daily Alexander Giang MD   40 mg at 06/14/22 1728    acetaminophen (TYLENOL) tablet 650 mg  650 mg Oral Q6H PRN Titi Raman MD        Or    acetaminophen (TYLENOL) suppository 650 mg  650 mg Rectal Q6H PRN Titi Raman MD        ipratropium-albuterol (DUONEB) nebulizer solution 1 ampule  1 ampule Inhalation Q4H WA Alexander Giang MD   1 ampule at 06/14/22 1836    insulin lispro (HUMALOG) injection vial 0-6 Units  0-6 Units SubCUTAneous TID WC Alexander Giang MD        insulin lispro (HUMALOG) injection vial 0-3 Units  0-3 Units SubCUTAneous Nightly Alexander Giang MD   1 Units at 06/14/22 2249    glucose chewable tablet 16 g  4 tablet Oral PRN Alexander Giang MD        dextrose bolus 10% 125 mL  125 mL IntraVENous PRN Alexander Giang MD        Or    dextrose bolus 10% 250 mL  250 mL IntraVENous PRN Alexander Giang MD        glucagon (rDNA) injection 1 mg  1 mg IntraMUSCular PRN Alexander Giang MD        dextrose 5 % solution  100 mL/hr IntraVENous PRN Alexander Giang MD        methylPREDNISolone sodium (SOLU-MEDROL) injection 60 mg  60 mg IntraVENous Q6H Alexander Giang MD   60 mg at 06/15/22 0445    Followed by   Ross Maciel ON 6/17/2022] predniSONE (DELTASONE) tablet 40 mg  40 mg Oral Daily Alexander Giang MD        pantoprazole (PROTONIX) 40 mg in sodium chloride (PF) 10 mL injection  40 mg IntraVENous Daily Alexander Giang MD   40 mg at 06/14/22 2216    LORazepam (ATIVAN) injection 0.5 mg  0.5 mg IntraVENous Q4H PRN Alexander Giang MD   0.5 mg at 06/14/22 1933    azithromycin (ZITHROMAX) 500 mg in dextrose 5% 250 mL IVPB  500 mg IntraVENous Once Cathy Perez MD        hydrALAZINE (APRESOLINE) injection 10 mg  10 mg IntraVENous Q6H PRN Cathy Perez MD   10 mg at 06/15/22 0030       ASSESSMENT:     Principal Problem:    Acute on chronic respiratory failure with hypoxia (HCC)  Active Problems:    COPD exacerbation ([facility] [address]    Controlled type 2 diabetes mellitus without complication, without long-term current use of insulin (HCC)    Combined systolic and diastolic congestive heart failure (HCC)  Resolved Problems:    * No resolved hospital problems. *      PLAN:      Acute on chronic respiratory failure due to COPD exacerbation secondary to community acquired pneumonia  On 5L O2 Nasal cannula   Procalcitonin 0.38, VBG  VBG 7.382, CO2 65.3, bicarb 38.8, O2 94  Rapid flu COVID-negative  Strep and Legionella antigen ordered  Chest x-ray: Shows small left pleural effusion scattered lung space possible to pneumonia. Started on ceftriaxone azithromycin  Started on Solu-Medrol 60 Mg IV every 6 hours  DuoNeb every 4 hours while awake  CT scan of chest once patient can tolerate spine position. Hypertension  Continued p.o. hydralazine, lisinopril, carvedilol, amlodipine      Type 2 diabetes  Held metformin  A1c 6.2 2/2022  L ISS  Hypoglycemia protocol     Code full  Diet adult regular, 3 carb choices low-sodium  DVT prophylaxis Lovenox 40 Mg subcu daily  GI prophylaxis Protonix 40 Mg IV once daily  PT/OT/SW         Discussed care plan with nurse after getting her input. Above plan discussed with the patient.  who agreed to the above plan     Plan will be discussed with the attending, Dr. Shanti Arita MD  Family Medicine Resident  6/15/2022 8:10 AM

## 2022-06-15 NOTE — PLAN OF CARE
Problem: Respiratory - Adult  Goal: Achieves optimal ventilation and oxygenation  6/15/2022 1612 by Kiersten Bermeo RCP  Outcome: Progressing    BRONCHOSPASM/BRONCHOCONSTRICTION     [x]         IMPROVE AERATION/BREATH SOUNDS  [x]   ADMINISTER BRONCHODILATOR THERAPY AS APPROPRIATE  [x]   ASSESS BREATH SOUNDS  []   IMPLEMENT AEROSOL/MDI PROTOCOL  [x]   PATIENT EDUCATION AS NEEDED     NON INVASIVE VENTILATION  PROVIDE OPTIMAL VENTILATION/ACCEPTABLE SP02  IMPLEMENT NON INVASIVE VENTILATION PROTOCOL  ASSESSMENT SKIN INTEGRITY  PATIENT EDUCATION AS NEEDED  BIPAP AS NEEDED

## 2022-06-15 NOTE — PROGRESS NOTES
Occupational Therapy  Facility/Department: [facility] CAR 2  Occupational Therapy Initial Assessment    Name: General Tsai  : 1955  MRN: [id number]  Date of Service: 6/15/2022    Discharge Recommendations:  Patient would benefit from continued therapy after discharge  OT Equipment Recommendations  Equipment Needed: Yes  Mobility Devices: ADL Assistive Devices  ADL Assistive Devices: Reacher; Shower Chair with back; Toileting - Drop Arm Commode, Heavy Duty Drop Arm Commode       Patient Diagnosis(es): The primary encounter diagnosis was Pneumonia of both lungs due to infectious organism, unspecified part of lung. A diagnosis of Hypoxia was also pertinent to this visit. Past Medical History:  has a past medical history of CHF (congestive heart failure) ([facility] [address] COPD (chronic obstructive pulmonary disease) ([facility] [address] Diabetes mellitus ([facility] [address] Erectile dysfunction due to arterial insufficiency, Hypertension, Microalbuminuria due to type 2 diabetes mellitus ([facility] [address] and Overweight (BMI 25.0-29.9). Past Surgical History:  has a past surgical history that includes Appendectomy and Total ankle arthroplasty. Assessment   Performance deficits / Impairments: Decreased functional mobility ; Decreased endurance;Decreased ADL status; Decreased balance;Decreased strength;Decreased high-level IADLs  Assessment: Patient demonstrated decreased endurance, balance, and strength impacting ADL performance on this date. Patient would benefit from continued acute OT services to address functional deficits, promote independence with ADLs, and safe functional transfer/mobility for ADL engagement.   Prognosis: Good  Decision Making: Medium Complexity  REQUIRES OT FOLLOW-UP: Yes  Activity Tolerance  Activity Tolerance: Patient limited by fatigue  Activity Tolerance Comments: Limited by O2 desaturation with activity        Plan   Plan  Times per Week: 3-4x  Current Treatment Recommendations: Strengthening,Balance training,Functional mobility training,Endurance training,Safety education & training,Patient/Caregiver education & training,Self-Care / ADL     Restrictions  Restrictions/Precautions  Restrictions/Precautions: Up as Tolerated  Required Braces or Orthoses?: No  Position Activity Restriction  Other position/activity restrictions: up with assist    Subjective   General  Patient assessed for rehabilitation services?: Yes  Family / Caregiver Present: No  General Comment  Comments: RN ok'd patient for OT evaluation. Patient was agreeable and cooperative throughout. Patient denied any pain     Social/Functional History  Social/Functional History  Lives With: Family (son and son's fiance.)  Type of Home: Apartment (2nd floor with no elevator.)  Home Layout: One level  Home Access: Stairs to enter with rails  Entrance Stairs - Number of Steps: 5 then 7  Entrance Stairs - Rails: Both  Bathroom Shower/Tub: Tub/Shower unit  Bathroom Toilet: Standard  Bathroom Equipment:  (reports none)  Home Equipment: Cane,Walker, rolling (pt reports using SPC at a baseline, uses RW on his \"bad days. \")  Receives Help From: Family  ADL Assistance: Independent  Homemaking Assistance: Independent  Homemaking Responsibilities: Yes (shares with son)  Meal Prep Responsibility: Primary  Cleaning Responsibility: Primary  Ambulation Assistance: Independent  Transfer Assistance: Independent  Active : No  Patient's  Info: wheelcare  Occupation: Retired  Type of Occupation: waste management. Leisure & Hobbies: Spending time with family. Watch [employer]  Additional Comments: Pt reports that his son is available to assist 24/7 as needed.        Objective     Vision Exceptions: Wears glasses for reading  Hearing: Exceptions to [facility]  Hearing Exceptions: Hard of hearing/hearing concerns          Safety Devices  Type of Devices: Gait belt;Left in chair;Chair alarm in place;Call light within reach  Restraints  Restraints Initially in Place: No  Bed Mobility Training  Bed Mobility Training: No (Patient in bedside recliner upon arrival and exit)  Balance  Sitting: With support (static/dynamic - SBA; tolerated ~ 20 minutes)  Standing: With support (static/dynamic- CGA; tolerated~ 90 seconds, demo'ing uni/bilateral hand release;  O2 saturation decreased at end of stand to 89%)  Transfer Training  Transfer Training: Yes  Sit to Stand: Contact-guard assistance  Stand to Sit: Contact-guard assistance  Gait  Overall Level of Assistance: Contact-guard assistance (~ 4 steps)  Interventions: Verbal cues; Visual cues  Assistive Device: Walker, rolling;Gait belt     AROM: Within functional limits  Strength: Generally decreased, functional (B UE gross strength 4-/5)  Coordination: Within functional limits  Sensation: Intact (Denies numbness/tingling)  ADL  Feeding: Independent  Grooming: Modified independent ; Increased time to complete  UE Bathing: Stand by assistance;Setup;Verbal cueing; Increased time to complete  LE Bathing: Contact guard assistance;Setup;Verbal cueing; Increased time to complete  UE Dressing: Stand by assistance;Setup;Verbal cueing; Increased time to complete  LE Dressing: Contact guard assistance;Setup;Verbal cueing; Increased time to complete  Toileting: Contact guard assistance;Setup; Increased time to complete; Adaptive equipment  Additional Comments: Patient was able to wash his face standing edge of chair with mod I, CGA for balance, Patient was able to independently doff glasses to complete face washing task. Patient was able to doff/don R sock seated edge of chair with SBA, L sock unattempted.  Patient declined further ADLs     Activity Tolerance  Activity Tolerance: Patient limited by fatigue;Patient limited by endurance  Cognition  Overall Cognitive Status: WFL   Oriented x 4               Education Given To: Patient  Education Provided: Role of Therapy;Plan of Care;Transfer Training  Education Provided Comments: Patient educated on OT role, OT POC, purpose of evaluation, safety with transfers, hand placement with transfers, and activity promotion. -- fair return.   Education Method: Demonstration;Verbal  Barriers to Learning: None  Education Outcome: Verbalized understanding;Demonstrated understanding;Continued education needed          AM-PAC Score        AM-PAC Inpatient Daily Activity Raw Score: 20 (06/15/22 1706)  AM-PAC Inpatient ADL T-Scale Score : 42.03 (06/15/22 1706)  ADL Inpatient CMS 0-100% Score: 38.32 (06/15/22 1706)  ADL Inpatient CMS G-Code Modifier : Santiago Gene (06/15/22 1706)    Goals  Short Term Goals  Time Frame for Short term goals: Patient will by discharge  Short Term Goal 1: Perform UB ADLs with mod I  Short Term Goal 2: Perform LB ADLs with supervision  Short Term Goal 3: Demo ~ 8 minutes standing tolerance with SBA to engage in functional tasks  Short Term Goal 4: Demo safe functional transfers/mobility with supervision  Short Term Goal 5: Demo 4/5 gross B UE strength to improve engage ment in ADLs  Short Term Goal 6: Demo use of EC/WS techniques during functional activities with < 2 VCs       Therapy Time   Individual Concurrent Group Co-treatment   Time In 1456         Time Out 1523         Minutes 27         Timed Code Treatment Minutes: [address]/OT

## 2022-06-15 NOTE — PROGRESS NOTES
Physical Therapy  Facility/Department: [facility] CAR 2  Physical Therapy Initial Assessment    Name: Facundo Easley  : 1955  MRN: [id number]  Date of Service: 6/15/2022  Copied from chart:  Facundo Easley is a 69 y.o. male who presents via EMS with shortness of breath and cough over the past week worse today. He arrives on CPAP, initial O2 sats for EMS were in the 70s, came up to high 80s low 90s on CPAP. EMS also gave 1 dose of nitro and Solu-Medrol. On arrival transitioning from CPAP to hospital BiPAP he desats to the low 70s, quickly rebounds back to the 90s on BiPAP. He reports feeling significantly better while on CPAP or BiPAP. Discharge Recommendations:  Patient would benefit from continued therapy after discharge   PT Equipment Recommendations  Equipment Needed: No (Pt reports owning a SPC and a RW, writer recommends  use of RW.)      Patient Diagnosis(es): The primary encounter diagnosis was Pneumonia of both lungs due to infectious organism, unspecified part of lung. A diagnosis of Hypoxia was also pertinent to this visit. Past Medical History:  has a past medical history of CHF (congestive heart failure) ([address] COPD (chronic obstructive pulmonary disease) ([address] Diabetes mellitus ([address] Erectile dysfunction due to arterial insufficiency, Hypertension, Microalbuminuria due to type 2 diabetes mellitus ([facility] [address] and Overweight (BMI 25.0-29.9). Past Surgical History:  has a past surgical history that includes Appendectomy and Total ankle arthroplasty. Assessment   Body Structures, Functions, Activity Limitations Requiring Skilled Therapeutic Intervention: Decreased functional mobility ; Decreased strength;Decreased endurance;Decreased balance;Decreased ROM  Assessment: Pt with mobility deficits requiring CGA to ambulate 8 feet with a RW. Pt most limited this date secondary to impaired endurance, with SpO2 dropping from 93% to 86% following 8 foot ambulation bout.   Pt would benefit from additional therapy upon discharge to assistin return to independent PLOF. Pt would be unsafe to return to prior living arrangements. Therapy Prognosis: Good  Decision Making: Medium Complexity  Requires PT Follow-Up: Yes  Activity Tolerance  Activity Tolerance: Patient limited by fatigue;Patient limited by endurance  Activity Tolerance Comments: Pt's SpO2 dropped from 93% to 86% following 8 foot ambulation bout. Plan   Plan  Plan:  (5-6x/week)  Current Treatment Recommendations: Strengthening,Balance training,Functional mobility training,Transfer training,Gait training,Stair training,Patient/Caregiver education & training,Safety education & training,Home exercise program,Equipment evaluation, education, & procurement,Therapeutic activities  Safety Devices  Type of Devices: Gait belt,Patient at risk for falls,All fall risk precautions in place,Left in chair,Chair alarm in place,Call light within reach,Nurse notified  Restraints  Restraints Initially in Place: No     Restrictions  Restrictions/Precautions  Restrictions/Precautions: Up as Tolerated  Required Braces or Orthoses?: No  Position Activity Restriction  Other position/activity restrictions: up with assist     Subjective   General  Patient assessed for rehabilitation services?: Yes  Response To Previous Treatment: Not applicable  Family / Caregiver Present: No  Follows Commands: Within Functional Limits  Subjective  Subjective: Pt supine in bed and agreeable to therapy, RN agreeable to therapy. Pt pleasant and cooperative throughout.  Pt denies pain at rest.         Social/Functional History  Social/Functional History  Lives With: Family (son and son's fiance.)  Type of Home: Apartment (2nd floor with no elevator.)  Home Layout: One level  Home Access: Stairs to enter with rails  Entrance Stairs - Number of Steps: 8  Entrance Stairs - Rails: Both  Bathroom Shower/Tub: Tub/Shower unit  Bathroom Toilet: Standard  Bathroom Equipment:  (reports none)  Home Equipment: Cane,Walker, rolling (pt reports using SPC at a baseline, uses RW on his \"bad days. \")  ADL Assistance: Independent  Homemaking Assistance: Independent  Homemaking Responsibilities: Yes (shares with son)  Ambulation Assistance: Independent  Transfer Assistance: Independent  Active : No  Patient's  Info: wheelcare  Occupation: Retired  Type of Occupation: waste management. Leisure & Hobbies: Spending time with family. Additional Comments: Pt reports that his son is available to assist 24/7 as needed. Vision/Hearing  Vision  Vision: Impaired  Vision Exceptions: Wears glasses for reading  Hearing  Hearing: Exceptions to [facility]  Hearing Exceptions: Hard of hearing/hearing concerns    Cognition   Cognition  Overall Cognitive Status: WFL     Objective               AROM RLE (degrees)  RLE AROM: WFL  AROM LLE (degrees)  LLE AROM : WFL  AROM RUE (degrees)  RUE AROM : WFL  RUE General AROM: shoulder flexion limited to ~100 degrees  AROM LUE (degrees)  LUE AROM : WFL  LUE General AROM: shoulder flexion limited to ~100 degrees  Strength RLE  Strength RLE: WFL  Comment: Grossly 4-/5  Strength LLE  Strength LLE: WFL  Comment: Grossly 4-/5  Strength RUE  Strength RUE: WFL  Comment: Grossly 4-/5  Strength LUE  Strength LUE: WFL  Comment: Grossly 4-/5           Bed mobility  Supine to Sit: Contact guard assistance  Sit to Supine:  (pt retired up to a chair at the conclusion of today's session.)  Scooting: Contact guard assistance  Bed Mobility Comments: HOB elevated ~20 degrees. Increased time/effort to perform. Transfers  Sit to Stand: Contact guard assistance  Stand to sit: Contact guard assistance  Comment: Verbal cues for hand placement. Ambulation  Surface: level tile  Device: Rolling Walker  Assistance: Contact guard assistance  Quality of Gait: fair stability, decreased stride length, no LOB.   Gait Deviations: Slow Patricia;Decreased step length  Distance: 8 feet  Comments: Pt's SpO2 dropped from 93% to 86% following ambulation bout, recovered to >90% after ~1 minute seated rest break. More Ambulation?: No  Stairs/Curb  Stairs?: No     Balance  Sitting - Static: Good  Sitting - Dynamic: Good;-  Standing - Static: Fair;+  Standing - Dynamic: Fair  Comments: standing balance assessed while using a RW                                            AM-PAC Score  AM-PAC Inpatient Mobility Raw Score : 16 (06/15/22 1510)  AM-PAC Inpatient T-Scale Score : 40.78 (06/15/22 1510)  Mobility Inpatient CMS 0-100% Score: 54.16 (06/15/22 1510)  Mobility Inpatient CMS G-Code Modifier : CK (06/15/22 1510)          Goals  Short Term Goals  Time Frame for Short term goals: 14 visits  Short term goal 1: Pt will ambulate 150 feet with least restrictive device and supervision to increase functional independence. Short term goal 2: Pt will negotiate 8 stairs with bilateral handrails and SBA to allow the pt to enter prior living arrangements. Short term goal 3: Pt will perform sit<>stand transfer independently to increase functional independence. Short term goal 4: Pt will demonstrate good- standing balance to decrease fall risk. Short term goal 5: Pt will tolerate a 35 minute therapy session to promote increased endurance. Additional Goals?: No       Education  Patient Education  Education Given To: Patient  Education Provided: Role of Therapy;Transfer Training;Equipment;Plan of Care; Fall Prevention Strategies  Education Method: Verbal  Barriers to Learning: None  Education Outcome: Verbalized understanding      Therapy Time   Individual Concurrent Group Co-treatment   Time In 5535         Time Out 0800         Minutes 34         Timed Code Treatment Minutes: Chana [address], PT

## 2022-06-15 NOTE — PROGRESS NOTES
PATIENT REFUSES TO WEAR BIPAP     [x] Risks and benefits explained to patient   [x] Patient refuses to wear Bipap stating pateint responded NO when asked if I could put mask on. Also stated he really did not want to wear  [x] Patient verbalizes understanding of information presented.

## 2022-06-15 NOTE — PLAN OF CARE
Problem: Discharge Planning  Goal: Discharge to home or other facility with appropriate resources  Outcome: Progressing  Flowsheets  Taken 6/14/2022 1958 by Ewa Mendez RN  Discharge to home or other facility with appropriate resources:   Identify barriers to discharge with patient and caregiver   Arrange for needed discharge resources and transportation as appropriate   Identify discharge learning needs (meds, wound care, etc)  Taken 6/14/2022 1534 by Tanya Arroyo RN  Discharge to home or other facility with appropriate resources:   Identify barriers to discharge with patient and caregiver   Arrange for needed discharge resources and transportation as appropriate   Identify discharge learning needs (meds, wound care, etc)   Refer to discharge planning if patient needs post-hospital services based on physician order or complex needs related to functional status, cognitive ability or social support system     Problem: Respiratory - Adult  Goal: Achieves optimal ventilation and oxygenation  6/15/2022 0002 by Ewa Mendez RN  Outcome: Progressing  6/14/2022 1630 by Fer Radford RCP  Outcome: Progressing     Problem: Chronic Conditions and Co-morbidities  Goal: Patient's chronic conditions and co-morbidity symptoms are monitored and maintained or improved  Outcome: Progressing  Flowsheets  Taken 6/14/2022 1958 by Ewa Mendez RN  Care Plan - Patient's Chronic Conditions and Co-Morbidity Symptoms are Monitored and Maintained or Improved:   Monitor and assess patient's chronic conditions and comorbid symptoms for stability, deterioration, or improvement   Collaborate with multidisciplinary team to address chronic and comorbid conditions and prevent exacerbation or deterioration   Update acute care plan with appropriate goals if chronic or comorbid symptoms are exacerbated and prevent overall improvement and discharge  Taken 6/14/2022 1534 by Tanya Arroyo RN  Care Plan - Patient's Chronic Conditions and Co-Morbidity Symptoms are Monitored and Maintained or Improved:   Monitor and assess patient's chronic conditions and comorbid symptoms for stability, deterioration, or improvement   Collaborate with multidisciplinary team to address chronic and comorbid conditions and prevent exacerbation or deterioration   Update acute care plan with appropriate goals if chronic or comorbid symptoms are exacerbated and prevent overall improvement and discharge     Problem: Safety - Adult  Goal: Free from fall injury  Outcome: Progressing

## 2022-06-15 NOTE — PROGRESS NOTES
MD notified of pt's refusal to wear Bipap at start of shift. O2 applied at 6LPM via NC, O2 sats 88-90%. Pt alert but disoriented, states feeling impending doom. MD also notified of elevated BP, order given to give oral BP med while off pap.

## 2022-06-16 ENCOUNTER — APPOINTMENT (OUTPATIENT)
Dept: CT IMAGING | Age: 67
DRG: 193 | End: 2022-06-16
Payer: MEDICARE

## 2022-06-16 LAB
ABSOLUTE EOS #: 0 K/UL (ref 0–0.44)
ABSOLUTE LYMPH #: 0.42 K/UL (ref 1.1–3.7)
ABSOLUTE MONO #: 0.11 K/UL (ref 0.1–1.2)
ANION GAP SERPL CALCULATED.3IONS-SCNC: 11 MMOL/L (ref 9–17)
BASOPHILS # BLD: 0 % (ref 0–2)
BASOPHILS ABSOLUTE: 0 K/UL (ref 0–0.2)
BUN BLDV-MCNC: 44 MG/DL (ref 8–23)
CALCIUM SERPL-MCNC: 8.7 MG/DL (ref 8.6–10.4)
CHLORIDE BLD-SCNC: 92 MMOL/L (ref 98–107)
CO2: 34 MMOL/L (ref 20–31)
CREAT SERPL-MCNC: 1.04 MG/DL (ref 0.7–1.2)
EOSINOPHILS RELATIVE PERCENT: 0 % (ref 1–4)
GFR AFRICAN AMERICAN: >60 ML/MIN
GFR NON-AFRICAN AMERICAN: >60 ML/MIN
GFR SERPL CREATININE-BSD FRML MDRD: ABNORMAL ML/MIN/{1.73_M2}
GLUCOSE BLD-MCNC: 202 MG/DL (ref 75–110)
GLUCOSE BLD-MCNC: 214 MG/DL (ref 70–99)
GLUCOSE BLD-MCNC: 214 MG/DL (ref 75–110)
GLUCOSE BLD-MCNC: 242 MG/DL (ref 75–110)
GLUCOSE BLD-MCNC: 338 MG/DL (ref 75–110)
HCT VFR BLD CALC: 29.9 % (ref 40.7–50.3)
HEMOGLOBIN: 9.5 G/DL (ref 13–17)
IMMATURE GRANULOCYTES %: 0 %
IMMATURE GRANULOCYTES ABSOLUTE: 0 K/UL (ref 0–0.3)
LYMPHOCYTES # BLD: 4 % (ref 24–43)
MCH RBC QN AUTO: 29.4 PG (ref 25.2–33.5)
MCHC RBC AUTO-ENTMCNC: 31.8 G/DL (ref 28.4–34.8)
MCV RBC AUTO: 92.6 FL (ref 82.6–102.9)
MONOCYTES # BLD: 1 % (ref 3–12)
MORPHOLOGY: NORMAL
NRBC AUTOMATED: 0 PER 100 WBC
PDW BLD-RTO: 13 % (ref 11.8–14.4)
PLATELET # BLD: 263 K/UL (ref 138–453)
PMV BLD AUTO: 10.8 FL (ref 8.1–13.5)
POTASSIUM SERPL-SCNC: 4.8 MMOL/L (ref 3.7–5.3)
RBC # BLD: 3.23 M/UL (ref 4.21–5.77)
SEG NEUTROPHILS: 95 % (ref 36–65)
SEGMENTED NEUTROPHILS ABSOLUTE COUNT: 9.97 K/UL (ref 1.5–8.1)
SODIUM BLD-SCNC: 137 MMOL/L (ref 135–144)
WBC # BLD: 10.5 K/UL (ref 3.5–11.3)

## 2022-06-16 PROCEDURE — 71250 CT THORAX DX C-: CPT

## 2022-06-16 PROCEDURE — 6360000002 HC RX W HCPCS: Performed by: STUDENT IN AN ORGANIZED HEALTH CARE EDUCATION/TRAINING PROGRAM

## 2022-06-16 PROCEDURE — 80048 BASIC METABOLIC PNL TOTAL CA: CPT

## 2022-06-16 PROCEDURE — 99233 SBSQ HOSP IP/OBS HIGH 50: CPT | Performed by: STUDENT IN AN ORGANIZED HEALTH CARE EDUCATION/TRAINING PROGRAM

## 2022-06-16 PROCEDURE — 85025 COMPLETE CBC W/AUTO DIFF WBC: CPT

## 2022-06-16 PROCEDURE — 2060000000 HC ICU INTERMEDIATE R&B

## 2022-06-16 PROCEDURE — 94761 N-INVAS EAR/PLS OXIMETRY MLT: CPT

## 2022-06-16 PROCEDURE — 6370000000 HC RX 637 (ALT 250 FOR IP): Performed by: STUDENT IN AN ORGANIZED HEALTH CARE EDUCATION/TRAINING PROGRAM

## 2022-06-16 PROCEDURE — 82947 ASSAY GLUCOSE BLOOD QUANT: CPT

## 2022-06-16 PROCEDURE — 2700000000 HC OXYGEN THERAPY PER DAY

## 2022-06-16 PROCEDURE — 2580000003 HC RX 258: Performed by: STUDENT IN AN ORGANIZED HEALTH CARE EDUCATION/TRAINING PROGRAM

## 2022-06-16 PROCEDURE — 36415 COLL VENOUS BLD VENIPUNCTURE: CPT

## 2022-06-16 PROCEDURE — 94640 AIRWAY INHALATION TREATMENT: CPT

## 2022-06-16 PROCEDURE — C9113 INJ PANTOPRAZOLE SODIUM, VIA: HCPCS | Performed by: STUDENT IN AN ORGANIZED HEALTH CARE EDUCATION/TRAINING PROGRAM

## 2022-06-16 PROCEDURE — 2500000003 HC RX 250 WO HCPCS: Performed by: STUDENT IN AN ORGANIZED HEALTH CARE EDUCATION/TRAINING PROGRAM

## 2022-06-16 RX ORDER — INSULIN LISPRO 100 [IU]/ML
0-6 INJECTION, SOLUTION INTRAVENOUS; SUBCUTANEOUS NIGHTLY
Status: DISCONTINUED | OUTPATIENT
Start: 2022-06-16 | End: 2022-06-16

## 2022-06-16 RX ORDER — FUROSEMIDE 10 MG/ML
40 INJECTION INTRAMUSCULAR; INTRAVENOUS ONCE
Status: DISCONTINUED | OUTPATIENT
Start: 2022-06-16 | End: 2022-06-16

## 2022-06-16 RX ORDER — INSULIN LISPRO 100 [IU]/ML
0-18 INJECTION, SOLUTION INTRAVENOUS; SUBCUTANEOUS
Status: DISCONTINUED | OUTPATIENT
Start: 2022-06-16 | End: 2022-06-19 | Stop reason: HOSPADM

## 2022-06-16 RX ORDER — PANTOPRAZOLE SODIUM 40 MG/1
40 TABLET, DELAYED RELEASE ORAL
Status: DISCONTINUED | OUTPATIENT
Start: 2022-06-17 | End: 2022-06-19 | Stop reason: HOSPADM

## 2022-06-16 RX ORDER — INSULIN LISPRO 100 [IU]/ML
0-12 INJECTION, SOLUTION INTRAVENOUS; SUBCUTANEOUS
Status: DISCONTINUED | OUTPATIENT
Start: 2022-06-16 | End: 2022-06-16

## 2022-06-16 RX ORDER — INSULIN LISPRO 100 [IU]/ML
0-9 INJECTION, SOLUTION INTRAVENOUS; SUBCUTANEOUS NIGHTLY
Status: DISCONTINUED | OUTPATIENT
Start: 2022-06-16 | End: 2022-06-19 | Stop reason: HOSPADM

## 2022-06-16 RX ORDER — FUROSEMIDE 10 MG/ML
20 INJECTION INTRAMUSCULAR; INTRAVENOUS ONCE
Status: COMPLETED | OUTPATIENT
Start: 2022-06-16 | End: 2022-06-16

## 2022-06-16 RX ADMIN — IPRATROPIUM BROMIDE AND ALBUTEROL SULFATE 1 AMPULE: .5; 3 SOLUTION RESPIRATORY (INHALATION) at 11:24

## 2022-06-16 RX ADMIN — AMLODIPINE BESYLATE 10 MG: 10 TABLET ORAL at 08:08

## 2022-06-16 RX ADMIN — IPRATROPIUM BROMIDE AND ALBUTEROL SULFATE 1 AMPULE: .5; 3 SOLUTION RESPIRATORY (INHALATION) at 07:52

## 2022-06-16 RX ADMIN — INSULIN LISPRO 3 UNITS: 100 INJECTION, SOLUTION INTRAVENOUS; SUBCUTANEOUS at 22:30

## 2022-06-16 RX ADMIN — IPRATROPIUM BROMIDE AND ALBUTEROL SULFATE 1 AMPULE: .5; 3 SOLUTION RESPIRATORY (INHALATION) at 15:06

## 2022-06-16 RX ADMIN — LISINOPRIL 20 MG: 20 TABLET ORAL at 08:08

## 2022-06-16 RX ADMIN — CEFTRIAXONE SODIUM 1000 MG: 1 INJECTION, POWDER, FOR SOLUTION INTRAMUSCULAR; INTRAVENOUS at 10:15

## 2022-06-16 RX ADMIN — ENOXAPARIN SODIUM 40 MG: 100 INJECTION SUBCUTANEOUS at 08:10

## 2022-06-16 RX ADMIN — Medication 500 MG: at 08:14

## 2022-06-16 RX ADMIN — INSULIN LISPRO 6 UNITS: 100 INJECTION, SOLUTION INTRAVENOUS; SUBCUTANEOUS at 16:29

## 2022-06-16 RX ADMIN — CARVEDILOL 25 MG: 25 TABLET, FILM COATED ORAL at 08:08

## 2022-06-16 RX ADMIN — SODIUM CHLORIDE, PRESERVATIVE FREE 40 MG: 5 INJECTION INTRAVENOUS at 08:09

## 2022-06-16 RX ADMIN — Medication 81 MG: at 08:08

## 2022-06-16 RX ADMIN — METHYLPREDNISOLONE SODIUM SUCCINATE 60 MG: 125 INJECTION, POWDER, FOR SOLUTION INTRAMUSCULAR; INTRAVENOUS at 06:03

## 2022-06-16 RX ADMIN — INSULIN LISPRO 4 UNITS: 100 INJECTION, SOLUTION INTRAVENOUS; SUBCUTANEOUS at 08:24

## 2022-06-16 RX ADMIN — METHYLPREDNISOLONE SODIUM SUCCINATE 60 MG: 125 INJECTION, POWDER, FOR SOLUTION INTRAMUSCULAR; INTRAVENOUS at 11:12

## 2022-06-16 RX ADMIN — IPRATROPIUM BROMIDE AND ALBUTEROL SULFATE 1 AMPULE: .5; 3 SOLUTION RESPIRATORY (INHALATION) at 19:21

## 2022-06-16 RX ADMIN — ATORVASTATIN CALCIUM 20 MG: 20 TABLET, FILM COATED ORAL at 08:08

## 2022-06-16 RX ADMIN — INSULIN LISPRO 8 UNITS: 100 INJECTION, SOLUTION INTRAVENOUS; SUBCUTANEOUS at 11:11

## 2022-06-16 RX ADMIN — FUROSEMIDE 20 MG: 10 INJECTION, SOLUTION INTRAMUSCULAR; INTRAVENOUS at 16:32

## 2022-06-16 RX ADMIN — SODIUM CHLORIDE, PRESERVATIVE FREE 10 ML: 5 INJECTION INTRAVENOUS at 08:31

## 2022-06-16 RX ADMIN — SODIUM CHLORIDE, PRESERVATIVE FREE 10 ML: 5 INJECTION INTRAVENOUS at 22:31

## 2022-06-16 ASSESSMENT — ENCOUNTER SYMPTOMS
CONSTIPATION: 0
DIARRHEA: 0
CHEST TIGHTNESS: 0
APNEA: 0
STRIDOR: 0
ALLERGIC/IMMUNOLOGIC NEGATIVE: 1
COUGH: 1
SHORTNESS OF BREATH: 0
ABDOMINAL DISTENTION: 0
EYES NEGATIVE: 1
NAUSEA: 0
ABDOMINAL PAIN: 0

## 2022-06-16 NOTE — FLOWSHEET NOTE
SPIRITUAL CARE DEPARTMENT - [facility]  PROGRESS NOTE    Shift date: 6/15/22  Shift day: Wednesday   Shift # 2    Room # 2003/2003-01   Name: Sirena Acevedo                Advent: [employer]   Place of Jewish: Unknown    Referral: Routine Visit    Admit Date & Time: 6/14/2022 10:04 AM    Assessment:  Sirena Acevedo is a 69 y.o. male in the hospital because of acute on chronic respiratory failure with hypoxia. Patient appears to be anxious, compromised coping, despair, fearful, powerlessness, sad, and stress overload. Intervention:  Writer introduced self and title as .  provided active listening, prayer, sustained presence of ministry, explored/ affirmed feelings, thoughts, and concerns. Outcome:  Patient was accepting, comforted, coping, connection/belonging, engaged in conversation, encouraged, receptive, and vented emotions. Plan:  Chaplains will remain available to offer spiritual and emotional support as needed. Electronically signed by Angel Yuen Resident, on 6/15/2022 at 8:36 PM.  [facility]  [phone]       06/15/22 2033   Encounter Summary   Service Provided For: Patient   Referral/Consult From: [facility];Family members   Last Encounter  06/15/22   Complexity of Encounter Moderate   Begin Time 2020   End Time  2034   Total Time Calculated 14 min   Encounter    Type Initial Screen/Assessment   Assessment/Intervention/Outcome   Assessment Anxious; Compromised coping;Despair;Fearful;Powerlessness;Sad;Stress overload   Intervention Active listening;Prayer (assurance of)/[place];Nurtured Hope;Sustaining Presence/Ministry of presence; Explored/Affirmed feelings, thoughts, concerns   Outcome Acceptance;Comfort;Coping;Connection/Belonging;Engaged in conversation;Encouraged;Receptive;Venting emotion   Plan and Referrals   Plan/Referrals Continue to visit, (comment)
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
## 2022-06-16 NOTE — PLAN OF CARE
PROVIDE ADEQUATE OXYGENATION WITH ACCEPTABLE SP02/ABG'S    [x]  IDENTIFY APPROPRIATE OXYGEN THERAPY  [x]   MONITOR SP02/ABG'S AS NEEDED   [x]   PATIENT EDUCATION AS NEEDED    BRONCHOSPASM/BRONCHOCONSTRICTION     [x]         IMPROVE AERATION/BREATH SOUNDS  [x]   ADMINISTER BRONCHODILATOR THERAPY AS APPROPRIATE  [x]   ASSESS BREATH SOUNDS  []   IMPLEMENT AEROSOL/MDI PROTOCOL  [x]   PATIENT EDUCATION AS NEEDED    NON INVASIVE VENTILATION  PROVIDE OPTIMAL VENTILATION/ACCEPTABLE SP02  IMPLEMENT NON INVASIVE VENTILATION PROTOCOL  ASSESSMENT SKIN INTEGRITY  PATIENT EDUCATION AS NEEDED  BIPAP AS NEEDED

## 2022-06-16 NOTE — PLAN OF CARE
Problem: Discharge Planning  Goal: Discharge to home or other facility with appropriate resources  Outcome: Progressing     Problem: Respiratory - Adult  Goal: Achieves optimal ventilation and oxygenation  Outcome: Progressing     Problem: Chronic Conditions and Co-morbidities  Goal: Patient's chronic conditions and co-morbidity symptoms are monitored and maintained or improved  Outcome: Progressing     Problem: Safety - Adult  Goal: Free from fall injury  Outcome: Progressing

## 2022-06-16 NOTE — PROGRESS NOTES
Physician Progress Note      PATIENT:               Jaimie Mazariegos  CSN #:                  [id number]  :                       1955  ADMIT DATE:       2022 10:04 AM  DISCH DATE:  RESPONDING  PROVIDER #:        CARON YEE MD          QUERY TEXT:    Pt admitted with COPD exacerbation/Pneumonia and noted to have SIRS. If   possible, please clarify one of the following: The medical record reflects the following:  Risk Factors: CHF,PNA,COPD  Clinical Indicators: Pt with Acute on chronic respiratory failure due to COPD   exacerbation secondary to community acquired pneumonia. Strep positive. Hypotensive. Tachypneic. , BNP 1628,Sed rate 75. Productive cough. CXR-   small left pleural effusion scattered lung space possible to pneumonia. Treatment: ceftriaxone and azithromycin. CT Chest pending. Supplemental   Oxygen. Solumedrol. Monitoring  Options provided:  -- Sepsis, present on admission  -- Pna/COPD without Sepsis  -- Other - I will add my own diagnosis  -- Disagree - Not applicable / Not valid  -- Disagree - Clinically unable to determine / Unknown  -- Refer to Clinical Documentation Reviewer    PROVIDER RESPONSE TEXT:    This patient has Pneumonia and COPD exacerbation without Sepsis. Query created by: Alvin Antony on 2022 10:57 AM      QUERY TEXT:    Pt admitted with COPD exacerbation/Pneumonia and has combined CHF documented. If possible, please clarify further specificity regarding the acuity of CHF:    The medical record reflects the following:  Risk Factors: CHF,PNA,COPD  Clinical Indicators: Pt with Acute on chronic respiratory failure due to COPD   exacerbation secondary to community acquired pneumonia. Strep positive. Hx CHF   with documentented ' Combined  systolic and diastolic CHF' , BNP   1628,Sed rate 75. Trops 51-40. Productive cough. CXR- small left pleural   effusion scattered lung space possible to pneumonia. Treatment: ceftriaxone and azithromycin.  CT Chest pending. Supplemental   Oxygen. Solumedrol. Monitoring  Options provided:  -- Acute on Chronic Systolic and Diastolic CHF  -- Chronic Systolic and Diastolic CHF  -- Other - I will add my own diagnosis  -- Disagree - Not applicable / Not valid  -- Disagree - Clinically unable to determine / Unknown  -- Refer to Clinical Documentation Reviewer    PROVIDER RESPONSE TEXT:    This patient has chronic systolic and diastolic CHF.     Query created by: Katia Pool on 6/16/2022 11:03 AM      Electronically signed by:  Tyshawn Cornelius MD 6/16/2022 2:46 PM

## 2022-06-16 NOTE — PLAN OF CARE
Problem: Discharge Planning  Goal: Discharge to home or other facility with appropriate resources  6/15/2022 2131 by Lashay Valenzuela RN  Outcome: Progressing  Flowsheets (Taken 6/15/2022 1958)  Discharge to home or other facility with appropriate resources:   Identify barriers to discharge with patient and caregiver   Arrange for needed discharge resources and transportation as appropriate   Identify discharge learning needs (meds, wound care, etc)  6/15/2022 1040 by Loki Chan RN  Outcome: Progressing  Flowsheets (Taken 6/15/2022 0800)  Discharge to home or other facility with appropriate resources: Identify barriers to discharge with patient and caregiver     Problem: Respiratory - Adult  Goal: Achieves optimal ventilation and oxygenation  6/15/2022 2131 by Lashay Valenzuela RN  Outcome: Progressing  Flowsheets (Taken 6/15/2022 1958)  Achieves optimal ventilation and oxygenation:   Assess for changes in respiratory status   Assess for changes in mentation and behavior   Position to facilitate oxygenation and minimize respiratory effort   Oxygen supplementation based on oxygen saturation or arterial blood gases  6/15/2022 1612 by Isa Mendoza RCP  Outcome: Progressing  6/15/2022 1040 by Loki Chan RN  Outcome: Progressing  Flowsheets (Taken 6/15/2022 0800)  Achieves optimal ventilation and oxygenation: Assess for changes in respiratory status     Problem: Chronic Conditions and Co-morbidities  Goal: Patient's chronic conditions and co-morbidity symptoms are monitored and maintained or improved  6/15/2022 2131 by Lashay Valenzuela RN  Outcome: Progressing  Flowsheets (Taken 6/15/2022 1958)  Care Plan - Patient's Chronic Conditions and Co-Morbidity Symptoms are Monitored and Maintained or Improved:   Monitor and assess patient's chronic conditions and comorbid symptoms for stability, deterioration, or improvement   Collaborate with multidisciplinary team to address chronic and comorbid conditions and prevent exacerbation or deterioration   Update acute care plan with appropriate goals if chronic or comorbid symptoms are exacerbated and prevent overall improvement and discharge  6/15/2022 1040 by Raciel Rowell, RN  Outcome: Progressing  Flowsheets (Taken 6/15/2022 0800)  Care Plan - Patient's Chronic Conditions and Co-Morbidity Symptoms are Monitored and Maintained or Improved: Monitor and assess patient's chronic conditions and comorbid symptoms for stability, deterioration, or improvement     Problem: Safety - Adult  Goal: Free from fall injury  6/15/2022 2131 by Malorie Lorenzana RN  Outcome: Lizy Armendariz (Taken 6/15/2022 2131)  Free From Fall Injury: Instruct family/caregiver on patient safety  6/15/2022 1040 by Raciel Rowell, RN  Outcome: Progressing

## 2022-06-16 NOTE — CARE COORDINATION
Transitional planning     Writer to room to discuss d/c plan, plan is home , will possibly need transportation.

## 2022-06-17 LAB
ABSOLUTE EOS #: 0 K/UL (ref 0–0.4)
ABSOLUTE LYMPH #: 0.23 K/UL (ref 1–4.8)
ABSOLUTE MONO #: 0.12 K/UL (ref 0.1–0.8)
ANION GAP SERPL CALCULATED.3IONS-SCNC: 10 MMOL/L (ref 9–17)
BASOPHILS # BLD: 0 % (ref 0–2)
BASOPHILS ABSOLUTE: 0 K/UL (ref 0–0.2)
BUN BLDV-MCNC: 53 MG/DL (ref 8–23)
CALCIUM SERPL-MCNC: 8.3 MG/DL (ref 8.6–10.4)
CHLORIDE BLD-SCNC: 89 MMOL/L (ref 98–107)
CO2: 33 MMOL/L (ref 20–31)
CREAT SERPL-MCNC: 1.06 MG/DL (ref 0.7–1.2)
EOSINOPHILS RELATIVE PERCENT: 0 % (ref 1–4)
GFR AFRICAN AMERICAN: >60 ML/MIN
GFR NON-AFRICAN AMERICAN: >60 ML/MIN
GFR SERPL CREATININE-BSD FRML MDRD: ABNORMAL ML/MIN/{1.73_M2}
GLUCOSE BLD-MCNC: 262 MG/DL (ref 75–110)
GLUCOSE BLD-MCNC: 265 MG/DL (ref 75–110)
GLUCOSE BLD-MCNC: 266 MG/DL (ref 75–110)
GLUCOSE BLD-MCNC: 276 MG/DL (ref 70–99)
GLUCOSE BLD-MCNC: 336 MG/DL (ref 75–110)
HCT VFR BLD CALC: 29.9 % (ref 40.7–50.3)
HEMOGLOBIN: 9.8 G/DL (ref 13–17)
IMMATURE GRANULOCYTES %: 0 %
IMMATURE GRANULOCYTES ABSOLUTE: 0 K/UL (ref 0–0.3)
LYMPHOCYTES # BLD: 2 % (ref 24–44)
MCH RBC QN AUTO: 29.8 PG (ref 25.2–33.5)
MCHC RBC AUTO-ENTMCNC: 32.8 G/DL (ref 28.4–34.8)
MCV RBC AUTO: 90.9 FL (ref 82.6–102.9)
MONOCYTES # BLD: 1 % (ref 1–7)
MORPHOLOGY: ABNORMAL
MYCOPLASMA PNEUMONIAE IGM: 0.24
NRBC AUTOMATED: 0 PER 100 WBC
PDW BLD-RTO: 12.7 % (ref 11.8–14.4)
PLATELET # BLD: 259 K/UL (ref 138–453)
PMV BLD AUTO: 10.8 FL (ref 8.1–13.5)
POTASSIUM SERPL-SCNC: 4.4 MMOL/L (ref 3.7–5.3)
RBC # BLD: 3.29 M/UL (ref 4.21–5.77)
SEG NEUTROPHILS: 97 % (ref 36–66)
SEGMENTED NEUTROPHILS ABSOLUTE COUNT: 11.35 K/UL (ref 1.8–7.7)
SODIUM BLD-SCNC: 132 MMOL/L (ref 135–144)
WBC # BLD: 11.7 K/UL (ref 3.5–11.3)

## 2022-06-17 PROCEDURE — 6370000000 HC RX 637 (ALT 250 FOR IP): Performed by: STUDENT IN AN ORGANIZED HEALTH CARE EDUCATION/TRAINING PROGRAM

## 2022-06-17 PROCEDURE — 97110 THERAPEUTIC EXERCISES: CPT

## 2022-06-17 PROCEDURE — 36415 COLL VENOUS BLD VENIPUNCTURE: CPT

## 2022-06-17 PROCEDURE — 85025 COMPLETE CBC W/AUTO DIFF WBC: CPT

## 2022-06-17 PROCEDURE — 94640 AIRWAY INHALATION TREATMENT: CPT

## 2022-06-17 PROCEDURE — 94761 N-INVAS EAR/PLS OXIMETRY MLT: CPT

## 2022-06-17 PROCEDURE — 2700000000 HC OXYGEN THERAPY PER DAY

## 2022-06-17 PROCEDURE — 80048 BASIC METABOLIC PNL TOTAL CA: CPT

## 2022-06-17 PROCEDURE — 97530 THERAPEUTIC ACTIVITIES: CPT

## 2022-06-17 PROCEDURE — 82947 ASSAY GLUCOSE BLOOD QUANT: CPT

## 2022-06-17 PROCEDURE — 99232 SBSQ HOSP IP/OBS MODERATE 35: CPT | Performed by: FAMILY MEDICINE

## 2022-06-17 PROCEDURE — 2580000003 HC RX 258: Performed by: STUDENT IN AN ORGANIZED HEALTH CARE EDUCATION/TRAINING PROGRAM

## 2022-06-17 PROCEDURE — 2060000000 HC ICU INTERMEDIATE R&B

## 2022-06-17 PROCEDURE — 94660 CPAP INITIATION&MGMT: CPT

## 2022-06-17 PROCEDURE — 6360000002 HC RX W HCPCS: Performed by: STUDENT IN AN ORGANIZED HEALTH CARE EDUCATION/TRAINING PROGRAM

## 2022-06-17 RX ORDER — FUROSEMIDE 10 MG/ML
20 INJECTION INTRAMUSCULAR; INTRAVENOUS DAILY
Status: DISCONTINUED | OUTPATIENT
Start: 2022-06-17 | End: 2022-06-19 | Stop reason: HOSPADM

## 2022-06-17 RX ORDER — INSULIN GLARGINE 100 [IU]/ML
5 INJECTION, SOLUTION SUBCUTANEOUS NIGHTLY
Status: DISCONTINUED | OUTPATIENT
Start: 2022-06-17 | End: 2022-06-18

## 2022-06-17 RX ADMIN — SODIUM CHLORIDE, PRESERVATIVE FREE 10 ML: 5 INJECTION INTRAVENOUS at 08:18

## 2022-06-17 RX ADMIN — ATORVASTATIN CALCIUM 20 MG: 20 TABLET, FILM COATED ORAL at 08:18

## 2022-06-17 RX ADMIN — INSULIN LISPRO 12 UNITS: 100 INJECTION, SOLUTION INTRAVENOUS; SUBCUTANEOUS at 12:37

## 2022-06-17 RX ADMIN — HYDRALAZINE HYDROCHLORIDE 50 MG: 50 TABLET, FILM COATED ORAL at 06:27

## 2022-06-17 RX ADMIN — INSULIN LISPRO 5 UNITS: 100 INJECTION, SOLUTION INTRAVENOUS; SUBCUTANEOUS at 21:06

## 2022-06-17 RX ADMIN — IPRATROPIUM BROMIDE AND ALBUTEROL SULFATE 1 AMPULE: .5; 3 SOLUTION RESPIRATORY (INHALATION) at 19:33

## 2022-06-17 RX ADMIN — IPRATROPIUM BROMIDE AND ALBUTEROL SULFATE 1 AMPULE: .5; 3 SOLUTION RESPIRATORY (INHALATION) at 15:19

## 2022-06-17 RX ADMIN — PREDNISONE 40 MG: 20 TABLET ORAL at 08:18

## 2022-06-17 RX ADMIN — SODIUM CHLORIDE, PRESERVATIVE FREE 10 ML: 5 INJECTION INTRAVENOUS at 21:07

## 2022-06-17 RX ADMIN — Medication 81 MG: at 08:18

## 2022-06-17 RX ADMIN — GUAIFENESIN DEXTROMETHORPHAN HYDROBROMIDE ORAL SOLUTION 10 ML: 200; 20 SOLUTION ORAL at 22:03

## 2022-06-17 RX ADMIN — INSULIN LISPRO 9 UNITS: 100 INJECTION, SOLUTION INTRAVENOUS; SUBCUTANEOUS at 08:19

## 2022-06-17 RX ADMIN — IPRATROPIUM BROMIDE AND ALBUTEROL SULFATE 1 AMPULE: .5; 3 SOLUTION RESPIRATORY (INHALATION) at 07:34

## 2022-06-17 RX ADMIN — FUROSEMIDE 20 MG: 10 INJECTION, SOLUTION INTRAMUSCULAR; INTRAVENOUS at 16:05

## 2022-06-17 RX ADMIN — ENOXAPARIN SODIUM 40 MG: 100 INJECTION SUBCUTANEOUS at 08:18

## 2022-06-17 RX ADMIN — IPRATROPIUM BROMIDE AND ALBUTEROL SULFATE 1 AMPULE: .5; 3 SOLUTION RESPIRATORY (INHALATION) at 11:02

## 2022-06-17 RX ADMIN — INSULIN LISPRO 9 UNITS: 100 INJECTION, SOLUTION INTRAVENOUS; SUBCUTANEOUS at 17:10

## 2022-06-17 RX ADMIN — PANTOPRAZOLE SODIUM 40 MG: 40 TABLET, DELAYED RELEASE ORAL at 06:27

## 2022-06-17 ASSESSMENT — ENCOUNTER SYMPTOMS
ALLERGIC/IMMUNOLOGIC NEGATIVE: 1
DIARRHEA: 0
NAUSEA: 0
CONSTIPATION: 0
APNEA: 0
STRIDOR: 0
ABDOMINAL PAIN: 0
EYES NEGATIVE: 1
ABDOMINAL DISTENTION: 0
SHORTNESS OF BREATH: 0
CHEST TIGHTNESS: 0
COUGH: 1

## 2022-06-17 NOTE — PROGRESS NOTES
Writer spoke to Dr. Steve Mars about 6 min walk test ordered for this patient. Patient wears 3L at home and is currently on 4L with a saturation of 90%. Dr. Sandra Lopez requested to defer test at this time.

## 2022-06-17 NOTE — PLAN OF CARE
Problem: Discharge Planning  Goal: Discharge to home or other facility with appropriate resources  6/16/2022 2311 by López Valdez RN  Outcome: Progressing  Flowsheets (Taken 6/16/2022 1958)  Discharge to home or other facility with appropriate resources:   Identify barriers to discharge with patient and caregiver   Arrange for needed discharge resources and transportation as appropriate   Identify discharge learning needs (meds, wound care, etc)  6/16/2022 1737 by Gail Shaw RN  Outcome: Progressing     Problem: Respiratory - Adult  Goal: Achieves optimal ventilation and oxygenation  6/16/2022 2311 by López Valdez RN  Outcome: Progressing  6/16/2022 1737 by Gail Shaw RN  Outcome: Progressing     Problem: Chronic Conditions and Co-morbidities  Goal: Patient's chronic conditions and co-morbidity symptoms are monitored and maintained or improved  6/16/2022 2311 by López Valdez RN  Outcome: Progressing  Flowsheets (Taken 6/16/2022 1958)  Care Plan - Patient's Chronic Conditions and Co-Morbidity Symptoms are Monitored and Maintained or Improved:   Monitor and assess patient's chronic conditions and comorbid symptoms for stability, deterioration, or improvement   Collaborate with multidisciplinary team to address chronic and comorbid conditions and prevent exacerbation or deterioration   Update acute care plan with appropriate goals if chronic or comorbid symptoms are exacerbated and prevent overall improvement and discharge  6/16/2022 1737 by Gail Shaw RN  Outcome: Progressing     Problem: Safety - Adult  Goal: Free from fall injury  6/16/2022 2311 by López Valdez RN  Outcome: Progressing  Flowsheets (Taken 6/16/2022 2311)  Free From Fall Injury: Instruct family/caregiver on patient safety  6/16/2022 1737 by Gail Shaw RN  Outcome: Progressing

## 2022-06-17 NOTE — PROGRESS NOTES
Congestive Heart Failure Education completed and charted. CHF booklet given. Patient was receptive to education. Discussed the  importance of medication compliance. Discussed the importance of a heart healthy diet. Discussed 2000 mg sodium-restricted daily diet. Patient instructed to limit fluid intake to  1.5 to 2 liters per day. Patient instructed to weigh self at the same time of each day each morning, reinforced teaching to monitor for 3-5 lb weight increase over 1-2 days notify physician if change noted. Signs and symptoms of CHF discussed with patient, such as feeling more tired than normal, feeling short of breath, coughing that increases when lying down, sudden weight gain, swelling of the feet, legs or belly. Patient verbalized understanding to notify physician office if these symptoms occur.     EF 51%   Pt was established with  [facility] Clinic in the past. He does not wish to return at this time

## 2022-06-17 NOTE — PROGRESS NOTES
Physical Therapy  Facility/Department: [facility]  Physical Therapy Daily Treatment Note    Name: Angelica Westbrook  : 1955  MRN: [id number]  Date of Service: 2022    Discharge Recommendations:  Patient would benefit from continued therapy after discharge   PT Equipment Recommendations  Equipment Needed: No (Pt reports owning a SPC and a RW, writer recommends  use of RW.)      Patient Diagnosis(es): The primary encounter diagnosis was Pneumonia of both lungs due to infectious organism, unspecified part of lung. A diagnosis of Hypoxia was also pertinent to this visit. Past Medical History:  has a past medical history of CHF (congestive heart failure) ([facility] [address] COPD (chronic obstructive pulmonary disease) ([facility] [address] Diabetes mellitus ([facility] [address] Erectile dysfunction due to arterial insufficiency, Hypertension, Microalbuminuria due to type 2 diabetes mellitus ([facility] [address] and Overweight (BMI 25.0-29.9). Past Surgical History:  has a past surgical history that includes Appendectomy and Total ankle arthroplasty. Assessment   Body Structures, Functions, Activity Limitations Requiring Skilled Therapeutic Intervention: Decreased functional mobility ; Decreased strength;Decreased endurance;Decreased balance;Decreased ROM  Assessment: Pt demonstrates improving functional mobility this date, able to ambulate 30 feet with a RW and CGA. Pt continues to be most limited secondary to impaired endurance, as well as slightly impaired standing balance, and BLE strength. Pt would benefit from additional therapy upon discharge to assist in return to independent PLOF. Pt would be unsafe to return to prior living arrangements upon discharge. Therapy Prognosis: Good  Decision Making: Medium Complexity  Requires PT Follow-Up: Yes  Activity Tolerance  Activity Tolerance: Patient limited by fatigue;Patient limited by endurance  Activity Tolerance Comments: Pt's SpO2 dropped from 93% to 87% following 30 foot ambulation bout.      Plan Plan  Plan:  (5-6x/week)  Current Treatment Recommendations: Strengthening,Balance training,Functional mobility training,Transfer training,Gait training,Stair training,Patient/Caregiver education & training,Safety education & training,Home exercise program,Equipment evaluation, education, & procurement,Therapeutic activities,Endurance training  Safety Devices  Type of Devices: Gait belt,Left in chair,Call light within reach,Patient at risk for falls,Nurse notified  Restraints  Restraints Initially in Place: No     Restrictions  Restrictions/Precautions  Restrictions/Precautions: Up as Tolerated  Required Braces or Orthoses?: No  Position Activity Restriction  Other position/activity restrictions: up with assist     Subjective   General  Patient assessed for rehabilitation services?: Yes  Response To Previous Treatment: Patient with no complaints from previous session. Family / Caregiver Present: No  Follows Commands: Within Functional Limits  Subjective  Subjective: Pt up in a chair and agreeable to therapy, RN agreeable to therapy. Pt pleasant and cooperative throughout. Pt denies pain at rest.           Cognition   Cognition  Overall Cognitive Status: WFL     Objective                              Bed mobility  Bed Mobility Comments: Pt began today's session up in a chair, retired up to a chair at the conclusion of today's session. Transfers  Sit to Stand: Contact guard assistance  Stand to sit: Contact guard assistance  Comment: Transfers performed 2x this date. Ambulation  Surface: level tile  Device: Rolling Walker  Assistance: Contact guard assistance  Quality of Gait: fair stability, decreased stride length, no LOB. Gait Deviations: Slow Patricia;Decreased step length  Distance: 30 feet, seated rest break, 30 feet.   Comments: Pt's SpO2 dropped from 93% to 87% following ambulation bout, recovered to >90% after ~ 25 second seated rest break on 4 lpm O2  More Ambulation?: No  Stairs/Curb  Stairs?: No Balance  Posture: Fair  Sitting - Static: Good  Sitting - Dynamic: Good;-  Standing - Static: Fair;+  Standing - Dynamic: Fair  Comments: standing balance assessed while using a RW  Exercise Treatment: 2x10 BLE in sitting LAQs, hip flexion, hip abduction, ankle pumps 2x15. AM-PAC Score  AM-PAC Inpatient Mobility Raw Score : 17 (06/17/22 1224)  AM-PAC Inpatient T-Scale Score : 42.13 (06/17/22 1224)  Mobility Inpatient CMS 0-100% Score: 50.57 (06/17/22 1224)  Mobility Inpatient CMS G-Code Modifier : CK (06/17/22 1224)          Goals  Short Term Goals  Time Frame for Short term goals: 14 visits  Short term goal 1: Pt will ambulate 150 feet with least restrictive device and supervision to increase functional independence. Short term goal 2: Pt will negotiate 8 stairs with bilateral handrails and SBA to allow the pt to enter prior living arrangements. Short term goal 3: Pt will perform sit<>stand transfer independently to increase functional independence. Short term goal 4: Pt will demonstrate good- standing balance to decrease fall risk. Short term goal 5: Pt will tolerate a 35 minute therapy session to promote increased endurance. Additional Goals?: No       Education  Patient Education  Education Given To: Patient  Education Provided: Transfer Training;Equipment;Plan of Care; Fall Prevention Strategies  Education Method: Verbal  Barriers to Learning: None  Education Outcome: Verbalized understanding      Therapy Time   Individual Concurrent Group Co-treatment   Time In 0924         Time Out 0948         Minutes 24         Timed Code Treatment Minutes: 24 Minutes       Rosalie Link, PT

## 2022-06-17 NOTE — CARE COORDINATION
Met with pt to discuss transitional planning. Discussed therapy notes. He wants to return home. He relates that he is at his baseline. He declines home care.  He denies other needs

## 2022-06-17 NOTE — PROGRESS NOTES
flu and COVID were negative. D-dimer normal.    Chest x-ray: Shows small left pleural effusion scattered lung space possible to pneumonia. Patient admitted to the hospital for acute on chronic respiratory failure secondary to COPD exacerbation secondary pneumonia. REVIEW OF SYSTEMS:      Review of Systems   Constitutional: Negative for chills, diaphoresis and fatigue. HENT: Negative. Eyes: Negative. Respiratory: Positive for cough. Negative for apnea, chest tightness, shortness of breath and stridor. Productive cough     Cardiovascular: Negative for chest pain, palpitations and leg swelling. Gastrointestinal: Negative for abdominal distention, abdominal pain, constipation, diarrhea and nausea. Endocrine: Negative. Genitourinary: Negative. Musculoskeletal: Negative. Skin: Negative. Allergic/Immunologic: Negative. Neurological: Negative. Hematological: Negative. Psychiatric/Behavioral: Negative. PAST MEDICAL HISTORY:      has a past medical history of CHF (congestive heart failure) ([facility] [address] COPD (chronic obstructive pulmonary disease) ([facility] [address] [facility] Erectile dysfunction due to arterial insufficiency, Hypertension, Microalbuminuria due to type 2 diabetes mellitus ([facility] and Overweight (BMI 25.0-29.9). PAST SURGICAL HISTORY:      has a past surgical history that includes Appendectomy and Total ankle arthroplasty. SOCIAL HISTORY:      reports that he quit smoking about 6 months ago. His smoking use included cigarettes. He smoked 0.50 packs per day. He has never used smokeless tobacco. He reports that he does not drink alcohol and does not use drugs. FAMILY HISTORY:     family history is not on file. HOME MEDICATIONS:      Prior to Admission medications    Medication Sig Start Date End Date Taking?  Authorizing Provider   carvedilol (COREG) 12.5 MG tablet Take 12.5 mg by mouth 2 times daily (with meals)   Yes Historical Provider, MD furosemide (LASIX) 20 MG tablet Take 20 mg by mouth daily   Yes Historical Provider, MD   metFORMIN (GLUCOPHAGE) 500 MG tablet Take 1 tablet by mouth 2 times daily (with meals) 5/31/22   Maddi Tejeda MD   lisinopril (PRINIVIL;ZESTRIL) 20 MG tablet Take 20 mg by mouth daily    Historical Provider, MD   atorvastatin (LIPITOR) 20 MG tablet Take 1 tablet by mouth daily 2/1/22   Anastacio Crespo MD   hydrALAZINE (APRESOLINE) 50 MG tablet Take 1 tablet by mouth every 8 hours 1/26/22   Diana Perez MD   Vitamin D (CHOLECALCIFEROL) 50 MCG (2000 UT) TABS tablet Take 1 tablet by mouth daily 1/27/22   Diana Perez MD   amLODIPine (NORVASC) 10 MG tablet TAKE 1 TABLET BY MOUTH DAILY 10/25/21   Mj Ramires MD   aspirin (HM ASPIRIN EC LOW DOSE) 81 MG EC tablet TAKE 1 TABLET BY MOUTH DAILY 8/26/21   Mercy Michael MD   albuterol sulfate HFA (PROAIR HFA) 108 (90 Base) MCG/ACT inhaler inhale 2 puffs by mouth every 6 hours if needed for wheezing 8/16/21   Te Atkinson MD   fluticasone-salmeterol (ADVAIR DISKUS) 100-50 MCG/DOSE diskus inhaler INHALE 1 PUFF INTO THE LUNGS EVERY 12 HOURS 8/16/21   Te Atkinson MD   tiotropium (Marcel Clunes) 18 MCG inhalation capsule Inhale 1 capsule into the lungs daily  Patient not taking: Reported on 3/9/2022 1/9/20   Pan Bhardwaj MD       ALLERGIES:     Patient has no known allergies. OBJECTIVE:       Vitals:    06/17/22 0627 06/17/22 0734 06/17/22 0752 06/17/22 1149   BP: (!) 134/90  (!) 75/58 (!) 109/56   Pulse:   60 65   Resp:   20 25   Temp:   98.1 °F (36.7 °C) 98 °F (36.7 °C)   TempSrc:   Oral Oral   SpO2:  92%     Weight:             Intake/Output Summary (Last 24 hours) at 6/17/2022 1419  Last data filed at 6/17/2022 0645  Gross per 24 hour   Intake 500 ml   Output 1475 ml   Net -975 ml       PHYSICAL EXAM:  Physical Exam  Constitutional:       Appearance: Normal appearance.       Comments: Patient sitting comfortably in chair   HENT:      Head: Normocephalic and atraumatic. Nose:      Comments: Nasal cannula in place. Eyes:      Extraocular Movements: Extraocular movements intact. Pupils: Pupils are equal, round, and reactive to light. Cardiovascular:      Rate and Rhythm: Normal rate and regular rhythm. Pulses: Normal pulses. Heart sounds: Normal heart sounds. Pulmonary:      Effort: Pulmonary effort is normal.      Breath sounds: Normal breath sounds. Abdominal:      General: Bowel sounds are normal.      Palpations: Abdomen is soft. Musculoskeletal:         General: Normal range of motion. Cervical back: Normal range of motion. Skin:     General: Skin is warm. Neurological:      General: No focal deficit present. Mental Status: He is alert.    Psychiatric:         Mood and Affect: Mood normal.         Behavior: Behavior normal.          DIAGNOSTICS:     Laboratory Testing:    Recent Results (from the past 24 hour(s))   POC Glucose Fingerstick    Collection Time: 06/16/22  4:09 PM   Result Value Ref Range    POC Glucose 214 (H) 75 - 110 mg/dL   POC Glucose Fingerstick    Collection Time: 06/16/22  8:56 PM   Result Value Ref Range    POC Glucose 242 (H) 75 - 110 mg/dL   CBC with Auto Differential    Collection Time: 06/17/22  6:53 AM   Result Value Ref Range    WBC 11.7 (H) 3.5 - 11.3 k/uL    RBC 3.29 (L) 4.21 - 5.77 m/uL    Hemoglobin 9.8 (L) 13.0 - 17.0 g/dL    Hematocrit 29.9 (L) 40.7 - 50.3 %    MCV 90.9 82.6 - 102.9 fL    MCH 29.8 25.2 - 33.5 pg    MCHC 32.8 28.4 - 34.8 g/dL    RDW 12.7 11.8 - 14.4 %    Platelets 559 141 - 066 k/uL    MPV 10.8 8.1 - 13.5 fL    NRBC Automated 0.0 0.0 per 100 WBC    Immature Granulocytes 0 0 %    Seg Neutrophils 97 (H) 36 - 66 %    Lymphocytes 2 (L) 24 - 44 %    Monocytes 1 1 - 7 %    Eosinophils % 0 (L) 1 - 4 %    Basophils 0 0 - 2 %    Absolute Immature Granulocyte 0.00 0.00 - 0.30 k/uL    Segs Absolute 11.35 (H) 1.8 - 7.7 k/uL    Absolute Lymph # 0.23 (L) 1.0 - 4.8 k/uL Absolute Mono # 0.12 0.1 - 0.8 k/uL    Absolute Eos # 0.00 0.0 - 0.4 k/uL    Basophils Absolute 0.00 0.0 - 0.2 k/uL    Morphology HYPOCHROMIA PRESENT    Basic Metabolic Panel w/ Reflex to MG    Collection Time: 06/17/22  6:53 AM   Result Value Ref Range    Glucose 276 (H) 70 - 99 mg/dL    BUN 53 (H) 8 - 23 mg/dL    CREATININE 1.06 0.70 - 1.20 mg/dL    Calcium 8.3 (L) 8.6 - 10.4 mg/dL    Sodium 132 (L) 135 - 144 mmol/L    Potassium 4.4 3.7 - 5.3 mmol/L    Chloride 89 (L) 98 - 107 mmol/L    CO2 33 (H) 20 - 31 mmol/L    Anion Gap 10 9 - 17 mmol/L    GFR Non-African American >60 >60 mL/min    GFR African American >60 >60 mL/min    GFR Comment         POC Glucose Fingerstick    Collection Time: 06/17/22  7:38 AM   Result Value Ref Range    POC Glucose 266 (H) 75 - 110 mg/dL   POC Glucose Fingerstick    Collection Time: 06/17/22 11:31 AM   Result Value Ref Range    POC Glucose 336 (H) 75 - 110 mg/dL         Imaging/Diagonstics:    Current Facility-Administered Medications   Medication Dose Route Frequency Provider Last Rate Last Admin    insulin lispro (HUMALOG) injection vial 0-18 Units  0-18 Units SubCUTAneous TID WC Alexander Giang MD   12 Units at 06/17/22 1237    insulin lispro (HUMALOG) injection vial 0-9 Units  0-9 Units SubCUTAneous Nightly Alexander Giang MD   3 Units at 06/16/22 2230    pantoprazole (PROTONIX) tablet 40 mg  40 mg Oral QAM AC Alexander Giang MD   40 mg at 06/17/22 0627    dextromethorphan-guaiFENesin (ROBITUSSIN-DM)  MG/5ML liquid 10 mL  10 mL Oral Q4H PRN Shannon Sutherland MD   10 mL at 06/15/22 2008    amLODIPine (NORVASC) tablet 10 mg  10 mg Oral Daily Alexander Giang MD   10 mg at 06/16/22 0808    aspirin EC tablet 81 mg  81 mg Oral Daily Alexander Giang MD   81 mg at 06/17/22 0818    atorvastatin (LIPITOR) tablet 20 mg  20 mg Oral Daily Alexander MD Padmaja   20 mg at 06/17/22 0818    carvedilol (COREG) tablet 25 mg  25 mg Oral BID WC Alexander MD Padmaja   25 mg at 06/16/22 0808    hydrALAZINE (APRESOLINE) tablet 50 mg  50 mg Oral 3 times per day Rigo De Souza MD   50 mg at 06/17/22 8574    lisinopril (PRINIVIL;ZESTRIL) tablet 20 mg  20 mg Oral Daily Alexander Giang MD   20 mg at 06/16/22 0808    [Held by provider] metFORMIN (GLUCOPHAGE) tablet 500 mg  500 mg Oral BID WC Alexander Giang MD        sodium chloride flush 0.9 % injection 5-40 mL  5-40 mL IntraVENous 2 times per day Alexander Giang MD   10 mL at 06/17/22 0818    sodium chloride flush 0.9 % injection 5-40 mL  5-40 mL IntraVENous PRN Alexander Giang MD        0.9 % sodium chloride infusion   IntraVENous PRN Alexander Giang MD        ondansetron (ZOFRAN-ODT) disintegrating tablet 4 mg  4 mg Oral Q8H PRN Alexander Giang MD        Or    ondansetron (ZOFRAN) injection 4 mg  4 mg IntraVENous Q6H PRN Alexander Giang MD        polyethylene glycol (GLYCOLAX) packet 17 g  17 g Oral Daily PRN Alexander Giang MD        enoxaparin (LOVENOX) injection 40 mg  40 mg SubCUTAneous Daily Alexander Giang MD   40 mg at 06/17/22 0818    acetaminophen (TYLENOL) tablet 650 mg  650 mg Oral Q6H PRN Rigo De Souza MD        Or    acetaminophen (TYLENOL) suppository 650 mg  650 mg Rectal Q6H PRN Rigo De Souza MD        ipratropium-albuterol (DUONEB) nebulizer solution 1 ampule  1 ampule Inhalation Q4H WA Alexander Giang MD   1 ampule at 06/17/22 1102    glucose chewable tablet 16 g  4 tablet Oral PRN Alexander Giang MD        dextrose bolus 10% 125 mL  125 mL IntraVENous PRN Alexander Giang MD        Or    dextrose bolus 10% 250 mL  250 mL IntraVENous PRN Alexander Giang MD        glucagon (rDNA) injection 1 mg  1 mg IntraMUSCular PRN Alexander Giang MD        dextrose 5 % solution  100 mL/hr IntraVENous PRN Alexander Giang MD        predniSONE (DELTASONE) tablet 40 mg  40 mg Oral Daily Alexander Giang MD   40 mg at 06/17/22 0818    LORazepam (ATIVAN) injection 0.5 mg  0.5 mg IntraVENous Q4H PRN Alexander Giang MD   0.5 mg at 06/14/22 1933    [Held by provider] hydrALAZINE (APRESOLINE) injection 10 mg  10 mg IntraVENous Q6H PRN Daniel Michaud MD   10 mg at 06/15/22 0030       ASSESSMENT:     Principal Problem:    Acute on chronic respiratory failure with hypoxia (HCC)  Active Problems:    COPD exacerbation (HCC)    Controlled type 2 diabetes mellitus without complication, without long-term current use of insulin (HCC)    Combined systolic and diastolic congestive heart failure (HCC)    Pneumonia of both lungs due to infectious organism    Hypoxia  Resolved Problems:    * No resolved hospital problems. *      PLAN:      Acute on chronic respiratory failure due to COPD exacerbation secondary to community acquired pneumonia, improving   On 4L O2 High flow  Procalcitonin 0.38, VBG  VBG 7.382, CO2 65.3, bicarb 38.8, O2 94  Rapid flu COVID-negative  Strep positive   Legionella antigen neg    Chest x-ray: Shows small left pleural effusion scattered lung space possible to pneumonia. Continue ceftriaxone and azithromycin 3/5 days  De-escalated IV steroids to oral  DuoNeb every 4 hours while awake  CT chest without contrast    -Small to moderate left-sided pleural effusion. Small right pleural effusion basilar atelectasis. Pericardial effusion 5.3 mL artery calcification. Pulmonary venous congestion. Lasix IV 20 Mg daily      Hypertension  Continued p.o. hydralazine, lisinopril, carvedilol, amlodipine      Type 2 diabetes  Held metformin  A1c 6.2 2/2022  M ISS  Hypoglycemia protocol     Code full  Diet adult regular, 3 carb choices low-sodium  DVT prophylaxis Lovenox 40 Mg subcu daily  GI prophylaxis Protonix 40 Mg IV once daily  PT/OT/SW  Dispo: Home depending on oxygen desaturation with ambulation. Discussed care plan with nurse after getting her input. Above plan discussed with the patient.  who agreed to the above plan     Plan will be discussed with the attending, Dr. Magnolia Reid MD  Family Medicine Resident  6/17/2022 2:19 PM

## 2022-06-18 LAB
ABSOLUTE EOS #: <0.03 K/UL (ref 0–0.44)
ABSOLUTE LYMPH #: 0.89 K/UL (ref 1.1–3.7)
ABSOLUTE MONO #: 0.54 K/UL (ref 0.1–1.2)
ANION GAP SERPL CALCULATED.3IONS-SCNC: 12 MMOL/L (ref 9–17)
BASOPHILS # BLD: 0 % (ref 0–2)
BASOPHILS ABSOLUTE: <0.03 K/UL (ref 0–0.2)
BUN BLDV-MCNC: 52 MG/DL (ref 8–23)
CALCIUM SERPL-MCNC: 8.2 MG/DL (ref 8.6–10.4)
CHLORIDE BLD-SCNC: 89 MMOL/L (ref 98–107)
CO2: 32 MMOL/L (ref 20–31)
CREAT SERPL-MCNC: 1.05 MG/DL (ref 0.7–1.2)
EOSINOPHILS RELATIVE PERCENT: 0 % (ref 1–4)
GFR AFRICAN AMERICAN: >60 ML/MIN
GFR NON-AFRICAN AMERICAN: >60 ML/MIN
GFR SERPL CREATININE-BSD FRML MDRD: ABNORMAL ML/MIN/{1.73_M2}
GLUCOSE BLD-MCNC: 185 MG/DL (ref 75–110)
GLUCOSE BLD-MCNC: 189 MG/DL (ref 70–99)
GLUCOSE BLD-MCNC: 298 MG/DL (ref 75–110)
GLUCOSE BLD-MCNC: 303 MG/DL (ref 75–110)
HCT VFR BLD CALC: 30.8 % (ref 40.7–50.3)
HEMOGLOBIN: 9.7 G/DL (ref 13–17)
IMMATURE GRANULOCYTES %: 1 %
IMMATURE GRANULOCYTES ABSOLUTE: 0.14 K/UL (ref 0–0.3)
LYMPHOCYTES # BLD: 7 % (ref 24–43)
MCH RBC QN AUTO: 29.4 PG (ref 25.2–33.5)
MCHC RBC AUTO-ENTMCNC: 31.5 G/DL (ref 28.4–34.8)
MCV RBC AUTO: 93.3 FL (ref 82.6–102.9)
MONOCYTES # BLD: 4 % (ref 3–12)
NRBC AUTOMATED: 0 PER 100 WBC
PDW BLD-RTO: 12.8 % (ref 11.8–14.4)
PLATELET # BLD: 268 K/UL (ref 138–453)
PMV BLD AUTO: 11.3 FL (ref 8.1–13.5)
POTASSIUM SERPL-SCNC: 4.8 MMOL/L (ref 3.7–5.3)
RBC # BLD: 3.3 M/UL (ref 4.21–5.77)
SEG NEUTROPHILS: 87 % (ref 36–65)
SEGMENTED NEUTROPHILS ABSOLUTE COUNT: 10.96 K/UL (ref 1.5–8.1)
SODIUM BLD-SCNC: 133 MMOL/L (ref 135–144)
WBC # BLD: 12.5 K/UL (ref 3.5–11.3)

## 2022-06-18 PROCEDURE — 94640 AIRWAY INHALATION TREATMENT: CPT

## 2022-06-18 PROCEDURE — 6370000000 HC RX 637 (ALT 250 FOR IP): Performed by: STUDENT IN AN ORGANIZED HEALTH CARE EDUCATION/TRAINING PROGRAM

## 2022-06-18 PROCEDURE — 85025 COMPLETE CBC W/AUTO DIFF WBC: CPT

## 2022-06-18 PROCEDURE — 99232 SBSQ HOSP IP/OBS MODERATE 35: CPT | Performed by: FAMILY MEDICINE

## 2022-06-18 PROCEDURE — 2060000000 HC ICU INTERMEDIATE R&B

## 2022-06-18 PROCEDURE — 2580000003 HC RX 258: Performed by: STUDENT IN AN ORGANIZED HEALTH CARE EDUCATION/TRAINING PROGRAM

## 2022-06-18 PROCEDURE — 82947 ASSAY GLUCOSE BLOOD QUANT: CPT

## 2022-06-18 PROCEDURE — 36415 COLL VENOUS BLD VENIPUNCTURE: CPT

## 2022-06-18 PROCEDURE — 6360000002 HC RX W HCPCS: Performed by: STUDENT IN AN ORGANIZED HEALTH CARE EDUCATION/TRAINING PROGRAM

## 2022-06-18 PROCEDURE — 94761 N-INVAS EAR/PLS OXIMETRY MLT: CPT

## 2022-06-18 PROCEDURE — 2500000003 HC RX 250 WO HCPCS: Performed by: STUDENT IN AN ORGANIZED HEALTH CARE EDUCATION/TRAINING PROGRAM

## 2022-06-18 PROCEDURE — 80048 BASIC METABOLIC PNL TOTAL CA: CPT

## 2022-06-18 PROCEDURE — 2700000000 HC OXYGEN THERAPY PER DAY

## 2022-06-18 RX ORDER — CARVEDILOL 25 MG/1
25 TABLET ORAL 2 TIMES DAILY WITH MEALS
Qty: 60 TABLET | Refills: 3 | Status: SHIPPED | OUTPATIENT
Start: 2022-06-18 | End: 2022-07-12 | Stop reason: SDUPTHER

## 2022-06-18 RX ORDER — INSULIN GLARGINE 100 [IU]/ML
10 INJECTION, SOLUTION SUBCUTANEOUS NIGHTLY
Status: DISCONTINUED | OUTPATIENT
Start: 2022-06-19 | End: 2022-06-18

## 2022-06-18 RX ORDER — PREDNISONE 20 MG/1
40 TABLET ORAL DAILY
Qty: 10 TABLET | Refills: 0 | Status: SHIPPED | OUTPATIENT
Start: 2022-06-19 | End: 2022-06-24

## 2022-06-18 RX ORDER — INSULIN GLARGINE 100 [IU]/ML
10 INJECTION, SOLUTION SUBCUTANEOUS NIGHTLY
Status: DISCONTINUED | OUTPATIENT
Start: 2022-06-18 | End: 2022-06-19 | Stop reason: HOSPADM

## 2022-06-18 RX ORDER — IPRATROPIUM BROMIDE AND ALBUTEROL SULFATE 2.5; .5 MG/3ML; MG/3ML
3 SOLUTION RESPIRATORY (INHALATION)
Qty: 360 ML | Refills: 3 | Status: SHIPPED | OUTPATIENT
Start: 2022-06-18 | End: 2022-07-12 | Stop reason: SDUPTHER

## 2022-06-18 RX ADMIN — FUROSEMIDE 20 MG: 10 INJECTION, SOLUTION INTRAMUSCULAR; INTRAVENOUS at 07:59

## 2022-06-18 RX ADMIN — CARVEDILOL 25 MG: 25 TABLET, FILM COATED ORAL at 16:14

## 2022-06-18 RX ADMIN — PREDNISONE 40 MG: 20 TABLET ORAL at 07:58

## 2022-06-18 RX ADMIN — INSULIN GLARGINE 10 UNITS: 100 INJECTION, SOLUTION SUBCUTANEOUS at 21:24

## 2022-06-18 RX ADMIN — ATORVASTATIN CALCIUM 20 MG: 20 TABLET, FILM COATED ORAL at 07:59

## 2022-06-18 RX ADMIN — INSULIN LISPRO 6 UNITS: 100 INJECTION, SOLUTION INTRAVENOUS; SUBCUTANEOUS at 08:00

## 2022-06-18 RX ADMIN — ENOXAPARIN SODIUM 40 MG: 100 INJECTION SUBCUTANEOUS at 07:59

## 2022-06-18 RX ADMIN — INSULIN LISPRO 3 UNITS: 100 INJECTION, SOLUTION INTRAVENOUS; SUBCUTANEOUS at 11:35

## 2022-06-18 RX ADMIN — IPRATROPIUM BROMIDE AND ALBUTEROL SULFATE 1 AMPULE: .5; 3 SOLUTION RESPIRATORY (INHALATION) at 11:17

## 2022-06-18 RX ADMIN — LISINOPRIL 20 MG: 20 TABLET ORAL at 07:58

## 2022-06-18 RX ADMIN — Medication 81 MG: at 07:59

## 2022-06-18 RX ADMIN — IPRATROPIUM BROMIDE AND ALBUTEROL SULFATE 1 AMPULE: .5; 3 SOLUTION RESPIRATORY (INHALATION) at 15:38

## 2022-06-18 RX ADMIN — SODIUM CHLORIDE, PRESERVATIVE FREE 10 ML: 5 INJECTION INTRAVENOUS at 21:22

## 2022-06-18 RX ADMIN — SODIUM CHLORIDE, PRESERVATIVE FREE 10 ML: 5 INJECTION INTRAVENOUS at 08:00

## 2022-06-18 RX ADMIN — INSULIN GLARGINE 5 UNITS: 100 INJECTION, SOLUTION SUBCUTANEOUS at 00:23

## 2022-06-18 RX ADMIN — IPRATROPIUM BROMIDE AND ALBUTEROL SULFATE 1 AMPULE: .5; 3 SOLUTION RESPIRATORY (INHALATION) at 08:16

## 2022-06-18 RX ADMIN — IPRATROPIUM BROMIDE AND ALBUTEROL SULFATE 1 AMPULE: .5; 3 SOLUTION RESPIRATORY (INHALATION) at 21:06

## 2022-06-18 RX ADMIN — CEFTRIAXONE SODIUM 1000 MG: 1 INJECTION, POWDER, FOR SOLUTION INTRAMUSCULAR; INTRAVENOUS at 00:24

## 2022-06-18 RX ADMIN — GUAIFENESIN DEXTROMETHORPHAN HYDROBROMIDE ORAL SOLUTION 10 ML: 200; 20 SOLUTION ORAL at 22:02

## 2022-06-18 RX ADMIN — PANTOPRAZOLE SODIUM 40 MG: 40 TABLET, DELAYED RELEASE ORAL at 07:58

## 2022-06-18 RX ADMIN — INSULIN LISPRO 12 UNITS: 100 INJECTION, SOLUTION INTRAVENOUS; SUBCUTANEOUS at 16:14

## 2022-06-18 RX ADMIN — INSULIN LISPRO 5 UNITS: 100 INJECTION, SOLUTION INTRAVENOUS; SUBCUTANEOUS at 21:16

## 2022-06-18 RX ADMIN — CARVEDILOL 25 MG: 25 TABLET, FILM COATED ORAL at 07:59

## 2022-06-18 RX ADMIN — AMLODIPINE BESYLATE 10 MG: 10 TABLET ORAL at 07:59

## 2022-06-18 ASSESSMENT — ENCOUNTER SYMPTOMS
CONSTIPATION: 0
SHORTNESS OF BREATH: 0
STRIDOR: 0
APNEA: 0
DIARRHEA: 0
ALLERGIC/IMMUNOLOGIC NEGATIVE: 1
NAUSEA: 0
ABDOMINAL DISTENTION: 0
WHEEZING: 1
CHEST TIGHTNESS: 0
COUGH: 0
ABDOMINAL PAIN: 0
EYES NEGATIVE: 1

## 2022-06-18 ASSESSMENT — PAIN SCALES - GENERAL: PAINLEVEL_OUTOF10: 0

## 2022-06-18 NOTE — PROGRESS NOTES
[address] [facility]  Progress Note    Date:   6/18/2022  Patient name:  Kate Fernandez  Date of admission:  6/14/2022 10:04 AM  MRN:   [id number]  YOB: 1955    SUBJECTIVE/Last 24 hours update:     Patient seen and examined at the bed side  No new acute events overnight   Today patient is sitting in chair at bedside. Currently on 3L O2. Denies any cough or worsening SOB. Patient had CT scan shows chronic COPD, small pleural effusion and pericardial effusion. HPI:   The patient is a 69 y.o.  male with past medical history of type 2 diabetes well controlled, hypertension, COPD on 3 L O2 home oxygen, COVID-19 in 01/2022 who presents to the hospital complaining of of productive cough and shortness of breath. Patient reports for the past week he has had a productive cough with green to yellow sputum, fever and chills and worsening shortness of breath. 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 Patient required increasing oxygen that is currently on 3 L at home. During patient's stay in the hospital for COVID-19 in January 2022 he received Actemra and Decadron for 10 days. Patient called EMS this morning. Patient SaO2 was in the 70s placed on BiPAP, received nitro x1 and Solu-Medrol in route.       In the ED, patient's oxygen saturation was in the high 70s placed on BiPAP. Vital signs afebrile tachypneic, hypertensive 182/74. SaO2 93% on BiPAP FiO2 of 40. BMP sodium 145, potassium 4.4, creatinine 0.67, procalcitonin 0.38.   proBNP 1628 (around baseline), troponin 51 downtrending to 40 (baseline 70), negative EKG findings. CBC no leukocytosis hemoglobin H/H 10.2/33. 7. VBG 7.342, CO2 76.9, bicarb 41.7, O2 53.0  Rapid flu and COVID were negative. D-dimer normal.    Chest x-ray: Shows small left pleural effusion scattered lung space possible to pneumonia.   Patient admitted to the hospital for acute on chronic respiratory failure secondary to COPD exacerbation secondary pneumonia. REVIEW OF SYSTEMS:      Review of Systems   Constitutional: Negative for chills, diaphoresis and fatigue. HENT: Negative. Eyes: Negative. Respiratory: Positive for wheezing. Negative for apnea, cough, chest tightness, shortness of breath and stridor. Cardiovascular: Negative for chest pain, palpitations and leg swelling. Gastrointestinal: Negative for abdominal distention, abdominal pain, constipation, diarrhea and nausea. Endocrine: Negative. Genitourinary: Negative. Musculoskeletal: Negative. Skin: Negative. Allergic/Immunologic: Negative. Neurological: Negative. Hematological: Negative. Psychiatric/Behavioral: Negative. PAST MEDICAL HISTORY:      has a past medical history of CHF (congestive heart failure) ([facility] [address] COPD (chronic obstructive pulmonary disease) ([facility] [address] [facility] Erectile dysfunction due to arterial insufficiency, Hypertension, Microalbuminuria due to type 2 diabetes mellitus ([facility] [address] and Overweight (BMI 25.0-29.9). PAST SURGICAL HISTORY:      has a past surgical history that includes Appendectomy and Total ankle arthroplasty. SOCIAL HISTORY:      reports that he quit smoking about 6 months ago. His smoking use included cigarettes. He smoked 0.50 packs per day. He has never used smokeless tobacco. He reports that he does not drink alcohol and does not use drugs. FAMILY HISTORY:     family history is not on file. HOME MEDICATIONS:      Prior to Admission medications    Medication Sig Start Date End Date Taking?  Authorizing Provider   carvedilol (COREG) 12.5 MG tablet Take 12.5 mg by mouth 2 times daily (with meals)   Yes Historical Provider, MD   furosemide (LASIX) 20 MG tablet Take 20 mg by mouth daily   Yes Historical Provider, MD   metFORMIN (GLUCOPHAGE) 500 MG tablet Take 1 tablet by mouth 2 times daily (with meals) 5/31/22   Clifford Smith MD   lisinopril (PRINIVIL;ZESTRIL) 20 MG tablet Take 20 mg by mouth daily    Historical Provider, MD   atorvastatin (LIPITOR) 20 MG tablet Take 1 tablet by mouth daily 2/1/22   Josette Joshi MD   hydrALAZINE (APRESOLINE) 50 MG tablet Take 1 tablet by mouth every 8 hours 1/26/22   Eneida Lin MD   Vitamin D (CHOLECALCIFEROL) 50 MCG (2000 UT) TABS tablet Take 1 tablet by mouth daily 1/27/22   Eneida Lin MD   amLODIPine (NORVASC) 10 MG tablet TAKE 1 TABLET BY MOUTH DAILY 10/25/21   Tracey Soares MD   aspirin (HM ASPIRIN EC LOW DOSE) 81 MG EC tablet TAKE 1 TABLET BY MOUTH DAILY 8/26/21   Kellee Cervantes MD   albuterol sulfate HFA (PROAIR HFA) 108 (90 Base) MCG/ACT inhaler inhale 2 puffs by mouth every 6 hours if needed for wheezing 8/16/21   Te Thomas MD   fluticasone-salmeterol (ADVAIR DISKUS) 100-50 MCG/DOSE diskus inhaler INHALE 1 PUFF INTO THE LUNGS EVERY 12 HOURS 8/16/21   Te Thomas MD   tiotropium (Manford Passy) 18 MCG inhalation capsule Inhale 1 capsule into the lungs daily  Patient not taking: Reported on 3/9/2022 1/9/20   Lili Mckoy MD       ALLERGIES:     Patient has no known allergies. OBJECTIVE:       Vitals:    06/18/22 0308 06/18/22 0459 06/18/22 0703 06/18/22 0758   BP:  (!) 154/65 (!) 140/67 136/66   Pulse:  63 66 67   Resp: 17 21 17    Temp:  98.2 °F (36.8 °C) 98.2 °F (36.8 °C)    TempSrc:  Oral Oral    SpO2:  98% 98%    Weight:  166 lb 10.7 oz (75.6 kg)           Intake/Output Summary (Last 24 hours) at 6/18/2022 0851  Last data filed at 6/18/2022 0459  Gross per 24 hour   Intake 240 ml   Output 800 ml   Net -560 ml       PHYSICAL EXAM:  Physical Exam  Constitutional:       Appearance: Normal appearance. Comments: Patient sitting comfortably in chair   HENT:      Head: Normocephalic and atraumatic. Nose:      Comments: Nasal cannula in place.   Eyes:      Extraocular Movements: Extraocular movements intact. Pupils: Pupils are equal, round, and reactive to light. Cardiovascular:      Rate and Rhythm: Normal rate and regular rhythm. Pulses: Normal pulses. Heart sounds: Normal heart sounds. Pulmonary:      Effort: Pulmonary effort is normal.      Breath sounds: Normal breath sounds. Abdominal:      General: Bowel sounds are normal.      Palpations: Abdomen is soft. Musculoskeletal:         General: Normal range of motion. Cervical back: Normal range of motion. Skin:     General: Skin is warm. Neurological:      General: No focal deficit present. Mental Status: He is alert.    Psychiatric:         Mood and Affect: Mood normal.         Behavior: Behavior normal.          DIAGNOSTICS:     Laboratory Testing:    Recent Results (from the past 24 hour(s))   POC Glucose Fingerstick    Collection Time: 06/17/22 11:31 AM   Result Value Ref Range    POC Glucose 336 (H) 75 - 110 mg/dL   POC Glucose Fingerstick    Collection Time: 06/17/22  4:50 PM   Result Value Ref Range    POC Glucose 265 (H) 75 - 110 mg/dL   POC Glucose Fingerstick    Collection Time: 06/17/22  8:58 PM   Result Value Ref Range    POC Glucose 262 (H) 75 - 110 mg/dL   CBC with Auto Differential    Collection Time: 06/18/22  4:56 AM   Result Value Ref Range    WBC 12.5 (H) 3.5 - 11.3 k/uL    RBC 3.30 (L) 4.21 - 5.77 m/uL    Hemoglobin 9.7 (L) 13.0 - 17.0 g/dL    Hematocrit 30.8 (L) 40.7 - 50.3 %    MCV 93.3 82.6 - 102.9 fL    MCH 29.4 25.2 - 33.5 pg    MCHC 31.5 28.4 - 34.8 g/dL    RDW 12.8 11.8 - 14.4 %    Platelets 810 973 - 891 k/uL    MPV 11.3 8.1 - 13.5 fL    NRBC Automated 0.0 0.0 per 100 WBC    Seg Neutrophils 87 (H) 36 - 65 %    Lymphocytes 7 (L) 24 - 43 %    Monocytes 4 3 - 12 %    Eosinophils % 0 (L) 1 - 4 %    Basophils 0 0 - 2 %    Immature Granulocytes 1 (H) 0 %    Segs Absolute 10.96 (H) 1.50 - 8.10 k/uL    Absolute Lymph # 0.89 (L) 1.10 - 3.70 k/uL    Absolute Mono # 0.54 0.10 - 1.20 k/uL    Absolute Eos # 25 mg  25 mg Oral BID JUAN MANUEL Giang MD   25 mg at 06/18/22 0759    [Held by provider] hydrALAZINE (APRESOLINE) tablet 50 mg  50 mg Oral 3 times per day Caitlyn Maldonado MD   50 mg at 06/17/22 1770    lisinopril (PRINIVIL;ZESTRIL) tablet 20 mg  20 mg Oral Daily Alexander Giang MD   20 mg at 06/18/22 0758    [Held by provider] metFORMIN (GLUCOPHAGE) tablet 500 mg  500 mg Oral BID  Alexander Giang MD        sodium chloride flush 0.9 % injection 5-40 mL  5-40 mL IntraVENous 2 times per day Alexander Giang MD   10 mL at 06/18/22 0800    sodium chloride flush 0.9 % injection 5-40 mL  5-40 mL IntraVENous PRN Alexander Giang MD        0.9 % sodium chloride infusion   IntraVENous PRN Alexander Giang MD        ondansetron (ZOFRAN-ODT) disintegrating tablet 4 mg  4 mg Oral Q8H PRN Alexander Giang MD        Or    ondansetron (ZOFRAN) injection 4 mg  4 mg IntraVENous Q6H PRN Alexander Giang MD        polyethylene glycol (GLYCOLAX) packet 17 g  17 g Oral Daily PRN Alexander Giang MD        enoxaparin (LOVENOX) injection 40 mg  40 mg SubCUTAneous Daily Alexander Giang MD   40 mg at 06/18/22 0759    acetaminophen (TYLENOL) tablet 650 mg  650 mg Oral Q6H PRN Caitlyn Maldonado MD        Or    acetaminophen (TYLENOL) suppository 650 mg  650 mg Rectal Q6H PRN Caitlyn Maldonado MD        ipratropium-albuterol (DUONEB) nebulizer solution 1 ampule  1 ampule Inhalation Q4H WA Alexander Giang MD   1 ampule at 06/18/22 0816    glucose chewable tablet 16 g  4 tablet Oral PRN Alexander Giang MD        dextrose bolus 10% 125 mL  125 mL IntraVENous PRN Alexander Giang MD        Or    dextrose bolus 10% 250 mL  250 mL IntraVENous PRN Alexander Giang MD        glucagon (rDNA) injection 1 mg  1 mg IntraMUSCular PRN Alexander Giang MD        dextrose 5 % solution  100 mL/hr IntraVENous PRN Alexander Giang MD        predniSONE (DELTASONE) tablet 40 mg  40 mg Oral Daily Alexander Giang, MD   40 mg at 06/18/22 0758    LORazepam (ATIVAN) injection 0.5 mg  0.5 mg IntraVENous Q4H PRN Alexander MD Padmaja   0.5 mg at 06/14/22 1933    [Held by provider] hydrALAZINE (APRESOLINE) injection 10 mg  10 mg IntraVENous Q6H PRN Manoj Geiger MD   10 mg at 06/15/22 0030       ASSESSMENT:     Principal Problem:    Acute on chronic respiratory failure with hypoxia (HCC)  Active Problems:    COPD exacerbation ([facility] [address]    Controlled type 2 diabetes mellitus without complication, without long-term current use of insulin (HCC)    Combined systolic and diastolic congestive heart failure ([facility] [address]    Pneumonia of both lungs due to infectious organism    Hypoxia  Resolved Problems:    * No resolved hospital problems. *      PLAN:      Acute on chronic respiratory failure due to COPD exacerbation secondary to community acquired pneumonia, improving   On 3 L oxygen (baseline)  Procalcitonin 0.38, VBG  VBG 7.382, CO2 65.3, bicarb 38.8, O2 94  Rapid flu COVID-negative  Strep positive   Legionella antigen neg    Chest x-ray: Shows small left pleural effusion scattered lung space possible to pneumonia. Continue ceftriaxone and azithromycin 4/5 days  De-escalated IV steroids to oral  DuoNeb every 4 hours while awake  CT chest without contrast    -Small to moderate left-sided pleural effusion. Small right pleural effusion basilar atelectasis. Pericardial effusion 5.3 mL artery calcification. Pulmonary venous congestion. Lasix IV 20 Mg daily      Hypertension  Continued p.o. hydralazine, lisinopril, carvedilol, amlodipine      Type 2 diabetes  Held metformin  A1c 6.2 2/2022  M ISS  Hypoglycemia protocol     Code full  Diet adult regular, 3 carb choices low-sodium  DVT prophylaxis Lovenox 40 Mg subcu daily  GI prophylaxis Protonix 40 Mg IV once daily  PT/OT/SW  Dispo: Home depending on oxygen desaturation with ambulation. Discussed care plan with nurse after getting her input. Above plan discussed with the patient.  who agreed to the above plan     Plan will be discussed with the attending, Dr. Severa Filippo Datt, MD  Family Medicine Resident  6/18/2022 8:51 AM

## 2022-06-18 NOTE — PROGRESS NOTES
Home Oxygen Evaluation    Home Oxygen Evaluation completed. Patient is on 3 liters per minute via NC. Resting SpO2 =95%  Resting SpO2 on room air = 86%    Patient placed on 3L NC via home regimen. Nocturnal Oximetry with patient on room air is recommended is SpO2 is between 89% and 95% (requires additional order).     Tracy Rinne, RCP  3:41 PM

## 2022-06-18 NOTE — CARE COORDINATION
Met with pt to discuss nebulizer and per pt, he would like to use same company that supplies his home O2, writer checked prior CM notes and his home O2 is through [employer] [facility]. Faxed face sheet, face to face, H&P, nebulizer order, and MAR to [facility]. Also placed call to [employer] [place] on-call and spoke with . Pt to contact [facility]'s  at [phone] when he gets home for nebulizer setup delivery. 1601 - Per pt, he is not going home tonight. Pt also does not have a home O2 tank with him. Writer received message from pt's nurse stating same, spoke with MARIANNA Keene and per Yecenia, pt would be here another night. 46 - Per pt, his son needs to be home to help him into the house, therefore, inform pt of transport time when arranged.  Per pt, his son is not able to pick him up from the hospital.

## 2022-06-18 NOTE — PLAN OF CARE
Problem: Respiratory - Adult  Goal: Achieves optimal ventilation and oxygenation  6/18/2022 0145 by Oskar Francisco RCP  Outcome: Progressing

## 2022-06-18 NOTE — PLAN OF CARE
Problem: Discharge Planning  Goal: Discharge to home or other facility with appropriate resources  6/18/2022 0359 by Marivel Troy RN  Outcome: Progressing  6/17/2022 1729 by Jeannette Mcdonald RN  Outcome: Progressing

## 2022-06-18 NOTE — PLAN OF CARE
Problem: Discharge Planning  Goal: Discharge to home or other facility with appropriate resources  6/18/2022 1753 by Lynn Gudino RN  Outcome: Progressing  6/18/2022 0359 by Alexandra Dowell RN  Outcome: Progressing     Problem: Respiratory - Adult  Goal: Achieves optimal ventilation and oxygenation  6/18/2022 1753 by Lynn Gudino RN  Outcome: Progressing  6/18/2022 0359 by Alexandra Dowell RN  Outcome: Progressing     Problem: Chronic Conditions and Co-morbidities  Goal: Patient's chronic conditions and co-morbidity symptoms are monitored and maintained or improved  6/18/2022 1753 by Lynn Gudino RN  Outcome: Progressing  6/18/2022 0359 by Alexandra Dowell RN  Outcome: Progressing     Problem: Safety - Adult  Goal: Free from fall injury  6/18/2022 1753 by Lynn Gudino RN  Outcome: Progressing  6/18/2022 0359 by Alexandra Dowell RN  Outcome: Not Progressing

## 2022-06-19 VITALS
DIASTOLIC BLOOD PRESSURE: 86 MMHG | BODY MASS INDEX: 28.1 KG/M2 | TEMPERATURE: 98.2 F | RESPIRATION RATE: 21 BRPM | SYSTOLIC BLOOD PRESSURE: 131 MMHG | OXYGEN SATURATION: 97 % | HEART RATE: 73 BPM | WEIGHT: 168.87 LBS

## 2022-06-19 LAB
ABSOLUTE EOS #: <0.03 K/UL (ref 0–0.44)
ABSOLUTE LYMPH #: 1.07 K/UL (ref 1.1–3.7)
ABSOLUTE MONO #: 0.54 K/UL (ref 0.1–1.2)
ANION GAP SERPL CALCULATED.3IONS-SCNC: 6 MMOL/L (ref 9–17)
BASOPHILS # BLD: 0 % (ref 0–2)
BASOPHILS ABSOLUTE: <0.03 K/UL (ref 0–0.2)
BUN BLDV-MCNC: 42 MG/DL (ref 8–23)
CALCIUM SERPL-MCNC: 8.4 MG/DL (ref 8.6–10.4)
CHLORIDE BLD-SCNC: 88 MMOL/L (ref 98–107)
CO2: 35 MMOL/L (ref 20–31)
CREAT SERPL-MCNC: 1.02 MG/DL (ref 0.7–1.2)
EOSINOPHILS RELATIVE PERCENT: 0 % (ref 1–4)
GFR AFRICAN AMERICAN: >60 ML/MIN
GFR NON-AFRICAN AMERICAN: >60 ML/MIN
GFR SERPL CREATININE-BSD FRML MDRD: ABNORMAL ML/MIN/{1.73_M2}
GLUCOSE BLD-MCNC: 135 MG/DL (ref 70–99)
HCT VFR BLD CALC: 32.2 % (ref 40.7–50.3)
HEMOGLOBIN: 10.2 G/DL (ref 13–17)
IMMATURE GRANULOCYTES %: 1 %
IMMATURE GRANULOCYTES ABSOLUTE: 0.16 K/UL (ref 0–0.3)
LYMPHOCYTES # BLD: 8 % (ref 24–43)
MCH RBC QN AUTO: 29.6 PG (ref 25.2–33.5)
MCHC RBC AUTO-ENTMCNC: 31.7 G/DL (ref 28.4–34.8)
MCV RBC AUTO: 93.3 FL (ref 82.6–102.9)
MONOCYTES # BLD: 4 % (ref 3–12)
NRBC AUTOMATED: 0 PER 100 WBC
PDW BLD-RTO: 12.9 % (ref 11.8–14.4)
PLATELET # BLD: 282 K/UL (ref 138–453)
PMV BLD AUTO: 11 FL (ref 8.1–13.5)
POTASSIUM SERPL-SCNC: 4.9 MMOL/L (ref 3.7–5.3)
RBC # BLD: 3.45 M/UL (ref 4.21–5.77)
SEG NEUTROPHILS: 87 % (ref 36–65)
SEGMENTED NEUTROPHILS ABSOLUTE COUNT: 11.04 K/UL (ref 1.5–8.1)
SODIUM BLD-SCNC: 129 MMOL/L (ref 135–144)
WBC # BLD: 12.8 K/UL (ref 3.5–11.3)

## 2022-06-19 PROCEDURE — 36415 COLL VENOUS BLD VENIPUNCTURE: CPT

## 2022-06-19 PROCEDURE — 99239 HOSP IP/OBS DSCHRG MGMT >30: CPT | Performed by: FAMILY MEDICINE

## 2022-06-19 PROCEDURE — 6360000002 HC RX W HCPCS: Performed by: STUDENT IN AN ORGANIZED HEALTH CARE EDUCATION/TRAINING PROGRAM

## 2022-06-19 PROCEDURE — 80048 BASIC METABOLIC PNL TOTAL CA: CPT

## 2022-06-19 PROCEDURE — 2500000003 HC RX 250 WO HCPCS: Performed by: STUDENT IN AN ORGANIZED HEALTH CARE EDUCATION/TRAINING PROGRAM

## 2022-06-19 PROCEDURE — 2700000000 HC OXYGEN THERAPY PER DAY

## 2022-06-19 PROCEDURE — 85025 COMPLETE CBC W/AUTO DIFF WBC: CPT

## 2022-06-19 PROCEDURE — 2580000003 HC RX 258: Performed by: STUDENT IN AN ORGANIZED HEALTH CARE EDUCATION/TRAINING PROGRAM

## 2022-06-19 PROCEDURE — 6370000000 HC RX 637 (ALT 250 FOR IP): Performed by: STUDENT IN AN ORGANIZED HEALTH CARE EDUCATION/TRAINING PROGRAM

## 2022-06-19 PROCEDURE — 94761 N-INVAS EAR/PLS OXIMETRY MLT: CPT

## 2022-06-19 RX ADMIN — CEFTRIAXONE SODIUM 1000 MG: 1 INJECTION, POWDER, FOR SOLUTION INTRAMUSCULAR; INTRAVENOUS at 10:26

## 2022-06-19 RX ADMIN — CEFTRIAXONE SODIUM 1000 MG: 1 INJECTION, POWDER, FOR SOLUTION INTRAMUSCULAR; INTRAVENOUS at 00:30

## 2022-06-19 RX ADMIN — AMLODIPINE BESYLATE 10 MG: 10 TABLET ORAL at 08:52

## 2022-06-19 RX ADMIN — PANTOPRAZOLE SODIUM 40 MG: 40 TABLET, DELAYED RELEASE ORAL at 08:51

## 2022-06-19 RX ADMIN — PREDNISONE 40 MG: 20 TABLET ORAL at 08:51

## 2022-06-19 RX ADMIN — LISINOPRIL 20 MG: 20 TABLET ORAL at 08:51

## 2022-06-19 RX ADMIN — ATORVASTATIN CALCIUM 20 MG: 20 TABLET, FILM COATED ORAL at 08:52

## 2022-06-19 RX ADMIN — ENOXAPARIN SODIUM 40 MG: 100 INJECTION SUBCUTANEOUS at 08:50

## 2022-06-19 RX ADMIN — CARVEDILOL 25 MG: 25 TABLET, FILM COATED ORAL at 08:52

## 2022-06-19 RX ADMIN — Medication 81 MG: at 08:52

## 2022-06-19 RX ADMIN — FUROSEMIDE 20 MG: 10 INJECTION, SOLUTION INTRAMUSCULAR; INTRAVENOUS at 08:50

## 2022-06-19 RX ADMIN — SODIUM CHLORIDE, PRESERVATIVE FREE 10 ML: 5 INJECTION INTRAVENOUS at 08:53

## 2022-06-19 ASSESSMENT — ENCOUNTER SYMPTOMS: TACHYPNEA: 1

## 2022-06-19 NOTE — CARE COORDINATION
Pt provided with numbers to call for nebulizer, he was also provided with O2 tank for home as he is apparently with [facility] but did not bring his tank to the hospital, I spoke with pts Nurse Yecenia who relates pt is not ready for discharge \"he still needs to walk and the physicians are checking on sleep study needs etc for cpap, looking at old records, Yecenia will notify me when to set up cab services\"\"    10am [place] unable to transport for another 3 hours, cab services set up with [employer], pt will be traveling with [address]  Confirmation #:[id number]  Customer:[employer] Number:[phone]  Voucher Number:[id number]      Trip Date/Time:2022 11:00:00 AM      Pickup Michael Esposito, [place], [place], [address] ava johnson, [place], [place], [address] [place] [address].  Ride Notes:Please  at 11am under the main hospital entrance [place] around canopy  Return Ride Ordered:  Ordered By:[address]    Discharge [address] [place] [address] Case Management Department  Written by: Sarah Rivas RN    Patient Name: Heide Colmenares  Attending Provider: Merlinda Pickett, MD  Admit Date: 2022 10:04 AM  MRN: [id number]  Account: [de-identified]                     : 1955  Discharge Date: 2022      Disposition: home    Sarah Rivas RN    Call from Patients nurse Pepe Norwood who relates they cannot take the patient to the lobby and wait for them so the patient was not taken down at 11am, she would like cab to call floor, explained [employer] does not call floor, will attempt to set up a [place] cab that can accomodate RN needs and call the floor when they arrive    Call to [facility], benefits not active for transportation     [phone] Call to [facility] and white transportation, cab rebooked unit number provided for call when they get here

## 2022-06-19 NOTE — DISCHARGE SUMMARY
Department of [address] [place] [address]    Discharge Summary      NAME:  Antonio Mcfadden  :  1955  MRN:  [id number]    Admit date:  2022  Discharge date:  2022    Admitting Physician:  Mary Ann Richardson MD    Primary Diagnosis on Admission:   Present on Admission:   COPD exacerbation ([facility]   Controlled type 2 diabetes mellitus without complication, without long-term current use of insulin ([facility]   Combined systolic and diastolic congestive heart failure ([facility]   Acute on chronic respiratory failure with hypoxia ([facility]   Hypoxia   Pneumonia of both lungs due to infectious organism      Secondary Diagnoses:  does not have any pertinent problems on file. Admission Condition:  fair     Discharged Condition: fair    Hospital Course: The patient was admitted for the management of acute on chronic respiratory failure secondary to COPD exacerbation with pneumonia. Patient was found to be positive for Streptococcus pneumonia. Was given a 5-day treatment of IV Rocephin and azithromycin. Was also given IV steroids which had been transitioned to oral prednisone during admission. Patient returned back to his baseline requirements of 3 L of oxygen via nasal cannula on day of discharge. He is feeling well and has no further complaints. Today on day of discharge pt feels better with no further complaints. Vitals and Labs are at pts baseline. All consultants involved during this admission are agreeable to d/c.     Consults:  none    Significant Diagnostic/theraputic interventions: IV Abx, IV Steroids, Breathing treatments      Disposition:   home    Instructions to Patient:   Take all medication as prescribed  You have been prescribed albuterol and DuoNeb for your nebulizer to use as an as needed basis for any shortness of breath  Return to the ER for worsening shortness of breath, chest pain  Follow-up with PCP in 2 weeks     Follow up with Stephanie Merrill MD in  2 weeks    Discharge Medications:       Medication List      START taking these medications    ipratropium-albuterol 0.5-2.5 (3) MG/3ML Soln nebulizer solution  Commonly known as: DUONEB  Inhale 3 mLs into the lungs every 4 hours (while awake)     predniSONE 20 MG tablet  Commonly known as: DELTASONE  Take 2 tablets by mouth daily for 5 days        CHANGE how you take these medications    * albuterol sulfate  (90 Base) MCG/ACT inhaler  Commonly known as: ProAir HFA  inhale 2 puffs by mouth every 6 hours if needed for wheezing  What changed: Another medication with the same name was added. Make sure you understand how and when to take each. * albuterol (5 MG/ML) 0.5% nebulizer solution  Commonly known as: PROVENTIL  Take 0.5 mLs by nebulization 4 times daily as needed for Wheezing  What changed: You were already taking a medication with the same name, and this prescription was added. Make sure you understand how and when to take each. carvedilol 25 MG tablet  Commonly known as: COREG  Take 1 tablet by mouth 2 times daily (with meals)  What changed:   · how much to take  · Another medication with the same name was removed. Continue taking this medication, and follow the directions you see here. * This list has 2 medication(s) that are the same as other medications prescribed for you. Read the directions carefully, and ask your doctor or other care provider to review them with you.             CONTINUE taking these medications    amLODIPine 10 MG tablet  Commonly known as: NORVASC  TAKE 1 TABLET BY MOUTH DAILY     aspirin 81 MG EC tablet  Commonly known as: HM Aspirin EC Low Dose  TAKE 1 TABLET BY MOUTH DAILY     atorvastatin 20 MG tablet  Commonly known as: LIPITOR  Take 1 tablet by mouth daily     fluticasone-salmeterol 100-50 MCG/DOSE diskus inhaler  Commonly known as: Advair Diskus  INHALE 1 PUFF INTO THE LUNGS EVERY 12 HOURS     furosemide 20 MG tablet  Commonly known as: LASIX lisinopril 20 MG tablet  Commonly known as: PRINIVIL;ZESTRIL     metFORMIN 500 MG tablet  Commonly known as: GLUCOPHAGE  Take 1 tablet by mouth 2 times daily (with meals)     vitamin D 50 MCG (2000 UT) Tabs tablet  Commonly known as: CHOLECALCIFEROL  Take 1 tablet by mouth daily        STOP taking these medications    hydrALAZINE 50 MG tablet  Commonly known as: APRESOLINE     tiotropium 18 MCG inhalation capsule  Commonly known as: Spiriva HandiHaler           Where to Get Your Medications      These medications were sent to [facility] [address] [facility], [address], [address] [place] [address]    Phone: [phone]   · albuterol (5 MG/ML) 0.5% nebulizer solution  · carvedilol 25 MG tablet  · ipratropium-albuterol 0.5-2.5 (3) MG/3ML Soln nebulizer solution  · predniSONE 20 MG tablet         Send Copies to: Beverly Judd MD      Note that over 30 minutes was spent in preparing discharge papers, discussing discharge with patient and family, medication review, etc.      Darnella Aase, MD  Family Medicine Resident  Family Medicine Inpatient Service  6/19/2022 1:16 PM          Please note that this chart was generated using voice recognition Dragon dictation software.   Although every effort was made to ensure the accuracy of this automated transcription, some errors in transcription may have occurred

## 2022-06-19 NOTE — PLAN OF CARE
Problem: Discharge Planning  Goal: Discharge to home or other facility with appropriate resources  6/19/2022 0651 by Mary Mike RN  Outcome: Progressing  6/18/2022 1753 by Yoselin Lantigua RN  Outcome: Progressing

## 2022-06-19 NOTE — PLAN OF CARE
Problem: Discharge Planning  Goal: Discharge to home or other facility with appropriate resources  6/19/2022 1039 by Naomi Bagley RN  Outcome: Progressing  6/19/2022 0651 by Roro Tabares RN  Outcome: Progressing     Problem: Respiratory - Adult  Goal: Achieves optimal ventilation and oxygenation  6/19/2022 1039 by Naomi Bagley RN  Outcome: Progressing  6/19/2022 0651 by Roro Tabares RN  Outcome: Progressing     Problem: Chronic Conditions and Co-morbidities  Goal: Patient's chronic conditions and co-morbidity symptoms are monitored and maintained or improved  6/19/2022 1039 by Naomi Bagley RN  Outcome: Progressing  6/19/2022 0651 by Roro Tabares RN  Outcome: Progressing     Problem: Safety - Adult  Goal: Free from fall injury  6/19/2022 1039 by Naomi Bagley RN  Outcome: Progressing  6/19/2022 0651 by Roro Tabares RN  Outcome: Progressing

## 2022-06-19 NOTE — PROGRESS NOTES
[facility]  Progress Note    Date:   6/19/2022  Patient name:  Lisbeth Jackson  Date of admission:  6/14/2022 10:04 AM  MRN:   [id number]  YOB: 1955    SUBJECTIVE/Last 24 hours update:     Patient seen and examined at the bed side  Acute events overnight  Vitals are within normal limits  On 3 L of oxygen which is his baseline  Resting comfortably in chair and having breakfast  Plan is for O2 evaluation before discharge today  Will need a sleep study OP  Has no other complaints today    Notes from nursing staff and Consults had been reviewed, and the overnight progress had been checked with the nursing staff as well. HPI:     The patient is S 51 y.o.  male with past medical history of type 2 diabetes well controlled, hypertension, COPD on 3 L O2 home oxygen, COVID-19 in 01/2022 who presents to the hospital complaining of of productive cough and shortness of breath.  Patient reports for the past week he has had a productive cough with green to yellow sputum, fever and chills and worsening shortness of breath. 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  Patient required increasing oxygen that is currently on 3 L at home.  During patient's stay in the hospital for COVID-19 in January 2022 he received Actemra and Decadron for 10 days.  Patient called EMS this morning.  Patient SaO2 was in the 70s placed on BiPAP, received nitro x1 and Solu-Medrol in route.       In the ED, patient's oxygen saturation was in the high 70s placed on BiPAP.  Vital signs afebrile tachypneic, hypertensive 182/74.  SaO2 93% on BiPAP FiO2 of 40.    BMP sodium 145, potassium 4.4, creatinine 0.67, procalcitonin 0.38.   proBNP 1628 (around baseline), troponin 51 downtrending to 40 (baseline 70), negative EKG findings.   CBC no leukocytosis hemoglobin H/H 10.2/33. 7.   VBG 7.342, CO2 76.9, bicarb 41.7, O2 53.0  Rapid flu and COVID were negative.  D-dimer normal.    Chest x-ray: Shows small left pleural effusion scattered lung space possible to pneumonia.  Patient admitted to the hospital for acute on chronic respiratory failure secondary to COPD exacerbation secondary pneumonia. REVIEW OF SYSTEMS:      CONSTITUTIONAL:  no fevers, no headcahes  EYES: negative for blury vision  HEENT: No headaches, No nasal congestion, no difficulty swallowing  RESPIRATORY:negative for dyspnea, no wheezing, no Cough  CARDIOVASCULAR: negative for chest pain, no palpitations  GASTROINTESTINAL: no nausea, no vomiting, no change in bowel habits, no abdominal pain   GENITOURINARY: negative for dysuria, no hematuria   MUSCULOSKELETAL: no joint pains, no muscle aches, no swelling of joints or extremities  NEUROLOGICAL: No  Weakness or numbness    PAST MEDICAL HISTORY:      has a past medical history of CHF (congestive heart failure) ([facility] COPD (chronic obstructive pulmonary disease) ([facility] [facility] Diabetes mellitus ([facility] Erectile dysfunction due to arterial insufficiency, Hypertension, Microalbuminuria due to type 2 diabetes mellitus ([facility] and Overweight (BMI 25.0-29.9). PAST SURGICAL HISTORY:      has a past surgical history that includes Appendectomy and Total ankle arthroplasty. SOCIAL HISTORY:      reports that he quit smoking about 6 months ago. His smoking use included cigarettes. He smoked 0.50 packs per day. He has never used smokeless tobacco. He reports that he does not drink alcohol and does not use drugs. FAMILY HISTORY:     family history is not on file. HOME MEDICATIONS:      Prior to Admission medications    Medication Sig Start Date End Date Taking?  Authorizing Provider   albuterol (PROVENTIL) (5 MG/ML) 0.5% nebulizer solution Take 0.5 mLs by nebulization 4 times daily as needed for Wheezing 6/19/22  Yes Lupe Hernandez MD   ipratropium-albuterol (DUONEB) 0.5-2.5 (3) MG/3ML SOLN nebulizer solution Inhale 3 mLs into the lungs every 4 hours (while awake) 6/18/22  Yes Alexander Giang MD   predniSONE (DELTASONE) 20 MG tablet Take 2 tablets by mouth daily for 5 days 6/19/22 6/24/22 Yes Alexander Giang MD   carvedilol (COREG) 25 MG tablet Take 1 tablet by mouth 2 times daily (with meals) 6/18/22  Yes Alexander Giang MD   furosemide (LASIX) 20 MG tablet Take 20 mg by mouth daily   Yes Historical Provider, MD   metFORMIN (GLUCOPHAGE) 500 MG tablet Take 1 tablet by mouth 2 times daily (with meals) 5/31/22   Benjaman Dakin, MD   lisinopril (PRINIVIL;ZESTRIL) 20 MG tablet Take 20 mg by mouth daily    Historical Provider, MD   atorvastatin (LIPITOR) 20 MG tablet Take 1 tablet by mouth daily 2/1/22   Elin Modi MD   Vitamin D (CHOLECALCIFEROL) 50 MCG (2000 UT) TABS tablet Take 1 tablet by mouth daily 1/27/22   Octavio Mcdonald MD   amLODIPine (NORVASC) 10 MG tablet TAKE 1 TABLET BY MOUTH DAILY 10/25/21   Link Lopez MD   aspirin (HM ASPIRIN EC LOW DOSE) 81 MG EC tablet TAKE 1 TABLET BY MOUTH DAILY 8/26/21   Coral Hernandez MD   albuterol sulfate HFA (PROAIR HFA) 108 (90 Base) MCG/ACT inhaler inhale 2 puffs by mouth every 6 hours if needed for wheezing 8/16/21   Te Triana MD   fluticasone-salmeterol (ADVAIR DISKUS) 100-50 MCG/DOSE diskus inhaler INHALE 1 PUFF INTO THE LUNGS EVERY 12 HOURS 8/16/21   Te Triana MD       ALLERGIES:     Patient has no known allergies.       OBJECTIVE:       Vitals:    06/19/22 0040 06/19/22 0450 06/19/22 0709 06/19/22 0851   BP: (!) 144/76 (!) 148/69 131/66 131/86   Pulse: 62 63 64 73   Resp: 19 19 21    Temp: 98.4 °F (36.9 °C) 98.6 °F (37 °C) 98.2 °F (36.8 °C)    TempSrc: Oral Oral Oral    SpO2: 100% 99% 97%    Weight:  168 lb 14 oz (76.6 kg)           Intake/Output Summary (Last 24 hours) at 6/19/2022 0917  Last data filed at 6/19/2022 0450  Gross per 24 hour   Intake 590 ml   Output 1275 ml   Net -685 ml       PHYSICAL EXAM:  General Appearance  Alert , awake , not in acute distress  HEENT - Head is normocephalic, atraumatic. Lungs - Bilateral equal air entry , no wheezes, rales or rhonchi, aeration good  Cardiovascular - Heart sounds are normal.  Regular rhythm, normal rate without murmur, gallop or rub.   Abdomen - Soft, nontender, nondistended, no masses or organomegaly  Neurologic - There are no new focal motor or sensory deficits  Skin - No bruising or bleeding on exposed skin area  Extremities - No cyanosis, clubbing or edema    DIAGNOSTICS:     Laboratory Testing:    Recent Results (from the past 24 hour(s))   POC Glucose Fingerstick    Collection Time: 06/18/22 11:33 AM   Result Value Ref Range    POC Glucose 185 (H) 75 - 110 mg/dL   POC Glucose Fingerstick    Collection Time: 06/18/22  4:01 PM   Result Value Ref Range    POC Glucose 303 (H) 75 - 110 mg/dL   POC Glucose Fingerstick    Collection Time: 06/18/22  8:48 PM   Result Value Ref Range    POC Glucose 298 (H) 75 - 110 mg/dL   CBC with Auto Differential    Collection Time: 06/19/22  6:56 AM   Result Value Ref Range    WBC 12.8 (H) 3.5 - 11.3 k/uL    RBC 3.45 (L) 4.21 - 5.77 m/uL    Hemoglobin 10.2 (L) 13.0 - 17.0 g/dL    Hematocrit 32.2 (L) 40.7 - 50.3 %    MCV 93.3 82.6 - 102.9 fL    MCH 29.6 25.2 - 33.5 pg    MCHC 31.7 28.4 - 34.8 g/dL    RDW 12.9 11.8 - 14.4 %    Platelets 835 423 - 357 k/uL    MPV 11.0 8.1 - 13.5 fL    NRBC Automated 0.0 0.0 per 100 WBC    Seg Neutrophils 87 (H) 36 - 65 %    Lymphocytes 8 (L) 24 - 43 %    Monocytes 4 3 - 12 %    Eosinophils % 0 (L) 1 - 4 %    Basophils 0 0 - 2 %    Immature Granulocytes 1 (H) 0 %    Segs Absolute 11.04 (H) 1.50 - 8.10 k/uL    Absolute Lymph # 1.07 (L) 1.10 - 3.70 k/uL    Absolute Mono # 0.54 0.10 - 1.20 k/uL    Absolute Eos # <0.03 0.00 - 0.44 k/uL    Basophils Absolute <0.03 0.00 - 0.20 k/uL    Absolute Immature Granulocyte 0.16 0.00 - 0.30 k/uL   Basic Metabolic Panel w/ Reflex to MG    Collection Time: 06/19/22  6:56 AM   Result Value Ref Range    Glucose 135 (H) 70 - 99 mg/dL    BUN 42 (H) 8 - 23 mg/dL    CREATININE 1.02 0.70 - 1.20 mg/dL    Calcium 8.4 (L) 8.6 - 10.4 mg/dL    Sodium 129 (L) 135 - 144 mmol/L    Potassium 4.9 3.7 - 5.3 mmol/L    Chloride 88 (L) 98 - 107 mmol/L    CO2 35 (H) 20 - 31 mmol/L    Anion Gap 6 (L) 9 - 17 mmol/L    GFR Non-African American >60 >60 mL/min    GFR African American >60 >60 mL/min    GFR Comment               Imaging/Diagonstics:    CT CHEST WO CONTRAST    Result Date: 6/16/2022  EXAMINATION: CT OF THE CHEST WITHOUT CONTRAST 6/16/2022 12:35 pm TECHNIQUE: CT of the chest was performed without the administration of intravenous contrast. Multiplanar reformatted images are provided for review. Automated exposure control, iterative reconstruction, and/or weight based adjustment of the mA/kV was utilized to reduce the radiation dose to as low as reasonably achievable. COMPARISON: None. HISTORY: ORDERING SYSTEM PROVIDED HISTORY: recurrent PNA TECHNOLOGIST PROVIDED HISTORY: recurrent PNA Reason for Exam: recurrent pna FINDINGS: Mediastinum: Prominent mediastinal lymph nodes are present without gross adenopathy. No acute aortic abnormality. Mild-to-moderate cardiomegaly is present. Pericardial effusion of 5.3 mm depth is noted. No epicardial adenopathy. Coronary artery calcification is evident. Lungs/pleura: Vascular markings are somewhat hazy in the lungs. Mild venous congestion not excluded. Small right effusion with compressive atelectasis right lower lobe is noted. Small to moderate left effusion. There is consolidation in the inferior aspect of the left lower lobe with air bronchograms. No extrapleural air is noted. No significant noncalcified solid pulmonary nodules or masses. Mild central bronchial wall thickening is noted. Trachea and mainstem bronchi are patent. There is some attenuation of the bronchus to the left lower lobe.   Small amount of material is present at the junction of the 2nd order bronchi in the left lower lobe which may represent some inspissated mucus. Upper Abdomen: Moderate calcification of the abdominal aorta and branches is noted. Included upper abdominal structures otherwise demonstrate no acute abnormality. Soft Tissues/Bones: Multilevel degenerative changes are present in the spine. ACDF hardware present anteriorly lower cervical region. No acute osseous destruction. The patient has bilateral gynecomastia. No axillary adenopathy. 1.  Prominent mediastinal lymph nodes. 2.  Small-to-moderate left-sided pleural effusion and consolidation of the inferior aspect of the left lower lobe. Question inspissated mucus in the 2nd order left lower lobe bronchi. 3.  Small right effusion with basilar atelectasis. 4.  Mild-to-moderate cardiomegaly with pericardial effusion of 5.3 mm and coronary artery calcification. 5.  Hazy pulmonary vascular margins suggesting pulmonary venous congestion. This can be correlated with BNP results as appropriate. XR CHEST PORTABLE    Result Date: 6/14/2022  EXAMINATION: ONE XRAY VIEW OF THE CHEST 6/14/2022 10:11 am COMPARISON: Chest x-ray dated 24 January 2022. HISTORY: ORDERING SYSTEM PROVIDED HISTORY: COPD, CHF, SOB TECHNOLOGIST PROVIDED HISTORY: COPD, CHF, SOB Reason for Exam: copd chf sob FINDINGS: Small left pleural effusion with left lower lobe airspace disease. Patchy infiltrates in the left upper lobe as well. The right lung is clear. No pneumothorax. The cardiomediastinal silhouette is within normal limits     Small left pleural effusion with scattered left lung airspace opacities. Consider pneumonia.      Current Facility-Administered Medications   Medication Dose Route Frequency Provider Last Rate Last Admin    cefTRIAXone (ROCEPHIN) 1,000 mg in sterile water 10 mL IV syringe  1,000 mg IntraVENous Q24H Jessy Travis MD        insulin glargine (LANTUS) injection vial 10 Units  10 Units SubCUTAneous Nightly Sallye Dandy, MD   10 Units at 06/18/22 2124    furosemide (LASIX) injection 20 mg  20 mg IntraVENous Daily Alexander Giang MD   20 mg at 06/19/22 0850    insulin lispro (HUMALOG) injection vial 0-18 Units  0-18 Units SubCUTAneous TID  Alexander Giang MD   12 Units at 06/18/22 1614    insulin lispro (HUMALOG) injection vial 0-9 Units  0-9 Units SubCUTAneous Nightly Alexander Giang MD   5 Units at 06/18/22 2116    pantoprazole (PROTONIX) tablet 40 mg  40 mg Oral QAM AC Alexander Giang MD   40 mg at 06/19/22 0851    dextromethorphan-guaiFENesin (ROBITUSSIN-DM)  MG/5ML liquid 10 mL  10 mL Oral Q4H PRN Latrice Arceo MD   10 mL at 06/18/22 2202    amLODIPine (NORVASC) tablet 10 mg  10 mg Oral Daily Alexander Giang MD   10 mg at 06/19/22 0852    aspirin EC tablet 81 mg  81 mg Oral Daily Alexander Giang MD   81 mg at 06/19/22 0852    atorvastatin (LIPITOR) tablet 20 mg  20 mg Oral Daily Alexander Giang MD   20 mg at 06/19/22 0852    carvedilol (COREG) tablet 25 mg  25 mg Oral BID  Alexander Giang MD   25 mg at 06/19/22 0852    [Held by provider] hydrALAZINE (APRESOLINE) tablet 50 mg  50 mg Oral 3 times per day Raul Mariscal MD   50 mg at 06/17/22 1678    lisinopril (PRINIVIL;ZESTRIL) tablet 20 mg  20 mg Oral Daily Alexander Giang MD   20 mg at 06/19/22 0851    [Held by provider] metFORMIN (GLUCOPHAGE) tablet 500 mg  500 mg Oral BID  Alexander Giang MD        sodium chloride flush 0.9 % injection 5-40 mL  5-40 mL IntraVENous 2 times per day Alexander Giang MD   10 mL at 06/19/22 0853    sodium chloride flush 0.9 % injection 5-40 mL  5-40 mL IntraVENous PRN Alexander Giang MD        0.9 % sodium chloride infusion   IntraVENous PRN Alexander Giang MD        ondansetron (ZOFRAN-ODT) disintegrating tablet 4 mg  4 mg Oral Q8H PRN Alexander Giang MD        Or    ondansetron (ZOFRAN) injection 4 mg  4 mg IntraVENous Q6H PRN Alexander Giang MD        polyethylene glycol (GLYCOLAX) packet 17 g  17 g Oral Daily PRN Alexander Giang MD        enoxaparin (LOVENOX) injection 40 mg  40 mg SubCUTAneous Daily Jammie Greco MD   40 mg at 06/19/22 0850    acetaminophen (TYLENOL) tablet 650 mg  650 mg Oral Q6H PRN Jammie Greco MD        Or    acetaminophen (TYLENOL) suppository 650 mg  650 mg Rectal Q6H PRN Jammie Greco MD        ipratropium-albuterol (DUONEB) nebulizer solution 1 ampule  1 ampule Inhalation Q4H WA Alexander Giang MD   1 ampule at 06/18/22 2106    glucose chewable tablet 16 g  4 tablet Oral PRN Alexander Giang MD        dextrose bolus 10% 125 mL  125 mL IntraVENous PRN Alexander Giang MD        Or    dextrose bolus 10% 250 mL  250 mL IntraVENous PRN Alexander Giang MD        glucagon (rDNA) injection 1 mg  1 mg IntraMUSCular PRN Alexander Giang MD        dextrose 5 % solution  100 mL/hr IntraVENous PRN Alexander Giang MD        predniSONE (DELTASONE) tablet 40 mg  40 mg Oral Daily Alexander Giang MD   40 mg at 06/19/22 0851    LORazepam (ATIVAN) injection 0.5 mg  0.5 mg IntraVENous Q4H PRN Alexander Giang MD   0.5 mg at 06/14/22 1933    [Held by provider] hydrALAZINE (APRESOLINE) injection 10 mg  10 mg IntraVENous Q6H PRN Aleyda Hedrick MD   10 mg at 06/15/22 0030       ASSESSMENT:     Principal Problem:    Acute on chronic respiratory failure with hypoxia (HCC)  Active Problems:    COPD exacerbation ([address]    Controlled type 2 diabetes mellitus without complication, without long-term current use of insulin (HCC)    Combined systolic and diastolic congestive heart failure ([address]    Pneumonia of both lungs due to infectious organism    Hypoxia  Resolved Problems:    * No resolved hospital problems.  *      PLAN:     Acute on chronic respiratory failure due to COPD exacerbation secondary to community acquired pneumonia, improved   On 3 L oxygen (baseline)  Procalcitonin 0.38, VBG  VBG 7.382, CO2 65.3, bicarb 38.8, O2 94  Rapid flu COVID-negative  Strep positive   Legionella antigen neg    Chest x-ray: Shows small left pleural effusion scattered lung space possible to pneumonia.   Continue ceftriaxone and azithromycin 5/5 days  De-escalated IV steroids to oral  DuoNeb every 4 hours while awake  CT chest without contrast               -Small to moderate left-sided pleural effusion. Small right pleural effusion basilar atelectasis. Pericardial effusion 5.3 mL artery calcification. Pulmonary venous congestion. Lasix IV 20 Mg daily     Hypertension  Continued p.o. hydralazine, lisinopril, carvedilol, amlodipine      Type 2 diabetes  Held metformin  A1c 6.2 2/2022  M ISS  Hypoglycemia protocol     Code full  Diet adult regular, 3 carb choices low-sodium  DVT prophylaxis Lovenox 40 Mg subcu daily  GI prophylaxis Protonix 40 Mg IV once daily    Dispo: Home depending on oxygen desaturation with ambulation       Above plan discussed with the patient. who agreed to the above plan        Plan will be discussed with the attending, Dr. Coral Us MD  Family Medicine Resident  6/19/2022 9:17 AM            Please note that this chart was generated using voice recognition Dragon dictation software.   Although every effort was made to ensure the accuracy of this automated transcription, some errors in transcription may have occurred

## 2022-06-20 ENCOUNTER — CARE COORDINATION (OUTPATIENT)
Dept: CARE COORDINATION | Age: 67
End: 2022-06-20

## 2022-06-20 LAB
GLUCOSE BLD-MCNC: 135 MG/DL (ref 75–110)
GLUCOSE BLD-MCNC: 237 MG/DL (ref 75–110)

## 2022-06-21 NOTE — CARE COORDINATION
Attempted to reach patient for follow up. M requesting return call  To [phone]. Will attempt to reach again within 1-2 weeks.   ERNA James

## 2022-07-08 ENCOUNTER — CARE COORDINATION (OUTPATIENT)
Dept: CARE COORDINATION | Age: 67
End: 2022-07-08

## 2022-07-11 NOTE — CARE COORDINATION
Patient goals reviewed:  1. Reviewed working on meal patten, currently eating 2-3 meal/day, working on eating breakfast daily. Reviewed and encouraged set meal times daily.  Quick/easy breakfast suggestions reviewed.  Goal is 3 meals daily spaced every 4-5 hours. 2. Reviewed portion control by measuring out portions using a measuring cup or hand to estimate a serving size. Encouraged patient to limit carb intake to 60gms/meal, 15-30gms/snack.  Reviewed low carb snacking. 3. Encouraged patient to increase intake of non-starchy vegetables.  Goal is 1/2 of  plate to be non-starchy vegetables, 1/4 lean protein, 1/4 carbs. Foods from each category reviewed along with what a serving size is.   4. Reviewed avoiding/limiting sweets and sweetened drinks. Patient relays does drink some regular pop, encouraged to work on cutting out and replacing with water. Reviewed avoiding sugary foods and sugar free alternatives to sweets- sugar free jello, pudding, ect. Encouraged patient to keep sweets out of the house as much as possible and limit to once a week or less.   5. Reviewed reading food labels to limit sodium intake. Reviewed high sodium foods to avoid/limit- processed and prepackage foods, processed meats, ect. Encouraged to shop the outer rim of the grocery store where fresher foods are found. Goal is <2gms of sodium/day.   7/11/22-patient reading labels now much more aware of foods to avoid/limit. Patient has no further nutrition questions. Provided with contact information to call with questions. Will remove from panel.   ERNA James

## 2022-07-12 ENCOUNTER — OFFICE VISIT (OUTPATIENT)
Dept: FAMILY MEDICINE CLINIC | Age: 67
End: 2022-07-12
Payer: MEDICARE

## 2022-07-12 VITALS
WEIGHT: 155 LBS | HEART RATE: 67 BPM | BODY MASS INDEX: 25.79 KG/M2 | DIASTOLIC BLOOD PRESSURE: 60 MMHG | SYSTOLIC BLOOD PRESSURE: 107 MMHG

## 2022-07-12 DIAGNOSIS — E11.69 TYPE 2 DIABETES MELLITUS WITH OTHER SPECIFIED COMPLICATION, WITHOUT LONG-TERM CURRENT USE OF INSULIN (HCC): Primary | ICD-10-CM

## 2022-07-12 DIAGNOSIS — Z76.0 MEDICATION REFILL: ICD-10-CM

## 2022-07-12 DIAGNOSIS — I50.42 CHRONIC COMBINED SYSTOLIC AND DIASTOLIC CONGESTIVE HEART FAILURE (HCC): ICD-10-CM

## 2022-07-12 DIAGNOSIS — J44.9 CHRONIC OBSTRUCTIVE PULMONARY DISEASE, UNSPECIFIED COPD TYPE (HCC): ICD-10-CM

## 2022-07-12 DIAGNOSIS — Z09 HOSPITAL DISCHARGE FOLLOW-UP: ICD-10-CM

## 2022-07-12 PROCEDURE — 99213 OFFICE O/P EST LOW 20 MIN: CPT | Performed by: STUDENT IN AN ORGANIZED HEALTH CARE EDUCATION/TRAINING PROGRAM

## 2022-07-12 PROCEDURE — 1111F DSCHRG MED/CURRENT MED MERGE: CPT | Performed by: STUDENT IN AN ORGANIZED HEALTH CARE EDUCATION/TRAINING PROGRAM

## 2022-07-12 PROCEDURE — 3044F HG A1C LEVEL LT 7.0%: CPT | Performed by: STUDENT IN AN ORGANIZED HEALTH CARE EDUCATION/TRAINING PROGRAM

## 2022-07-12 PROCEDURE — 1123F ACP DISCUSS/DSCN MKR DOCD: CPT | Performed by: STUDENT IN AN ORGANIZED HEALTH CARE EDUCATION/TRAINING PROGRAM

## 2022-07-12 RX ORDER — FLUTICASONE PROPIONATE AND SALMETEROL 100; 50 UG/1; UG/1
1 POWDER RESPIRATORY (INHALATION) EVERY 12 HOURS
Qty: 14 EACH | Refills: 5 | Status: SHIPPED | OUTPATIENT
Start: 2022-07-12

## 2022-07-12 RX ORDER — TIOTROPIUM BROMIDE 18 UG/1
18 CAPSULE ORAL; RESPIRATORY (INHALATION) DAILY
Qty: 30 CAPSULE | Refills: 5 | Status: SHIPPED | OUTPATIENT
Start: 2022-07-12

## 2022-07-12 RX ORDER — CHOLECALCIFEROL (VITAMIN D3) 50 MCG
2000 TABLET ORAL DAILY
Qty: 60 TABLET | Refills: 1 | Status: SHIPPED | OUTPATIENT
Start: 2022-07-12

## 2022-07-12 RX ORDER — ATORVASTATIN CALCIUM 20 MG/1
20 TABLET, FILM COATED ORAL DAILY
Qty: 30 TABLET | Refills: 5 | Status: SHIPPED | OUTPATIENT
Start: 2022-07-12

## 2022-07-12 RX ORDER — FUROSEMIDE 20 MG/1
20 TABLET ORAL DAILY
Qty: 60 TABLET | Refills: 5 | Status: SHIPPED | OUTPATIENT
Start: 2022-07-12

## 2022-07-12 RX ORDER — AMLODIPINE BESYLATE 10 MG/1
10 TABLET ORAL DAILY
Qty: 30 TABLET | Refills: 5 | Status: SHIPPED | OUTPATIENT
Start: 2022-07-12

## 2022-07-12 RX ORDER — IPRATROPIUM BROMIDE AND ALBUTEROL SULFATE 2.5; .5 MG/3ML; MG/3ML
3 SOLUTION RESPIRATORY (INHALATION)
Qty: 360 ML | Refills: 5 | Status: SHIPPED | OUTPATIENT
Start: 2022-07-12

## 2022-07-12 RX ORDER — LISINOPRIL 20 MG/1
20 TABLET ORAL DAILY
Qty: 30 TABLET | Refills: 5 | Status: SHIPPED | OUTPATIENT
Start: 2022-07-12

## 2022-07-12 RX ORDER — CARVEDILOL 25 MG/1
25 TABLET ORAL 2 TIMES DAILY WITH MEALS
Qty: 60 TABLET | Refills: 5 | Status: SHIPPED | OUTPATIENT
Start: 2022-07-12 | End: 2022-08-05

## 2022-07-12 RX ORDER — ASPIRIN 81 MG/1
TABLET ORAL
Qty: 90 TABLET | Refills: 5 | Status: SHIPPED | OUTPATIENT
Start: 2022-07-12

## 2022-07-12 RX ORDER — ALBUTEROL SULFATE 90 UG/1
AEROSOL, METERED RESPIRATORY (INHALATION)
Qty: 18 G | Refills: 5 | Status: SHIPPED | OUTPATIENT
Start: 2022-07-12 | End: 2022-07-18

## 2022-07-12 ASSESSMENT — PATIENT HEALTH QUESTIONNAIRE - PHQ9
SUM OF ALL RESPONSES TO PHQ QUESTIONS 1-9: 0
1. LITTLE INTEREST OR PLEASURE IN DOING THINGS: 0
SUM OF ALL RESPONSES TO PHQ QUESTIONS 1-9: 0
SUM OF ALL RESPONSES TO PHQ QUESTIONS 1-9: 0
SUM OF ALL RESPONSES TO PHQ9 QUESTIONS 1 & 2: 0
SUM OF ALL RESPONSES TO PHQ QUESTIONS 1-9: 0
2. FEELING DOWN, DEPRESSED OR HOPELESS: 0

## 2022-07-12 NOTE — PROGRESS NOTES
Attending Physician Statement  I have discussed the care of Arun Castellano 69 y.o. male, including pertinent history and exam findings, with the resident Dr. Greta Hazel MD.    History and Exam:   Chief Complaint   Patient presents with    Follow-Up from Hospital       Past Medical History:   Diagnosis Date    [facility]congestive [facility] failure) ([facility]     COPD (chronic obstructive pulmonary disease) ([facility]     Diabetes mellitus ([place] [address].)     Erectile dysfunction due to arterial insufficiency 12/07/2015    Hypertension     Microalbuminuria due to type 2 diabetes mellitus ([facility] 07/07/2016    Overweight (BMI 25.0-29.9) 12/07/2015     No Known Allergies   I have seen and examined the patient and the key elements of the encounter have been performed by me. BP Readings from Last 3 Encounters:   07/12/22 107/60   06/19/22 131/86   02/15/22 134/68     /60 (Site: Right Upper Arm, Position: Sitting, Cuff Size: Medium Adult)   Pulse 67   Wt 155 lb (70.3 kg)   BMI 25.79 kg/m²   Lab Results   Component Value Date    WBC 12.8 (H) 06/19/2022    HGB 10.2 (L) 06/19/2022    HCT 32.2 (L) 06/19/2022     06/19/2022    CHOL 149 02/15/2022    TRIG 101 02/15/2022    HDL 58 02/15/2022    ALT 7 06/14/2022    AST 10 06/14/2022     (L) 06/19/2022    K 4.9 06/19/2022    CL 88 (L) 06/19/2022    CREATININE 1.02 06/19/2022    BUN 42 (H) 06/19/2022    CO2 35 (H) 06/19/2022    TSH 1.24 01/22/2022    PSA 0.77 06/21/2012    INR 0.9 01/18/2022    LABA1C 6.2 02/15/2022    LABMICR 74 (H) 02/17/2022     Lab Results   Component Value Date    LABPROT 6.5 06/21/2012    LABALBU 3.2 (L) 06/14/2022     Lab Results   Component Value Date    IRON 79 12/23/2016    TIBC 233 (L) 12/23/2016    FERRITIN 532 (H) 01/20/2022     Lab Results   Component Value Date    LDLCALC 24 07/01/2016    LDLCHOLESTEROL 71 02/15/2022     I agree with the assessment, plan and the diagnosis of    Diagnosis Orders   1.  Type 2 diabetes mellitus with other specified complication, without long-term current use of insulin (HCC)  metFORMIN (GLUCOPHAGE) 500 MG tablet   2. Medication refill  albuterol sulfate HFA (PROAIR HFA) 108 (90 Base) MCG/ACT inhaler    amLODIPine (NORVASC) 10 MG tablet    atorvastatin (LIPITOR) 20 MG tablet    vitamin D (CHOLECALCIFEROL) 50 MCG (2000 UT) TABS tablet    fluticasone-salmeterol (ADVIAR) 100-50 MCG/ACT AEPB diskus inhaler    ND DISCHARGE MEDS RECONCILED W/ CURRENT OUTPATIENT MED LIST   3. Chronic obstructive pulmonary disease, unspecified COPD type ([facility])  albuterol (PROVENTIL) (5 MG/ML) 0.5% nebulizer solution    albuterol sulfate HFA (PROAIR HFA) 108 (90 Base) MCG/ACT inhaler    ipratropium-albuterol (DUONEB) 0.5-2.5 (3) MG/3ML SOLN nebulizer solution    DME Order for Nebulizer as OP    Full PFT Study With Bronchodilator    tiotropium (SPIRIVA HANDIHALER) 18 MCG inhalation capsule   4. Chronic combined systolic and diastolic congestive heart failure (HCC)  aspirin (HM ASPIRIN EC LOW DOSE) 81 MG EC tablet    carvedilol (COREG) 25 MG tablet    furosemide (LASIX) 20 MG tablet    lisinopril (PRINIVIL;ZESTRIL) 20 MG tablet   5. Hospital discharge follow-up  ND DISCHARGE MEDS RECONCILED W/ CURRENT OUTPATIENT MED LIST    ND DISCHARGE MEDS RECONCILED W/ CURRENT OUTPATIENT MED LIST    . I agree with orders as documented by the resident. More than 25 minutes spent  in face to face encounter with the patient and more than half in counseling. Patient's questions were answered. Patient Voiced understanding to the counseling. No follow-ups on file.    (GC Modifier)-Dr. Dori Gilliland MD
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
## 2022-07-12 NOTE — PATIENT INSTRUCTIONS
Thank you for letting us take care of you today. We hope all your questions were addressed. If a question was overlooked or something else comes to mind after you return home, please contact a member of your Care Team listed below. Your Care Team at [place] [address] is Team #5  Esha Anne MD (Faculty)  Sen Vega MD (Resident)  Aleksandr Hampton MD (Resident)  Will Liu MD (Resident)  Rozina Wu MD, (Resident)  Jaci Jorge., SETH Love., RMA  Jane Ellis.,  LPN  Jyothi Alaniz., Marisol Smith., [facility])  Meño Liu, [address] [facility] [address] (Clinical Practice Manager)  Joyce Taylor, [facility] (Clinical Pharmacist)       Office phone number: [phone]    If you need to get in right away due to illness, please be advised we have \"Same Day\" appointments available Monday-Friday. Please call us at [phone] option #3 to schedule your \"Same Day\" appointment.

## 2022-07-12 NOTE — PROGRESS NOTES
Visit Information    Have you changed or started any medications since your last visit including any over-the-counter medicines, vitamins, or herbal medicines? no   Are you having any side effects from any of your medications? -  no  Have you stopped taking any of your medications? Is so, why? -  no    Have you seen any other physician or provider since your last visit? No  Have you had any other diagnostic tests since your last visit? Yes - Records Requested  Have you been seen in the emergency room and/or had an admission to a hospital since we last saw you? Yes - Records Requested  Have you had your routine dental cleaning in the past 6 months? no    Have you activated your [employer] account? If not, what are your barriers?  Yes     Patient Care Team:  Tr Hale MD as PCP - General (Emergency Medicine)    Medical History Review  Past Medical, Family, and Social History reviewed and does not contribute to the patient presenting condition    Health Maintenance   Topic Date Due    Annual Wellness Visit (AWV)  Never done    COVID-19 Vaccine (1) Never done    Shingles vaccine (1 of 2) Never done    Prostate Specific Antigen (PSA) Screening or Monitoring  06/21/2013    Diabetic retinal exam  03/01/2016    Colorectal Cancer Screen  08/03/2019    Diabetic foot exam  09/11/2021    Depression Screen  08/16/2022    Flu vaccine (1) 09/01/2022    A1C test (Diabetic or Prediabetic)  02/15/2023    Lipids  02/15/2023    DTaP/Tdap/Td vaccine (2 - Td or Tdap) 08/02/2028    Pneumococcal 65+ years Vaccine  Completed    AAA screen  Completed    Hepatitis C screen  Completed    Hepatitis A vaccine  Aged Out    Hib vaccine  Aged Out    Meningococcal (ACWY) vaccine  Aged Out

## 2022-07-15 ENCOUNTER — TELEPHONE (OUTPATIENT)
Dept: FAMILY MEDICINE CLINIC | Age: 67
End: 2022-07-15

## 2022-07-18 DIAGNOSIS — J44.9 CHRONIC OBSTRUCTIVE PULMONARY DISEASE, UNSPECIFIED COPD TYPE (HCC): ICD-10-CM

## 2022-07-18 DIAGNOSIS — Z76.0 MEDICATION REFILL: ICD-10-CM

## 2022-07-18 RX ORDER — ALBUTEROL SULFATE 90 UG/1
AEROSOL, METERED RESPIRATORY (INHALATION)
Qty: 8.5 G | Refills: 5 | Status: ON HOLD | OUTPATIENT
Start: 2022-07-18 | End: 2022-10-14 | Stop reason: HOSPADM
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
## 2022-07-28 DIAGNOSIS — I50.42 CHRONIC COMBINED SYSTOLIC AND DIASTOLIC CONGESTIVE HEART FAILURE (HCC): ICD-10-CM

## 2022-07-29 RX ORDER — CARVEDILOL 25 MG/1
TABLET ORAL
Qty: 60 TABLET | Refills: 5 | OUTPATIENT
Start: 2022-07-29
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
## 2022-08-02 NOTE — TELEPHONE ENCOUNTER
Per [employer]- Your PA request cannot be processed for the member plan submitted.  Please Advise

## 2022-08-05 DIAGNOSIS — I50.42 CHRONIC COMBINED SYSTOLIC AND DIASTOLIC CONGESTIVE HEART FAILURE (HCC): ICD-10-CM

## 2022-08-05 RX ORDER — CARVEDILOL 25 MG/1
TABLET ORAL
Qty: 60 TABLET | Refills: 5 | Status: ON HOLD | OUTPATIENT
Start: 2022-08-05 | End: 2022-10-14 | Stop reason: HOSPADM
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
## 2022-08-15 DIAGNOSIS — E11.69 TYPE 2 DIABETES MELLITUS WITH OTHER SPECIFIED COMPLICATION, WITHOUT LONG-TERM CURRENT USE OF INSULIN (HCC): ICD-10-CM
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
## 2022-10-06 ENCOUNTER — TELEPHONE (OUTPATIENT)
Dept: FAMILY MEDICINE CLINIC | Age: 67
End: 2022-10-06

## 2022-10-06 NOTE — TELEPHONE ENCOUNTER
Received health Link from home visiting nurse Misti Peguero. Performing her home Medicare risk assessment she recorded patient's SaO2 at 82% on 3 L oxygen nasal cannula which is patient's baseline. Patient is 56-year-old male past medical history of HTN, CHF, type 2 diabetes and COPD. During her assessment Misti Peguero reports vital signs: Afebrile, heart rate within normal limits, respiratory rate 22-24, blood pressure 142/72. Physical exam finding bilateral rhonchi. Misti Peguero recommended that patient go to the hospital for further evaluation but patient refused and stated he would call outpatient clinic for an appointment. Per Medicare protocol she increase patient's oxygen to 4 L resulting in oxygen saturation of 90 to 91%. Called Arley Skinner to ask additional questions. He reports that he has been experiencing fever chills nausea without vomiting worsening shortness of breath and a productive cough with green sputum. He denies chest pain abdominal pain GI or  changes or worsening of lower extremity edema. Patient's weight today is 144 lbs when compared to June 2022 168 yielding a unintentional 20 pound weight loss. Curb 65 score 1. Recommended that patient come to the emergency department via ambulance for further work-up and treatment. Patient reports that he is hesitant to come to the hospital, he would prefer just to call the clinic and needed some time to think about it. At 1750 attempted to call patient 8 times with patient ending phone call after first ring. On ninth attempt left voicemail with instructions to contact [employer] with his decision whether he wishes to come to the hospital or go to outpatient clinic.

## 2022-10-07 ENCOUNTER — APPOINTMENT (OUTPATIENT)
Dept: CT IMAGING | Age: 67
DRG: 871 | End: 2022-10-07
Payer: MEDICARE

## 2022-10-07 ENCOUNTER — HOSPITAL ENCOUNTER (INPATIENT)
Age: 67
LOS: 7 days | Discharge: HOME | DRG: 871 | End: 2022-10-14
Attending: EMERGENCY MEDICINE | Admitting: INTERNAL MEDICINE
Payer: MEDICARE

## 2022-10-07 ENCOUNTER — APPOINTMENT (OUTPATIENT)
Dept: GENERAL RADIOLOGY | Age: 67
DRG: 871 | End: 2022-10-07
Payer: MEDICARE

## 2022-10-07 DIAGNOSIS — T68.XXXA HYPOTHERMIA, INITIAL ENCOUNTER: ICD-10-CM

## 2022-10-07 DIAGNOSIS — R41.89 UNRESPONSIVE: Primary | ICD-10-CM

## 2022-10-07 DIAGNOSIS — J96.00 ACUTE RESPIRATORY FAILURE, UNSPECIFIED WHETHER WITH HYPOXIA OR HYPERCAPNIA (HCC): ICD-10-CM

## 2022-10-07 DIAGNOSIS — R00.1 BRADYCARDIA: ICD-10-CM

## 2022-10-07 PROBLEM — G93.41 ACUTE METABOLIC ENCEPHALOPATHY: Status: ACTIVE | Noted: 2022-10-07

## 2022-10-07 LAB
ABSOLUTE EOS #: 0 K/UL (ref 0–0.4)
ABSOLUTE IMMATURE GRANULOCYTE: 0 K/UL (ref 0–0.3)
ABSOLUTE LYMPH #: 0.94 K/UL (ref 1–4.8)
ABSOLUTE MONO #: 0.8 K/UL (ref 0.1–0.8)
ACETAMINOPHEN LEVEL: <5 UG/ML (ref 10–30)
ADENOVIRUS PCR: NOT DETECTED
ALBUMIN SERPL-MCNC: 3.9 G/DL (ref 3.5–5.2)
ALBUMIN/GLOBULIN RATIO: 1.1 (ref 1–2.5)
ALLEN TEST: ABNORMAL
ALP BLD-CCNC: 115 U/L (ref 40–129)
ALT SERPL-CCNC: 33 U/L (ref 5–41)
AMMONIA: 31 UMOL/L (ref 16–60)
AMPHETAMINE SCREEN URINE: NEGATIVE
ANION GAP SERPL CALCULATED.3IONS-SCNC: 11 MMOL/L (ref 9–17)
ANION GAP SERPL CALCULATED.3IONS-SCNC: 14 MMOL/L (ref 9–17)
ANION GAP: 1 MMOL/L (ref 7–16)
AST SERPL-CCNC: 83 U/L
BARBITURATE SCREEN URINE: NEGATIVE
BASOPHILS # BLD: 0 % (ref 0–2)
BASOPHILS ABSOLUTE: 0 K/UL (ref 0–0.2)
BENZODIAZEPINE SCREEN, URINE: NEGATIVE
BILIRUB SERPL-MCNC: 0.5 MG/DL (ref 0.3–1.2)
BILIRUBIN URINE: ABNORMAL
BORDETELLA PARAPERTUSSIS: NOT DETECTED
BORDETELLA PERTUSSIS PCR: NOT DETECTED
BUN BLDV-MCNC: 30 MG/DL (ref 8–23)
BUN BLDV-MCNC: 31 MG/DL (ref 8–23)
CALCIUM SERPL-MCNC: 8.4 MG/DL (ref 8.6–10.4)
CALCIUM SERPL-MCNC: 9.3 MG/DL (ref 8.6–10.4)
CANNABINOID SCREEN URINE: NEGATIVE
CASTS UA: ABNORMAL /LPF (ref 0–2)
CASTS UA: ABNORMAL /LPF (ref 0–2)
CHLAMYDIA PNEUMONIAE BY PCR: NOT DETECTED
CHLORIDE BLD-SCNC: 102 MMOL/L (ref 98–107)
CHLORIDE BLD-SCNC: 107 MMOL/L (ref 98–107)
CO2: 23 MMOL/L (ref 20–31)
CO2: 31 MMOL/L (ref 20–31)
COCAINE METABOLITE, URINE: NEGATIVE
COLOR: ABNORMAL
CORONAVIRUS 229E PCR: NOT DETECTED
CORONAVIRUS HKU1 PCR: NOT DETECTED
CORONAVIRUS NL63 PCR: NOT DETECTED
CORONAVIRUS OC43 PCR: NOT DETECTED
CREAT SERPL-MCNC: 0.87 MG/DL (ref 0.7–1.2)
CREAT SERPL-MCNC: 1.06 MG/DL (ref 0.7–1.2)
EGFR, POC: 57 ML/MIN/1.73M2
EOSINOPHILS RELATIVE PERCENT: 0 % (ref 1–4)
EPITHELIAL CELLS UA: ABNORMAL /HPF (ref 0–5)
ETHANOL PERCENT: <0.01 %
ETHANOL: <10 MG/DL
FENTANYL URINE: NEGATIVE
FIO2: 100
GFR NON-AFRICAN AMERICAN: 52 ML/MIN
GFR SERPL CREATININE-BSD FRML MDRD: >60 ML/MIN
GFR SERPL CREATININE-BSD FRML MDRD: >60 ML/MIN/1.73M2
GFR SERPL CREATININE-BSD FRML MDRD: >60 ML/MIN/1.73M2
GLUCOSE BLD-MCNC: 108 MG/DL (ref 70–99)
GLUCOSE BLD-MCNC: 195 MG/DL (ref 74–100)
GLUCOSE BLD-MCNC: 201 MG/DL (ref 70–99)
GLUCOSE BLD-MCNC: 86 MG/DL (ref 74–100)
GLUCOSE URINE: NEGATIVE
HCO3 VENOUS: 37.6 MMOL/L (ref 22–29)
HCT VFR BLD CALC: 31.1 % (ref 40.7–50.3)
HEMOGLOBIN: 9.3 G/DL (ref 13–17)
HUMAN METAPNEUMOVIRUS PCR: NOT DETECTED
IMMATURE GRANULOCYTES: 0 %
INFLUENZA A BY PCR: NOT DETECTED
INFLUENZA B BY PCR: NOT DETECTED
INR BLD: 1
KETONES, URINE: NEGATIVE
LACTIC ACID, WHOLE BLOOD: 1.3 MMOL/L (ref 0.7–2.1)
LACTIC ACID, WHOLE BLOOD: 3.4 MMOL/L (ref 0.7–2.1)
LEUKOCYTE ESTERASE, URINE: NEGATIVE
LYMPHOCYTES # BLD: 7 % (ref 24–44)
MAGNESIUM: 1.7 MG/DL (ref 1.6–2.6)
MCH RBC QN AUTO: 28.6 PG (ref 25.2–33.5)
MCHC RBC AUTO-ENTMCNC: 29.9 G/DL (ref 28.4–34.8)
MCV RBC AUTO: 95.7 FL (ref 82.6–102.9)
METHADONE SCREEN, URINE: NEGATIVE
MODE: ABNORMAL
MONOCYTES # BLD: 6 % (ref 1–7)
MORPHOLOGY: ABNORMAL
MYCOPLASMA PNEUMONIAE PCR: NOT DETECTED
NITRITE, URINE: NEGATIVE
NRBC AUTOMATED: 0 PER 100 WBC
O2 DEVICE/FLOW/%: ABNORMAL
O2 SAT, VEN: 67 % (ref 60–85)
OPIATES, URINE: NEGATIVE
OXYCODONE SCREEN URINE: NEGATIVE
PARAINFLUENZA 1 PCR: NOT DETECTED
PARAINFLUENZA 2 PCR: NOT DETECTED
PARAINFLUENZA 3 PCR: NOT DETECTED
PARAINFLUENZA 4 PCR: NOT DETECTED
PARTIAL THROMBOPLASTIN TIME: 23.7 SEC (ref 20.5–30.5)
PCO2, VEN: 137 MM HG (ref 41–51)
PDW BLD-RTO: 14.9 % (ref 11.8–14.4)
PH UA: 5 (ref 5–8)
PH VENOUS: 7.05 (ref 7.32–7.43)
PHENCYCLIDINE, URINE: NEGATIVE
PLATELET # BLD: 213 K/UL (ref 138–453)
PMV BLD AUTO: 10.8 FL (ref 8.1–13.5)
PO2, VEN: 53.6 MM HG (ref 30–50)
POC BUN: 31 MG/DL (ref 8–26)
POC CHLORIDE: 104 MMOL/L (ref 98–107)
POC CREATININE: 1.35 MG/DL (ref 0.51–1.19)
POC HCO3: 31.4 MMOL/L (ref 21–28)
POC HEMATOCRIT: 32 % (ref 41–53)
POC HEMOGLOBIN: 10.9 G/DL (ref 13.5–17.5)
POC IONIZED CALCIUM: 1.28 MMOL/L (ref 1.15–1.33)
POC LACTIC ACID: 1.03 MMOL/L (ref 0.56–1.39)
POC O2 SATURATION: 100 % (ref 94–98)
POC PCO2: 72.3 MM HG (ref 35–48)
POC PH: 7.25 (ref 7.35–7.45)
POC PO2: 468.5 MM HG (ref 83–108)
POC POTASSIUM: 4.8 MMOL/L (ref 3.5–4.5)
POC SODIUM: 144 MMOL/L (ref 138–146)
POC TCO2: 40 MMOL/L (ref 22–30)
POSITIVE BASE EXCESS, ART: 4 (ref 0–3)
POSITIVE BASE EXCESS, VEN: 4 (ref 0–3)
POTASSIUM SERPL-SCNC: 5.1 MMOL/L (ref 3.7–5.3)
POTASSIUM SERPL-SCNC: 5.1 MMOL/L (ref 3.7–5.3)
PRO-BNP: 1583 PG/ML
PRO-BNP: 1650 PG/ML
PROCALCITONIN: 1.05 NG/ML
PROLACTIN: 8.37 NG/ML (ref 4.04–15.2)
PROTEIN UA: ABNORMAL
PROTHROMBIN TIME: 9.9 SEC (ref 9.1–12.3)
RBC # BLD: 3.25 M/UL (ref 4.21–5.77)
RBC UA: ABNORMAL /HPF (ref 0–2)
RESP SYNCYTIAL VIRUS PCR: NOT DETECTED
RHINO/ENTEROVIRUS PCR: NOT DETECTED
SALICYLATE LEVEL: <1 MG/DL (ref 3–10)
SAMPLE SITE: ABNORMAL
SARS-COV-2, PCR: NOT DETECTED
SEG NEUTROPHILS: 87 % (ref 36–66)
SEGMENTED NEUTROPHILS ABSOLUTE COUNT: 11.66 K/UL (ref 1.8–7.7)
SODIUM BLD-SCNC: 144 MMOL/L (ref 135–144)
SODIUM BLD-SCNC: 144 MMOL/L (ref 135–144)
SPECIFIC GRAVITY UA: 1.02 (ref 1–1.03)
SPECIMEN DESCRIPTION: NORMAL
TEST INFORMATION: NORMAL
TOTAL PROTEIN: 7.4 G/DL (ref 6.4–8.3)
TOXIC TRICYCLIC SC,BLOOD: NEGATIVE
TROPONIN, HIGH SENSITIVITY: 28 NG/L (ref 0–22)
TROPONIN, HIGH SENSITIVITY: 36 NG/L (ref 0–22)
TROPONIN, HIGH SENSITIVITY: 38 NG/L (ref 0–22)
TSH SERPL DL<=0.05 MIU/L-ACNC: 1.12 UIU/ML (ref 0.3–5)
TURBIDITY: ABNORMAL
URINE HGB: ABNORMAL
UROBILINOGEN, URINE: NORMAL
WBC # BLD: 13.4 K/UL (ref 3.5–11.3)
WBC UA: ABNORMAL /HPF (ref 0–5)

## 2022-10-07 PROCEDURE — G0480 DRUG TEST DEF 1-7 CLASSES: HCPCS

## 2022-10-07 PROCEDURE — 96375 TX/PRO/DX INJ NEW DRUG ADDON: CPT

## 2022-10-07 PROCEDURE — 83605 ASSAY OF LACTIC ACID: CPT

## 2022-10-07 PROCEDURE — 87641 MR-STAPH DNA AMP PROBE: CPT

## 2022-10-07 PROCEDURE — 82803 BLOOD GASES ANY COMBINATION: CPT

## 2022-10-07 PROCEDURE — 93005 ELECTROCARDIOGRAM TRACING: CPT | Performed by: STUDENT IN AN ORGANIZED HEALTH CARE EDUCATION/TRAINING PROGRAM

## 2022-10-07 PROCEDURE — 94002 VENT MGMT INPAT INIT DAY: CPT

## 2022-10-07 PROCEDURE — 94640 AIRWAY INHALATION TREATMENT: CPT

## 2022-10-07 PROCEDURE — 80048 BASIC METABOLIC PNL TOTAL CA: CPT

## 2022-10-07 PROCEDURE — 94761 N-INVAS EAR/PLS OXIMETRY MLT: CPT

## 2022-10-07 PROCEDURE — 2000000000 HC ICU R&B

## 2022-10-07 PROCEDURE — 85730 THROMBOPLASTIN TIME PARTIAL: CPT

## 2022-10-07 PROCEDURE — 87086 URINE CULTURE/COLONY COUNT: CPT

## 2022-10-07 PROCEDURE — 2580000003 HC RX 258

## 2022-10-07 PROCEDURE — 82140 ASSAY OF AMMONIA: CPT

## 2022-10-07 PROCEDURE — 84146 ASSAY OF PROLACTIN: CPT

## 2022-10-07 PROCEDURE — 84145 PROCALCITONIN (PCT): CPT

## 2022-10-07 PROCEDURE — 6360000002 HC RX W HCPCS

## 2022-10-07 PROCEDURE — 2500000003 HC RX 250 WO HCPCS

## 2022-10-07 PROCEDURE — 99291 CRITICAL CARE FIRST HOUR: CPT | Performed by: INTERNAL MEDICINE

## 2022-10-07 PROCEDURE — 81001 URINALYSIS AUTO W/SCOPE: CPT

## 2022-10-07 PROCEDURE — 80179 DRUG ASSAY SALICYLATE: CPT

## 2022-10-07 PROCEDURE — 82947 ASSAY GLUCOSE BLOOD QUANT: CPT

## 2022-10-07 PROCEDURE — 87040 BLOOD CULTURE FOR BACTERIA: CPT

## 2022-10-07 PROCEDURE — 83735 ASSAY OF MAGNESIUM: CPT

## 2022-10-07 PROCEDURE — 85014 HEMATOCRIT: CPT

## 2022-10-07 PROCEDURE — 71045 X-RAY EXAM CHEST 1 VIEW: CPT

## 2022-10-07 PROCEDURE — 84443 ASSAY THYROID STIM HORMONE: CPT

## 2022-10-07 PROCEDURE — 85610 PROTHROMBIN TIME: CPT

## 2022-10-07 PROCEDURE — 0202U NFCT DS 22 TRGT SARS-COV-2: CPT

## 2022-10-07 PROCEDURE — 80053 COMPREHEN METABOLIC PANEL: CPT

## 2022-10-07 PROCEDURE — 2500000003 HC RX 250 WO HCPCS: Performed by: STUDENT IN AN ORGANIZED HEALTH CARE EDUCATION/TRAINING PROGRAM

## 2022-10-07 PROCEDURE — 70450 CT HEAD/BRAIN W/O DYE: CPT

## 2022-10-07 PROCEDURE — 99285 EMERGENCY DEPT VISIT HI MDM: CPT

## 2022-10-07 PROCEDURE — 6360000002 HC RX W HCPCS: Performed by: STUDENT IN AN ORGANIZED HEALTH CARE EDUCATION/TRAINING PROGRAM

## 2022-10-07 PROCEDURE — 82565 ASSAY OF CREATININE: CPT

## 2022-10-07 PROCEDURE — 5A1945Z RESPIRATORY VENTILATION, 24-96 CONSECUTIVE HOURS: ICD-10-PCS | Performed by: STUDENT IN AN ORGANIZED HEALTH CARE EDUCATION/TRAINING PROGRAM

## 2022-10-07 PROCEDURE — 36600 WITHDRAWAL OF ARTERIAL BLOOD: CPT

## 2022-10-07 PROCEDURE — 80051 ELECTROLYTE PANEL: CPT

## 2022-10-07 PROCEDURE — 82330 ASSAY OF CALCIUM: CPT

## 2022-10-07 PROCEDURE — 85025 COMPLETE CBC W/AUTO DIFF WBC: CPT

## 2022-10-07 PROCEDURE — 6370000000 HC RX 637 (ALT 250 FOR IP)

## 2022-10-07 PROCEDURE — 84484 ASSAY OF TROPONIN QUANT: CPT

## 2022-10-07 PROCEDURE — 80307 DRUG TEST PRSMV CHEM ANLYZR: CPT

## 2022-10-07 PROCEDURE — 0BH17EZ INSERTION OF ENDOTRACHEAL AIRWAY INTO TRACHEA, VIA NATURAL OR ARTIFICIAL OPENING: ICD-10-PCS | Performed by: STUDENT IN AN ORGANIZED HEALTH CARE EDUCATION/TRAINING PROGRAM

## 2022-10-07 PROCEDURE — 84520 ASSAY OF UREA NITROGEN: CPT

## 2022-10-07 PROCEDURE — 2580000003 HC RX 258: Performed by: STUDENT IN AN ORGANIZED HEALTH CARE EDUCATION/TRAINING PROGRAM

## 2022-10-07 PROCEDURE — 36415 COLL VENOUS BLD VENIPUNCTURE: CPT

## 2022-10-07 PROCEDURE — 83880 ASSAY OF NATRIURETIC PEPTIDE: CPT

## 2022-10-07 PROCEDURE — 6360000004 HC RX CONTRAST MEDICATION: Performed by: STUDENT IN AN ORGANIZED HEALTH CARE EDUCATION/TRAINING PROGRAM

## 2022-10-07 PROCEDURE — 96374 THER/PROPH/DIAG INJ IV PUSH: CPT

## 2022-10-07 PROCEDURE — 71260 CT THORAX DX C+: CPT | Performed by: STUDENT IN AN ORGANIZED HEALTH CARE EDUCATION/TRAINING PROGRAM

## 2022-10-07 PROCEDURE — 80143 DRUG ASSAY ACETAMINOPHEN: CPT

## 2022-10-07 PROCEDURE — 2700000000 HC OXYGEN THERAPY PER DAY

## 2022-10-07 RX ORDER — ACETAMINOPHEN 650 MG/1
650 SUPPOSITORY RECTAL EVERY 6 HOURS PRN
Status: DISCONTINUED | OUTPATIENT
Start: 2022-10-07 | End: 2022-10-14 | Stop reason: HOSPADM

## 2022-10-07 RX ORDER — BUDESONIDE AND FORMOTEROL FUMARATE DIHYDRATE 80; 4.5 UG/1; UG/1
2 AEROSOL RESPIRATORY (INHALATION) 2 TIMES DAILY
Status: DISCONTINUED | OUTPATIENT
Start: 2022-10-07 | End: 2022-10-12

## 2022-10-07 RX ORDER — SODIUM CHLORIDE 9 MG/ML
INJECTION, SOLUTION INTRAVENOUS PRN
Status: DISCONTINUED | OUTPATIENT
Start: 2022-10-07 | End: 2022-10-14 | Stop reason: HOSPADM

## 2022-10-07 RX ORDER — ACETAMINOPHEN 325 MG/1
650 TABLET ORAL EVERY 6 HOURS PRN
Status: DISCONTINUED | OUTPATIENT
Start: 2022-10-07 | End: 2022-10-14 | Stop reason: HOSPADM

## 2022-10-07 RX ORDER — SODIUM CHLORIDE 0.9 % (FLUSH) 0.9 %
5-40 SYRINGE (ML) INJECTION PRN
Status: DISCONTINUED | OUTPATIENT
Start: 2022-10-07 | End: 2022-10-14 | Stop reason: HOSPADM

## 2022-10-07 RX ORDER — ONDANSETRON 4 MG/1
4 TABLET, ORALLY DISINTEGRATING ORAL EVERY 8 HOURS PRN
Status: DISCONTINUED | OUTPATIENT
Start: 2022-10-07 | End: 2022-10-14 | Stop reason: HOSPADM

## 2022-10-07 RX ORDER — SODIUM CHLORIDE 450 MG/100ML
INJECTION, SOLUTION INTRAVENOUS CONTINUOUS
Status: CANCELLED | OUTPATIENT
Start: 2022-10-07

## 2022-10-07 RX ORDER — 0.9 % SODIUM CHLORIDE 0.9 %
1000 INTRAVENOUS SOLUTION INTRAVENOUS ONCE
Status: COMPLETED | OUTPATIENT
Start: 2022-10-07 | End: 2022-10-07

## 2022-10-07 RX ORDER — SODIUM CHLORIDE 0.9 % (FLUSH) 0.9 %
5-40 SYRINGE (ML) INJECTION EVERY 12 HOURS SCHEDULED
Status: DISCONTINUED | OUTPATIENT
Start: 2022-10-07 | End: 2022-10-14 | Stop reason: HOSPADM

## 2022-10-07 RX ORDER — PROPOFOL 10 MG/ML
INJECTION, EMULSION INTRAVENOUS
Status: DISCONTINUED
Start: 2022-10-07 | End: 2022-10-07

## 2022-10-07 RX ORDER — POLYETHYLENE GLYCOL 3350 17 G/17G
17 POWDER, FOR SOLUTION ORAL DAILY PRN
Status: DISCONTINUED | OUTPATIENT
Start: 2022-10-07 | End: 2022-10-14 | Stop reason: HOSPADM

## 2022-10-07 RX ORDER — ONDANSETRON 2 MG/ML
4 INJECTION INTRAMUSCULAR; INTRAVENOUS EVERY 6 HOURS PRN
Status: DISCONTINUED | OUTPATIENT
Start: 2022-10-07 | End: 2022-10-14 | Stop reason: HOSPADM

## 2022-10-07 RX ORDER — DEXTROSE AND SODIUM CHLORIDE 5; .45 G/100ML; G/100ML
INJECTION, SOLUTION INTRAVENOUS CONTINUOUS
Status: DISCONTINUED | OUTPATIENT
Start: 2022-10-07 | End: 2022-10-10

## 2022-10-07 RX ORDER — MAGNESIUM SULFATE IN WATER 40 MG/ML
2000 INJECTION, SOLUTION INTRAVENOUS ONCE
Status: COMPLETED | OUTPATIENT
Start: 2022-10-07 | End: 2022-10-07

## 2022-10-07 RX ORDER — NOREPINEPHRINE BIT/0.9 % NACL 16MG/250ML
INFUSION BOTTLE (ML) INTRAVENOUS
Status: COMPLETED
Start: 2022-10-07 | End: 2022-10-07

## 2022-10-07 RX ORDER — ENOXAPARIN SODIUM 100 MG/ML
40 INJECTION SUBCUTANEOUS DAILY
Status: DISCONTINUED | OUTPATIENT
Start: 2022-10-07 | End: 2022-10-14 | Stop reason: HOSPADM

## 2022-10-07 RX ORDER — METHYLPREDNISOLONE SODIUM SUCCINATE 125 MG/2ML
125 INJECTION, POWDER, LYOPHILIZED, FOR SOLUTION INTRAMUSCULAR; INTRAVENOUS ONCE
Status: COMPLETED | OUTPATIENT
Start: 2022-10-07 | End: 2022-10-07

## 2022-10-07 RX ORDER — 0.9 % SODIUM CHLORIDE 0.9 %
2000 INTRAVENOUS SOLUTION INTRAVENOUS ONCE
Status: COMPLETED | OUTPATIENT
Start: 2022-10-07 | End: 2022-10-07

## 2022-10-07 RX ORDER — FAMOTIDINE 10 MG/ML
20 INJECTION, SOLUTION INTRAVENOUS 2 TIMES DAILY
Status: DISCONTINUED | OUTPATIENT
Start: 2022-10-07 | End: 2022-10-11

## 2022-10-07 RX ORDER — IPRATROPIUM BROMIDE AND ALBUTEROL SULFATE 2.5; .5 MG/3ML; MG/3ML
1 SOLUTION RESPIRATORY (INHALATION)
Status: DISCONTINUED | OUTPATIENT
Start: 2022-10-07 | End: 2022-10-14 | Stop reason: HOSPADM

## 2022-10-07 RX ORDER — NOREPINEPHRINE BIT/0.9 % NACL 16MG/250ML
1-100 INFUSION BOTTLE (ML) INTRAVENOUS CONTINUOUS
Status: DISCONTINUED | OUTPATIENT
Start: 2022-10-07 | End: 2022-10-11

## 2022-10-07 RX ORDER — CHLORHEXIDINE GLUCONATE 0.12 MG/ML
15 RINSE ORAL 2 TIMES DAILY
Status: DISCONTINUED | OUTPATIENT
Start: 2022-10-07 | End: 2022-10-10

## 2022-10-07 RX ADMIN — Medication 2.5 MCG/MIN: at 12:56

## 2022-10-07 RX ADMIN — ENOXAPARIN SODIUM 40 MG: 100 INJECTION SUBCUTANEOUS at 20:15

## 2022-10-07 RX ADMIN — SODIUM CHLORIDE, PRESERVATIVE FREE 10 ML: 5 INJECTION INTRAVENOUS at 20:13

## 2022-10-07 RX ADMIN — FAMOTIDINE 20 MG: 10 INJECTION INTRAVENOUS at 20:13

## 2022-10-07 RX ADMIN — Medication 5 MCG/MIN: at 12:37

## 2022-10-07 RX ADMIN — SODIUM CHLORIDE 1000 ML: 9 INJECTION, SOLUTION INTRAVENOUS at 14:37

## 2022-10-07 RX ADMIN — DEXTROSE AND SODIUM CHLORIDE: 5; 450 INJECTION, SOLUTION INTRAVENOUS at 18:37

## 2022-10-07 RX ADMIN — Medication 2 MG/HR: at 12:54

## 2022-10-07 RX ADMIN — AMPICILLIN SODIUM AND SULBACTAM SODIUM 3000 MG: 2; 1 INJECTION, POWDER, FOR SOLUTION INTRAMUSCULAR; INTRAVENOUS at 20:45

## 2022-10-07 RX ADMIN — IPRATROPIUM BROMIDE AND ALBUTEROL SULFATE 1 AMPULE: .5; 2.5 SOLUTION RESPIRATORY (INHALATION) at 19:57

## 2022-10-07 RX ADMIN — SODIUM CHLORIDE 2000 ML: 9 INJECTION, SOLUTION INTRAVENOUS at 12:31

## 2022-10-07 RX ADMIN — METHYLPREDNISOLONE SODIUM SUCCINATE 125 MG: 125 INJECTION, POWDER, FOR SOLUTION INTRAMUSCULAR; INTRAVENOUS at 18:43

## 2022-10-07 RX ADMIN — MAGNESIUM SULFATE HEPTAHYDRATE 2000 MG: 40 INJECTION, SOLUTION INTRAVENOUS at 18:43

## 2022-10-07 RX ADMIN — IOPAMIDOL 75 ML: 755 INJECTION, SOLUTION INTRAVENOUS at 13:54

## 2022-10-07 ASSESSMENT — PULMONARY FUNCTION TESTS
PIF_VALUE: 35
PIF_VALUE: 34
PIF_VALUE: 50
PIF_VALUE: 47
PIF_VALUE: 51
PIF_VALUE: 47
PIF_VALUE: 37

## 2022-10-07 NOTE — PROGRESS NOTES
Date: 10/7/2022  Time: 1230  Patient identity confirmed:  Yes  Indications: resp distress  Preoxygenation: yes    Laryngoscope size and type Glidescope  Airway introducer used: No  Evac: No  ETT size:an 8.0 cuffed  Number of attempts:1   Cords visualized:  [x] Clearly  [] Poorly  Breath sounds present bilaterally: Yes   ETCO2   [x] Positive   ETT secured at  24    ETT secured with Hector  Chest x-ray ordered: Yes     Difficult airway:    No       If yes, was red tape placed around ETT:   No    Was this a Code Situation:    No             BP: 119/65        Procedure performed by: Rj LOPEZ RCP  4:56 PM

## 2022-10-07 NOTE — ED NOTES
Low BP noted, resident notified. 1L NS with pressure bag started per verbal orders.      Kalin Clay RN  10/07/22 6969

## 2022-10-07 NOTE — ED NOTES
Pt back from CT by bed. Pt on full cardiac monitor and back on navjot hugger.       Matt Toth RN  10/07/22 3474

## 2022-10-07 NOTE — ED NOTES
Patient transported to ICU with Nancy Lam on monitor with Jesus ZELAYA.       Selina Weinstein RN  10/07/22 8334

## 2022-10-07 NOTE — ED NOTES
Levo started at 5 mcg/min by Eliceo Purcell RN per verbal order Dr. Vandana Pedroza for BP.       Librado Cole RN  10/07/22 2045

## 2022-10-07 NOTE — ED NOTES
1L NS started by Britany Aponte RN per verbal order Dr. Adriana Shields.      Bedelia General, RN  10/07/22 8938

## 2022-10-07 NOTE — ED NOTES
Dr. Jacquelyn Velasquez notified of patient bradycardia, HR 48-49.       Shara Anne, RN  10/07/22 2688

## 2022-10-07 NOTE — PLAN OF CARE
Problem: Respiratory - Adult  Goal: Achieves optimal ventilation and oxygenation  10/7/2022 1905 by Stanford Arellano RN  Outcome: Progressing  Flowsheets (Taken 10/7/2022 1700)  Achieves optimal ventilation and oxygenation: Assess for changes in respiratory status  10/7/2022 1438 by David Toney RCP  Outcome: Progressing     Problem: Skin/Tissue Integrity - Adult  Goal: Skin integrity remains intact  10/7/2022 1905 by Stanford Arellano RN  Outcome: Progressing  Flowsheets (Taken 10/7/2022 1700)  Skin Integrity Remains Intact: Monitor for areas of redness and/or skin breakdown  10/7/2022 1438 by David Toney RCP  Outcome: Progressing  Goal: Incisions, wounds, or drain sites healing without S/S of infection  10/7/2022 1905 by Stanford Arellano RN  Outcome: Progressing  Flowsheets (Taken 10/7/2022 1700)  Incisions, Wounds, or Drain Sites Healing Without Sign and Symptoms of Infection: ADMISSION and DAILY: Assess and document risk factors for pressure ulcer development  10/7/2022 1438 by David Toney RCP  Outcome: Progressing  Goal: Oral mucous membranes remain intact  10/7/2022 1905 by Stanford Arellano RN  Outcome: Progressing  Flowsheets (Taken 10/7/2022 1700)  Oral Mucous Membranes Remain Intact: Assess oral mucosa and hygiene practices  10/7/2022 1438 by David Toney RCP  Outcome: Progressing     Problem: Discharge Planning  Goal: Discharge to home or other facility with appropriate resources  Outcome: Progressing  Flowsheets (Taken 10/7/2022 1700)  Discharge to home or other facility with appropriate resources: Identify barriers to discharge with patient and caregiver     Problem: Safety - Medical Restraint  Goal: Remains free of injury from restraints (Restraint for Interference with Medical Device)  Description: INTERVENTIONS:  1. Determine that other, less restrictive measures have been tried or would not be effective before applying the restraint  2.  Evaluate the patient's condition at the time of restraint application  3. Inform patient/family regarding the reason for restraint  4.  Q2H: Monitor safety, psychosocial status, comfort, nutrition and hydration  Outcome: Progressing  Flowsheets  Taken 10/7/2022 1800  Remains free of injury from restraints (restraint for interference with medical device): Every 2 hours: Monitor safety, psychosocial status, comfort, nutrition and hydration  Taken 10/7/2022 1646  Remains free of injury from restraints (restraint for interference with medical device): Every 2 hours: Monitor safety, psychosocial status, comfort, nutrition and hydration     Problem: Pain  Goal: Verbalizes/displays adequate comfort level or baseline comfort level  Outcome: Progressing
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
## 2022-10-07 NOTE — PLAN OF CARE
Problem: Respiratory - Adult  Goal: Achieves optimal ventilation and oxygenation  Outcome: Progressing     Problem: Skin/Tissue Integrity - Adult  Goal: Skin integrity remains intact  Outcome: Progressing  Goal: Incisions, wounds, or drain sites healing without S/S of infection  Outcome: Progressing  Goal: Oral mucous membranes remain intact  Outcome: Progressing

## 2022-10-07 NOTE — ED PROVIDER NOTES
[address] [facility]     Emergency Department     Faculty Attestation    I performed a history and physical examination of the patient and discussed management with the resident. I reviewed the residents note and agree with the documented findings and plan of care. Any areas of disagreement are noted on the chart. I was personally present for the key portions of any procedures. I have documented in the chart those procedures where I was not present during the key portions. I have reviewed the emergency nurses triage note. I agree with the chief complaint, past medical history, past surgical history, allergies, medications, social and family history as documented unless otherwise noted below. For Physician Assistant/ Nurse Practitioner cases/documentation I have personally evaluated this patient and have completed at least one if not all key elements of the E/M (history, physical exam, and MDM). Additional findings are as noted.       Primary Care Physician:  Curry Soto MD    [address] [place] [address]       Chief Complaint   Patient presents with    Respiratory Distress       RECENT VITALS:   Temp: (!) 93.7 °F (34.3 °C),  Heart Rate: 51, Resp: 13, BP: 126/67    LABS:  Labs Reviewed   URINALYSIS WITH MICROSCOPIC - Abnormal; Notable for the following components:       Result Value    Color, UA Dark Yellow (*)     Turbidity UA Cloudy (*)     Bilirubin Urine NEGATIVE  Verified by ictotest. (*)     Urine Hgb TRACE (*)     Protein, UA 3+ (*)     All other components within normal limits   ELECTROLYTES PLUS - Abnormal; Notable for the following components:    POC Potassium 4.8 (*)     POC TCO2 40 (*)     Anion Gap 1 (*)     All other components within normal limits   HGB/HCT - Abnormal; Notable for the following components:    POC Hemoglobin 10.9 (*)     POC Hematocrit 32 (*)     All other components within normal limits   CBC WITH AUTO DIFFERENTIAL - Abnormal; Notable for the following components:    WBC 13.4 (*)     RBC 3.25 (*)     Hemoglobin 9.3 (*)     Hematocrit 31.1 (*)     RDW 14.9 (*)     Seg Neutrophils 87 (*)     Lymphocytes 7 (*)     Eosinophils % 0 (*)     Segs Absolute 11.66 (*)     Absolute Lymph # 0.94 (*)     All other components within normal limits   COMPREHENSIVE METABOLIC PANEL - Abnormal; Notable for the following components:    Glucose 201 (*)     BUN 31 (*)     AST 83 (*)     All other components within normal limits   TOX SCR, BLD, ED - Abnormal; Notable for the following components:    Acetaminophen Level <5 (*)     Salicylate Lvl <1 (*)     All other components within normal limits   VENOUS BLOOD GAS, POINT OF CARE - Abnormal; Notable for the following components:    pH, Jose D 7.046 (*)     pCO2, Jose D 137.0 (*)     pO2, Jose D 53.6 (*)     HCO3, Venous 37.6 (*)     Positive Base Excess, Jose D 4 (*)     All other components within normal limits   CREATININE W/GFR POINT OF CARE - Abnormal; Notable for the following components:    POC Creatinine 1.35 (*)     GFR Non- 52 (*)     All other components within normal limits   UREA N (POC) - Abnormal; Notable for the following components:    POC BUN 31 (*)     All other components within normal limits   POCT GLUCOSE - Abnormal; Notable for the following components:    POC Glucose 195 (*)     All other components within normal limits   CULTURE, BLOOD 1   CULTURE, BLOOD 1   CULTURE, URINE   RESPIRATORY PANEL, MOLECULAR, WITH COVID-19   AMMONIA   CALCIUM, IONIC (POC)   LACTIC ACID   MAGNESIUM   PROTIME-INR   APTT   TSH WITH REFLEX   LACTIC ACID   URINE DRUG SCREEN   BRAIN NATRIURETIC PEPTIDE   TROPONIN   LACTIC ACID,POINT OF CARE     EKG shows normal sinus rhythm rate of 60 bpm IN interval is 138 ms QRS durations 88 ms QT corrected 469ms R axis is 77 there is no acute ST or T wave changes he has LVH with repolarization    PERTINENT ATTENDING PHYSICIAN COMMENTS:    Patient presents with a chief complaint of unresponsive last known well 830 history of heart disease on arrival he has minimal response to painful stimuli is breathing on his own gag is weak he was RSI at an ET tube was placed with my direct supervision equal breath sounds end-tidal CO2 x-rays pending at this time we will do a neuro work-up initial blood gas shows evidence of CO2 retention    Critical Care  See resident's documentation        Mendoza Marin MD,  MD, [facility]  Attending Emergency  Physician              Mendoza Marin MD  10/07/22 4557

## 2022-10-07 NOTE — ED NOTES
20 mg etomidate in per verbal orders Dr. Jacquelyn Velasquez.    100mg Tevin in per verbal order Dr. Kishore Melgar, RN  10/07/22 0661

## 2022-10-07 NOTE — ED NOTES
8. 0 ETT tube placed Dr. Ronnell Ayers. Color changed noted. Bilateral breath sounds. 24 at the lip.      Damir Bartlett RN  10/07/22 6647

## 2022-10-07 NOTE — ED NOTES
Pt arrives to ED by EMS unresponsive. EMS bagging on arrival, bagging taken over by RT. Pt found unresponsive with slow shallow breathing by family today, LKW 0830. Pt GCS 3. ED team at bedside.       Severo Davila RN  10/07/22 [address], RN  10/07/22 1406

## 2022-10-07 NOTE — ED NOTES
Core temp at 34.0. Dr. Nedra Joseph notified. Tamera higgins on per verbal orders.      Susan Cantu RN  10/07/22 8545

## 2022-10-07 NOTE — H&P
Attending Physician Statement  I have discussed the case of Franny Eastman, including pertinent history and exam findings with the resident/fellow/medical student/NP/PA. I have seen and examined the patient and the key elements of the encounter have been performed by me. I agree with the assessment, plan and orders as documented by the resident/fellow/medical student/NP/PA  With changes made to the note as needed. Pt was seen during rounds. Review of Systems:   In addition to the pertinent positives and negatives as stated within HPI and the review of systems as documented in their notes, all other systems were reviewed when able to and are reported negative. Patient admitted via the emergency room where the patient presented with respiratory distress and required intubation. Patient likely has acute respiratory failure with hypoxemia and hypercarbia secondary to COPD exacerbation due to acute bronchitis versus pneumonia complicated by hypothermia likely due to sepsis. Patient had bradycardia rather than tachycardia related to the infection. Patient on Coreg at home and this could have been the reason why he is not tachycardic.-Continue ventilator support with a target pH of 7.35-7.39, corticosteroids, bronchodilators, antibiotics, hold off on beta-blockers, gradually rewarm the patient    Shock following intubation could be multifactorial including hypovolemia and lack of sympathetic discharge and medication use for intubation.   Patient was started on norepinephrine but then came off of it-rehydrate patient and watch for auto PEEP    Type 2 diabetes mellitus-continue to monitor blood sugars and cover with insulin    Essential hypertension history-hold antihypertensives until the blood pressure is more stable    Reported history of congestive heart failure but the last echocardiogram done at [facility] without any evidence of systolic or diastolic dysfunction-observe    Previous history of cigarette smoking-continue cessation    Acute on chronic respiratory acidosis-continue mechanical ventilator support    Leukocytosis-continue to monitor as the patient is treated for the infection    Anemia, normochromic normocytic. Could be related to chronic illness-continue to monitor    Add Lovenox for DVT prophylaxis and patient is on Pepcid  Total critical care time caring for this patient with life threatening, unstable organ failure, including direct patient contact, management of life support systems, review of data including imaging and labs, discussions with other team members and physicians at least 27  Min so far today, excluding procedures.               Bouchra Christian MD  10/7/2022  4:35 PM

## 2022-10-07 NOTE — ED NOTES
Report called to [facility]. All questions answered. RT notified.       Mable Alvarez RN  10/07/22 7869

## 2022-10-07 NOTE — ED NOTES
Labs collected by [facility]. EPOC running.      Juice Burdick RN  10/07/22 1755

## 2022-10-07 NOTE — ED NOTES
BP still low, resident notified. Second liter NS started with pressure bag per verbal order Dr. Fernando Evans.      Chris Prasad, RN  10/07/22 2458

## 2022-10-07 NOTE — ED NOTES
Spoke to Dr. Gabby Martin about pt temp at 33.7. Warm NS started at 250ml/hr per verbal orders.      Deanne Morales RN  10/07/22 1801

## 2022-10-07 NOTE — ED PROVIDER NOTES
[facility] ED  Emergency Department Encounter  Emergency Medicine Resident     Pt Name: Aleta Dias  MRN: [id number]  [place] 1955  Date of evaluation: 10/7/22  PCP:  Stevenson Desai MD    CHIEF COMPLAINT       Chief Complaint   Patient presents with    Respiratory Distress       HISTORY OFPRESENT ILLNESS  (Location/Symptom, Timing/Onset, Context/Setting, Quality, Duration, Modifying Factors,Severity.)      Aleta Dias is a 69 y. o.yo male who presents with after he was found unresponsive. Patient with history of CHF, COPD. The history was obtained from his son who reports that patient was fine all day long. All of a sudden patient looked up and he became unresponsive. Patient's son reports that at that time he called EMS. When EMS arrived, patient was unresponsive, he was subsequently brought to the emergency room for further management and care. He never did lose pulses. PAST MEDICAL / SURGICAL / SOCIAL / FAMILY HISTORY      has a past medical history of CHF (congestive heart failure) ([facility] [address] COPD (chronic obstructive pulmonary disease) ([facility] [address] Diabetes mellitus ([facility] [address] Erectile dysfunction due to arterial insufficiency, Hypertension, Microalbuminuria due to type 2 diabetes mellitus ([facility] [address] and Overweight (BMI 25.0-29.9). has a past surgical history that includes Appendectomy and Total ankle arthroplasty. Social History     Socioeconomic History    Marital status:       Spouse name: Not on file    Number of children: Not on file    Years of education: Not on file    Highest education level: Not on file   Occupational History    Not on file   Tobacco Use    Smoking status: Former     Packs/day: 0.50     Types: Cigarettes     Quit date: 2021     Years since quittin.8    Smokeless tobacco: Never    Tobacco comments:     reports he quit smoking in the past 2 months   Substance and Sexual Activity    Alcohol use: No     Alcohol/week: 0.0 standard drinks Drug use: No    Sexual activity: Not on file   Other Topics Concern    Not on file   Social History Narrative    Not on file     Social Determinants of Health     Financial Resource Strain: Not on file   Food Insecurity: Not on file   Transportation Needs: Unmet Transportation Needs    Lack of Transportation (Medical): No    Lack of Transportation (Non-Medical): Yes   Physical Activity: Inactive    Days of Exercise per Week: 0 days    Minutes of Exercise per Session: 0 min   Stress: Stress Concern Present    Feeling of Stress : Very much   Social Connections: Socially Isolated    Frequency of Communication with Friends and Family: Never    Frequency of Social Gatherings with Friends and Family: More than three times a week    Attends Jain Services: Never    Active Member of Clubs or Organizations: No    Attends Club or Organization Meetings: Never    Marital Status:    Intimate Partner Violence: Not on file   Housing Stability: Low Risk     Unable to Pay for Housing in the Last Year: No    Number of Places Lived in the Last Year: 1    Unstable Housing in the Last Year: No       No family history on file. Allergies:  Patient has no known allergies. Home Medications:  Prior to Admission medications    Medication Sig Start Date End Date Taking? Authorizing Provider   metFORMIN (GLUCOPHAGE) 500 MG tablet Take 1 tablet by mouth in the morning and 1 tablet in the evening. Take with meals.  8/15/22   Sherie Perez MD   carvedilol (COREG) 25 MG tablet TAKE 1/2 TABLET TWICE A DAY 8/5/22   Te Griffith MD   albuterol sulfate HFA (PROVENTIL;VENTOLIN;PROAIR) 108 (90 Base) MCG/ACT inhaler INHALE 2 PUFFS BY MOUTH EVERY 6 HOURS IF NEEDED FOR WHEEZING 7/18/22   Khari Haji MD   albuterol (PROVENTIL) (5 MG/ML) 0.5% nebulizer solution Take 0.5 mLs by nebulization 4 times daily as needed for Wheezing 7/12/22   Te Griffith MD   amLODIPine (NORVASC) 10 MG tablet Take 1 tablet by mouth daily 7/12/22   Te Moody MD   aspirin (HM ASPIRIN EC LOW DOSE) 81 MG EC tablet TAKE 1 TABLET BY MOUTH DAILY 7/12/22   Te Moody MD   atorvastatin (LIPITOR) 20 MG tablet Take 1 tablet by mouth daily 7/12/22   Jens Bhakta MD   ipratropium-albuterol (DUONEB) 0.5-2.5 (3) MG/3ML SOLN nebulizer solution Inhale 3 mLs into the lungs every 4 hours (while awake) 7/12/22   Te Moody MD   furosemide (LASIX) 20 MG tablet Take 1 tablet by mouth daily 7/12/22   Jens Bhakta MD   lisinopril (PRINIVIL;ZESTRIL) 20 MG tablet Take 1 tablet by mouth daily 7/12/22   Jens Bhakta MD   vitamin D (CHOLECALCIFEROL) 50 MCG (2000 UT) TABS tablet Take 1 tablet by mouth daily 7/12/22   Jens Bhakta MD   fluticasone-salmeterol (ADVIAR) 100-50 MCG/ACT AEPB diskus inhaler Inhale 1 puff into the lungs every 12 hours 7/12/22   Jens Bhakta MD   tiotropium (SPIRIVA HANDIHALER) 18 MCG inhalation capsule Inhale 1 capsule into the lungs daily 7/12/22   Te Moody MD       REVIEW OFSYSTEMS    (2-9 systems for level 4, 10 or more for level 5)      Review of Systems   Unable to perform ROS: Patient unresponsive     PHYSICAL EXAM   (up to 7 for level 4, 8 or more forlevel 5)      INITIAL VITALS:   ED Triage Vitals   BP Temp Temp src Heart Rate Resp SpO2 Height Weight   10/07/22 1223 -- -- 10/07/22 1220 10/07/22 1220 10/07/22 1220 -- 10/07/22 1230   128/76   62 16 99 %  154 lb 5.2 oz (70 kg)       Physical Exam  Constitutional:       Comments: Patient unresponsive, he appears sick, he is maintaining his airway, satting 100%. HENT:      Head: Normocephalic and atraumatic. Nose: Nose normal.      Mouth/Throat:      Mouth: Mucous membranes are dry. Eyes:      Comments: Pupils are 3 mm, equal, sluggishly reactive   Cardiovascular:      Rate and Rhythm: Normal rate.    Pulmonary:      Comments: Assisted breathing with bag valve, patient immediately intubated on arrival  Abdominal:      Palpations: Abdomen is soft. Tenderness: There is no abdominal tenderness. Musculoskeletal:         General: No deformity or signs of injury. Cervical back: Normal range of motion. No rigidity. Skin:     General: Skin is dry. Capillary Refill: Capillary refill takes 2 to 3 seconds. Comments: Cool extremities   Neurological:      Comments: Patient with a GCS initially of 3 however after persistent nausea stimuli, patient was withdrawn to pain.    Psychiatric:      Comments: unreponsive       DIFFERENTIAL  DIAGNOSIS     PLAN (LABS / IMAGING / EKG):  Orders Placed This Encounter   Procedures    Culture, Blood 1    Culture, Urine    Culture, Blood 1    Respiratory Panel, Molecular, with COVID-19 (Restricted: peds pts or suitable admitted adults)    XR CHEST PORTABLE    CT HEAD WO CONTRAST    CT CHEST PULMONARY EMBOLISM W CONTRAST    Lactic Acid Now and in 2 Hours    Ammonia (select if history of liver disease or alcohol abuse)    Urine Drug Screen    Urinalysis with Microscopic    ELECTROLYTES PLUS    Hemoglobin and hematocrit, blood    CALCIUM, IONIC (POC)    CBC with Auto Differential    Comprehensive Metabolic Panel    TOX SCR, BLD, ED    Lactic Acid    Magnesium    Protime-INR    APTT    TSH with Reflex    Brain Natriuretic Peptide    Troponin    Blood gas, arterial    Prolactin    PREVIOUS SPECIMEN    Procalcitonin    Troponin    Pulse Oximetry    Elevate Head of Bed    Spontaneous Awakening Trial (SAT)    Inpatient consult to Critical Care    Inpatient consult to Dietitian    Pulse oximetry, continuous    End Tidal CO2 Continuous    Capnography    Pulse oximetry, continuous    Spontaneous Breathing Trial (SBT)    Post Extubation Oxygen Therapy    Manual Mechanical Ventilation    Venous Blood Gas, POC    Creatinine W/GFR Point of Care    POCT urea (BUN)    Lactic Acid, POC    POCT Glucose    EKG 12 Lead    Saline lock IV    Insert/Change Gabriel Catheter    ADMIT TO INPATIENT    Restraints non-violent or Initial MDM/Plan: 69 y.o. male who presents with EMS after he was found unresponsive. Patient with assisted breathing per EMS. He immediately intubated on arrival.  His initial EKG showed benign early repolarization however no ST segment elevation noted. Patient was hypotensive postintubation and thus was immediately given IV fluid resuscitation was subsequently started on Versed for sedation. Patient was started on Levophed for map goal of greater than 65 however after 2 L bolus, his pressure was able to hold at 771Q systolic. CT scan of his head did not show any intracranial bleed however did show old encephalomalacia. CT chest pending.   Patient also hypothermic, Brair hugger placed, warmed IV fluids started  Patient to be admitted to critical care team.      DIAGNOSTIC RESULTS / EMERGENCYDEPARTMENT COURSE / MDM     LABS:  Labs Reviewed   LACTIC ACID - Abnormal; Notable for the following components:       Result Value    Lactic Acid, Whole Blood 3.4 (*)     All other components within normal limits   URINALYSIS WITH MICROSCOPIC - Abnormal; Notable for the following components:    Color, UA Dark Yellow (*)     Turbidity UA Cloudy (*)     Bilirubin Urine NEGATIVE  Verified by ictotest. (*)     Urine Hgb TRACE (*)     Protein, UA 3+ (*)     All other components within normal limits   ELECTROLYTES PLUS - Abnormal; Notable for the following components:    POC Potassium 4.8 (*)     POC TCO2 40 (*)     Anion Gap 1 (*)     All other components within normal limits   HGB/HCT - Abnormal; Notable for the following components:    POC Hemoglobin 10.9 (*)     POC Hematocrit 32 (*)     All other components within normal limits   CBC WITH AUTO DIFFERENTIAL - Abnormal; Notable for the following components:    WBC 13.4 (*)     RBC 3.25 (*)     Hemoglobin 9.3 (*)     Hematocrit 31.1 (*)     RDW 14.9 (*)     Seg Neutrophils 87 (*)     Lymphocytes 7 (*)     Eosinophils % 0 (*)     Segs Absolute 11.66 (*)     Absolute Lymph # 0.94 (*)     All other components within normal limits   COMPREHENSIVE METABOLIC PANEL - Abnormal; Notable for the following components:    Glucose 201 (*)     BUN 31 (*)     AST 83 (*)     All other components within normal limits   TOX SCR, BLD, ED - Abnormal; Notable for the following components:    Acetaminophen Level <5 (*)     Salicylate Lvl <1 (*)     All other components within normal limits   BRAIN NATRIURETIC PEPTIDE - Abnormal; Notable for the following components:    Pro-BNP 1,650 (*)     All other components within normal limits   TROPONIN - Abnormal; Notable for the following components:    Troponin, High Sensitivity 36 (*)     All other components within normal limits   VENOUS BLOOD GAS, POINT OF CARE - Abnormal; Notable for the following components:    pH, Jose D 7.046 (*)     pCO2, Jose D 137.0 (*)     pO2, Jose D 53.6 (*)     HCO3, Venous 37.6 (*)     Positive Base Excess, Jose D 4 (*)     All other components within normal limits   CREATININE W/GFR POINT OF CARE - Abnormal; Notable for the following components:    POC Creatinine 1.35 (*)     GFR Non- 52 (*)     All other components within normal limits   UREA N (POC) - Abnormal; Notable for the following components:    POC BUN 31 (*)     All other components within normal limits   POCT GLUCOSE - Abnormal; Notable for the following components:    POC Glucose 195 (*)     All other components within normal limits   CULTURE, BLOOD 1   CULTURE, BLOOD 1   RESPIRATORY PANEL, MOLECULAR, WITH COVID-19   CULTURE, URINE   AMMONIA   URINE DRUG SCREEN   CALCIUM, IONIC (POC)   LACTIC ACID   MAGNESIUM   PROTIME-INR   APTT   TSH WITH REFLEX   PROLACTIN   PREVIOUS SPECIMEN   PROCALCITONIN   TROPONIN   LACTIC ACID,POINT OF CARE         RADIOLOGY:  CT HEAD WO CONTRAST    Result Date: 10/7/2022  EXAMINATION: CT OF THE HEAD WITHOUT CONTRAST  10/7/2022 10:31 am TECHNIQUE: CT of the head was performed without the administration of intravenous contrast. Automated enteric tube terminates at the body of the stomach. CT CHEST PULMONARY EMBOLISM W CONTRAST    Result Date: 10/7/2022  EXAMINATION: CTA OF THE CHEST 10/7/2022 1:53 pm TECHNIQUE: CTA of the chest was performed after the administration of intravenous contrast.  Multiplanar reformatted images are provided for review. MIP images are provided for review. Automated exposure control, iterative reconstruction, and/or weight based adjustment of the mA/kV was utilized to reduce the radiation dose to as low as reasonably achievable. COMPARISON: Chest radiograph today. Chest CT 06/16/2022 HISTORY: ORDERING SYSTEM PROVIDED HISTORY: hypotensive, unreposive TECHNOLOGIST PROVIDED HISTORY: hypotensive, unreposive Decision Support Exception - unselect if not a suspected or confirmed emergency medical condition->Emergency Medical Condition (MA) Reason for Exam: hypotensive, unreposive FINDINGS: Pulmonary Arteries: Pulmonary arteries are adequately opacified for evaluation. No evidence of intraluminal filling defect to suggest pulmonary embolism. Mildly enlarged main pulmonary artery measuring 40 mm. . Mediastinum: No evidence of mediastinal lymphadenopathy. The heart and pericardium demonstrate no acute abnormality. There is no acute abnormality of the thoracic aorta. Incidental accessory right upper lobe pulmonary vein with a retrobronchial coarse. Lungs/pleura: The endotracheal tube terminates above the [place]. Debris is present in the distal trachea and mainstem bronchi. Chronic clustered cystic change in the right suprahilar region in the right upper lobe is noted with more prominent opacity in the interval.  There is peribronchial wall thickening, bilateral reticulonodular opacities and tree-in-bud opacities present. These findings are most pronounced in the right middle and right lower lobe. No focal area of consolidation. No effusion. Upper Abdomen: Enteric tube terminates in the body of the stomach.   No acute findings. Punctate calcifications in the pancreas are again demonstrated. Small soft tissue density near the posterior liver surface again demonstrated. Soft Tissues/Bones: No acute bone or soft tissue abnormality. 1.  No evidence of pulmonary embolism. 2.  Debris present within the distal trachea and mainstem bronchi with findings of bronchiolitis bilaterally. This may be due to an endobronchial infection or aspiration pneumonitis. EKG  EKG Interpretation    Interpreted by me    Rhythm: normal sinus   Rate: normal  Axis: normal  Ectopy: none  Conduction: normal  ST Segments: no acute change  T Waves: no acute change  Q Waves: none    Clinical Impression: Benign early repolarization    All EKG's are interpreted by the Emergency Department Physicianwho either signs or Co-signs this chart in the absence of a cardiologist.    EMERGENCY DEPARTMENT COURSE:          PROCEDURES:  PROCEDURE NOTE - EMERGENCY INTUBATION    PATIENT NAME: [address] [place] [address] RECORD NO. [id number]  DATE: 10/7/2022  ATTENDING PHYSICIAN: Deb Husain    PREOPERATIVE DIAGNOSIS:  Acute Respiratory Failure  POSTOPERATIVE DIAGNOSIS:  Same  PROCEDURE PERFORMED:  Emergency endotracheal intubation  PERFORMING PHYSICIAN: Dulce Maria Mckeon MD    MEDICATIONS: etomidate intravenously and rocuronium intravenously    DISCUSSION:  Marah Aceves is a 69y.o.-year-old male who requires intubation and ventilatory support due to airway compromise. The history and physical examination were reviewed and confirmed. CONSENT: Unable to be obtained due to patient's condition. PROCEDURE:  A timeout was initiated by the bedside nurse and was confirmed by those present. The patient was placed in the appropriate position. Cricoid pressure was not required. Intubation was performed by direct laryngoscopy using a laryngoscope and an 8.0 cuffed endotracheal tube.   The cuff was then inflated and the tube was secured appropriately at a distance of 24 cm to the dental ridge. Initial confirmation of placement included bilateral breath sounds, an end tidal CO2 detector, and absence of sounds over the stomach. A chest x-ray to verify correct placement of the tube showed appropriate tube position. The patient tolerated the procedure well. COMPLICATIONS:  None     Baldo White MD  5:51 PM, 10/7/22     CONSULTS:  IP CONSULT TO CRITICAL CARE  IP CONSULT TO DIETITIAN    CRITICAL CARE:  CRITICAL CARE: There was a high probability of clinically significant/life threatening deterioration in this patient's condition which required my urgent intervention. Total critical care time was 45 minutes. This excludes any time for separately reportable procedures. FINAL IMPRESSION      1. Unresponsive    2. Bradycardia    3. Hypothermia, initial encounter    4. Acute respiratory failure, unspecified whether with hypoxia or hypercapnia ([facility] [address]          DISPOSITION / PLAN     DISPOSITION Admitted 10/07/2022 03:16:45 PM      PATIENT REFERRED TO:  No follow-up provider specified.     DISCHARGE MEDICATIONS:  Current Discharge Medication List          Baldo White MD  Emergency Medicine Resident    (Please note that portions of this note were completed with a voice recognition program.Efforts were made to edit the dictations but occasionally words are mis-transcribed.)       Baldo White MD  Resident  10/07/22 3536

## 2022-10-08 LAB
ABSOLUTE EOS #: 0 K/UL (ref 0–0.4)
ABSOLUTE IMMATURE GRANULOCYTE: 0 K/UL (ref 0–0.3)
ABSOLUTE LYMPH #: 0.24 K/UL (ref 1–4.8)
ABSOLUTE MONO #: 0.24 K/UL (ref 0.1–0.8)
ACTION: NORMAL
ANION GAP SERPL CALCULATED.3IONS-SCNC: 10 MMOL/L (ref 9–17)
ANION GAP: 11 MMOL/L (ref 7–16)
BASOPHILS # BLD: 0 % (ref 0–2)
BASOPHILS ABSOLUTE: 0 K/UL (ref 0–0.2)
BUN BLDV-MCNC: 33 MG/DL (ref 8–23)
CALCIUM SERPL-MCNC: 8.1 MG/DL (ref 8.6–10.4)
CHLORIDE BLD-SCNC: 107 MMOL/L (ref 98–107)
CO2: 27 MMOL/L (ref 20–31)
CREAT SERPL-MCNC: 1.2 MG/DL (ref 0.7–1.2)
CULTURE: NORMAL
DATE AND TIME: NORMAL
EGFR, POC: 55 ML/MIN/1.73M2
EOSINOPHILS RELATIVE PERCENT: 0 % (ref 1–4)
FIO2: 50
GFR NON-AFRICAN AMERICAN: 51 ML/MIN
GFR SERPL CREATININE-BSD FRML MDRD: >60 ML/MIN
GFR SERPL CREATININE-BSD FRML MDRD: >60 ML/MIN/1.73M2
GLUCOSE BLD-MCNC: 172 MG/DL (ref 70–99)
GLUCOSE BLD-MCNC: 187 MG/DL (ref 74–100)
HCT VFR BLD CALC: 21.1 % (ref 40.7–50.3)
HCT VFR BLD CALC: 21.8 % (ref 40.7–50.3)
HCT VFR BLD CALC: 22 % (ref 40.7–50.3)
HCT VFR BLD CALC: 22.4 % (ref 40.7–50.3)
HEMOGLOBIN: 6.9 G/DL (ref 13–17)
HEMOGLOBIN: 7 G/DL (ref 13–17)
HEMOGLOBIN: 7.1 G/DL (ref 13–17)
HEMOGLOBIN: 7.3 G/DL (ref 13–17)
IMMATURE GRANULOCYTES: 0 %
LACTIC ACID, WHOLE BLOOD: 1.1 MMOL/L (ref 0.7–2.1)
LACTIC ACID, WHOLE BLOOD: 2 MMOL/L (ref 0.7–2.1)
LYMPHOCYTES # BLD: 4 % (ref 24–44)
MAGNESIUM: 2.2 MG/DL (ref 1.6–2.6)
MCH RBC QN AUTO: 29.4 PG (ref 25.2–33.5)
MCHC RBC AUTO-ENTMCNC: 32.7 G/DL (ref 28.4–34.8)
MCV RBC AUTO: 89.8 FL (ref 82.6–102.9)
MONOCYTES # BLD: 4 % (ref 1–7)
MORPHOLOGY: ABNORMAL
MRSA, DNA, NASAL: NEGATIVE
NOTIFY: NORMAL
NRBC AUTOMATED: 0 PER 100 WBC
PDW BLD-RTO: 14.6 % (ref 11.8–14.4)
PLATELET # BLD: 184 K/UL (ref 138–453)
PMV BLD AUTO: 10.8 FL (ref 8.1–13.5)
POC BUN: 28 MG/DL (ref 8–26)
POC CHLORIDE: 107 MMOL/L (ref 98–107)
POC CREATININE: 1.38 MG/DL (ref 0.51–1.19)
POC HCO3: 28.2 MMOL/L (ref 21–28)
POC HEMATOCRIT: 19 % (ref 41–53)
POC HEMOGLOBIN: 6.5 G/DL (ref 13.5–17.5)
POC IONIZED CALCIUM: 1.16 MMOL/L (ref 1.15–1.33)
POC LACTIC ACID: 0.99 MMOL/L (ref 0.56–1.39)
POC O2 SATURATION: 95 % (ref 94–98)
POC PCO2: 44.9 MM HG (ref 35–48)
POC PH: 7.41 (ref 7.35–7.45)
POC PO2: 76.4 MM HG (ref 83–108)
POC POTASSIUM: 3.8 MMOL/L (ref 3.5–4.5)
POC SODIUM: 145 MMOL/L (ref 138–146)
POC TCO2: 28 MMOL/L (ref 22–30)
POSITIVE BASE EXCESS, ART: 3 (ref 0–3)
POTASSIUM SERPL-SCNC: 4.3 MMOL/L (ref 3.7–5.3)
RBC # BLD: 2.35 M/UL (ref 4.21–5.77)
READ BACK: YES
REASON FOR REJECTION: NORMAL
SAMPLE SITE: ABNORMAL
SEG NEUTROPHILS: 92 % (ref 36–66)
SEGMENTED NEUTROPHILS ABSOLUTE COUNT: 5.52 K/UL (ref 1.8–7.7)
SODIUM BLD-SCNC: 144 MMOL/L (ref 135–144)
SPECIMEN DESCRIPTION: NORMAL
SPECIMEN DESCRIPTION: NORMAL
TROPONIN, HIGH SENSITIVITY: 35 NG/L (ref 0–22)
WBC # BLD: 6 K/UL (ref 3.5–11.3)
ZZ NTE CLEAN UP: ORDERED TEST: NORMAL
ZZ NTE WITH NAME CLEAN UP: SPECIMEN SOURCE: NORMAL

## 2022-10-08 PROCEDURE — 82330 ASSAY OF CALCIUM: CPT

## 2022-10-08 PROCEDURE — 6360000002 HC RX W HCPCS

## 2022-10-08 PROCEDURE — 86901 BLOOD TYPING SEROLOGIC RH(D): CPT

## 2022-10-08 PROCEDURE — 82947 ASSAY GLUCOSE BLOOD QUANT: CPT

## 2022-10-08 PROCEDURE — 85018 HEMOGLOBIN: CPT

## 2022-10-08 PROCEDURE — 82803 BLOOD GASES ANY COMBINATION: CPT

## 2022-10-08 PROCEDURE — 89220 SPUTUM SPECIMEN COLLECTION: CPT

## 2022-10-08 PROCEDURE — 84520 ASSAY OF UREA NITROGEN: CPT

## 2022-10-08 PROCEDURE — 94640 AIRWAY INHALATION TREATMENT: CPT

## 2022-10-08 PROCEDURE — 85025 COMPLETE CBC W/AUTO DIFF WBC: CPT

## 2022-10-08 PROCEDURE — 2580000003 HC RX 258

## 2022-10-08 PROCEDURE — 83605 ASSAY OF LACTIC ACID: CPT

## 2022-10-08 PROCEDURE — 6360000002 HC RX W HCPCS: Performed by: STUDENT IN AN ORGANIZED HEALTH CARE EDUCATION/TRAINING PROGRAM

## 2022-10-08 PROCEDURE — 93005 ELECTROCARDIOGRAM TRACING: CPT

## 2022-10-08 PROCEDURE — 2580000003 HC RX 258: Performed by: STUDENT IN AN ORGANIZED HEALTH CARE EDUCATION/TRAINING PROGRAM

## 2022-10-08 PROCEDURE — 2500000003 HC RX 250 WO HCPCS: Performed by: STUDENT IN AN ORGANIZED HEALTH CARE EDUCATION/TRAINING PROGRAM

## 2022-10-08 PROCEDURE — 86850 RBC ANTIBODY SCREEN: CPT

## 2022-10-08 PROCEDURE — 99291 CRITICAL CARE FIRST HOUR: CPT | Performed by: INTERNAL MEDICINE

## 2022-10-08 PROCEDURE — 86920 COMPATIBILITY TEST SPIN: CPT

## 2022-10-08 PROCEDURE — 80051 ELECTROLYTE PANEL: CPT

## 2022-10-08 PROCEDURE — 36415 COLL VENOUS BLD VENIPUNCTURE: CPT

## 2022-10-08 PROCEDURE — 6370000000 HC RX 637 (ALT 250 FOR IP): Performed by: STUDENT IN AN ORGANIZED HEALTH CARE EDUCATION/TRAINING PROGRAM

## 2022-10-08 PROCEDURE — 94761 N-INVAS EAR/PLS OXIMETRY MLT: CPT

## 2022-10-08 PROCEDURE — 2700000000 HC OXYGEN THERAPY PER DAY

## 2022-10-08 PROCEDURE — 86900 BLOOD TYPING SEROLOGIC ABO: CPT

## 2022-10-08 PROCEDURE — 94003 VENT MGMT INPAT SUBQ DAY: CPT

## 2022-10-08 PROCEDURE — 83735 ASSAY OF MAGNESIUM: CPT

## 2022-10-08 PROCEDURE — 82565 ASSAY OF CREATININE: CPT

## 2022-10-08 PROCEDURE — 6370000000 HC RX 637 (ALT 250 FOR IP)

## 2022-10-08 PROCEDURE — 84484 ASSAY OF TROPONIN QUANT: CPT

## 2022-10-08 PROCEDURE — 80048 BASIC METABOLIC PNL TOTAL CA: CPT

## 2022-10-08 PROCEDURE — 2000000000 HC ICU R&B

## 2022-10-08 PROCEDURE — 85014 HEMATOCRIT: CPT

## 2022-10-08 RX ORDER — SODIUM CHLORIDE 9 MG/ML
INJECTION, SOLUTION INTRAVENOUS PRN
Status: DISCONTINUED | OUTPATIENT
Start: 2022-10-08 | End: 2022-10-14 | Stop reason: HOSPADM

## 2022-10-08 RX ORDER — METHYLPREDNISOLONE SODIUM SUCCINATE 40 MG/ML
40 INJECTION, POWDER, LYOPHILIZED, FOR SOLUTION INTRAMUSCULAR; INTRAVENOUS DAILY
Status: DISCONTINUED | OUTPATIENT
Start: 2022-10-08 | End: 2022-10-11

## 2022-10-08 RX ORDER — FENTANYL CITRATE 50 UG/ML
25 INJECTION, SOLUTION INTRAMUSCULAR; INTRAVENOUS EVERY 4 HOURS PRN
Status: COMPLETED | OUTPATIENT
Start: 2022-10-08 | End: 2022-10-09

## 2022-10-08 RX ADMIN — SODIUM CHLORIDE, PRESERVATIVE FREE 10 ML: 5 INJECTION INTRAVENOUS at 09:00

## 2022-10-08 RX ADMIN — METHYLPREDNISOLONE SODIUM SUCCINATE 40 MG: 40 INJECTION, POWDER, FOR SOLUTION INTRAMUSCULAR; INTRAVENOUS at 14:29

## 2022-10-08 RX ADMIN — FAMOTIDINE 20 MG: 10 INJECTION INTRAVENOUS at 19:57

## 2022-10-08 RX ADMIN — FAMOTIDINE 20 MG: 10 INJECTION INTRAVENOUS at 08:59

## 2022-10-08 RX ADMIN — CHLORHEXIDINE GLUCONATE 15 ML: 1.2 RINSE ORAL at 09:00

## 2022-10-08 RX ADMIN — SODIUM CHLORIDE, PRESERVATIVE FREE 10 ML: 5 INJECTION INTRAVENOUS at 19:58

## 2022-10-08 RX ADMIN — IPRATROPIUM BROMIDE AND ALBUTEROL SULFATE 1 AMPULE: .5; 2.5 SOLUTION RESPIRATORY (INHALATION) at 12:02

## 2022-10-08 RX ADMIN — IPRATROPIUM BROMIDE AND ALBUTEROL SULFATE 1 AMPULE: .5; 2.5 SOLUTION RESPIRATORY (INHALATION) at 08:43

## 2022-10-08 RX ADMIN — SODIUM CHLORIDE, PRESERVATIVE FREE 10 ML: 5 INJECTION INTRAVENOUS at 20:01

## 2022-10-08 RX ADMIN — AMPICILLIN SODIUM AND SULBACTAM SODIUM 3000 MG: 2; 1 INJECTION, POWDER, FOR SOLUTION INTRAMUSCULAR; INTRAVENOUS at 01:58

## 2022-10-08 RX ADMIN — IPRATROPIUM BROMIDE AND ALBUTEROL SULFATE 1 AMPULE: .5; 2.5 SOLUTION RESPIRATORY (INHALATION) at 20:20

## 2022-10-08 RX ADMIN — ENOXAPARIN SODIUM 40 MG: 100 INJECTION SUBCUTANEOUS at 09:00

## 2022-10-08 RX ADMIN — IPRATROPIUM BROMIDE AND ALBUTEROL SULFATE 1 AMPULE: .5; 2.5 SOLUTION RESPIRATORY (INHALATION) at 17:05

## 2022-10-08 RX ADMIN — DEXTROSE AND SODIUM CHLORIDE: 5; 450 INJECTION, SOLUTION INTRAVENOUS at 10:12

## 2022-10-08 RX ADMIN — AMPICILLIN SODIUM AND SULBACTAM SODIUM 3000 MG: 2; 1 INJECTION, POWDER, FOR SOLUTION INTRAMUSCULAR; INTRAVENOUS at 05:36

## 2022-10-08 RX ADMIN — BUDESONIDE AND FORMOTEROL FUMARATE DIHYDRATE 2 PUFF: 80; 4.5 AEROSOL RESPIRATORY (INHALATION) at 08:44

## 2022-10-08 ASSESSMENT — PULMONARY FUNCTION TESTS
PIF_VALUE: 28
PIF_VALUE: 25
PIF_VALUE: 30
PIF_VALUE: 33
PIF_VALUE: 27
PIF_VALUE: 24
PIF_VALUE: 25
PIF_VALUE: 26
PIF_VALUE: 23
PIF_VALUE: 27
PIF_VALUE: 24
PIF_VALUE: 34
PIF_VALUE: 59
PIF_VALUE: 29
PIF_VALUE: 26
PIF_VALUE: 34
PIF_VALUE: 24
PIF_VALUE: 29

## 2022-10-08 ASSESSMENT — ENCOUNTER SYMPTOMS: TACHYPNEA: 1

## 2022-10-08 NOTE — PROGRESS NOTES
Critical Care Team - Daily Progress Note      Date and time: 10/8/2022 7:41 AM  Patient's name:  Jane Chang  Medical Record Number: [id number]  Patient's account/billing number: [de-identified]  Patient's YOB: 1955  Age: 69 y.o. Date of Admission: 10/7/2022 12:20 PM  Length of stay during current admission: 1      Primary Care Physician: Katherine Yates MD  ICU Attending Physician: Dr. Trish Olmos Status: Full Code    Reason for ICU admission:   Chief Complaint   Patient presents with    Respiratory Distress         SUBJECTIVE:   HPI:  History was obtained from child and chart review. Jane Chang is a 69 y.o. with past medical history of hypertension, type 2 diabetes mellitus, COPD, combined heart failure, presented to the ER altered. The patient signed stated that he was doing fine all day long and all of a sudden the patient became unresponsive and started to look up. Son called EMS and he was brought to the ER. As per the EMS the patient never lost responses. CT PE showed No evidence of pulm embolism, debris in trachea and mainstem, likely aspiration pneumonitis. CT head showed mild cerebral atrophy with stable encephalomalacia in the right occipital lobe into no acute intracranial abnormality. VBG showed pH of 7.046, PCO2 37, bicarb 31.   proBNP was 1650, troponin 36. WBC 31.4, H&H of 9.3 and 31. 1. lactic acid was 1.3> 3.4. In the ER the patient was unresponsive, was intubated due to shallow breathing and impending respiratory failure and was subsequently admitted to MICU. Following intubation patient was bradycardic and hypotensive. Was started on Levophed. Currently maintaining off of Levophed. Interval history:  - Patient evaluated at the bedside. Patient intubated and sedated. -Patient on Versed for sedation.  -Hemodynamically stable with blood pressure 125/56 mmHg. Currently off Levophed.   -Bradycardia noted on EKG with junctional rhythm and heart rate 49.  -Currently getting Unasyn for suspected aspiration pneumonia/pneumonitis. -Extremities cold during examination. Wheezing heard on chest auscultation.  -Hemoglobin 1.3. No bleeding noted in urine or stools.     OVERNIGHT EVENTS:       Hemoglobin dropped to 6.5; 7.2 latest.    AWAKE & FOLLOWING COMMANDS:  [x] No   [] Yes    CURRENT VENTILATION STATUS:     [x] Ventilator  [] BIPAP  [] Nasal Cannula [] Room Air        SECRETIONS Amount:  [] Small [x] Moderate  [] Large  [] None  Color:     [x] White [] Colored  [] Bloody    SEDATION:   [] Propofol gtt  [x] Versed gtt  [] Ativan gtt   [] No Sedation    PARALYZED:  [x] No    [] Yes    DIARRHEA:                [x] No                [] Yes  (C. Difficile status: [] positive                                                                                                                       [] negative                                                                                                                     [] pending)    VASOPRESSORS:  [x] No    [] Yes    If yes -   [] Levophed       [] Dopamine     [] Vasopressin       [] Dobutamine  [] Phenylephrine         [] Epinephrine    CENTRAL LINES:     [x] No   [] Yes   (Date of Insertion:   )           If yes -     [] Right IJ     [] Left IJ [] Right Femoral [] Left Femoral                   [] Right Subclavian [] Left Subclavian       MORENO'S CATHETER:   [] No   [x] Yes  (Date of Insertion: 10/7/2022   )     URINE OUTPUT:            [x] Good   [] Low              [] Anuric      OBJECTIVE:     VITAL SIGNS:  BP (!) 125/56   Pulse 50   Temp 98.4 °F (36.9 °C) (Bladder)   Resp 22   Ht 5' 5\" (1.651 m)   Wt 146 lb 2.6 oz (66.3 kg)   SpO2 99%   BMI 24.32 kg/m²   Tmax over 24 hours:  Temp (24hrs), Av.1 °F (36.2 °C), Min:93.2 °F (34 °C), Max:98.6 °F (37 °C)      Patient Vitals for the past 8 hrs:   BP Temp Temp src Pulse Resp SpO2 Weight   10/08/22 0700 (!) 125/56 -- -- 50 22 99 % --   10/08/22 0600 (!) 114/58 98.4 °F (36.9 °C) Bladder 51 22 99 % --   10/08/22 0558 -- -- -- -- -- -- 146 lb 2.6 oz (66.3 kg)   10/08/22 0500 (!) 144/75 -- -- 64 23 96 % --   10/08/22 0400 (!) 117/54 98.4 °F (36.9 °C) Bladder 52 22 98 % --   10/08/22 0325 -- -- -- 52 22 96 % --   10/08/22 0300 (!) 128/55 -- -- 54 22 98 % --   10/08/22 0200 (!) 110/56 98.6 °F (37 °C) Bladder 53 22 99 % --   10/08/22 0100 (!) 108/53 -- -- 53 22 98 % --   10/08/22 0000 (!) 130/58 98.2 °F (36.8 °C) Bladder 60 22 95 % --   10/07/22 2343 -- -- -- 56 22 96 % --         Intake/Output Summary (Last 24 hours) at 10/8/2022 0741  Last data filed at 10/8/2022 0604  Gross per 24 hour   Intake 1146.26 ml   Output 840 ml   Net 306.26 ml     Date 10/08/22 0000 - 10/08/22 2359   Shift 3268-9000 8671-6642 8620-5970 24 Hour Total   INTAKE   I.V.(mL/kg) 811.5(12.2)   811.5(12.2)   IV Piggyback(mL/kg) 334. 7(5)   334. 7(5)   Shift Total(mL/kg) 1146. 3(17.3)   1146. 3(17.3)   OUTPUT   Urine(mL/kg/hr) 245   245   Shift Total(mL/kg) 245(3.7)   245(3.7)   Weight (kg) 66.3 66.3 66.3 66.3     Wt Readings from Last 3 Encounters:   10/08/22 146 lb 2.6 oz (66.3 kg)   07/12/22 155 lb (70.3 kg)   06/19/22 168 lb 14 oz (76.6 kg)     Body mass index is 24.32 kg/m². PHYSICAL EXAM:  Constitutional: Intubated and sedated. EENT: PERRLA, EOMI, sclera clear, anicteric. Respiratory: Wheezing noted. No crackles or rhonchi heard. Cardiovascular: Patient has bradycardia. Junctional rhythm with bradycardia noted on EKG. Abdomen: soft, nontender, nondistended, no masses or organomegaly  NEUROLOGIC: Intubated and sedated   Extremities:  peripheral pulses normal, no pedal edema, no clubbing or cyanosis  SKIN: normal coloration and turgor.       MEDICATIONS:  Scheduled Meds:   chlorhexidine  15 mL Mouth/Throat BID    famotidine (PEPCID) injection  20 mg IntraVENous BID    sodium chloride flush  5-40 mL IntraVENous 2 times per day    enoxaparin  40 mg SubCUTAneous Daily    ampicillin-sulbactam  3,000 mg IntraVENous Q6H    budesonide-formoterol  2 puff Inhalation BID    ipratropium-albuterol  1 ampule Inhalation Q4H WA     Continuous Infusions:   sodium chloride      norepinephrine Stopped (10/07/22 1301)    midazolam 2 mg/hr (10/08/22 0631)    sodium chloride      dextrose 5 % and 0.45 % NaCl Stopped (10/08/22 0536)     PRN Meds:   sodium chloride, , PRN  sodium chloride flush, 5-40 mL, PRN  sodium chloride, , PRN  ondansetron, 4 mg, Q8H PRN   Or  ondansetron, 4 mg, Q6H PRN  polyethylene glycol, 17 g, Daily PRN  acetaminophen, 650 mg, Q6H PRN   Or  acetaminophen, 650 mg, Q6H PRN        SUPPORT DEVICES: [] Ventilator [] BIPAP  [] Nasal Cannula [] Room Air    VENT SETTINGS (Comprehensive) (if applicable):   PRVC mode, FiO2 50%, PEEP 5, Respiratory Rate 22, Tidal Volume 490  Vent Information  Ventilator Day(s): 1  Ventilator ID: [email or web address]  Equipment Changed: Expiratory Filter, HME  Ventilator Initiate: Yes  Vent Mode: AC/PRVC  Additional Respiratory Assessments  Heart Rate: 50  Resp: 22  SpO2: 99 %  End Tidal CO2: 25 (%)  Position: Semi-Grigsby's  Humidification Source: HME    ABGs:   Arterial Blood Gas result:  pH 7.406; pCO2 44.9; pO2 76.4; HCO3 27  Lab Results   Component Value Date/Time    NMP2QXI 29 07/01/2017 11:51 AM    FIO2 50.0 10/08/2022 04:41 AM     Lactic Acid: No results found for: LACTA      DATA:  Complete Blood Count:   Recent Labs     10/07/22  1241 10/08/22  0405 10/08/22  0528   WBC 13.4* 6.0  --    HGB 9.3* 6.9* 7.3*   MCV 95.7 89.8  --     184  --    RBC 3.25* 2.35*  --    HCT 31.1* 21.1* 22.4*   MCH 28.6 29.4  --    MCHC 29.9 32.7  --    RDW 14.9* 14.6*  --    MPV 10.8 10.8  --         PT/INR:    Lab Results   Component Value Date/Time    PROTIME 9.9 10/07/2022 12:41 PM    INR 1.0 10/07/2022 12:41 PM     PTT:    Lab Results   Component Value Date/Time    APTT 23.7 10/07/2022 12:41 PM       Basal Metabolic Profile:   Recent Labs     10/07/22  1241 10/07/22  1752 10/08/22  0304 10/08/22  0441    144 144  --    K 5.1 5.1 4.3  --    BUN 31* 30* 33*  --    CREATININE 1.06 0.87 1.20 1.38*    107 107  --    CO2 31 23 27  --       Magnesium:   Lab Results   Component Value Date/Time    MG 1.7 10/07/2022 12:41 PM    MG 1.7 06/14/2022 10:13 AM    MG 1.8 01/25/2022 06:33 AM     Phosphorus:   Lab Results   Component Value Date/Time    PHOS 3.5 09/09/2019 05:03 AM    PHOS 3.5 09/08/2019 05:55 AM    PHOS 5.9 09/07/2019 03:13 AM     S. Calcium:  Recent Labs     10/08/22  0303   CALCIUM 8.1*     S. Ionized Calcium:No results for input(s): IONCA in the last 72 hours. Urinalysis:   Lab Results   Component Value Date/Time    NITRU NEGATIVE 10/07/2022 01:21 PM    COLORU Dark Yellow 10/07/2022 01:21 PM    PHUR 5.0 10/07/2022 01:21 PM    WBCUA 5 TO 10 10/07/2022 01:21 PM    RBCUA 2 TO 5 10/07/2022 01:21 PM    MUCUS 2+ 07/01/2017 02:51 AM    TRICHOMONAS NOT REPORTED 07/01/2017 02:51 AM    YEAST NOT REPORTED 07/01/2017 02:51 AM    BACTERIA FEW 07/01/2017 02:51 AM    SPECGRAV 1.021 10/07/2022 01:21 PM    LEUKOCYTESUR NEGATIVE 10/07/2022 01:21 PM    UROBILINOGEN Normal 10/07/2022 01:21 PM    BILIRUBINUR NEGATIVE  Verified by ictotest. 10/07/2022 01:21 PM    GLUCOSEU NEGATIVE 10/07/2022 01:21 PM    KETUA NEGATIVE 10/07/2022 01:21 PM    AMORPHOUS 2+ 07/01/2017 02:51 AM       CARDIAC ENZYMES: No results for input(s): CKMB, CKMBINDEX, TROPONINI in the last 72 hours. Invalid input(s): CKTOTAL;3  BNP: No results for input(s): BNP in the last 72 hours.     LFTS  Recent Labs     10/07/22  1241   ALKPHOS 115   ALT 33   AST 83*   BILITOT 0.5   LABALBU 3.9       AMYLASE/LIPASE/AMMONIA  Recent Labs     10/07/22  1357   AMMONIA 31       Last 3 Blood Glucose:   Recent Labs     10/07/22  1241 10/07/22  1752 10/08/22  0303   GLUCOSE 201* 108* 172*      HgBA1c:    Lab Results   Component Value Date/Time    LABA1C 6.2 02/15/2022 03:19 PM         TSH:    Lab Results   Component Value Date/Time    TSH 1.12 10/07/2022 12:41 PM     ANEMIA STUDIES  No results for input(s): LABIRON, TIBC, FERRITIN, [id number], FOLATE, OCCULTBLD in the last 72 hours. Cultures during this admission:     Blood cultures:                 [] None drawn      [x] Negative             []  Positive (Details:  )  Urine Culture:                   [] None drawn      [x] Negative             []  Positive (Details:  )  Sputum Culture:               [] None drawn       [] Negative             []  Positive (Details:  )   Endotracheal aspirate: No results yet    [] None drawn       [] Negative             []  Positive (Details:  )             Chest Xray (10/8/2022):    ASSESSMENT:     Principal Problem:    Acute metabolic encephalopathy  Resolved Problems:    * No resolved hospital problems. *   FLUIDS:Dextrose 5% + 0.45% NS  FEEDING:NPO  ANALGESICS:No Analgesia  SEDATION: Versed 2 mg/hr  THROMBO- PROPHYLAXIS: Lovenox   MOBILIZATION:PT/OT not on board; patient intubated and sedated  HEADS UP:15  HEMODYNAMICS: /56; off levophed  ULCER PROPHYLAXIS: Pepcid   GLYCEMIC CONTROL:POC glucose 187  SPONTANEOUS BREATHING TRIAL: Not done  BOWEL MANAGEMENT: Glycolax 17g  INDWELLING CATHETER:Gabriel's  DRUG DE-ESCALATION: N/A      PLAN:     PLAN/MEDICAL DECISION MAKING:  Neurologic:   Neuro intact  versed  Patient intubated and sedated  Cardiovascular:  Hemodynamically stable  MAP goal >65  Heart rate 49, bradycardia  Cardiology consulted. EKG reviewed by cardiology  Recommended dopamine infusion if hemodynamically unstable or heart rate less than 40. Titrate for heart rate goal> 40  Pulmonary:  Maintain oxygen sats >92%  Pulmonary toilet  Patient on Symbicort twice daily and DuoNeb every 4 hours for possible COPD exacerbation. Was given Solu-Medrol 125 mg once IV yesterday. We will be administering Solu-Medrol 40 mg IV daily for 5 days.   Vent Information  Ventilator Day(s): 1  Ventilator ID: [email or web address]  Equipment Changed: HME  Ventilator Initiate: Yes  Vent Mode: AC/PRVC  GI/Nutrition  GlycoLax 17 g  Ulcer Prophylaxis: Pepcid   Diet:Diet NPO  Renal/Fluid/Electrolyte  IV Fluids: Dextrose 5% + 0.45% NS @ 75 mL/Hr   I/O: In: 1146.3 [I.V.:811.5]  Out: 965 [Urine:965]  UOP: 0.4 cc/kg/hr  Monitor electrolytes, replace PRN   ID  WBC:   Lab Results   Component Value Date    WBC 6.0 10/08/2022     Tmax: Temp (24hrs), Av.5 °F (36.4 °C), Min:93.7 °F (34.3 °C), Max:98.6 °F (37 °C)    Antimicrobials: Unasyn stopped. Hematology:  Recent Labs     10/08/22  0405 10/08/22  0528 10/08/22  1127   HGB 6.9* 7.3* 7.0*    trending down  - Will follow hemoglobin and hematocrit every 6 hours for now.    -We will transfuse 1 unit PRBC if the following hemoglobin value is<7.0. Will follow with imaging test source of bleed. - Will change hemoglobin and hematocrit checks to every 12 hours if hemoglobin level increases. Endocrine:   glucose controlled - most recent BGL is   Recent Labs     10/07/22  1241 10/07/22  1752 10/08/22  0303   GLUCOSE 201* 108* 172*     DVT Prophylaxis  lovenox  Discharge Needs:  PT, OT, ST, SW, and Case Management      CODE STATUS: Full Code             Eliceo Mcgrath MD, M.D.              Department of Internal Medicine/ Critical care  [facility])             10/8/2022, 7:41 AM

## 2022-10-08 NOTE — PLAN OF CARE
Problem: Respiratory - Adult  Goal: Achieves optimal ventilation and oxygenation  10/8/2022 0300 by Deidra Nuno RN  Outcome: Progressing     Problem: Skin/Tissue Integrity - Adult  Goal: Skin integrity remains intact  10/8/2022 0300 by Deidra Nuno RN  Outcome: Progressing     Problem: Skin/Tissue Integrity - Adult  Goal: Incisions, wounds, or drain sites healing without S/S of infection  10/8/2022 0300 by Deidra Nuno RN  Outcome: Progressing     Problem: Skin/Tissue Integrity - Adult  Goal: Oral mucous membranes remain intact  10/8/2022 0300 by Deidra Nuno RN  Outcome: Progressing     Problem: Discharge Planning  Goal: Discharge to home or other facility with appropriate resources  10/8/2022 0300 by Deidra Nuno RN  Outcome: Progressing     Problem: Safety - Medical Restraint  Goal: Remains free of injury from restraints (Restraint for Interference with Medical Device)  Description: INTERVENTIONS:  1. Determine that other, less restrictive measures have been tried or would not be effective before applying the restraint  2. Evaluate the patient's condition at the time of restraint application  3. Inform patient/family regarding the reason for restraint  4.  Q2H: Monitor safety, psychosocial status, comfort, nutrition and hydration  10/8/2022 0300 by Deidra Nuno RN  Outcome: Progressing     Problem: Pain  Goal: Verbalizes/displays adequate comfort level or baseline comfort level  10/8/2022 0300 by Deidra Nuno RN  Outcome: Progressing

## 2022-10-08 NOTE — PLAN OF CARE
Patient hemoglobin of 6.9 repeat hemoglobin of 6.5    Patient is currently unable to give consent    Writer did reach out to the patient power of  for obtaining consent for transfusing 1 unit of packed red blood cells. The POA did not answer the call and I left a voice message to call back ICU. In the meantime I will transfuse him with 1 unit of PRBC given significant low hemoglobin of 6.5. Avtar Mckeon MD  Internal Medicine Resident, PGY- [facility];  [place], [place]  10/8/2022, 4:56 AM

## 2022-10-08 NOTE — PLAN OF CARE
Problem: Respiratory - Adult  Goal: Achieves optimal ventilation and oxygenation  10/8/2022 1215 by Ana Cristina Rosales RCP  Outcome: Progressing  Achieves optimal ventilation and oxygenation: Assess for changes in respiratory status   BRONCHOSPASM/BRONCHOCONSTRICTION     [x]         IMPROVE AERATION/BREATH SOUNDS  [x]   ADMINISTER BRONCHODILATOR THERAPY AS APPROPRIATE  [x]   ASSESS BREATH SOUNDS  []   IMPLEMENT AEROSOL/MDI PROTOCOL  [x]   PATIENT EDUCATION AS NEEDED

## 2022-10-08 NOTE — PLAN OF CARE
Problem: Respiratory - Adult  Goal: Achieves optimal ventilation and oxygenation  10/8/2022 1636 by Priscila Wynn RN  Outcome: Progressing     Problem: Skin/Tissue Integrity - Adult  Goal: Skin integrity remains intact  10/8/2022 1636 by Priscila Wynn RN  Outcome: Progressing  Flowsheets (Taken 10/8/2022 0800)  Skin Integrity Remains Intact: Monitor for areas of redness and/or skin breakdown     Problem: Skin/Tissue Integrity - Adult  Goal: Incisions, wounds, or drain sites healing without S/S of infection  10/8/2022 1636 by Priscila Wynn RN  Outcome: Progressing  Flowsheets (Taken 10/8/2022 0800)  Incisions, Wounds, or Drain Sites Healing Without Sign and Symptoms of Infection: ADMISSION and DAILY: Assess and document risk factors for pressure ulcer development     Problem: Skin/Tissue Integrity - Adult  Goal: Oral mucous membranes remain intact  10/8/2022 1636 by Priscila Wynn RN  Outcome: Progressing  Flowsheets (Taken 10/8/2022 0800)  Oral Mucous Membranes Remain Intact: Implement oral medicated treatments as ordered     Problem: Discharge Planning  Goal: Discharge to home or other facility with appropriate resources  10/8/2022 1636 by Priscila Wynn RN  Outcome: Progressing  Flowsheets (Taken 10/8/2022 0800)  Discharge to home or other facility with appropriate resources: Identify barriers to discharge with patient and caregiver     Problem: Safety - Medical Restraint  Goal: Remains free of injury from restraints (Restraint for Interference with Medical Device)  10/8/2022 1636 by Priscila Wynn RN  Outcome: Progressing  Flowsheets  Taken 10/8/2022 1600  Remains free of injury from restraints (restraint for interference with medical device): Determine that other, less restrictive measures have been tried or would not be effective before applying the restraint  Taken 10/8/2022 1400  Remains free of injury from restraints (restraint for interference with medical device): Determine that other, less restrictive measures have been tried or would not be effective before applying the restraint  Taken 10/8/2022 1200  Remains free of injury from restraints (restraint for interference with medical device): Determine that other, less restrictive measures have been tried or would not be effective before applying the restraint  Taken 10/8/2022 1000  Remains free of injury from restraints (restraint for interference with medical device): Determine that other, less restrictive measures have been tried or would not be effective before applying the restraint  Taken 10/8/2022 0800  Remains free of injury from restraints (restraint for interference with medical device): Determine that other, less restrictive measures have been tried or would not be effective before applying the restraint     Problem: Pain  Goal: Verbalizes/displays adequate comfort level or baseline comfort level  10/8/2022 1636 by Yvan Christian RN  Outcome: Progressing  Flowsheets (Taken 10/8/2022 0400 by Alyce Toussaint RN)  Verbalizes/displays adequate comfort level or baseline comfort level: Assess pain using appropriate pain scale     Problem: Chronic Conditions and Co-morbidities  Goal: Patient's chronic conditions and co-morbidity symptoms are monitored and maintained or improved  Outcome: Progressing     Problem: Nutrition Deficit:  Goal: Optimize nutritional status  Outcome: Progressing

## 2022-10-08 NOTE — CONSULTS
Comprehensive Nutrition Assessment    Type and Reason for Visit:  Initial (Mechanical Vent)    Nutrition Recommendations/Plan:   Continue NPO. Monitor for start of nutrition support  If TF, recommend Diabetic formula with goal rate of 55 ml/hr providing 1980 kcal and 109 g protein     Malnutrition Assessment:  Malnutrition Status:  Insufficient data (10/08/22 4932)    Context:  Acute Illness     Findings of the 6 clinical characteristics of malnutrition:  Energy Intake:  Unable to assess  Weight Loss:  Unable to assess     Body Fat Loss:  Unable to assess     Muscle Mass Loss:  Unable to assess    Fluid Accumulation:  Unable to assess     Strength:  Not Performed    Nutrition Assessment:    Patient intubated and sedated. Admitted with acute metabolic encephalopathy. [facility] pnemuonia, T2DM    Nutrition Related Findings:    Labs/meds reviewed Wound Type: None       Current Nutrition Intake & Therapies:    Average Meal Intake: NPO  Average Supplements Intake: NPO  Diet NPO    Anthropometric Measures:  Height: 5' 5\" (165.1 cm)  Ideal Body Weight (IBW): 136 lbs (62 kg)       Current Body Weight: 146 lb (66.2 kg), 107.4 % IBW. Weight Source: Bed Scale  Current BMI (kg/m2): 24.3                          BMI Categories: Normal Weight (BMI 18.5-24. 9)    Estimated Daily Nutrient Needs:  Energy Requirements Based On: Kcal/kg  Weight Used for Energy Requirements: Current  Energy (kcal/day): 1900 kcal  Weight Used for Protein Requirements: Current  Protein (g/day): 80-90 gm  Method Used for Fluid Requirements: 1 ml/kcal  Fluid (ml/day): 1800 mL or per MD    Nutrition Diagnosis:   Inadequate oral intake related to impaired respiratory function as evidenced by intubation, NPO or clear liquid status due to medical condition    Nutrition Interventions:   Food and/or Nutrient Delivery: Continue NPO (Start TF or oral diet as appropriate)  Nutrition Education/Counseling: No recommendation at this time  Coordination of Nutrition Care: Continue to monitor while inpatient       Goals:     Goals: Meet at least 75% of estimated needs       Nutrition Monitoring and Evaluation:   Behavioral-Environmental Outcomes: None Identified  Food/Nutrient Intake Outcomes: Diet Advancement/Tolerance  Physical Signs/Symptoms Outcomes: Biochemical Data, GI Status, Weight, Skin    Discharge Planning:     Too soon to determine     Ventura Sellers N [facility] [address]  Contact: [phone]

## 2022-10-09 LAB
ABSOLUTE EOS #: 0 K/UL (ref 0–0.44)
ABSOLUTE IMMATURE GRANULOCYTE: 0.14 K/UL (ref 0–0.3)
ABSOLUTE LYMPH #: 0.28 K/UL (ref 1.1–3.7)
ABSOLUTE MONO #: 0.21 K/UL (ref 0.1–1.2)
ALLEN TEST: ABNORMAL
ANION GAP SERPL CALCULATED.3IONS-SCNC: 9 MMOL/L (ref 9–17)
BASOPHILS # BLD: 0 % (ref 0–2)
BASOPHILS ABSOLUTE: 0 K/UL (ref 0–0.2)
BUN BLDV-MCNC: 34 MG/DL (ref 8–23)
CALCIUM SERPL-MCNC: 8.5 MG/DL (ref 8.6–10.4)
CHLORIDE BLD-SCNC: 109 MMOL/L (ref 98–107)
CO2: 27 MMOL/L (ref 20–31)
CREAT SERPL-MCNC: 1.02 MG/DL (ref 0.7–1.2)
EOSINOPHILS RELATIVE PERCENT: 0 % (ref 1–4)
FIO2: 50
GFR SERPL CREATININE-BSD FRML MDRD: >60 ML/MIN/1.73M2
GLUCOSE BLD-MCNC: 230 MG/DL (ref 74–100)
GLUCOSE BLD-MCNC: 231 MG/DL (ref 75–110)
GLUCOSE BLD-MCNC: 236 MG/DL (ref 75–110)
GLUCOSE BLD-MCNC: 241 MG/DL (ref 70–99)
GLUCOSE BLD-MCNC: 243 MG/DL (ref 75–110)
HCT VFR BLD CALC: 21.4 % (ref 40.7–50.3)
HCT VFR BLD CALC: 22.9 % (ref 40.7–50.3)
HEMOGLOBIN: 7 G/DL (ref 13–17)
HEMOGLOBIN: 7.3 G/DL (ref 13–17)
IMMATURE GRANULOCYTES: 2 %
LYMPHOCYTES # BLD: 4 % (ref 24–43)
MCH RBC QN AUTO: 29 PG (ref 25.2–33.5)
MCHC RBC AUTO-ENTMCNC: 32.7 G/DL (ref 28.4–34.8)
MCV RBC AUTO: 88.8 FL (ref 82.6–102.9)
MODE: ABNORMAL
MONOCYTES # BLD: 3 % (ref 3–12)
MORPHOLOGY: ABNORMAL
NRBC AUTOMATED: 0 PER 100 WBC
O2 DEVICE/FLOW/%: ABNORMAL
PDW BLD-RTO: 14.5 % (ref 11.8–14.4)
PLATELET # BLD: 195 K/UL (ref 138–453)
PMV BLD AUTO: 10.6 FL (ref 8.1–13.5)
POC HCO3: 28.5 MMOL/L (ref 21–28)
POC LACTIC ACID: 0.8 MMOL/L (ref 0.56–1.39)
POC O2 SATURATION: 97 % (ref 94–98)
POC PCO2: 45.1 MM HG (ref 35–48)
POC PH: 7.41 (ref 7.35–7.45)
POC PO2: 95.7 MM HG (ref 83–108)
POSITIVE BASE EXCESS, ART: 3 (ref 0–3)
POTASSIUM SERPL-SCNC: 3.6 MMOL/L (ref 3.7–5.3)
RBC # BLD: 2.41 M/UL (ref 4.21–5.77)
SAMPLE SITE: ABNORMAL
SEG NEUTROPHILS: 91 % (ref 36–65)
SEGMENTED NEUTROPHILS ABSOLUTE COUNT: 6.47 K/UL (ref 1.5–8.1)
SODIUM BLD-SCNC: 145 MMOL/L (ref 135–144)
WBC # BLD: 7.1 K/UL (ref 3.5–11.3)

## 2022-10-09 PROCEDURE — 82947 ASSAY GLUCOSE BLOOD QUANT: CPT

## 2022-10-09 PROCEDURE — 94761 N-INVAS EAR/PLS OXIMETRY MLT: CPT

## 2022-10-09 PROCEDURE — 6360000002 HC RX W HCPCS

## 2022-10-09 PROCEDURE — 36600 WITHDRAWAL OF ARTERIAL BLOOD: CPT

## 2022-10-09 PROCEDURE — 82803 BLOOD GASES ANY COMBINATION: CPT

## 2022-10-09 PROCEDURE — 2580000003 HC RX 258

## 2022-10-09 PROCEDURE — 85025 COMPLETE CBC W/AUTO DIFF WBC: CPT

## 2022-10-09 PROCEDURE — 93005 ELECTROCARDIOGRAM TRACING: CPT | Performed by: STUDENT IN AN ORGANIZED HEALTH CARE EDUCATION/TRAINING PROGRAM

## 2022-10-09 PROCEDURE — 6370000000 HC RX 637 (ALT 250 FOR IP)

## 2022-10-09 PROCEDURE — 85014 HEMATOCRIT: CPT

## 2022-10-09 PROCEDURE — 94640 AIRWAY INHALATION TREATMENT: CPT

## 2022-10-09 PROCEDURE — 85018 HEMOGLOBIN: CPT

## 2022-10-09 PROCEDURE — 2700000000 HC OXYGEN THERAPY PER DAY

## 2022-10-09 PROCEDURE — 94003 VENT MGMT INPAT SUBQ DAY: CPT

## 2022-10-09 PROCEDURE — 6370000000 HC RX 637 (ALT 250 FOR IP): Performed by: STUDENT IN AN ORGANIZED HEALTH CARE EDUCATION/TRAINING PROGRAM

## 2022-10-09 PROCEDURE — 2000000000 HC ICU R&B

## 2022-10-09 PROCEDURE — 2580000003 HC RX 258: Performed by: STUDENT IN AN ORGANIZED HEALTH CARE EDUCATION/TRAINING PROGRAM

## 2022-10-09 PROCEDURE — 6360000002 HC RX W HCPCS: Performed by: STUDENT IN AN ORGANIZED HEALTH CARE EDUCATION/TRAINING PROGRAM

## 2022-10-09 PROCEDURE — 2500000003 HC RX 250 WO HCPCS: Performed by: STUDENT IN AN ORGANIZED HEALTH CARE EDUCATION/TRAINING PROGRAM

## 2022-10-09 PROCEDURE — 99291 CRITICAL CARE FIRST HOUR: CPT | Performed by: INTERNAL MEDICINE

## 2022-10-09 PROCEDURE — 80048 BASIC METABOLIC PNL TOTAL CA: CPT

## 2022-10-09 PROCEDURE — 83605 ASSAY OF LACTIC ACID: CPT

## 2022-10-09 PROCEDURE — 36415 COLL VENOUS BLD VENIPUNCTURE: CPT

## 2022-10-09 PROCEDURE — 89220 SPUTUM SPECIMEN COLLECTION: CPT

## 2022-10-09 RX ORDER — INSULIN LISPRO 100 [IU]/ML
0-4 INJECTION, SOLUTION INTRAVENOUS; SUBCUTANEOUS NIGHTLY
Status: DISCONTINUED | OUTPATIENT
Start: 2022-10-09 | End: 2022-10-12

## 2022-10-09 RX ORDER — DEXTROSE MONOHYDRATE 100 MG/ML
INJECTION, SOLUTION INTRAVENOUS CONTINUOUS PRN
Status: DISCONTINUED | OUTPATIENT
Start: 2022-10-09 | End: 2022-10-14 | Stop reason: HOSPADM

## 2022-10-09 RX ORDER — FENTANYL CITRATE 50 UG/ML
25 INJECTION, SOLUTION INTRAMUSCULAR; INTRAVENOUS
Status: DISCONTINUED | OUTPATIENT
Start: 2022-10-09 | End: 2022-10-14 | Stop reason: HOSPADM

## 2022-10-09 RX ORDER — MIDAZOLAM HYDROCHLORIDE 1 MG/ML
INJECTION INTRAMUSCULAR; INTRAVENOUS
Status: COMPLETED
Start: 2022-10-09 | End: 2022-10-09

## 2022-10-09 RX ORDER — INSULIN LISPRO 100 [IU]/ML
0-4 INJECTION, SOLUTION INTRAVENOUS; SUBCUTANEOUS
Status: DISCONTINUED | OUTPATIENT
Start: 2022-10-09 | End: 2022-10-12

## 2022-10-09 RX ORDER — MIDAZOLAM HYDROCHLORIDE 2 MG/2ML
2 INJECTION, SOLUTION INTRAMUSCULAR; INTRAVENOUS ONCE
Status: COMPLETED | OUTPATIENT
Start: 2022-10-10 | End: 2022-10-09

## 2022-10-09 RX ADMIN — SODIUM CHLORIDE, PRESERVATIVE FREE 10 ML: 5 INJECTION INTRAVENOUS at 09:08

## 2022-10-09 RX ADMIN — IPRATROPIUM BROMIDE AND ALBUTEROL SULFATE 1 AMPULE: .5; 2.5 SOLUTION RESPIRATORY (INHALATION) at 19:27

## 2022-10-09 RX ADMIN — METHYLPREDNISOLONE SODIUM SUCCINATE 40 MG: 40 INJECTION, POWDER, FOR SOLUTION INTRAMUSCULAR; INTRAVENOUS at 09:07

## 2022-10-09 RX ADMIN — POTASSIUM BICARBONATE 20 MEQ: 782 TABLET, EFFERVESCENT ORAL at 09:05

## 2022-10-09 RX ADMIN — FAMOTIDINE 20 MG: 10 INJECTION INTRAVENOUS at 09:07

## 2022-10-09 RX ADMIN — IPRATROPIUM BROMIDE AND ALBUTEROL SULFATE 1 AMPULE: .5; 2.5 SOLUTION RESPIRATORY (INHALATION) at 08:44

## 2022-10-09 RX ADMIN — DEXTROSE AND SODIUM CHLORIDE: 5; 450 INJECTION, SOLUTION INTRAVENOUS at 00:04

## 2022-10-09 RX ADMIN — DEXTROSE AND SODIUM CHLORIDE: 5; 450 INJECTION, SOLUTION INTRAVENOUS at 13:50

## 2022-10-09 RX ADMIN — SODIUM CHLORIDE, PRESERVATIVE FREE 10 ML: 5 INJECTION INTRAVENOUS at 22:32

## 2022-10-09 RX ADMIN — ENOXAPARIN SODIUM 40 MG: 100 INJECTION SUBCUTANEOUS at 09:07

## 2022-10-09 RX ADMIN — BUDESONIDE AND FORMOTEROL FUMARATE DIHYDRATE 2 PUFF: 80; 4.5 AEROSOL RESPIRATORY (INHALATION) at 19:38

## 2022-10-09 RX ADMIN — IPRATROPIUM BROMIDE AND ALBUTEROL SULFATE 1 AMPULE: .5; 2.5 SOLUTION RESPIRATORY (INHALATION) at 11:32

## 2022-10-09 RX ADMIN — CHLORHEXIDINE GLUCONATE 15 ML: 1.2 RINSE ORAL at 09:03

## 2022-10-09 RX ADMIN — FENTANYL CITRATE 50 MCG: 50 INJECTION, SOLUTION INTRAMUSCULAR; INTRAVENOUS at 11:34

## 2022-10-09 RX ADMIN — IPRATROPIUM BROMIDE AND ALBUTEROL SULFATE 1 AMPULE: .5; 2.5 SOLUTION RESPIRATORY (INHALATION) at 19:55

## 2022-10-09 RX ADMIN — IPRATROPIUM BROMIDE AND ALBUTEROL SULFATE 1 AMPULE: .5; 2.5 SOLUTION RESPIRATORY (INHALATION) at 16:33

## 2022-10-09 RX ADMIN — INSULIN LISPRO 1 UNITS: 100 INJECTION, SOLUTION INTRAVENOUS; SUBCUTANEOUS at 16:45

## 2022-10-09 RX ADMIN — FENTANYL CITRATE 50 MCG: 50 INJECTION, SOLUTION INTRAMUSCULAR; INTRAVENOUS at 16:43

## 2022-10-09 RX ADMIN — FENTANYL CITRATE 25 MCG: 50 INJECTION, SOLUTION INTRAMUSCULAR; INTRAVENOUS at 05:59

## 2022-10-09 RX ADMIN — MIDAZOLAM HYDROCHLORIDE 2 MG: 2 INJECTION, SOLUTION INTRAMUSCULAR; INTRAVENOUS at 23:55

## 2022-10-09 RX ADMIN — FENTANYL CITRATE 50 MCG: 50 INJECTION, SOLUTION INTRAMUSCULAR; INTRAVENOUS at 09:04

## 2022-10-09 RX ADMIN — FENTANYL CITRATE 25 MCG: 50 INJECTION, SOLUTION INTRAMUSCULAR; INTRAVENOUS at 21:00

## 2022-10-09 RX ADMIN — FAMOTIDINE 20 MG: 10 INJECTION INTRAVENOUS at 22:31

## 2022-10-09 RX ADMIN — FENTANYL CITRATE 25 MCG: 50 INJECTION, SOLUTION INTRAMUSCULAR; INTRAVENOUS at 01:09

## 2022-10-09 RX ADMIN — BUDESONIDE AND FORMOTEROL FUMARATE DIHYDRATE 2 PUFF: 80; 4.5 AEROSOL RESPIRATORY (INHALATION) at 08:44

## 2022-10-09 RX ADMIN — MIDAZOLAM 2 MG: 1 INJECTION INTRAMUSCULAR; INTRAVENOUS at 23:55

## 2022-10-09 ASSESSMENT — PULMONARY FUNCTION TESTS
PIF_VALUE: 26
PIF_VALUE: 29
PIF_VALUE: 27
PIF_VALUE: 28
PIF_VALUE: 29
PIF_VALUE: 29
PIF_VALUE: 25
PIF_VALUE: 25
PIF_VALUE: 26
PIF_VALUE: 25
PIF_VALUE: 54
PIF_VALUE: 23
PIF_VALUE: 25
PIF_VALUE: 36
PIF_VALUE: 26
PIF_VALUE: 24
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
## 2022-10-09 NOTE — PLAN OF CARE
Problem: Respiratory - Adult  Goal: Achieves optimal ventilation and oxygenation  10/9/2022 0903 by Yolanda Hyatt RCP  Outcome: Progressing  10/8/2022 2029 by Reji Pyle RN  Outcome: Progressing

## 2022-10-09 NOTE — CARE COORDINATION
10/09/22 1453   Service Assessment   Patient Orientation Other (see comment)  (Intubated.)   History Provided By Child/Family   Primary [place] [address] is: Legal Next of Kin   PCP Verified by CM Yes  (Unknown.)   Prior Functional Level Assistance with the following:;Cooking;Housework; Shopping  (Pt's son, Thomas Black, assists pt.)   Can patient return to prior living arrangement Unknown at present   Ability to make needs known: Unable   Family able to assist with home care needs: Yes   Financial Resources Medicare   Social/Functional History   Lives With Son   Type of Home Apartment   Home Layout   (Lives on the 2nd floor of an apartment building.)   Home Access   (15 total to get into their apartment.)   Bathroom Shower/Tub Tub/Shower unit   [address] Help From Family   Active  No   Patient's [place] service. Discharge Planning   Type of Residence Apartment   Living Arrangements Children   Current Services Prior To Admission [address]   Potential Assistance Purchasing Medications Yes   Patient expects to be discharged to: Skilled nursing facility   History of falls? 0   Services At/After Discharge   Transition of Care Consult (CM Consult) SNF   Services At/After Discharge [facility] (SNF)   Mode of Transport at Discharge Other (see comment)  (Will need transportation.)   Condition of Participation: Discharge Planning   The Plan for Transition of Care is related to the following treatment goals: Pt's goal is to home. The Patient and/or Patient Representative was provided with a Choice of Provider? Patient Representative  (Pt's son and Berkley Cortes.)   The Patient and/Or Patient Representative agree with the Discharge Plan?  Yes   Freedom of Choice list was provided with basic dialogue that supports the patient's individualized plan of care/goals, treatment preferences, and shares the quality data associated with the providers? Yes     Pt's goal is to home, likely will need SNF, pt's son and Uli Shabazz, states that pt has been to [facility] in the past, may want a referral there if SNF is needed. Blanca Nando also states that pt has difficulty affording his medications as times. Writer to fax hospital SNF list and information on possible prescription assistance through the social security administration to Blanca Collier @ [email or web address] com Unable to print SNF list from pt's insurance web site currently as it is down. [facility], RT charge, that pt wears home O2 at 3L continuous. Pt son did not know home O2 supplier, prior notes state that a referral was sent to [facility].

## 2022-10-09 NOTE — PLAN OF CARE
Problem: Respiratory - Adult  Goal: Achieves optimal ventilation and oxygenation  10/8/2022 2029 by Prateek Greenwood RN  Outcome: Progressing     Problem: Skin/Tissue Integrity - Adult  Goal: Skin integrity remains intact  10/8/2022 2029 by Prateek Greenwood RN  Outcome: Progressing     Problem: Skin/Tissue Integrity - Adult  Goal: Incisions, wounds, or drain sites healing without S/S of infection  10/8/2022 2029 by Prateek Greenwood RN  Outcome: Progressing     Problem: Skin/Tissue Integrity - Adult  Goal: Oral mucous membranes remain intact  10/8/2022 2029 by Prateek Greenwood RN  Outcome: Progressing     Problem: Discharge Planning  Goal: Discharge to home or other facility with appropriate resources  10/8/2022 2029 by Prateek Greenwood RN  Outcome: Progressing     Problem: Safety - Medical Restraint  Goal: Remains free of injury from restraints (Restraint for Interference with Medical Device)  Description: INTERVENTIONS:  1. Determine that other, less restrictive measures have been tried or would not be effective before applying the restraint  2. Evaluate the patient's condition at the time of restraint application  3. Inform patient/family regarding the reason for restraint  4.  Q2H: Monitor safety, psychosocial status, comfort, nutrition and hydration  10/8/2022 2029 by Prateek Greenwood RN  Outcome: Progressing  Flowsheets  Taken 10/8/2022 1800 by Kelvin Fatima RN  Remains free of injury from restraints (restraint for interference with medical device): Determine that other, less restrictive measures have been tried or would not be effective before applying the restraint  Taken 10/8/2022 1653 by Kelvin Fatima RN  Remains free of injury from restraints (restraint for interference with medical device): Determine that other, less restrictive measures have been tried or would not be effective before applying the restraint     Problem: Pain  Goal: Verbalizes/displays adequate comfort level or baseline comfort level  10/8/2022 2029 by Patric Solorzano RN  Outcome: Progressing     Problem: Chronic Conditions and Co-morbidities  Goal: Patient's chronic conditions and co-morbidity symptoms are monitored and maintained or improved  10/8/2022 2029 by Patric Solorzano RN  Outcome: Progressing     Problem: Nutrition Deficit:  Goal: Optimize nutritional status  10/8/2022 2029 by Patric Solorzano RN  Outcome: Progressing

## 2022-10-09 NOTE — PROGRESS NOTES
Critical Care Team - Daily Progress Note      Date and time: 10/9/2022 7:19 AM  Patient's name:  Aleta Dias  Medical Record Number: [id number]  Patient's account/billing number: [de-identified]  Patient's YOB: 1955  Age: 69 y.o. Date of Admission: 10/7/2022 12:20 PM  Length of stay during current admission: 2      Primary Care Physician: Stevenson Desai MD  ICU Attending Physician: Dr. Deon Zaragoza Status: Full Code    Reason for ICU admission:   Chief Complaint   Patient presents with    Respiratory Distress         SUBJECTIVE:   HPI:  History was obtained from child and chart review. Aleta Dias is a 69 y.o. with past medical history of hypertension, type 2 diabetes mellitus, COPD, combined heart failure, presented to the ER altered. The patient signed stated that he was doing fine all day long and all of a sudden the patient became unresponsive and started to look up. Son called EMS and he was brought to the ER. As per the EMS the patient never lost responses. CT PE showed No evidence of pulm embolism, debris in trachea and mainstem, likely aspiration pneumonitis. CT head showed mild cerebral atrophy with stable encephalomalacia in the right occipital lobe into no acute intracranial abnormality. VBG showed pH of 7.046, PCO2 37, bicarb 31.   proBNP was 1650, troponin 36. WBC 31.4, H&H of 9.3 and 31. 1. lactic acid was 1.3> 3.4. In the ER the patient was unresponsive, was intubated due to shallow breathing and impending respiratory failure and was subsequently admitted to MICU. Following intubation patient was bradycardic and hypotensive. Was started on Levophed. Currently maintaining off of Levophed. 10/8: Cardio consulted for bradycardia - dopamine when unstable or HR <40. Interval history:  -Patient evaluated at bedside. The patient was intubated on the ventilator.  -Patient off Versed but was not awake.   - Was not following commands during evaluation  - HR was 49-51 BPM. Hemodynamically stable with MAP of 84.  - SBT was not tried yesterday as patient was not following commands.   OVERNIGHT EVENTS:       Hb at lower rony; 25 mcg fenatnyl given for ventilator desynchrony; Versed off    AWAKE & FOLLOWING COMMANDS:  [x] No   [] Yes    CURRENT VENTILATION STATUS:     [x] Ventilator  [] BIPAP  [] Nasal Cannula [] Room Air        SECRETIONS Amount:  [x] Small [] Moderate  [] Large  [] None  Color:     [x] White [] Colored  [] Bloody    SEDATION:    [] Propofol gtt  [] Versed gtt  [] Ativan gtt   [x] No Sedation    PARALYZED:  [x] No    [] Yes    DIARRHEA:                [x] No                [] Yes  (C. Difficile status: [] positive                                                                                                                       [] negative                                                                                                                     [] pending)    VASOPRESSORS:  [x] No    [] Yes    If yes -   [] Levophed       [] Dopamine     [] Vasopressin       [] Dobutamine  [] Phenylephrine         [] Epinephrine    CENTRAL LINES:     [] No   [] Yes   (Date of Insertion:   )           If yes -     [] Right IJ     [] Left IJ [] Right Femoral [] Left Femoral                   [] Right Subclavian [] Left Subclavian       MORENO'S CATHETER:   [] No   [x] Yes  (Date of Insertion:  10/7/22 )     URINE OUTPUT:            [x] Good   [] Low              [] Anuric      OBJECTIVE:     VITAL SIGNS:  /64   Pulse 86   Temp 98.6 °F (37 °C) (Bladder)   Resp 22   Ht 5' 5\" (1.651 m)   Wt 146 lb 2.6 oz (66.3 kg)   SpO2 95%   BMI 24.32 kg/m²   Tmax over 24 hours:  Temp (24hrs), Av.2 °F (36.8 °C), Min:97.7 °F (36.5 °C), Max:98.6 °F (37 °C)      Patient Vitals for the past 8 hrs:   BP Temp Temp src Pulse Resp SpO2   10/09/22 0629 -- -- -- -- 22 --   10/09/22 0600 138/64 98.6 °F (37 °C) Bladder 86 22 95 %   10/09/22 0500 (!) 145/58 -- -- 50 22 94 %   10/09/22 0400 (!) 146/53 98.4 °F (36.9 °C) Bladder 50 22 99 %   10/09/22 0320 -- -- -- (!) 49 22 100 %   10/09/22 0300 (!) 142/55 -- -- 51 22 99 %   10/09/22 0200 (!) 136/55 98.4 °F (36.9 °C) Bladder 52 22 100 %   10/09/22 0139 -- -- -- -- 22 --   10/09/22 0108 (!) 133/55 -- -- 82 (!) 33 96 %   10/09/22 0100 -- -- -- 80 23 96 %   10/09/22 0000 (!) 134/49 98.1 °F (36.7 °C) Bladder 51 22 98 %   10/08/22 2331 -- -- -- 58 22 99 %         Intake/Output Summary (Last 24 hours) at 10/9/2022 0719  Last data filed at 10/9/2022 0000  Gross per 24 hour   Intake 790.54 ml   Output 505 ml   Net 285.54 ml     Date 10/09/22 0000 - 10/09/22 2359   Shift 9550-7781 7396-4199 7367-7301 24 Hour Total   INTAKE   Shift Total(mL/kg)       OUTPUT   Urine(mL/kg/hr) 120   120   Shift Total(mL/kg) 120(1.8)   120(1.8)   Weight (kg) 66.3 66.3 66.3 66.3     Wt Readings from Last 3 Encounters:   10/08/22 146 lb 2.6 oz (66.3 kg)   07/12/22 155 lb (70.3 kg)   06/19/22 168 lb 14 oz (76.6 kg)     Body mass index is 24.32 kg/m². PHYSICAL EXAM:  Constitutional: Appears well, no distress  EENT: PERRLA, EOMI, sclera clear, anicteric. Neck: Supple, symmetrical, trachea midline, no adenopathy, thyroid symmetric, no jvd skin normal  Respiratory: clear to auscultation, no wheezes or rales and unlabored breathing. No intercostal tenderness  Cardiovascular: regular rate and rhythm, normal S1, S2, no murmur noted and 2+ pulses throughout  Abdomen: soft, nontender, nondistended, no masses or organomegaly  NEUROLOGIC: Intubated and not awake. Off versed.    Extremities:  peripheral pulses normal, no pedal edema, no clubbing or cyanosis  SKIN: normal coloration and turgor          MEDICATIONS:  Scheduled Meds:   methylPREDNISolone  40 mg IntraVENous Daily    chlorhexidine  15 mL Mouth/Throat BID    famotidine (PEPCID) injection  20 mg IntraVENous BID    sodium chloride flush  5-40 mL IntraVENous 2 times per day    enoxaparin  40 mg SubCUTAneous Daily    budesonide-formoterol 2 puff Inhalation BID    ipratropium-albuterol  1 ampule Inhalation Q4H WA     Continuous Infusions:   sodium chloride      norepinephrine Stopped (10/07/22 1301)    midazolam Stopped (10/08/22 1615)    sodium chloride      dextrose 5 % and 0.45 % NaCl 75 mL/hr at 10/09/22 0004     PRN Meds:   sodium chloride, , PRN  fentanNYL, 25 mcg, Q4H PRN  sodium chloride flush, 5-40 mL, PRN  sodium chloride, , PRN  ondansetron, 4 mg, Q8H PRN   Or  ondansetron, 4 mg, Q6H PRN  polyethylene glycol, 17 g, Daily PRN  acetaminophen, 650 mg, Q6H PRN   Or  acetaminophen, 650 mg, Q6H PRN        SUPPORT DEVICES: [x] Ventilator [] BIPAP  [] Nasal Cannula [] Room Air    VENT SETTINGS (Comprehensive) (if applicable):   PRVC mode, FiO2 40%, PEEP 5, Respiratory Rate 22, Tidal Volume 500  Vent Information  Ventilator Day(s): 1  Ventilator ID: [id number]  Equipment Changed: HME, Expiratory Filter  Ventilator Initiate: Yes  Vent Mode: AC/PRVC  Additional Respiratory Assessments  Heart Rate: 86  Resp: 22  SpO2: 95 %  End Tidal CO2: 23 (%)  Position: Semi-Grigsby's  Humidification Source: HME  Cuff Pressure (cm H2O):  (mov)  Skin Barrier Applied: No    ABGs:   Arterial Blood Gas result:  pH 7.408; pCO2 45.1; pO2 95.7; HCO3 27  Lab Results   Component Value Date/Time    FNW8ZKV 29 07/01/2017 11:51 AM    FIO2 50.0 10/09/2022 04:17 AM     Lactic Acid: No results found for: LACTA      DATA:  Complete Blood Count:   Recent Labs     10/07/22  1241 10/08/22  0405 10/08/22  0528 10/08/22  1127 10/08/22  1806   WBC 13.4* 6.0  --   --   --    HGB 9.3* 6.9* 7.3* 7.0* 7.1*   MCV 95.7 89.8  --   --   --     184  --   --   --    RBC 3.25* 2.35*  --   --   --    HCT 31.1* 21.1* 22.4* 22.0* 21.8*   MCH 28.6 29.4  --   --   --    MCHC 29.9 32.7  --   --   --    RDW 14.9* 14.6*  --   --   --    MPV 10.8 10.8  --   --   --         PT/INR:    Lab Results   Component Value Date/Time    PROTIME 9.9 10/07/2022 12:41 PM    INR 1.0 10/07/2022 12:41 PM     PTT: Lab Results   Component Value Date/Time    APTT 23.7 10/07/2022 12:41 PM       Basal Metabolic Profile:   Recent Labs     10/07/22  1241 10/07/22  1752 10/08/22  0303 10/08/22  0441    144 144  --    K 5.1 5.1 4.3  --    BUN 31* 30* 33*  --    CREATININE 1.06 0.87 1.20 1.38*    107 107  --    CO2 31 23 27  --       Magnesium:   Lab Results   Component Value Date/Time    MG 2.2 10/08/2022 07:55 AM    MG 1.7 10/07/2022 12:41 PM    MG 1.7 06/14/2022 10:13 AM     Phosphorus:   Lab Results   Component Value Date/Time    PHOS 3.5 09/09/2019 05:03 AM    PHOS 3.5 09/08/2019 05:55 AM    PHOS 5.9 09/07/2019 03:13 AM     S. Calcium:  Recent Labs     10/08/22  0303   CALCIUM 8.1*     S. Ionized Calcium:No results for input(s): IONCA in the last 72 hours. Urinalysis:   Lab Results   Component Value Date/Time    NITRU NEGATIVE 10/07/2022 01:21 PM    COLORU Dark Yellow 10/07/2022 01:21 PM    PHUR 5.0 10/07/2022 01:21 PM    WBCUA 5 TO 10 10/07/2022 01:21 PM    RBCUA 2 TO 5 10/07/2022 01:21 PM    MUCUS 2+ 07/01/2017 02:51 AM    TRICHOMONAS NOT REPORTED 07/01/2017 02:51 AM    YEAST NOT REPORTED 07/01/2017 02:51 AM    BACTERIA FEW 07/01/2017 02:51 AM    SPECGRAV 1.021 10/07/2022 01:21 PM    LEUKOCYTESUR NEGATIVE 10/07/2022 01:21 PM    UROBILINOGEN Normal 10/07/2022 01:21 PM    BILIRUBINUR NEGATIVE  Verified by ictotest. 10/07/2022 01:21 PM    GLUCOSEU NEGATIVE 10/07/2022 01:21 PM    KETUA NEGATIVE 10/07/2022 01:21 PM    AMORPHOUS 2+ 07/01/2017 02:51 AM       CARDIAC ENZYMES: No results for input(s): CKMB, CKMBINDEX, TROPONINI in the last 72 hours. Invalid input(s): CKTOTAL;3  BNP: No results for input(s): BNP in the last 72 hours.     LFTS  Recent Labs     10/07/22  1241   ALKPHOS 115   ALT 33   AST 83*   BILITOT 0.5   LABALBU 3.9       AMYLASE/LIPASE/AMMONIA  Recent Labs     10/07/22  1357   AMMONIA 31       Last 3 Blood Glucose:   Recent Labs     10/07/22  1241 10/07/22  1752 10/08/22  0303   GLUCOSE 201* 108* 172*      HgBA1c:    Lab Results   Component Value Date/Time    LABA1C 6.2 02/15/2022 03:19 PM         TSH:    Lab Results   Component Value Date/Time    TSH 1.12 10/07/2022 12:41 PM     ANEMIA STUDIES  No results for input(s): LABIRON, TIBC, FERRITIN, AHIBZEQQ87, FOLATE, OCCULTBLD in the last 72 hours. Cultures during this admission:     Blood cultures:                 [] None drawn      [x] Negative             []  Positive (Details:  )  Urine Culture:                   [] None drawn      [x] Negative             []  Positive (Details:  )  Sputum Culture:               [] None drawn       [] Negative             []  Positive (Details:  )   Endotracheal aspirate:     [] None drawn       [] Negative             []  Positive (Details:  )             Chest Xray (10/9/2022):    ASSESSMENT:     Principal Problem:    Acute metabolic encephalopathy  Resolved Problems:    * No resolved hospital problems. *   FLUIDS:dextrose 5% + 0.45% NS @75 ml/hr  FEEDING:NPO; will start on tube feed  ANALGESICS:Acetaminophen PRN; Fentanyl PRN  SEDATION: Fentanyl and Versed; Versed stopped this morning  THROMBO- PROPHYLAXIS: Lovenox   MOBILIZATION:PT/OT not following  HEADS UP:30  HEMODYNAMICS: 135/63 (84); HR 50; no pressors  ULCER PROPHYLAXIS: Pepcid   GLYCEMIC CONTROL:; no insulin  SPONTANEOUS BREATHING TRIAL:Not done; patient not following commands  BOWEL MANAGEMENT:Glycolax 17g  INDWELLING CATHETER:Gabriel's, 3 peripheral lines; ETT; Orogastric tube  DRUG DE-ESCALATION:     PLAN:   PLAN/MEDICAL DECISION MAKING:  Neurologic:   Patient was not awake  versed discontinued. Patient on fentanyl every 2 as needed  Patient intubated. Cardiovascular:  Hemodynamically stable  MAP goal >65  Heart rate 49, bradycardia  Cardiology consulted. EKG reviewed by cardiology  Recommended dopamine infusion if hemodynamically unstable or heart rate less than 40.   Titrate for heart rate goal> 40  Pulmonary:  Maintain oxygen sats >92%  Pulmonary toilet  Patient on Symbicort twice daily and DuoNeb every 4 hours for possible COPD exacerbation. Was given Solu-Medrol 125 mg once IV yesterday. We will be administering Solu-Medrol 40 mg IV daily for 5 days. Spontaneous breathing trial not done yesterday as patient did not follow commands. SBT will be done today as the patient has started following some commands. Vent Information  Ventilator Day(s): 1  Ventilator ID: [id number]  Equipment Changed: HME  Ventilator Initiate: Yes  Vent Mode: AC/PRVC  GI/Nutrition  GlycoLax 17 g  Ulcer Prophylaxis: Pepcid   Diet:Diet NPO  Renal/Fluid/Electrolyte  IV Fluids: 0.5% dextrose +0.45% normal saline@ 75 mL/Hr   I/O: In: 1040.5 [I.V.:783.9; NG/GT:250]  Out: 505 [Urine:505]  UOP: 0.3 cc/kg/hr  Monitor electrolytes, replace PRN   ID  WBC:   Lab Results   Component Value Date    WBC 7.1 10/09/2022     Tmax: Temp (24hrs), Av.2 °F (36.8 °C), Min:97.7 °F (36.5 °C), Max:98.6 °F (37 °C)    Antimicrobials: not indicated ; patient afebrile  Hematology:  Recent Labs     10/08/22  1127 10/08/22  1806 10/09/22  0714   HGB 7.0* 7.1* 7.0*     Hemoglobin levels low  Will follow hemoglobin and hematocrit every 12 hours  Transfuse 1 unit PRBC if Hb level less than 7. Endocrine:   glucose controlled - most recent BGL is   Recent Labs     10/07/22  1752 10/08/22  0303 10/09/22  0714   GLUCOSE 108* 172* 241*   Glucose not controlled adequately  Started on low-dose sliding scale insulin with hypoglycemia protocol for glucose control. DVT Prophylaxis  lovenox  Discharge Needs:  PT, OT, ST, SW, and Case Management      CODE STATUS: Full Code               Sy Canchola MD, M.D.              Department of Internal Medicine/ Critical care  [facility] ([place]             10/9/2022, 7:19 AM

## 2022-10-10 LAB
ABO/RH: NORMAL
ABSOLUTE EOS #: 0 K/UL (ref 0–0.4)
ABSOLUTE IMMATURE GRANULOCYTE: 0.17 K/UL (ref 0–0.3)
ABSOLUTE LYMPH #: 0.17 K/UL (ref 1–4.8)
ABSOLUTE MONO #: 0.67 K/UL (ref 0.1–0.8)
ALLEN TEST: ABNORMAL
ALLEN TEST: POSITIVE
ANION GAP SERPL CALCULATED.3IONS-SCNC: 6 MMOL/L (ref 9–17)
ANTIBODY SCREEN: NEGATIVE
ARM BAND NUMBER: NORMAL
BASOPHILS # BLD: 0 % (ref 0–2)
BASOPHILS ABSOLUTE: 0 K/UL (ref 0–0.2)
BLD PROD TYP BPU: NORMAL
BPU ID: NORMAL
BUN BLDV-MCNC: 27 MG/DL (ref 8–23)
CALCIUM SERPL-MCNC: 8.6 MG/DL (ref 8.6–10.4)
CHLORIDE BLD-SCNC: 103 MMOL/L (ref 98–107)
CO2: 28 MMOL/L (ref 20–31)
CREAT SERPL-MCNC: 0.73 MG/DL (ref 0.7–1.2)
CROSSMATCH RESULT: NORMAL
DISPENSE STATUS BLOOD BANK: NORMAL
EKG ATRIAL RATE: 49 BPM
EKG ATRIAL RATE: 50 BPM
EKG ATRIAL RATE: 68 BPM
EKG P AXIS: -89 DEGREES
EKG P AXIS: -95 DEGREES
EKG P AXIS: 81 DEGREES
EKG P-R INTERVAL: 128 MS
EKG P-R INTERVAL: 130 MS
EKG P-R INTERVAL: 138 MS
EKG Q-T INTERVAL: 442 MS
EKG Q-T INTERVAL: 470 MS
EKG Q-T INTERVAL: 476 MS
EKG QRS DURATION: 104 MS
EKG QRS DURATION: 88 MS
EKG QRS DURATION: 90 MS
EKG QTC CALCULATION (BAZETT): 428 MS
EKG QTC CALCULATION (BAZETT): 429 MS
EKG QTC CALCULATION (BAZETT): 469 MS
EKG R AXIS: 0 DEGREES
EKG R AXIS: 50 DEGREES
EKG R AXIS: 77 DEGREES
EKG T AXIS: 48 DEGREES
EKG T AXIS: 53 DEGREES
EKG T AXIS: 66 DEGREES
EKG VENTRICULAR RATE: 49 BPM
EKG VENTRICULAR RATE: 50 BPM
EKG VENTRICULAR RATE: 68 BPM
EOSINOPHILS RELATIVE PERCENT: 0 % (ref 1–4)
EXPIRATION DATE: NORMAL
FIO2: 50
FIO2: 60
GFR SERPL CREATININE-BSD FRML MDRD: >60 ML/MIN/1.73M2
GLUCOSE BLD-MCNC: 109 MG/DL (ref 74–100)
GLUCOSE BLD-MCNC: 154 MG/DL (ref 75–110)
GLUCOSE BLD-MCNC: 171 MG/DL (ref 75–110)
GLUCOSE BLD-MCNC: 184 MG/DL (ref 74–100)
GLUCOSE BLD-MCNC: 189 MG/DL (ref 70–99)
GLUCOSE BLD-MCNC: 189 MG/DL (ref 75–110)
GLUCOSE BLD-MCNC: 195 MG/DL (ref 75–110)
HCT VFR BLD CALC: 24.5 % (ref 40.7–50.3)
HEMOGLOBIN: 7.7 G/DL (ref 13–17)
IMMATURE GRANULOCYTES: 2 %
LYMPHOCYTES # BLD: 2 % (ref 24–44)
MCH RBC QN AUTO: 28.5 PG (ref 25.2–33.5)
MCHC RBC AUTO-ENTMCNC: 31.4 G/DL (ref 28.4–34.8)
MCV RBC AUTO: 90.7 FL (ref 82.6–102.9)
MODE: ABNORMAL
MODE: ABNORMAL
MONOCYTES # BLD: 8 % (ref 1–7)
MORPHOLOGY: ABNORMAL
NRBC AUTOMATED: 0 PER 100 WBC
O2 DEVICE/FLOW/%: ABNORMAL
O2 DEVICE/FLOW/%: ABNORMAL
PATIENT TEMP: 34.2
PDW BLD-RTO: 14.7 % (ref 11.8–14.4)
PLATELET # BLD: 174 K/UL (ref 138–453)
PMV BLD AUTO: 11.1 FL (ref 8.1–13.5)
POC HCO3: 26.4 MMOL/L (ref 21–28)
POC HCO3: 29.3 MMOL/L (ref 21–28)
POC LACTIC ACID: 1.14 MMOL/L (ref 0.56–1.39)
POC LACTIC ACID: 1.29 MMOL/L (ref 0.56–1.39)
POC O2 SATURATION: 100 % (ref 94–98)
POC O2 SATURATION: 98 % (ref 94–98)
POC PCO2 TEMP: 36 MM HG
POC PCO2: 40.9 MM HG (ref 35–48)
POC PCO2: 47.3 MM HG (ref 35–48)
POC PH TEMP: 7.46
POC PH: 7.4 (ref 7.35–7.45)
POC PH: 7.42 (ref 7.35–7.45)
POC PO2 TEMP: 324 MM HG
POC PO2: 107.8 MM HG (ref 83–108)
POC PO2: 337.9 MM HG (ref 83–108)
POSITIVE BASE EXCESS, ART: 2 (ref 0–3)
POSITIVE BASE EXCESS, ART: 4 (ref 0–3)
POTASSIUM SERPL-SCNC: 4 MMOL/L (ref 3.7–5.3)
RBC # BLD: 2.7 M/UL (ref 4.21–5.77)
SAMPLE SITE: ABNORMAL
SAMPLE SITE: ABNORMAL
SEG NEUTROPHILS: 88 % (ref 36–66)
SEGMENTED NEUTROPHILS ABSOLUTE COUNT: 7.39 K/UL (ref 1.8–7.7)
SODIUM BLD-SCNC: 137 MMOL/L (ref 135–144)
TRANSFUSION STATUS: NORMAL
UNIT DIVISION: 0
WBC # BLD: 8.4 K/UL (ref 3.5–11.3)

## 2022-10-10 PROCEDURE — 94761 N-INVAS EAR/PLS OXIMETRY MLT: CPT

## 2022-10-10 PROCEDURE — 2500000003 HC RX 250 WO HCPCS: Performed by: STUDENT IN AN ORGANIZED HEALTH CARE EDUCATION/TRAINING PROGRAM

## 2022-10-10 PROCEDURE — 94003 VENT MGMT INPAT SUBQ DAY: CPT

## 2022-10-10 PROCEDURE — 36415 COLL VENOUS BLD VENIPUNCTURE: CPT

## 2022-10-10 PROCEDURE — 6370000000 HC RX 637 (ALT 250 FOR IP): Performed by: STUDENT IN AN ORGANIZED HEALTH CARE EDUCATION/TRAINING PROGRAM

## 2022-10-10 PROCEDURE — 93010 ELECTROCARDIOGRAM REPORT: CPT | Performed by: INTERNAL MEDICINE

## 2022-10-10 PROCEDURE — 80048 BASIC METABOLIC PNL TOTAL CA: CPT

## 2022-10-10 PROCEDURE — 6370000000 HC RX 637 (ALT 250 FOR IP)

## 2022-10-10 PROCEDURE — 94640 AIRWAY INHALATION TREATMENT: CPT

## 2022-10-10 PROCEDURE — 2580000003 HC RX 258: Performed by: STUDENT IN AN ORGANIZED HEALTH CARE EDUCATION/TRAINING PROGRAM

## 2022-10-10 PROCEDURE — 2700000000 HC OXYGEN THERAPY PER DAY

## 2022-10-10 PROCEDURE — 6360000002 HC RX W HCPCS

## 2022-10-10 PROCEDURE — 2000000000 HC ICU R&B

## 2022-10-10 PROCEDURE — 6360000002 HC RX W HCPCS: Performed by: STUDENT IN AN ORGANIZED HEALTH CARE EDUCATION/TRAINING PROGRAM

## 2022-10-10 PROCEDURE — 2580000003 HC RX 258

## 2022-10-10 PROCEDURE — 85025 COMPLETE CBC W/AUTO DIFF WBC: CPT

## 2022-10-10 PROCEDURE — 99291 CRITICAL CARE FIRST HOUR: CPT | Performed by: INTERNAL MEDICINE

## 2022-10-10 RX ORDER — SODIUM CHLORIDE 9 MG/ML
INJECTION, SOLUTION INTRAVENOUS CONTINUOUS
Status: DISCONTINUED | OUTPATIENT
Start: 2022-10-10 | End: 2022-10-14 | Stop reason: HOSPADM

## 2022-10-10 RX ORDER — FUROSEMIDE 10 MG/ML
20 INJECTION INTRAMUSCULAR; INTRAVENOUS ONCE
Status: COMPLETED | OUTPATIENT
Start: 2022-10-11 | End: 2022-10-11

## 2022-10-10 RX ORDER — FUROSEMIDE 10 MG/ML
20 INJECTION INTRAMUSCULAR; INTRAVENOUS ONCE
Status: COMPLETED | OUTPATIENT
Start: 2022-10-10 | End: 2022-10-10

## 2022-10-10 RX ORDER — HYDRALAZINE HYDROCHLORIDE 20 MG/ML
10 INJECTION INTRAMUSCULAR; INTRAVENOUS EVERY 6 HOURS PRN
Status: DISCONTINUED | OUTPATIENT
Start: 2022-10-10 | End: 2022-10-12

## 2022-10-10 RX ADMIN — FENTANYL CITRATE 25 MCG: 50 INJECTION, SOLUTION INTRAMUSCULAR; INTRAVENOUS at 02:55

## 2022-10-10 RX ADMIN — BUDESONIDE AND FORMOTEROL FUMARATE DIHYDRATE 2 PUFF: 80; 4.5 AEROSOL RESPIRATORY (INHALATION) at 20:16

## 2022-10-10 RX ADMIN — CHLORHEXIDINE GLUCONATE 15 ML: 1.2 RINSE ORAL at 09:00

## 2022-10-10 RX ADMIN — IPRATROPIUM BROMIDE AND ALBUTEROL SULFATE 1 AMPULE: .5; 2.5 SOLUTION RESPIRATORY (INHALATION) at 12:00

## 2022-10-10 RX ADMIN — SODIUM CHLORIDE, PRESERVATIVE FREE 10 ML: 5 INJECTION INTRAVENOUS at 08:29

## 2022-10-10 RX ADMIN — FENTANYL CITRATE 25 MCG: 50 INJECTION, SOLUTION INTRAMUSCULAR; INTRAVENOUS at 10:53

## 2022-10-10 RX ADMIN — FAMOTIDINE 20 MG: 10 INJECTION INTRAVENOUS at 08:27

## 2022-10-10 RX ADMIN — IPRATROPIUM BROMIDE AND ALBUTEROL SULFATE 1 AMPULE: .5; 2.5 SOLUTION RESPIRATORY (INHALATION) at 15:06

## 2022-10-10 RX ADMIN — IPRATROPIUM BROMIDE AND ALBUTEROL SULFATE 1 AMPULE: .5; 2.5 SOLUTION RESPIRATORY (INHALATION) at 20:16

## 2022-10-10 RX ADMIN — SODIUM CHLORIDE: 9 INJECTION, SOLUTION INTRAVENOUS at 01:18

## 2022-10-10 RX ADMIN — IPRATROPIUM BROMIDE AND ALBUTEROL SULFATE 1 AMPULE: .5; 2.5 SOLUTION RESPIRATORY (INHALATION) at 07:45

## 2022-10-10 RX ADMIN — SODIUM CHLORIDE, PRESERVATIVE FREE 10 ML: 5 INJECTION INTRAVENOUS at 20:20

## 2022-10-10 RX ADMIN — ENOXAPARIN SODIUM 40 MG: 100 INJECTION SUBCUTANEOUS at 08:28

## 2022-10-10 RX ADMIN — BUDESONIDE AND FORMOTEROL FUMARATE DIHYDRATE 2 PUFF: 80; 4.5 AEROSOL RESPIRATORY (INHALATION) at 07:45

## 2022-10-10 RX ADMIN — FUROSEMIDE 20 MG: 10 INJECTION, SOLUTION INTRAMUSCULAR; INTRAVENOUS at 15:37

## 2022-10-10 RX ADMIN — FAMOTIDINE 20 MG: 10 INJECTION INTRAVENOUS at 20:20

## 2022-10-10 RX ADMIN — METHYLPREDNISOLONE SODIUM SUCCINATE 40 MG: 40 INJECTION, POWDER, FOR SOLUTION INTRAMUSCULAR; INTRAVENOUS at 10:55

## 2022-10-10 ASSESSMENT — PULMONARY FUNCTION TESTS
PIF_VALUE: 31
PIF_VALUE: 28
PIF_VALUE: 35
PIF_VALUE: 22
PIF_VALUE: 26
PIF_VALUE: 6
PIF_VALUE: 14
PIF_VALUE: 41
PIF_VALUE: 25
PIF_VALUE: 13
PIF_VALUE: 13
PIF_VALUE: 36
PIF_VALUE: 14
PIF_VALUE: 23
PIF_VALUE: 26
PIF_VALUE: 20
PIF_VALUE: 28
PIF_VALUE: 13
PIF_VALUE: 28
PIF_VALUE: 30
PIF_VALUE: 14
PIF_VALUE: 13
PIF_VALUE: 14
PIF_VALUE: 15
PIF_VALUE: 22
PIF_VALUE: 37
PIF_VALUE: 26
PIF_VALUE: 14
PIF_VALUE: 31
PIF_VALUE: 14
PIF_VALUE: 21
PIF_VALUE: 6
PIF_VALUE: 27
PIF_VALUE: 30
PIF_VALUE: 15
PIF_VALUE: 13
PIF_VALUE: 14
PIF_VALUE: 26
PIF_VALUE: 27
PIF_VALUE: 14
PIF_VALUE: 26

## 2022-10-10 NOTE — PROGRESS NOTES
Critical Care Team - Daily Progress Note      Date and time: 10/10/2022 9:16 AM  Patient's name:  Bobbi Ewing  Medical Record Number: [id number]  Patient's account/billing number: [de-identified]  Patient's YOB: 1955  Age: 69 y.o. Date of Admission: 10/7/2022 12:20 PM  Length of stay during current admission: 3      Primary Care Physician: Sindi Garcia MD  ICU Attending Physician: Dr. Gerber Silva Status: Full Code    Reason for ICU admission:   Chief Complaint   Patient presents with    Respiratory Distress         SUBJECTIVE:   HPI:  History was obtained from child and chart review. Bobbi Ewing is a 69 y.o. with past medical history of hypertension, type 2 diabetes mellitus, COPD, combined heart failure, presented to the ER altered. The patient signed stated that he was doing fine all day long and all of a sudden the patient became unresponsive and started to look up. Son called EMS and he was brought to the ER. As per the EMS the patient never lost responses. CT PE showed No evidence of pulm embolism, debris in trachea and mainstem, likely aspiration pneumonitis. CT head showed mild cerebral atrophy with stable encephalomalacia in the right occipital lobe into no acute intracranial abnormality. VBG showed pH of 7.046, PCO2 37, bicarb 31.   proBNP was 1650, troponin 36. WBC 31.4, H&H of 9.3 and 31. 1. lactic acid was 1.3> 3.4. In the ER the patient was unresponsive, was intubated due to shallow breathing and impending respiratory failure and was subsequently admitted to MICU. Following intubation patient was bradycardic and hypotensive. Was started on Levophed. Currently maintaining off of Levophed. 10/8: Cardio consulted for bradycardia - dopamine when unstable or HR <40.    10/9: SBT not tried as patient was not following commands    Interval history:    - Patient examined at bedside. Patient was awake and following commands.   - On CPAP settings with FiO2 40 and PEEP 5 and pressure support of 8 above PEEP  - Patient hemodynamically stable but still has bradycardia; Cardiology on board.   - Normal breath sounds on chest auscultation    OVERNIGHT EVENTS:      - No acute overnight events  - Had decreaseed urine output; was started on NS 75 ml/hr      AWAKE & FOLLOWING COMMANDS:  [] No   [x] Yes    CURRENT VENTILATION STATUS:     [x] Ventilator  [] BIPAP  [] Nasal Cannula [] Room Air      SECRETIONS Amount:  [x] Small [] Moderate  [] Large  [] None  Color:     [x] White [] Colored  [] Bloody    SEDATION:  RAAS Score:  [] Propofol gtt  [] Versed gtt  [] Ativan gtt   [x] No Sedation    PARALYZED:  [x] No    [] Yes    DIARRHEA:                [x] No                [] Yes  (C. Difficile status: [] positive                                                                                                                       [] negative                                                                                                                     [] pending)    VASOPRESSORS:  [x] No    [] Yes    If yes -   [] Levophed       [] Dopamine     [] Vasopressin       [] Dobutamine  [] Phenylephrine         [] Epinephrine    CENTRAL LINES:     [x] No   [] Yes   (Date of Insertion:   )           If yes -     [] Right IJ     [] Left IJ [] Right Femoral [] Left Femoral                   [] Right Subclavian [] Left Subclavian       MORENO'S CATHETER:   [x] No   [] Yes  (Date of Insertion:   )     URINE OUTPUT:            [] Good   [x] Low              [] Anuric      OBJECTIVE:     VITAL SIGNS:  BP (!) 169/56   Pulse 54   Temp 97.7 °F (36.5 °C) (Bladder)   Resp 23   Ht 5' 5\" (1.651 m)   Wt 151 lb 7.3 oz (68.7 kg)   SpO2 94%   BMI 25.20 kg/m²   Tmax over 24 hours:  Temp (24hrs), Av °F (36.7 °C), Min:97.7 °F (36.5 °C), Max:98.2 °F (36.8 °C)      Patient Vitals for the past 8 hrs:   BP Temp Temp src Pulse Resp SpO2 Weight   10/10/22 075 -- -- -- 54 23 94 % --   10/10/22 075 -- -- -- 65 21 95 % -- 10/10/22 0700 (!) 169/56 -- -- (!) 48 22 100 % --   10/10/22 0600 (!) 165/57 -- -- (!) 48 22 99 % --   10/10/22 0513 -- -- -- -- -- -- 151 lb 7.3 oz (68.7 kg)   10/10/22 0500 (!) 162/54 -- -- (!) 46 22 98 % --   10/10/22 0400 (!) 150/61 97.7 °F (36.5 °C) Bladder (!) 46 22 100 % --   10/10/22 0325 -- -- -- -- 24 -- --   10/10/22 0321 -- -- -- (!) 48 22 100 % --   10/10/22 0300 (!) 134/58 -- -- (!) 48 22 100 % --   10/10/22 0200 (!) 158/64 98.1 °F (36.7 °C) Bladder (!) 49 22 100 % --         Intake/Output Summary (Last 24 hours) at 10/10/2022 0916  Last data filed at 10/10/2022 0600  Gross per 24 hour   Intake 3450.09 ml   Output 325 ml   Net 3125.09 ml     Date 10/10/22 0000 - 10/10/22 2359   Shift 1503-8643 6639-7614 4075-6251 24 Hour Total   INTAKE   I.V.(mL/kg) 756.1(11)   756.1(11)   NG/GT(mL/kg) 500(7.3)   500(7.3)   Shift Total(mL/kg) 1256. 1(18.3)   1256. 1(18.3)   OUTPUT   Urine(mL/kg/hr) 205(0.4)   205   Shift Total(mL/kg) 205(3)   205(3)   Weight (kg) 68.7 68.7 68.7 68.7     Wt Readings from Last 3 Encounters:   10/10/22 151 lb 7.3 oz (68.7 kg)   07/12/22 155 lb (70.3 kg)   06/19/22 168 lb 14 oz (76.6 kg)     Body mass index is 25.2 kg/m². PHYSICAL EXAM:  Constitutional: Patient intubated. On CPAP settings. EENT: PERRLA, EOMI, sclera clear. Neck: Supple, symmetrical, trachea midline, no adenopathy. Respiratory: clear to auscultation, no wheezes or rales and unlabored breathing. No intercostal tenderness  Cardiovascular: regular rate and rhythm, normal S1, S2, no murmur noted and 2+ pulses throughout. Patient bradycardic with heart rate of 52. Abdomen: soft, nontender, nondistended, no masses or organomegaly  NEUROLOGIC: Awake and following commands.     Extremities:  peripheral pulses normal, no pedal edema, no clubbing or cyanosis  SKIN: normal coloration and turgor        MEDICATIONS:  Scheduled Meds:   insulin lispro  0-4 Units SubCUTAneous TID WC    insulin lispro  0-4 Units SubCUTAneous Nightly    methylPREDNISolone  40 mg IntraVENous Daily    chlorhexidine  15 mL Mouth/Throat BID    famotidine (PEPCID) injection  20 mg IntraVENous BID    sodium chloride flush  5-40 mL IntraVENous 2 times per day    enoxaparin  40 mg SubCUTAneous Daily    budesonide-formoterol  2 puff Inhalation BID    ipratropium-albuterol  1 ampule Inhalation Q4H WA     Continuous Infusions:   sodium chloride 75 mL/hr at 10/10/22 0429    dextrose      sodium chloride      norepinephrine Stopped (10/07/22 1301)    sodium chloride       PRN Meds:   glucose, 4 tablet, PRN  dextrose bolus, 125 mL, PRN   Or  dextrose bolus, 250 mL, PRN  glucagon (rDNA), 1 mg, PRN  dextrose, , Continuous PRN  fentanNYL, 25 mcg, Q2H PRN  sodium chloride, , PRN  sodium chloride flush, 5-40 mL, PRN  sodium chloride, , PRN  ondansetron, 4 mg, Q8H PRN   Or  ondansetron, 4 mg, Q6H PRN  polyethylene glycol, 17 g, Daily PRN  acetaminophen, 650 mg, Q6H PRN   Or  acetaminophen, 650 mg, Q6H PRN        SUPPORT DEVICES: [x] Ventilator [] BIPAP  [] Nasal Cannula [] Room Air    VENT SETTINGS (Comprehensive) (if applicable): On CPAP settings with FiO2 40 and PEEP of 5 and pressure support of 8 over PEEP.     Vent Information  Ventilator Day(s): 1  Ventilator ID: [email or web address]  Equipment Changed: Expiratory Filter, HME  Ventilator Initiate: Yes  Vent Mode: AC/PRVC  Additional Respiratory Assessments  Heart Rate: 54  Resp: 23  SpO2: 94 %  End Tidal CO2: 32 (%)  Position: Semi-Grigsby's  Humidification Source: HME  Cuff Pressure (cm H2O):  (mov)  Skin Barrier Applied: No    ABGs:   Arterial Blood Gas result:  pH 7.4; pCO2 47.3; pO2 107.8; HCO3 28  Lab Results   Component Value Date/Time    FCG0VIA 29 07/01/2017 11:51 AM    FIO2 50.0 10/10/2022 04:19 AM     Lactic Acid: No results found for: LACTA      DATA:  Complete Blood Count:   Recent Labs     10/08/22  0405 10/08/22  0528 10/09/22  0714 10/09/22  1732 10/10/22  0555   WBC 6.0  --  7.1  --  8.4   HGB 6.9*   < > 7.0* 7.3* 7.7*   MCV 89.8  --  88.8  --  90.7     --  195  --  174   RBC 2.35*  --  2.41*  --  2.70*   HCT 21.1*   < > 21.4* 22.9* 24.5*   MCH 29.4  --  29.0  --  28.5   MCHC 32.7  --  32.7  --  31.4   RDW 14.6*  --  14.5*  --  14.7*   MPV 10.8  --  10.6  --  11.1    < > = values in this interval not displayed. PT/INR:    Lab Results   Component Value Date/Time    PROTIME 9.9 10/07/2022 12:41 PM    INR 1.0 10/07/2022 12:41 PM     PTT:    Lab Results   Component Value Date/Time    APTT 23.7 10/07/2022 12:41 PM       Basal Metabolic Profile:   Recent Labs     10/08/22  0303 10/08/22  0441 10/09/22  0714 10/10/22  0555     --  145* 137   K 4.3  --  3.6* 4.0   BUN 33*  --  34* 27*   CREATININE 1.20 1.38* 1.02 0.73     --  109* 103   CO2 27  --  27 28      Magnesium:   Lab Results   Component Value Date/Time    MG 2.2 10/08/2022 07:55 AM    MG 1.7 10/07/2022 12:41 PM    MG 1.7 06/14/2022 10:13 AM     Phosphorus:   Lab Results   Component Value Date/Time    PHOS 3.5 09/09/2019 05:03 AM    PHOS 3.5 09/08/2019 05:55 AM    PHOS 5.9 09/07/2019 03:13 AM     S. Calcium:  Recent Labs     10/10/22  0555   CALCIUM 8.6     S. Ionized Calcium:No results for input(s): IONCA in the last 72 hours.       Urinalysis:   Lab Results   Component Value Date/Time    NITRU NEGATIVE 10/07/2022 01:21 PM    COLORU Dark Yellow 10/07/2022 01:21 PM    PHUR 5.0 10/07/2022 01:21 PM    WBCUA 5 TO 10 10/07/2022 01:21 PM    RBCUA 2 TO 5 10/07/2022 01:21 PM    MUCUS 2+ 07/01/2017 02:51 AM    TRICHOMONAS NOT REPORTED 07/01/2017 02:51 AM    YEAST NOT REPORTED 07/01/2017 02:51 AM    BACTERIA FEW 07/01/2017 02:51 AM    SPECGRAV 1.021 10/07/2022 01:21 PM    LEUKOCYTESUR NEGATIVE 10/07/2022 01:21 PM    UROBILINOGEN Normal 10/07/2022 01:21 PM    BILIRUBINUR NEGATIVE  Verified by ictotest. 10/07/2022 01:21 PM    GLUCOSEU NEGATIVE 10/07/2022 01:21 PM    KETUA NEGATIVE 10/07/2022 01:21 PM    AMORPHOUS 2+ 07/01/2017 02:51 AM       CARDIAC ENZYMES: No results for input(s): CKMB, CKMBINDEX, TROPONINI in the last 72 hours. Invalid input(s): CKTOTAL;3  BNP: No results for input(s): BNP in the last 72 hours. LFTS  Recent Labs     10/07/22  1241   ALKPHOS 115   ALT 33   AST 83*   BILITOT 0.5   LABALBU 3.9       AMYLASE/LIPASE/AMMONIA  Recent Labs     10/07/22  1357   AMMONIA 31       Last 3 Blood Glucose:   Recent Labs     10/07/22  1241 10/07/22  1752 10/08/22  0303 10/09/22  0714 10/10/22  0555   GLUCOSE 201* 108* 172* 241* 189*      HgBA1c:    Lab Results   Component Value Date/Time    LABA1C 6.2 02/15/2022 03:19 PM         TSH:    Lab Results   Component Value Date/Time    TSH 1.12 10/07/2022 12:41 PM     ANEMIA STUDIES  No results for input(s): LABIRON, TIBC, FERRITIN, NOMVQOFV04, FOLATE, OCCULTBLD in the last 72 hours. Cultures during this admission:     Blood cultures:                 [] None drawn      [x] Negative             []  Positive (Details:  )  Urine Culture:                   [] None drawn      [x] Negative             []  Positive (Details:  )  Sputum Culture:               [] None drawn       [] Negative             []  Positive (Details:  )   Endotracheal aspirate:     [] None drawn       [] Negative             []  Positive (Details:  )           ASSESSMENT:     Principal Problem:    Acute metabolic encephalopathy  Resolved Problems:    * No resolved hospital problems. *       PLAN:     PLAN/MEDICAL DECISION MAKING:  Neurologic:   Patient following commands  No sedation; Versed discontinued  Patient intubated  Cardiovascular:     Bradycardia with junctional rhythm-   Hemodynamically stable  MAP goal >65  Cardiology on board. Recommended dopamine infusion if hemodynamically unstable or heart rate less than 40. Titrate for heart rate goal> 40. Recommended follow-up echo previous echo revealed normal EF 51%. Hold off all AV blocking medication. 20 mg IV Lasix injection given once.   Was started with normal saline 75 mL/h due to decreased urine output overnight. Started on 0.9% NaCl IV at 20 mL/h. Pulmonary:  Maintain oxygen sats >92%  Pulmonary toilet  Spontaneous breathing trial to be done. Patient to be extubated if SBT successful. Currently on Symbicort and DuoNeb with Solu-Medrol 40 mg IV daily for 5 days. Vent Information  Ventilator Day(s): 1  Ventilator ID: [email or web address]  Equipment Changed: HME  Ventilator Initiate: Yes  Vent Mode: AC/PRVC  GI/Nutrition  GlycoLax 17 g oral daily as needed  Ulcer Prophylaxis: Pepcid 20 mg IV 2 times daily. Diet:Diet NPO  Renal/Fluid/Electrolyte  IV Fluids: 0.9NS @  20  mL/Hr   I/O: In: 3700.1 [I.V.:2700.1; NG/GT:1000]  Out: 445 [Urine:445]  UOP: 0.2 cc/kg/hr  Started on normal saline at 20 mL/h  Monitor electrolytes, replace PRN   Gabriel's taken out. On external urinary catheter. ID  WBC:   Lab Results   Component Value Date    WBC 8.4 10/10/2022     Tmax: Temp (24hrs), Av °F (36.7 °C), Min:97.7 °F (36.5 °C), Max:98.2 °F (36.8 °C)    Antimicrobials: not indicated   Hematology:  Recent Labs     10/09/22  0714 10/09/22  1732 10/10/22  0555   HGB 7.0* 7.3* 7.7*    trending up  Endocrine:   glucose levels trending down. Recent Labs     10/08/22  0303 10/09/22  0714 10/10/22  0555   GLUCOSE 172* 241* 189*   On low-dose sliding scale insulin. DVT Prophylaxis  lovenox  Discharge Needs:  PT, OT, ST, SW, and Case Management      CODE STATUS: Full Code             Patrick Hodge MD, M.D. Department of Internal Medicine/ Critical care  [facility]             10/10/2022, 9:16 AM   Attending Physician Statement  I have discussed the care of Latasha Seen, including pertinent history and exam findings,  with the resident. I have seen and examined the patient and the key elements of all parts of the encounter have been performed by me. I agree with the assessment, plan and orders as documented by the resident with additions .   Jean sbt   Dec iv fluids   Lasix iv daily   Use bipap at night   Keep sats 92 %   Continue iv solumedrol          Total critical care time caring for this patient with life threatening, unstable organ failure, including direct patient contact, management of life support systems, review of data including imaging and labs, discussions with other team members and physicians at least 27   Min so far today, excluding procedures. Electronically signed by Ariela Pate MD on   10/10/22 at 7:44 PM EDT    Please note that this chart was generated using voice recognition Dragon dictation software. Although every effort was made to ensure the accuracy of this automated transcription, some errors in transcription may have occurred. LABS: Personally Read and Interpreted                          11.0   0.4   )-----------( 25       ( 05 Jan 2018 14:17 )             31.1     Hgb Trend: 11.0<--, 9.5<--  PT/INR - ( 05 Jan 2018 14:17 )   PT: 13.7 sec;   INR: 1.26 ratio         PTT - ( 05 Jan 2018 14:17 )  PTT:30.4 sec    01-05    128<L>  |  95<L>  |  10  ----------------------------<  129<H>  3.4<L>   |  19<L>  |  1.07    Ca    9.3      05 Jan 2018 14:17    TPro  8.2  /  Alb  3.4  /  TBili  0.9  /  DBili  x   /  AST  25  /  ALT  18  /  AlkPhos  61  01-05    Creatinine Trend: 1.07<--, 0.92<--, 0.96<--, 0.95<--, 0.98<--, 0.98<--    CARDIAC MARKERS ( 05 Jan 2018 14:17 )  x     / <0.01 ng/mL / x     / x     / x        IMAGING  CXR:   No focal consolidation, no effusion, unchanged from 12/26. Official radiology read pending LABS: Personally Read and Interpreted                          11.0   0.4   )-----------( 25       ( 05 Jan 2018 14:17 )             31.1     Hgb Trend: 11.0<--, 9.5<--  PT/INR - ( 05 Jan 2018 14:17 )   PT: 13.7 sec;   INR: 1.26 ratio         PTT - ( 05 Jan 2018 14:17 )  PTT:30.4 sec    01-05    128<L>  |  95<L>  |  10  ----------------------------<  129<H>  3.4<L>   |  19<L>  |  1.07    Ca    9.3      05 Jan 2018 14:17    TPro  8.2  /  Alb  3.4  /  TBili  0.9  /  DBili  x   /  AST  25  /  ALT  18  /  AlkPhos  61  01-05    Creatinine Trend: 1.07<--, 0.92<--, 0.96<--, 0.95<--, 0.98<--, 0.98<--    CARDIAC MARKERS ( 05 Jan 2018 14:17 )  x     / <0.01 ng/mL / x     / x     / x        IMAGING (personally reviewed)  CXR:   No focal consolidation, no effusion, unchanged from 12/26. Official radiology read pending    EKG: Sinus tachycardia, no ST-T changes
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
## 2022-10-10 NOTE — PROGRESS NOTES
Order obtained for extubation. SpO2 of 98 on 40% FiO2. Patient extubated and placed on 6 liters/min via nasal cannula. Post extubation SpO2 is 88% with HR  66 bpm and RR 23 breaths/min. Patient had strong cough that was non-productive. Extubation Well tolerated by patient. .   Breath Sounds: diminished    ANGELINE SOTO RCP   4:04 PM

## 2022-10-10 NOTE — CONSULTS
Comprehensive Nutrition Assessment    Type and Reason for Visit:  Consult    Nutrition Recommendations/Plan:   Recommend Diabetic Formula with goal rate of 55 ml/hr providing 1980 kcal and 109g protein  Monitor weight, labs and TF tolerance     Malnutrition Assessment:  Malnutrition Status:  Insufficient data (10/08/22 1332)    Context:  Acute Illness     Findings of the 6 clinical characteristics of malnutrition:  Energy Intake:  Unable to assess  Weight Loss:  Unable to assess     Body Fat Loss:  Unable to assess     Muscle Mass Loss:  Unable to assess    Fluid Accumulation:  Unable to assess     Strength:  Not Performed    Nutrition Assessment:    Patient remains intubated. Consulted for TF. Chart reviewed. glucose 109-171. NGT in place. Nutrition Related Findings:    Labs/meds reviewed Wound Type: None       Current Nutrition Intake & Therapies:    Average Meal Intake: NPO  Average Supplements Intake: NPO  Current Tube Feeding (TF) Orders:  Feeding Route: Nasogastric  Formula: Diabetic  Schedule: Continuous  Feeding Regimen: 55 ml/hr  Additives/Modulars: None  Water Flushes: per MD  Current TF & Flush Orders Provides:    Goal TF & Flush Orders Provides: Diabetic formula 55 ml/hr providing 1980 kcal and 109g protein    Anthropometric Measures:  Height: 5' 5\" (165.1 cm)  Ideal Body Weight (IBW): 136 lbs (62 kg)       Current Body Weight: 151 lb (68.5 kg), 111 % IBW. Weight Source: Bed Scale  Current BMI (kg/m2): 25.1                          BMI Categories: Overweight (BMI 25.0-29. 9)    Estimated Daily Nutrient Needs:  Energy Requirements Based On: Kcal/kg  Weight Used for Energy Requirements: Current  Energy (kcal/day): 1900 kcal  Weight Used for Protein Requirements: Current  Protein (g/day): 80-90 gm  Method Used for Fluid Requirements: 1 ml/kcal  Fluid (ml/day): 1800 mL or per MD    Nutrition Diagnosis:   Inadequate oral intake related to impaired respiratory function as evidenced by nutrition support - enteral nutrition, NPO or clear liquid status due to medical condition    Nutrition Interventions:   Food and/or Nutrient Delivery: Start Tube Feeding  Nutrition Education/Counseling: No recommendation at this time  Coordination of Nutrition Care: Continue to monitor while inpatient       Goals:     Goals: Meet at least 75% of estimated needs       Nutrition Monitoring and Evaluation:   Behavioral-Environmental Outcomes: None Identified  Food/Nutrient Intake Outcomes: Enteral Nutrition Intake/Tolerance  Physical Signs/Symptoms Outcomes: Biochemical Data, GI Status, Weight, Skin    Discharge Planning:     Too soon to determine     Ventura Soriano [address] [facility] [address]  Contact: [phone]

## 2022-10-10 NOTE — PLAN OF CARE
Problem: Respiratory - Adult  Goal: Achieves optimal ventilation and oxygenation  Outcome: Progressing     Problem: Skin/Tissue Integrity - Adult  Goal: Skin integrity remains intact  Outcome: Progressing  Goal: Incisions, wounds, or drain sites healing without S/S of infection  Outcome: Progressing  Goal: Oral mucous membranes remain intact  Outcome: Progressing     Problem: Discharge Planning  Goal: Discharge to home or other facility with appropriate resources  Outcome: Progressing     Problem: Safety - Medical Restraint  Goal: Remains free of injury from restraints (Restraint for Interference with Medical Device)  Description: INTERVENTIONS:  1. Determine that other, less restrictive measures have been tried or would not be effective before applying the restraint  2. Evaluate the patient's condition at the time of restraint application  3. Inform patient/family regarding the reason for restraint  4.  Q2H: Monitor safety, psychosocial status, comfort, nutrition and hydration  Outcome: Progressing  Flowsheets  Taken 10/9/2022 1833 by Nevaeh Westfall RN  Remains free of injury from restraints (restraint for interference with medical device): Determine that other, less restrictive measures have been tried or would not be effective before applying the restraint  Taken 10/9/2022 1600 by Nevaeh Westfall RN  Remains free of injury from restraints (restraint for interference with medical device): Determine that other, less restrictive measures have been tried or would not be effective before applying the restraint     Problem: Pain  Goal: Verbalizes/displays adequate comfort level or baseline comfort level  Outcome: Progressing     Problem: Chronic Conditions and Co-morbidities  Goal: Patient's chronic conditions and co-morbidity symptoms are monitored and maintained or improved  Outcome: Progressing     Problem: Nutrition Deficit:  Goal: Optimize nutritional status  Outcome: Progressing     Problem: Skin/Tissue Integrity  Goal: Absence of new skin breakdown  Description: 1. Monitor for areas of redness and/or skin breakdown  2. Assess vascular access sites hourly  3. Every 4-6 hours minimum:  Change oxygen saturation probe site  4. Every 4-6 hours:  If on nasal continuous positive airway pressure, respiratory therapy assess nares and determine need for appliance change or resting period.   Outcome: Progressing     Problem: Safety - Adult  Goal: Free from fall injury  Outcome: Progressing     Problem: ABCDS Injury Assessment  Goal: Absence of physical injury  Outcome: Progressing

## 2022-10-10 NOTE — PROGRESS NOTES
[facility]   Progress Note                 Patient's name:  Patti Sanchez  Medical Record Number: [id number]  Patient's account/billing number: [de-identified]  Patient's YOB: 1955  Age: 69 y.o. Date of Admission: 10/7/2022 12:20 PM  Date of History and Physical Examination: 10/10/2022  Primary Care Physician: Jeremiah Santiago MD    Code Status: Full Code    CHIEF COMPLAINT:    Chief Complaint   Patient presents with    Respiratory Distress       ADMISSION DIAGNOSIS:   Acute metabolic encephalopathy    REASON FOR CONSULT :   Concern junctional rhythm    SUBJECTIVE:      Pt seen and Chart reviewed.    No issue overnight.   -ekg obtained: \"Unusual P axis and short NH, probable junctional bradycardia:\"  -remains intubated on vent  -awake-responds appropriately to questions via nodding/tracking w/ eyes  -hypertensive sbp 160s  -hr high 40s-currently 48  -labs reviewed    Temp (24hrs), Av °F (36.7 °C), Min:97.7 °F (36.5 °C), Max:98.2 °F (09.0 °C)    Systolic (90VXQ), RFZ:368 , Min:126 , :778     Diastolic (35GDM), GJL:32, Min:54, Max:75        Review of Systems -  -unable to obtain-patient intubated      MIDICATION:    Scheduled Meds:   insulin lispro  0-4 Units SubCUTAneous TID WC    insulin lispro  0-4 Units SubCUTAneous Nightly    methylPREDNISolone  40 mg IntraVENous Daily    chlorhexidine  15 mL Mouth/Throat BID    famotidine (PEPCID) injection  20 mg IntraVENous BID    sodium chloride flush  5-40 mL IntraVENous 2 times per day    enoxaparin  40 mg SubCUTAneous Daily    budesonide-formoterol  2 puff Inhalation BID    ipratropium-albuterol  1 ampule Inhalation Q4H WA     Continuous Infusions:   sodium chloride 75 mL/hr at 10/10/22 0429    dextrose      sodium chloride      norepinephrine Stopped (10/07/22 1301)    sodium chloride         PHYSICAL EXAM:    Physical Examination:    Temperature:  Temp: 97.7 °F (36.5 °C)  Respirations:  Resp: 22  Pulse:   Heart Rate: (!) 48  BP: BP: (!) 169/56    Constitutional and General Appearance: intubated  HEENT: PERRL, no cervical lymphadenopathy. No masses palpable. Normal oral mucosa  Respiratory:  Intubated-ctab b/l ant chest-mechanical breath sounds  Cardiovascular: The apical impulse is not displaced  Heart auscultation: normal s1/s2, bradycardia  Jugular venous pulsation Normal  The carotid upstroke is normal in amplitude and contour without delay or bruit  Peripheral pulses are symmetrical and full   Abdomen:  No masses or tenderness  Bowel sounds present  Extremities:   No Cyanosis or Clubbing   Lower extremity edema: No   Skin: Warm and dry  Neurological:  Awake off sedation, responds appropriately w/ nodding, tracks appropriately    DATA:    Labs:   CBC:   Recent Labs     10/09/22  0714 10/09/22  1732 10/10/22  0555   WBC 7.1  --  8.4   HGB 7.0* 7.3* 7.7*   HCT 21.4* 22.9* 24.5*     --  174     BMP:   Recent Labs     10/09/22  0714 10/10/22  0555   * 137   K 3.6* 4.0   CO2 27 28   BUN 34* 27*   CREATININE 1.02 0.73   LABGLOM >60 >60   GLUCOSE 241* 189*     BNP: No results for input(s): BNP in the last 72 hours. PT/INR:   Recent Labs     10/07/22  1241   PROTIME 9.9   INR 1.0     APTT:  Recent Labs     10/07/22  1241   APTT 23.7     CARDIAC ENZYMES:No results for input(s): CKTOTAL, CKMB, CKMBINDEX, TROPONINI in the last 72 hours.   FASTING LIPID PANEL:  Lab Results   Component Value Date/Time    HDL 58 02/15/2022 04:01 PM    LDLCALC 24 07/01/2016 12:00 AM    TRIG 101 02/15/2022 04:01 PM     LIVER PROFILE:  Recent Labs     10/07/22  1241   AST 83*   ALT 33   LABALBU 3.9       Diagnostics:      -Echo done in January 2022 with EF low normal 51%    -Stress test done in 2019 with EF 27%, hypokinesis in the apical, periapical regions of the inferior walls, high risk stratification.    -Cath done in 2015 with normal coronaries, EF 35%, ZOLTAN done in 2016 with LV SF normal.       IMPRESSION:    Principal Problem:    Acute metabolic encephalopathy  Resolved Problems:    * No resolved hospital problems. *      Acute respiratory failure secondary to COPD exacerbation  Sepsis  Bradycardia  Mild elevation in troponin 36> 28> 38> 35  Normocytic normochromic anemia    RECOMMENDATIONS:    F/u ordered echo. Previous echo in January 2022 with low normal EF 51%, stress test done 2019 with EF 27%, high risk stratification  Hold all AV blocking medication  No imminent need for ischemic work-up at this time  If there is any need for pressors, prefer dopamine if the heart rate is under 40  Replace electrolytes      Final plan and recommendation will follow discussion with attending on rounds. Justino Hoover MD  PGY-3, Department of Internal Medicine  [address] [place] [address]    I performed a history and physical examination of the patient and discussed management with the resident. I reviewed the residents note and agree with the documented findings and plan of care. Any areas of disagreement are noted on the chart. I was personally present for the key portions of any procedures. I have documented in the chart those procedures where I was not present during the key portions. I have personally evaluated this patient and have completed at least one if not all key elements of the E/M (history, physical exam, and MDM). Additional findings are as noted. TSH normal. Ectopic atrial rhyhtm intermittently. Bradycardia- however BP high. Monitor HR and BP. TTE pending.     Harvey Moore MD

## 2022-10-10 NOTE — PLAN OF CARE
Problem: Respiratory - Adult  Goal: Achieves optimal ventilation and oxygenation  10/10/2022 1629 by Randal Jensen RN  Outcome: Progressing  10/10/2022 0420 by An Velasquez RN  Outcome: Progressing     Problem: Skin/Tissue Integrity - Adult  Goal: Skin integrity remains intact  10/10/2022 1629 by Randal Jensen RN  Outcome: Progressing  Flowsheets (Taken 10/10/2022 0800)  Skin Integrity Remains Intact: Monitor for areas of redness and/or skin breakdown  10/10/2022 0420 by An Velasquez RN  Outcome: Progressing  Flowsheets (Taken 10/9/2022 2000)  Skin Integrity Remains Intact: Monitor for areas of redness and/or skin breakdown  Goal: Incisions, wounds, or drain sites healing without S/S of infection  10/10/2022 1629 by Randal Jensen RN  Outcome: Progressing  Flowsheets (Taken 10/10/2022 0800)  Incisions, Wounds, or Drain Sites Healing Without Sign and Symptoms of Infection: ADMISSION and DAILY: Assess and document risk factors for pressure ulcer development  10/10/2022 0420 by An Velasquez RN  Outcome: Progressing  Flowsheets (Taken 10/9/2022 2000)  Incisions, Wounds, or Drain Sites Healing Without Sign and Symptoms of Infection: ADMISSION and DAILY: Assess and document risk factors for pressure ulcer development  Goal: Oral mucous membranes remain intact  10/10/2022 1629 by Randal Jensen RN  Outcome: Progressing  Flowsheets (Taken 10/10/2022 0800)  Oral Mucous Membranes Remain Intact: Assess oral mucosa and hygiene practices  10/10/2022 0420 by An Velasquez RN  Outcome: Progressing  Flowsheets (Taken 10/9/2022 2000)  Oral Mucous Membranes Remain Intact: Assess oral mucosa and hygiene practices     Problem: Discharge Planning  Goal: Discharge to home or other facility with appropriate resources  10/10/2022 1629 by Randal Jensen RN  Outcome: Progressing  10/10/2022 0420 by An Velasquez RN  Outcome: Progressing  Flowsheets (Taken 10/9/2022 2000)  Discharge to home or other facility with appropriate resources: Identify barriers to discharge with patient and caregiver     Problem: Pain  Goal: Verbalizes/displays adequate comfort level or baseline comfort level  10/10/2022 1629 by Randal Jensen RN  Outcome: Progressing  10/10/2022 0420 by An Velasquez RN  Outcome: Progressing  Flowsheets  Taken 10/10/2022 0400  Verbalizes/displays adequate comfort level or baseline comfort level: Assess pain using appropriate pain scale  Taken 10/9/2022 2000  Verbalizes/displays adequate comfort level or baseline comfort level: Assess pain using appropriate pain scale     Problem: Chronic Conditions and Co-morbidities  Goal: Patient's chronic conditions and co-morbidity symptoms are monitored and maintained or improved  10/10/2022 1629 by Randal Jensen RN  Outcome: Progressing  10/10/2022 0420 by An Velasquez RN  Outcome: Progressing  Flowsheets (Taken 10/9/2022 2000)  Care Plan - Patient's Chronic Conditions and Co-Morbidity Symptoms are Monitored and Maintained or Improved: Monitor and assess patient's chronic conditions and comorbid symptoms for stability, deterioration, or improvement     Problem: Nutrition Deficit:  Goal: Optimize nutritional status  10/10/2022 1629 by Randal Jensen RN  Outcome: Progressing  Flowsheets (Taken 10/10/2022 1031 by Virginia Godinze RD)  Nutrient intake appropriate for improving, restoring, or maintaining nutritional needs: Recommend, monitor, and adjust tube feedings and TPN/PPN based on assessed needs  10/10/2022 0420 by An Velasquez RN  Outcome: Progressing     Problem: Skin/Tissue Integrity  Goal: Absence of new skin breakdown  10/10/2022 1629 by Randal Jensen RN  Outcome: Progressing  10/10/2022 0420 by An Velasquez RN  Outcome: Progressing     Problem: Safety - Adult  Goal: Free from fall injury  10/10/2022 1629 by Randal Jensen RN  Outcome: Progressing  10/10/2022 0420 by An Velasquez RN  Outcome: Progressing     Problem: ABCDS Injury Assessment  Goal: Absence of physical injury  10/10/2022 1629 by Maritza Crawford RN  Outcome: Progressing  10/10/2022 0420 by Mihai Fox RN  Outcome: Progressing

## 2022-10-11 LAB
ANION GAP SERPL CALCULATED.3IONS-SCNC: 7 MMOL/L (ref 9–17)
BUN BLDV-MCNC: 22 MG/DL (ref 8–23)
CALCIUM SERPL-MCNC: 8.6 MG/DL (ref 8.6–10.4)
CHLORIDE BLD-SCNC: 102 MMOL/L (ref 98–107)
CO2: 32 MMOL/L (ref 20–31)
CREAT SERPL-MCNC: 0.77 MG/DL (ref 0.7–1.2)
GFR SERPL CREATININE-BSD FRML MDRD: >60 ML/MIN/1.73M2
GLUCOSE BLD-MCNC: 140 MG/DL (ref 75–110)
GLUCOSE BLD-MCNC: 167 MG/DL (ref 70–99)
GLUCOSE BLD-MCNC: 175 MG/DL (ref 75–110)
GLUCOSE BLD-MCNC: 252 MG/DL (ref 75–110)
GLUCOSE BLD-MCNC: 304 MG/DL (ref 75–110)
HCT VFR BLD CALC: 26.2 % (ref 40.7–50.3)
HEMOGLOBIN: 8.4 G/DL (ref 13–17)
LV EF: 50 %
LVEF MODALITY: NORMAL
MCH RBC QN AUTO: 29.3 PG (ref 25.2–33.5)
MCHC RBC AUTO-ENTMCNC: 32.1 G/DL (ref 28.4–34.8)
MCV RBC AUTO: 91.3 FL (ref 82.6–102.9)
NRBC AUTOMATED: 0.4 PER 100 WBC
PDW BLD-RTO: 14 % (ref 11.8–14.4)
PLATELET # BLD: 206 K/UL (ref 138–453)
PMV BLD AUTO: 9.8 FL (ref 8.1–13.5)
POTASSIUM SERPL-SCNC: 4.6 MMOL/L (ref 3.7–5.3)
RBC # BLD: 2.87 M/UL (ref 4.21–5.77)
SODIUM BLD-SCNC: 141 MMOL/L (ref 135–144)
WBC # BLD: 7.7 K/UL (ref 3.5–11.3)

## 2022-10-11 PROCEDURE — 6360000002 HC RX W HCPCS: Performed by: INTERNAL MEDICINE

## 2022-10-11 PROCEDURE — 94660 CPAP INITIATION&MGMT: CPT

## 2022-10-11 PROCEDURE — 36415 COLL VENOUS BLD VENIPUNCTURE: CPT

## 2022-10-11 PROCEDURE — 2700000000 HC OXYGEN THERAPY PER DAY

## 2022-10-11 PROCEDURE — 93306 TTE W/DOPPLER COMPLETE: CPT

## 2022-10-11 PROCEDURE — 6360000002 HC RX W HCPCS: Performed by: STUDENT IN AN ORGANIZED HEALTH CARE EDUCATION/TRAINING PROGRAM

## 2022-10-11 PROCEDURE — 2580000003 HC RX 258

## 2022-10-11 PROCEDURE — 2060000000 HC ICU INTERMEDIATE R&B

## 2022-10-11 PROCEDURE — 94640 AIRWAY INHALATION TREATMENT: CPT

## 2022-10-11 PROCEDURE — 80048 BASIC METABOLIC PNL TOTAL CA: CPT

## 2022-10-11 PROCEDURE — 2500000003 HC RX 250 WO HCPCS: Performed by: STUDENT IN AN ORGANIZED HEALTH CARE EDUCATION/TRAINING PROGRAM

## 2022-10-11 PROCEDURE — 6370000000 HC RX 637 (ALT 250 FOR IP)

## 2022-10-11 PROCEDURE — 6370000000 HC RX 637 (ALT 250 FOR IP): Performed by: STUDENT IN AN ORGANIZED HEALTH CARE EDUCATION/TRAINING PROGRAM

## 2022-10-11 PROCEDURE — 85027 COMPLETE CBC AUTOMATED: CPT

## 2022-10-11 PROCEDURE — 99233 SBSQ HOSP IP/OBS HIGH 50: CPT | Performed by: INTERNAL MEDICINE

## 2022-10-11 PROCEDURE — 6360000002 HC RX W HCPCS

## 2022-10-11 PROCEDURE — 82947 ASSAY GLUCOSE BLOOD QUANT: CPT

## 2022-10-11 PROCEDURE — 94761 N-INVAS EAR/PLS OXIMETRY MLT: CPT

## 2022-10-11 RX ORDER — DEXTROSE MONOHYDRATE 100 MG/ML
INJECTION, SOLUTION INTRAVENOUS CONTINUOUS PRN
Status: DISCONTINUED | OUTPATIENT
Start: 2022-10-11 | End: 2022-10-14 | Stop reason: SDUPTHER

## 2022-10-11 RX ORDER — PANTOPRAZOLE SODIUM 40 MG/1
40 TABLET, DELAYED RELEASE ORAL
Status: DISCONTINUED | OUTPATIENT
Start: 2022-10-11 | End: 2022-10-14 | Stop reason: HOSPADM

## 2022-10-11 RX ORDER — PREDNISONE 20 MG/1
20 TABLET ORAL DAILY
Status: DISCONTINUED | OUTPATIENT
Start: 2022-10-15 | End: 2022-10-11

## 2022-10-11 RX ORDER — PREDNISONE 10 MG/1
10 TABLET ORAL DAILY
Status: DISCONTINUED | OUTPATIENT
Start: 2022-10-18 | End: 2022-10-11

## 2022-10-11 RX ORDER — DEXTROMETHORPHAN POLISTIREX 30 MG/5ML
30 SUSPENSION ORAL EVERY 12 HOURS SCHEDULED
Status: DISCONTINUED | OUTPATIENT
Start: 2022-10-11 | End: 2022-10-11

## 2022-10-11 RX ORDER — DEXTROMETHORPHAN POLISTIREX 30 MG/5ML
30 SUSPENSION ORAL EVERY 12 HOURS PRN
Status: DISCONTINUED | OUTPATIENT
Start: 2022-10-11 | End: 2022-10-14 | Stop reason: HOSPADM

## 2022-10-11 RX ORDER — PREDNISONE 10 MG/1
10 TABLET ORAL DAILY
Status: DISCONTINUED | OUTPATIENT
Start: 2022-10-21 | End: 2022-10-14 | Stop reason: HOSPADM

## 2022-10-11 RX ORDER — INSULIN GLARGINE 100 [IU]/ML
5 INJECTION, SOLUTION SUBCUTANEOUS NIGHTLY
Status: DISCONTINUED | OUTPATIENT
Start: 2022-10-11 | End: 2022-10-14 | Stop reason: HOSPADM

## 2022-10-11 RX ORDER — ATORVASTATIN CALCIUM 20 MG/1
20 TABLET, FILM COATED ORAL DAILY
Status: DISCONTINUED | OUTPATIENT
Start: 2022-10-11 | End: 2022-10-14 | Stop reason: HOSPADM

## 2022-10-11 RX ORDER — ASPIRIN 81 MG/1
81 TABLET ORAL DAILY
Status: DISCONTINUED | OUTPATIENT
Start: 2022-10-11 | End: 2022-10-14 | Stop reason: HOSPADM

## 2022-10-11 RX ORDER — PREDNISONE 20 MG/1
40 TABLET ORAL DAILY
Status: COMPLETED | OUTPATIENT
Start: 2022-10-12 | End: 2022-10-14

## 2022-10-11 RX ORDER — PREDNISONE 20 MG/1
20 TABLET ORAL DAILY
Status: DISCONTINUED | OUTPATIENT
Start: 2022-10-18 | End: 2022-10-14 | Stop reason: HOSPADM

## 2022-10-11 RX ADMIN — FAMOTIDINE 20 MG: 10 INJECTION INTRAVENOUS at 08:04

## 2022-10-11 RX ADMIN — Medication 81 MG: at 18:11

## 2022-10-11 RX ADMIN — ATORVASTATIN CALCIUM 20 MG: 20 TABLET, FILM COATED ORAL at 18:12

## 2022-10-11 RX ADMIN — IPRATROPIUM BROMIDE AND ALBUTEROL SULFATE 1 AMPULE: .5; 2.5 SOLUTION RESPIRATORY (INHALATION) at 12:03

## 2022-10-11 RX ADMIN — FUROSEMIDE 20 MG: 10 INJECTION, SOLUTION INTRAMUSCULAR; INTRAVENOUS at 07:00

## 2022-10-11 RX ADMIN — IPRATROPIUM BROMIDE AND ALBUTEROL SULFATE 1 AMPULE: .5; 2.5 SOLUTION RESPIRATORY (INHALATION) at 08:07

## 2022-10-11 RX ADMIN — IPRATROPIUM BROMIDE AND ALBUTEROL SULFATE 1 AMPULE: .5; 2.5 SOLUTION RESPIRATORY (INHALATION) at 22:22

## 2022-10-11 RX ADMIN — ENOXAPARIN SODIUM 40 MG: 100 INJECTION SUBCUTANEOUS at 08:03

## 2022-10-11 RX ADMIN — PANTOPRAZOLE SODIUM 40 MG: 40 TABLET, DELAYED RELEASE ORAL at 20:27

## 2022-10-11 RX ADMIN — METHYLPREDNISOLONE SODIUM SUCCINATE 40 MG: 40 INJECTION, POWDER, FOR SOLUTION INTRAMUSCULAR; INTRAVENOUS at 08:04

## 2022-10-11 RX ADMIN — INSULIN LISPRO 2 UNITS: 100 INJECTION, SOLUTION INTRAVENOUS; SUBCUTANEOUS at 12:55

## 2022-10-11 RX ADMIN — BUDESONIDE AND FORMOTEROL FUMARATE DIHYDRATE 2 PUFF: 80; 4.5 AEROSOL RESPIRATORY (INHALATION) at 20:35

## 2022-10-11 RX ADMIN — SODIUM CHLORIDE, PRESERVATIVE FREE 5 ML: 5 INJECTION INTRAVENOUS at 20:27

## 2022-10-11 RX ADMIN — SODIUM CHLORIDE, PRESERVATIVE FREE 10 ML: 5 INJECTION INTRAVENOUS at 08:04

## 2022-10-11 RX ADMIN — INSULIN LISPRO 4 UNITS: 100 INJECTION, SOLUTION INTRAVENOUS; SUBCUTANEOUS at 20:37

## 2022-10-11 RX ADMIN — BUDESONIDE AND FORMOTEROL FUMARATE DIHYDRATE 2 PUFF: 80; 4.5 AEROSOL RESPIRATORY (INHALATION) at 08:12

## 2022-10-11 RX ADMIN — INSULIN GLARGINE 5 UNITS: 100 INJECTION, SOLUTION SUBCUTANEOUS at 22:30

## 2022-10-11 NOTE — PLAN OF CARE
Problem: Respiratory - Adult  Goal: Achieves optimal ventilation and oxygenation  Outcome: Progressing     Problem: Skin/Tissue Integrity - Adult  Goal: Skin integrity remains intact  Outcome: Progressing  Goal: Incisions, wounds, or drain sites healing without S/S of infection  Outcome: Progressing  Goal: Oral mucous membranes remain intact  Outcome: Progressing     Problem: Discharge Planning  Goal: Discharge to home or other facility with appropriate resources  Outcome: Progressing     Problem: Pain  Goal: Verbalizes/displays adequate comfort level or baseline comfort level  Outcome: Progressing     Problem: Chronic Conditions and Co-morbidities  Goal: Patient's chronic conditions and co-morbidity symptoms are monitored and maintained or improved  Outcome: Progressing     Problem: Nutrition Deficit:  Goal: Optimize nutritional status  Outcome: Progressing     Problem: Skin/Tissue Integrity  Goal: Absence of new skin breakdown  Description: 1. Monitor for areas of redness and/or skin breakdown  2. Assess vascular access sites hourly  3. Every 4-6 hours minimum:  Change oxygen saturation probe site  4. Every 4-6 hours:  If on nasal continuous positive airway pressure, respiratory therapy assess nares and determine need for appliance change or resting period.   Outcome: Progressing     Problem: Safety - Adult  Goal: Free from fall injury  Outcome: Progressing  Flowsheets (Taken 10/11/2022 0500)  Free From Fall Injury: Instruct family/caregiver on patient safety     Problem: ABCDS Injury Assessment  Goal: Absence of physical injury  Outcome: Progressing  Flowsheets (Taken 10/11/2022 0500)  Absence of Physical Injury: Implement safety measures based on patient assessment
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
## 2022-10-11 NOTE — PROGRESS NOTES
Transition of care note    Osmar Ca is a 66-year-old male past medical history of hypertension, type 2 diabetes mellitus, COPD, combined heart failure, presented to the ER altered. Received perfect serve from [address] [place] [address] who did annual assessment patient was found to have SaO2 88 on baseline 3 L oxygen nasal cannula. Patient was instructed to increase to 4 L oxygen via nasal cannula. Patient then reports that he was found to be unresponsive and was brought to the ED via EMS. CT PE was negative for PE but showed debris in trachea and mainstem bronchus likely due to aspiration pneumonitis. CT head showed no acute changes. VBG showed severe respiratory acidosis. In the ED patient was found unresponsive and intubated due to shallow breathing and impending respiratory failure admitted to MICU. She was briefly on Levophed for bradycardia and hypotension but is currently maintained off Levophed. Radiology was consulted for bradycardia I recommends dopamine when unstable or heart rate is less than 40 and patient has not had any further episodes of bradycardia. Today patient already spontaneous breathing trial and was extubated. Currently on adult diet and on 4 L oxygen via nasal cannula. Transferred to  IP team and to stepdown unit.     Acute on chronic hypoxic respiratory failure due to COPD exacerbation secondary to pneumonitis  -Admitted to ICU for intubation ventilation due to pending respiratory failure  -Patient was extubated  -Currently on 4 L oxygen via nasal cannula  -Status post Unasyn  -Transition from IV to oral steroids with taper and bronchodilators

## 2022-10-11 NOTE — PROGRESS NOTES
[facility]   Progress Note                 Patient's name:  Iván Flores  Medical Record Number: [id number]  Patient's account/billing number: [de-identified]  Patient's YOB: 1955  Age: 69 y.o. Date of Admission: 10/7/2022 12:20 PM  Date of History and Physical Examination: 10/11/2022  Primary Care Physician: Allison Prieto MD    Code Status: Full Code    CHIEF COMPLAINT:    Chief Complaint   Patient presents with    Respiratory Distress       ADMISSION DIAGNOSIS:   Acute metabolic encephalopathy    REASON FOR CONSULT :   Concern junctional rhythm    SUBJECTIVE:      Pt seen and Chart reviewed. No issue overnight.   -extubated to NC -doing well-denies cp, sob  -hr 54, bp 167/64  -tte ordered-pending completion  -labs reviewed-stable    Temp (24hrs), Av.5 °F (36.9 °C), Min:98.3 °F (36.8 °C), Max:98.7 °F (03.7 °C)    Systolic (18BUB), NNT:609 , Min:114 , YAM:349     Diastolic (17ISJ), WZN:42, Min:53, Max:142        Review of Systems -  Constitutional: Negative for appetite change, chills, diaphoresis, fatigue, fever and unexpected weight change. HENT: Negative for congestion, drooling, postnasal drip, rhinorrhea, sinus pressure, sinus pain, sneezing, trouble swallowing and voice change. Eyes: Negative for photophobia, pain, discharge, redness and itching. Respiratory: Negative for cough, chest tightness, shortness of breath, wheezing and stridor  Cardiovascular: Negative for chest pain, palpitations and leg swelling. Gastrointestinal: Negative for abdominal distention, abdominal pain, blood in stool, constipation, diarrhea, nausea and vomiting. Endocrine: Negative for cold intolerance, heat intolerance, polydipsia, polyphagia and polyuria. Genitourinary: Negative for difficulty urinating, dysuria, flank pain, hematuria and urgency. Musculoskeletal: Negative for arthralgias, back pain, gait problem, joint swelling, myalgias and neck pain.    Skin: Negative for color change, pallor, rash and wound. Neurological: Negative for dizziness, seizures, speech difficulty, weakness, light-headedness, numbness and headaches. Psychiatric/Behavioral: Negative for agitation, behavioral problems, confusion and sleep disturbance. MIDICATION:    Scheduled Meds:   furosemide  20 mg IntraVENous Once    insulin lispro  0-4 Units SubCUTAneous TID WC    insulin lispro  0-4 Units SubCUTAneous Nightly    methylPREDNISolone  40 mg IntraVENous Daily    famotidine (PEPCID) injection  20 mg IntraVENous BID    sodium chloride flush  5-40 mL IntraVENous 2 times per day    enoxaparin  40 mg SubCUTAneous Daily    budesonide-formoterol  2 puff Inhalation BID    ipratropium-albuterol  1 ampule Inhalation Q4H WA     Continuous Infusions:   sodium chloride 20 mL/hr at 10/11/22 0730    dextrose      sodium chloride      norepinephrine Stopped (10/07/22 1301)    sodium chloride         PHYSICAL EXAM:    Physical Examination:    Temperature:  Temp: 98.5 °F (36.9 °C)  Respirations:  Resp: 26  Pulse:   Heart Rate: 54  BP:    BP: (!) 167/64    Constitutional and General Appearance: awake, up in bed on NC, appears NAD  HEENT: PERRL, no cervical lymphadenopathy. No masses palpable. Normal oral mucosa  Respiratory:  Ctab b/l anterior chest  Cardiovascular:   The apical impulse is not displaced  Heart auscultation: normal s1/s2, bradycardia  Jugular venous pulsation Normal  The carotid upstroke is normal in amplitude and contour without delay or bruit  Peripheral pulses are symmetrical and full   Abdomen:  No masses or tenderness  Bowel sounds present  Extremities:   No Cyanosis or Clubbing   Lower extremity edema: No   Skin: Warm and dry  Neurological:  AAOx3, no FND    DATA:    Labs:   CBC:   Recent Labs     10/10/22  0555 10/11/22  0653   WBC 8.4 7.7   HGB 7.7* 8.4*   HCT 24.5* 26.2*    206     BMP:   Recent Labs     10/10/22  0555 10/11/22  0412    141   K 4.0 4.6   CO2 28 32*   BUN 27* 22 CREATININE 0.73 0.77   LABGLOM >60 >60   GLUCOSE 189* 167*     BNP: No results for input(s): BNP in the last 72 hours. PT/INR:   No results for input(s): PROTIME, INR in the last 72 hours. APTT:  No results for input(s): APTT in the last 72 hours. CARDIAC ENZYMES:No results for input(s): CKTOTAL, CKMB, CKMBINDEX, TROPONINI in the last 72 hours. FASTING LIPID PANEL:  Lab Results   Component Value Date/Time    HDL 58 02/15/2022 04:01 PM    LDLCALC 24 07/01/2016 12:00 AM    TRIG 101 02/15/2022 04:01 PM     LIVER PROFILE:  No results for input(s): AST, ALT, LABALBU in the last 72 hours. Diagnostics:      -Echo done in January 2022 with EF low normal 51%    -Stress test done in 2019 with EF 27%, hypokinesis in the apical, periapical regions of the inferior walls, high risk stratification.    -Cath done in 2015 with normal coronaries, EF 35%, ZOLTAN done in 2016 with LV SF normal.       IMPRESSION:    Principal Problem:    Acute metabolic encephalopathy  Resolved Problems:    * No resolved hospital problems. *      Acute respiratory failure secondary to COPD [id number]-[id number]-ngyniymcv 10/10/22  Sepsis  Bradycardia-TSH normal, ectopic atrial rhythm-intermittent  Mild elevation in troponin 36> 28> 38> 35  Normocytic normochromic anemia  HTN    RECOMMENDATIONS:    F/u ordered echo. Previous echo in January 2022 with low normal EF 51%, stress test done 2019 with EF 27%, high risk stratification  Resume home meds for htn e.g. norvasc  Hold all AV blocking medication  No imminent need for ischemic work-up at this time  If there is any need for pressors, prefer dopamine if the heart rate is under 40  Keep K>4, Mg>2      Final plan and recommendation will follow discussion with attending on rounds.       Althea Sandoval MD  PGY-3, Department of Internal Medicine  [address] [place] [address]      I performed a history and physical examination of the patient and discussed management with the resident. I reviewed the residents note and agree with the documented findings and plan of care. Any areas of disagreement are noted on the chart. I was personally present for the key portions of any procedures. I have documented in the chart those procedures where I was not present during the key portions. I have personally evaluated this patient and have completed at least one if not all key elements of the E/M (history, physical exam, and MDM). Additional findings are as noted. Extubated. HR in 50. Stable. Echo pending.     Facundo Glover MD

## 2022-10-11 NOTE — PROGRESS NOTES
Critical Care Team - Daily Progress Note      Date and time: 10/11/2022 2:35 PM  Patient's name:  Juan Daniel Acosta  Medical Record Number: [id number]  Patient's account/billing number: [de-identified]  Patient's YOB: 1955  Age: 69 y.o. Date of Admission: 10/7/2022 12:20 PM  Length of stay during current admission: 4      Primary Care Physician: Herman Schaumann, MD  ICU Attending Physician: Dr. Jadyn Richard Status: Full Code    Reason for ICU admission:   Chief Complaint   Patient presents with    Respiratory Distress         SUBJECTIVE:   HPI:  History was obtained from child and chart review. Juan Daniel Acosta is a 69 y.o. with past medical history of hypertension, type 2 diabetes mellitus, COPD, combined heart failure, presented to the ER altered. The patient signed stated that he was doing fine all day long and all of a sudden the patient became unresponsive and started to look up. Son called EMS and he was brought to the ER. As per the EMS the patient never lost responses. CT PE showed No evidence of pulm embolism, debris in trachea and mainstem, likely aspiration pneumonitis. CT head showed mild cerebral atrophy with stable encephalomalacia in the right occipital lobe into no acute intracranial abnormality. VBG showed pH of 7.046, PCO2 37, bicarb 31.   proBNP was 1650, troponin 36. WBC 31.4, H&H of 9.3 and 31. 1. lactic acid was 1.3> 3.4. In the ER the patient was unresponsive, was intubated due to shallow breathing and impending respiratory failure and was subsequently admitted to MICU. Following intubation patient was bradycardic and hypotensive. Was started on Levophed. Currently maintaining off of Levophed.      10/8: Cardio consulted for bradycardia - dopamine when unstable or HR <40.     10/9: SBT not tried as patient was not following commands    10/10: Patient awake and following commands; extubated after successful SBT; passed swallow study    Interval history:   - Patient examined at bedside. Patient is alert, oriented. No acute distress  - Extubated yesterday patient denies any shortness of breath, chest pain, and cough. On high flow nasal cannula 5 L. Will be converted to nasal cannula oxygen. - Patient hemodynamically stable blood pressure of 153/65 mmhg MAP of 91. No pressors used  -Still has bradycardia with heart rate of 51  -Passed swallow study yesterday. On adult diet.     OVERNIGHT EVENTS:       No acute event overnight    AWAKE & FOLLOWING COMMANDS:  [] No   [x] Yes    CURRENT VENTILATION STATUS:     [] Ventilator  [] BIPAP  [x] Nasal Cannula [] Room Air        SECRETIONS Amount:  [] Small [] Moderate  [] Large  [x] None  Color:     [] White [] Colored  [] Bloody    SEDATION:  RAAS Score:  [] Propofol gtt  [] Versed gtt  [] Ativan gtt   [x] No Sedation    PARALYZED:  [x] No    [] Yes    DIARRHEA:                [x] No                [] Yes  (C. Difficile status: [] positive                                                                                                                       [] negative                                                                                                                     [] pending)    VASOPRESSORS:  [x] No    [] Yes    If yes -   [] Levophed       [] Dopamine     [] Vasopressin       [] Dobutamine  [] Phenylephrine         [] Epinephrine    CENTRAL LINES:     [x] No   [] Yes   (Date of Insertion:   )           If yes -     [] Right IJ     [] Left IJ [] Right Femoral [] Left Femoral                   [] Right Subclavian [] Left Subclavian       MORENO'S CATHETER:   [x] No   [] Yes  (Date of Insertion:   )     URINE OUTPUT:            [x] Good   [] Low              [] Anuric      OBJECTIVE:     VITAL SIGNS:  BP (!) 132/50   Pulse 57   Temp 98.6 °F (37 °C) (Oral)   Resp 24   Ht 5' 5\" (1.651 m)   Wt 151 lb 14.4 oz (68.9 kg)   SpO2 92%   BMI 25.28 kg/m²   Tmax over 24 hours:  Temp (24hrs), Av.5 °F (36.9 °C), Min:98.2 °F (36.8 °C), Max:99 °F (37.2 °C)      Patient Vitals for the past 8 hrs:   BP Temp Temp src Pulse Resp SpO2   10/11/22 1300 (!) 132/50 -- -- 57 24 92 %   10/11/22 1200 (!) 136/49 98.6 °F (37 °C) Oral 58 19 94 %   10/11/22 1100 (!) 143/59 -- -- 61 20 96 %   10/11/22 1000 (!) 172/78 98.2 °F (36.8 °C) Oral 53 22 92 %   10/11/22 0900 (!) 161/64 -- -- 54 21 94 %   10/11/22 0812 -- -- -- 54 26 99 %   10/11/22 0800 (!) 153/65 99 °F (37.2 °C) Oral 54 20 93 %   10/11/22 0700 -- -- -- 53 22 91 %         Intake/Output Summary (Last 24 hours) at 10/11/2022 1435  Last data filed at 10/11/2022 1200  Gross per 24 hour   Intake 1429.27 ml   Output 2100 ml   Net -670.73 ml     Date 10/11/22 0000 - 10/11/22 2359   Shift 5711-6303 3851-5318 1531-6691 24 Hour Total   INTAKE   P.O.(mL/kg/hr)  300  300   I. V.(mL/kg) 1129. 3(16.4)   1129. 3(16.4)   Shift Total(mL/kg) 1129. 3(16.4) 300(4.4)  1429. 3(20.7)   OUTPUT   Urine(mL/kg/hr) 450(0.8) 900  1350   Shift Total(mL/kg) 450(6.5) 900(13.1)  1350(19.6)   Weight (kg) 68.9 68.9 68.9 68.9     Wt Readings from Last 3 Encounters:   10/11/22 151 lb 14.4 oz (68.9 kg)   07/12/22 155 lb (70.3 kg)   06/19/22 168 lb 14 oz (76.6 kg)     Body mass index is 25.28 kg/m². PHYSICAL EXAM:  Constitutional: Appears well, no distress  EENT: PERRLA, EOMI, sclera clear. Neck: Supple, symmetrical, trachea midline. Respiratory: clear to auscultation, no wheezes or rales and unlabored breathing. No intercostal tenderness  Cardiovascular: regular rate and rhythm, normal S1, S2, no murmur noted and 2+ pulses throughout  Abdomen: soft, nontender, nondistended, no masses or organomegaly  NEUROLOGIC: Awake, alert, oriented to name, place and time.    Extremities:  peripheral pulses normal, no pedal edema, no clubbing or cyanosis  SKIN: normal coloration and turgor    Any additional physical findings:      MEDICATIONS:  Scheduled Meds:   [START ON 10/12/2022] predniSONE  40 mg Oral Daily    Followed by    Ricardo Matt ON 22.9* 24.5* 26.2*   MCH 29.0  --  28.5 29.3   MCHC 32.7  --  31.4 32.1   RDW 14.5*  --  14.7* 14.0   MPV 10.6  --  11.1 9.8        PT/INR:    Lab Results   Component Value Date/Time    PROTIME 9.9 10/07/2022 12:41 PM    INR 1.0 10/07/2022 12:41 PM     PTT:    Lab Results   Component Value Date/Time    APTT 23.7 10/07/2022 12:41 PM       Basal Metabolic Profile:   Recent Labs     10/09/22  0714 10/10/22  0555 10/11/22  0412   * 137 141   K 3.6* 4.0 4.6   BUN 34* 27* 22   CREATININE 1.02 0.73 0.77   * 103 102   CO2 27 28 32*      Magnesium:   Lab Results   Component Value Date/Time    MG 2.2 10/08/2022 07:55 AM    MG 1.7 10/07/2022 12:41 PM    MG 1.7 06/14/2022 10:13 AM     Phosphorus:   Lab Results   Component Value Date/Time    PHOS 3.5 09/09/2019 05:03 AM    PHOS 3.5 09/08/2019 05:55 AM    PHOS 5.9 09/07/2019 03:13 AM     S. Calcium:  Recent Labs     10/11/22  0412   CALCIUM 8.6     S. Ionized Calcium:No results for input(s): IONCA in the last 72 hours. Urinalysis:   Lab Results   Component Value Date/Time    NITRU NEGATIVE 10/07/2022 01:21 PM    COLORU Dark Yellow 10/07/2022 01:21 PM    PHUR 5.0 10/07/2022 01:21 PM    WBCUA 5 TO 10 10/07/2022 01:21 PM    RBCUA 2 TO 5 10/07/2022 01:21 PM    MUCUS 2+ 07/01/2017 02:51 AM    TRICHOMONAS NOT REPORTED 07/01/2017 02:51 AM    YEAST NOT REPORTED 07/01/2017 02:51 AM    BACTERIA FEW 07/01/2017 02:51 AM    SPECGRAV 1.021 10/07/2022 01:21 PM    LEUKOCYTESUR NEGATIVE 10/07/2022 01:21 PM    UROBILINOGEN Normal 10/07/2022 01:21 PM    BILIRUBINUR NEGATIVE  Verified by ictotest. 10/07/2022 01:21 PM    GLUCOSEU NEGATIVE 10/07/2022 01:21 PM    KETUA NEGATIVE 10/07/2022 01:21 PM    AMORPHOUS 2+ 07/01/2017 02:51 AM       CARDIAC ENZYMES: No results for input(s): CKMB, CKMBINDEX, TROPONINI in the last 72 hours. Invalid input(s): CKTOTAL;3  BNP: No results for input(s): BNP in the last 72 hours.     LFTS  No results for input(s): ALKPHOS, ALT, AST, BILITOT, BILIDIR, LABALBU in the last 72 hours. AMYLASE/LIPASE/AMMONIA  No results for input(s): AMYLASE, LIPASE, AMMONIA in the last 72 hours. Last 3 Blood Glucose:   Recent Labs     10/09/22  0714 10/10/22  0555 10/11/22  0412   GLUCOSE 241* 189* 167*      HgBA1c:    Lab Results   Component Value Date/Time    LABA1C 6.2 02/15/2022 03:19 PM         TSH:    Lab Results   Component Value Date/Time    TSH 1.12 10/07/2022 12:41 PM     ANEMIA STUDIES  No results for input(s): LABIRON, TIBC, FERRITIN, DOPRHKTL51, FOLATE, OCCULTBLD in the last 72 hours. Cultures during this admission:     Blood cultures:                 [] None drawn      [x] Negative             []  Positive (Details:  )  Urine Culture:                   [] None drawn      [x] Negative             []  Positive (Details:  )  Sputum Culture:               [x] None drawn       [] Negative             []  Positive (Details:  )   Endotracheal aspirate:     [x] None drawn       [] Negative             []  Positive (Details:  )           ASSESSMENT:     Principal Problem:    Acute metabolic encephalopathy  Resolved Problems:    * No resolved hospital problems. *     PLAN:     PLAN/MEDICAL DECISION MAKING:  Neurologic:   Patient following commands  No sedation    Cardiovascular:  Hemodynamically stable  MAP goal >65  Blood pressure 153/65 mmHg with MAP 91  Heart rate 51 cardiology following  Cardiology recommends dopamine once heart rate is less than 40 or hemodynamically unstable. 20 mg IV Lasix injection given in the morning. Normal saline 20 mL/h started. Pulmonary:  Maintain oxygen sats >92%  Pulmonary toilet  Extubated  On 4 L nasal cannula oxygen breathing comfortably  Currently on Symbicort and DuoNeb. Solu-Medrol stopped. Will be followed with prednisone taper of 40-30-20-10 for 3 days each.   Vent Information  Ventilator Day(s): 1  Ventilator ID: [id number]  Equipment Changed: NIV circuit  Ventilator Initiate: Yes  Ventilator Discontinue: Yes  Vent Mode: AC/PRVC  GI/Nutrition  GlycoLax 17 oral daily as needed  Ulcer Prophylaxis:  Not indicated  Diet:ADULT DIET; Regular; 5 carb choices (75 gm/meal)  Renal/Fluid/Electrolyte  IV Fluids: 0.9NS @  20  mL/Hr   I/O: In: 1429.3 [P.O.:300; I.V.:1129.3]  Out: 2210 [Urine:2210]  UOP: 0.3 cc/kg/hr  Monitor electrolytes, replace PRN   ID  WBC:   Lab Results   Component Value Date    WBC 7.7 10/11/2022     Tmax: Temp (24hrs), Av.5 °F (36.9 °C), Min:98.2 °F (36.8 °C), Max:99 °F (37.2 °C)    Antimicrobials:  not indicated   Hematology:  Recent Labs     10/09/22  1732 10/10/22  0555 10/11/22  0653   HGB 7.3* 7.7* 8.4*    trending up  Endocrine:   glucose controlled - most recent BGL is   Recent Labs     10/09/22  0714 10/10/22  0555 10/11/22  0412   GLUCOSE 241* 189* 167*     DVT Prophylaxis  lovenox  Discharge Needs:  PT, OT, ST, SW, and Case Management    -Patient okay to be discharged to stepdown unit. CODE STATUS: Full Code             Lucille Yañez MD, M.D. Department of Internal Medicine/ Critical care  [facility]             10/11/2022, 2:35 PM   Attending Physician Statement  I have discussed the care of Carroll Leal, including pertinent history and exam findings,  with the resident. I have seen and examined the patient and the key elements of all parts of the encounter have been performed by me. I agree with the assessment, plan and orders as documented by the resident with additions . Electronically signed by Vika Aguiar MD on   10/11/22 at 9:01 PM EDT    Please note that this chart was generated using voice recognition Dragon dictation software. Although every effort was made to ensure the accuracy of this automated transcription, some errors in transcription may have occurred.

## 2022-10-11 NOTE — PLAN OF CARE
Problem: Respiratory - Adult  Goal: Achieves optimal ventilation and oxygenation  10/11/2022 1215 by Corrine Batres RN  Outcome: Progressing     Problem: Skin/Tissue Integrity - Adult  Goal: Skin integrity remains intact  10/11/2022 1215 by Corrine Batres RN  Outcome: Progressing     Problem: Skin/Tissue Integrity - Adult  Goal: Incisions, wounds, or drain sites healing without S/S of infection  10/11/2022 1215 by Corrine Batres RN  Outcome: Progressing     Problem: Discharge Planning  Goal: Discharge to home or other facility with appropriate resources  10/11/2022 1215 by Corrine Batres RN  Outcome: Progressing  10/11/2022 0803 by Suleman Wesley RN  Outcome: Progressing     Problem: Pain  Goal: Verbalizes/displays adequate comfort level or baseline comfort level  10/11/2022 1215 by Corrine Batres RN  Outcome: Progressing  10/11/2022 0803 by Suleman Wesley RN  Outcome: Progressing     Problem: Chronic Conditions and Co-morbidities  Goal: Patient's chronic conditions and co-morbidity symptoms are monitored and maintained or improved  10/11/2022 1215 by Corrine Batres RN  Outcome: Progressing     Problem: Safety - Adult  Goal: Free from fall injury  10/11/2022 1215 by Corrine Batres RN  Outcome: Progressing     Problem: ABCDS Injury Assessment  Goal: Absence of physical injury  10/11/2022 0803 by Suleman Wesley RN  Outcome: Progressing  Flowsheets (Taken 10/11/2022 0500)  Absence of Physical Injury: Implement safety measures based on patient assessment     Problem: ABCDS Injury Assessment  Goal: Absence of physical injury  10/11/2022 0803 by Suleamn Wesley RN  Outcome: Progressing  Flowsheets (Taken 10/11/2022 0500)  Absence of Physical Injury: Implement safety measures based on patient assessment     Problem: ABCDS Injury Assessment  Goal: Absence of physical injury  10/11/2022 1215 by Corrine Batres RN  Outcome: Progressing

## 2022-10-11 NOTE — CARE COORDINATION
Received call from Natanael Bojorquez at [address] [place] [address] ([phone]) as message left for [employer] yesterday - concerns reported to Natanael Bojorquez and ref made.

## 2022-10-11 NOTE — PROGRESS NOTES
SPIRITUAL CARE DEPARTMENT - [facility] [address]  PROGRESS NOTE    Shift date: 10/11/22  Shift day: Tuesday   Shift # 1    Room # 4201/1511-36   Name: Carroll Leal                Lutheran: Pentacostal   Place of Mosque:      Referral: Routine Visit    Admit Date & Time: 10/7/2022 12:20 PM    Assessment:  Carroll Leal is a 69 y.o. male in the hospital because \"acute metabolic encephalopathy\". Upon entering the room writer observes pt lying in bed awake and alert. Pt welcomed writer into room      Intervention:  Writer introduced self and title as  Writer offered space for pt  to express feelings, needs, and concerns and provided a ministry presence. Engaged in conversation w/ pt actively listening to pt. Pt stated that he lives w/ his son and another son also lives close to them. Pt said he is supported by his sons and daughter. This writer offered prayer and pt accepted. This writer spk a brief prayer for comfort    Outcome:  Pt expressed gratitude for this  visit    Plan:  Chaplains will remain available to offer spiritual and emotional support as needed.       Electronically signed by Tristan Kendall on 10/11/2022 at 9:47 AM.  [facility]  [phone]       10/11/22 0945   Encounter Summary   Encounter Overview/Reason  Initial Encounter   Service Provided For: Patient   Referral/Consult From: [facility]   Last Encounter  10/11/22   Complexity of Encounter Moderate   Begin Time 0900   End Time  0910   Total Time Calculated 10 min   Encounter    Type Initial Screen/Assessment   Spiritual/Emotional needs   Type Spiritual Support   Assessment/Intervention/Outcome   Assessment Calm   Intervention Active listening;Sustaining Presence/Ministry of presence;Prayer (assurance of)/[place]   Outcome Engaged in conversation;Encouraged;Expressed Gratitude;Receptive

## 2022-10-12 PROBLEM — R41.89 UNRESPONSIVE: Status: ACTIVE | Noted: 2022-10-12

## 2022-10-12 PROBLEM — G93.41 ACUTE METABOLIC ENCEPHALOPATHY: Status: RESOLVED | Noted: 2022-10-07 | Resolved: 2022-10-12

## 2022-10-12 LAB
ANION GAP SERPL CALCULATED.3IONS-SCNC: 8 MMOL/L (ref 9–17)
BUN BLDV-MCNC: 25 MG/DL (ref 8–23)
CALCIUM SERPL-MCNC: 8.4 MG/DL (ref 8.6–10.4)
CHLORIDE BLD-SCNC: 97 MMOL/L (ref 98–107)
CO2: 31 MMOL/L (ref 20–31)
CREAT SERPL-MCNC: 0.88 MG/DL (ref 0.7–1.2)
CULTURE: NORMAL
CULTURE: NORMAL
GFR SERPL CREATININE-BSD FRML MDRD: >60 ML/MIN/1.73M2
GLUCOSE BLD-MCNC: 139 MG/DL (ref 75–110)
GLUCOSE BLD-MCNC: 151 MG/DL (ref 75–110)
GLUCOSE BLD-MCNC: 203 MG/DL (ref 70–99)
GLUCOSE BLD-MCNC: 227 MG/DL (ref 75–110)
GLUCOSE BLD-MCNC: 242 MG/DL (ref 75–110)
HCT VFR BLD CALC: 23.8 % (ref 40.7–50.3)
HEMOGLOBIN: 7.8 G/DL (ref 13–17)
Lab: NORMAL
MCH RBC QN AUTO: 29.1 PG (ref 25.2–33.5)
MCHC RBC AUTO-ENTMCNC: 32.8 G/DL (ref 28.4–34.8)
MCV RBC AUTO: 88.8 FL (ref 82.6–102.9)
NRBC AUTOMATED: 0.3 PER 100 WBC
PDW BLD-RTO: 13.8 % (ref 11.8–14.4)
PLATELET # BLD: 204 K/UL (ref 138–453)
PMV BLD AUTO: 10.8 FL (ref 8.1–13.5)
POTASSIUM SERPL-SCNC: 4.2 MMOL/L (ref 3.7–5.3)
RBC # BLD: 2.68 M/UL (ref 4.21–5.77)
SODIUM BLD-SCNC: 136 MMOL/L (ref 135–144)
SPECIMEN DESCRIPTION: NORMAL
SPECIMEN DESCRIPTION: NORMAL
WBC # BLD: 9.9 K/UL (ref 3.5–11.3)

## 2022-10-12 PROCEDURE — 97110 THERAPEUTIC EXERCISES: CPT

## 2022-10-12 PROCEDURE — 82947 ASSAY GLUCOSE BLOOD QUANT: CPT

## 2022-10-12 PROCEDURE — 6370000000 HC RX 637 (ALT 250 FOR IP): Performed by: STUDENT IN AN ORGANIZED HEALTH CARE EDUCATION/TRAINING PROGRAM

## 2022-10-12 PROCEDURE — 2700000000 HC OXYGEN THERAPY PER DAY

## 2022-10-12 PROCEDURE — 97161 PT EVAL LOW COMPLEX 20 MIN: CPT

## 2022-10-12 PROCEDURE — 94761 N-INVAS EAR/PLS OXIMETRY MLT: CPT

## 2022-10-12 PROCEDURE — 94640 AIRWAY INHALATION TREATMENT: CPT

## 2022-10-12 PROCEDURE — 94660 CPAP INITIATION&MGMT: CPT

## 2022-10-12 PROCEDURE — 6370000000 HC RX 637 (ALT 250 FOR IP): Performed by: INTERNAL MEDICINE

## 2022-10-12 PROCEDURE — 6360000002 HC RX W HCPCS

## 2022-10-12 PROCEDURE — 2060000000 HC ICU INTERMEDIATE R&B

## 2022-10-12 PROCEDURE — 99232 SBSQ HOSP IP/OBS MODERATE 35: CPT | Performed by: FAMILY MEDICINE

## 2022-10-12 PROCEDURE — 97166 OT EVAL MOD COMPLEX 45 MIN: CPT

## 2022-10-12 PROCEDURE — 80048 BASIC METABOLIC PNL TOTAL CA: CPT

## 2022-10-12 PROCEDURE — 6370000000 HC RX 637 (ALT 250 FOR IP)

## 2022-10-12 PROCEDURE — 97162 PT EVAL MOD COMPLEX 30 MIN: CPT

## 2022-10-12 PROCEDURE — 87185 SC STD ENZYME DETCJ PER NZM: CPT

## 2022-10-12 PROCEDURE — 36415 COLL VENOUS BLD VENIPUNCTURE: CPT

## 2022-10-12 PROCEDURE — 85027 COMPLETE CBC AUTOMATED: CPT

## 2022-10-12 PROCEDURE — 87070 CULTURE OTHR SPECIMN AEROBIC: CPT

## 2022-10-12 PROCEDURE — 87077 CULTURE AEROBIC IDENTIFY: CPT

## 2022-10-12 PROCEDURE — 87205 SMEAR GRAM STAIN: CPT

## 2022-10-12 PROCEDURE — 97530 THERAPEUTIC ACTIVITIES: CPT

## 2022-10-12 PROCEDURE — 99233 SBSQ HOSP IP/OBS HIGH 50: CPT | Performed by: INTERNAL MEDICINE

## 2022-10-12 PROCEDURE — 2580000003 HC RX 258

## 2022-10-12 RX ORDER — AMLODIPINE BESYLATE 10 MG/1
10 TABLET ORAL DAILY
Status: DISCONTINUED | OUTPATIENT
Start: 2022-10-12 | End: 2022-10-14 | Stop reason: HOSPADM

## 2022-10-12 RX ORDER — BUDESONIDE AND FORMOTEROL FUMARATE DIHYDRATE 160; 4.5 UG/1; UG/1
2 AEROSOL RESPIRATORY (INHALATION) 2 TIMES DAILY
Status: DISCONTINUED | OUTPATIENT
Start: 2022-10-12 | End: 2022-10-14 | Stop reason: HOSPADM

## 2022-10-12 RX ORDER — CARVEDILOL 12.5 MG/1
12.5 TABLET ORAL 2 TIMES DAILY WITH MEALS
Status: DISCONTINUED | OUTPATIENT
Start: 2022-10-12 | End: 2022-10-14 | Stop reason: HOSPADM

## 2022-10-12 RX ORDER — LISINOPRIL 20 MG/1
20 TABLET ORAL DAILY
Status: DISCONTINUED | OUTPATIENT
Start: 2022-10-12 | End: 2022-10-14 | Stop reason: HOSPADM

## 2022-10-12 RX ORDER — LEVOFLOXACIN 500 MG/1
500 TABLET, FILM COATED ORAL DAILY
Status: DISCONTINUED | OUTPATIENT
Start: 2022-10-12 | End: 2022-10-14 | Stop reason: HOSPADM

## 2022-10-12 RX ADMIN — IPRATROPIUM BROMIDE AND ALBUTEROL SULFATE 1 AMPULE: .5; 2.5 SOLUTION RESPIRATORY (INHALATION) at 07:35

## 2022-10-12 RX ADMIN — BUDESONIDE AND FORMOTEROL FUMARATE DIHYDRATE 2 PUFF: 160; 4.5 AEROSOL RESPIRATORY (INHALATION) at 20:43

## 2022-10-12 RX ADMIN — ENOXAPARIN SODIUM 40 MG: 100 INJECTION SUBCUTANEOUS at 08:31

## 2022-10-12 RX ADMIN — BUDESONIDE AND FORMOTEROL FUMARATE DIHYDRATE 2 PUFF: 80; 4.5 AEROSOL RESPIRATORY (INHALATION) at 07:36

## 2022-10-12 RX ADMIN — CARVEDILOL 12.5 MG: 12.5 TABLET, FILM COATED ORAL at 17:10

## 2022-10-12 RX ADMIN — METFORMIN HYDROCHLORIDE 500 MG: 500 TABLET ORAL at 17:10

## 2022-10-12 RX ADMIN — AMLODIPINE BESYLATE 10 MG: 10 TABLET ORAL at 08:39

## 2022-10-12 RX ADMIN — CARVEDILOL 12.5 MG: 12.5 TABLET, FILM COATED ORAL at 08:40

## 2022-10-12 RX ADMIN — ATORVASTATIN CALCIUM 20 MG: 20 TABLET, FILM COATED ORAL at 08:31

## 2022-10-12 RX ADMIN — Medication 30 MG: at 08:39

## 2022-10-12 RX ADMIN — IPRATROPIUM BROMIDE AND ALBUTEROL SULFATE 1 AMPULE: .5; 2.5 SOLUTION RESPIRATORY (INHALATION) at 16:08

## 2022-10-12 RX ADMIN — INSULIN GLARGINE 5 UNITS: 100 INJECTION, SOLUTION SUBCUTANEOUS at 21:36

## 2022-10-12 RX ADMIN — LISINOPRIL 20 MG: 20 TABLET ORAL at 08:40

## 2022-10-12 RX ADMIN — PREDNISONE 40 MG: 20 TABLET ORAL at 08:31

## 2022-10-12 RX ADMIN — METFORMIN HYDROCHLORIDE 500 MG: 500 TABLET ORAL at 08:39

## 2022-10-12 RX ADMIN — LEVOFLOXACIN 500 MG: 500 TABLET, FILM COATED ORAL at 09:47

## 2022-10-12 RX ADMIN — SODIUM CHLORIDE, PRESERVATIVE FREE 10 ML: 5 INJECTION INTRAVENOUS at 08:30

## 2022-10-12 RX ADMIN — Medication 81 MG: at 08:31

## 2022-10-12 RX ADMIN — PANTOPRAZOLE SODIUM 40 MG: 40 TABLET, DELAYED RELEASE ORAL at 08:31

## 2022-10-12 RX ADMIN — IPRATROPIUM BROMIDE AND ALBUTEROL SULFATE 1 AMPULE: .5; 2.5 SOLUTION RESPIRATORY (INHALATION) at 20:43

## 2022-10-12 ASSESSMENT — ENCOUNTER SYMPTOMS
TROUBLE SWALLOWING: 0
ABDOMINAL PAIN: 0
ALLERGIC/IMMUNOLOGIC NEGATIVE: 1
DIARRHEA: 0
WHEEZING: 1
VOMITING: 0
COUGH: 1
PHOTOPHOBIA: 0
VOICE CHANGE: 0
SHORTNESS OF BREATH: 1
SORE THROAT: 0
EYE REDNESS: 0

## 2022-10-12 NOTE — PROGRESS NOTES
Physician Progress Note      PATIENT:               Lexus Adorno  CSN #:                  [id number]  :                       1955  ADMIT DATE:       10/7/2022 12:20 PM  [address] [place] DATE:  RESPONDING  PROVIDER #:        Oliver Arnett TEXT:    Patient admitted with Acute respiratory failure secondary to copd exacerbation   and aspiration pneumonitis. H&P on 10/7 notes likely sepsis with hypothermia   pneumonia and copd exacerbation. Per medical record review WBC 13.4 >6.0   lactic acid 3.4>2.0 with noted metabolic encephalopathy. If possible please   clarify one of the following regarding Sepsis:    The medical record reflects the following:  Risk Factors: pneumonia, copd, hypothermia  Clinical Indicators: admitted with Acute respiratory failure secondary to copd   exacerbation and aspiration pneumonitis. H&P on 10/7 notes likely sepsis with   hypothermia pneumonia and copd exacerbation. Per medical record review WBC   13.4 >6.0 lactic acid 3.4>2.0 with noted metabolic encephalopathy. Treatment: ICU monitoring, iv antibiotics, mechanical ventilation    Thank Selvin Prince RN BSN  CCDS  Email [email or web address]  Cell [phone]  office hours M-F 6am to 2:30pm  Options provided:  -- Sepsis  confirmed after study  -- Sepsis  treated and resolved  -- Sepsis  ruled out after study  -- Other - I will add my own diagnosis  -- Disagree - Not applicable / Not valid  -- Disagree - Clinically unable to determine / Unknown  -- Refer to Clinical Documentation Reviewer    PROVIDER RESPONSE TEXT:    Sepsis treated and resolved. Query created by:  Gabriela Mina on 10/12/2022 12:15 PM      Electronically signed by:  Lennox Sells 10/12/2022 1:36 PM

## 2022-10-12 NOTE — PROGRESS NOTES
Occupational Therapy  Facility/Department: [address] NEUR[facility]  Occupational Therapy Initial Assessment    Name: Anusha Yuen  : 1955  MRN: [id number]  Date of Service: 10/12/2022    Chief Complaint   Patient presents with    Respiratory Distress       Discharge Recommendations:  Patient would benefit from continued therapy after discharge  OT Equipment Recommendations  Equipment Needed: Yes  Mobility Devices: ADL Assistive Devices  ADL Assistive Devices: Shower Chair with back       Patient Diagnosis(es): The primary encounter diagnosis was Unresponsive. Diagnoses of Bradycardia, Hypothermia, initial encounter, and Acute respiratory failure, unspecified whether with hypoxia or hypercapnia ([facility] [address] were also pertinent to this visit. Past Medical History:  has a past medical history of CHF (congestive heart failure) ([address] COPD (chronic obstructive pulmonary disease) ([facility] [address] Diabetes mellitus ([facility] [address] Erectile dysfunction due to arterial insufficiency, Hypertension, Microalbuminuria due to type 2 diabetes mellitus ([facility] [address] and Overweight (BMI 25.0-29.9). Past Surgical History:  has a past surgical history that includes Appendectomy and Total ankle arthroplasty. Assessment   Performance deficits / Impairments: Decreased functional mobility ; Decreased ADL status; Decreased safe awareness;Decreased endurance;Decreased high-level IADLs;Decreased balance  Assessment: Pt engaged in functional sit<>stand transfers and functional mobility with CGA and use of RW. Pt declined to engage in any ADL/functional activity despite encouragement and education on purpose of therapist requesting pt to engage in these activities. Pt grossly limited by decreased endurance at this time.  Pt is expected to require skilled OT services during their acute hospitalization stay to address the above noted deficits through skilled occupational therapy intervention for promotion of increased independence throughout ADLs, IADLs and functional mobility tasks. Prognosis: Good  Decision Making: Medium Complexity  REQUIRES OT FOLLOW-UP: Yes  Activity Tolerance  Activity Tolerance: Patient Tolerated treatment well        Plan   Occupational Therapy Plan  Times Per Week: 2-3x/wk     Restrictions  Restrictions/Precautions  Restrictions/Precautions: General Precautions, Fall Risk  Required Braces or Orthoses?: No  Position Activity Restriction  Other position/activity restrictions: 4L of oxygen via NC    Subjective   General  Patient assessed for rehabilitation services?: Yes  Family / Caregiver Present: Yes (multiple family members in room)  General Comment  Comments: RN ok'df for OT eval this AM. Pt agreeable to session, pleasent/cooperative throughout. Pt denies any pain throughout. Social/Functional History  Social/Functional History  Lives With: Son  Type of Home: Apartment  Home Layout:  (Lives on the 2nd floor of an apartment building.)  Home Access: Stairs to enter with rails  Entrance Stairs - Number of Steps: 7 steps to enter, ~7 to 2nd floor  Entrance Stairs - Rails: Both  Bathroom Shower/Tub: Tub/Shower unit  Bathroom Toilet: Standard  Home Equipment: [place], Oxygen, Walker, rolling (SPC baseline)  Has the patient had two or more falls in the past year or any fall with injury in the past year?: Yes (x1 LOB incident on ice)  Receives Help From: Family  ADL Assistance: Independent  Homemaking Assistance: Needs assistance (Son helps with cooking, cleaning, and shopping)  Homemaking Responsibilities: Yes  Ambulation Assistance: Independent  Transfer Assistance: Independent  Active : No  Patient's  Info: Cab service  Occupation: Retired  Type of Occupation: Waste management,   Leisure & Hobbies: internet, play cards, TV       Objective      Safety Devices  Type of Devices: Call light within reach;Gait belt;Nurse notified; Left in chair  Restraints  Restraints Initially in Place: No    Balance  Sitting: Intact (Seated unsupported edge of chair for ~12 minutes with SUP)  Standing: With support (Pt stood ~2 min statically with SBA and use of RW)    Gait  Overall Level of Assistance: Contact-guard assistance (Pt completed functional mobility in room and hospital hallway to simulate household and community distances. Use of RW.)     AROM: Within functional limits  Strength: Within functional limits  Coordination: Within functional limits  Tone: Normal  Sensation: Intact (Denies any numbness/tingling)    ADL  Feeding: Independent  Grooming: Independent  UE Bathing: Stand by assistance  LE Bathing: Contact guard assistance  UE Dressing: Stand by assistance  LE Dressing: Contact guard assistance  Toileting: Contact guard assistance  Additional Comments: Pt declined to participate in any ADL activity despite max encouragement and education on importance of engaging in these activities for occupational therapy evaluation. Activity Tolerance  Activity Tolerance: Patient tolerated treatment well    Bed mobility  Bed Mobility Comments: Pt seated at bedside recliner upon entry and exit.     Transfers  Sit to stand: Contact guard assistance  Stand to sit: Contact guard assistance  Transfer Comments: Use of RW    Vision  Vision: Impaired  Vision Exceptions: Wears glasses for reading  Hearing  Hearing: Within functional limits    Cognition  Overall Cognitive Status: Exceptions  Safety Judgement: Decreased awareness of need for assistance;Decreased awareness of need for safety  Cognition Comment: Slight decreased awareness for safety throughout evaluation  Orientation  Overall Orientation Status: Within Functional Limits  Orientation Level: Oriented X4                  Education Provided Comments: Pt educated on OT role, OT POC, activity promotion, safety awareness, transfer training, DME equiptment-good return  LUE AROM (degrees)  LUE AROM : WFL  Left Hand AROM (degrees)  Left Hand AROM: WFL  RUE AROM (degrees)  RUE AROM : WFL  Right Hand AROM (degrees)  Right Hand AROM: [facility]                 AM-PAC Score        AM-PAC Inpatient Daily Activity Raw Score: 20 (10/12/22 1705)  AM-PAC Inpatient ADL T-Scale Score : 42.03 (10/12/22 1705)  ADL Inpatient CMS 0-100% Score: 38.32 (10/12/22 1705)  ADL Inpatient CMS G-Code Modifier : CJ (10/12/22 1705)      Goals  Short Term Goals  Time Frame for Short Term Goals: By discharge, pt will:  Short Term Goal 1: Demo functional sit<>stand transfers and functional mobility with Mod IND and LRD PRN  Short Term Goal 2: Demo 10 minutes of dynamic standing balance with SBA to promote increased engagement in ADLs  Short Term Goal 3: Demo UB ADLs with Mod IND  Short Term Goal 4: Demo LB ADLs with SUP and DME PRN  Short Term Goal 5: Demo good safety awareness independently throughout all functional activities with 0 VCs each visit       Therapy Time   Individual Concurrent Group Co-treatment   Time In 1324         Time Out 1348         Minutes 24         Timed Code Treatment Minutes: 20 Minutes       Niharika Frederick, OTR/L
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
## 2022-10-12 NOTE — PLAN OF CARE
Problem: Respiratory - Adult  Goal: Achieves optimal ventilation and oxygenation  10/12/2022 1805 by Tamela Dumont  Outcome: Progressing  10/12/2022 1738 by Valentina Ramirez RCP  Outcome: Progressing     Problem: Skin/Tissue Integrity - Adult  Goal: Skin integrity remains intact  Outcome: Progressing     Problem: Pain  Goal: Verbalizes/displays adequate comfort level or baseline comfort level  Outcome: Adequate for Discharge

## 2022-10-12 NOTE — PROGRESS NOTES
[address] [facility]  Progress Note    Date:   10/12/2022  Patient name:  Chely Cardona  Date of admission:  10/7/2022 12:20 PM  MRN:   [id number]  YOB: 1955    SUBJECTIVE/Last 24 hours update:     Patient seen and examined at the bed side , no new acute events   Bipap overnight and on 8 L of nasal cannula when seen this morning  Blood pressure elevated at 155/76   All other vitals are stable  Patient endorses having yellow sputum production with cough   No known complaints at this time  I/O: [phone]    HPI:        Arley Skinner is a 77-year-old male past medical history of hypertension, type 2 diabetes mellitus, COPD, combined heart failure, presented to the ER altered. Received perfect serve from [address] [place] [address] who did annual assessment patient was found to have SaO2 88 on baseline 3 L oxygen nasal cannula. Patient was instructed to increase to 4 L oxygen via nasal cannula. Patient then reports that he was found to be unresponsive and was brought to the ED via EMS. CT PE was negative for PE but showed debris in trachea and mainstem bronchus likely due to aspiration pneumonitis. CT head showed no acute changes. VBG showed severe respiratory acidosis. In the ED patient was found unresponsive and intubated due to shallow breathing and impending respiratory failure admitted to MICU. She was briefly on Levophed for bradycardia and hypotension but is currently maintained off Levophed. Radiology was consulted for bradycardia I recommends dopamine when unstable or heart rate is less than 40 and patient has not had any further episodes of bradycardia. Today patient already spontaneous breathing trial and was extubated. Currently on adult diet and on 4 L oxygen via nasal cannula. Transferred to  IP team and to stepdown unit.      Acute on chronic hypoxic respiratory failure due to COPD exacerbation secondary to pneumonitis  -Admitted to ICU for intubation ventilation due to pending respiratory failure  -Patient was extubated  -Currently on 4 L oxygen via nasal cannula  -Status post Unasyn  -Transition from IV to oral steroids with taper and bronchodilators    REVIEW OF SYSTEMS:   Review of Systems   Constitutional:  Negative for chills and fever. HENT:  Negative for congestion. Eyes:  Negative for visual disturbance. Respiratory:  Positive for cough. Cardiovascular:  Negative for leg swelling. Genitourinary:  Negative for difficulty urinating. Neurological:  Negative for seizures. Psychiatric/Behavioral:  Negative for agitation. PAST MEDICAL HISTORY:      has a past medical history of CHF (congestive heart failure) ([facility] COPD (chronic obstructive pulmonary disease) ([facility] Erectile dysfunction due to arterial insufficiency, Hypertension, Microalbuminuria due to type 2 diabetes mellitus ([facility] and Overweight (BMI 25.0-29.9). PAST SURGICAL HISTORY:      has a past surgical history that includes Appendectomy and Total ankle arthroplasty. SOCIAL HISTORY:      reports that he quit smoking about 10 months ago. His smoking use included cigarettes. He smoked an average of .5 packs per day. He has never used smokeless tobacco. He reports that he does not drink alcohol and does not use drugs. FAMILY HISTORY:     family history is not on file. HOME MEDICATIONS:      Prior to Admission medications    Medication Sig Start Date End Date Taking? Authorizing Provider   metFORMIN (GLUCOPHAGE) 500 MG tablet Take 1 tablet by mouth in the morning and 1 tablet in the evening. Take with meals.  8/15/22   Tyree Payne MD   carvedilol (COREG) 25 MG tablet TAKE 1/2 TABLET TWICE A DAY 8/5/22   Herman Barbosa MD   albuterol sulfate HFA (PROVENTIL;VENTOLIN;PROAIR) 108 (90 Base) MCG/ACT inhaler INHALE 2 PUFFS BY MOUTH EVERY 6 HOURS IF NEEDED FOR WHEEZING 7/18/22   Khari Norris MD   albuterol (PROVENTIL) (5 MG/ML) 0.5% nebulizer solution Take 0.5 mLs by nebulization 4 times daily as needed for Wheezing 7/12/22   Katherine Yates MD   amLODIPine (NORVASC) 10 MG tablet Take 1 tablet by mouth daily 7/12/22   Katherine Yates MD   aspirin (HM ASPIRIN EC LOW DOSE) 81 MG EC tablet TAKE 1 TABLET BY MOUTH DAILY 7/12/22   Te Mckeon MD   atorvastatin (LIPITOR) 20 MG tablet Take 1 tablet by mouth daily 7/12/22   Katherine aYtes MD   ipratropium-albuterol (DUONEB) 0.5-2.5 (3) MG/3ML SOLN nebulizer solution Inhale 3 mLs into the lungs every 4 hours (while awake) 7/12/22   Te Mckeon MD   furosemide (LASIX) 20 MG tablet Take 1 tablet by mouth daily 7/12/22   Katherine Yates MD   lisinopril (PRINIVIL;ZESTRIL) 20 MG tablet Take 1 tablet by mouth daily 7/12/22   Katherine Yates MD   vitamin D (CHOLECALCIFEROL) 50 MCG (2000 UT) TABS tablet Take 1 tablet by mouth daily 7/12/22   Katherine Yates MD   fluticasone-salmeterol (ADVIAR) 100-50 MCG/ACT AEPB diskus inhaler Inhale 1 puff into the lungs every 12 hours 7/12/22   Katherine Yates MD   tiotropium (Derotha Sloop) 18 MCG inhalation capsule Inhale 1 capsule into the lungs daily 7/12/22   Te Mckeon MD       ALLERGIES:     Patient has no known allergies. OBJECTIVE:       Vitals:    10/11/22 2347 10/12/22 0459 10/12/22 0736 10/12/22 0749   BP: 139/62 (!) 155/76     Pulse:  54 56 63   Resp: 19 20 17 14   Temp: 98.4 °F (36.9 °C) 97.5 °F (36.4 °C)     TempSrc: Oral Oral     SpO2:   94% 97%   Weight:       Height:             Intake/Output Summary (Last 24 hours) at 10/12/2022 0806  Last data filed at 10/12/2022 0507  Gross per 24 hour   Intake 300 ml   Output 1600 ml   Net -1300 ml       PHYSICAL EXAM:  Physical Exam  Vitals reviewed. Constitutional:       General: He is not in acute distress. Appearance: Normal appearance. Cardiovascular:      Rate and Rhythm: Normal rate and regular rhythm. Pulses: Normal pulses.       Heart sounds: Normal heart sounds. No murmur heard. Pulmonary:      Effort: Pulmonary effort is normal.      Breath sounds: Normal breath sounds. Abdominal:      General: Bowel sounds are normal.      Palpations: Abdomen is soft. Tenderness: There is no abdominal tenderness. Musculoskeletal:      Cervical back: Normal range of motion. Right lower leg: No edema. Left lower leg: No edema. Neurological:      Mental Status: He is alert.    Psychiatric:         Mood and Affect: Mood normal.       DIAGNOSTICS:     Laboratory Testing:    Recent Results (from the past 24 hour(s))   POC Glucose Fingerstick    Collection Time: 10/11/22 12:34 PM   Result Value Ref Range    POC Glucose 252 (H) 75 - 110 mg/dL   POC Glucose Fingerstick    Collection Time: 10/11/22  5:08 PM   Result Value Ref Range    POC Glucose 175 (H) 75 - 110 mg/dL   POC Glucose Fingerstick    Collection Time: 10/11/22  8:31 PM   Result Value Ref Range    POC Glucose 304 (H) 75 - 110 mg/dL   Basic Metabolic Panel w/ Reflex to MG    Collection Time: 10/12/22  3:50 AM   Result Value Ref Range    Glucose 203 (H) 70 - 99 mg/dL    BUN 25 (H) 8 - 23 mg/dL    Creatinine 0.88 0.70 - 1.20 mg/dL    Est, Glom Filt Rate >60 >60 mL/min/1.73m2    Calcium 8.4 (L) 8.6 - 10.4 mg/dL    Sodium 136 135 - 144 mmol/L    Potassium 4.2 3.7 - 5.3 mmol/L    Chloride 97 (L) 98 - 107 mmol/L    CO2 31 20 - 31 mmol/L    Anion Gap 8 (L) 9 - 17 mmol/L   CBC    Collection Time: 10/12/22  3:50 AM   Result Value Ref Range    WBC 9.9 3.5 - 11.3 k/uL    RBC 2.68 (L) 4.21 - 5.77 m/uL    Hemoglobin 7.8 (L) 13.0 - 17.0 g/dL    Hematocrit 23.8 (L) 40.7 - 50.3 %    MCV 88.8 82.6 - 102.9 fL    MCH 29.1 25.2 - 33.5 pg    MCHC 32.8 28.4 - 34.8 g/dL    RDW 13.8 11.8 - 14.4 %    Platelets 125 562 - 582 k/uL    MPV 10.8 8.1 - 13.5 fL    NRBC Automated 0.3 (H) 0.0 per 100 WBC   POC Glucose Fingerstick    Collection Time: 10/12/22  7:56 AM   Result Value Ref Range    POC Glucose 139 (H) 75 - 110 mg/dL Imaging/Diagonstics:  EKG: normal EKG, normal sinus rhythm. CT HEAD WO CONTRAST    Result Date: 10/7/2022  EXAMINATION: CT OF THE HEAD WITHOUT CONTRAST  10/7/2022 10:31 am TECHNIQUE: CT of the head was performed without the administration of intravenous contrast. Automated exposure control, iterative reconstruction, and/or weight based adjustment of the mA/kV was utilized to reduce the radiation dose to as low as reasonably achievable. COMPARISON: 01/18/2022 HISTORY: ORDERING SYSTEM PROVIDED HISTORY: Moise Mcdonald TECHNOLOGIST PROVIDED HISTORY: Moise Mcdonald Decision Support Exception - unselect if not a suspected or confirmed emergency medical condition->Emergency Medical Condition (MA) CT BRAIN FINDINGS: BRAIN/VENTRICLES: Stable encephalomalacia in the right occipital lobe. The brainstem, and cerebellum have a normal appearance for the patient's age. The falx is midline. The ventricles normal in size. The peripheral sulci are mildly dilated. There is no sign of a space occupying lesion, infarction, or hemorrhage. Orbits: Portion of the orbits demonstrate no acute abnormality. SINUSES: Mucoperiosteal thickening of the ethmoid and maxillary sinuses as well as the nasal passage. .  The remaining imaged portions of the paranasal sinuses are clear. The mastoids and the middle ear chambers are clear. SOFT TISSUES/SKULL:  No acute abnormality of the visualized skull or soft tissues. Vascular calcifications are seen compatible with atherosclerotic disease. Mild cortical cerebral atrophy. Stable encephalomalacia in the right occipital lobe. No acute intracranial abnormalities are noted.      XR CHEST PORTABLE    Result Date: 10/7/2022  EXAMINATION: ONE XRAY VIEW OF THE CHEST 10/7/2022 12:44 pm COMPARISON: 06/14/2022 HISTORY: ORDERING SYSTEM PROVIDED HISTORY: ET tube and OG tube placement TECHNOLOGIST PROVIDED HISTORY: ET tube and OG tube placement FINDINGS: Endotracheal tube terminates in appropriate position above the orlando. Enteric tube terminates at the level the body of the stomach. Previously seen left pleural effusion and basilar opacity has resolved. Generalized interstitial prominence again noted. Endotracheal tube terminates in appropriate position above the orlando. The enteric tube terminates at the body of the stomach. CT CHEST PULMONARY EMBOLISM W CONTRAST    Result Date: 10/7/2022  EXAMINATION: CTA OF THE CHEST 10/7/2022 1:53 pm TECHNIQUE: CTA of the chest was performed after the administration of intravenous contrast.  Multiplanar reformatted images are provided for review. MIP images are provided for review. Automated exposure control, iterative reconstruction, and/or weight based adjustment of the mA/kV was utilized to reduce the radiation dose to as low as reasonably achievable. COMPARISON: Chest radiograph today. Chest CT 06/16/2022 HISTORY: ORDERING SYSTEM PROVIDED HISTORY: hypotensive, unreposive TECHNOLOGIST PROVIDED HISTORY: hypotensive, unreposive Decision Support Exception - unselect if not a suspected or confirmed emergency medical condition->Emergency Medical Condition (MA) Reason for Exam: hypotensive, unreposive FINDINGS: Pulmonary Arteries: Pulmonary arteries are adequately opacified for evaluation. No evidence of intraluminal filling defect to suggest pulmonary embolism. Mildly enlarged main pulmonary artery measuring 40 mm. . Mediastinum: No evidence of mediastinal lymphadenopathy. The heart and pericardium demonstrate no acute abnormality. There is no acute abnormality of the thoracic aorta. Incidental accessory right upper lobe pulmonary vein with a retrobronchial coarse. Lungs/pleura: The endotracheal tube terminates above the orlando. Debris is present in the distal trachea and mainstem bronchi.   Chronic clustered cystic change in the right suprahilar region in the right upper lobe is noted with more prominent opacity in the interval.  There is peribronchial wall thickening, bilateral reticulonodular opacities and tree-in-bud opacities present. These findings are most pronounced in the right middle and right lower lobe. No focal area of consolidation. No effusion. Upper Abdomen: Enteric tube terminates in the body of the stomach. No acute findings. Punctate calcifications in the pancreas are again demonstrated. Small soft tissue density near the posterior liver surface again demonstrated. Soft Tissues/Bones: No acute bone or soft tissue abnormality. 1.  No evidence of pulmonary embolism. 2.  Debris present within the distal trachea and mainstem bronchi with findings of bronchiolitis bilaterally. This may be due to an endobronchial infection or aspiration pneumonitis.        Current Facility-Administered Medications   Medication Dose Route Frequency Provider Last Rate Last Admin    amLODIPine (NORVASC) tablet 10 mg  10 mg Oral Daily Quratulain Noah, DO        carvedilol (COREG) tablet 12.5 mg  12.5 mg Oral BID  Quratulain Noah, DO        lisinopril (PRINIVIL;ZESTRIL) tablet 20 mg  20 mg Oral Daily Quratulain Noah, DO        metFORMIN (GLUCOPHAGE) tablet 500 mg  500 mg Oral BID  Quratulain Noah, DO        predniSONE (DELTASONE) tablet 40 mg  40 mg Oral Daily Pam Clay MD        Followed by    Kb Agosto ON 10/15/2022] predniSONE (DELTASONE) tablet 30 mg  30 mg Oral Daily Pam Clay MD        Followed by    Kb Agosto ON 10/18/2022] predniSONE (DELTASONE) tablet 20 mg  20 mg Oral Daily Pam Clay MD        Followed by    Kb Agosto ON 10/21/2022] predniSONE (DELTASONE) tablet 10 mg  10 mg Oral Daily aPm Clay MD        aspirin EC tablet 81 mg  81 mg Oral Daily Alexa Anderson MD   81 mg at 10/11/22 1811    atorvastatin (LIPITOR) tablet 20 mg  20 mg Oral Daily Alexa Anderson MD   20 mg at 10/11/22 1812    glucose chewable tablet 16 g  4 tablet Oral PRN Alexa Anderson MD        dextrose bolus 10% 125 mL  125 mL IntraVENous PRN Alexa Anderson MD Or    dextrose bolus 10% 250 mL  250 mL IntraVENous PRN Cherylene Corner, MD        glucagon (rDNA) injection 1 mg  1 mg SubCUTAneous PRN Cherylene Corner, MD        dextrose 10 % infusion   IntraVENous Continuous PRN Cherylene Corner, MD        pantoprazole (PROTONIX) tablet 40 mg  40 mg Oral QAM AC Alexander Giang MD   40 mg at 10/11/22 2027    dextromethorphan (DELSYM) 30 MG/5ML extended release liquid 30 mg  30 mg Oral Q12H PRN Humberto Bazan MD        insulin glargine (LANTUS) injection vial 5 Units  5 Units SubCUTAneous Nightly Humberto Bazan MD   5 Units at 10/11/22 2230    0.9 % sodium chloride infusion   IntraVENous Continuous Carmella Joshi MD 20 mL/hr at 10/11/22 0730 Rate Verify at 10/11/22 0730    glucose chewable tablet 16 g  4 tablet Oral PRN Matt Rae MD        dextrose bolus 10% 125 mL  125 mL IntraVENous PRN Matt Rae MD        Or    dextrose bolus 10% 250 mL  250 mL IntraVENous PRN Matt Rae MD        glucagon (rDNA) injection 1 mg  1 mg SubCUTAneous PRN Matt Rae MD        dextrose 10 % infusion   IntraVENous Continuous PRN Matt Rae MD        fentaNYL (SUBLIMAZE) injection 25 mcg  25 mcg IntraVENous Q2H PRN Matt Rae MD   25 mcg at 10/10/22 1053    0.9 % sodium chloride infusion   IntraVENous PRN Sky Law MD        sodium chloride flush 0.9 % injection 5-40 mL  5-40 mL IntraVENous 2 times per day Lencho Ross MD   5 mL at 10/11/22 2027    sodium chloride flush 0.9 % injection 5-40 mL  5-40 mL IntraVENous PRN Lencho Ross MD   10 mL at 10/09/22 2232    0.9 % sodium chloride infusion   IntraVENous PRN Lencho Ross MD        enoxaparin (LOVENOX) injection 40 mg  40 mg SubCUTAneous Daily Lencho Ross MD   40 mg at 10/11/22 0803    ondansetron (ZOFRAN-ODT) disintegrating tablet 4 mg  4 mg Oral Q8H PRN Lencho Ross MD        Or    ondansetron [facility]) injection 4 mg  4 mg IntraVENous Q6H PRN Lencho Ross MD polyethylene glycol (GLYCOLAX) packet 17 g  17 g Oral Daily PRN Jeramy Arthur MD        acetaminophen (TYLENOL) tablet 650 mg  650 mg Oral Q6H PRN Jeramy Arthur MD        Or    acetaminophen (TYLENOL) suppository 650 mg  650 mg Rectal Q6H PRN Jeramy Arthur MD        budesonide-formoterol (SYMBICORT) 80-4.5 MCG/ACT inhaler 2 puff  2 puff Inhalation BID Sukumar Dowell MD   2 puff at 10/12/22 0736    ipratropium-albuterol (DUONEB) nebulizer solution 1 ampule  1 ampule Inhalation Q4H WA Sukumar Dowell MD   1 ampule at 10/12/22 0735       ASSESSMENT:     Principal Problem (Resolved):    Acute metabolic encephalopathy  Active Problems:    Hypertension goal BP (blood pressure) < 140/80    Hyperlipidemia with target LDL less than 70    Controlled type 2 diabetes mellitus without complication, without long-term current use of insulin (HCC)    Combined systolic and diastolic congestive heart failure (HCC)    Acute on chronic respiratory failure with hypoxia (HCC)      PLAN:       Acute on chronic hypoxic respiratory failure secondary to COPD exacerbation due to pneumonitis  Admitted to ICU for intubation ventilation due to pending respiratory failure  -Patient was extubated  -Currently on 4 L oxygen via nasal cannula (3 L baseline)  -Status post Unasyn for 3 days for aspiration pneumonia ruled out  -Currently on Symbicort and DuoNeb. Solu-Medrol stopped. -Will be followed with prednisone taper of 40-30-20-10 for 3 days each.     Hyperglycemia iatrogenic due to steroids Type 2 diabetes non-insulin-dependent  -A1c 7 months ago 6.2  -Currently on oral steroids  -Started Lantus 5 units   -Continued home metformin 500 mg 1 tablet twice a day  -Hypoglycemia protocol    Bradycardia, no further episode  -Cardiology was consulted  -May use dopamine if heart rate is less than 40 or patient's unstable  -Received IV Lasix 20 mg yesterday  -Echo ordered showing ejection fraction 50% with no valvular abnormality    Hypertension  -Resumed home amlodipine, Coreg and lisinopril      GI prophylaxis tonics 40 Mg once daily  DVT prophylaxis Lovenox 40 Mg subcu once daily        Above plan discussed with the patient and family in room, who agreed to the above plan   Plan will be discussed with the attending, Dr. Brooke Grace DO  Family Medicine Resident  10/12/2022 8:06 AM

## 2022-10-12 NOTE — PROGRESS NOTES
Physical Therapy  Facility/Department: [address] NEURO  Physical Therapy Initial Assessment    Name: Toby Tucker  : 1955  MRN: [id number]  Date of Service: 10/12/2022  Chief Complaint   Patient presents with    Respiratory Distress       Discharge Recommendations:    Further therapy recommended at discharge. Patient Diagnosis(es): The primary encounter diagnosis was Unresponsive. Diagnoses of Bradycardia, Hypothermia, initial encounter, and Acute respiratory failure, unspecified whether with hypoxia or hypercapnia ([address] were also pertinent to this visit. Past Medical History:  has a past medical history of CHF (congestive heart failure) ([address] COPD (chronic obstructive pulmonary disease) ([address] Diabetes mellitus ([facility] Erectile dysfunction due to arterial insufficiency, Hypertension, Microalbuminuria due to type 2 diabetes mellitus ([facility] and Overweight (BMI 25.0-29.9). Past Surgical History:  has a past surgical history that includes Appendectomy and Total ankle arthroplasty. Assessment   Body Structures, Functions, Activity Limitations Requiring Skilled Therapeutic Intervention: Decreased functional mobility ; Decreased ADL status; Decreased tolerance to work activity; Decreased strength;Decreased endurance;Decreased balance  Assessment: Pt amb 250 ft RW CGA, mild gait deficits noted, no c/o adverse s/s. Pt should use RW for safe ambulation with skilled assistance.  Pt would benefit from continued acute PT to address deficits  Therapy Prognosis: Good  Decision Making: Medium Complexity  Requires PT Follow-Up: Yes  Activity Tolerance  Activity Tolerance: Patient tolerated treatment well     Plan   Physcial Therapy Plan  General Plan:  (5-6x/wk)  Current Treatment Recommendations: Strengthening, ROM, Balance training, Functional mobility training, Transfer training, ADL/Self-care training, IADL training, Endurance training, Gait training, Stair training, Return to work related activity, Home exercise program, Safety education & training, Patient/Caregiver education & training, Therapeutic activities, Equipment evaluation, education, & procurement  Safety Devices  Type of Devices:  All fall risk precautions in place, Call light within reach, Gait belt, Nurse notified, Left in chair, Patient at risk for falls  Restraints  Restraints Initially in Place: No     Restrictions  Restrictions/Precautions  Restrictions/Precautions: General Precautions, Fall Risk  Required Braces or Orthoses?: No     Subjective   General  Chart Reviewed: Yes  Patient assessed for rehabilitation services?: Yes  Response To Previous Treatment: Not applicable  Family / Caregiver Present: No  Follows Commands: Within Functional Limits  General Comment  Comments: Pt and RN agreeable to PT eval. Pt alert and seated on EOB with RN upon arrival for PT eval.  Subjective  Subjective: Pt denies c/o pain or numbness/tingling         Social/Functional History  Social/Functional History  Lives With: Son  Type of Home: Apartment  Home Layout:  (Lives on the 2nd floor of an apartment building.)  Home Access: Stairs to enter with rails  Entrance Stairs - Number of Steps: 7 steps to enter, ~7 to 2nd floor  Entrance Stairs - Rails: Both  Bathroom Shower/Tub: Tub/Shower unit  Bathroom Toilet: Standard  Home Equipment: Cane, Oxygen, Walker, rolling (SPC baseline)  Has the patient had two or more falls in the past year or any fall with injury in the past year?: Yes (x1 LOB incident on ice)  Receives Help From: Family  ADL Assistance: Independent  Homemaking Assistance: Needs assistance (Son helps with cooking, cleaning, and shopping)  Homemaking Responsibilities: Yes  Ambulation Assistance: Independent  Transfer Assistance: Independent  Active : No  Patient's  Info: Cab service  Occupation: Retired  Type of Occupation: Waste management,   Leisure & Hobbies: internet, play cards, TV  Vision/Hearing  Vision  Vision: Impaired  Vision Exceptions: Wears glasses for reading  Hearing  Hearing: Within functional limits    Cognition   Orientation  Overall Orientation Status: Within Functional Limits  Orientation Level: Oriented to person;Oriented to place;Oriented to situation;Oriented to time;Oriented X4  Cognition  Overall Cognitive Status: [facility]  Cognition Comment: Pt pleasant and agreeable throughout eval     Objective                 AROM RLE (degrees)  RLE AROM: WFL  AROM LLE (degrees)  LLE AROM : WFL  AROM RUE (degrees)  RUE AROM : WFL  AROM LUE (degrees)  LUE AROM : WFL  Strength RLE  Strength RLE: WFL  Comment: 4+/5 grossly  Strength LLE  Strength LLE: WFL  Comment: 4+/5 grossly  Strength RUE  Strength RUE: WFL  Comment: 4+/5 grossly  Strength LUE  Strength LUE: WFL  Comment: 4+/5 grossly              Transfers  Sit to Stand: Contact guard assistance  Stand to Sit: Contact guard assistance  Comment: Verbal cueing given for appropriate hand placement with good return demonstration  Ambulation  Surface: Level tile  Device: Rolling Walker  Other Apparatus: O2 (4L)  Assistance: Contact guard assistance  Quality of Gait: Pt displayed decreased gait speed & step length/height  Gait Deviations: Decreased step length;Decreased step height;Slow Patricia  Distance: 250 ft     Balance  Posture: Good  Sitting - Static: Good  Sitting - Dynamic: Good  Standing - Static: Fair;+  Standing - Dynamic: Fair;+  Comments: Pt required use of RW for UE support during standing activities to prevent LOB  Exercise Treatment: Pt amb 250 ft in giraldo RW CGA and returned to room to perform toileting activities. After completed, Pt returned to bedside chair. Mild gait deficits noted, no c/o adverse s/s. Pt had one incident during during hand washing at sink involving posterior LOB which was self corrected by posterior stepping strategy and therapist guarding CGA.                                            AM-PAC Score  AM-PAC Inpatient Mobility Raw Score : 18 (10/12/22 1435)  AM-PAC Inpatient T-Scale Score : 43.63 (10/12/22 1435)  Mobility Inpatient CMS 0-100% Score: 46.58 (10/12/22 1435)  Mobility Inpatient CMS G-Code Modifier : CK (10/12/22 1435)            Goals  Short Term Goals  Time Frame for Short Term Goals: 14 visits  Short Term Goal 1: Pt will be Thai when ambulating 300 ft with least restrictive AD  Short Term Goal 2: Pt will be Thai when navigating 15 stairs either or bilateral rails  Short Term Goal 3: Pt will be Thai with Bed Mobility  Short Term Goal 4: Pt will be Thai with transfers       Education  Patient Education  Education Given To: Patient  Education Provided: Role of Therapy;Plan of Care  Education Method: Demonstration;Verbal  Barriers to Learning: None  Education Outcome: Verbalized understanding;Demonstrated understanding      Therapy Time   Individual Concurrent Group Co-treatment   Time In 1132         Time Out 1208         Minutes 36         Timed Code Treatment Minutes: [address] [facility]   This treatment/evaluation completed by signing SPT. Signing PT agrees with treatment and documentation.

## 2022-10-12 NOTE — CARE COORDINATION
Transition planning  Called and spoke with Ezra Saldana at [facility], updated patient has been extubated and moved out of ICU, she states she will start precert once PT/OT notes are available.

## 2022-10-13 ENCOUNTER — APPOINTMENT (OUTPATIENT)
Dept: GENERAL RADIOLOGY | Age: 67
DRG: 871 | End: 2022-10-13
Payer: MEDICARE

## 2022-10-13 LAB
ANION GAP SERPL CALCULATED.3IONS-SCNC: 7 MMOL/L (ref 9–17)
BUN BLDV-MCNC: 32 MG/DL (ref 8–23)
CALCIUM SERPL-MCNC: 8.1 MG/DL (ref 8.6–10.4)
CHLORIDE BLD-SCNC: 98 MMOL/L (ref 98–107)
CO2: 33 MMOL/L (ref 20–31)
CREAT SERPL-MCNC: 1.02 MG/DL (ref 0.7–1.2)
GFR SERPL CREATININE-BSD FRML MDRD: >60 ML/MIN/1.73M2
GLUCOSE BLD-MCNC: 104 MG/DL (ref 75–110)
GLUCOSE BLD-MCNC: 108 MG/DL (ref 75–110)
GLUCOSE BLD-MCNC: 141 MG/DL (ref 70–99)
GLUCOSE BLD-MCNC: 185 MG/DL (ref 75–110)
GLUCOSE BLD-MCNC: 224 MG/DL (ref 75–110)
GLUCOSE BLD-MCNC: 230 MG/DL (ref 75–110)
HCT VFR BLD CALC: 23.6 % (ref 40.7–50.3)
HEMOGLOBIN: 7.6 G/DL (ref 13–17)
MCH RBC QN AUTO: 28.8 PG (ref 25.2–33.5)
MCHC RBC AUTO-ENTMCNC: 32.2 G/DL (ref 28.4–34.8)
MCV RBC AUTO: 89.4 FL (ref 82.6–102.9)
NRBC AUTOMATED: 0 PER 100 WBC
PDW BLD-RTO: 14 % (ref 11.8–14.4)
PLATELET # BLD: 168 K/UL (ref 138–453)
PMV BLD AUTO: 10.3 FL (ref 8.1–13.5)
POTASSIUM SERPL-SCNC: 3.9 MMOL/L (ref 3.7–5.3)
RBC # BLD: 2.64 M/UL (ref 4.21–5.77)
SODIUM BLD-SCNC: 138 MMOL/L (ref 135–144)
WBC # BLD: 11.2 K/UL (ref 3.5–11.3)

## 2022-10-13 PROCEDURE — 2580000003 HC RX 258

## 2022-10-13 PROCEDURE — 6370000000 HC RX 637 (ALT 250 FOR IP): Performed by: STUDENT IN AN ORGANIZED HEALTH CARE EDUCATION/TRAINING PROGRAM

## 2022-10-13 PROCEDURE — 94761 N-INVAS EAR/PLS OXIMETRY MLT: CPT

## 2022-10-13 PROCEDURE — 99232 SBSQ HOSP IP/OBS MODERATE 35: CPT | Performed by: FAMILY MEDICINE

## 2022-10-13 PROCEDURE — 6370000000 HC RX 637 (ALT 250 FOR IP)

## 2022-10-13 PROCEDURE — 6370000000 HC RX 637 (ALT 250 FOR IP): Performed by: INTERNAL MEDICINE

## 2022-10-13 PROCEDURE — 80048 BASIC METABOLIC PNL TOTAL CA: CPT

## 2022-10-13 PROCEDURE — 94640 AIRWAY INHALATION TREATMENT: CPT

## 2022-10-13 PROCEDURE — 82947 ASSAY GLUCOSE BLOOD QUANT: CPT

## 2022-10-13 PROCEDURE — 85027 COMPLETE CBC AUTOMATED: CPT

## 2022-10-13 PROCEDURE — 36415 COLL VENOUS BLD VENIPUNCTURE: CPT

## 2022-10-13 PROCEDURE — 2700000000 HC OXYGEN THERAPY PER DAY

## 2022-10-13 PROCEDURE — 6360000002 HC RX W HCPCS

## 2022-10-13 PROCEDURE — 97110 THERAPEUTIC EXERCISES: CPT

## 2022-10-13 PROCEDURE — 2060000000 HC ICU INTERMEDIATE R&B

## 2022-10-13 PROCEDURE — 99233 SBSQ HOSP IP/OBS HIGH 50: CPT | Performed by: INTERNAL MEDICINE

## 2022-10-13 PROCEDURE — 71045 X-RAY EXAM CHEST 1 VIEW: CPT

## 2022-10-13 PROCEDURE — 97530 THERAPEUTIC ACTIVITIES: CPT

## 2022-10-13 PROCEDURE — 97116 GAIT TRAINING THERAPY: CPT

## 2022-10-13 RX ORDER — INSULIN LISPRO 100 [IU]/ML
0-4 INJECTION, SOLUTION INTRAVENOUS; SUBCUTANEOUS NIGHTLY
Status: DISCONTINUED | OUTPATIENT
Start: 2022-10-13 | End: 2022-10-14 | Stop reason: HOSPADM

## 2022-10-13 RX ORDER — INSULIN LISPRO 100 [IU]/ML
0-8 INJECTION, SOLUTION INTRAVENOUS; SUBCUTANEOUS
Status: DISCONTINUED | OUTPATIENT
Start: 2022-10-13 | End: 2022-10-14 | Stop reason: HOSPADM

## 2022-10-13 RX ADMIN — LISINOPRIL 20 MG: 20 TABLET ORAL at 09:02

## 2022-10-13 RX ADMIN — ATORVASTATIN CALCIUM 20 MG: 20 TABLET, FILM COATED ORAL at 09:02

## 2022-10-13 RX ADMIN — SODIUM CHLORIDE, PRESERVATIVE FREE 10 ML: 5 INJECTION INTRAVENOUS at 09:04

## 2022-10-13 RX ADMIN — BUDESONIDE AND FORMOTEROL FUMARATE DIHYDRATE 2 PUFF: 160; 4.5 AEROSOL RESPIRATORY (INHALATION) at 20:36

## 2022-10-13 RX ADMIN — INSULIN LISPRO 2 UNITS: 100 INJECTION, SOLUTION INTRAVENOUS; SUBCUTANEOUS at 18:31

## 2022-10-13 RX ADMIN — LEVOFLOXACIN 500 MG: 500 TABLET, FILM COATED ORAL at 09:02

## 2022-10-13 RX ADMIN — IPRATROPIUM BROMIDE AND ALBUTEROL SULFATE 1 AMPULE: .5; 2.5 SOLUTION RESPIRATORY (INHALATION) at 11:35

## 2022-10-13 RX ADMIN — PREDNISONE 40 MG: 20 TABLET ORAL at 09:03

## 2022-10-13 RX ADMIN — INSULIN GLARGINE 5 UNITS: 100 INJECTION, SOLUTION SUBCUTANEOUS at 20:24

## 2022-10-13 RX ADMIN — SODIUM CHLORIDE, PRESERVATIVE FREE 10 ML: 5 INJECTION INTRAVENOUS at 20:22

## 2022-10-13 RX ADMIN — BUDESONIDE AND FORMOTEROL FUMARATE DIHYDRATE 2 PUFF: 160; 4.5 AEROSOL RESPIRATORY (INHALATION) at 07:46

## 2022-10-13 RX ADMIN — METFORMIN HYDROCHLORIDE 500 MG: 500 TABLET ORAL at 09:02

## 2022-10-13 RX ADMIN — IPRATROPIUM BROMIDE AND ALBUTEROL SULFATE 1 AMPULE: .5; 2.5 SOLUTION RESPIRATORY (INHALATION) at 20:36

## 2022-10-13 RX ADMIN — CARVEDILOL 12.5 MG: 12.5 TABLET, FILM COATED ORAL at 09:02

## 2022-10-13 RX ADMIN — METFORMIN HYDROCHLORIDE 500 MG: 500 TABLET ORAL at 18:30

## 2022-10-13 RX ADMIN — AMLODIPINE BESYLATE 10 MG: 10 TABLET ORAL at 09:04

## 2022-10-13 RX ADMIN — Medication 81 MG: at 09:02

## 2022-10-13 RX ADMIN — ENOXAPARIN SODIUM 40 MG: 100 INJECTION SUBCUTANEOUS at 09:02

## 2022-10-13 RX ADMIN — CARVEDILOL 12.5 MG: 12.5 TABLET, FILM COATED ORAL at 18:30

## 2022-10-13 RX ADMIN — PANTOPRAZOLE SODIUM 40 MG: 40 TABLET, DELAYED RELEASE ORAL at 09:02

## 2022-10-13 RX ADMIN — IPRATROPIUM BROMIDE AND ALBUTEROL SULFATE 1 AMPULE: .5; 2.5 SOLUTION RESPIRATORY (INHALATION) at 07:46

## 2022-10-13 RX ADMIN — IPRATROPIUM BROMIDE AND ALBUTEROL SULFATE 1 AMPULE: .5; 2.5 SOLUTION RESPIRATORY (INHALATION) at 15:40

## 2022-10-13 ASSESSMENT — ENCOUNTER SYMPTOMS
SHORTNESS OF BREATH: 1
ALLERGIC/IMMUNOLOGIC NEGATIVE: 1
VOMITING: 0
TROUBLE SWALLOWING: 0
PHOTOPHOBIA: 0
DIARRHEA: 0
EYE REDNESS: 0
COUGH: 1
ABDOMINAL PAIN: 0
VOICE CHANGE: 0
WHEEZING: 1
SORE THROAT: 0

## 2022-10-13 NOTE — PROGRESS NOTES
PULMONARY & CRITICAL CARE MEDICINE PROGRESS  NOTE     Patient:  Perez Lopez  MRN: [id number]  [facility] date: 10/7/2022  Primary Care Physician: Mala South MD  Consulting Physician: Lyndon Vincent DO  CODE Status: Full Code  LOS: 6    SUBJECTIVE     I personally interviewed/examined the patient, reviewed interval history and interpreted all available radiographic, laboratory data at the time of service. Chief Compliant/Reason for Initial Consult:     Acute on chronic hypoxic hypercapnic respiratory failure    Brief Hospital Course: The patient is a 69 y.o. male history of hypertension, hyperlipidemia, COPD, chronic hypoxic respiratory failure on long-term oxygen therapy between 3 to 4 L at home and history of CHF. Brought into emergency room apparently with sudden onset of unresponsiveness. In the emergency room patient was apparently hypoxic in respiratory distress blood gas shows hypercapnia. Patient was intubated for respiratory failure and was admitted to MICU. CT head showed encephalomalacia in the right occipital lobe without acute intracranial abnormality. He had a CTA chest done which was negative for pulm embolism showed secretion and distal trachea and mainstem bronchi and possible bronchiolitis. Patient was hypotensive also. He was treated with bronchodilators steroids and apparently antibiotic also had required Levophed for hypotension and also had bradycardia. EKG was concerning for junctional rhythm and cardiology was following the patient did not require intervention. Patient was ultimately extubated on 10/10/2021 was on nasal cannula 4 L post extubation and ultimately was transferred to stepdown unit on 10/11/2022    Interval History:  10/13/22    Overnight events noted chart reviewed. Patient is ambulatory afebrile hemodynamically patient is stable.   Patient had been on nasal cannula 4 to 5 L and he was saturating okay until this morning he was desaturating to mid 80s and he was placed on Yvette high flow nasal cannula currently on 10 L maintaining saturation 96 to 98%. According patient he does not have any change in his breathing denies any worsening shortness of breath he move out of bed to chair with help of physical therapy. He does have cough does complain of shortness of breath on any activity does complain of a sputum production and wheezing but improving. According to patient he has history of smoking for 40 years. Quit smoking 1 year ago he is on home oxygen at 3 L and recently increased to 4 L. Review of Systems:  Review of Systems   Constitutional:  Positive for activity change. Negative for fever and unexpected weight change. HENT:  Negative for postnasal drip, sore throat, trouble swallowing and voice change. Eyes:  Negative for photophobia, redness and visual disturbance. Respiratory:  Positive for cough, shortness of breath and wheezing. Cardiovascular:  Negative for chest pain, palpitations and leg swelling. Gastrointestinal:  Negative for abdominal pain, diarrhea and vomiting. Endocrine: Negative for polydipsia, polyphagia and polyuria. Genitourinary:  Negative for dysuria, frequency and hematuria. Musculoskeletal:  Negative for arthralgias and gait problem. Skin: Negative. Allergic/Immunologic: Negative. Neurological:  Negative for dizziness, seizures, speech difficulty and headaches. Hematological:  Negative for adenopathy. Does not bruise/bleed easily. Psychiatric/Behavioral: Negative.        OBJECTIVE     VITAL SIGNS:   LAST-  BP (!) 150/66   Pulse 64   Temp 98.2 °F (36.8 °C) (Temporal)   Resp 23   Ht 5' 5\" (1.651 m)   Wt 150 lb 5.7 oz (68.2 kg)   SpO2 98%   BMI 25.02 kg/m²   8-24 HR RANGE-  TEMP Temp  Av.8 °F (36.6 °C)  Min: 97.4 °F (36.3 °C)  Max: 98.3 °F (47.0 °C)   BP Systolic (26RFB), JNC:670 , Min:110 , CUD:267      Diastolic (90SNR), Av, Min:42, Max:66     PULSE Pulse  Av.3  Min: 51  Max: 77   RR Resp  Av.6  Min: 20  Max: 29   O2 SAT SpO2  Av.2 %  Min: 84 %  Max: 98 %   OXYGEN DELIVERY O2 Flow Rate (L/min)  Av.7 L/min  Min: 4 L/min  Max: 10 L/min     Systemic Examination:   Physical Exam  General appearance - looks comfortable and in no acute distress mildly tachypneic and chronically ill looking  Mental status - alert, oriented to person, place, and time  Eyes - pupils equal and reactive, extraocular eye movements intact  Mouth - mucous membranes moist, pharynx normal without lesions  Neck - supple, no significant adenopathy  Chest - Chest was symmetrical without dullness to percussion. There is increased resonance on percussion. He has bilateral breath sounds present but very reduced and diminished. No crackles present and no wheezing present currently.  There is no intercostal recession or use of accessory muscles  Heart - normal rate, regular rhythm, normal S1, S2, no murmurs, rubs, clicks or gallops  Abdomen - soft, nontender, nondistended, no masses or organomegaly  Neurological - alert, oriented, normal speech, no focal findings or movement disorder noted  Extremities - peripheral pulses normal, no pedal edema, no clubbing or cyanosis  Skin - normal coloration and turgor, no rashes, no suspicious skin lesions noted     DATA REVIEW     Medications:  Scheduled Meds:   amLODIPine  10 mg Oral Daily    carvedilol  12.5 mg Oral BID     lisinopril  20 mg Oral Daily    metFORMIN  500 mg Oral BID     budesonide-formoterol  2 puff Inhalation BID    levoFLOXacin  500 mg Oral Daily    predniSONE  40 mg Oral Daily    Followed by    Kb Agosto ON 10/15/2022] predniSONE  30 mg Oral Daily    Followed by    Kb Agosto ON 10/18/2022] predniSONE  20 mg Oral Daily    Followed by    Kb Agosto ON 10/21/2022] predniSONE  10 mg Oral Daily    aspirin  81 mg Oral Daily    atorvastatin  20 mg Oral Daily    pantoprazole  40 mg Oral QAM AC insulin glargine  5 Units SubCUTAneous Nightly    sodium chloride flush  5-40 mL IntraVENous 2 times per day    enoxaparin  40 mg SubCUTAneous Daily    ipratropium-albuterol  1 ampule Inhalation Q4H WA     Continuous Infusions:   dextrose      sodium chloride 20 mL/hr at 10/11/22 0730    dextrose      sodium chloride      sodium chloride       LABS:-  ABG:   No results for input(s): POCPH, POCPCO2, POCPO2, POCHCO3, TPSQ0OKV in the last 72 hours. CBC:   Recent Labs     10/11/22  0653 10/12/22  0350 10/13/22  0457   WBC 7.7 9.9 11.2   HGB 8.4* 7.8* 7.6*   HCT 26.2* 23.8* 23.6*   MCV 91.3 88.8 89.4    204 168   RBC 2.87* 2.68* 2.64*   MCH 29.3 29.1 28.8   MCHC 32.1 32.8 32.2   RDW 14.0 13.8 14.0       BMP:   Recent Labs     10/11/22  0412 10/12/22  0350 10/13/22  0457    136 138   K 4.6 4.2 3.9    97* 98   CO2 32* 31 33*   BUN 22 25* 32*   CREATININE 0.77 0.88 1.02   GLUCOSE 167* 203* 141*       Liver Function Test:   No results for input(s): PROT, LABALBU, ALT, AST, GGT, ALKPHOS, BILITOT in the last 72 hours. Amylase/Lipase:  No results for input(s): AMYLASE, LIPASE in the last 72 hours. Coagulation Profile:   No results for input(s): INR, PROTIME, APTT in the last 72 hours. Cardiac Enzymes:  No results for input(s): CKTOTAL, CKMB, CKMBINDEX, TROPONINI in the last 72 hours.   Lactic Acid:  No results found for: LACTA  BNP:   Lab Results   Component Value Date    BNP 4 08/11/2013     D-Dimer:  Lab Results   Component Value Date    DDIMER 1.57 06/14/2022     Others:   Lab Results   Component Value Date    TSH 1.12 10/07/2022     Lab Results   Component Value Date    SEDRATE 75 (H) 06/15/2022    .8 (H) 06/15/2022     No results found for: Donna Kaplan  Lab Results   Component Value Date    IRON 79 12/23/2016    TIBC 233 (L) 12/23/2016    FERRITIN 532 (H) 01/20/2022     No results found for: NOEMY SIFUENTES  Lab Results   Component Value Date/Time    PSA 0.77 06/21/2012 08:27 AM Input/Output:    Intake/Output Summary (Last 24 hours) at 10/13/2022 0919  Last data filed at 10/13/2022 0000  Gross per 24 hour   Intake --   Output 200 ml   Net -200 ml         Microbiology:  Recent Labs     10/12/22  1007   [facility] . SPUTUM   CULTURE PENDING       Pathology:    Radiology reports:  CT HEAD WO CONTRAST   Final Result   Mild cortical cerebral atrophy. Stable encephalomalacia in the right occipital lobe. No acute intracranial abnormalities are noted. CT CHEST PULMONARY EMBOLISM W CONTRAST   Final Result   1. No evidence of pulmonary embolism. 2.  Debris present within the distal trachea and mainstem bronchi with   findings of bronchiolitis bilaterally. This may be due to an endobronchial   infection or aspiration pneumonitis. XR CHEST PORTABLE   Final Result   Endotracheal tube terminates in appropriate position above the [place]. The   enteric tube terminates at the body of the stomach. XR CHEST PORTABLE    (Results Pending)       Echocardiogram:   No results found for this or any previous visit. Cardiac Catheterization:   No results found for this or any previous visit. ASSESSMENT AND PLAN     Assessment:    //Acute exacerbation of COPD.  //Acute bronchitis/L RTI  //Acute on chronic hypoxic hypercapnic respiratory failure  //Severe COPD on long-term oxygen therapy  //Bradycardia/junctional rhythm.  //Smoking history of 40-pack-year or more.  //Diabetes mellitus  //Hypertension  //Hyperlipidemia    Plan:    Patient is currently on Yvette high flow nasal cannula at 10 L. Discussed with nursing staff to wean O2 to keep saturation 88 to 90%. Ultimate goal is to transition to nasal cannula. He had used BiPAP last night 12/6/40 percent. Able to tolerated good  Will continue nocturnal BiPAP and as needed during the daytime  Continue DuoNeb aerosol and continue Symbicort to 160/4.52 puff twice daily.   Sputum culture sent yesterday so far pending. Continue pulmonary toilet, aspiration precautions and bronchodilators  Continue to monitor I/O with a goal of even fluid balance  Antimicrobials reviewed; had Unasyn. Continue levofloxacin for 5 days  Finished 5 days of IV Solu-Medrol on prednisone taper dose  DVT prophylaxis on Lovenox 40 mg daily  Physical/occupational/speech therapy; increase activity as tolerated    Discussed with nursing staff, treatment plan discussed    I updated the patient regarding the current clinical condition, provisional diagnosis and management plan. I addressed concerns and answered all questions to the best of my abilities. It was my pleasure to evaluate Flora Riggs today. We will continue to follow. I would like to thank you for allowing me to participate in the care of this patient. Please feel free to call with any further questions or concerns. Bari Corona MD, M.D. Pulmonary and Critical Care Medicine           10/13/2022, 9:19 AM    Please note that this chart was generated using voice recognition Dragon dictation software. Although every effort was made to ensure the accuracy of this automated transcription, some errors in transcription may have occurred.

## 2022-10-13 NOTE — CARE COORDINATION
Received message from Shawn Valdez . Called Trell back and updated him on discharge plan, that pt will be going home now instead of SNF and that pt has refused HC.   Will notify Alexey Lucia when pt is discharged

## 2022-10-13 NOTE — PLAN OF CARE
Problem: Respiratory - Adult  Goal: Achieves optimal ventilation and oxygenation  Outcome: Progressing     Problem: Skin/Tissue Integrity - Adult  Goal: Skin integrity remains intact  Outcome: Progressing  Goal: Incisions, wounds, or drain sites healing without S/S of infection  Outcome: Progressing  Goal: Oral mucous membranes remain intact  Outcome: Progressing     Problem: Discharge Planning  Goal: Discharge to home or other facility with appropriate resources  Outcome: Progressing     Problem: Pain  Goal: Verbalizes/displays adequate comfort level or baseline comfort level  Outcome: Progressing     Problem: Chronic Conditions and Co-morbidities  Goal: Patient's chronic conditions and co-morbidity symptoms are monitored and maintained or improved  Outcome: Progressing     Problem: Nutrition Deficit:  Goal: Optimize nutritional status  Outcome: Progressing  Flowsheets (Taken 10/13/2022 1257 by Jesusita Luna RD, ERNA)  Nutrient intake appropriate for improving, restoring, or maintaining nutritional needs:   Assess nutritional status and recommend course of action   Monitor oral intake, labs, and treatment plans   Recommend appropriate diets, oral nutritional supplements, and vitamin/mineral supplements     Problem: Skin/Tissue Integrity  Goal: Absence of new skin breakdown  Description: 1. Monitor for areas of redness and/or skin breakdown  2. Assess vascular access sites hourly  3. Every 4-6 hours minimum:  Change oxygen saturation probe site  4. Every 4-6 hours:  If on nasal continuous positive airway pressure, respiratory therapy assess nares and determine need for appliance change or resting period.   Outcome: Progressing     Problem: Safety - Adult  Goal: Free from fall injury  Outcome: Progressing     Problem: ABCDS Injury Assessment  Goal: Absence of physical injury  Outcome: Progressing

## 2022-10-13 NOTE — PROGRESS NOTES
[facility]  Progress Note    Date:   10/13/2022  Patient name:  Aleta Dias  Date of admission:  10/7/2022 12:20 PM  MRN:   [id number]  YOB: 1955    SUBJECTIVE/Last 24 hours update:     Patient seen and examined at the bed side , no new acute events   Bipap overnight and on 5L of nasal cannula when seen this morning  Patient was desatting to 85% this morning and respiratory therapist changed oxygen to high flow 6 L nasal cannula, is currently saturating at 95%  All other vitals are stable  Patient endorses having yellow sputum production with cough   No known complaints at this time  I/O: +2076  HPI:      Nevaeh Wong is a 68-year-old male past medical history of hypertension, type 2 diabetes mellitus, COPD, combined heart failure, presented to the ER altered. Received perfect serve from [address] [place] [address] who did annual assessment patient was found to have SaO2 88 on baseline 3 L oxygen nasal cannula. Patient was instructed to increase to 4 L oxygen via nasal cannula. Patient then reports that he was found to be unresponsive and was brought to the ED via EMS. CT PE was negative for PE but showed debris in trachea and mainstem bronchus likely due to aspiration pneumonitis. CT head showed no acute changes. VBG showed severe respiratory acidosis. In the ED patient was found unresponsive and intubated due to shallow breathing and impending respiratory failure admitted to MICU. She was briefly on Levophed for bradycardia and hypotension but is currently maintained off Levophed. Radiology was consulted for bradycardia I recommends dopamine when unstable or heart rate is less than 40 and patient has not had any further episodes of bradycardia. Today patient already spontaneous breathing trial and was extubated. Currently on adult diet and on 4 L oxygen via nasal cannula. Transferred to  IP team and to stepdown unit.      Acute on chronic hypoxic respiratory failure due to COPD exacerbation secondary to pneumonitis  -Admitted to ICU for intubation ventilation due to pending respiratory failure  -Patient was extubated  -Currently on 4 L oxygen via nasal cannula  -Status post Unasyn  -Transition from IV to oral steroids with taper and bronchodilators    REVIEW OF SYSTEMS:   Review of Systems   Constitutional:  Negative for chills and fever. HENT:  Negative for congestion. Eyes:  Negative for visual disturbance. Respiratory:  Positive for cough. Cardiovascular:  Negative for leg swelling. Genitourinary:  Negative for difficulty urinating. Neurological:  Negative for seizures. Psychiatric/Behavioral:  Negative for agitation. PAST MEDICAL HISTORY:      has a past medical history of CHF (congestive heart failure) ([facility] [address] COPD (chronic obstructive pulmonary disease) ([facility] [address] Diabetes mellitus ([facility] [address] Erectile dysfunction due to arterial insufficiency, Hypertension, Microalbuminuria due to type 2 diabetes mellitus ([facility] [address] and Overweight (BMI 25.0-29.9). PAST SURGICAL HISTORY:      has a past surgical history that includes Appendectomy and Total ankle arthroplasty. SOCIAL HISTORY:      reports that he quit smoking about 10 months ago. His smoking use included cigarettes. He smoked an average of .5 packs per day. He has never used smokeless tobacco. He reports that he does not drink alcohol and does not use drugs. FAMILY HISTORY:     family history is not on file. HOME MEDICATIONS:      Prior to Admission medications    Medication Sig Start Date End Date Taking? Authorizing Provider   metFORMIN (GLUCOPHAGE) 500 MG tablet Take 1 tablet by mouth in the morning and 1 tablet in the evening. Take with meals.  8/15/22   Anastasiya Estrella MD   carvedilol (COREG) 25 MG tablet TAKE 1/2 TABLET TWICE A DAY 8/5/22   Te Villavicencio MD   albuterol sulfate HFA (PROVENTIL;VENTOLIN;PROAIR) 108 (90 Base) MCG/ACT inhaler INHALE 2 PUFFS BY MOUTH EVERY 6 HOURS IF NEEDED FOR WHEEZING 7/18/22   Khari Lackey MD   albuterol (PROVENTIL) (5 MG/ML) 0.5% nebulizer solution Take 0.5 mLs by nebulization 4 times daily as needed for Wheezing 7/12/22   Te Diez MD   amLODIPine (NORVASC) 10 MG tablet Take 1 tablet by mouth daily 7/12/22   Amanda Coburn MD   aspirin (HM ASPIRIN EC LOW DOSE) 81 MG EC tablet TAKE 1 TABLET BY MOUTH DAILY 7/12/22   Te Diez MD   atorvastatin (LIPITOR) 20 MG tablet Take 1 tablet by mouth daily 7/12/22   Amanda Coburn MD   ipratropium-albuterol (DUONEB) 0.5-2.5 (3) MG/3ML SOLN nebulizer solution Inhale 3 mLs into the lungs every 4 hours (while awake) 7/12/22   Te Diez MD   furosemide (LASIX) 20 MG tablet Take 1 tablet by mouth daily 7/12/22   Amanda Coburn MD   lisinopril (PRINIVIL;ZESTRIL) 20 MG tablet Take 1 tablet by mouth daily 7/12/22   Amanda Coburn MD   vitamin D (CHOLECALCIFEROL) 50 MCG (2000 UT) TABS tablet Take 1 tablet by mouth daily 7/12/22   Amanda Coburn MD   fluticasone-salmeterol (ADVIAR) 100-50 MCG/ACT AEPB diskus inhaler Inhale 1 puff into the lungs every 12 hours 7/12/22   Amanda Coburn MD   tiotropium (SPIRIVA HANDIHALER) 18 MCG inhalation capsule Inhale 1 capsule into the lungs daily 7/12/22   Te Diez MD       ALLERGIES:     Patient has no known allergies. OBJECTIVE:       Vitals:    10/13/22 0000 10/13/22 0404 10/13/22 0746 10/13/22 0758   BP: (!) 125/59 125/61     Pulse: 55 51 77 68   Resp: 22 20 21 25   Temp: 97.4 °F (36.3 °C) 97.7 °F (36.5 °C)     TempSrc: Temporal Temporal     SpO2: 94% 97% 90% 93%   Weight:       Height:             Intake/Output Summary (Last 24 hours) at 10/13/2022 0800  Last data filed at 10/13/2022 0000  Gross per 24 hour   Intake --   Output 200 ml   Net -200 ml       PHYSICAL EXAM:  Vitals reviewed. Constitutional:       General: He is not in acute distress. Appearance: Normal appearance.    Cardiovascular:      Rate and Rhythm: Normal rate and regular rhythm. Pulses: Normal pulses. Heart sounds: Normal heart sounds. No murmur heard. Pulmonary:      Effort: Pulmonary effort is normal.      Breath sounds: Normal breath sounds. Abdominal:      General: Bowel sounds are normal.      Palpations: Abdomen is soft. Tenderness: There is no abdominal tenderness. Musculoskeletal:      Cervical back: Normal range of motion. Right lower leg: No edema. Left lower leg: No edema. Neurological:      Mental Status: He is alert. Psychiatric:         Mood and Affect: Mood normal.     DIAGNOSTICS:     Laboratory Testing:    Recent Results (from the past 24 hour(s))   Culture, Respiratory    Collection Time: 10/12/22 10:07 AM    Specimen: Sputum   Result Value Ref Range    Specimen Description . SPUTUM     Direct Exam < 10 EPITHELIAL CELLS/LPF     Direct Exam >25 NEUTROPHILS/LPF     Direct Exam PREDOMINANT ORGANISM: GRAM NEGATIVE COCCOBACILLI (A)     Direct Exam (A)      MIXED BACTERIAL MORPHOTYPES ALSO PRESENT ON GRAM STAIN.     Culture PENDING    POC Glucose Fingerstick    Collection Time: 10/12/22 11:52 AM   Result Value Ref Range    POC Glucose 151 (H) 75 - 110 mg/dL   POC Glucose Fingerstick    Collection Time: 10/12/22  3:52 PM   Result Value Ref Range    POC Glucose 242 (H) 75 - 110 mg/dL   POC Glucose Fingerstick    Collection Time: 10/12/22  7:52 PM   Result Value Ref Range    POC Glucose 227 (H) 75 - 110 mg/dL   Basic Metabolic Panel w/ Reflex to MG    Collection Time: 10/13/22  4:57 AM   Result Value Ref Range    Glucose 141 (H) 70 - 99 mg/dL    BUN 32 (H) 8 - 23 mg/dL    Creatinine 1.02 0.70 - 1.20 mg/dL    Est, Glom Filt Rate >60 >60 mL/min/1.73m2    Calcium 8.1 (L) 8.6 - 10.4 mg/dL    Sodium 138 135 - 144 mmol/L    Potassium 3.9 3.7 - 5.3 mmol/L    Chloride 98 98 - 107 mmol/L    CO2 33 (H) 20 - 31 mmol/L    Anion Gap 7 (L) 9 - 17 mmol/L   CBC    Collection Time: 10/13/22  4:57 AM   Result Value Ref Range WBC 11.2 3.5 - 11.3 k/uL    RBC 2.64 (L) 4.21 - 5.77 m/uL    Hemoglobin 7.6 (L) 13.0 - 17.0 g/dL    Hematocrit 23.6 (L) 40.7 - 50.3 %    MCV 89.4 82.6 - 102.9 fL    MCH 28.8 25.2 - 33.5 pg    MCHC 32.2 28.4 - 34.8 g/dL    RDW 14.0 11.8 - 14.4 %    Platelets 895 068 - 386 k/uL    MPV 10.3 8.1 - 13.5 fL    NRBC Automated 0.0 0.0 per 100 WBC         Imaging/Diagonstics:  EKG: normal EKG, normal sinus rhythm. CT HEAD WO CONTRAST    Result Date: 10/7/2022  EXAMINATION: CT OF THE HEAD WITHOUT CONTRAST  10/7/2022 10:31 am TECHNIQUE: CT of the head was performed without the administration of intravenous contrast. Automated exposure control, iterative reconstruction, and/or weight based adjustment of the mA/kV was utilized to reduce the radiation dose to as low as reasonably achievable. COMPARISON: 01/18/2022 HISTORY: ORDERING SYSTEM PROVIDED HISTORY: [email or web address] TECHNOLOGIST PROVIDED HISTORY: [employer] Decision Support Exception - unselect if not a suspected or confirmed emergency medical condition->Emergency Medical Condition (MA) CT BRAIN FINDINGS: BRAIN/VENTRICLES: Stable encephalomalacia in the right occipital lobe. The brainstem, and cerebellum have a normal appearance for the patient's age. The falx is midline. The ventricles normal in size. The peripheral sulci are mildly dilated. There is no sign of a space occupying lesion, infarction, or hemorrhage. Orbits: Portion of the orbits demonstrate no acute abnormality. SINUSES: Mucoperiosteal thickening of the ethmoid and maxillary sinuses as well as the nasal passage. .  The remaining imaged portions of the paranasal sinuses are clear. The mastoids and the middle ear chambers are clear. SOFT TISSUES/SKULL:  No acute abnormality of the visualized skull or soft tissues. Vascular calcifications are seen compatible with atherosclerotic disease. Mild cortical cerebral atrophy. Stable encephalomalacia in the right occipital lobe.  No acute intracranial abnormalities are noted. XR CHEST PORTABLE    Result Date: 10/7/2022  EXAMINATION: ONE XRAY VIEW OF THE CHEST 10/7/2022 12:44 pm COMPARISON: 06/14/2022 HISTORY: ORDERING SYSTEM PROVIDED HISTORY: ET tube and OG tube placement TECHNOLOGIST PROVIDED HISTORY: ET tube and OG tube placement FINDINGS: Endotracheal tube terminates in appropriate position above the orlando. Enteric tube terminates at the level the body of the stomach. Previously seen left pleural effusion and basilar opacity has resolved. Generalized interstitial prominence again noted. Endotracheal tube terminates in appropriate position above the orlando. The enteric tube terminates at the body of the stomach. CT CHEST PULMONARY EMBOLISM W CONTRAST    Result Date: 10/7/2022  EXAMINATION: CTA OF THE CHEST 10/7/2022 1:53 pm TECHNIQUE: CTA of the chest was performed after the administration of intravenous contrast.  Multiplanar reformatted images are provided for review. MIP images are provided for review. Automated exposure control, iterative reconstruction, and/or weight based adjustment of the mA/kV was utilized to reduce the radiation dose to as low as reasonably achievable. COMPARISON: Chest radiograph today. Chest CT 06/16/2022 HISTORY: ORDERING SYSTEM PROVIDED HISTORY: hypotensive, unreposive TECHNOLOGIST PROVIDED HISTORY: hypotensive, unrepos[employer] Decision Support Exception - unselect if not a suspected or confirmed emergency medical condition->Emergency Medical Condition (MA) Reason for Exam: hypotensive, unreposive FINDINGS: Pulmonary Arteries: Pulmonary arteries are adequately opacified for evaluation. No evidence of intraluminal filling defect to suggest pulmonary embolism. Mildly enlarged main pulmonary artery measuring 40 mm. . Mediastinum: No evidence of mediastinal lymphadenopathy. The heart and pericardium demonstrate no acute abnormality. There is no acute abnormality of the thoracic aorta.   Incidental accessory right upper lobe pulmonary vein with a retrobronchial coarse. Lungs/pleura: The endotracheal tube terminates above the orlando. Debris is present in the distal trachea and mainstem bronchi. Chronic clustered cystic change in the right suprahilar region in the right upper lobe is noted with more prominent opacity in the interval.  There is peribronchial wall thickening, bilateral reticulonodular opacities and tree-in-bud opacities present. These findings are most pronounced in the right middle and right lower lobe. No focal area of consolidation. No effusion. Upper Abdomen: Enteric tube terminates in the body of the stomach. No acute findings. Punctate calcifications in the pancreas are again demonstrated. Small soft tissue density near the posterior liver surface again demonstrated. Soft Tissues/Bones: No acute bone or soft tissue abnormality. 1.  No evidence of pulmonary embolism. 2.  Debris present within the distal trachea and mainstem bronchi with findings of bronchiolitis bilaterally. This may be due to an endobronchial infection or aspiration pneumonitis.        Current Facility-Administered Medications   Medication Dose Route Frequency Provider Last Rate Last Admin    amLODIPine (NORVASC) tablet 10 mg  10 mg Oral Daily Quratulain Noah, DO   10 mg at 10/12/22 0839    carvedilol (COREG) tablet 12.5 mg  12.5 mg Oral BID  Quratulain Noah, DO   12.5 mg at 10/12/22 1710    lisinopril (PRINIVIL;ZESTRIL) tablet 20 mg  20 mg Oral Daily Quratulain Noah, DO   20 mg at 10/12/22 0840    metFORMIN (GLUCOPHAGE) tablet 500 mg  500 mg Oral BID  Quratulain Noah, DO   500 mg at 10/12/22 1710    budesonide-formoterol (SYMBICORT) 160-4.5 MCG/ACT inhaler 2 puff  2 puff Inhalation BID Keith Frausto MD   2 puff at 10/13/22 0746    levoFLOXacin (LEVAQUIN) tablet 500 mg  500 mg Oral Daily Keith Frausto MD   500 mg at 10/12/22 0947    predniSONE (DELTASONE) tablet 40 mg  40 mg Oral Daily Zeynep Redmond MD   40 mg at 10/12/22 0831    Followed by    Vikram Adorno ON 10/15/2022] predniSONE (DELTASONE) tablet 30 mg  30 mg Oral Daily Pamela Grace MD        Followed by    Vikram Adorno ON 10/18/2022] predniSONE (DELTASONE) tablet 20 mg  20 mg Oral Daily Pamela Grace MD        Followed by    Vikram Adorno ON 10/21/2022] predniSONE (DELTASONE) tablet 10 mg  10 mg Oral Daily Pamela Grace MD        aspirin EC tablet 81 mg  81 mg Oral Daily Jimbo Aguilar MD   81 mg at 10/12/22 0831    atorvastatin (LIPITOR) tablet 20 mg  20 mg Oral Daily Jimbo Aguilar MD   20 mg at 10/12/22 0831    glucose chewable tablet 16 g  4 tablet Oral PRN Jimbo Aguilar MD        dextrose bolus 10% 125 mL  125 mL IntraVENous PRN Jimbo Aguilar MD        Or    dextrose bolus 10% 250 mL  250 mL IntraVENous PRN Jimbo Aguilar MD        glucagon (rDNA) injection 1 mg  1 mg SubCUTAneous PRN Jimbo Aguilar MD        dextrose 10 % infusion   IntraVENous Continuous PRN Jimbo Aguilar MD        pantoprazole (PROTONIX) tablet 40 mg  40 mg Oral QAM AC Alexander Giang MD   40 mg at 10/12/22 0831    dextromethorphan (DELSYM) 30 MG/5ML extended release liquid 30 mg  30 mg Oral Q12H PRN Rakan Fermin MD   30 mg at 10/12/22 0839    insulin glargine (LANTUS) injection vial 5 Units  5 Units SubCUTAneous Nightly Rakan Fermin MD   5 Units at 10/12/22 2136    0.9 % sodium chloride infusion   IntraVENous Continuous Kaylie Bryant MD 20 mL/hr at 10/11/22 0730 Rate Verify at 10/11/22 0730    glucose chewable tablet 16 g  4 tablet Oral PRN Jhon Muaro MD        dextrose bolus 10% 125 mL  125 mL IntraVENous PRN Jhon Mauro MD        Or    dextrose bolus 10% 250 mL  250 mL IntraVENous PRN Jhon Mauro MD        glucagon (rDNA) injection 1 mg  1 mg SubCUTAneous PRN Jhon Mauro MD        dextrose 10 % infusion   IntraVENous Continuous PRN Jhon Mauro MD        fentaNYL (SUBLIMAZE) injection 25 mcg  25 mcg IntraVENous Q2H PRN Jhon Mauro MD   25 mcg at 10/10/22 1053 0.9 % sodium chloride infusion   IntraVENous PRN Sky Law MD        sodium chloride flush 0.9 % injection 5-40 mL  5-40 mL IntraVENous 2 times per day Nick Kohli MD   10 mL at 10/12/22 0830    sodium chloride flush 0.9 % injection 5-40 mL  5-40 mL IntraVENous PRN Nick Kohli MD   10 mL at 10/09/22 2232    0.9 % sodium chloride infusion   IntraVENous PRN Nick Kohli MD        enoxaparin (LOVENOX) injection 40 mg  40 mg SubCUTAneous Daily Nick Kohli MD   40 mg at 10/12/22 0831    ondansetron (ZOFRAN-ODT) disintegrating tablet 4 mg  4 mg Oral Q8H PRN Nick Kohli MD        Or    ondansetron [facility]) injection 4 mg  4 mg IntraVENous Q6H PRN Nick Kohli MD        polyethylene glycol [facility]) packet 17 g  17 g Oral Daily PRN Nick Kohli MD        acetaminophen (TYLENOL) tablet 650 mg  650 mg Oral Q6H PRN Nick Kohli MD        Or    acetaminophen (TYLENOL) suppository 650 mg  650 mg Rectal Q6H PRN Nick Kohli MD        ipratropium-albuterol (DUONEB) nebulizer solution 1 ampule  1 ampule Inhalation Q4H WA Aviva Rodríguez MD   1 ampule at 10/13/22 0746       ASSESSMENT:     Principal Problem (Resolved):    Acute metabolic encephalopathy  Active Problems:    Unresponsive    Hypertension goal BP (blood pressure) < 140/80    Hyperlipidemia with target LDL less than 70    Controlled type 2 diabetes mellitus without complication, without long-term current use of insulin (HCC)    Combined systolic and diastolic congestive heart failure (HCC)    Acute on chronic respiratory failure with hypoxia (HCC)      PLAN:     Acute on chronic hypoxic respiratory failure secondary to COPD exacerbation due to pneumonitis  Admitted to ICU for intubation ventilation due to pending respiratory failure  -Patient was extubated  -Increased oxygen requirement this morning (10/13)   -Repeat chest x-ray, respiratory culture, urine culture, blood culture  -Status post Unasyn for 3 days for aspiration pneumonia ruled out  -Currently on Symbicort and DuoNeb. Solu-Medrol stopped.     -Will be followed with prednisone taper of 40-30-20-10 for 3 days each  -Acapella device ordered for sputum clearance  -Pulm on board    -Ordered respiratory culture pending    -Nocturnal bipap    -Continue duoneb    -Monitor I/O    -Added Levofloxacin 5 days        Hyperglycemia iatrogenic due to steroids Type 2 diabetes non-insulin-dependent  -A1c 7 months ago 6.2  -Currently on oral steroids  -Started Lantus 5 units   -Continued home metformin 500 mg 1 tablet twice a day  -Hypoglycemia protocol  -See glucose 104, under control     Bradycardia, no further episode  -Cardiology consulted    -Echo 10/07: 50%    -Continue monitor junctional rhythm    -Signed off at this time   -May use dopamine if heart rate is less than 40 or patient's unstable  -Received IV Lasix 20 mg yesterday       Hypertension  -Resumed home amlodipine, Coreg and lisinopril     GI prophylaxis tonics 40 Mg once daily  DVT prophylaxis Lovenox 40 Mg subcu once daily          Above plan discussed with the patient and family in room, who agreed to the above plan   Plan will be discussed with the attending, Dr. Saritha Balderrama, DO  Family Medicine Resident  10/13/2022 8:00 AM

## 2022-10-13 NOTE — PLAN OF CARE
Problem: Respiratory - Adult  Goal: Achieves optimal ventilation and oxygenation  10/13/2022 0214 by Shaista See RN  Outcome: Progressing  10/12/2022 1805 by Anjana Moreno  Outcome: Progressing  10/12/2022 1738 by Zeus Holt RCP  Outcome: Progressing     Problem: Skin/Tissue Integrity - Adult  Goal: Skin integrity remains intact  10/13/2022 0214 by Shaista See RN  Outcome: Progressing  10/12/2022 1805 by Anjana Moreno  Outcome: Progressing  Goal: Incisions, wounds, or drain sites healing without S/S of infection  Outcome: Progressing  Goal: Oral mucous membranes remain intact  Outcome: Progressing     Problem: Discharge Planning  Goal: Discharge to home or other facility with appropriate resources  Outcome: Progressing     Problem: Pain  Goal: Verbalizes/displays adequate comfort level or baseline comfort level  10/13/2022 0214 by Shaista See RN  Outcome: Progressing  10/12/2022 1805 by Anjana Moreno  Outcome: Adequate for Discharge     Problem: Nutrition Deficit:  Goal: Optimize nutritional status  Outcome: Progressing     Problem: Skin/Tissue Integrity  Goal: Absence of new skin breakdown  Description: 1. Monitor for areas of redness and/or skin breakdown  2. Assess vascular access sites hourly  3. Every 4-6 hours minimum:  Change oxygen saturation probe site  4. Every 4-6 hours:  If on nasal continuous positive airway pressure, respiratory therapy assess nares and determine need for appliance change or resting period.   Outcome: Progressing     Problem: Safety - Adult  Goal: Free from fall injury  Outcome: Progressing     Problem: ABCDS Injury Assessment  Goal: Absence of physical injury  Outcome: Progressing

## 2022-10-13 NOTE — CARE COORDINATION
Transition planning  Met with patient at bedside to discuss transition plan. He states he lives with his son in a second floor apartment, son assists him as needed at home. He has home O2 (does not remember name of company who supplies it) and cane he uses as needed. He states he wants to go home instead of going to SNF for rehab. Noted patient ambulated 250 feet with PT yesterday, explained to patient insurance would likely not approve SNF. Discussed making a referral to home care, pt declines as he has had home care in the past and has a co-pay  that he cannot afford to pay. Patient states his plan is to go home with his son, daughter will transport. Patient states he would like a shower chair for home.

## 2022-10-13 NOTE — PROGRESS NOTES
Comprehensive Nutrition Assessment    Type and Reason for Visit:  Reassess    Nutrition Recommendations/Plan:   Continue current diet. Encourage/monitor PO intakes as tolerated and monitor need for ONS with meals. Will monitor labs, weights, and plan of care. Malnutrition Assessment:  Malnutrition Status: At risk for malnutrition (10/13/22 1300)    Context:  Acute Illness     Findings of the 6 clinical characteristics of malnutrition:  Energy Intake:  Mild decrease in energy intake   Weight Loss:  Unable to assess     Body Fat Loss:  No significant body fat loss   Muscle Mass Loss:  No significant muscle mass loss  Fluid Accumulation:  No significant fluid accumulation   Strength:  Not Performed    Nutrition Assessment:    Pt extubated on 10/10. Currently tolerating an oral diet with \"okay\" appetite per pt. Consuming at least 50% of meals. Will monitor need for ONS with meals. Labs reviewed. Meds include: Lantus, Prednisone. Nutrition Related Findings:    Labs/Meds reviewed. Last BM 10/12. Wound Type: None       Current Nutrition Intake & Therapies:    Average Meal Intake: 51-75%  Average Supplements Intake: None Ordered  ADULT DIET; Regular; 3 carb choices (45 gm/meal)    Anthropometric Measures:  Height: 5' 5\" (165.1 cm)  Ideal Body Weight (IBW): 136 lbs (62 kg)    Admission Body Weight: 151 lb (68.5 kg)  Current Body Weight: 150 lb 5.7 oz (68.2 kg), 110.6 % IBW. Weight Source: Bed Scale  Current BMI (kg/m2): 25                          BMI Categories: Overweight (BMI 25.0-29. 9)    Estimated Daily Nutrient Needs:  Energy Requirements Based On: Kcal/kg  Weight Used for Energy Requirements: Current  Energy (kcal/day): 1900 kcals/day  Weight Used for Protein Requirements: Ideal  Protein (g/day): 70-90 gm pro/day  Method Used for Fluid Requirements: 1 ml/kcal  Fluid (ml/day): 1800 mL or per MD    Nutrition Diagnosis:   Inadequate oral intake related to impaired respiratory function as evidenced by intake 26-50%, intake 51-75% (recent extubation; appetite)    Nutrition Interventions:   Food and/or Nutrient Delivery: Continue Current Diet  Nutrition Education/Counseling: No recommendation at this time  Coordination of Nutrition Care: Continue to monitor while inpatient       Goals:  Previous Goal Met: Progressing toward Goal(s)  Goals: Meet at least 75% of estimated needs       Nutrition Monitoring and Evaluation:   Behavioral-Environmental Outcomes: None Identified  Food/Nutrient Intake Outcomes: Food and Nutrient Intake  Physical Signs/Symptoms Outcomes: Biochemical Data, GI Status, Fluid Status or Edema, Nutrition Focused Physical Findings, Weight, Skin    Discharge Planning:     Too soon to determine     Liat Astorga, [address] [place] [address], LD  Contact: [phone]

## 2022-10-13 NOTE — PROGRESS NOTES
Physical Therapy  Facility/Department: [address] [facility]  Physical Therapy Daily Treatment Note    Name: Toby Tucker  : 1955  MRN: [id number]  Date of Service: 10/13/2022    Discharge Recommendations:  Patient would benefit from continued therapy after discharge   PT Equipment Recommendations  Equipment Needed: No (Pt reports owning a RW)      Patient Diagnosis(es): The primary encounter diagnosis was Unresponsive. Diagnoses of Bradycardia, Hypothermia, initial encounter, and Acute respiratory failure, unspecified whether with hypoxia or hypercapnia ([address] were also pertinent to this visit. Past Medical History:  has a past medical history of CHF (congestive heart failure) ([address] COPD (chronic obstructive pulmonary disease) ([facility] [address] Diabetes mellitus ([facility] [address] Erectile dysfunction due to arterial insufficiency, Hypertension, Microalbuminuria due to type 2 diabetes mellitus ([facility] [address] and Overweight (BMI 25.0-29.9). Past Surgical History:  has a past surgical history that includes Appendectomy and Total ankle arthroplasty. Assessment   Body Structures, Functions, Activity Limitations Requiring Skilled Therapeutic Intervention: Decreased functional mobility ; Decreased strength;Decreased endurance;Decreased balance;Decreased coordination  Assessment: Pt with mobility deficits requiring CGA to ambulate 140 feet with a RW with fair stability. Pt also ambulates 30 feet with no device but with 1x LOB requiring min-A to correct. Pt demonstrates significantly improved stability with ambulation with use of RW. Pt will require assistance with mobility upon discharge. Pt would benefit from additional PT upon discharge to further reduce fall risk.   Therapy Prognosis: Good  Decision Making: Medium Complexity  Requires PT Follow-Up: Yes  Activity Tolerance  Activity Tolerance: Patient tolerated treatment well  Activity Tolerance Comments: SpO2 between % this date on 6 lpm.     Plan   Physcial Therapy Plan  General Plan: (5-6x/wk)  Current Treatment Recommendations: Strengthening, ROM, Balance training, Functional mobility training, Transfer training, ADL/Self-care training, IADL training, Endurance training, Gait training, Stair training, Return to work related activity, Home exercise program, Safety education & training, Patient/Caregiver education & training, Therapeutic activities, Equipment evaluation, education, & procurement  Safety Devices  Type of Devices: Call light within reach, Gait belt, Nurse notified, Left in chair  Restraints  Restraints Initially in Place: No     Restrictions  Restrictions/Precautions  Restrictions/Precautions: General Precautions, Fall Risk  Required Braces or Orthoses?: No  Position Activity Restriction  Other position/activity restrictions: . Subjective   General  Patient assessed for rehabilitation services?: Yes  Response To Previous Treatment: Patient with no complaints from previous session. Family / Caregiver Present: No  Follows Commands: Within Functional Limits  Subjective  Subjective: Pt up in a chair and agreeable to therapy, RN agreeable to therapy. Pt pleasant and cooperative throughout today's session. Pt declines pain at rest.           Cognition   Cognition  Overall Cognitive Status: WFL     Objective                                Bed mobility  Bed Mobility Comments: Pt seated at bedside recliner upon entry and exit. Transfers  Sit to Stand: Stand by assistance  Stand to Sit: Stand by assistance  Comment: Transfers performed 2x this date. Verbal cues for hand placement. Ambulation  Surface: Level tile  Device: Rolling Walker  Other Apparatus: O2 (6 lpm)  Assistance: Contact guard assistance  Quality of Gait: fair stability, forward flexed trunk, no LOB.   Gait Deviations: Decreased step length;Decreased step height;Slow Patricia  Distance: 140 feet  More Ambulation?: Yes  Ambulation 2  Surface - 2: level tile  Device 2: No device  Assistance 2: Contact guard assistance  Quality of Gait 2: mildly unsteady, decreased stride length, 1x LOB requiring min-A to correct. Gait Deviations: Slow Patricia;Decreased step length;Decreased step height  Distance: 30 feet  Stairs/Curb  Stairs?: No     Balance  Posture: Good  Sitting - Static: Good  Sitting - Dynamic: Good  Standing - Static: Fair;+  Standing - Dynamic: Fair;+  Comments: Standing balance assessed while using a RW  Exercise Treatment: x10 BLE in sitting EOB: hip flexion, hip abduction, LAQs, ankle dorsi/plantarflexion. AM-PAC Score  AM-PAC Inpatient Mobility Raw Score : 19 (10/13/22 1603)  AM-PAC Inpatient T-Scale Score : 45.44 (10/13/22 1603)  Mobility Inpatient CMS 0-100% Score: 41.77 (10/13/22 1603)  Mobility Inpatient CMS G-Code Modifier : CK (10/13/22 1603)            Goals  Short Term Goals  Time Frame for Short Term Goals: 14 visits  Short Term Goal 1: Pt will be Thai when ambulating 300 ft with least restrictive AD  Short Term Goal 2: Pt will be Thai when navigating 15 stairs either or bilateral rails  Short Term Goal 3: Pt will be Thai with Bed Mobility  Short Term Goal 4: Pt will be Thai with transfers  Additional Goals?: No       Education  Patient Education  Education Given To: Patient  Education Provided: Plan of Care;Transfer Training; Fall Prevention Strategies  Education Method: Verbal  Barriers to Learning: None  Education Outcome: Verbalized understanding      Therapy Time   Individual Concurrent Group Co-treatment   Time In 1345         Time Out 1408         Minutes 23         Timed Code Treatment Minutes: AKASH Orellana 20, PT

## 2022-10-14 VITALS
TEMPERATURE: 98.3 F | SYSTOLIC BLOOD PRESSURE: 123 MMHG | OXYGEN SATURATION: 98 % | RESPIRATION RATE: 28 BRPM | HEIGHT: 65 IN | WEIGHT: 150.35 LBS | DIASTOLIC BLOOD PRESSURE: 62 MMHG | BODY MASS INDEX: 25.05 KG/M2 | HEART RATE: 63 BPM

## 2022-10-14 LAB
GLUCOSE BLD-MCNC: 140 MG/DL (ref 75–110)
GLUCOSE BLD-MCNC: 172 MG/DL (ref 75–110)

## 2022-10-14 PROCEDURE — 99239 HOSP IP/OBS DSCHRG MGMT >30: CPT | Performed by: FAMILY MEDICINE

## 2022-10-14 PROCEDURE — 2580000003 HC RX 258

## 2022-10-14 PROCEDURE — 6360000002 HC RX W HCPCS

## 2022-10-14 PROCEDURE — 6370000000 HC RX 637 (ALT 250 FOR IP): Performed by: STUDENT IN AN ORGANIZED HEALTH CARE EDUCATION/TRAINING PROGRAM

## 2022-10-14 PROCEDURE — 94761 N-INVAS EAR/PLS OXIMETRY MLT: CPT

## 2022-10-14 PROCEDURE — 99232 SBSQ HOSP IP/OBS MODERATE 35: CPT | Performed by: INTERNAL MEDICINE

## 2022-10-14 PROCEDURE — 82947 ASSAY GLUCOSE BLOOD QUANT: CPT

## 2022-10-14 PROCEDURE — 6370000000 HC RX 637 (ALT 250 FOR IP)

## 2022-10-14 PROCEDURE — 94640 AIRWAY INHALATION TREATMENT: CPT

## 2022-10-14 PROCEDURE — 6370000000 HC RX 637 (ALT 250 FOR IP): Performed by: INTERNAL MEDICINE

## 2022-10-14 PROCEDURE — 2700000000 HC OXYGEN THERAPY PER DAY

## 2022-10-14 RX ORDER — LEVOFLOXACIN 500 MG/1
500 TABLET, FILM COATED ORAL DAILY
Qty: 2 TABLET | Refills: 0 | Status: SHIPPED | OUTPATIENT
Start: 2022-10-15 | End: 2022-10-17

## 2022-10-14 RX ORDER — PREDNISONE 20 MG/1
20 TABLET ORAL DAILY
Qty: 3 TABLET | Refills: 0 | Status: SHIPPED | OUTPATIENT
Start: 2022-10-18 | End: 2022-10-21

## 2022-10-14 RX ORDER — PREDNISONE 10 MG/1
10 TABLET ORAL DAILY
Qty: 3 TABLET | Refills: 0 | Status: SHIPPED | OUTPATIENT
Start: 2022-10-21 | End: 2022-10-24

## 2022-10-14 RX ORDER — DEXTROMETHORPHAN POLISTIREX 30 MG/5ML
30 SUSPENSION ORAL 2 TIMES DAILY PRN
Qty: 89 ML | Refills: 1 | Status: SHIPPED | OUTPATIENT
Start: 2022-10-14 | End: 2022-10-24

## 2022-10-14 RX ORDER — CARVEDILOL 12.5 MG/1
12.5 TABLET ORAL 2 TIMES DAILY WITH MEALS
Qty: 60 TABLET | Refills: 3 | Status: SHIPPED | OUTPATIENT
Start: 2022-10-14

## 2022-10-14 RX ORDER — BUDESONIDE AND FORMOTEROL FUMARATE DIHYDRATE 160; 4.5 UG/1; UG/1
2 AEROSOL RESPIRATORY (INHALATION) 2 TIMES DAILY
Qty: 10.2 G | Refills: 3 | Status: SHIPPED | OUTPATIENT
Start: 2022-10-14

## 2022-10-14 RX ORDER — PREDNISONE 10 MG/1
30 TABLET ORAL DAILY
Qty: 9 TABLET | Refills: 0 | Status: SHIPPED | OUTPATIENT
Start: 2022-10-15 | End: 2022-10-18

## 2022-10-14 RX ADMIN — LEVOFLOXACIN 500 MG: 500 TABLET, FILM COATED ORAL at 09:05

## 2022-10-14 RX ADMIN — CARVEDILOL 12.5 MG: 12.5 TABLET, FILM COATED ORAL at 09:05

## 2022-10-14 RX ADMIN — SODIUM CHLORIDE, PRESERVATIVE FREE 10 ML: 5 INJECTION INTRAVENOUS at 09:06

## 2022-10-14 RX ADMIN — ATORVASTATIN CALCIUM 20 MG: 20 TABLET, FILM COATED ORAL at 09:11

## 2022-10-14 RX ADMIN — PREDNISONE 40 MG: 20 TABLET ORAL at 09:05

## 2022-10-14 RX ADMIN — IPRATROPIUM BROMIDE AND ALBUTEROL SULFATE 1 AMPULE: .5; 2.5 SOLUTION RESPIRATORY (INHALATION) at 11:07

## 2022-10-14 RX ADMIN — ENOXAPARIN SODIUM 40 MG: 100 INJECTION SUBCUTANEOUS at 09:06

## 2022-10-14 RX ADMIN — AMLODIPINE BESYLATE 10 MG: 10 TABLET ORAL at 09:05

## 2022-10-14 RX ADMIN — BUDESONIDE AND FORMOTEROL FUMARATE DIHYDRATE 2 PUFF: 160; 4.5 AEROSOL RESPIRATORY (INHALATION) at 08:02

## 2022-10-14 RX ADMIN — IPRATROPIUM BROMIDE AND ALBUTEROL SULFATE 1 AMPULE: .5; 2.5 SOLUTION RESPIRATORY (INHALATION) at 08:02

## 2022-10-14 RX ADMIN — METFORMIN HYDROCHLORIDE 500 MG: 500 TABLET ORAL at 09:05

## 2022-10-14 RX ADMIN — LISINOPRIL 20 MG: 20 TABLET ORAL at 09:05

## 2022-10-14 RX ADMIN — Medication 81 MG: at 09:05

## 2022-10-14 RX ADMIN — PANTOPRAZOLE SODIUM 40 MG: 40 TABLET, DELAYED RELEASE ORAL at 06:58

## 2022-10-14 ASSESSMENT — ENCOUNTER SYMPTOMS
VOMITING: 0
EYE REDNESS: 0
VOICE CHANGE: 0
SORE THROAT: 0
ABDOMINAL PAIN: 0
SHORTNESS OF BREATH: 1
ALLERGIC/IMMUNOLOGIC NEGATIVE: 1
DIARRHEA: 0
COUGH: 1
TROUBLE SWALLOWING: 0
PHOTOPHOBIA: 0
WHEEZING: 0

## 2022-10-14 NOTE — PROGRESS NOTES
[address] [facility]  Progress Note    Date:   10/14/2022  Patient name:  Tiffany Lux  Date of admission:  10/7/2022 12:20 PM  MRN:   [id number]  YOB: 1955    SUBJECTIVE/Last 24 hours update:     Patient seen and examined at the bed side , no new acute events   Bipap overnight and on 4 L of nasal cannula when seen this morning  All other vitals are stable  Patient is still endorses having yellow sputum production with cough   No known complaints at this time  Currently on levofloxacin , last dose on 10/16/22   Patient is also on oral steroids at this time  Awaiting pulmonology recommendations  Most likely to be discharged today with follow-up in clinic for a sleep study so that the patient can get CPAP machine at home  I/O: +1716  HPI:      Tammy Payan is a 77-year-old male past medical history of hypertension, type 2 diabetes mellitus, COPD, combined heart failure, presented to the ER altered. Received perfect serve from [address] [place] [address] who did annual assessment patient was found to have SaO2 88 on baseline 3 L oxygen nasal cannula. Patient was instructed to increase to 4 L oxygen via nasal cannula. Patient then reports that he was found to be unresponsive and was brought to the ED via EMS. CT PE was negative for PE but showed debris in trachea and mainstem bronchus likely due to aspiration pneumonitis. CT head showed no acute changes. VBG showed severe respiratory acidosis. In the ED patient was found unresponsive and intubated due to shallow breathing and impending respiratory failure admitted to MICU. She was briefly on Levophed for bradycardia and hypotension but is currently maintained off Levophed. Radiology was consulted for bradycardia I recommends dopamine when unstable or heart rate is less than 40 and patient has not had any further episodes of bradycardia. Today patient already spontaneous breathing trial and was extubated. Currently on adult diet and on 4 L oxygen via nasal cannula. Transferred to  IP team and to stepdown unit. Acute on chronic hypoxic respiratory failure due to COPD exacerbation secondary to pneumonitis  -Admitted to ICU for intubation ventilation due to pending respiratory failure  -Patient was extubated  -Currently on 4 L oxygen via nasal cannula  -Status post Unasyn  -Transition from IV to oral steroids with taper and bronchodilators       REVIEW OF SYSTEMS:   Review of Systems   Constitutional:  Negative for chills and fever. HENT:  Negative for congestion. Eyes:  Negative for visual disturbance. Respiratory:  Positive for cough. Cardiovascular:  Negative for leg swelling. Genitourinary:  Negative for difficulty urinating. Neurological:  Negative for seizures. Psychiatric/Behavioral:  Negative for agitation. PAST MEDICAL HISTORY:      has a past medical history of CHF (congestive heart failure) ([facility] [address] COPD (chronic obstructive pulmonary disease) ([facility] [address] Diabetes mellitus ([facility] [address] Erectile dysfunction due to arterial insufficiency, Hypertension, Microalbuminuria due to type 2 diabetes mellitus ([facility] [address] and Overweight (BMI 25.0-29.9). PAST SURGICAL HISTORY:      has a past surgical history that includes Appendectomy and Total ankle arthroplasty. SOCIAL HISTORY:      reports that he quit smoking about 10 months ago. His smoking use included cigarettes. He smoked an average of .5 packs per day. He has never used smokeless tobacco. He reports that he does not drink alcohol and does not use drugs. FAMILY HISTORY:     family history is not on file. HOME MEDICATIONS:      Prior to Admission medications    Medication Sig Start Date End Date Taking? Authorizing Provider   metFORMIN (GLUCOPHAGE) 500 MG tablet Take 1 tablet by mouth in the morning and 1 tablet in the evening. Take with meals.  8/15/22   Ravi Hooker MD   carvedilol (COREG) 25 MG tablet TAKE 1/2 TABLET TWICE A DAY 8/5/22 Ronaldo Figueredo MD   albuterol sulfate HFA (PROVENTIL;VENTOLIN;PROAIR) 108 (90 Base) MCG/ACT inhaler INHALE 2 PUFFS BY MOUTH EVERY 6 HOURS IF NEEDED FOR WHEEZING 7/18/22   Khari Stevens MD   albuterol (PROVENTIL) (5 MG/ML) 0.5% nebulizer solution Take 0.5 mLs by nebulization 4 times daily as needed for Wheezing 7/12/22   Te South MD   amLODIPine (NORVASC) 10 MG tablet Take 1 tablet by mouth daily 7/12/22   Ronaldo Figueredo MD   aspirin (HM ASPIRIN EC LOW DOSE) 81 MG EC tablet TAKE 1 TABLET BY MOUTH DAILY 7/12/22   Te South MD   atorvastatin (LIPITOR) 20 MG tablet Take 1 tablet by mouth daily 7/12/22   Ronaldo Figueredo MD   ipratropium-albuterol (DUONEB) 0.5-2.5 (3) MG/3ML SOLN nebulizer solution Inhale 3 mLs into the lungs every 4 hours (while awake) 7/12/22   Te South MD   furosemide (LASIX) 20 MG tablet Take 1 tablet by mouth daily 7/12/22   Ronaldo Figueredo MD   lisinopril (PRINIVIL;ZESTRIL) 20 MG tablet Take 1 tablet by mouth daily 7/12/22   Ronaldo Figueredo MD   vitamin D (CHOLECALCIFEROL) 50 MCG (2000 UT) TABS tablet Take 1 tablet by mouth daily 7/12/22   Ronaldo Figueredo MD   fluticasone-salmeterol (ADVIAR) 100-50 MCG/ACT AEPB diskus inhaler Inhale 1 puff into the lungs every 12 hours 7/12/22   Ronaldo Figueredo MD   tiotropium (SPIRIVA HANDIHALER) 18 MCG inhalation capsule Inhale 1 capsule into the lungs daily 7/12/22   Te South MD       ALLERGIES:     Patient has no known allergies. OBJECTIVE:       Vitals:    10/13/22 2036 10/13/22 2037 10/14/22 0017 10/14/22 0449   BP:    134/60   Pulse: 64 62 56 56   Resp: 27 26 20 25   Temp:    98.6 °F (37 °C)   TempSrc:    Tympanic   SpO2: 98% 100% 100% 96%   Weight:       Height:             Intake/Output Summary (Last 24 hours) at 10/14/2022 0733  Last data filed at 10/14/2022 0659  Gross per 24 hour   Intake 240 ml   Output 600 ml   Net -360 ml       PHYSICAL EXAM:  Vitals reviewed.    Constitutional: General: He is not in acute distress. Appearance: Normal appearance. Cardiovascular:      Rate and Rhythm: Normal rate and regular rhythm. Pulses: Normal pulses. Heart sounds: Normal heart sounds. No murmur heard. Pulmonary:      Effort: Pulmonary effort is normal.      Breath sounds: Normal breath sounds. Abdominal:      General: Bowel sounds are normal.      Palpations: Abdomen is soft. Tenderness: There is no abdominal tenderness. Musculoskeletal:      Cervical back: Normal range of motion. Right lower leg: No edema. Left lower leg: No edema. Neurological:      Mental Status: He is alert. Psychiatric:         Mood and Affect: Mood normal.     DIAGNOSTICS:     Laboratory Testing:    Recent Results (from the past 24 hour(s))   POC Glucose Fingerstick    Collection Time: 10/13/22  8:17 AM   Result Value Ref Range    POC Glucose 108 75 - 110 mg/dL   POC Glucose Fingerstick    Collection Time: 10/13/22 11:24 AM   Result Value Ref Range    POC Glucose 104 75 - 110 mg/dL   POC Glucose Fingerstick    Collection Time: 10/13/22  3:33 PM   Result Value Ref Range    POC Glucose 224 (H) 75 - 110 mg/dL   POC Glucose Fingerstick    Collection Time: 10/13/22  6:29 PM   Result Value Ref Range    POC Glucose 230 (H) 75 - 110 mg/dL   POC Glucose Fingerstick    Collection Time: 10/13/22  8:02 PM   Result Value Ref Range    POC Glucose 185 (H) 75 - 110 mg/dL         Imaging/Diagonstics:  EKG: normal EKG, normal sinus rhythm. CT HEAD WO CONTRAST    Result Date: 10/7/2022  EXAMINATION: CT OF THE HEAD WITHOUT CONTRAST  10/7/2022 10:31 am TECHNIQUE: CT of the head was performed without the administration of intravenous contrast. Automated exposure control, iterative reconstruction, and/or weight based adjustment of the mA/kV was utilized to reduce the radiation dose to as low as reasonably achievable.  COMPARISON: 01/18/2022 HISTORY: ORDERING SYSTEM PROVIDED HISTORY: Ismael Angelucci TECHNOLOGIST PROVIDED HISTORY: unreponsive Decision Support Exception - unselect if not a suspected or confirmed emergency medical condition->Emergency Medical Condition (MA) CT BRAIN FINDINGS: BRAIN/VENTRICLES: Stable encephalomalacia in the right occipital lobe. The brainstem, and cerebellum have a normal appearance for the patient's age. The falx is midline. The ventricles normal in size. The peripheral sulci are mildly dilated. There is no sign of a space occupying lesion, infarction, or hemorrhage. Orbits: Portion of the orbits demonstrate no acute abnormality. SINUSES: Mucoperiosteal thickening of the ethmoid and maxillary sinuses as well as the nasal passage. .  The remaining imaged portions of the paranasal sinuses are clear. The mastoids and the middle ear chambers are clear. SOFT TISSUES/SKULL:  No acute abnormality of the visualized skull or soft tissues. Vascular calcifications are seen compatible with atherosclerotic disease. Mild cortical cerebral atrophy. Stable encephalomalacia in the right occipital lobe. No acute intracranial abnormalities are noted. XR CHEST PORTABLE    Result Date: 10/13/2022  EXAMINATION: ONE XRAY VIEW OF THE CHEST 10/13/2022 11:33 am COMPARISON: 10/07/2022 HISTORY: ORDERING SYSTEM PROVIDED HISTORY: post extubation, productive cough TECHNOLOGIST PROVIDED HISTORY: post extubation, productive cough FINDINGS: No consolidation, pleural effusion or pneumothorax. Mildly elevated left hemidiaphragm with left lower lobe linear densities. Cardiac silhouette normal in size for an AP projection. No hilar enlargement or vascular congestion. Surgical hardware overlies lower cervical spine. No consolidation or pleural effusion. Mildly elevated left hemidiaphragm with left lower lobe discoid atelectasis. Interval removal endotracheal and NG tubes.      XR CHEST PORTABLE    Result Date: 10/7/2022  EXAMINATION: ONE XRAY VIEW OF THE CHEST 10/7/2022 12:44 pm COMPARISON: 06/14/2022 HISTORY: ORDERING SYSTEM PROVIDED HISTORY: ET tube and OG tube placement TECHNOLOGIST PROVIDED HISTORY: ET tube and OG tube placement FINDINGS: Endotracheal tube terminates in appropriate position above the orlando. Enteric tube terminates at the level the body of the stomach. Previously seen left pleural effusion and basilar opacity has resolved. Generalized interstitial prominence again noted. Endotracheal tube terminates in appropriate position above the orlando. The enteric tube terminates at the body of the stomach. CT CHEST PULMONARY EMBOLISM W CONTRAST    Result Date: 10/7/2022  EXAMINATION: CTA OF THE CHEST 10/7/2022 1:53 pm TECHNIQUE: CTA of the chest was performed after the administration of intravenous contrast.  Multiplanar reformatted images are provided for review. MIP images are provided for review. Automated exposure control, iterative reconstruction, and/or weight based adjustment of the mA/kV was utilized to reduce the radiation dose to as low as reasonably achievable. COMPARISON: Chest radiograph today. Chest CT 06/16/2022 HISTORY: ORDERING SYSTEM PROVIDED HISTORY: hypotensive, unreposive TECHNOLOGIST PROVIDED HISTORY: hypotensive, unreposive Decision Support Exception - unselect if not a suspected or confirmed emergency medical condition->Emergency Medical Condition (MA) Reason for Exam: hypotensive, unreposive FINDINGS: Pulmonary Arteries: Pulmonary arteries are adequately opacified for evaluation. No evidence of intraluminal filling defect to suggest pulmonary embolism. Mildly enlarged main pulmonary artery measuring 40 mm. . Mediastinum: No evidence of mediastinal lymphadenopathy. The heart and pericardium demonstrate no acute abnormality. There is no acute abnormality of the thoracic aorta. Incidental accessory right upper lobe pulmonary vein with a retrobronchial coarse. Lungs/pleura: The endotracheal tube terminates above the orlando.   Debris is present in the distal trachea and mainstem bronchi. Chronic clustered cystic change in the right suprahilar region in the right upper lobe is noted with more prominent opacity in the interval.  There is peribronchial wall thickening, bilateral reticulonodular opacities and tree-in-bud opacities present. These findings are most pronounced in the right middle and right lower lobe. No focal area of consolidation. No effusion. Upper Abdomen: Enteric tube terminates in the body of the stomach. No acute findings. Punctate calcifications in the pancreas are again demonstrated. Small soft tissue density near the posterior liver surface again demonstrated. Soft Tissues/Bones: No acute bone or soft tissue abnormality. 1.  No evidence of pulmonary embolism. 2.  Debris present within the distal trachea and mainstem bronchi with findings of bronchiolitis bilaterally. This may be due to an endobronchial infection or aspiration pneumonitis.        Current Facility-Administered Medications   Medication Dose Route Frequency Provider Last Rate Last Admin    insulin lispro (HUMALOG) injection vial 0-8 Units  0-8 Units SubCUTAneous TID WC Quratulain Noah, DO   2 Units at 10/13/22 1831    insulin lispro (HUMALOG) injection vial 0-4 Units  0-4 Units SubCUTAneous Nightly Quratulain Noah, DO        amLODIPine (NORVASC) tablet 10 mg  10 mg Oral Daily Quratulain Noah, DO   10 mg at 10/13/22 0904    carvedilol (COREG) tablet 12.5 mg  12.5 mg Oral BID  Quratulain Noah, DO   12.5 mg at 10/13/22 1830    lisinopril (PRINIVIL;ZESTRIL) tablet 20 mg  20 mg Oral Daily Quratulain Noah, DO   20 mg at 10/13/22 0902    metFORMIN (GLUCOPHAGE) tablet 500 mg  500 mg Oral BID  Quratulain Noah, DO   500 mg at 10/13/22 1830    budesonide-formoterol (SYMBICORT) 160-4.5 MCG/ACT inhaler 2 puff  2 puff Inhalation BID Keith Frausto MD   2 puff at 10/13/22 2036    levoFLOXacin (LEVAQUIN) tablet 500 mg  500 mg Oral Daily Keith Frausto MD   500 mg at 10/13/22 4434 predniSONE (DELTASONE) tablet 40 mg  40 mg Oral Daily Rakel Fowler MD   40 mg at 10/13/22 1313    Followed by    Roque Benavides ON 10/15/2022] predniSONE (DELTASONE) tablet 30 mg  30 mg Oral Daily Rakel Fowler MD        Followed by    Roque Benavides ON 10/18/2022] predniSONE (DELTASONE) tablet 20 mg  20 mg Oral Daily Rakel Fowler MD        Followed by    Roque Benavides ON 10/21/2022] predniSONE (DELTASONE) tablet 10 mg  10 mg Oral Daily Rakel Fowler MD        aspirin EC tablet 81 mg  81 mg Oral Daily Shayna Welsh MD   81 mg at 10/13/22 0902    atorvastatin (LIPITOR) tablet 20 mg  20 mg Oral Daily Shayna Welsh MD   20 mg at 10/13/22 0902    glucose chewable tablet 16 g  4 tablet Oral PRN Shayna Welsh MD        dextrose bolus 10% 125 mL  125 mL IntraVENous PRN Shayna Welsh MD        Or    dextrose bolus 10% 250 mL  250 mL IntraVENous PRN Shayna Welsh MD        glucagon (rDNA) injection 1 mg  1 mg SubCUTAneous PRN Shayna Welsh MD        dextrose 10 % infusion   IntraVENous Continuous PRN Shayna Welsh MD        pantoprazole (PROTONIX) tablet 40 mg  40 mg Oral QAM AC Alexander Giang MD   40 mg at 10/14/22 0658    dextromethorphan (DELSYM) 30 MG/5ML extended release liquid 30 mg  30 mg Oral Q12H PRN Tammie Shrestha MD   30 mg at 10/12/22 0839    insulin glargine (LANTUS) injection vial 5 Units  5 Units SubCUTAneous Nightly Tammie Shrestha MD   5 Units at 10/13/22 2024    0.9 % sodium chloride infusion   IntraVENous Continuous Amanda Oneill MD 20 mL/hr at 10/11/22 0730 Rate Verify at 10/11/22 0730    glucose chewable tablet 16 g  4 tablet Oral PRN Sandra Galarza MD        dextrose bolus 10% 125 mL  125 mL IntraVENous PRJOCELYNN Galarza MD        Or    dextrose bolus 10% 250 mL  250 mL IntraVENous PRN Sandra Galarza MD        glucagon (rDNA) injection 1 mg  1 mg SubCUTAneous PRN Sandra Galarza MD        dextrose 10 % infusion   IntraVENous Continuous LORRIEN Sandra Galarza MD        fentaNYL (SUBLIMAZE) injection 25 mcg  25 mcg IntraVENous Q2H PRN Dianne Zendejas MD   25 mcg at 10/10/22 1053    0.9 % sodium chloride infusion   IntraVENous PRN Sky Law MD        sodium chloride flush 0.9 % injection 5-40 mL  5-40 mL IntraVENous 2 times per day Hosea Monroe MD   10 mL at 10/13/22 2022    sodium chloride flush 0.9 % injection 5-40 mL  5-40 mL IntraVENous PRN Hosea Monroe MD   10 mL at 10/09/22 2232    0.9 % sodium chloride infusion   IntraVENous PRN Hosea Monroe MD        enoxaparin (LOVENOX) injection 40 mg  40 mg SubCUTAneous Daily Hosea Monroe MD   40 mg at 10/13/22 0902    ondansetron (ZOFRAN-ODT) disintegrating tablet 4 mg  4 mg Oral Q8H PRN Hosea Monroe MD        Or    ondansetron [facility]) injection 4 mg  4 mg IntraVENous Q6H PRN Hosea Monroe MD        polyethylene glycol [facility]) packet 17 g  17 g Oral Daily PRN Hosea Monroe MD        acetaminophen (TYLENOL) tablet 650 mg  650 mg Oral Q6H PRN Hosea Monroe MD        Or    acetaminophen (TYLENOL) suppository 650 mg  650 mg Rectal Q6H PRN Hosea Monroe MD        ipratropium-albuterol (DUONEB) nebulizer solution 1 ampule  1 ampule Inhalation Q4H WA Tigre Maravilla MD   1 ampule at 10/13/22 2036       ASSESSMENT:     Principal Problem (Resolved):    Acute metabolic encephalopathy  Active Problems:    Unresponsive    Hypertension goal BP (blood pressure) < 140/80    Hyperlipidemia with target LDL less than 70    Controlled type 2 diabetes mellitus without complication, without long-term current use of insulin (HCC)    Combined systolic and diastolic congestive heart failure (HCC)    Acute on chronic respiratory failure with hypoxia (HCC)      PLAN:     Acute on chronic hypoxic respiratory failure secondary to COPD exacerbation due to pneumonitis  Admitted to ICU for intubation ventilation due to pending respiratory failure  -Patient was extubated  -Increased oxygen requirement on 10/13, currently at 4L nasal cannula   -Repeat chest x-ray, respiratory culture, urine culture, blood culture on 10/13  -CXR 10/13 : No consolidation or pleural effusion  -Urine culture : no significant growth   -Respiratory culture : normal respiratory alyssa  -Status post Unasyn for 3 days for aspiration pneumonia ruled out  -Currently on Symbicort and DuoNeb. Solu-Medrol stopped. -Will be followed with prednisone taper of 40-30-20-10 for 3 days each  -Acapella device ordered for sputum clearance  -Pulm on board               -Respiratory culture : normal respiratory alyssa               -Nocturnal bipap               -Continue duoneb          -I/O: +1716              -Added Levofloxacin 5 days , last dose on 10/16/22   -Patient will likely be discharged today after pulmonology clears him, to be follow-up at the PCP, for sleep study so that patient can get approved for CPAP machine for use at nighttime  -Patient most likely be to be discharged with oral steroids and oral antibiotics      2. Hyperglycemia iatrogenic due to steroids Type 2 diabetes non-insulin-dependent  -A1c 7 months ago 6.2  -Currently on oral steroids  -Started Lantus 5 units   -Continued home metformin 500 mg 1 tablet twice a day  -Hypoglycemia protocol  -POC glucose 185       3. Bradycardia, no further episode  -Cardiology consulted               -Echo 10/07: 50%               -Continue monitor junctional rhythm               -Signed off at this time   -May use dopamine if heart rate is less than 40 or patient's unstable  -Received IV Lasix 20 mg yesterday        4.  Hypertension  -Resumed home amlodipine, Coreg and lisinopril     GI prophylaxis tonics 40 Mg once daily  DVT prophylaxis Lovenox 40 Mg subcu once daily              Above plan discussed with the patient and family in room, who agreed to the above plan   Plan will be discussed with the attending, Dr. Leonel Triana DO  Family Medicine Resident  10/14/2022 7:33 AM   Attending Physician Statement  I have discussed the care of Fito Sees, including pertinent history and exam findings,  with the resident. I have seen and examined the patient and the key elements of all parts of the encounter have been performed by me. I agree with the assessment, plan and orders as documented by the resident. ([address] [facility])   Patient seen and examined along with the resident physicians. Here for Acute on chronic Respiratory failure secondary to COPD exacerbation and is cleared for discharge today by the Pulmonologist.  Also has Hypertension and Hyperglycemia that are stable. Would be discharged today and advised to Follow-up with his PCP in the office in 1-2 weeks time. Ingrid Ornelas.

## 2022-10-14 NOTE — PLAN OF CARE
Problem: Respiratory - Adult  Goal: Achieves optimal ventilation and oxygenation  10/14/2022 1429 by Lisa Francis RN  Outcome: Completed  10/14/2022 0804 by Polina Barillas RCP  Outcome: Progressing  10/14/2022 0649 by Ashley Moore  Outcome: Progressing     Problem: Skin/Tissue Integrity - Adult  Goal: Skin integrity remains intact  10/14/2022 1429 by Lisa Francis RN  Outcome: Completed  10/14/2022 0649 by Ashley Moore  Outcome: Progressing  Goal: Incisions, wounds, or drain sites healing without S/S of infection  10/14/2022 1429 by Lisa Francis RN  Outcome: Completed  10/14/2022 0649 by Ashley Moore  Outcome: Progressing  Goal: Oral mucous membranes remain intact  10/14/2022 1429 by Lisa Francis RN  Outcome: Completed  10/14/2022 0649 by Ashley Moore  Outcome: Progressing     Problem: Discharge Planning  Goal: Discharge to home or other facility with appropriate resources  10/14/2022 1429 by Lisa Francis RN  Outcome: Completed  10/14/2022 0649 by Ashley Moore  Outcome: Progressing     Problem: Pain  Goal: Verbalizes/displays adequate comfort level or baseline comfort level  10/14/2022 1429 by Lisa Francis RN  Outcome: Completed  10/14/2022 0649 by Ashley Moore  Outcome: Progressing     Problem: Chronic Conditions and Co-morbidities  Goal: Patient's chronic conditions and co-morbidity symptoms are monitored and maintained or improved  10/14/2022 1429 by Lisa Francis RN  Outcome: Completed  10/14/2022 0649 by Ashley Moore  Outcome: Progressing     Problem: Nutrition Deficit:  Goal: Optimize nutritional status  10/14/2022 1429 by Lisa Francis RN  Outcome: Completed  10/14/2022 0649 by Ashley Moore  Outcome: Progressing     Problem: Skin/Tissue Integrity  Goal: Absence of new skin breakdown  Description: 1. Monitor for areas of redness and/or skin breakdown  2. Assess vascular access sites hourly  3. Every 4-6 hours minimum:  Change oxygen saturation probe site  4. Every 4-6 hours:   If on nasal continuous positive airway pressure, respiratory therapy assess nares and determine need for appliance change or resting period.   10/14/2022 1429 by Pamela Mock RN  Outcome: Completed  10/14/2022 0649 by Costa Adler  Outcome: Progressing     Problem: Safety - Adult  Goal: Free from fall injury  10/14/2022 1429 by Pamela Mock RN  Outcome: Completed  10/14/2022 0649 by Costa Adler  Outcome: Progressing     Problem: ABCDS Injury Assessment  Goal: Absence of physical injury  10/14/2022 1429 by Pamela Mock RN  Outcome: Completed  10/14/2022 0649 by Costa Adler  Outcome: Progressing

## 2022-10-14 NOTE — PROGRESS NOTES
durable medical equipment      Vivi Forward 69year old male is requiring durable medical equipment: shower chair with back due to COPD on continuous oxygen.

## 2022-10-14 NOTE — PROGRESS NOTES
Home Oxygen Evaluation    Home Oxygen Evaluation completed. Patient is on 4 liters per minute via NC. Resting SpO2 = 95%  Resting SpO2 on room air = 90%    SpO2 on room air with exercise = 87%  SpO2 on oxygen as above with exercise = 96%    Nocturnal Oximetry with patient on room air is recommended is SpO2 is between 89% and 95% (requires additional order).     OUSMANE STREETER RCP  11:09 AM

## 2022-10-14 NOTE — PROGRESS NOTES
RN d/c pt and went over all instructions with pt and daughter at bedside. Pt verbalized understanding. Pt left with O2 tank and all belongings. Pt did have COVID vaccine ordered at d/c and pt declined. Also declined his shower chair and stated hes getting it from somewhere else.

## 2022-10-14 NOTE — CARE COORDINATION
Transitional Planning  Patient receives oxygen therapy thru [facility], needed a portable tank for transport. Tank provided and patient to call [facility] once he is home. Notified Vishnu Avina from [facility] [place] regarding the above.
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
## 2022-10-14 NOTE — PLAN OF CARE
Problem: Respiratory - Adult  Goal: Achieves optimal ventilation and oxygenation  10/14/2022 0804 by Kimmy Carrillo RCP  Outcome: Progressing

## 2022-10-14 NOTE — PLAN OF CARE
Problem: Respiratory - Adult  Goal: Achieves optimal ventilation and oxygenation  10/14/2022 0649 by CHRISTINA Brown  Outcome: Progressing  10/13/2022 2238 by Costa Adler  Outcome: Progressing  10/13/2022 1825 by Pamela Mock RN  Outcome: Progressing     Problem: Skin/Tissue Integrity - Adult  Goal: Skin integrity remains intact  10/14/2022 0649 by Costa Adler  Outcome: Progressing  10/13/2022 2238 by Costa Adler  Outcome: Progressing  10/13/2022 1825 by Pamela Mock RN  Outcome: Progressing  Goal: Incisions, wounds, or drain sites healing without S/S of infection  10/14/2022 0649 by CHRISTINA Brown  Outcome: Progressing  10/13/2022 2238 by Costa Adler  Outcome: Progressing  10/13/2022 1825 by Pamela Mock RN  Outcome: Progressing  Goal: Oral mucous membranes remain intact  10/14/2022 0649 by Costa Adler  Outcome: Progressing  10/13/2022 2238 by Costa Adler  Outcome: Progressing  10/13/2022 1825 by Pamela Mock RN  Outcome: Progressing     Problem: Discharge Planning  Goal: Discharge to home or other facility with appropriate resources  10/14/2022 0649 by Costa Adler  Outcome: Progressing  10/13/2022 2238 by Costa Adler  Outcome: Progressing  10/13/2022 1825 by Pamela Mock RN  Outcome: Progressing     Problem: Pain  Goal: Verbalizes/displays adequate comfort level or baseline comfort level  10/14/2022 0649 by CHRISTINA Brown  Outcome: Progressing  10/13/2022 2238 by Costa Adler  Outcome: Progressing  10/13/2022 1825 by Pamela Mock RN  Outcome: Progressing     Problem: Chronic Conditions and Co-morbidities  Goal: Patient's chronic conditions and co-morbidity symptoms are monitored and maintained or improved  10/14/2022 0649 by Costa Adler  Outcome: Progressing  10/13/2022 2238 by Costa Adler  Outcome: Progressing  10/13/2022 1825 by Pamela Mock RN  Outcome: Progressing     Problem: Nutrition Deficit:  Goal: Optimize nutritional status  10/14/2022 0649 by Anabella Moya CHRISTINA  Outcome: Progressing  10/13/2022 2238 by Arin Sandoval  Outcome: Progressing  10/13/2022 1825 by Gladys Alvarado RN  Outcome: Progressing  Flowsheets (Taken 10/13/2022 1257 by Joselo Aceves RD, LD)  Nutrient intake appropriate for improving, restoring, or maintaining nutritional needs:   Assess nutritional status and recommend course of action   Monitor oral intake, labs, and treatment plans   Recommend appropriate diets, oral nutritional supplements, and vitamin/mineral supplements     Problem: Skin/Tissue Integrity  Goal: Absence of new skin breakdown  Description: 1. Monitor for areas of redness and/or skin breakdown  2. Assess vascular access sites hourly  3. Every 4-6 hours minimum:  Change oxygen saturation probe site  4. Every 4-6 hours:  If on nasal continuous positive airway pressure, respiratory therapy assess nares and determine need for appliance change or resting period.   10/14/2022 0649 by Arin Sandoval  Outcome: Progressing  10/13/2022 2238 by Arin Sandoval  Outcome: Progressing  10/13/2022 1825 by Gladys Alvarado RN  Outcome: Progressing     Problem: Safety - Adult  Goal: Free from fall injury  10/14/2022 0649 by Arin Sandoval  Outcome: Progressing  10/13/2022 2238 by Arin Sandoval  Outcome: Progressing  Flowsheets (Taken 10/13/2022 2000)  Free From Fall Injury: Instruct family/caregiver on patient safety  10/13/2022 1825 by Gladys Alvarado RN  Outcome: Progressing     Problem: ABCDS Injury Assessment  Goal: Absence of physical injury  10/14/2022 0649 by Arin Sandoval  Outcome: Progressing  10/13/2022 2238 by Arin Sandoval  Outcome: Progressing  Flowsheets (Taken 10/13/2022 2000)  Absence of Physical Injury: Implement safety measures based on patient assessment  10/13/2022 1825 by Gladys Alvarado RN  Outcome: Progressing

## 2022-10-14 NOTE — PROGRESS NOTES
PULMONARY & CRITICAL CARE MEDICINE PROGRESS  NOTE     Patient:  Jovani Whitney  MRN: [id number]  6 [facility] date: 10/7/2022  Primary Care Physician: Kosta Neri MD  Consulting Physician: Nicole Escobedo DO  CODE Status: Full Code  LOS: 7    SUBJECTIVE     I personally interviewed/examined the patient, reviewed interval history and interpreted all available radiographic, laboratory data at the time of service. Chief Compliant/Reason for Initial Consult:     Acute on chronic hypoxic hypercapnic respiratory failure    Brief Hospital Course: The patient is a 69 y.o. male history of hypertension, hyperlipidemia, COPD, chronic hypoxic respiratory failure on long-term oxygen therapy between 3 to 4 L at home and history of CHF. Brought into emergency room apparently with sudden onset of unresponsiveness. In the emergency room patient was apparently hypoxic in respiratory distress blood gas shows hypercapnia. Patient was intubated for respiratory failure and was admitted to MICU. CT head showed encephalomalacia in the right occipital lobe without acute intracranial abnormality. He had a CTA chest done which was negative for pulm embolism showed secretion and distal trachea and mainstem bronchi and possible bronchiolitis. Patient was hypotensive also. He was treated with bronchodilators steroids and apparently antibiotic also had required Levophed for hypotension and also had bradycardia. EKG was concerning for junctional rhythm and cardiology was following the patient did not require intervention. Patient was ultimately extubated on 10/10/2021 was on nasal cannula 4 L post extubation and ultimately was transferred to stepdown unit on 10/11/2022    Interval History:  10/14/22    Overnight events noted chart reviewed. Patient is ambulatory afebrile hemodynamically patient is stable.   Patient required Yvette high flow nasal cannula for few hours yesterday in the morning transition back to 4 L nasal cannula and had been maintaining saturation on 4 L saturating 96% at rest.  He did use BiPAP p at night although less than previous nights. 1 patient is feeling better. He has sputum production still pale to yellowish colored sputum does not have any fever chills does not have hemoptysis. He does not complain of wheezing. Shortness of breath on activities from bed to chair almost baseline. According to patient he has history of smoking for 40 years. Quit smoking 1 year ago he is on home oxygen at 3 L and recently increased to 4 L. Review of Systems:  Review of Systems   Constitutional:  Positive for activity change. Negative for fever and unexpected weight change. HENT:  Negative for postnasal drip, sore throat, trouble swallowing and voice change. Eyes:  Negative for photophobia, redness and visual disturbance. Respiratory:  Positive for cough and shortness of breath. Negative for wheezing. Cardiovascular:  Negative for chest pain, palpitations and leg swelling. Gastrointestinal:  Negative for abdominal pain, diarrhea and vomiting. Endocrine: Negative for polydipsia, polyphagia and polyuria. Genitourinary:  Negative for dysuria, frequency and hematuria. Musculoskeletal:  Negative for arthralgias and gait problem. Skin: Negative. Allergic/Immunologic: Negative. Neurological:  Negative for dizziness, seizures, speech difficulty and headaches. Hematological:  Negative for adenopathy. Does not bruise/bleed easily. Psychiatric/Behavioral: Negative.        OBJECTIVE     VITAL SIGNS:   LAST-  BP (!) 121/56   Pulse 62   Temp 97.9 °F (36.6 °C) (Temporal)   Resp 28   Ht 5' 5\" (1.651 m)   Wt 150 lb 5.7 oz (68.2 kg)   SpO2 98%   BMI 25.02 kg/m²   8-24 HR RANGE-  TEMP Temp  Av.2 °F (36.8 °C)  Min: 97.6 °F (36.4 °C)  Max: 98.6 °F (37 °C)   BP Systolic (20GAO), MQM:364 , Min:112 , LMB:231      Diastolic (67HKU), Av, Min:56, Max:77     PULSE Pulse  Av.6  Min: 56  Max: 72   RR Resp  Av.7  Min: 18  Max: 28   O2 SAT SpO2  Av %  Min: 96 %  Max: 98 %   OXYGEN DELIVERY O2 Flow Rate (L/min)  Av L/min  Min: 4 L/min  Max: 4 L/min     Systemic Examination:   Physical Exam  General appearance - looks comfortable and in no acute distress mildly tachypneic and chronically ill looking  Mental status - alert, oriented to person, place, and time  Eyes - pupils equal and reactive, extraocular eye movements intact  Mouth - mucous membranes moist, pharynx normal without lesions  Neck - supple, no significant adenopathy  Chest - Chest was symmetrical without dullness to percussion. There is increased resonance on percussion. He has bilateral breath sounds present but very reduced and diminished. No crackles present and no wheezing present currently.  There is no intercostal recession or use of accessory muscles  Heart - normal rate, regular rhythm, normal S1, S2, no murmurs, rubs, clicks or gallops  Abdomen - soft, nontender, nondistended, no masses or organomegaly  Neurological - alert, oriented, normal speech, no focal findings or movement disorder noted  Extremities - peripheral pulses normal, no pedal edema, no clubbing or cyanosis  Skin - normal coloration and turgor, no rashes, no suspicious skin lesions noted     DATA REVIEW     Medications:  Scheduled Meds:   COVID-19 mRNA Vacc (Moderna)  0.5 mL IntraMUSCular Prior to discharge    insulin lispro  0-8 Units SubCUTAneous TID     insulin lispro  0-4 Units SubCUTAneous Nightly    amLODIPine  10 mg Oral Daily    carvedilol  12.5 mg Oral BID     lisinopril  20 mg Oral Daily    metFORMIN  500 mg Oral BID     budesonide-formoterol  2 puff Inhalation BID    levoFLOXacin  500 mg Oral Daily    [START ON 10/15/2022] predniSONE  30 mg Oral Daily    Followed by    Jame Pacheco ON 10/18/2022] predniSONE  20 mg Oral Daily    Followed by    Jame Pacheco ON 10/21/2022] predniSONE  10 mg Oral Daily    aspirin  81 mg Oral Daily    atorvastatin  20 mg Oral Daily    pantoprazole  40 mg Oral QAM AC    insulin glargine  5 Units SubCUTAneous Nightly    sodium chloride flush  5-40 mL IntraVENous 2 times per day    enoxaparin  40 mg SubCUTAneous Daily    ipratropium-albuterol  1 ampule Inhalation Q4H WA     Continuous Infusions:   sodium chloride 20 mL/hr at 10/11/22 0730    dextrose      sodium chloride      sodium chloride       LABS:-  ABG:   No results for input(s): POCPH, POCPCO2, POCPO2, POCHCO3, [id number] in the last 72 hours. CBC:   Recent Labs     10/12/22  0350 10/13/22  0457   WBC 9.9 11.2   HGB 7.8* 7.6*   HCT 23.8* 23.6*   MCV 88.8 89.4    168   RBC 2.68* 2.64*   MCH 29.1 28.8   MCHC 32.8 32.2   RDW 13.8 14.0       BMP:   Recent Labs     10/12/22  0350 10/13/22  0457    138   K 4.2 3.9   CL 97* 98   CO2 31 33*   BUN 25* 32*   CREATININE 0.88 1.02   GLUCOSE 203* 141*       Liver Function Test:   No results for input(s): PROT, LABALBU, ALT, AST, GGT, ALKPHOS, BILITOT in the last 72 hours. Amylase/Lipase:  No results for input(s): AMYLASE, LIPASE in the last 72 hours. Coagulation Profile:   No results for input(s): INR, PROTIME, APTT in the last 72 hours. Cardiac Enzymes:  No results for input(s): CKTOTAL, CKMB, CKMBINDEX, TROPONINI in the last 72 hours.   Lactic Acid:  No results found for: LACTA  BNP:   Lab Results   Component Value Date    BNP 4 08/11/2013     D-Dimer:  Lab Results   Component Value Date    DDIMER 1.57 06/14/2022     Others:   Lab Results   Component Value Date    TSH 1.12 10/07/2022     Lab Results   Component Value Date    SEDRATE 75 (H) 06/15/2022    .8 (H) 06/15/2022     No results found for: Kenny Radon  Lab Results   Component Value Date    IRON 79 12/23/2016    TIBC 233 (L) 12/23/2016    FERRITIN 532 (H) 01/20/2022     No results found for: SPEP, UPEP  Lab Results   Component Value Date/Time    PSA 0.77 06/21/2012 08:27 AM Input/Output:    Intake/Output Summary (Last 24 hours) at 10/14/2022 0916  Last data filed at 10/14/2022 0659  Gross per 24 hour   Intake 240 ml   Output 600 ml   Net -360 ml         Microbiology:  Recent Labs     10/12/22  1007   [facility] . SPUTUM   CULTURE NORMAL RESPIRATORY TAMIKO MODERATE GROWTH         Pathology:    Radiology reports:  XR CHEST PORTABLE   Final Result   No consolidation or pleural effusion. Mildly elevated left hemidiaphragm with left lower lobe discoid atelectasis. Interval removal endotracheal and NG tubes. CT HEAD WO CONTRAST   Final Result   Mild cortical cerebral atrophy. Stable encephalomalacia in the right occipital lobe. No acute intracranial abnormalities are noted. CT CHEST PULMONARY EMBOLISM W CONTRAST   Final Result   1. No evidence of pulmonary embolism. 2.  Debris present within the distal trachea and mainstem bronchi with   findings of bronchiolitis bilaterally. This may be due to an endobronchial   infection or aspiration pneumonitis. XR CHEST PORTABLE   Final Result   Endotracheal tube terminates in appropriate position above the [place]. The   enteric tube terminates at the body of the stomach. Echocardiogram:   No results found for this or any previous visit. Cardiac Catheterization:   No results found for this or any previous visit. ASSESSMENT AND PLAN     Assessment:    //Acute exacerbation of COPD.  //Acute bronchitis/L RTI  //Acute on chronic hypoxic hypercapnic respiratory failure  //Severe COPD on long-term oxygen therapy  //Bradycardia/junctional rhythm.  //Smoking history of 40-pack-year or more.  //Diabetes mellitus  //Hypertension  //Hyperlipidemia    Plan:    Patient is currently on 4 L nasal cannula. Discussed with nursing staff to wean O2 to keep saturation 88 to 90%.    Will continue nocturnal BiPAP and as needed during the daytime  Continue DuoNeb aerosol and continue Symbicort to 160/4.52 puff twice daily. Sputum culture negative. Continue pulmonary toilet, aspiration precautions and bronchodilators  Continue to monitor I/O with a goal of even fluid balance  Antimicrobials reviewed; had Unasyn. Continue levofloxacin for 5 days  Finished 5 days of IV Solu-Medrol on prednisone taper dose  DVT prophylaxis on Lovenox 40 mg daily  Physical/occupational/speech therapy; increase activity as tolerated    According to patient he is back to baseline he used 3 to 4 L of oxygen at home. Discharge planning noted by primary service later today. Discussed with nursing staff, treatment plan discussed    I updated the patient regarding the current clinical condition, provisional diagnosis and management plan. I addressed concerns and answered all questions to the best of my abilities. It was my pleasure to evaluate Latasha Seen today. We will continue to follow. I would like to thank you for allowing me to participate in the care of this patient. Please feel free to call with any further questions or concerns. Suzy Fernandez MD, M.D. Pulmonary and Critical Care Medicine           10/14/2022, 9:16 AM    Please note that this chart was generated using voice recognition Dragon dictation software. Although every effort was made to ensure the accuracy of this automated transcription, some errors in transcription may have occurred.

## 2022-10-15 LAB
CULTURE: ABNORMAL
CULTURE: ABNORMAL
DIRECT EXAM: ABNORMAL
SPECIMEN DESCRIPTION: ABNORMAL

## 2022-10-17 NOTE — DISCHARGE SUMMARY
Department of [address] [place] [address]    Discharge Summary      NAME:  Abilio Ashley  :  1955  MRN:  [id number]    Admit date:  10/7/2022  Discharge date:  10/14/2022    Admitting Physician:  Eddie Mc MD    Primary Diagnosis on Admission:   Present on Admission:   (Resolved) Acute metabolic encephalopathy   Acute on chronic respiratory failure with hypoxia (HCC)   Combined systolic and diastolic congestive heart failure (HCC)   Hypertension goal BP (blood pressure) < 140/80   Hyperlipidemia with target LDL less than 70   Controlled type 2 diabetes mellitus without complication, without long-term current use of insulin ([facility]   Unresponsive      Secondary Diagnoses:  does not have any pertinent problems on file. Admission Condition:  critical     Discharged Condition: fair    Hospital Course: The patient was admitted for the management of acute on chronic hypoxic respiratory failure secondary to COPD exacerbation due to pneumonitis. Patient is a 57-year-old male with past medical history of hypertension, type 2 diabetes, COPD. FMI for the service received a perfect serve from a [facility] care nurse who found that the patient had increased oxygen requirement and was saturating at 88% at home on 3 L nasal cannula. Patient was instructed to increase 4 L and was instructed to come to the ED. Subsequently patient was found unresponsive by son and was brought to the ED via EMS. CT showed debris in trachea and mainstem bronchus likely due to aspiration pneumonitis. VBG showed severe respiratory acidosis. Patient was unresponsive in the ED and was intubated due to shallow breathing and impending respiratory failure with subsequent admission to the MICU. Was on Unasyn during critical care. Was briefly on Levophed for bradycardia and hypotension.   On 10/11/2022 patient had spontaneous breathing trial and was extubated and was on 4 L of nasal cannula. His care was transferred to [facility] IP team and to stepdown unit. He was admitted for acute on chronic hypoxic respiratory failure due to COPD exacerbation secondary to pneumonia. 10/12 patient was seen and had been using BiPAP overnight with 8 L of nasal cannula. Patient still has yellow sputum production with cough. During admission patient was also getting Symbicort and DuoNeb. Patient was discharged with oral levofloxacin antibiotics for 5 days. He finished 5 days of IV Solu-Medrol and was on prednisone taper dose. On day of discharge pt feels better with no further complaints. Vitals and Labs are at pts baseline. All consultants involved during this admission are agreeable to d/c. Consults:  pulmonary/intensive care    Significant Diagnostic/theraputic interventions: IV antibiotics, IV steroids      Disposition:   home    Instructions to Patient: Please follow-up with PCP in 1 to 2 weeks for sleep study so can get approved for CPAP machine for use at nighttime. Please take oral antibiotics and oral steroids taper as instructed. Follow up with Talha Juan MD in  2 weeks    Discharge Medications:       Medication List        START taking these medications      budesonide-formoterol 160-4.5 MCG/ACT Aero  Commonly known as: SYMBICORT  Inhale 2 puffs into the lungs in the morning and 2 puffs in the evening.      dextromethorphan 30 MG/5ML extended release liquid  Commonly known as: DELSYM  Take 5 mLs by mouth 2 times daily as needed for Cough     levoFLOXacin 500 MG tablet  Commonly known as: LEVAQUIN  Take 1 tablet by mouth daily for 2 doses     * predniSONE 10 MG tablet  Commonly known as: DELTASONE  Take 3 tablets by mouth daily for 3 doses     * predniSONE 20 MG tablet  Commonly known as: DELTASONE  Take 1 tablet by mouth daily for 3 doses  Start taking on: October 18, 2022     * predniSONE 10 MG tablet  Commonly known as: DELTASONE  Take 1 tablet by mouth daily for 3 doses  Start taking on: October 21, 2022           * This list has 3 medication(s) that are the same as other medications prescribed for you. Read the directions carefully, and ask your doctor or other care provider to review them with you. CHANGE how you take these medications      albuterol (5 MG/ML) 0.5% nebulizer solution  Commonly known as: PROVENTIL  Take 0.5 mLs by nebulization 4 times daily as needed for Wheezing  What changed: Another medication with the same name was removed. Continue taking this medication, and follow the directions you see here. carvedilol 12.5 MG tablet  Commonly known as: COREG  Take 1 tablet by mouth 2 times daily (with meals)  What changed:   medication strength  See the new instructions. CONTINUE taking these medications      amLODIPine 10 MG tablet  Commonly known as: NORVASC  Take 1 tablet by mouth daily     aspirin 81 MG EC tablet  Commonly known as: HM Aspirin EC Low Dose  TAKE 1 TABLET BY MOUTH DAILY     atorvastatin 20 MG tablet  Commonly known as: LIPITOR  Take 1 tablet by mouth daily     fluticasone-salmeterol 100-50 MCG/ACT Aepb diskus inhaler  Commonly known as: ADVIAR  Inhale 1 puff into the lungs every 12 hours     furosemide 20 MG tablet  Commonly known as: LASIX  Take 1 tablet by mouth daily     ipratropium-albuterol 0.5-2.5 (3) MG/3ML Soln nebulizer solution  Commonly known as: DUONEB  Inhale 3 mLs into the lungs every 4 hours (while awake)     lisinopril 20 MG tablet  Commonly known as: PRINIVIL;ZESTRIL  Take 1 tablet by mouth daily     metFORMIN 500 MG tablet  Commonly known as: GLUCOPHAGE  Take 1 tablet by mouth in the morning and 1 tablet in the evening. Take with meals.      Spiriva HandiHaler 18 MCG inhalation capsule  Generic drug: tiotropium  Inhale 1 capsule into the lungs daily     vitamin D 50 MCG (2000 UT) Tabs tablet  Commonly known as: CHOLECALCIFEROL  Take 1 tablet by mouth daily               Where to Get Your Medications        These medications were sent to [address] [place], [place] [address] [facility] [place] [address]      Phone: [phone]   budesonide-formoterol 160-4.5 MCG/ACT Aero  carvedilol 12.5 MG tablet  dextromethorphan 30 MG/5ML extended release liquid  levoFLOXacin 500 MG tablet  predniSONE 10 MG tablet  predniSONE 10 MG tablet  predniSONE 20 MG tablet         Send Copies to: Lila Medrano MD,       Note that over 30 minutes was spent in preparing discharge papers, discussing discharge with patient and family, medication review, etc.      Meri Blake DO  Family Medicine Resident  Family Medicine Inpatient Service  10/17/2022 3:48 PM          Please note that this chart was generated using voice recognition Dragon dictation software.   Although every effort was made to ensure the accuracy of this automated transcription, some errors in transcription may have occurred

## 2023-01-01 ENCOUNTER — OFFICE VISIT (OUTPATIENT)
Dept: OPERATING ROOM | Age: 68
End: 2023-01-01
Attending: INTERNAL MEDICINE

## 2023-01-01 RX ORDER — PROPOFOL 10 MG/ML
INJECTION, EMULSION INTRAVENOUS
Status: DISPENSED
Start: 2023-01-01 | End: 2023-01-01

## 2023-01-08 ENCOUNTER — HOSPITAL ENCOUNTER (INPATIENT)
Age: 68
LOS: 5 days | Discharge: HOME | End: 2023-01-13
Attending: EMERGENCY MEDICINE
Payer: MEDICARE

## 2023-01-08 ENCOUNTER — APPOINTMENT (OUTPATIENT)
Dept: GENERAL RADIOLOGY | Age: 68
End: 2023-01-08
Payer: MEDICARE

## 2023-01-08 DIAGNOSIS — J44.1 COPD EXACERBATION (HCC): Primary | ICD-10-CM

## 2023-01-08 LAB
ABSOLUTE EOS #: 0 K/UL (ref 0–0.4)
ABSOLUTE IMMATURE GRANULOCYTE: 0 K/UL (ref 0–0.3)
ABSOLUTE LYMPH #: 0.91 K/UL (ref 1–4.8)
ABSOLUTE MONO #: 0.13 K/UL (ref 0.1–0.8)
ALLEN TEST: POSITIVE
ANION GAP SERPL CALCULATED.3IONS-SCNC: 16 MMOL/L (ref 9–17)
BASOPHILS # BLD: 0 % (ref 0–2)
BASOPHILS ABSOLUTE: 0 K/UL (ref 0–0.2)
BUN BLDV-MCNC: 13 MG/DL (ref 8–23)
CALCIUM SERPL-MCNC: 8.5 MG/DL (ref 8.6–10.4)
CHLORIDE BLD-SCNC: 95 MMOL/L (ref 98–107)
CO2: 26 MMOL/L (ref 20–31)
CREAT SERPL-MCNC: 1.23 MG/DL (ref 0.7–1.2)
EOSINOPHILS RELATIVE PERCENT: 0 % (ref 1–4)
GFR SERPL CREATININE-BSD FRML MDRD: >60 ML/MIN/1.73M2
GLUCOSE BLD-MCNC: 104 MG/DL (ref 74–100)
GLUCOSE BLD-MCNC: 123 MG/DL (ref 75–110)
GLUCOSE BLD-MCNC: 151 MG/DL (ref 75–110)
GLUCOSE BLD-MCNC: 184 MG/DL (ref 70–99)
HCT VFR BLD CALC: 27.8 % (ref 40.7–50.3)
HEMOGLOBIN: 8.3 G/DL (ref 13–17)
IMMATURE GRANULOCYTES: 0 %
LYMPHOCYTES # BLD: 7 % (ref 24–44)
MAGNESIUM: 1.3 MG/DL (ref 1.6–2.6)
MCH RBC QN AUTO: 29.2 PG (ref 25.2–33.5)
MCHC RBC AUTO-ENTMCNC: 29.9 G/DL (ref 28.4–34.8)
MCV RBC AUTO: 97.9 FL (ref 82.6–102.9)
MONOCYTES # BLD: 1 % (ref 1–7)
MORPHOLOGY: ABNORMAL
MORPHOLOGY: ABNORMAL
NRBC AUTOMATED: 0 PER 100 WBC
O2 DEVICE/FLOW/%: ABNORMAL
PDW BLD-RTO: 14.7 % (ref 11.8–14.4)
PLATELET # BLD: 235 K/UL (ref 138–453)
PMV BLD AUTO: 10.7 FL (ref 8.1–13.5)
POC HCO3: 32.6 MMOL/L (ref 21–28)
POC O2 SATURATION: 99 % (ref 94–98)
POC PCO2: 39.5 MM HG (ref 35–48)
POC PH: 7.53 (ref 7.35–7.45)
POC PO2: 134.3 MM HG (ref 83–108)
POSITIVE BASE EXCESS, ART: 9 (ref 0–3)
POTASSIUM SERPL-SCNC: 4.9 MMOL/L (ref 3.7–5.3)
POTASSIUM SERPL-SCNC: 6.2 MMOL/L (ref 3.7–5.3)
PRO-BNP: 4696 PG/ML
PROCALCITONIN: >100 NG/ML
RBC # BLD: 2.84 M/UL (ref 4.21–5.77)
SAMPLE SITE: ABNORMAL
SEG NEUTROPHILS: 92 % (ref 36–66)
SEGMENTED NEUTROPHILS ABSOLUTE COUNT: 11.96 K/UL (ref 1.8–7.7)
SODIUM BLD-SCNC: 137 MMOL/L (ref 135–144)
TROPONIN, HIGH SENSITIVITY: 115 NG/L (ref 0–22)
TROPONIN, HIGH SENSITIVITY: 129 NG/L (ref 0–22)
TROPONIN, HIGH SENSITIVITY: 93 NG/L (ref 0–22)
WBC # BLD: 13 K/UL (ref 3.5–11.3)

## 2023-01-08 PROCEDURE — 83880 ASSAY OF NATRIURETIC PEPTIDE: CPT

## 2023-01-08 PROCEDURE — 84484 ASSAY OF TROPONIN QUANT: CPT

## 2023-01-08 PROCEDURE — 36415 COLL VENOUS BLD VENIPUNCTURE: CPT

## 2023-01-08 PROCEDURE — 36600 WITHDRAWAL OF ARTERIAL BLOOD: CPT

## 2023-01-08 PROCEDURE — 83735 ASSAY OF MAGNESIUM: CPT

## 2023-01-08 PROCEDURE — 85025 COMPLETE CBC W/AUTO DIFF WBC: CPT

## 2023-01-08 PROCEDURE — 87070 CULTURE OTHR SPECIMN AEROBIC: CPT

## 2023-01-08 PROCEDURE — 94640 AIRWAY INHALATION TREATMENT: CPT

## 2023-01-08 PROCEDURE — 82947 ASSAY GLUCOSE BLOOD QUANT: CPT

## 2023-01-08 PROCEDURE — 2580000003 HC RX 258: Performed by: STUDENT IN AN ORGANIZED HEALTH CARE EDUCATION/TRAINING PROGRAM

## 2023-01-08 PROCEDURE — 2700000000 HC OXYGEN THERAPY PER DAY

## 2023-01-08 PROCEDURE — 6360000002 HC RX W HCPCS

## 2023-01-08 PROCEDURE — 6360000002 HC RX W HCPCS: Performed by: INTERNAL MEDICINE

## 2023-01-08 PROCEDURE — 84145 PROCALCITONIN (PCT): CPT

## 2023-01-08 PROCEDURE — 86738 MYCOPLASMA ANTIBODY: CPT

## 2023-01-08 PROCEDURE — 99291 CRITICAL CARE FIRST HOUR: CPT | Performed by: INTERNAL MEDICINE

## 2023-01-08 PROCEDURE — 94660 CPAP INITIATION&MGMT: CPT

## 2023-01-08 PROCEDURE — 82803 BLOOD GASES ANY COMBINATION: CPT

## 2023-01-08 PROCEDURE — 93005 ELECTROCARDIOGRAM TRACING: CPT | Performed by: INTERNAL MEDICINE

## 2023-01-08 PROCEDURE — 71045 X-RAY EXAM CHEST 1 VIEW: CPT

## 2023-01-08 PROCEDURE — 2580000003 HC RX 258

## 2023-01-08 PROCEDURE — 99285 EMERGENCY DEPT VISIT HI MDM: CPT

## 2023-01-08 PROCEDURE — 87040 BLOOD CULTURE FOR BACTERIA: CPT

## 2023-01-08 PROCEDURE — 2500000003 HC RX 250 WO HCPCS

## 2023-01-08 PROCEDURE — 2580000003 HC RX 258: Performed by: INTERNAL MEDICINE

## 2023-01-08 PROCEDURE — 84132 ASSAY OF SERUM POTASSIUM: CPT

## 2023-01-08 PROCEDURE — 80048 BASIC METABOLIC PNL TOTAL CA: CPT

## 2023-01-08 PROCEDURE — 94761 N-INVAS EAR/PLS OXIMETRY MLT: CPT

## 2023-01-08 PROCEDURE — 51798 US URINE CAPACITY MEASURE: CPT

## 2023-01-08 PROCEDURE — 87641 MR-STAPH DNA AMP PROBE: CPT

## 2023-01-08 PROCEDURE — 2000000000 HC ICU R&B

## 2023-01-08 PROCEDURE — 6360000002 HC RX W HCPCS: Performed by: STUDENT IN AN ORGANIZED HEALTH CARE EDUCATION/TRAINING PROGRAM

## 2023-01-08 RX ORDER — INSULIN LISPRO 100 [IU]/ML
0-4 INJECTION, SOLUTION INTRAVENOUS; SUBCUTANEOUS NIGHTLY
Status: CANCELLED | OUTPATIENT
Start: 2023-01-08

## 2023-01-08 RX ORDER — ACETAMINOPHEN 325 MG/1
650 TABLET ORAL EVERY 6 HOURS PRN
Status: DISCONTINUED | OUTPATIENT
Start: 2023-01-08 | End: 2023-01-13 | Stop reason: HOSPADM

## 2023-01-08 RX ORDER — INSULIN LISPRO 100 [IU]/ML
0-4 INJECTION, SOLUTION INTRAVENOUS; SUBCUTANEOUS NIGHTLY
Status: DISCONTINUED | OUTPATIENT
Start: 2023-01-08 | End: 2023-01-08

## 2023-01-08 RX ORDER — SODIUM CHLORIDE 9 MG/ML
INJECTION, SOLUTION INTRAVENOUS PRN
Status: DISCONTINUED | OUTPATIENT
Start: 2023-01-08 | End: 2023-01-13 | Stop reason: HOSPADM

## 2023-01-08 RX ORDER — BUDESONIDE AND FORMOTEROL FUMARATE DIHYDRATE 80; 4.5 UG/1; UG/1
2 AEROSOL RESPIRATORY (INHALATION) 2 TIMES DAILY
Status: DISCONTINUED | OUTPATIENT
Start: 2023-01-08 | End: 2023-01-13 | Stop reason: HOSPADM

## 2023-01-08 RX ORDER — ATORVASTATIN CALCIUM 20 MG/1
20 TABLET, FILM COATED ORAL DAILY
Status: DISCONTINUED | OUTPATIENT
Start: 2023-01-08 | End: 2023-01-13 | Stop reason: HOSPADM

## 2023-01-08 RX ORDER — SODIUM CHLORIDE 0.9 % (FLUSH) 0.9 %
5-40 SYRINGE (ML) INJECTION EVERY 12 HOURS SCHEDULED
Status: DISCONTINUED | OUTPATIENT
Start: 2023-01-08 | End: 2023-01-13 | Stop reason: HOSPADM

## 2023-01-08 RX ORDER — ONDANSETRON 2 MG/ML
4 INJECTION INTRAMUSCULAR; INTRAVENOUS EVERY 6 HOURS PRN
Status: DISCONTINUED | OUTPATIENT
Start: 2023-01-08 | End: 2023-01-08

## 2023-01-08 RX ORDER — MAGNESIUM SULFATE IN WATER 40 MG/ML
2000 INJECTION, SOLUTION INTRAVENOUS ONCE
Status: COMPLETED | OUTPATIENT
Start: 2023-01-08 | End: 2023-01-08

## 2023-01-08 RX ORDER — ALBUTEROL SULFATE 2.5 MG/3ML
15 SOLUTION RESPIRATORY (INHALATION)
Status: DISCONTINUED | OUTPATIENT
Start: 2023-01-08 | End: 2023-01-08 | Stop reason: ALTCHOICE

## 2023-01-08 RX ORDER — ACETAMINOPHEN 650 MG/1
650 SUPPOSITORY RECTAL EVERY 6 HOURS PRN
Status: DISCONTINUED | OUTPATIENT
Start: 2023-01-08 | End: 2023-01-08

## 2023-01-08 RX ORDER — ONDANSETRON 2 MG/ML
4 INJECTION INTRAMUSCULAR; INTRAVENOUS EVERY 6 HOURS PRN
Status: DISCONTINUED | OUTPATIENT
Start: 2023-01-08 | End: 2023-01-13 | Stop reason: HOSPADM

## 2023-01-08 RX ORDER — CARVEDILOL 12.5 MG/1
12.5 TABLET ORAL 2 TIMES DAILY WITH MEALS
Status: DISCONTINUED | OUTPATIENT
Start: 2023-01-08 | End: 2023-01-10

## 2023-01-08 RX ORDER — FUROSEMIDE 20 MG/1
20 TABLET ORAL DAILY
Status: DISCONTINUED | OUTPATIENT
Start: 2023-01-08 | End: 2023-01-08

## 2023-01-08 RX ORDER — POLYETHYLENE GLYCOL 3350 17 G/17G
17 POWDER, FOR SOLUTION ORAL DAILY PRN
Status: DISCONTINUED | OUTPATIENT
Start: 2023-01-08 | End: 2023-01-08

## 2023-01-08 RX ORDER — INSULIN LISPRO 100 [IU]/ML
0-8 INJECTION, SOLUTION INTRAVENOUS; SUBCUTANEOUS EVERY 4 HOURS
Status: DISCONTINUED | OUTPATIENT
Start: 2023-01-08 | End: 2023-01-08

## 2023-01-08 RX ORDER — ONDANSETRON 4 MG/1
4 TABLET, ORALLY DISINTEGRATING ORAL EVERY 8 HOURS PRN
Status: DISCONTINUED | OUTPATIENT
Start: 2023-01-08 | End: 2023-01-08

## 2023-01-08 RX ORDER — METHYLPREDNISOLONE SODIUM SUCCINATE 40 MG/ML
40 INJECTION, POWDER, LYOPHILIZED, FOR SOLUTION INTRAMUSCULAR; INTRAVENOUS EVERY 12 HOURS
Status: DISCONTINUED | OUTPATIENT
Start: 2023-01-08 | End: 2023-01-08

## 2023-01-08 RX ORDER — AMLODIPINE BESYLATE 10 MG/1
10 TABLET ORAL DAILY
Status: DISCONTINUED | OUTPATIENT
Start: 2023-01-08 | End: 2023-01-10

## 2023-01-08 RX ORDER — SODIUM CHLORIDE 9 MG/ML
INJECTION, SOLUTION INTRAVENOUS PRN
Status: DISCONTINUED | OUTPATIENT
Start: 2023-01-08 | End: 2023-01-08

## 2023-01-08 RX ORDER — METHYLPREDNISOLONE SODIUM SUCCINATE 125 MG/2ML
125 INJECTION, POWDER, LYOPHILIZED, FOR SOLUTION INTRAMUSCULAR; INTRAVENOUS ONCE
Status: DISCONTINUED | OUTPATIENT
Start: 2023-01-08 | End: 2023-01-08

## 2023-01-08 RX ORDER — METHYLPREDNISOLONE SODIUM SUCCINATE 125 MG/2ML
60 INJECTION, POWDER, LYOPHILIZED, FOR SOLUTION INTRAMUSCULAR; INTRAVENOUS EVERY 6 HOURS
Status: DISCONTINUED | OUTPATIENT
Start: 2023-01-09 | End: 2023-01-08

## 2023-01-08 RX ORDER — FUROSEMIDE 10 MG/ML
20 INJECTION INTRAMUSCULAR; INTRAVENOUS ONCE
Status: COMPLETED | OUTPATIENT
Start: 2023-01-08 | End: 2023-01-08

## 2023-01-08 RX ORDER — ONDANSETRON 4 MG/1
4 TABLET, ORALLY DISINTEGRATING ORAL EVERY 8 HOURS PRN
Status: DISCONTINUED | OUTPATIENT
Start: 2023-01-08 | End: 2023-01-13 | Stop reason: HOSPADM

## 2023-01-08 RX ORDER — ACETAMINOPHEN 650 MG/1
650 SUPPOSITORY RECTAL EVERY 6 HOURS PRN
Status: DISCONTINUED | OUTPATIENT
Start: 2023-01-08 | End: 2023-01-13 | Stop reason: HOSPADM

## 2023-01-08 RX ORDER — METHYLPREDNISOLONE SODIUM SUCCINATE 40 MG/ML
40 INJECTION, POWDER, LYOPHILIZED, FOR SOLUTION INTRAMUSCULAR; INTRAVENOUS DAILY
Status: DISCONTINUED | OUTPATIENT
Start: 2023-01-08 | End: 2023-01-08

## 2023-01-08 RX ORDER — INSULIN LISPRO 100 [IU]/ML
0-8 INJECTION, SOLUTION INTRAVENOUS; SUBCUTANEOUS EVERY 6 HOURS
Status: DISCONTINUED | OUTPATIENT
Start: 2023-01-08 | End: 2023-01-10

## 2023-01-08 RX ORDER — INSULIN LISPRO 100 [IU]/ML
0-8 INJECTION, SOLUTION INTRAVENOUS; SUBCUTANEOUS
Status: CANCELLED | OUTPATIENT
Start: 2023-01-08

## 2023-01-08 RX ORDER — HEPARIN SODIUM 5000 [USP'U]/ML
5000 INJECTION, SOLUTION INTRAVENOUS; SUBCUTANEOUS EVERY 8 HOURS SCHEDULED
Status: DISCONTINUED | OUTPATIENT
Start: 2023-01-08 | End: 2023-01-13 | Stop reason: HOSPADM

## 2023-01-08 RX ORDER — SODIUM CHLORIDE 0.9 % (FLUSH) 0.9 %
5-40 SYRINGE (ML) INJECTION PRN
Status: DISCONTINUED | OUTPATIENT
Start: 2023-01-08 | End: 2023-01-13 | Stop reason: HOSPADM

## 2023-01-08 RX ORDER — SODIUM CHLORIDE 0.9 % (FLUSH) 0.9 %
5-40 SYRINGE (ML) INJECTION PRN
Status: DISCONTINUED | OUTPATIENT
Start: 2023-01-08 | End: 2023-01-08

## 2023-01-08 RX ORDER — METHYLPREDNISOLONE SODIUM SUCCINATE 40 MG/ML
40 INJECTION, POWDER, LYOPHILIZED, FOR SOLUTION INTRAMUSCULAR; INTRAVENOUS EVERY 8 HOURS
Status: DISCONTINUED | OUTPATIENT
Start: 2023-01-09 | End: 2023-01-08

## 2023-01-08 RX ORDER — SODIUM CHLORIDE 9 MG/ML
INJECTION, SOLUTION INTRAVENOUS CONTINUOUS
Status: DISCONTINUED | OUTPATIENT
Start: 2023-01-08 | End: 2023-01-10

## 2023-01-08 RX ORDER — POLYETHYLENE GLYCOL 3350 17 G/17G
17 POWDER, FOR SOLUTION ORAL DAILY PRN
Status: DISCONTINUED | OUTPATIENT
Start: 2023-01-08 | End: 2023-01-13 | Stop reason: HOSPADM

## 2023-01-08 RX ORDER — SODIUM CHLORIDE 0.9 % (FLUSH) 0.9 %
5-40 SYRINGE (ML) INJECTION EVERY 12 HOURS SCHEDULED
Status: DISCONTINUED | OUTPATIENT
Start: 2023-01-08 | End: 2023-01-08

## 2023-01-08 RX ORDER — METHYLPREDNISOLONE SODIUM SUCCINATE 40 MG/ML
40 INJECTION, POWDER, LYOPHILIZED, FOR SOLUTION INTRAMUSCULAR; INTRAVENOUS DAILY
Status: DISCONTINUED | OUTPATIENT
Start: 2023-01-09 | End: 2023-01-11

## 2023-01-08 RX ORDER — IPRATROPIUM BROMIDE AND ALBUTEROL SULFATE 2.5; .5 MG/3ML; MG/3ML
1 SOLUTION RESPIRATORY (INHALATION)
Status: DISCONTINUED | OUTPATIENT
Start: 2023-01-08 | End: 2023-01-13 | Stop reason: HOSPADM

## 2023-01-08 RX ORDER — ALBUTEROL SULFATE 2.5 MG/3ML
2.5 SOLUTION RESPIRATORY (INHALATION)
Status: DISCONTINUED | OUTPATIENT
Start: 2023-01-08 | End: 2023-01-08 | Stop reason: ALTCHOICE

## 2023-01-08 RX ORDER — ASPIRIN 81 MG/1
81 TABLET ORAL DAILY
Status: DISCONTINUED | OUTPATIENT
Start: 2023-01-08 | End: 2023-01-13 | Stop reason: HOSPADM

## 2023-01-08 RX ORDER — DEXTROSE MONOHYDRATE 100 MG/ML
INJECTION, SOLUTION INTRAVENOUS CONTINUOUS PRN
Status: DISCONTINUED | OUTPATIENT
Start: 2023-01-08 | End: 2023-01-13 | Stop reason: HOSPADM

## 2023-01-08 RX ORDER — METHYLPREDNISOLONE SODIUM SUCCINATE 40 MG/ML
40 INJECTION, POWDER, LYOPHILIZED, FOR SOLUTION INTRAMUSCULAR; INTRAVENOUS DAILY
Status: DISCONTINUED | OUTPATIENT
Start: 2023-01-09 | End: 2023-01-08

## 2023-01-08 RX ORDER — INSULIN LISPRO 100 [IU]/ML
0-8 INJECTION, SOLUTION INTRAVENOUS; SUBCUTANEOUS
Status: DISCONTINUED | OUTPATIENT
Start: 2023-01-08 | End: 2023-01-08

## 2023-01-08 RX ORDER — IPRATROPIUM BROMIDE AND ALBUTEROL SULFATE 2.5; .5 MG/3ML; MG/3ML
1 SOLUTION RESPIRATORY (INHALATION)
Status: DISCONTINUED | OUTPATIENT
Start: 2023-01-08 | End: 2023-01-08 | Stop reason: ALTCHOICE

## 2023-01-08 RX ORDER — DEXTROSE MONOHYDRATE 100 MG/ML
INJECTION, SOLUTION INTRAVENOUS CONTINUOUS PRN
Status: DISCONTINUED | OUTPATIENT
Start: 2023-01-08 | End: 2023-01-08

## 2023-01-08 RX ORDER — ACETAMINOPHEN 325 MG/1
650 TABLET ORAL EVERY 6 HOURS PRN
Status: DISCONTINUED | OUTPATIENT
Start: 2023-01-08 | End: 2023-01-08

## 2023-01-08 RX ADMIN — HEPARIN SODIUM 5000 UNITS: 5000 INJECTION INTRAVENOUS; SUBCUTANEOUS at 17:36

## 2023-01-08 RX ADMIN — FUROSEMIDE 20 MG: 10 INJECTION, SOLUTION INTRAMUSCULAR; INTRAVENOUS at 17:27

## 2023-01-08 RX ADMIN — SODIUM CHLORIDE: 9 INJECTION, SOLUTION INTRAVENOUS at 23:23

## 2023-01-08 RX ADMIN — CEFTRIAXONE SODIUM 1000 MG: 1 INJECTION, POWDER, FOR SOLUTION INTRAMUSCULAR; INTRAVENOUS at 14:00

## 2023-01-08 RX ADMIN — MAGNESIUM SULFATE HEPTAHYDRATE 2000 MG: 40 INJECTION, SOLUTION INTRAVENOUS at 17:34

## 2023-01-08 RX ADMIN — HEPARIN SODIUM 5000 UNITS: 5000 INJECTION INTRAVENOUS; SUBCUTANEOUS at 22:54

## 2023-01-08 RX ADMIN — METHYLPREDNISOLONE SODIUM SUCCINATE 40 MG: 40 INJECTION, POWDER, FOR SOLUTION INTRAMUSCULAR; INTRAVENOUS at 17:23

## 2023-01-08 RX ADMIN — SODIUM CHLORIDE, PRESERVATIVE FREE 10 ML: 5 INJECTION INTRAVENOUS at 20:39

## 2023-01-08 RX ADMIN — SODIUM CHLORIDE, PRESERVATIVE FREE 20 MG: 5 INJECTION INTRAVENOUS at 20:38

## 2023-01-08 ASSESSMENT — PAIN DESCRIPTION - DESCRIPTORS
DESCRIPTORS: CRAMPING

## 2023-01-08 ASSESSMENT — ENCOUNTER SYMPTOMS
CHEST TIGHTNESS: 1
TROUBLE SWALLOWING: 0
DIARRHEA: 0
BACK PAIN: 0
APNEA: 0
VOMITING: 0
RHINORRHEA: 0
WHEEZING: 0
CONSTIPATION: 0
COUGH: 1
ABDOMINAL DISTENTION: 0
ABDOMINAL PAIN: 1
SHORTNESS OF BREATH: 1
NAUSEA: 0
VOICE CHANGE: 0
EYE DISCHARGE: 0

## 2023-01-08 ASSESSMENT — PAIN SCALES - GENERAL
PAINLEVEL_OUTOF10: 7
PAINLEVEL_OUTOF10: 5

## 2023-01-08 ASSESSMENT — PAIN DESCRIPTION - LOCATION
LOCATION: ABDOMEN

## 2023-01-08 ASSESSMENT — PAIN DESCRIPTION - ONSET: ONSET: ON-GOING

## 2023-01-08 ASSESSMENT — PAIN - FUNCTIONAL ASSESSMENT: PAIN_FUNCTIONAL_ASSESSMENT: NONE - DENIES PAIN

## 2023-01-08 ASSESSMENT — PAIN DESCRIPTION - FREQUENCY: FREQUENCY: CONTINUOUS

## 2023-01-08 ASSESSMENT — PAIN DESCRIPTION - PAIN TYPE: TYPE: OTHER (COMMENT)

## 2023-01-08 NOTE — H&P
[facility]  History & Physical Examination Note              Date:   1/8/2023  Patient name:  Lianne Purdy  Date of admission:  1/8/2023 10:43 AM  MRN:   [id number]  YOB: 1955    CHIEF COMPLAINT:     Chief Complaint   Patient presents with    Respiratory Distress       History Obtained From:  Patient and chart review. HPI:     The patient is a 69 y.o.  male with history of COPD on 4 L O2 at home, CHF, UYEN, T2DM, hypertension, hyperlipidemia, chronic anemia, MRSA septicemia who presented to the ED via EMS with worsening shortness of breath and difficulty breathing. Mentions that he took his albuterol at home without improvement. He is currently using 4 L O2 at home at all times. After EMS arrival, patient was hypoxic and he was given another albuterol and Combivent with mild improvement in his symptoms. Patient denies smoking, chest pains, palpitations, or diaphoresis. Patient has had previous admissions for COPD exacerbations. Continues to take Lasix at home for CHF along with using his inhalers for COPD. Echo 10/11/2022 shows 50% EF. In the ED, chest x-ray is remarkable for small to moderate left and small right pleural effusion which is new from prior study and left greater than right basilar lung atelectasis though pneumonitis can be a possibility. CBC is remarkable for elevated white count with segmented neutrophils, decreased hemoglobin 8.3, new UYEN with creatinine 1.23, mag 1.3, troponin elevated x2, BNP 4696 elevated from baseline, and potassium 4.9. EKG showed no acute changes  PAST MEDICAL HISTORY:        has a past medical history of CHF (congestive heart failure) ([address] COPD (chronic obstructive pulmonary disease) ([facility] [address] Diabetes mellitus ([facility] [address] Erectile dysfunction due to arterial insufficiency, Hypertension, Microalbuminuria due to type 2 diabetes mellitus ([address] and Overweight (BMI 25.0-29.9).     PAST SURGICAL HISTORY:      has a past surgical history that includes Appendectomy and Total ankle arthroplasty. FAMILY HISTORY:     family history is not on file. HOME MEDICATIONS:     Prior to Admission medications    Medication Sig Start Date End Date Taking? Authorizing Provider   budesonide-formoterol (SYMBICORT) 160-4.5 MCG/ACT AERO Inhale 2 puffs into the lungs in the morning and 2 puffs in the evening. 10/14/22   Quratulaadela Zamora DO   carvedilol (COREG) 12.5 MG tablet Take 1 tablet by mouth 2 times daily (with meals) 10/14/22   Quratulain DO Noah   metFORMIN (GLUCOPHAGE) 500 MG tablet Take 1 tablet by mouth in the morning and 1 tablet in the evening. Take with meals.  8/15/22   Keyla Dangelo MD   albuterol (PROVENTIL) (5 MG/ML) 0.5% nebulizer solution Take 0.5 mLs by nebulization 4 times daily as needed for Wheezing 7/12/22   Herman Schaumann, MD   amLODIPine (NORVASC) 10 MG tablet Take 1 tablet by mouth daily 7/12/22   Herman Schaumann, MD   aspirin (HM ASPIRIN EC LOW DOSE) 81 MG EC tablet TAKE 1 TABLET BY MOUTH DAILY 7/12/22   Te Espino MD   atorvastatin (LIPITOR) 20 MG tablet Take 1 tablet by mouth daily 7/12/22   Herman Schaumann, MD   ipratropium-albuterol (DUONEB) 0.5-2.5 (3) MG/3ML SOLN nebulizer solution Inhale 3 mLs into the lungs every 4 hours (while awake) 7/12/22   Te Espino MD   furosemide (LASIX) 20 MG tablet Take 1 tablet by mouth daily 7/12/22   Herman Schaumann, MD   lisinopril (PRINIVIL;ZESTRIL) 20 MG tablet Take 1 tablet by mouth daily 7/12/22   Herman Schaumann, MD   vitamin D (CHOLECALCIFEROL) 50 MCG (2000 UT) TABS tablet Take 1 tablet by mouth daily 7/12/22   Herman Schaumann, MD   fluticasone-salmeterol (ADVIAR) 100-50 MCG/ACT AEPB diskus inhaler Inhale 1 puff into the lungs every 12 hours 7/12/22   Herman Schaumann, MD   tiotropium (SPIRIVA HANDIHALER) 18 MCG inhalation capsule Inhale 1 capsule into the lungs daily 7/12/22   Herman Schaumann, MD       ALLERGIES: Patient has no known allergies. SOCIAL HISTORY:      reports that he quit smoking about 13 months ago. His smoking use included cigarettes. He smoked an average of .5 packs per day. He has never used smokeless tobacco. He reports that he does not drink alcohol and does not use drugs. REVIEW OF SYSTEMS:     Review of Systems   Constitutional:  Positive for fatigue. Negative for chills, diaphoresis and fever. HENT:  Negative for rhinorrhea, sneezing and trouble swallowing. Respiratory:  Positive for shortness of breath. Negative for wheezing. Cardiovascular:  Negative for chest pain, palpitations and leg swelling. Gastrointestinal:  Positive for abdominal pain. Negative for constipation, diarrhea, nausea and vomiting. Genitourinary:  Negative for dysuria and flank pain. Musculoskeletal:  Negative for arthralgias. Neurological:  Negative for light-headedness and headaches. Psychiatric/Behavioral:  Negative for agitation and behavioral problems. PHYSICAL EXAM:     Vitals:    01/08/23 1400 01/08/23 1415 01/08/23 1430 01/08/23 1432   BP: (!) 119/59 (!) 117/59 (!) 112/59    Pulse: 82 83 81 81   Resp: 27 24 (!) 32 29   Temp:       TempSrc:       SpO2: 100% 100% 100% 100%   Weight:           No intake or output data in the 24 hours ending 01/08/23 1439    Physical Exam  Cardiovascular:      Rate and Rhythm: Normal rate and regular rhythm. Heart sounds: Normal heart sounds. Pulmonary:      Comments: No wheezes/rales heard bilaterally, diminished and soft breath sounds, currently on BiPAP 50 FiO2 saturating 100%, patient is able to answer questions  Abdominal:      General: There is no distension. Palpations: Abdomen is soft. Tenderness: There is no abdominal tenderness. There is no guarding. Musculoskeletal:      Right lower leg: Edema (Trace) present. Left lower leg: Edema (Trace) present. Skin:     General: Skin is warm and dry.    Psychiatric:         Mood and Affect: Mood normal.         Behavior: Behavior normal.         DIAGNOSTICS:      Laboratory Testing:    Recent Results (from the past 24 hour(s))   CBC with Auto Differential    Collection Time: 01/08/23 10:59 AM   Result Value Ref Range    WBC 13.0 (H) 3.5 - 11.3 k/uL    RBC 2.84 (L) 4.21 - 5.77 m/uL    Hemoglobin 8.3 (L) 13.0 - 17.0 g/dL    Hematocrit 27.8 (L) 40.7 - 50.3 %    MCV 97.9 82.6 - 102.9 fL    MCH 29.2 25.2 - 33.5 pg    MCHC 29.9 28.4 - 34.8 g/dL    RDW 14.7 (H) 11.8 - 14.4 %    Platelets 499 982 - 906 k/uL    MPV 10.7 8.1 - 13.5 fL    NRBC Automated 0.0 0.0 per 100 WBC    Immature Granulocytes 0 0 %    Seg Neutrophils 92 (H) 36 - 66 %    Lymphocytes 7 (L) 24 - 44 %    Monocytes 1 1 - 7 %    Eosinophils % 0 (L) 1 - 4 %    Basophils 0 0 - 2 %    Absolute Immature Granulocyte 0.00 0.00 - 0.30 k/uL    Segs Absolute 11.96 (H) 1.8 - 7.7 k/uL    Absolute Lymph # 0.91 (L) 1.0 - 4.8 k/uL    Absolute Mono # 0.13 0.1 - 0.8 k/uL    Absolute Eos # 0.00 0.0 - 0.4 k/uL    Basophils Absolute 0.00 0.0 - 0.2 k/uL    Morphology ANISOCYTOSIS PRESENT     Morphology 1+ ELLIPTOCYTES    BMP    Collection Time: 01/08/23 10:59 AM   Result Value Ref Range    Glucose 184 (H) 70 - 99 mg/dL    BUN 13 8 - 23 mg/dL    Creatinine 1.23 (H) 0.70 - 1.20 mg/dL    Est, Glom Filt Rate >60 >60 mL/min/1.73m2    Calcium 8.5 (L) 8.6 - 10.4 mg/dL    Sodium 137 135 - 144 mmol/L    Potassium 6.2 (HH) 3.7 - 5.3 mmol/L    Chloride 95 (L) 98 - 107 mmol/L    CO2 26 20 - 31 mmol/L    Anion Gap 16 9 - 17 mmol/L   Magnesium    Collection Time: 01/08/23 10:59 AM   Result Value Ref Range    Magnesium 1.3 (L) 1.6 - 2.6 mg/dL   Troponin    Collection Time: 01/08/23 10:59 AM   Result Value Ref Range    Troponin, High Sensitivity 129 (HH) 0 - 22 ng/L   Brain Natriuretic Peptide    Collection Time: 01/08/23 10:59 AM   Result Value Ref Range    Pro-BNP 4,696 (H) <300 pg/mL   Troponin    Collection Time: 01/08/23 11:41 AM   Result Value Ref Range    Troponin, High Sensitivity 115 (HH) 0 - 22 ng/L   Potassium    Collection Time: 01/08/23 12:28 PM   Result Value Ref Range    Potassium 4.9 3.7 - 5.3 mmol/L         Imaging/Diagonstics:  XR CHEST PORTABLE    Result Date: 1/8/2023  EXAM: XR Chest, 1 View EXAM DATE/TIME: 1/8/2023 10:51 am CLINICAL HISTORY: ORDERING SYSTEM PROVIDED  COPD exacerbation  TECHNOLOGIST PROVIDED HISTORY: COPD exacerbation TECHNIQUE: Frontal view of the chest. COMPARISON: 10/13/2022 FINDINGS: Lungs:  Presumed left more so than right basilar lung atelectasis though pneumonitis cannot be excluded if suspected clinically. Pleural space:  Small to moderate left and small right pleural effusion which is new from the prior study. No pneumothorax. Heart:  No acute findings. No cardiomegaly. Mediastinum:  No acute findings. Bones/joints:  No acute findings. 1.  Small to moderate left and small right pleural effusion which is new from the prior study. 2.  Presumed left more so than right basilar lung atelectasis though pneumonitis cannot be excluded if suspected clinically. ASSESSMENT:       Principal Problem:    COPD exacerbation ([address]  Resolved Problems:    * No resolved hospital problems. *      PLAN:     Patient status: Admit the patient as Inpatient in the Progressive Unit/Med/Surge Unit    Acute on chronic respiration failure 2/2 COPD exacerbation and possible pneumonia   -Currently on BiPAP 50% FiO2 saturating 100%. -Blood culture x2 pending, sputum culture pending, Pro-Ken pending, lactate pending, pneumo panel (Legionella, S pneumo, Mycoplasma) pending   -CXR 01/08/23 remarkable for small to moderate left and small right pleural effusion which is new from prior study and left greater than right basilar lung atelectasis though pneumonitis can be a possibility.   -Rocephin 1000 mg IV given once in the ED.   We will start Zosyn 3375 mg IV every 8 hours  -We will administer Solu-Medrol 40 mg once daily  -We will continue at home inhalers  -Pulmonology consulted    CHF exacerbation   -Echo 10/11/2022 shows 50% EF.  -Troponin elevated x2, BNP 4696 elevated from baseline  -No acute changes on EKG  -Strict I&O's, daily weight check   -We will continue Lasix 20 mg daily    Type 2 NSTEMI 2/2 hypoxia   -Troponin elevated x2 115 and 129. Next troponin at 6 PM.  If elevated we will start heparin drip  -No acute changes on EKG  -Cardiology consulted    Normocytic Anemia   -Hb 8.3 (approximately 7.5 since past 6 months)  -CBC daily  -Transfuse if hemoglobin less than 7  -We will evaluate tomorrow after patient stabilizes    UYEN  -Cr 1.23, GFR >60  -Avoid nephrotoxic agents   -BMP daily    Hypomagnesemia   -Mg 1.3 01/08/23  -Will replace     Hypertension  -At home meds Coreg 12.5 twice daily, Norvasc 10 mg daily, Lasix 20 mg daily, Lisinopril 20 mg daily  -Continue at home meds    Hyperlipidemia  -At home meds Lipitor 20 mg daily  -Continue on home meds    T2DM  -Last A1c 6.2 2/50/22  -POC glucose every 4 hours  -Medium dose sliding scale    PT/OT  CODE STATUS full    DVT prophylaxis: heparin (porcine) injection 5,000 TID  GI prophylaxis: Famotidine 20 mg BID    Dispo; Social work consulted     The patient is a 69 y.o.  male with history of COPD on 4 L O2 at home, CHF, UYEN, T2DM, hypertension, hyperlipidemia who presented to the ED via EMS with worsening shortness of breath and difficulty breathing. Mentions that he took his albuterol at home without improvement. He is currently using 4 L O2 at home at all times. After EMS arrival, patient was hypoxic and he was given another albuterol and Combivent with mild improvement in his symptoms. Patient denies smoking, chest pains, palpitations, or diaphoresis. Patient has had previous admissions for COPD exacerbations. Continues to take Lasix at home for CHF along with using his inhalers for COPD. Echo 10/11/2022 shows 50% EF.     In the ED, chest x-ray is remarkable for small to moderate left and small right pleural effusion which is new from prior study and left greater than right basilar lung atelectasis though pneumonitis can be a possibility. CBC is remarkable for elevated white count with segmented neutrophils, decreased hemoglobin 8.3, new UYEN with creatinine 1.23, mag 1.3, troponin elevated x2, BNP 4696 elevated from baseline, and potassium 4.9.       Consultations:   Consults: IP CONSULT TO CARDIOLOGY  IP CONSULT TO INTERNAL MEDICINE  IP CONSULT TO FAMILY MEDICINE  IP CONSULT TO PULMONOLOGY  IP CONSULT TO SOCIAL WORK  PT/OT    Plan will be discussed with the attending, Dr Teja Hills MD  Family Medicine Resident  1/8/2023 2:39 PM

## 2023-01-08 NOTE — CARE COORDINATION
Case Management Assessment  Initial Evaluation    Date/Time of Evaluation: 1/8/2023 3:58 PM  Assessment Completed by: Mari García RN    If patient is discharged prior to next notation, then this note serves as note for discharge by case management. Patient Name: Yesica Cox                   YOB: 1955  Diagnosis: COPD exacerbation ([facility] [J44.1]                   Date / Time: 1/8/2023 10:43 AM    Patient Admission Status: Inpatient   Readmission Risk (Low < 19, Mod (19-27), High > 27): Readmission Risk Score: 16.5    Current PCP: Chet Waters MD  PCP verified by CM? Yes    Chart Reviewed: Yes      History Provided by: Patient  Patient Orientation: Alert and Oriented    Patient Cognition: Alert    Hospitalization in the last 30 days (Readmission):  No    If yes, Readmission Assessment in  Navigator will be completed. Advance Directives:      Code Status: Full Code   Patient's Primary Decision Maker is: Legal Next of Kin      Discharge Planning:    Patient lives with: Children Type of Home: House  Primary Care Giver: Self  Patient Support Systems include: Children, Other (Comment) (lives with his son, Narciso Kim)   Current Financial resources: Medicare  Current community resources:    Current services prior to admission: Durable Medical Equipment            Current DME: Home Aerosol, Oxygen Therapy (Comment), Cane (O2 4L nc at  home w/HCS)            Type of Home Care services:       ADLS  Prior functional level: Assistance with the following:, Mobility, Other (see comment), Housework, Shopping, Cooking (Bedroom is on 2nd floor and pt states his son carries him up the stairs.)  Current functional level: Assistance with the following:, Mobility, Shopping, Housework, Cooking    PT AM-PAC:   /24  OT AM-PAC:   /24    Family can provide assistance at DC:  Yes  Would you like Case Management to discuss the discharge plan with any other family members/significant others, and if so, who? Yes  Plans to Return to Present Housing: Yes  Other Identified Issues/Barriers to RETURNING to current housing:   Potential Assistance needed at discharge:              Potential DME:    Patient expects to discharge to: [address] [place] [address] for transportation at discharge: Other (see comment) (he said a family member)    Financial    Payor: Caleb Sequeira / Plan: Colby Perches / Product Type: *No Product type* /     Does insurance require precert for SNF: Yes    Potential assistance Purchasing Medications: No  Meds-to-Beds request:        [address] [place] [address] [facility] [phone]  [address] [address]  Phone: [phone] Fax: [phone]    AJ[address] [facility] [facility], [address] [place] [address] Drive [address] Cindy Leighvard [phone] Misty Braldey [phone]  [address] [facility]  [address] [address]  Phone: [phone] Fax: [phone]      Notes:    Factors facilitating achievement of predicted outcomes: Caregiver support    Barriers to discharge: Decreased endurance    Additional Case Management Notes: Home O2 at 4L nc at home. Called respiratory dept, spoke with Dae Toribio and notified. Pt gets O2 from [employer]. Currently on 6L nc and just placed on BiPAP by respiratory therapist.   Transport home via family member. The Plan for Transition of Care is related to the following treatment goals of COPD exacerbation ([facility] [N81.4]    IF APPLICABLE: The Patient and/or patient representative Tyshawn Dunn and his family were provided with a choice of provider and agrees with the discharge plan. Freedom of choice list with basic dialogue that supports the patient's individualized plan of care/goals and shares the quality data associated with the providers was provided to:     Patient Representative Name:       The Patient and/or Patient Representative Agree with the Discharge Plan?       Kaleb Andersen RN  Case Management Department  Ph: [phone] Fax:

## 2023-01-08 NOTE — PROGRESS NOTES
[facility] [place] COPD exacerb[facility] ([facility] [address] [J44.1] . Vitals:    01/08/23 1827   BP:    Pulse: 76   Resp: (!) 32   Temp:    SpO2: 100%   . Patients home meds are   Prior to Admission medications    Medication Sig Start Date End Date Taking? Authorizing Provider   budesonide-formoterol (SYMBICORT) 160-4.5 MCG/ACT AERO Inhale 2 puffs into the lungs in the morning and 2 puffs in the evening. 10/14/22   Derik Zamora DO   carvedilol (COREG) 12.5 MG tablet Take 1 tablet by mouth 2 times daily (with meals) 10/14/22   Derik Zamora DO   metFORMIN (GLUCOPHAGE) 500 MG tablet Take 1 tablet by mouth in the morning and 1 tablet in the evening. Take with meals.  8/15/22   Elayne Aquino MD   albuterol (PROVENTIL) (5 MG/ML) 0.5% nebulizer solution Take 0.5 mLs by nebulization 4 times daily as needed for Wheezing 7/12/22   Jairon Mayorga MD   amLODIPine (NORVASC) 10 MG tablet Take 1 tablet by mouth daily 7/12/22   Jairon Mayorga MD   aspirin (HM ASPIRIN EC LOW DOSE) 81 MG EC tablet TAKE 1 TABLET BY MOUTH DAILY 7/12/22   Te Gayle MD   atorvastatin (LIPITOR) 20 MG tablet Take 1 tablet by mouth daily 7/12/22   Jairon Mayorga MD   ipratropium-albuterol (DUONEB) 0.5-2.5 (3) MG/3ML SOLN nebulizer solution Inhale 3 mLs into the lungs every 4 hours (while awake) 7/12/22   Te Gayle MD   furosemide (LASIX) 20 MG tablet Take 1 tablet by mouth daily 7/12/22   Jairon Mayorga MD   lisinopril (PRINIVIL;ZESTRIL) 20 MG tablet Take 1 tablet by mouth daily 7/12/22   Jairon Mayorga MD   vitamin D (CHOLECALCIFEROL) 50 MCG (2000 UT) TABS tablet Take 1 tablet by mouth daily 7/12/22   Jairon Mayorga MD   fluticasone-salmeterol (ADVIAR) 100-50 MCG/ACT AEPB diskus inhaler Inhale 1 puff into the lungs every 12 hours 7/12/22   Jairon Mayorga MD   tiotropium (SPIRIVA HANDIHALER) 18 MCG inhalation capsule Inhale 1 capsule into the lungs daily 7/12/22   Jairon Mayorga MD .      Assessment     RR 32  Breath Sounds: diminished      Bronchodilator assessment at level  3  [x]    Bronchodilator Assessment  BRONCHODILATOR ASSESSMENT SCORE  Score 0 1 2 3 4 5   Breath Sounds   []  Patient Baseline []  No Wheeze good aeration []  Faint, scattered wheezing, good aeration [x]  Expiratory Wheezing and or moderately diminished []  Insp/Exp wheeze and/or very diminished []  Insp/Exp and/ or marked distress   Respiratory Rate   []  Patient Baseline []  Less than 20 []  Less than 20 [x]  20-25 []  Greater than 25 []  Greater than 25   Peak flow % of Pred or PB []  NA   []  Greater than 90%  []  81-90% []  71-80% []  Less than or equal to 70%  or unable to perform []  Unable due to Respiratory Distress   Dyspnea re []  Patient Baseline []  No SOB []  No SOB [x]  SOB on exertion []  SOB min activity []  At rest/acute   e FEV% Predicted       [x]  NA []  Above 69%  []  Unable []  Above 60-69%  []  Unable []  Above 50-59%  []  Unable []  Above 35-49%  []  Unable []  Less than 35%  []  Unable

## 2023-01-08 NOTE — ED PROVIDER NOTES
[address]  ED  Emergency Department Encounter  Emergency Medicine Resident     Pt Name: Gail Luis  IDM:[id number]  [place] 1955  Date of evaluation: 1/8/23  PCP:  Lila Medrano MD    CHIEF COMPLAINT       Chief Complaint   Patient presents with    Respiratory Distress       HISTORY OF PRESENT ILLNESS  (Location/Symptom, Timing/Onset, Context/Setting, Quality, Duration, ModifyingFactors, Severity.)      Gail Luis is a 69 y.o. male with PMH of COPD, CHF and asthma presents in respiratory distress. Patient called out after he was having some difficulty with breathing. Patient did take her home albuterol without improvement. On arrival EMS did give him another albuterol and Combivent with improvement of his symptoms. Patient was able to say that he did feel significantly better than he did. EMS did state the patient was hypoxic on arrival.  Patient does endorse that he wears 4 L of oxygen at all times at home and that he is compliant on it. Patient is able to shake yes or no to questions, does not believe this is anything to do with his CHF, did endorse that he does take his water pills every day and has not gained weight. Patient's does believe this feels like his COPD and he has had multiple exacerbations in the past.    PAST MEDICAL / SURGICAL / SOCIAL /FAMILY HISTORY      has a past medical history of CHF (congestive heart failure) ([address] COPD (chronic obstructive pulmonary disease) ([address] Diabetes mellitus ([address] Erectile dysfunction due to arterial insufficiency, Hypertension, Microalbuminuria due to type 2 diabetes mellitus ([address] and Overweight (BMI 25.0-29.9). No other pertinent PMH on review with patient/guardian. has a past surgical history that includes Appendectomy and Total ankle arthroplasty. No other pertinent PSH on review with patient/guardian. Social History     Socioeconomic History    Marital status:       Spouse name: Not on file Number of children: Not on file    Years of education: Not on file    Highest education level: Not on file   Occupational History    Not on file   Tobacco Use    Smoking status: Former     Packs/day: 0.50     Types: Cigarettes     Quit date: 2021     Years since quittin.1    Smokeless tobacco: Never    Tobacco comments:     reports he quit smoking in the past 2 months   Substance and Sexual Activity    Alcohol use: No     Alcohol/week: 0.0 standard drinks    Drug use: No    Sexual activity: Not on file   Other Topics Concern    Not on file   Social History Narrative    Not on file     Social Determinants of Health     Financial Resource Strain: Not on file   Food Insecurity: Not on file   Transportation Needs: Unmet Transportation Needs    Lack of Transportation (Medical): No    Lack of Transportation (Non-Medical): Yes   Physical Activity: Inactive    Days of Exercise per Week: 0 days    Minutes of Exercise per Session: 0 min   Stress: Stress Concern Present    Feeling of Stress : Very much   Social Connections: Socially Isolated    Frequency of Communication with Friends and Family: Never    Frequency of Social Gatherings with Friends and Family: More than three times a week    Attends Confucianism Services: Never    Active Member of Clubs or Organizations: No    Attends Club or Organization Meetings: Never    Marital Status:    Intimate Partner Violence: Not on file   Housing Stability: Low Risk     Unable to Pay for Housing in the Last Year: No    Number of Places Lived in the Last Year: 1    Unstable Housing in the Last Year: No       I counseled the patient against using tobacco products. History reviewed. No pertinent family history. No other pertinent FamHx on review with patient/guardian. Allergies:  Patient has no known allergies. Home Medications:  Prior to Admission medications    Medication Sig Start Date End Date Taking?  Authorizing Provider   budesonide-formoterol [facility]) 160-4.5 MCG/ACT AERO Inhale 2 puffs into the lungs in the morning and 2 puffs in the evening. 10/14/22   Quoscar Zamora DO   carvedilol (COREG) 12.5 MG tablet Take 1 tablet by mouth 2 times daily (with meals) 10/14/22   Ariantchai Zamora DO   metFORMIN (GLUCOPHAGE) 500 MG tablet Take 1 tablet by mouth in the morning and 1 tablet in the evening. Take with meals. 8/15/22   Ranjan Mcgovern MD   albuterol (PROVENTIL) (5 MG/ML) 0.5% nebulizer solution Take 0.5 mLs by nebulization 4 times daily as needed for Wheezing 7/12/22   Gabe Irvin MD   amLODIPine (NORVASC) 10 MG tablet Take 1 tablet by mouth daily 7/12/22   Gabe Irvin MD   aspirin (HM ASPIRIN EC LOW DOSE) 81 MG EC tablet TAKE 1 TABLET BY MOUTH DAILY 7/12/22   Te Almaguer MD   atorvastatin (LIPITOR) 20 MG tablet Take 1 tablet by mouth daily 7/12/22   Gabe Irvin MD   ipratropium-albuterol (DUONEB) 0.5-2.5 (3) MG/3ML SOLN nebulizer solution Inhale 3 mLs into the lungs every 4 hours (while awake) 7/12/22   Te Almaguer MD   furosemide (LASIX) 20 MG tablet Take 1 tablet by mouth daily 7/12/22   Gabe Irvin MD   lisinopril (PRINIVIL;ZESTRIL) 20 MG tablet Take 1 tablet by mouth daily 7/12/22   Gabe Irvin MD   vitamin D (CHOLECALCIFEROL) 50 MCG (2000 UT) TABS tablet Take 1 tablet by mouth daily 7/12/22   Gabe Irvin MD   fluticasone-salmeterol (ADVIAR) 100-50 MCG/ACT AEPB diskus inhaler Inhale 1 puff into the lungs every 12 hours 7/12/22   Gabe Irvin MD   tiotropium (SPIRIVA HANDIHALER) 18 MCG inhalation capsule Inhale 1 capsule into the lungs daily 7/12/22   Te Almaguer MD       REVIEW OF SYSTEMS    (2-9 systems for level 4, 10 ormore for level 5)      Review of Systems   Constitutional:  Negative for activity change, diaphoresis and fatigue. HENT:  Negative for congestion, tinnitus, trouble swallowing and voice change. Eyes:  Negative for discharge and visual disturbance.    Respiratory:  Positive for cough, chest tightness and shortness of breath. Negative for apnea. Cardiovascular:  Negative for chest pain, palpitations and leg swelling. Gastrointestinal:  Negative for abdominal distention, diarrhea, nausea and vomiting. Endocrine: Negative for cold intolerance and heat intolerance. Genitourinary:  Negative for dysuria and urgency. Musculoskeletal:  Negative for arthralgias, back pain, myalgias and neck pain. Skin: Negative. Allergic/Immunologic: Negative for immunocompromised state. Neurological:  Negative for dizziness, facial asymmetry and headaches. Psychiatric/Behavioral:  Negative for agitation. The patient is not nervous/anxious. PHYSICAL EXAM   (up to 7 for level 4, 8 or more for level 5)      INITIAL VITALS:   /60   Pulse 82   Temp 97.7 °F (36.5 °C) (Oral)   Resp 29   Wt 150 lb (68 kg)   SpO2 100%   BMI 24.96 kg/m²     Physical Exam  Vitals reviewed. Constitutional:       Appearance: He is ill-appearing. HENT:      Head: Normocephalic and atraumatic. Nose: Nose normal.      Mouth/Throat:      Mouth: Mucous membranes are moist.      Pharynx: Oropharynx is clear. Eyes:      Extraocular Movements: Extraocular movements intact. Conjunctiva/sclera: Conjunctivae normal.      Pupils: Pupils are equal, round, and reactive to light. Cardiovascular:      Rate and Rhythm: Regular rhythm. Tachycardia present. Pulses: Normal pulses. Heart sounds: Normal heart sounds. Pulmonary:      Effort: Respiratory distress present. Breath sounds: No stridor. Wheezing and rhonchi present. No rales. Chest:      Chest wall: No tenderness. Abdominal:      General: There is no distension. Palpations: Abdomen is soft. Tenderness: There is no abdominal tenderness. Musculoskeletal:         General: Normal range of motion. Cervical back: Neck supple. No rigidity or tenderness. Skin:     General: Skin is warm and dry.       Capillary Refill: Capillary refill takes less than 2 seconds. Coloration: Skin is not jaundiced. Neurological:      General: No focal deficit present. Mental Status: He is alert. He is disoriented. Psychiatric:         Mood and Affect: Mood normal.         Behavior: Behavior normal.         Thought Content:  Thought content normal.       DIFFERENTIAL  DIAGNOSIS     DDX: COPD exacerbation, CHF exacerbation, asthma exacerbation, pneumonia, ACS    PLAN (LABS / IMAGING / EKG):  Orders Placed This Encounter   Procedures    XR CHEST PORTABLE    CBC with Auto Differential    BMP    Magnesium    Troponin    Brain Natriuretic Peptide    Potassium    Inpatient consult to Cardiology    Inpatient consult to Internal Medicine    Inpatient consult to [facility]    Respiratory Care Evaluation and Treat    Initiate ED RT Bronchospasm Protocol    Adult NIV/Positive Airway Pressure    Pulse oximetry, continuous    Nasal Cannula Oxygen    ADMIT TO INPATIENT    ADMIT TO INPATIENT       MEDICATIONS ORDERED:  Orders Placed This Encounter   Medications    OR Linked Order Group     ipratropium-albuterol (DUONEB) nebulizer solution 1 ampule      Order Specific Question:   Initiate RT Bronchodilator Protocol      Answer:   Yes - ED protocol     albuterol (PROVENTIL) nebulizer solution 2.5 mg      Order Specific Question:   Initiate RT Bronchodilator Protocol      Answer:   Yes - ED protocol    AND Linked Order Group     albuterol (PROVENTIL) nebulizer solution 15 mg      Order Specific Question:   Initiate RT Bronchodilator Protocol      Answer:   Yes - ED protocol     ipratropium (ATROVENT) 0.02 % nebulizer solution 0.5 mg      Order Specific Question:   Initiate RT Bronchodilator Protocol      Answer:   Yes - ED protocol    cefTRIAXone (ROCEPHIN) 1,000 mg in sodium chloride 0.9 % 50 mL IVPB mini-bag     Order Specific Question:   Antimicrobial Indications     Answer:   COPD Exacerbation           DIAGNOSTIC RESULTS / EMERGENCY DEPARTMENT COURSE / MDM     LABS:  Results for orders placed or performed during the hospital encounter of 01/08/23   CBC with Auto Differential   Result Value Ref Range    WBC 13.0 (H) 3.5 - 11.3 k/uL    RBC 2.84 (L) 4.21 - 5.77 m/uL    Hemoglobin 8.3 (L) 13.0 - 17.0 g/dL    Hematocrit 27.8 (L) 40.7 - 50.3 %    MCV 97.9 82.6 - 102.9 fL    MCH 29.2 25.2 - 33.5 pg    MCHC 29.9 28.4 - 34.8 g/dL    RDW 14.7 (H) 11.8 - 14.4 %    Platelets 228 632 - 933 k/uL    MPV 10.7 8.1 - 13.5 fL    NRBC Automated 0.0 0.0 per 100 WBC    Immature Granulocytes 0 0 %    Seg Neutrophils 92 (H) 36 - 66 %    Lymphocytes 7 (L) 24 - 44 %    Monocytes 1 1 - 7 %    Eosinophils % 0 (L) 1 - 4 %    Basophils 0 0 - 2 %    Absolute Immature Granulocyte 0.00 0.00 - 0.30 k/uL    Segs Absolute 11.96 (H) 1.8 - 7.7 k/uL    Absolute Lymph # 0.91 (L) 1.0 - 4.8 k/uL    Absolute Mono # 0.13 0.1 - 0.8 k/uL    Absolute Eos # 0.00 0.0 - 0.4 k/uL    Basophils Absolute 0.00 0.0 - 0.2 k/uL    Morphology ANISOCYTOSIS PRESENT     Morphology 1+ ELLIPTOCYTES    BMP   Result Value Ref Range    Glucose 184 (H) 70 - 99 mg/dL    BUN 13 8 - 23 mg/dL    Creatinine 1.23 (H) 0.70 - 1.20 mg/dL    Est, Glom Filt Rate >60 >60 mL/min/1.73m2    Calcium 8.5 (L) 8.6 - 10.4 mg/dL    Sodium 137 135 - 144 mmol/L    Potassium 6.2 (HH) 3.7 - 5.3 mmol/L    Chloride 95 (L) 98 - 107 mmol/L    CO2 26 20 - 31 mmol/L    Anion Gap 16 9 - 17 mmol/L   Magnesium   Result Value Ref Range    Magnesium 1.3 (L) 1.6 - 2.6 mg/dL   Troponin   Result Value Ref Range    Troponin, High Sensitivity 129 (HH) 0 - 22 ng/L   Troponin   Result Value Ref Range    Troponin, High Sensitivity 115 (HH) 0 - 22 ng/L   Brain Natriuretic Peptide   Result Value Ref Range    Pro-BNP 4,696 (H) <300 pg/mL   Potassium   Result Value Ref Range    Potassium 4.9 3.7 - 5.3 mmol/L         IMPRESSION/MDM/ED COURSE:  69 y.o. male presented with difficult breathing. Patient did improve after Combivent. Respiratory at bedside for evaluation. Giving medications as they see fit. ACS work-up ensuing. No respiratory distress work-up ensuing. Will monitor for any acute changes and reevaluate after labs and scans returned. ED Course as of 01/08/23 1408   Sun Jan 08, 2023   1226 Patient's laboratory work-up has concluded. Mild white count bump which could be explained by the respiratory distress in addition with the albuterol the patient received. Patient's troponin is elevated but downtrending. Potassium initially thought to be 6.2 however on repeat potassium direct only 4.9. We will consult cardiology for evaluation. The patient was requiring BiPAP and is still requiring breathing supports. We will have the patient admitted at this time for COPD exacerbation. [ES]   46 Was able to speak with the family medicine attending. Accepted the patient onto their service for work-up. [ES]      ED Course User Index  [ES] Karlene Campo MD             RADIOLOGY:  XR CHEST PORTABLE   Final Result      1. Small to moderate left and small right pleural effusion which is new from   the prior study. 2.  Presumed left more so than right basilar lung atelectasis though   pneumonitis cannot be excluded if suspected clinically. EKG  Sinus rhythm  COPD disease pattern  No acute ST elevation  No T wave depressions  Clear to prior EKG nearly unchanged  Abnormal EKG    All EKG's are interpreted by the Emergency Department Physician who either signs or Co-signs this chart in the absence of a cardiologist.      PROCEDURES:  None    CONSULTS:  IP CONSULT TO CARDIOLOGY  IP CONSULT TO INTERNAL MEDICINE  IP CONSULT TO FAMILY MEDICINE        FINAL IMPRESSION      1. COPD exacerbation ([address]          DISPOSITION / [address] Admitted 01/08/2023 01:48:51 PM        PATIENT REFERREDTO:  No follow-up provider specified.     DISCHARGE MEDICATIONS:  New Prescriptions    No medications on file       Karlene Campo MD  PGY 3  Resident Physician Emergency Medicine  01/08/23 2:08 PM        (Please note that portions of this note were completed with a voice recognition program.Efforts were made to edit the dictations but occasionally words are mis-transcribed.)       Martha Talbot MD  Resident  01/08/23 [id number]

## 2023-01-08 NOTE — PROGRESS NOTES
MARIANNA received report from [facility] in the ED. Pt admitted to [facility] room 542. Vitals taken. See flowsheet for further documentation.     Susanne CABRAL

## 2023-01-08 NOTE — PROGRESS NOTES
RN notified Sarah Lepe MD unable to insert panda catheter. Met resistance on insertion. Pt able to spontaneously void 300mL.       Susanne CABRAL

## 2023-01-08 NOTE — PROGRESS NOTES
Pt transferred to [address]. RN gave report to [employer] All questions answered. Pt transported on stretcher, portable monitor, bipap, and respiratory.     Susanne CABRAL
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
## 2023-01-08 NOTE — ED TRIAGE NOTES
Patient was brought to room 29 by LS with difficulty breathing in the past day. Patient has hx of COPD and thinks he is having a flair up of it. Patient is wheezing and sating in 80%s on arrival on 4L NC. Patient usually wears 4L at home. Patient was put on bipap by respiratory on arrival.  Pt respirations are even and unlabored, pt is alert and oriented X 4, speaking in complete sentences, bed is in the lowest position, call light is within reach.

## 2023-01-09 ENCOUNTER — APPOINTMENT (OUTPATIENT)
Dept: CT IMAGING | Age: 68
End: 2023-01-09
Payer: MEDICARE

## 2023-01-09 ENCOUNTER — APPOINTMENT (OUTPATIENT)
Dept: GENERAL RADIOLOGY | Age: 68
End: 2023-01-09
Payer: MEDICARE

## 2023-01-09 ENCOUNTER — APPOINTMENT (OUTPATIENT)
Dept: ULTRASOUND IMAGING | Age: 68
End: 2023-01-09
Payer: MEDICARE

## 2023-01-09 LAB
ABSOLUTE EOS #: 0 K/UL (ref 0–0.4)
ABSOLUTE EOS #: 0 K/UL (ref 0–0.4)
ABSOLUTE IMMATURE GRANULOCYTE: 0 K/UL (ref 0–0.3)
ABSOLUTE IMMATURE GRANULOCYTE: 0 K/UL (ref 0–0.3)
ABSOLUTE LYMPH #: 0.3 K/UL (ref 1–4.8)
ABSOLUTE LYMPH #: 0.76 K/UL (ref 1–4.8)
ABSOLUTE MONO #: 0.14 K/UL (ref 0.1–0.8)
ABSOLUTE MONO #: 0.3 K/UL (ref 0.1–0.8)
ALBUMIN SERPL-MCNC: 2.6 G/DL (ref 3.5–5.2)
ALBUMIN/GLOBULIN RATIO: 1 (ref 1–2.5)
ALLEN TEST: POSITIVE
ALP BLD-CCNC: 83 U/L (ref 40–129)
ALT SERPL-CCNC: 5 U/L (ref 5–41)
ANION GAP SERPL CALCULATED.3IONS-SCNC: 12 MMOL/L (ref 9–17)
AST SERPL-CCNC: 15 U/L
BASOPHILS # BLD: 0 % (ref 0–2)
BASOPHILS # BLD: 0 % (ref 0–2)
BASOPHILS ABSOLUTE: 0 K/UL (ref 0–0.2)
BASOPHILS ABSOLUTE: 0 K/UL (ref 0–0.2)
BILIRUB SERPL-MCNC: 0.4 MG/DL (ref 0.3–1.2)
BILIRUBIN URINE: NEGATIVE
BUN BLDV-MCNC: 20 MG/DL (ref 8–23)
CALCIUM IONIZED: 1.18 MMOL/L (ref 1.13–1.33)
CALCIUM SERPL-MCNC: 8.4 MG/DL (ref 8.6–10.4)
CARBOXYHEMOGLOBIN: 0.7 % (ref 0–5)
CASTS UA: NORMAL /LPF (ref 0–8)
CHLORIDE BLD-SCNC: 93 MMOL/L (ref 98–107)
CO2: 30 MMOL/L (ref 20–31)
COLOR: YELLOW
CREAT SERPL-MCNC: 1.15 MG/DL (ref 0.7–1.2)
EKG ATRIAL RATE: 100 BPM
EKG P-R INTERVAL: 92 MS
EKG Q-T INTERVAL: 348 MS
EKG QRS DURATION: 78 MS
EKG QTC CALCULATION (BAZETT): 448 MS
EKG R AXIS: 20 DEGREES
EKG T AXIS: 31 DEGREES
EKG VENTRICULAR RATE: 100 BPM
EOSINOPHILS RELATIVE PERCENT: 0 % (ref 1–4)
EOSINOPHILS RELATIVE PERCENT: 0 % (ref 1–4)
EPITHELIAL CELLS UA: NORMAL /HPF (ref 0–5)
ESTIMATED AVERAGE GLUCOSE: 88 MG/DL
FIO2: 3.5
FIO2: ABNORMAL
GFR SERPL CREATININE-BSD FRML MDRD: >60 ML/MIN/1.73M2
GLUCOSE BLD-MCNC: 146 MG/DL (ref 74–100)
GLUCOSE BLD-MCNC: 151 MG/DL (ref 75–110)
GLUCOSE BLD-MCNC: 162 MG/DL (ref 70–99)
GLUCOSE BLD-MCNC: 192 MG/DL (ref 75–110)
GLUCOSE BLD-MCNC: 236 MG/DL (ref 75–110)
GLUCOSE URINE: NEGATIVE
GLUCOSE, FLUID: 149 MG/DL
HBA1C MFR BLD: 4.7 % (ref 4–6)
HCO3 VENOUS: 32.3 MMOL/L (ref 24–30)
HCT VFR BLD CALC: 21.6 % (ref 40.7–50.3)
HCT VFR BLD CALC: 23.5 % (ref 40.7–50.3)
HEMOGLOBIN: 6.7 G/DL (ref 13–17)
HEMOGLOBIN: 7 G/DL (ref 13–17)
IMMATURE GRANULOCYTES: 0 %
IMMATURE GRANULOCYTES: 0 %
INR BLD: 1.1
KETONES, URINE: NEGATIVE
LACTATE DEHYDROGENASE, FLUID: 87 U/L
LACTIC ACID, SEPSIS WHOLE BLOOD: 2.4 MMOL/L (ref 0.5–1.9)
LACTIC ACID, SEPSIS WHOLE BLOOD: 2.7 MMOL/L (ref 0.5–1.9)
LACTIC ACID, SEPSIS WHOLE BLOOD: 3.2 MMOL/L (ref 0.5–1.9)
LACTIC ACID, WHOLE BLOOD: 4.8 MMOL/L (ref 0.7–2.1)
LEGIONELLA PNEUMOPHILIA AG, URINE: NEGATIVE
LEUKOCYTE ESTERASE, URINE: NEGATIVE
LIPASE: 13 U/L (ref 13–60)
LYMPHOCYTES # BLD: 11 % (ref 24–44)
LYMPHOCYTES # BLD: 4 % (ref 24–44)
MAGNESIUM: 1.8 MG/DL (ref 1.6–2.6)
MCH RBC QN AUTO: 28.5 PG (ref 25.2–33.5)
MCH RBC QN AUTO: 28.6 PG (ref 25.2–33.5)
MCHC RBC AUTO-ENTMCNC: 29.8 G/DL (ref 28.4–34.8)
MCHC RBC AUTO-ENTMCNC: 31 G/DL (ref 28.4–34.8)
MCV RBC AUTO: 91.9 FL (ref 82.6–102.9)
MCV RBC AUTO: 95.9 FL (ref 82.6–102.9)
MONOCYTES # BLD: 2 % (ref 1–7)
MONOCYTES # BLD: 4 % (ref 1–7)
MORPHOLOGY: ABNORMAL
MRSA, DNA, NASAL: NEGATIVE
MYCOPLASMA PNEUMONIAE IGM: 0.16
NITRITE, URINE: NEGATIVE
NRBC AUTOMATED: 0 PER 100 WBC
NRBC AUTOMATED: 0 PER 100 WBC
O2 SAT, VEN: 51 % (ref 60–85)
PATIENT TEMP: 37
PCO2, VEN: 73.2 MM HG (ref 39–55)
PDW BLD-RTO: 14.6 % (ref 11.8–14.4)
PDW BLD-RTO: 14.6 % (ref 11.8–14.4)
PH FLUID: 7.5
PH UA: 5 (ref 5–8)
PH VENOUS: 7.27 (ref 7.32–7.42)
PLATELET # BLD: 172 K/UL (ref 138–453)
PLATELET # BLD: ABNORMAL K/UL (ref 138–453)
PLATELET, FLUORESCENCE: NORMAL K/UL (ref 138–453)
PMV BLD AUTO: 10.6 FL (ref 8.1–13.5)
PO2, VEN: 36.5 MM HG (ref 30–50)
POC HCO3: 32.3 MMOL/L (ref 21–28)
POC O2 SATURATION: 95 % (ref 94–98)
POC PCO2: 48.5 MM HG (ref 35–48)
POC PH: 7.43 (ref 7.35–7.45)
POC PO2: 75.5 MM HG (ref 83–108)
POSITIVE BASE EXCESS, ART: 7 (ref 0–3)
POSITIVE BASE EXCESS, VEN: 5 MMOL/L (ref 0–2)
POTASSIUM SERPL-SCNC: 4.9 MMOL/L (ref 3.7–5.3)
PROCALCITONIN: >100 NG/ML
PROTEIN UA: NEGATIVE
PROTHROMBIN TIME: 11.6 SEC (ref 9.1–12.3)
RBC # BLD: 2.35 M/UL (ref 4.21–5.77)
RBC # BLD: 2.45 M/UL (ref 4.21–5.77)
RBC UA: NORMAL /HPF (ref 0–4)
REASON FOR REJECTION: NORMAL
SAMPLE SITE: ABNORMAL
SEG NEUTROPHILS: 87 % (ref 36–66)
SEG NEUTROPHILS: 92 % (ref 36–66)
SEGMENTED NEUTROPHILS ABSOLUTE COUNT: 6 K/UL (ref 1.8–7.7)
SEGMENTED NEUTROPHILS ABSOLUTE COUNT: 6.9 K/UL (ref 1.8–7.7)
SODIUM BLD-SCNC: 135 MMOL/L (ref 135–144)
SPECIFIC GRAVITY UA: 1.01 (ref 1–1.03)
SPECIMEN DESCRIPTION: NORMAL
SPECIMEN TYPE: NORMAL
TOTAL PROTEIN, BODY FLUID: 1.9 G/DL
TOTAL PROTEIN: 5.1 G/DL (ref 6.4–8.3)
TROPONIN, HIGH SENSITIVITY: 77 NG/L (ref 0–22)
TROPONIN, HIGH SENSITIVITY: 85 NG/L (ref 0–22)
TROPONIN, HIGH SENSITIVITY: 90 NG/L (ref 0–22)
TURBIDITY: CLEAR
URINE HGB: NEGATIVE
UROBILINOGEN, URINE: NORMAL
WBC # BLD: 6.9 K/UL (ref 3.5–11.3)
WBC # BLD: 7.5 K/UL (ref 3.5–11.3)
WBC UA: NORMAL /HPF (ref 0–5)
ZZ NTE CLEAN UP: ORDERED TEST: NORMAL
ZZ NTE WITH NAME CLEAN UP: SPECIMEN SOURCE: NORMAL

## 2023-01-09 PROCEDURE — 87205 SMEAR GRAM STAIN: CPT

## 2023-01-09 PROCEDURE — 93005 ELECTROCARDIOGRAM TRACING: CPT | Performed by: STUDENT IN AN ORGANIZED HEALTH CARE EDUCATION/TRAINING PROGRAM

## 2023-01-09 PROCEDURE — 99291 CRITICAL CARE FIRST HOUR: CPT | Performed by: INTERNAL MEDICINE

## 2023-01-09 PROCEDURE — 84157 ASSAY OF PROTEIN OTHER: CPT

## 2023-01-09 PROCEDURE — 83615 LACTATE (LD) (LDH) ENZYME: CPT

## 2023-01-09 PROCEDURE — 87075 CULTR BACTERIA EXCEPT BLOOD: CPT

## 2023-01-09 PROCEDURE — 6370000000 HC RX 637 (ALT 250 FOR IP)

## 2023-01-09 PROCEDURE — 83986 ASSAY PH BODY FLUID NOS: CPT

## 2023-01-09 PROCEDURE — 81001 URINALYSIS AUTO W/SCOPE: CPT

## 2023-01-09 PROCEDURE — 2580000003 HC RX 258: Performed by: STUDENT IN AN ORGANIZED HEALTH CARE EDUCATION/TRAINING PROGRAM

## 2023-01-09 PROCEDURE — 2000000000 HC ICU R&B

## 2023-01-09 PROCEDURE — 71045 X-RAY EXAM CHEST 1 VIEW: CPT

## 2023-01-09 PROCEDURE — 6360000002 HC RX W HCPCS: Performed by: STUDENT IN AN ORGANIZED HEALTH CARE EDUCATION/TRAINING PROGRAM

## 2023-01-09 PROCEDURE — 83036 HEMOGLOBIN GLYCOSYLATED A1C: CPT

## 2023-01-09 PROCEDURE — 74176 CT ABD & PELVIS W/O CONTRAST: CPT

## 2023-01-09 PROCEDURE — 2580000003 HC RX 258

## 2023-01-09 PROCEDURE — 2500000003 HC RX 250 WO HCPCS

## 2023-01-09 PROCEDURE — 87449 NOS EACH ORGANISM AG IA: CPT

## 2023-01-09 PROCEDURE — 36415 COLL VENOUS BLD VENIPUNCTURE: CPT

## 2023-01-09 PROCEDURE — 94640 AIRWAY INHALATION TREATMENT: CPT

## 2023-01-09 PROCEDURE — 83690 ASSAY OF LIPASE: CPT

## 2023-01-09 PROCEDURE — 32555 ASPIRATE PLEURA W/ IMAGING: CPT

## 2023-01-09 PROCEDURE — 94660 CPAP INITIATION&MGMT: CPT

## 2023-01-09 PROCEDURE — 85025 COMPLETE CBC W/AUTO DIFF WBC: CPT

## 2023-01-09 PROCEDURE — 80053 COMPREHEN METABOLIC PANEL: CPT

## 2023-01-09 PROCEDURE — 85610 PROTHROMBIN TIME: CPT

## 2023-01-09 PROCEDURE — 82803 BLOOD GASES ANY COMBINATION: CPT

## 2023-01-09 PROCEDURE — 82805 BLOOD GASES W/O2 SATURATION: CPT

## 2023-01-09 PROCEDURE — 83605 ASSAY OF LACTIC ACID: CPT

## 2023-01-09 PROCEDURE — 84145 PROCALCITONIN (PCT): CPT

## 2023-01-09 PROCEDURE — 84484 ASSAY OF TROPONIN QUANT: CPT

## 2023-01-09 PROCEDURE — 82330 ASSAY OF CALCIUM: CPT

## 2023-01-09 PROCEDURE — 82947 ASSAY GLUCOSE BLOOD QUANT: CPT

## 2023-01-09 PROCEDURE — 6370000000 HC RX 637 (ALT 250 FOR IP): Performed by: STUDENT IN AN ORGANIZED HEALTH CARE EDUCATION/TRAINING PROGRAM

## 2023-01-09 PROCEDURE — 2580000003 HC RX 258: Performed by: INTERNAL MEDICINE

## 2023-01-09 PROCEDURE — 83735 ASSAY OF MAGNESIUM: CPT

## 2023-01-09 PROCEDURE — 0W9B3ZZ DRAINAGE OF LEFT PLEURAL CAVITY, PERCUTANEOUS APPROACH: ICD-10-PCS | Performed by: STUDENT IN AN ORGANIZED HEALTH CARE EDUCATION/TRAINING PROGRAM

## 2023-01-09 PROCEDURE — 36600 WITHDRAWAL OF ARTERIAL BLOOD: CPT

## 2023-01-09 PROCEDURE — 85055 RETICULATED PLATELET ASSAY: CPT

## 2023-01-09 PROCEDURE — 82945 GLUCOSE OTHER FLUID: CPT

## 2023-01-09 PROCEDURE — 2700000000 HC OXYGEN THERAPY PER DAY

## 2023-01-09 PROCEDURE — 6360000002 HC RX W HCPCS: Performed by: INTERNAL MEDICINE

## 2023-01-09 PROCEDURE — 37799 UNLISTED PX VASCULAR SURGERY: CPT

## 2023-01-09 PROCEDURE — 87070 CULTURE OTHR SPECIMN AEROBIC: CPT

## 2023-01-09 RX ORDER — MAGNESIUM SULFATE 1 G/100ML
1000 INJECTION INTRAVENOUS ONCE
Status: COMPLETED | OUTPATIENT
Start: 2023-01-09 | End: 2023-01-09

## 2023-01-09 RX ORDER — DOCUSATE SODIUM 100 MG/1
100 CAPSULE, LIQUID FILLED ORAL DAILY
Status: DISCONTINUED | OUTPATIENT
Start: 2023-01-09 | End: 2023-01-13 | Stop reason: HOSPADM

## 2023-01-09 RX ORDER — SENNA PLUS 8.6 MG/1
1 TABLET ORAL NIGHTLY
Status: DISCONTINUED | OUTPATIENT
Start: 2023-01-09 | End: 2023-01-13 | Stop reason: HOSPADM

## 2023-01-09 RX ADMIN — IPRATROPIUM BROMIDE AND ALBUTEROL SULFATE 1 AMPULE: 2.5; .5 SOLUTION RESPIRATORY (INHALATION) at 19:34

## 2023-01-09 RX ADMIN — DOCUSATE SODIUM 100 MG: 100 CAPSULE ORAL at 15:52

## 2023-01-09 RX ADMIN — IPRATROPIUM BROMIDE AND ALBUTEROL SULFATE 1 AMPULE: 2.5; .5 SOLUTION RESPIRATORY (INHALATION) at 16:16

## 2023-01-09 RX ADMIN — PIPERACILLIN AND TAZOBACTAM 3375 MG: 3; .375 INJECTION, POWDER, FOR SOLUTION INTRAVENOUS at 17:00

## 2023-01-09 RX ADMIN — METHYLPREDNISOLONE SODIUM SUCCINATE 40 MG: 40 INJECTION, POWDER, FOR SOLUTION INTRAMUSCULAR; INTRAVENOUS at 08:38

## 2023-01-09 RX ADMIN — SODIUM CHLORIDE, PRESERVATIVE FREE 20 MG: 5 INJECTION INTRAVENOUS at 08:38

## 2023-01-09 RX ADMIN — BUDESONIDE AND FORMOTEROL FUMARATE DIHYDRATE 2 PUFF: 80; 4.5 AEROSOL RESPIRATORY (INHALATION) at 19:34

## 2023-01-09 RX ADMIN — SODIUM CHLORIDE, PRESERVATIVE FREE 10 ML: 5 INJECTION INTRAVENOUS at 21:50

## 2023-01-09 RX ADMIN — PIPERACILLIN AND TAZOBACTAM 3375 MG: 3; .375 INJECTION, POWDER, FOR SOLUTION INTRAVENOUS at 08:53

## 2023-01-09 RX ADMIN — SENNOSIDES 8.6 MG: 8.6 TABLET, COATED ORAL at 21:48

## 2023-01-09 RX ADMIN — MAGNESIUM SULFATE HEPTAHYDRATE 1000 MG: 1 INJECTION, SOLUTION INTRAVENOUS at 16:16

## 2023-01-09 RX ADMIN — PIPERACILLIN AND TAZOBACTAM 3375 MG: 3; .375 INJECTION, POWDER, FOR SOLUTION INTRAVENOUS at 00:59

## 2023-01-09 RX ADMIN — SODIUM CHLORIDE, PRESERVATIVE FREE 20 MG: 5 INJECTION INTRAVENOUS at 21:48

## 2023-01-09 RX ADMIN — ATORVASTATIN CALCIUM 20 MG: 20 TABLET, FILM COATED ORAL at 08:37

## 2023-01-09 ASSESSMENT — PAIN SCALES - GENERAL
PAINLEVEL_OUTOF10: 0

## 2023-01-09 NOTE — BRIEF OP NOTE
Brief Postoperative Note for Thoracentesis    Latasha Seen  YOB: 1955  [id number]    Pre-operative Diagnosis: left Pleural effusion      Post-operative Diagnosis: Same    Procedure: Ultrasound guided Thoracentesis     Anesthesia: 1% Lidocaine     Surgeons/Assistants: Jocelyn Naik PA-C    Complications: none    EBL: Minimal    Specimens: were obtained    Ultrasound guided left thoracentesis performed. 450 ml clear yellow fluid obtained. Dressing applied.       Electronically signed by FER Mosquera on 1/9/2023 at 12:28 PM

## 2023-01-09 NOTE — PLAN OF CARE
Problem: Discharge Planning  Goal: Discharge to home or other facility with appropriate resources  1/9/2023 0657 by Facundo Deng RN  Outcome: Progressing     Problem: Pain  Goal: Verbalizes/displays adequate comfort level or baseline comfort level  1/9/2023 0657 by Facundo Deng RN  Outcome: Progressing
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
## 2023-01-09 NOTE — PLAN OF CARE
Problem: Discharge Planning  Goal: Discharge to home or other facility with appropriate resources  1/9/2023 1336 by Paulie White RN  Outcome: Progressing  1/9/2023 0657 by Jenifer Schafer RN  Outcome: Progressing     Problem: Pain  Goal: Verbalizes/displays adequate comfort level or baseline comfort level  1/9/2023 1336 by Paulie White RN  Outcome: Progressing  1/9/2023 0657 by Jenifer Schafer RN  Outcome: Progressing     Problem: Chronic Conditions and Co-morbidities  Goal: Patient's chronic conditions and co-morbidity symptoms are monitored and maintained or improved  Outcome: Progressing   Paulie White RN

## 2023-01-09 NOTE — PLAN OF CARE
Problem: Discharge Planning  Goal: Discharge to home or other facility with appropriate resources  Outcome: Progressing  Flowsheets (Taken 1/8/2023 1807)  Discharge to home or other facility with appropriate resources:   Identify barriers to discharge with patient and caregiver   Identify discharge learning needs (meds, wound care, etc)   Arrange for needed discharge resources and transportation as appropriate   Refer to discharge planning if patient needs post-hospital services based on physician order or complex needs related to functional status, cognitive ability or social support system     Problem: Pain  Goal: Verbalizes/displays adequate comfort level or baseline comfort level  Outcome: Progressing

## 2023-01-09 NOTE — PROGRESS NOTES
Physician Progress Note      PATIENT:               Kearney Epley  CSN #:                  [id number]  :                       1955  ADMIT DATE:       2023 10:43 AM  DISCH DATE:  RESPONDING  PROVIDER #:        PAULA BEUACHAMP          QUERY TEXT:    Pt admitted with COPD exacerbation/pneumonia. Pt noted to have elevated WBC,   lactic acid and procalcitonin with tachycardia and tachypnea. If possible,   please document in the progress notes and discharge summary if you are   evaluating and /or treating any of the following: The medical record reflects the following:  Risk Factors: pneumonia  Clinical Indicators: admitted with acute respiratory failure, COPD   exacerbation and pneumonia, CXR showed pleural effusions and possible   pneumonitis, WBC 13, lactic acid 4.8, procalcitonin >100, HR 90's, RR 30's  Treatment: IV Rocephin and Zosyn, IV Lasix, Solumedrol, BIPAP, CXR, transfer   to ICU, labs, cultures, pending thoracentesis    Thank you, Tulio Whitney, CDI  email - [email or web address]  cell- [phone]  office hours M-F-7A-3P  Options provided:  -- Sepsis, present on admission  -- pneumonia without Sepsis  -- Other - I will add my own diagnosis  -- Disagree - Not applicable / Not valid  -- Disagree - Clinically unable to determine / Unknown  -- Refer to Clinical Documentation Reviewer    PROVIDER RESPONSE TEXT:    This patient has sepsis which was present on admission. Query created by: Dar Castaneda on 2023 9:38 AM      QUERY TEXT:    Pt admitted with acute respiratory failure/COPD exacerbation and has CHF   exacerbation documented.  If possible, please document in progress notes and   discharge summary further specificity regarding the type and acuity of CHF:    The medical record reflects the following:  Risk Factors: hx CHF, HTN, DM  Clinical Indicators: per family med H&P \"CHF exacerbation-Echo 10/11/2022   shows 50% EF\", CXR showed small to moderate left and small right pleural   effusion which is new from prior study, BNP 4696, per Dr Almaguer Lav >rt   pleural effusion-Will need left thoracentesis by basilio reyes\"  Treatment: IV Lasix, BIPAP, CXR, transfer to ICU, labs, cultures, monitoring,   pending thoracentesis    Thank you, Luisa Sandoval, JERROD  email - [email or web address]  cell- [phone]  office hours M-F-7A-3P  Options provided:  -- Acute on Chronic Systolic CHF/HFrEF  -- Acute on Chronic Diastolic CHF/HFpEF  -- Acute on Chronic Systolic and Diastolic CHF  -- Other - I will add my own diagnosis  -- Disagree - Not applicable / Not valid  -- Disagree - Clinically unable to determine / Unknown  -- Refer to Clinical Documentation Reviewer    PROVIDER RESPONSE TEXT:    This patient is in acute on chronic systolic and diastolic CHF.     Query created by: Woody Zhou on 1/9/2023 9:42 AM      Electronically signed by:  Merline Guevara 1/9/2023 11:34 AM

## 2023-01-09 NOTE — PROGRESS NOTES
Here for thoracentesis. Consent signed. Labs noted. Here on monitors with floor RN. Jermaine MONROE at side. Ultrasound done to back. Left side with more fluid. Left side is prepped, draped and numbed with lidocaine. Access obtained and patient drained for 450 ML of yellow fluid. Post scan done. Access out and bandage on. Back to room with floor RN. Specimen to be sent.

## 2023-01-09 NOTE — PROGRESS NOTES
SPIRITUAL CARE DEPARTMENT - [address]  PROGRESS NOTE    Shift date: 1/9/2023  Shift day: Monday   Shift # 1    Room # 3027/3027-01   Name: Gerhardt Stapler                Pentec[employer]alism: [employer]   Place of [facility]: Unknown    Referral: Routine Visit    Admit Date & Time: 1/8/2023 10:43 AM    Assessment:  Gerhardt Stapler is a 69 y.o. male in the hospital because COPD exacerbation. Upon entering the room writer observes patient is calmly sitting in the bed. Intervention:  Writer introduced self and title as  Writer offered space for patient  to express feelings, needs, and concerns and provided a ministry presence. Patient told the  that he was trying to get better. Patient said that the only need he had was prayer. Outcome:   prayed with patient and patient thanked the  for stopping by and praying with him and checking in . Plan:  Chaplains will remain available to offer spiritual and emotional support as needed. Electronically signed by Grey Mae, on 1/9/2023 at 2:39 PM.  [facility]  [phone]       01/09/23 1438   Encounter Summary   Service Provided For: Patient   Referral/Consult From: [employer]   Support System Unknown   Last Encounter  01/09/23   Complexity of Encounter Moderate   Begin Time 1030   End Time  1032   Total Time Calculated 2 min   Assessment/Intervention/Outcome   Assessment Calm;Coping; Anxious   Intervention Active listening;Sustaining Presence/Ministry of presence;Prayer (assurance of)/Longs   Outcome Connection/Belonging;Coping;Engaged in conversation;Expressed Gratitude

## 2023-01-10 LAB
ABSOLUTE EOS #: 0 K/UL (ref 0–0.4)
ABSOLUTE IMMATURE GRANULOCYTE: 0 K/UL (ref 0–0.3)
ABSOLUTE LYMPH #: 0.37 K/UL (ref 1–4.8)
ABSOLUTE MONO #: 0.06 K/UL (ref 0.1–0.8)
ABSOLUTE RETIC #: 0.04 M/UL (ref 0.03–0.08)
ALBUMIN SERPL-MCNC: 2.3 G/DL (ref 3.5–5.2)
ALBUMIN/GLOBULIN RATIO: 1 (ref 1–2.5)
ALP BLD-CCNC: 64 U/L (ref 40–129)
ALT SERPL-CCNC: 5 U/L (ref 5–41)
ANION GAP SERPL CALCULATED.3IONS-SCNC: 7 MMOL/L (ref 9–17)
AST SERPL-CCNC: 13 U/L
BASOPHILS # BLD: 0 % (ref 0–2)
BASOPHILS ABSOLUTE: 0 K/UL (ref 0–0.2)
BILIRUB SERPL-MCNC: 0.3 MG/DL (ref 0.3–1.2)
BUN BLDV-MCNC: 24 MG/DL (ref 8–23)
CALCIUM SERPL-MCNC: 7.9 MG/DL (ref 8.6–10.4)
CHLORIDE BLD-SCNC: 90 MMOL/L (ref 98–107)
CO2: 33 MMOL/L (ref 20–31)
CREAT SERPL-MCNC: 0.98 MG/DL (ref 0.7–1.2)
CULTURE: ABNORMAL
DIRECT EXAM: ABNORMAL
EKG ATRIAL RATE: 72 BPM
EKG P AXIS: 52 DEGREES
EKG P-R INTERVAL: 146 MS
EKG Q-T INTERVAL: 438 MS
EKG QRS DURATION: 96 MS
EKG QTC CALCULATION (BAZETT): 479 MS
EKG R AXIS: 3 DEGREES
EKG T AXIS: 27 DEGREES
EKG VENTRICULAR RATE: 72 BPM
EOSINOPHILS RELATIVE PERCENT: 0 % (ref 1–4)
FERRITIN: 210 NG/ML (ref 30–400)
FOLATE: 4.2 NG/ML
GFR SERPL CREATININE-BSD FRML MDRD: >60 ML/MIN/1.73M2
GLUCOSE BLD-MCNC: 133 MG/DL (ref 70–99)
GLUCOSE BLD-MCNC: 173 MG/DL (ref 75–110)
GLUCOSE BLD-MCNC: 174 MG/DL (ref 75–110)
GLUCOSE BLD-MCNC: 175 MG/DL (ref 75–110)
GLUCOSE BLD-MCNC: 176 MG/DL (ref 75–110)
GLUCOSE BLD-MCNC: 198 MG/DL (ref 75–110)
GLUCOSE BLD-MCNC: 290 MG/DL (ref 75–110)
HCT VFR BLD CALC: 21.2 % (ref 40.7–50.3)
HCT VFR BLD CALC: 26.6 % (ref 40.7–50.3)
HEMOGLOBIN: 6.2 G/DL (ref 13–17)
HEMOGLOBIN: 8.3 G/DL (ref 13–17)
IMMATURE GRANULOCYTES: 0 %
IMMATURE RETIC FRACT: 5.6 % (ref 2.7–18.3)
IRON SATURATION: ABNORMAL % (ref 20–55)
IRON: 174 UG/DL (ref 59–158)
LACTATE DEHYDROGENASE: 241 U/L (ref 135–225)
LACTIC ACID, SEPSIS WHOLE BLOOD: 1 MMOL/L (ref 0.5–1.9)
LYMPHOCYTES # BLD: 6 % (ref 24–44)
MCH RBC QN AUTO: 28.4 PG (ref 25.2–33.5)
MCHC RBC AUTO-ENTMCNC: 29.2 G/DL (ref 28.4–34.8)
MCV RBC AUTO: 97.2 FL (ref 82.6–102.9)
MONOCYTES # BLD: 1 % (ref 1–7)
MORPHOLOGY: NORMAL
NRBC AUTOMATED: 0 PER 100 WBC
PDW BLD-RTO: 14.3 % (ref 11.8–14.4)
PLATELET # BLD: 160 K/UL (ref 138–453)
PMV BLD AUTO: 10.1 FL (ref 8.1–13.5)
POTASSIUM SERPL-SCNC: 4.3 MMOL/L (ref 3.7–5.3)
RBC # BLD: 2.18 M/UL (ref 4.21–5.77)
RETIC %: 1.4 % (ref 0.5–1.9)
RETIC HEMOGLOBIN: 25.3 PG (ref 28.2–35.7)
SEG NEUTROPHILS: 93 % (ref 36–66)
SEGMENTED NEUTROPHILS ABSOLUTE COUNT: 5.67 K/UL (ref 1.8–7.7)
SODIUM BLD-SCNC: 130 MMOL/L (ref 135–144)
SPECIMEN DESCRIPTION: ABNORMAL
TOTAL IRON BINDING CAPACITY: ABNORMAL UG/DL (ref 250–450)
TOTAL PROTEIN: 4.6 G/DL (ref 6.4–8.3)
UNSATURATED IRON BINDING CAPACITY: <17 UG/DL (ref 112–347)
VITAMIN B-12: 599 PG/ML (ref 232–1245)
WBC # BLD: 6.1 K/UL (ref 3.5–11.3)

## 2023-01-10 PROCEDURE — 85014 HEMATOCRIT: CPT

## 2023-01-10 PROCEDURE — 82728 ASSAY OF FERRITIN: CPT

## 2023-01-10 PROCEDURE — 2700000000 HC OXYGEN THERAPY PER DAY

## 2023-01-10 PROCEDURE — 6370000000 HC RX 637 (ALT 250 FOR IP)

## 2023-01-10 PROCEDURE — 6370000000 HC RX 637 (ALT 250 FOR IP): Performed by: STUDENT IN AN ORGANIZED HEALTH CARE EDUCATION/TRAINING PROGRAM

## 2023-01-10 PROCEDURE — 86850 RBC ANTIBODY SCREEN: CPT

## 2023-01-10 PROCEDURE — 2580000003 HC RX 258: Performed by: INTERNAL MEDICINE

## 2023-01-10 PROCEDURE — 85025 COMPLETE CBC W/AUTO DIFF WBC: CPT

## 2023-01-10 PROCEDURE — 86900 BLOOD TYPING SEROLOGIC ABO: CPT

## 2023-01-10 PROCEDURE — 80053 COMPREHEN METABOLIC PANEL: CPT

## 2023-01-10 PROCEDURE — 85018 HEMOGLOBIN: CPT

## 2023-01-10 PROCEDURE — 93010 ELECTROCARDIOGRAM REPORT: CPT | Performed by: INTERNAL MEDICINE

## 2023-01-10 PROCEDURE — 6360000002 HC RX W HCPCS: Performed by: INTERNAL MEDICINE

## 2023-01-10 PROCEDURE — 6360000002 HC RX W HCPCS: Performed by: STUDENT IN AN ORGANIZED HEALTH CARE EDUCATION/TRAINING PROGRAM

## 2023-01-10 PROCEDURE — 83540 ASSAY OF IRON: CPT

## 2023-01-10 PROCEDURE — 1200000000 HC SEMI PRIVATE

## 2023-01-10 PROCEDURE — 36415 COLL VENOUS BLD VENIPUNCTURE: CPT

## 2023-01-10 PROCEDURE — 2580000003 HC RX 258

## 2023-01-10 PROCEDURE — 36430 TRANSFUSION BLD/BLD COMPNT: CPT

## 2023-01-10 PROCEDURE — 94660 CPAP INITIATION&MGMT: CPT

## 2023-01-10 PROCEDURE — 86920 COMPATIBILITY TEST SPIN: CPT

## 2023-01-10 PROCEDURE — 2580000003 HC RX 258: Performed by: STUDENT IN AN ORGANIZED HEALTH CARE EDUCATION/TRAINING PROGRAM

## 2023-01-10 PROCEDURE — 83605 ASSAY OF LACTIC ACID: CPT

## 2023-01-10 PROCEDURE — 85045 AUTOMATED RETICULOCYTE COUNT: CPT

## 2023-01-10 PROCEDURE — 82746 ASSAY OF FOLIC ACID SERUM: CPT

## 2023-01-10 PROCEDURE — 86901 BLOOD TYPING SEROLOGIC RH(D): CPT

## 2023-01-10 PROCEDURE — 99291 CRITICAL CARE FIRST HOUR: CPT | Performed by: INTERNAL MEDICINE

## 2023-01-10 PROCEDURE — 94640 AIRWAY INHALATION TREATMENT: CPT

## 2023-01-10 PROCEDURE — 82607 VITAMIN B-12: CPT

## 2023-01-10 PROCEDURE — 2709999900 HC NON-CHARGEABLE SUPPLY

## 2023-01-10 PROCEDURE — 2500000003 HC RX 250 WO HCPCS

## 2023-01-10 PROCEDURE — 83615 LACTATE (LD) (LDH) ENZYME: CPT

## 2023-01-10 PROCEDURE — 83550 IRON BINDING TEST: CPT

## 2023-01-10 PROCEDURE — P9016 RBC LEUKOCYTES REDUCED: HCPCS

## 2023-01-10 RX ORDER — CARVEDILOL 12.5 MG/1
12.5 TABLET ORAL 2 TIMES DAILY WITH MEALS
Status: DISCONTINUED | OUTPATIENT
Start: 2023-01-10 | End: 2023-01-13 | Stop reason: HOSPADM

## 2023-01-10 RX ORDER — SODIUM CHLORIDE 9 MG/ML
INJECTION, SOLUTION INTRAVENOUS PRN
Status: DISCONTINUED | OUTPATIENT
Start: 2023-01-10 | End: 2023-01-13 | Stop reason: HOSPADM

## 2023-01-10 RX ORDER — AMLODIPINE BESYLATE 5 MG/1
5 TABLET ORAL DAILY
Status: DISCONTINUED | OUTPATIENT
Start: 2023-01-11 | End: 2023-01-11

## 2023-01-10 RX ORDER — INSULIN LISPRO 100 [IU]/ML
0-8 INJECTION, SOLUTION INTRAVENOUS; SUBCUTANEOUS
Status: DISCONTINUED | OUTPATIENT
Start: 2023-01-10 | End: 2023-01-13 | Stop reason: HOSPADM

## 2023-01-10 RX ORDER — FUROSEMIDE 20 MG/1
20 TABLET ORAL DAILY
Status: DISCONTINUED | OUTPATIENT
Start: 2023-01-10 | End: 2023-01-13 | Stop reason: HOSPADM

## 2023-01-10 RX ORDER — INSULIN LISPRO 100 [IU]/ML
0-4 INJECTION, SOLUTION INTRAVENOUS; SUBCUTANEOUS NIGHTLY
Status: DISCONTINUED | OUTPATIENT
Start: 2023-01-10 | End: 2023-01-13 | Stop reason: HOSPADM

## 2023-01-10 RX ORDER — FAMOTIDINE 20 MG/1
20 TABLET, FILM COATED ORAL 2 TIMES DAILY
Status: DISCONTINUED | OUTPATIENT
Start: 2023-01-10 | End: 2023-01-13 | Stop reason: HOSPADM

## 2023-01-10 RX ADMIN — BUDESONIDE AND FORMOTEROL FUMARATE DIHYDRATE 2 PUFF: 80; 4.5 AEROSOL RESPIRATORY (INHALATION) at 20:30

## 2023-01-10 RX ADMIN — SODIUM CHLORIDE, PRESERVATIVE FREE 20 MG: 5 INJECTION INTRAVENOUS at 08:44

## 2023-01-10 RX ADMIN — FUROSEMIDE 20 MG: 20 TABLET ORAL at 12:03

## 2023-01-10 RX ADMIN — PIPERACILLIN AND TAZOBACTAM 3375 MG: 3; .375 INJECTION, POWDER, FOR SOLUTION INTRAVENOUS at 17:02

## 2023-01-10 RX ADMIN — PIPERACILLIN AND TAZOBACTAM 3375 MG: 3; .375 INJECTION, POWDER, FOR SOLUTION INTRAVENOUS at 08:56

## 2023-01-10 RX ADMIN — SODIUM CHLORIDE, PRESERVATIVE FREE 10 ML: 5 INJECTION INTRAVENOUS at 10:19

## 2023-01-10 RX ADMIN — DOCUSATE SODIUM 100 MG: 100 CAPSULE ORAL at 08:44

## 2023-01-10 RX ADMIN — BUDESONIDE AND FORMOTEROL FUMARATE DIHYDRATE 2 PUFF: 80; 4.5 AEROSOL RESPIRATORY (INHALATION) at 08:22

## 2023-01-10 RX ADMIN — IPRATROPIUM BROMIDE AND ALBUTEROL SULFATE 1 AMPULE: 2.5; .5 SOLUTION RESPIRATORY (INHALATION) at 20:30

## 2023-01-10 RX ADMIN — CARVEDILOL 12.5 MG: 12.5 TABLET, FILM COATED ORAL at 20:39

## 2023-01-10 RX ADMIN — ATORVASTATIN CALCIUM 20 MG: 20 TABLET, FILM COATED ORAL at 08:45

## 2023-01-10 RX ADMIN — IPRATROPIUM BROMIDE AND ALBUTEROL SULFATE 1 AMPULE: 2.5; .5 SOLUTION RESPIRATORY (INHALATION) at 08:22

## 2023-01-10 RX ADMIN — PIPERACILLIN AND TAZOBACTAM 3375 MG: 3; .375 INJECTION, POWDER, FOR SOLUTION INTRAVENOUS at 00:03

## 2023-01-10 RX ADMIN — IPRATROPIUM BROMIDE AND ALBUTEROL SULFATE 1 AMPULE: 2.5; .5 SOLUTION RESPIRATORY (INHALATION) at 13:48

## 2023-01-10 RX ADMIN — Medication 81 MG: at 08:45

## 2023-01-10 RX ADMIN — SODIUM CHLORIDE, PRESERVATIVE FREE 10 ML: 5 INJECTION INTRAVENOUS at 20:11

## 2023-01-10 RX ADMIN — FAMOTIDINE 20 MG: 20 TABLET, FILM COATED ORAL at 20:11

## 2023-01-10 RX ADMIN — METHYLPREDNISOLONE SODIUM SUCCINATE 40 MG: 40 INJECTION, POWDER, FOR SOLUTION INTRAMUSCULAR; INTRAVENOUS at 08:44

## 2023-01-10 RX ADMIN — SENNOSIDES 8.6 MG: 8.6 TABLET, COATED ORAL at 20:11

## 2023-01-10 RX ADMIN — IPRATROPIUM BROMIDE AND ALBUTEROL SULFATE 1 AMPULE: 2.5; .5 SOLUTION RESPIRATORY (INHALATION) at 15:34

## 2023-01-10 ASSESSMENT — PAIN SCALES - GENERAL
PAINLEVEL_OUTOF10: 0

## 2023-01-10 NOTE — PROGRESS NOTES
Transfer of Care Note    Patient is a 68 yo male with PMH of HTN, COPD, CHF initially presented with shortness of breath who was initially seen by [facility] Medicine Team on 1/8/23 but due to worsening of SOB Pulm was consulted and was transferred to ICU. Patient was placed on BiPAP and noted to have bilateral Pleural Effusion. In the ICU patient had Thoracentesis done which showed Transudative Effusion, cultures pending. Patient was also placed on BiPAP and weaned off to use only at night. Patient also had Anemia, which appears to be chronic in nature, and received 1 PRBC on 1/10/23, but no acute signs of bleeding are seen. Patient also had hypotension and coreg was held due to low HR. Patient had elevated Trops and cardiology evaluated the patient likely Type II due to demand doubt ACS. Recent Echo in October EF of 50%.      Examination   Vital Signs Stable Currently on 3 L NC  Pulm : Clear Breath Sounds, Diminished Breath Sounds, No wheezing  CVS: regular rate and rhythm   Abdomen: Mild Epigastric Tenderness     Assessment and Plan    Acute on Chronic Respiratory Failure 2/2 COPD exacerbation  -Bipap at night  -IV solumedrol 40 mg daily  -Continue Symbicort and Duoneb    Bilateral Pneumonia   -CXR suggestive of Pleural Effusion and pneumonitis  -On zosyn  -Awaiting cultures thoracentesis    Anemia  -Likely chronic   -Last Hemoglobin 6.2  -Received 1 PRBC  -Iron studies - pending    HTN   -Norvasc 5 mg  -Coreg 12.5 mg twice daily - held due to low HR    T2DM  -Continue Sliding Scale  -Hypoglycemia protocol  -POCT 4 times daily AC and QH    DVT prophylaxis: Heparin Held due to Anemia  GI Prophylaxis: Pepcid 20 mg twice daily    Electronically signed by Marie Cruz MD on 1/10/2023 at 1:46 PM
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
## 2023-01-10 NOTE — PLAN OF CARE
Problem: Discharge Planning  Goal: Discharge to home or other facility with appropriate resources  1/10/2023 1452 by Teresa Samano RN  Outcome: Progressing  Flowsheets (Taken 1/10/2023 1426 by Lorenza Barraza RN)  Discharge to home or other facility with appropriate resources: Identify barriers to discharge with patient and caregiver  1/10/2023 0453 by Yovanny Nunez RN  Outcome: Progressing  Flowsheets (Taken 1/9/2023 2000)  Discharge to home or other facility with appropriate resources: Identify barriers to discharge with patient and caregiver     Problem: Pain  Goal: Verbalizes/displays adequate comfort level or baseline comfort level  Outcome: Progressing     Problem: Chronic Conditions and Co-morbidities  Goal: Patient's chronic conditions and co-morbidity symptoms are monitored and maintained or improved  1/10/2023 1452 by Teresa Samano RN  Outcome: Progressing  [address] (Taken 1/10/2023 1426 by Lorenza Barraza RN)  Care Plan - Patient's Chronic Conditions and Co-Morbidity Symptoms are Monitored and Maintained or Improved: Monitor and assess patient's chronic conditions and comorbid symptoms for stability, deterioration, or improvement  1/10/2023 0453 by Yovanny Nunez RN  Outcome: Progressing  Flowsheets (Taken 1/9/2023 2000)  Care Plan - Patient's Chronic Conditions and Co-Morbidity Symptoms are Monitored and Maintained or Improved: Monitor and assess patient's chronic conditions and comorbid symptoms for stability, deterioration, or improvement     Problem: Skin/Tissue Integrity  Goal: Absence of new skin breakdown  Description: 1. Monitor for areas of redness and/or skin breakdown  2. Assess vascular access sites hourly  3. Every 4-6 hours minimum:  Change oxygen saturation probe site  4. Every 4-6 hours:  If on nasal continuous positive airway pressure, respiratory therapy assess nares and determine need for appliance change or resting period.   1/10/2023 1452 by Teresa Samano RN  Outcome: Progressing  1/10/2023 0453 by Gertrude Haney RN  Outcome: Progressing     Problem: Safety - Adult  Goal: Free from fall injury  1/10/2023 1452 by Tasha Salcedo RN  Outcome: Progressing  Flowsheets (Taken 1/10/2023 1451)  Free From Fall Injury: Danilo Sharpe family/caregiver on patient safety  1/10/2023 0453 by Gertrude Haney RN  Outcome: Progressing  Flowsheets (Taken 1/10/2023 0453)  Free From Fall Injury: Instruct family/caregiver on patient safety     Problem: ABCDS Injury Assessment  Goal: Absence of physical injury  1/10/2023 1452 by Tasha Salcedo RN  Outcome: Progressing  Flowsheets (Taken 1/10/2023 1451)  Absence of Physical Injury: Implement safety measures based on patient assessment  1/10/2023 0453 by Gertrude Haney RN  Outcome: Progressing  Flowsheets (Taken 1/10/2023 0453)  Absence of Physical Injury: Implement safety measures based on patient assessment

## 2023-01-10 NOTE — PLAN OF CARE
Problem: Discharge Planning  Goal: Discharge to home or other facility with appropriate resources  Outcome: Progressing  Flowsheets (Taken 1/9/2023 2000)  Discharge to home or other facility with appropriate resources: Identify barriers to discharge with patient and caregiver     Problem: Chronic Conditions and Co-morbidities  Goal: Patient's chronic conditions and co-morbidity symptoms are monitored and maintained or improved  Outcome: Progressing  Flowsheets (Taken 1/9/2023 2000)  Care Plan - Patient's Chronic Conditions and Co-Morbidity Symptoms are Monitored and Maintained or Improved: Monitor and assess patient's chronic conditions and comorbid symptoms for stability, deterioration, or improvement     Problem: Skin/Tissue Integrity  Goal: Absence of new skin breakdown  Description: 1. Monitor for areas of redness and/or skin breakdown  2. Assess vascular access sites hourly  3. Every 4-6 hours minimum:  Change oxygen saturation probe site  4. Every 4-6 hours:  If on nasal continuous positive airway pressure, respiratory therapy assess nares and determine need for appliance change or resting period.   Outcome: Progressing     Problem: Safety - Adult  Goal: Free from fall injury  Outcome: Progressing     Problem: ABCDS Injury Assessment  Goal: Absence of physical injury  Outcome: Progressing

## 2023-01-10 NOTE — CONSENT
Informed Consent for Blood Component Transfusion Note    I have discussed with the patient the rationale for blood component transfusion; its benefits in treating or preventing fatigue, organ damage, or death; and its risk which includes mild transfusion reactions, rare risk of blood borne infection, or more serious but rare reactions. I have discussed the alternatives to transfusion, including the risk and consequences of not receiving transfusion. The patient had an opportunity to ask questions and had agreed to proceed with transfusion of blood components.     Electronically signed by Tanner Molina MD on 1/10/23 at 6:12 AM EST
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
## 2023-01-11 ENCOUNTER — APPOINTMENT (OUTPATIENT)
Dept: GENERAL RADIOLOGY | Age: 68
End: 2023-01-11
Payer: MEDICARE

## 2023-01-11 LAB
ABO/RH: NORMAL
ABSOLUTE EOS #: 0 K/UL (ref 0–0.4)
ABSOLUTE IMMATURE GRANULOCYTE: 0 K/UL (ref 0–0.3)
ABSOLUTE LYMPH #: 0.5 K/UL (ref 1–4.8)
ABSOLUTE MONO #: 0 K/UL (ref 0.1–0.8)
ALBUMIN SERPL-MCNC: 2.1 G/DL (ref 3.5–5.2)
ALBUMIN/GLOBULIN RATIO: 0.8 (ref 1–2.5)
ALP BLD-CCNC: 57 U/L (ref 40–129)
ALT SERPL-CCNC: <5 U/L (ref 5–41)
ANION GAP SERPL CALCULATED.3IONS-SCNC: 6 MMOL/L (ref 9–17)
ANTIBODY SCREEN: NEGATIVE
ARM BAND NUMBER: NORMAL
AST SERPL-CCNC: 11 U/L
BASOPHILS # BLD: 0 % (ref 0–2)
BASOPHILS ABSOLUTE: 0 K/UL (ref 0–0.2)
BILIRUB SERPL-MCNC: 0.4 MG/DL (ref 0.3–1.2)
BLD PROD TYP BPU: NORMAL
BLOOD BANK BLOOD PRODUCT EXPIRATION DATE: NORMAL
BLOOD BANK ISBT PRODUCT BLOOD TYPE: 5100
BLOOD BANK PRODUCT CODE: NORMAL
BLOOD BANK UNIT TYPE AND RH: NORMAL
BPU ID: NORMAL
BUN BLDV-MCNC: 24 MG/DL (ref 8–23)
CALCIUM SERPL-MCNC: 7.7 MG/DL (ref 8.6–10.4)
CHLORIDE BLD-SCNC: 95 MMOL/L (ref 98–107)
CO2: 32 MMOL/L (ref 20–31)
CREAT SERPL-MCNC: 0.98 MG/DL (ref 0.7–1.2)
CROSSMATCH RESULT: NORMAL
DISPENSE STATUS BLOOD BANK: NORMAL
EOSINOPHILS RELATIVE PERCENT: 0 % (ref 1–4)
EXPIRATION DATE: NORMAL
GFR SERPL CREATININE-BSD FRML MDRD: >60 ML/MIN/1.73M2
GLUCOSE BLD-MCNC: 123 MG/DL (ref 70–99)
GLUCOSE BLD-MCNC: 145 MG/DL (ref 75–110)
GLUCOSE BLD-MCNC: 166 MG/DL (ref 75–110)
GLUCOSE BLD-MCNC: 214 MG/DL (ref 75–110)
GLUCOSE BLD-MCNC: 273 MG/DL (ref 75–110)
HCT VFR BLD CALC: 26.7 % (ref 40.7–50.3)
HEMOGLOBIN: 8.1 G/DL (ref 13–17)
IMMATURE GRANULOCYTES: 0 %
LV EF: 43 %
LVEF MODALITY: NORMAL
LYMPHOCYTES # BLD: 9 % (ref 24–44)
MCH RBC QN AUTO: 28.2 PG (ref 25.2–33.5)
MCHC RBC AUTO-ENTMCNC: 30.3 G/DL (ref 28.4–34.8)
MCV RBC AUTO: 93 FL (ref 82.6–102.9)
MONOCYTES # BLD: 0 % (ref 1–7)
MORPHOLOGY: ABNORMAL
NRBC AUTOMATED: 0 PER 100 WBC
PDW BLD-RTO: 15.9 % (ref 11.8–14.4)
PLATELET # BLD: 163 K/UL (ref 138–453)
PMV BLD AUTO: 10.7 FL (ref 8.1–13.5)
POTASSIUM SERPL-SCNC: 4.6 MMOL/L (ref 3.7–5.3)
RBC # BLD: 2.87 M/UL (ref 4.21–5.77)
SEG NEUTROPHILS: 91 % (ref 36–66)
SEGMENTED NEUTROPHILS ABSOLUTE COUNT: 5.1 K/UL (ref 1.8–7.7)
SODIUM BLD-SCNC: 133 MMOL/L (ref 135–144)
TOTAL PROTEIN: 4.7 G/DL (ref 6.4–8.3)
TRANSFUSION STATUS: NORMAL
UNIT DIVISION: 0
UNIT ISSUE DATE/TIME: NORMAL
WBC # BLD: 5.6 K/UL (ref 3.5–11.3)

## 2023-01-11 PROCEDURE — 99233 SBSQ HOSP IP/OBS HIGH 50: CPT | Performed by: INTERNAL MEDICINE

## 2023-01-11 PROCEDURE — 97166 OT EVAL MOD COMPLEX 45 MIN: CPT

## 2023-01-11 PROCEDURE — 99232 SBSQ HOSP IP/OBS MODERATE 35: CPT | Performed by: FAMILY MEDICINE

## 2023-01-11 PROCEDURE — 36415 COLL VENOUS BLD VENIPUNCTURE: CPT

## 2023-01-11 PROCEDURE — 71045 X-RAY EXAM CHEST 1 VIEW: CPT

## 2023-01-11 PROCEDURE — 2700000000 HC OXYGEN THERAPY PER DAY

## 2023-01-11 PROCEDURE — 82947 ASSAY GLUCOSE BLOOD QUANT: CPT

## 2023-01-11 PROCEDURE — 94640 AIRWAY INHALATION TREATMENT: CPT

## 2023-01-11 PROCEDURE — 6360000002 HC RX W HCPCS: Performed by: INTERNAL MEDICINE

## 2023-01-11 PROCEDURE — 6360000002 HC RX W HCPCS

## 2023-01-11 PROCEDURE — 2580000003 HC RX 258: Performed by: INTERNAL MEDICINE

## 2023-01-11 PROCEDURE — 6370000000 HC RX 637 (ALT 250 FOR IP)

## 2023-01-11 PROCEDURE — 6370000000 HC RX 637 (ALT 250 FOR IP): Performed by: STUDENT IN AN ORGANIZED HEALTH CARE EDUCATION/TRAINING PROGRAM

## 2023-01-11 PROCEDURE — 2580000003 HC RX 258: Performed by: STUDENT IN AN ORGANIZED HEALTH CARE EDUCATION/TRAINING PROGRAM

## 2023-01-11 PROCEDURE — 93308 TTE F-UP OR LMTD: CPT

## 2023-01-11 PROCEDURE — 80053 COMPREHEN METABOLIC PANEL: CPT

## 2023-01-11 PROCEDURE — 94761 N-INVAS EAR/PLS OXIMETRY MLT: CPT

## 2023-01-11 PROCEDURE — 97162 PT EVAL MOD COMPLEX 30 MIN: CPT

## 2023-01-11 PROCEDURE — 6360000002 HC RX W HCPCS: Performed by: STUDENT IN AN ORGANIZED HEALTH CARE EDUCATION/TRAINING PROGRAM

## 2023-01-11 PROCEDURE — 1200000000 HC SEMI PRIVATE

## 2023-01-11 PROCEDURE — 94660 CPAP INITIATION&MGMT: CPT

## 2023-01-11 PROCEDURE — 97530 THERAPEUTIC ACTIVITIES: CPT

## 2023-01-11 PROCEDURE — 85025 COMPLETE CBC W/AUTO DIFF WBC: CPT

## 2023-01-11 PROCEDURE — 97535 SELF CARE MNGMENT TRAINING: CPT

## 2023-01-11 RX ORDER — AMLODIPINE BESYLATE 10 MG/1
10 TABLET ORAL DAILY
Status: DISCONTINUED | OUTPATIENT
Start: 2023-01-12 | End: 2023-01-13 | Stop reason: HOSPADM

## 2023-01-11 RX ADMIN — INSULIN LISPRO 4 UNITS: 100 INJECTION, SOLUTION INTRAVENOUS; SUBCUTANEOUS at 18:04

## 2023-01-11 RX ADMIN — CARVEDILOL 12.5 MG: 12.5 TABLET, FILM COATED ORAL at 18:03

## 2023-01-11 RX ADMIN — IPRATROPIUM BROMIDE AND ALBUTEROL SULFATE 1 AMPULE: 2.5; .5 SOLUTION RESPIRATORY (INHALATION) at 07:55

## 2023-01-11 RX ADMIN — IPRATROPIUM BROMIDE AND ALBUTEROL SULFATE 1 AMPULE: 2.5; .5 SOLUTION RESPIRATORY (INHALATION) at 12:17

## 2023-01-11 RX ADMIN — DOCUSATE SODIUM 100 MG: 100 CAPSULE ORAL at 11:12

## 2023-01-11 RX ADMIN — Medication 81 MG: at 11:12

## 2023-01-11 RX ADMIN — PIPERACILLIN AND TAZOBACTAM 3375 MG: 3; .375 INJECTION, POWDER, FOR SOLUTION INTRAVENOUS at 01:26

## 2023-01-11 RX ADMIN — CARVEDILOL 12.5 MG: 12.5 TABLET, FILM COATED ORAL at 11:12

## 2023-01-11 RX ADMIN — ATORVASTATIN CALCIUM 20 MG: 20 TABLET, FILM COATED ORAL at 11:12

## 2023-01-11 RX ADMIN — PIPERACILLIN AND TAZOBACTAM 3375 MG: 3; .375 INJECTION, POWDER, FOR SOLUTION INTRAVENOUS at 18:01

## 2023-01-11 RX ADMIN — SODIUM CHLORIDE, PRESERVATIVE FREE 10 ML: 5 INJECTION INTRAVENOUS at 20:33

## 2023-01-11 RX ADMIN — IPRATROPIUM BROMIDE AND ALBUTEROL SULFATE 1 AMPULE: 2.5; .5 SOLUTION RESPIRATORY (INHALATION) at 15:33

## 2023-01-11 RX ADMIN — FAMOTIDINE 20 MG: 20 TABLET, FILM COATED ORAL at 20:33

## 2023-01-11 RX ADMIN — PIPERACILLIN AND TAZOBACTAM 3375 MG: 3; .375 INJECTION, POWDER, FOR SOLUTION INTRAVENOUS at 12:47

## 2023-01-11 RX ADMIN — BUDESONIDE AND FORMOTEROL FUMARATE DIHYDRATE 2 PUFF: 80; 4.5 AEROSOL RESPIRATORY (INHALATION) at 19:41

## 2023-01-11 RX ADMIN — SODIUM CHLORIDE, PRESERVATIVE FREE 10 ML: 5 INJECTION INTRAVENOUS at 11:12

## 2023-01-11 RX ADMIN — FUROSEMIDE 20 MG: 20 TABLET ORAL at 11:11

## 2023-01-11 RX ADMIN — METHYLPREDNISOLONE SODIUM SUCCINATE 40 MG: 40 INJECTION, POWDER, FOR SOLUTION INTRAMUSCULAR; INTRAVENOUS at 11:12

## 2023-01-11 RX ADMIN — BUDESONIDE AND FORMOTEROL FUMARATE DIHYDRATE 2 PUFF: 80; 4.5 AEROSOL RESPIRATORY (INHALATION) at 07:55

## 2023-01-11 RX ADMIN — IPRATROPIUM BROMIDE AND ALBUTEROL SULFATE 1 AMPULE: 2.5; .5 SOLUTION RESPIRATORY (INHALATION) at 19:41

## 2023-01-11 RX ADMIN — HEPARIN SODIUM 5000 UNITS: 5000 INJECTION INTRAVENOUS; SUBCUTANEOUS at 21:38

## 2023-01-11 RX ADMIN — FAMOTIDINE 20 MG: 20 TABLET, FILM COATED ORAL at 11:11

## 2023-01-11 ASSESSMENT — ENCOUNTER SYMPTOMS
NAUSEA: 0
SHORTNESS OF BREATH: 1
DIARRHEA: 0
ABDOMINAL PAIN: 0
VOMITING: 0
WHEEZING: 0
CONSTIPATION: 0

## 2023-01-11 NOTE — PROGRESS NOTES
Pulmonary Progress Note    CC:  Chief Complaint   Patient presents with    Respiratory Distress      Subjective:  Transferred out of ICU. He did receive 1 unit of PRBC. Presently sitting up in the chair is dyspneic with exertion. He is requiring 4 L of oxygen which he uses chronically at home. Does not have sputum or purulent sputum or hemoptysis. Review of Systems -  General ROS: negative for - chills, fatigue, fever or weight loss  ENT ROS: negative for - headaches, oral lesions or sore throat  Cardiovascular ROS: no chest pain , orthopnea or pnd   Gastrointestinal ROS: no abdominal pain, change in bowel habits, or black or bloody stools  Skin - no rash   Neuro - no blurry vision , no loc . No focal weakness   msk - no jt tenderness or swelling        Immunization   Immunization History   Administered Date(s) Administered    Pneumococcal Conjugate 13-valent ([id number]) 08/02/2018    Pneumococcal Polysaccharide ([id number]) 11/15/2019    Tdap (Boostrix, Adacel) 08/02/2018        Pneumococcal Vaccine     [] Up to date    [] Indicated   [] Refused  [] Contraindicated       Influenza Vaccine   [] Up to date    [] Indicated   [] Refused  [] Contraindicated     PAST MEDICAL HISTORY:       Diagnosis Date    CHF (congestive heart failure) (HCC)     COPD (chronic obstructive pulmonary disease) ([facility]     Diabetes mellitus ([facility] [facility]     Erectile dysfunction due to arterial insufficiency 12/07/2015    Hypertension     Microalbuminuria due to type 2 diabetes mellitus ([facility] [facility] 07/07/2016    Overweight (BMI 25.0-29.9) 12/07/2015         Family History:   History reviewed. No pertinent family history. SURGICAL HISTORY:   Past Surgical History:   Procedure Laterality Date    APPENDECTOMY      TOTAL ANKLE ARTHROPLASTY      LEFT              TOBACCO:   reports that he quit smoking about 13 months ago. His smoking use included cigarettes. He smoked an average of .5 packs per day.  He has never used smokeless tobacco.  ETOH: reports no history of alcohol use. ALLERGIES:  No Known Allergies    Home Meds:   Prior to Admission medications    Medication Sig Start Date End Date Taking? Authorizing Provider   budesonide-formoterol (SYMBICORT) 160-4.5 MCG/ACT AERO Inhale 2 puffs into the lungs in the morning and 2 puffs in the evening. 10/14/22   Qujose de jesustchai Zamora DO   carvedilol (COREG) 12.5 MG tablet Take 1 tablet by mouth 2 times daily (with meals) 10/14/22   Ariantchai Zamora DO   metFORMIN (GLUCOPHAGE) 500 MG tablet Take 1 tablet by mouth in the morning and 1 tablet in the evening. Take with meals.  8/15/22   Misha Lainez MD   albuterol (PROVENTIL) (5 MG/ML) 0.5% nebulizer solution Take 0.5 mLs by nebulization 4 times daily as needed for Wheezing 7/12/22   Karlee Wu MD   amLODIPine (NORVASC) 10 MG tablet Take 1 tablet by mouth daily 7/12/22   Karlee Wu MD   aspirin (HM ASPIRIN EC LOW DOSE) 81 MG EC tablet TAKE 1 TABLET BY MOUTH DAILY 7/12/22   Te Kruger MD   atorvastatin (LIPITOR) 20 MG tablet Take 1 tablet by mouth daily 7/12/22   Karlee Wu MD   ipratropium-albuterol (DUONEB) 0.5-2.5 (3) MG/3ML SOLN nebulizer solution Inhale 3 mLs into the lungs every 4 hours (while awake) 7/12/22   Te Kruger MD   furosemide (LASIX) 20 MG tablet Take 1 tablet by mouth daily 7/12/22   Karlee Wu MD   lisinopril (PRINIVIL;ZESTRIL) 20 MG tablet Take 1 tablet by mouth daily 7/12/22   Karlee Wu MD   vitamin D (CHOLECALCIFEROL) 50 MCG (2000 UT) TABS tablet Take 1 tablet by mouth daily 7/12/22   Karlee Wu MD   fluticasone-salmeterol (ADVIAR) 100-50 MCG/ACT AEPB diskus inhaler Inhale 1 puff into the lungs every 12 hours 7/12/22   Karlee Wu MD   tiotropium (SPIRIVA HANDIHALER) 18 MCG inhalation capsule Inhale 1 capsule into the lungs daily 7/12/22   Karlee Wu MD         Intake/Output Summary (Last 24 hours) at 1/11/2023 1609  Last data filed at 1/11/2023 0125  Gross per 24 hour   Intake --   Output 500 ml   Net -500 ml         Diet   ADULT DIET; Regular; 4 carb choices (60 gm/meal)    Vitals:   BP (!) 157/56   Pulse 52   Temp 97.6 °F (36.4 °C) (Oral)   Resp 16   Wt 147 lb 4.3 oz (66.8 kg)   SpO2 96%   BMI 24.51 kg/m²  on         I/O (24 Hours)    No data found. Intake/Output Summary (Last 24 hours) at 1/11/2023 1609  Last data filed at 1/11/2023 0125  Gross per 24 hour   Intake --   Output 500 ml   Net -500 ml     I/O last 3 completed shifts: In: 1211.3 [I.V.:728.7; Blood:358.3; IV Piggyback:124.3]  Out: 2200 [Urine:2200]   Date 01/11/23 0000 - 01/11/23 2359   Shift 6350-8092 2642-0243 4721-5264 24 Hour Total   INTAKE   Shift Total(mL/kg)       OUTPUT   Urine(mL/kg/hr) 500(0.9)   500   Shift Total(mL/kg) 500(7.5)   500(7.5)   Weight (kg) 66.8 66.8 66.8 66.8     Patient Vitals for the past 96 hrs (Last 3 readings):   Weight   01/09/23 0600 147 lb 4.3 oz (66.8 kg)   01/08/23 1043 150 lb (68 kg)          PHYSICAL EXAMINATION:  General Appearance:    Alert, cooperative, no distress but has mild tachypnea, appears stated age   Head:    Normocephalic, without obvious abnormality, atraumatic                  :    Neck:   Supple, symmetrical, trachea midline, no adenopathy;     thyroid:  no enlargement/tenderness/nodules; no carotid    bruit no JVP , no HJR   Back:     Symmetric, no curvature, ROM normal, no CVA tenderness   Lungs:       Barrel chest, prolonged expiratory phase no wheezing. Decreased breath sounds and dullness in the posterior base.    Chest Wall:    No tenderness or deformity      Heart:    Regular rate and rhythm, S1 and S2 normal, no murmur, rub        or gallop no rvh                           Abdomen:                                                 Pulses:                                            Lymph nodes:                    Neurologic:                  Soft, non-tender, bowel sounds active all four quadrants,     no masses, no organomegaly         2+ and symmetric all extremities            Cervical, supraclavicular not enlarged or matted or tender      CNII-XII intact, normal strength 5/5 .   Sensation grossly normal  and reflexes normal 2+  throughout     Clubbing No  Lower ext edema Yes1+   [] , 2 +  [] , 3+   []  Upper ext edema No              Medications:    Scheduled Meds:   [START ON 1/12/2023] amLODIPine  10 mg Oral Daily    insulin lispro  0-8 Units SubCUTAneous TID WC    insulin lispro  0-4 Units SubCUTAneous Nightly    famotidine  20 mg Oral BID    furosemide  20 mg Oral Daily    carvedilol  12.5 mg Oral BID WC    docusate sodium  100 mg Oral Daily    senna  1 tablet Oral Nightly    magnesium hydroxide  30 mL Oral Daily    [Held by provider] heparin (porcine)  5,000 Units SubCUTAneous 3 times per day    ipratropium-albuterol  1 ampule Inhalation Q4H WA    aspirin  81 mg Oral Daily    atorvastatin  20 mg Oral Daily    budesonide-formoterol  2 puff Inhalation BID    piperacillin-tazobactam  3,375 mg IntraVENous Q8H    sodium chloride flush  5-40 mL IntraVENous 2 times per day    methylPREDNISolone  40 mg IntraVENous Daily       Continuous Infusions:   sodium chloride      dextrose      sodium chloride         PRN Meds:  sodium chloride, glucose, dextrose bolus **OR** dextrose bolus, glucagon (rDNA), dextrose, sodium chloride flush, sodium chloride, ondansetron **OR** ondansetron, polyethylene glycol, acetaminophen **OR** acetaminophen    Labs:  CBC:   Recent Labs     01/09/23  0525 01/10/23  0416 01/10/23  1352 01/11/23  0601   WBC 7.5 6.1  --  5.6   HGB 7.0* 6.2* 8.3* 8.1*   HCT 23.5* 21.2* 26.6* 26.7*   MCV 95.9 97.2  --  93.0    160  --  163     BMP:   Recent Labs     01/09/23  0525 01/10/23  0416 01/11/23  0601    130* 133*   K 4.9 4.3 4.6   CL 93* 90* 95*   CO2 30 33* 32*   BUN 20 24* 24*   CREATININE 1.15 0.98 0.98     LIVER PROFILE:   Recent Labs     01/09/23  0525 01/09/23  1511 01/10/23  0416 01/11/23  0601   AST 15  --  13 11   ALT 5 --  5 <5*   LIPASE  --  13  --   --    BILITOT 0.4  --  0.3 0.4   ALKPHOS 83  --  64 57     PT/INR:   Recent Labs     01/09/23  0917   PROTIME 11.6   INR 1.1     APTT: No results for input(s): APTT in the last 72 hours. UA:  Recent Labs     01/09/23  0508   COLORU Yellow   PHUR 5.0   WBCUA None   RBCUA 2 TO 5   SPECGRAV 1.013   LEUKOCYTESUR NEGATIVE   UROBILINOGEN Normal   BILIRUBINUR NEGATIVE   GLUCOSEU NEGATIVE     No results for input(s): PHART, BUE2LLO, PO2ART in the last 72 hours. ABG   No results found for: PH, PCO2, PO2, HCO3, O2SAT  Lab Results   Component Value Date/Time    MODE [facility] 10/10/2022 04:19 AM       XR CHEST PORTABLE    Result Date: 1/9/2023  Small bilateral pleural effusions and bibasilar airspace disease, left greater than right. Mild pulmonary vascular congestion. Stable cardiomegaly. XR CHEST PORTABLE    Result Date: 1/8/2023  1. Small to moderate left and small right pleural effusion which is new from the prior study. 2.  Presumed left more so than right basilar lung atelectasis though pneumonitis cannot be excluded if suspected clinically. US THORACENTESIS Which side should the procedure be performed? Radiologist Recommendation    Result Date: 1/9/2023  Successful ultrasound guided thoracentesis. CT CHEST ABDOMEN PELVIS WO CONTRAST Additional Contrast? None    Result Date: 1/9/2023  1. Moderate bilateral pleural effusions and lower lobe atelectatic changes. 2. Cardiomegaly with minimal pericardial thickening. No evidence of lymphadenopathy. 3. No evidence of aortic aneurysm. Prominent main pulmonary artery measuring 4.4 cm. 4. Gallbladder demonstrates sludge and mild thickening. 5. Scattered pancreatic calcifications likely related to chronic pancreatitis but no acute pancreatitis. 6. Right punctate nephrolithiasis but no obstructive uropathy. 7. Mild retroperitoneal lymphadenopathy with retroperitoneal nodes measuring up to 1.1 cm. 8. Mild ascites.  9. Mild diverticulosis but no acute diverticulitis. Assessment:   Principal Problem:    COPD exacerbation ([address]  Resolved Problems:    * No resolved hospital problems. *   Bilateral pleural effusion left greater than right and postthoracentesis of left pleural effusion  Left lower lobe atelectasis/pneumonia  Left transudate pleural effusion, culture negative  Chronic obstructive pulmonary disease  Acute CHF systolic  Acute on chronic respiratory failure hypoxic is improving.   Anemia posttransfusion of 1 unit  Plan:  Complete 7 days of antibiotics  Continue Lasix  Change prednisone to p.o.  X-ray chest today shows bilateral pleural effusion but decreased in the left side postthoracentesis, vascular congestion is also seen   bronchodilator therapy    Electronically signed by Vika Aguiar MD on 1/11/2023 at 4:09 PM

## 2023-01-11 NOTE — CARE COORDINATION
[facility] Quality Flow/Interdisciplinary Rounds Progress Note    Quality Flow Rounds held on January 11, 2023 at [address] Attending:   and Nursing Unit Leadership    Barriers to Discharge: medical clearance    Anticipated Discharge Date:   1/12    Anticipated Discharge Disposition:    Readmission Risk              Risk of Unplanned Readmission:  29           Discussed patient goal for the day, patient clinical progression, and barriers to discharge.   The following Goal(s) of the Day/Commitment(s) have been identified:  Activity Progression  Awaiting cultures from Thoracentesis,   Daily labs, HGB 8.1 today, IV antx, Has home O2 and transportation    Rupert Lombard, RN  January 11, 2023
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
## 2023-01-11 NOTE — PROGRESS NOTES
[facility]  Progress Note    Date:   1/11/2023  Patient name:  Lianne Purdy  Date of admission:  1/8/2023 10:43 AM  MRN:   [id number]  YOB: 1955    SUBJECTIVE/Last 24 hours update:     Patient seen and examined at the bed side , no new acute events overnight, no new complains. Blood pressure elevated, will resume Coreg. Echo planned for today. Corrected Calcium 9.2     Notes from nursing staff and Consults had been reviewed, and the overnight progress had been checked with the nursing staff as well. HPI:    68 yo male with PMH of HTN, COPD, CHF initially presented with shortness of breath who was initially seen by [facility] Medicine Team on 1/8/23 but due to worsening of SOB Pulm was consulted and was transferred to ICU. Patient was placed on BiPAP and noted to have bilateral Pleural Effusion. In the ICU patient had Thoracentesis done which showed Transudative Effusion, cultures pending. Patient was also placed on BiPAP and weaned off to use only at night. Patient also had Anemia, which appears to be chronic in nature, and received 1 PRBC on 1/10/23, but no acute signs of bleeding are seen. Patient also had hypotension and coreg was held due to low HR. Patient had elevated Trops and cardiology evaluated the patient likely Type II due to demand doubt ACS. Recent Echo in October EF of 50%. Examination   Vital Signs Stable Currently on 3 L NC  Pulm : Clear Breath Sounds, Diminished Breath Sounds, No wheezing  CVS: regular rate and rhythm   Abdomen: Mild Epigastric Tenderness     REVIEW OF SYSTEMS:      Review of Systems   Respiratory:  Positive for shortness of breath. Negative for wheezing. Cardiovascular:  Negative for chest pain, palpitations and leg swelling. Gastrointestinal:  Negative for abdominal pain, constipation, diarrhea, nausea and vomiting. Genitourinary:  Negative for dysuria and flank pain. Neurological:  Negative for light-headedness and headaches. Psychiatric/Behavioral:  Negative for agitation and behavioral problems. PAST MEDICAL HISTORY:      has a past medical history of CHF (congestive heart failure) ([facility] [address] COPD [facility]chronic obstructive [facility] disease) [facility] Diabetes mellitus ([facility] Erectile dysfunction due to arterial insufficiency, Hypertension, Microalbuminuria due to type 2 diabetes mellitus ([facility] and Overweight (BMI 25.0-29.9). PAST SURGICAL HISTORY:      has a past surgical history that includes Appendectomy and Total ankle arthroplasty. SOCIAL HISTORY:      reports that he quit smoking about 13 months ago. His smoking use included cigarettes. He smoked an average of .5 packs per day. He has never used smokeless tobacco. He reports that he does not drink alcohol and does not use drugs. FAMILY HISTORY:     family history is not on file. HOME MEDICATIONS:      Prior to Admission medications    Medication Sig Start Date End Date Taking? Authorizing Provider   budesonide-formoterol (SYMBICORT) 160-4.5 MCG/ACT AERO Inhale 2 puffs into the lungs in the morning and 2 puffs in the evening. 10/14/22   Derik Zamora DO   carvedilol (COREG) 12.5 MG tablet Take 1 tablet by mouth 2 times daily (with meals) 10/14/22   Derik Zamora DO   metFORMIN (GLUCOPHAGE) 500 MG tablet Take 1 tablet by mouth in the morning and 1 tablet in the evening. Take with meals.  8/15/22   Alexandria Eaton MD   albuterol (PROVENTIL) (5 MG/ML) 0.5% nebulizer solution Take 0.5 mLs by nebulization 4 times daily as needed for Wheezing 7/12/22   Yenifer Garcia MD   amLODIPine (NORVASC) 10 MG tablet Take 1 tablet by mouth daily 7/12/22   Yenifer Garcia MD   aspirin (HM ASPIRIN EC LOW DOSE) 81 MG EC tablet TAKE 1 TABLET BY MOUTH DAILY 7/12/22   Te Edwards MD   atorvastatin (LIPITOR) 20 MG tablet Take 1 tablet by mouth daily 7/12/22   Yenifer Garcia MD   ipratropium-albuterol (Amy Paganini) 0.5-2.5 (3) MG/3ML SOLN nebulizer solution Inhale 3 mLs into the lungs every 4 hours (while awake) 7/12/22   Te Morfin MD   furosemide (LASIX) 20 MG tablet Take 1 tablet by mouth daily 7/12/22   Talha Juan MD   lisinopril (PRINIVIL;ZESTRIL) 20 MG tablet Take 1 tablet by mouth daily 7/12/22   Talha Juan MD   vitamin D (CHOLECALCIFEROL) 50 MCG (2000 UT) TABS tablet Take 1 tablet by mouth daily 7/12/22   Talha Juan MD   fluticasone-salmeterol (ADVIAR) 100-50 MCG/ACT AEPB diskus inhaler Inhale 1 puff into the lungs every 12 hours 7/12/22   Talha Juan MD   tiotropium (Whitney Terell) 18 MCG inhalation capsule Inhale 1 capsule into the lungs daily 7/12/22   Te Morfin MD       ALLERGIES:     Patient has no known allergies. OBJECTIVE:       Vitals:    01/10/23 2030 01/11/23 0015 01/11/23 0332 01/11/23 0719   BP:    (!) 157/56   Pulse: 65   52   Resp: 16 17 20 16   Temp:    97.6 °F (36.4 °C)   TempSrc:    Oral   SpO2:    96%   Weight:             Intake/Output Summary (Last 24 hours) at 1/11/2023 0727  Last data filed at 1/11/2023 0125  Gross per 24 hour   Intake 358.33 ml   Output 1350 ml   Net -991.67 ml       PHYSICAL EXAM:  Physical Exam  Cardiovascular:      Rate and Rhythm: Normal rate and regular rhythm. Heart sounds: Normal heart sounds. Pulmonary:      Effort: Pulmonary effort is normal.      Comments: Soft breath sounds, currently saturating 96% on 4 L nasal O2 nasal cannula  Abdominal:      General: There is no distension. Palpations: Abdomen is soft. Tenderness: There is no abdominal tenderness. There is no guarding. Musculoskeletal:      Right lower leg: No edema. Left lower leg: No edema. Skin:     General: Skin is warm and dry.    Psychiatric:         Mood and Affect: Mood normal.         Behavior: Behavior normal.        DIAGNOSTICS:     Laboratory Testing:    Recent Results (from the past 24 hour(s))   TYPE AND SCREEN    Collection Time: 01/10/23  7:33 AM   Result Value Ref Range    Expiration Date 01/13/2023,2359     Arm Band Number [id number]     ABO/Rh O POSITIVE     Antibody Screen NEGATIVE     Unit Number [id number]     Product Code Leukocyte Reduced Red Cell     Unit Divison 00     Dispense Status ISSUED     Unit Issue Date/Time 139212009782     Product Code Blood Bank [id number]     Blood Bank Unit Type and Rh O POS     Blood Bank ISBT Product Blood Type 5100     Blood Bank Blood Product Expiration Date 691123208165     Transfusion Status OK TO TRANSFUSE     Crossmatch Result COMPATIBLE    POC Glucose Fingerstick    Collection Time: 01/10/23  8:31 AM   Result Value Ref Range    POC Glucose 175 (H) 75 - 110 mg/dL   POC Glucose Fingerstick    Collection Time: 01/10/23 11:48 AM   Result Value Ref Range    POC Glucose 174 (H) 75 - 110 mg/dL   Hemoglobin and Hematocrit    Collection Time: 01/10/23  1:52 PM   Result Value Ref Range    Hemoglobin 8.3 (L) 13.0 - 17.0 g/dL    Hematocrit 26.6 (L) 40.7 - 50.3 %   Ferritin    Collection Time: 01/10/23  1:52 PM   Result Value Ref Range    Ferritin 210 30 - 400 ng/mL   Iron and TIBC    Collection Time: 01/10/23  1:52 PM   Result Value Ref Range    Iron 174 (H) 59 - 158 ug/dL    TIBC  250 - 450 ug/dL     Unable to calculate due to low iron binding result. Iron Saturation  20 - 55 %     Unable to calculate due to low iron binding result.     UIBC <17 (L) 112 - 347 ug/dL   Vitamin B12 & Folate    Collection Time: 01/10/23  1:52 PM   Result Value Ref Range    Vitamin B-12 599 232 - 1245 pg/mL    Folate 4.2 (L) >4.8 ng/mL   Reticulocytes    Collection Time: 01/10/23  1:52 PM   Result Value Ref Range    Retic % 1.4 0.5 - 1.9 %    Absolute Retic # 0.040 0.030 - 0.080 M/uL    Immature Retic Fract 5.600 2.7 - 18.3 %    Retic Hemoglobin 25.3 (L) 28.2 - 35.7 pg   POC Glucose Fingerstick    Collection Time: 01/10/23  2:24 PM   Result Value Ref Range    POC Glucose 198 (H) 75 - 110 mg/dL   POC Glucose Fingerstick    Collection Time: 01/10/23  4:47 PM   Result Value Ref Range    POC Glucose 176 (H) 75 - 110 mg/dL   POC Glucose Fingerstick    Collection Time: 01/10/23  8:55 PM   Result Value Ref Range    POC Glucose 290 (H) 75 - 110 mg/dL   CBC with Auto Differential    Collection Time: 01/11/23  6:01 AM   Result Value Ref Range    WBC 5.6 3.5 - 11.3 k/uL    RBC 2.87 (L) 4.21 - 5.77 m/uL    Hemoglobin 8.1 (L) 13.0 - 17.0 g/dL    Hematocrit 26.7 (L) 40.7 - 50.3 %    MCV 93.0 82.6 - 102.9 fL    MCH 28.2 25.2 - 33.5 pg    MCHC 30.3 28.4 - 34.8 g/dL    RDW 15.9 (H) 11.8 - 14.4 %    Platelets 154 473 - 225 k/uL    MPV 10.7 8.1 - 13.5 fL    NRBC Automated 0.0 0.0 per 100 WBC    Seg Neutrophils PENDING %    Lymphocytes PENDING %    Monocytes PENDING %    Eosinophils % PENDING %    Basophils PENDING %    Immature Granulocytes PENDING 0 %    Segs Absolute PENDING k/uL    Absolute Lymph # PENDING k/uL    Absolute Mono # PENDING k/uL    Absolute Eos # PENDING k/uL    Basophils Absolute PENDING 0.0 - 0.2 k/uL    Absolute Immature Granulocyte PENDING 0.00 - 0.30 k/uL   Comprehensive Metabolic Panel w/ Reflex to MG    Collection Time: 01/11/23  6:01 AM   Result Value Ref Range    Glucose 123 (H) 70 - 99 mg/dL    BUN 24 (H) 8 - 23 mg/dL    Creatinine 0.98 0.70 - 1.20 mg/dL    Est, Glom Filt Rate >60 >60 mL/min/1.73m2    Calcium 7.7 (L) 8.6 - 10.4 mg/dL    Sodium 133 (L) 135 - 144 mmol/L    Potassium 4.6 3.7 - 5.3 mmol/L    Chloride 95 (L) 98 - 107 mmol/L    CO2 32 (H) 20 - 31 mmol/L    Anion Gap 6 (L) 9 - 17 mmol/L    Alkaline Phosphatase 57 40 - 129 U/L    ALT <5 (L) 5 - 41 U/L    AST 11 <40 U/L    Total Bilirubin 0.4 0.3 - 1.2 mg/dL    Total Protein 4.7 (L) 6.4 - 8.3 g/dL    Albumin 2.1 (L) 3.5 - 5.2 g/dL    Albumin/Globulin Ratio 0.8 (L) 1.0 - 2.5         Imaging/Diagonstics:    Current Facility-Administered Medications   Medication Dose Route Frequency Provider Last Rate Last Admin    0.9 % sodium chloride infusion IntraVENous PRN Scarlet Joseph MD        insulin lispro (HUMALOG) injection vial 0-8 Units  0-8 Units SubCUTAneous TID  Radha Spivey MD        insulin lispro (HUMALOG) injection vial 0-4 Units  0-4 Units SubCUTAneous Nightly Radha Spivey MD        famotidine (PEPCID) tablet 20 mg  20 mg Oral BID Zac Sheikh MD   20 mg at 01/10/23 2011    amLODIPine (NORVASC) tablet 5 mg  5 mg Oral Daily Radha Spivey MD        furosemide (LASIX) tablet 20 mg  20 mg Oral Daily Radha Spivey MD   20 mg at 01/10/23 1203    carvedilol (COREG) tablet 12.5 mg  12.5 mg Oral BID  Mann Dickinson MD   12.5 mg at 01/10/23 2039    docusate sodium (COLACE) capsule 100 mg  100 mg Oral Daily Radha Spivey MD   100 mg at 01/10/23 0844    senna (SENOKOT) tablet 8.6 mg  1 tablet Oral Nightly Radha Spivey MD   8.6 mg at 01/10/23 2011    magnesium hydroxide (MILK OF MAGNESIA) 400 MG/5ML suspension 30 mL  30 mL Oral Daily Radha Spivey MD        [Held by provider] heparin (porcine) injection 5,000 Units  5,000 Units SubCUTAneous 3 times per day Nilson Pinedo MD   5,000 Units at 01/08/23 2254    ipratropium-albuterol (DUONEB) nebulizer solution 1 ampule  1 ampule Inhalation Q4H WA Gary Boston MD   1 ampule at 01/10/23 2030    aspirin EC tablet 81 mg  81 mg Oral Daily Gary Boston MD   81 mg at 01/10/23 0845    atorvastatin (LIPITOR) tablet 20 mg  20 mg Oral Daily Gary Boston MD   20 mg at 01/10/23 0845    budesonide-formoterol (SYMBICORT) 80-4.5 MCG/ACT inhaler 2 puff  2 puff Inhalation BID Gary Boston MD   2 puff at 01/10/23 2030    glucose chewable tablet 16 g  4 tablet Oral PRN Gary Boston MD        dextrose bolus 10% 125 mL  125 mL IntraVENous PRN Gary Boston MD        Or    dextrose bolus 10% 250 mL  250 mL IntraVENous PRN Gary Boston MD        glucagon (rDNA) injection 1 mg  1 mg SubCUTAneous PRN Gary Boston MD        dextrose 10 % infusion IntraVENous Continuous PRN Gary Boston MD        piperacillin-tazobactam (ZOSYN) 3,375 mg in dextrose 5 % 50 mL IVPB extended infusion (mini-bag)  3,375 mg IntraVENous Q8H Marjo Essex, MD   Stopped at 01/11/23 0631    sodium chloride flush 0.9 % injection 5-40 mL  5-40 mL IntraVENous 2 times per day Gaby Haji MD   10 mL at 01/10/23 2011    sodium chloride flush 0.9 % injection 5-40 mL  5-40 mL IntraVENous PRN Gaby Haji MD        0.9 % sodium chloride infusion   IntraVENous PRN Gaby Haji MD        ondansetron (ZOFRAN-ODT) disintegrating tablet 4 mg  4 mg Oral Q8H PRN Gaby Haji MD        Or    [facility]ron [facility] injection 4 mg  4 mg IntraVENous Q6H PRN Gaby Haji MD        polyethylene glycol (GLYCOLAX) packet 17 g  17 g Oral Daily PRN Gaby Haji MD        acetaminophen (TYLENOL) tablet 650 mg  650 mg Oral Q6H PRN Gaby Haji MD        Or    acetaminophen (TYLENOL) suppository 650 mg  650 mg Rectal Q6H PRN Gaby Haji MD        methylPREDNISolone sodium (SOLU-MEDROL) injection 40 mg  40 mg IntraVENous Daily Gaby Haji MD   40 mg at 01/10/23 0934       ASSESSMENT:     Principal Problem:    COPD exacerbation ([facility] [address]  Resolved Problems:    * No resolved hospital problems.  *      PLAN:     Acute on Chronic Respiratory Failure 2/2 COPD exacerbation  -Bipap at night, on 4L saturating at 6%  -Continue inhaler use  -IV solumedrol 40 mg daily  -Continue Symbicort and Duoneb     Bilateral Pneumonia   -CXR suggestive of Pleural Effusion and pneumonitis  -On zosyn  -Awaiting cultures thoracentesis     Anemia  -Likely chronic   -Last Hemoglobin 8.1 today   -Received 1 PRBC 01/10/23  -Iron studies -TIBC unable, iron saturation unable, ferritin 210, folate 4.2, vitamin B12 499, repeat count 25.3, red percent 1.4     HTN   -Norvasc 5 mg  -Coreg 12.5 mg twice daily - Resume      T2DM  -Continue Sliding Scale  -Hypoglycemia protocol  -POCT 4 times daily AC and QH     DVT prophylaxis: Heparin Held due to Anemia  GI Prophylaxis: Pepcid 20 mg twice daily    Discussed care plan with nurse after getting her input. Plan will be discussed with the attending, Dr Ever Rutherford MD  Family Medicine Resident  1/11/2023 7:27 AM   Attending Physician Statement  I have discussed the care of Xiao Franklin, including pertinent history and exam findings,  with the resident. I have seen and examined the patient and the key elements of all parts of the encounter have been performed by me. I agree with the assessment, plan and orders as documented by the resident. ([address] [place] [address])   Patient seen and examined. Admitted for COPD Exacerbation and had Respiratory Failure and was in Medical Intensive Care Unit. Care Transferred to [facility] Yesterday. Today sitting on chair and slowly getting better. Has to use BiPap at night. Also has Pleural Effusion and has had Thoracocentesis Anemia and Hypertension. Slowly improving and hopefully Discharge planning in the next 1-2 days once cleared by Pulmonologt and Cardiology and appreciate there Input. Continue supportive care and coordination of care. Chari Lowell.

## 2023-01-11 NOTE — PROGRESS NOTES
Occupational Therapy  Facility/Department: [address] OBSERVATION  Occupational Therapy Initial Assessment    Name: Addie Salvador  : 1955  MRN: [id number]  Date of Service: 2023  Chief Complaint   Patient presents with    Respiratory Distress     Discharge Recommendations:  Patient would benefit from continued therapy after discharge       Patient Diagnosis(es): The encounter diagnosis was COPD exacerbation ([address] Past Medical History:  has a past medical history of CHF (congestive heart failure) ([address] COPD (chronic obstructive pulmonary disease) ([address] Diabetes mellitus ([facility] Erectile dysfunction due to arterial insufficiency, Hypertension, Microalbuminuria due to type 2 diabetes mellitus ([address] and Overweight (BMI 25.0-29.9). Past Surgical History:  has a past surgical history that includes Appendectomy and Total ankle arthroplasty. Assessment   Performance deficits / Impairments: Decreased functional mobility ; Decreased ADL status; Decreased endurance;Decreased high-level IADLs;Decreased balance  Assessment: Patient demonstrates decreased endurance overall impacting performance and safety with ADLs and functional tasks. Pt would benefit from acute OT services to promote implementation of EC/WS techs to improve safety and independence with functional tasks. Prognosis: Good  Decision Making: Low Complexity  REQUIRES OT FOLLOW-UP: Yes  Activity Tolerance  Activity Tolerance: Patient Tolerated treatment well        Plan   Occupational Therapy Plan  Times Per Week: 3x/wk  Current Treatment Recommendations: Endurance training, Functional mobility training, Patient/Caregiver education & training, Equipment evaluation, education, & procurement, Self-Care / ADL, Positioning, Home management training, Safety education & training     Restrictions  Position Activity Restriction  Other position/activity restrictions:  Up with assist    Subjective   General  Patient assessed for rehabilitation services?: Yes  Family / Caregiver Present: No  General Comment  Comments: RN ok'd patient for OT/PT evaluation. Pt pleasant, cooperative and agreeable. Pt reports 4/10 pain in the abdomen able to progress with treatment without therapist intervention.      Social/Functional History  Social/Functional History  Lives With: Son, Other (comment) (and son's fiancee)  Type of Home: Apartment  Home Layout: One level  Home Access: Stairs to enter with rails  Entrance Stairs - Number of Steps: 3+5+7  Entrance Stairs - Rails: Both  Bathroom Shower/Tub: Tub/Shower unit  Bathroom Toilet: Standard  Home Equipment: Armando Dakins, rolling  Receives Help From: Family  ADL Assistance: Independent  Homemaking Assistance: Independent  Homemaking Responsibilities: No (son completes)  Meal Prep Responsibility: Secondary  Laundry Responsibility: Secondary  Cleaning Responsibility: Secondary  Shopping Responsibility: Secondary  Ambulation Assistance: Independent  Transfer Assistance: Independent  Mode of Transportation: Cab  Occupation: Retired  Type of Occupation:   Leisure & Hobbies: [address], car club     Objective   Safety Devices  Type of Devices: Left in chair;Gait belt;Call light within reach  Restraints  Restraints Initially in [address]: No  Balance  Sitting: Intact (Supervision unsupported in [facility] for ~11 minutes)  Standing: Without support (SBA grossly at bedside recliner ~2-3 min)  Transfer Training  Transfer Training: Yes  Overall Level of Assistance: Contact-guard assistance  Sit to Stand: Contact-guard assistance  Stand to Sit: Stand-by assistance  Gait  Overall Level of Assistance: Stand-by assistance  Assistive Device: Gait belt;Walker, rolling (household distances within room and hallway)        ADL  Feeding: Independent  Grooming: Independent  UE Bathing: Modified independent   LE Bathing: Contact guard assistance  UE Dressing: Modified independent   LE Dressing: Contact guard assistance  Toileting: Contact guard assistance  Additional Comments: [facility] able to thread seated unsupported in recliner at [address]. Pt donned gown on backside at SBA standing at bedside recliner. Activity Tolerance  Activity Tolerance: Patient limited by endurance  Bed mobility  Bed Mobility Comments: in recliner upon arrival and retired to recliner at end of session     Vision  Vision: Impaired  Vision Exceptions: Wears glasses at all times  Hearing  Hearing: Exceptions to [facility]  Hearing Exceptions: Hard of hearing/hearing concerns  Cognition  Overall Cognitive Status: WFL  Orientation  Overall Orientation Status: Within Functional Limits     Education Given To: Patient  Education Provided: Role of Therapy;Plan of Care;Energy Conservation; ADL Adaptive Strategies; Equipment  Education Method: Verbal  Barriers to Learning: None  Education Outcome: Verbalized understanding;Continued education needed  AM-PAC Score        AM-PAC Inpatient Daily Activity Raw Score: 21 (01/11/23 1635)  AM-PAC Inpatient ADL T-Scale Score : 44.27 (01/11/23 1635)  ADL Inpatient CMS 0-100% Score: 32.79 (01/11/23 1635)  ADL Inpatient CMS G-Code Modifier : Salome Howell (01/11/23 1635)  Goals  Short Term Goals  Time Frame for Short Term Goals: Patient will,by discharge  Short Term Goal 1: demo ADLs at Mod I  Short Term Goal 2: demo functional transfers/mobility independently to engage in ADL/IADLS  Short Term Goal 3: demo EC/WS techs independently to reduce exertion with engagement in ADL and IADL tasks     Therapy Time   Individual Concurrent Group Co-treatment   Time In 0931         Time Out 0954         Minutes 23         Timed Code Treatment Minutes: [address] Luana Boo, OTR/L

## 2023-01-11 NOTE — PLAN OF CARE
Problem: Discharge Planning  Goal: Discharge to home or other facility with appropriate resources  1/11/2023 1018 by Moises Welsh RN  Outcome: Progressing  1/10/2023 2040 by Juliocesar Raymundo RN  Outcome: Progressing     Problem: Pain  Goal: Verbalizes/displays adequate comfort level or baseline comfort level  1/11/2023 1018 by Moises Welsh RN  Outcome: Progressing  1/10/2023 2040 by Juliocesar Raymundo RN  Outcome: Progressing     Problem: Chronic Conditions and Co-morbidities  Goal: Patient's chronic conditions and co-morbidity symptoms are monitored and maintained or improved  1/11/2023 1018 by Moises Welsh RN  Outcome: Progressing  1/10/2023 2040 by Juliocesar Raymundo RN  Outcome: Progressing     Problem: Skin/Tissue Integrity  Goal: Absence of new skin breakdown  Description: 1. Monitor for areas of redness and/or skin breakdown  2. Assess vascular access sites hourly  3. Every 4-6 hours minimum:  Change oxygen saturation probe site  4. Every 4-6 hours:  If on nasal continuous positive airway pressure, respiratory therapy assess nares and determine need for appliance change or resting period.   1/11/2023 1018 by Moises Welsh RN  Outcome: Progressing  1/10/2023 2040 by Juliocesar Raymundo RN  Outcome: Progressing     Problem: Safety - Adult  Goal: Free from fall injury  1/11/2023 1018 by Moises Welsh RN  Outcome: Progressing  1/10/2023 2040 by Juliocesar Raymundo RN  Outcome: Progressing     Problem: ABCDS Injury Assessment  Goal: Absence of physical injury  1/11/2023 1018 by Moises Welsh RN  Outcome: Progressing  1/10/2023 2040 by Juliocesar Raymundo RN  Outcome: Progressing
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
## 2023-01-11 NOTE — PROGRESS NOTES
Physical Therapy  Facility/Department: [address] OBSERVATION  Physical Therapy Initial Assessment    Name: Gaviota Azar  : 1955  MRN: [id number]  Date of Service: 2023  Chief Complaint   Patient presents with    Respiratory Distress      Discharge Recommendations:  Patient would benefit from continued therapy after discharge      Patient Diagnosis(es): The encounter diagnosis was COPD exacerbation ([address] Past Medical History:  has a past medical history of CHF (congestive heart failure) ([facility] COPD (chronic obstructive pulmonary disease) ([facility] Diabetes mellitus ([facility] Erectile dysfunction due to arterial insufficiency, Hypertension, Microalbuminuria due to type 2 diabetes mellitus ([facility] and Overweight (BMI 25.0-29.9). Past Surgical History:  has a past surgical history that includes Appendectomy and Total ankle arthroplasty. Assessment   Body Structures, Functions, Activity Limitations Requiring Skilled Therapeutic Intervention: Decreased functional mobility ; Decreased strength;Decreased endurance  Assessment: The pt ambulated 100 ft with a RW x CGA. He was mildly SOB following ambulation. He could benefit from a continuation of PT for gait and strengthening following his DC  Therapy Prognosis: Good  Decision Making: Medium Complexity  Requires PT Follow-Up: Yes  Activity Tolerance  Activity Tolerance: Patient limited by endurance     Plan   Physcial Therapy Plan  General Plan:  (5-6x wk)  Current Treatment Recommendations: Strengthening, Functional mobility training, Endurance training, Stair training, Gait training, Safety education & training  Safety Devices  Type of Devices: Left in chair, Gait belt, Call light within reach  Restraints  Restraints Initially in Place: No     Restrictions  Position Activity Restriction  Other position/activity restrictions:  Up with assist     Subjective   General  Patient assessed for rehabilitation services?: Yes  Response To Previous Treatment: Not applicable  Family / Caregiver Present: No  Follows Commands: Within Functional Limits  Subjective  Subjective: Pt reports 4/10 abd pain     Social/Functional History  Social/Functional History  Lives With: Son, Other (comment) (and son's magalys)  Type of Home: Apartment  Home Layout: One level  Home Access: Stairs to enter with rails  Entrance Stairs - Number of Steps: 3+5+7  Entrance Stairs - Rails: Both  Bathroom Shower/Tub: Tub/Shower unit  Bathroom Toilet: Standard  Home Equipment: Bridget Rodriguez, [address] Help From: Family  ADL Assistance: Independent  Homemaking Assistance: Independent  Homemaking Responsibilities: Yes  Ambulation Assistance: Independent  Transfer Assistance: Independent  Mode of Transportation: Cab  Occupation: Retired  Type of Occupation:   Leisure & Hobbies: Fishing, car club  [address]: Impaired  Vision Exceptions: Wears glasses at all times  Hearing  Hearing: Exceptions to [facility]  Hearing Exceptions: Hard of hearing/hearing concerns    Cognition   Orientation  Overall Orientation Status: Within Functional Limits  Cognition  Overall Cognitive Status: WFL     Objective   Heart Rate: 52  Heart Rate Source: Monitor  BP:   MAP (Calculated): 90  Resp: 16  SpO2: 96 %  O2 Device: Nasal cannula     AROM RLE (degrees)  RLE AROM: WNL  AROM LLE (degrees)  LLE AROM : WNL  AROM RUE (degrees)  RUE AROM : WNL  AROM LUE (degrees)  LUE AROM : WNL  Strength RLE  Strength RLE: WFL  Strength LLE  Strength LLE: WFL  Strength RUE  Strength RUE: WFL  Strength LUE  Strength LUE: WFL      Bed mobility  Rolling to Right: Contact guard assistance  Supine to Sit: Contact guard assistance  Sit to Supine: Contact guard assistance  Transfers  Sit to Stand: Contact guard assistance  Stand to Sit: Contact guard assistance  Ambulation  Surface: Level tile  Device: Rolling Walker  Assistance: Contact guard assistance  Distance: amb 100 ft with a RW x CGA     Balance  Posture: Good  Sitting - Static: Good  Sitting - Dynamic: Fair  Standing - Static: Fair  Standing - Dynamic: Fair     OutComes Score     AM-PAC Score  AM-PAC Inpatient Mobility Raw Score : 19 (01/11/23 1114)  AM-PAC Inpatient T-Scale Score : 45.44 (01/11/23 1114)  Mobility Inpatient CMS 0-100% Score: 41.77 (01/11/23 1114)  Mobility Inpatient CMS G-Code Modifier : CK (01/11/23 1114)     Tinneti Score     Goals  Short Term Goals  Time Frame for Short Term Goals: 10 visits  Short Term Goal 1: transfers with SBA  Short Term Goal 2: amb 150 ft with a RW x SBA  Short Term Goal 3: ascend/descend 4 steps with SBA  Short Term Goal 4: 20 min strengthening exercise program x SBA  Patient Goals   Patient Goals : Return home     Education  Patient Education  Education Given To: Patient  Education Provided: Role of Therapy;Plan of Care  Education Method: Verbal  Barriers to Learning: None  Education Outcome: Verbalized understanding    Therapy Time   Individual Concurrent Group Co-treatment   Time In 0930         Time Out 0953         Minutes 23             1 of [facility], PT

## 2023-01-12 LAB
GLUCOSE BLD-MCNC: 186 MG/DL (ref 75–110)
GLUCOSE BLD-MCNC: 196 MG/DL (ref 75–110)
GLUCOSE BLD-MCNC: 259 MG/DL (ref 75–110)
GLUCOSE BLD-MCNC: 267 MG/DL (ref 75–110)

## 2023-01-12 PROCEDURE — 2700000000 HC OXYGEN THERAPY PER DAY

## 2023-01-12 PROCEDURE — 99232 SBSQ HOSP IP/OBS MODERATE 35: CPT | Performed by: FAMILY MEDICINE

## 2023-01-12 PROCEDURE — 6370000000 HC RX 637 (ALT 250 FOR IP): Performed by: NURSE PRACTITIONER

## 2023-01-12 PROCEDURE — 1200000000 HC SEMI PRIVATE

## 2023-01-12 PROCEDURE — 6370000000 HC RX 637 (ALT 250 FOR IP)

## 2023-01-12 PROCEDURE — 6370000000 HC RX 637 (ALT 250 FOR IP): Performed by: SURGERY

## 2023-01-12 PROCEDURE — 82947 ASSAY GLUCOSE BLOOD QUANT: CPT

## 2023-01-12 PROCEDURE — 94660 CPAP INITIATION&MGMT: CPT

## 2023-01-12 PROCEDURE — 94761 N-INVAS EAR/PLS OXIMETRY MLT: CPT

## 2023-01-12 PROCEDURE — 6360000002 HC RX W HCPCS: Performed by: INTERNAL MEDICINE

## 2023-01-12 PROCEDURE — 6370000000 HC RX 637 (ALT 250 FOR IP): Performed by: STUDENT IN AN ORGANIZED HEALTH CARE EDUCATION/TRAINING PROGRAM

## 2023-01-12 PROCEDURE — 94640 AIRWAY INHALATION TREATMENT: CPT

## 2023-01-12 PROCEDURE — 97116 GAIT TRAINING THERAPY: CPT

## 2023-01-12 PROCEDURE — 6370000000 HC RX 637 (ALT 250 FOR IP): Performed by: INTERNAL MEDICINE

## 2023-01-12 PROCEDURE — 2580000003 HC RX 258: Performed by: STUDENT IN AN ORGANIZED HEALTH CARE EDUCATION/TRAINING PROGRAM

## 2023-01-12 PROCEDURE — 2580000003 HC RX 258: Performed by: INTERNAL MEDICINE

## 2023-01-12 PROCEDURE — 6360000002 HC RX W HCPCS

## 2023-01-12 PROCEDURE — 97110 THERAPEUTIC EXERCISES: CPT

## 2023-01-12 PROCEDURE — 99232 SBSQ HOSP IP/OBS MODERATE 35: CPT | Performed by: INTERNAL MEDICINE

## 2023-01-12 RX ORDER — LEVOFLOXACIN 750 MG/1
750 TABLET ORAL DAILY
Qty: 4 TABLET | Refills: 0 | Status: SHIPPED | OUTPATIENT
Start: 2023-01-12 | End: 2023-01-13 | Stop reason: SDUPTHER

## 2023-01-12 RX ADMIN — PIPERACILLIN AND TAZOBACTAM 3375 MG: 3; .375 INJECTION, POWDER, FOR SOLUTION INTRAVENOUS at 01:03

## 2023-01-12 RX ADMIN — PREDNISONE 30 MG: 20 TABLET ORAL at 08:20

## 2023-01-12 RX ADMIN — FAMOTIDINE 20 MG: 20 TABLET, FILM COATED ORAL at 21:10

## 2023-01-12 RX ADMIN — INSULIN LISPRO 4 UNITS: 100 INJECTION, SOLUTION INTRAVENOUS; SUBCUTANEOUS at 17:55

## 2023-01-12 RX ADMIN — CARVEDILOL 12.5 MG: 12.5 TABLET, FILM COATED ORAL at 08:19

## 2023-01-12 RX ADMIN — BUDESONIDE AND FORMOTEROL FUMARATE DIHYDRATE 2 PUFF: 80; 4.5 AEROSOL RESPIRATORY (INHALATION) at 08:14

## 2023-01-12 RX ADMIN — CARVEDILOL 12.5 MG: 12.5 TABLET, FILM COATED ORAL at 16:54

## 2023-01-12 RX ADMIN — HEPARIN SODIUM 5000 UNITS: 5000 INJECTION INTRAVENOUS; SUBCUTANEOUS at 06:26

## 2023-01-12 RX ADMIN — SODIUM CHLORIDE, PRESERVATIVE FREE 10 ML: 5 INJECTION INTRAVENOUS at 21:10

## 2023-01-12 RX ADMIN — IPRATROPIUM BROMIDE AND ALBUTEROL SULFATE 1 AMPULE: 2.5; .5 SOLUTION RESPIRATORY (INHALATION) at 11:32

## 2023-01-12 RX ADMIN — FAMOTIDINE 20 MG: 20 TABLET, FILM COATED ORAL at 08:19

## 2023-01-12 RX ADMIN — FUROSEMIDE 20 MG: 20 TABLET ORAL at 08:19

## 2023-01-12 RX ADMIN — HEPARIN SODIUM 5000 UNITS: 5000 INJECTION INTRAVENOUS; SUBCUTANEOUS at 21:10

## 2023-01-12 RX ADMIN — HEPARIN SODIUM 5000 UNITS: 5000 INJECTION INTRAVENOUS; SUBCUTANEOUS at 16:38

## 2023-01-12 RX ADMIN — AMLODIPINE BESYLATE 10 MG: 10 TABLET ORAL at 08:19

## 2023-01-12 RX ADMIN — IPRATROPIUM BROMIDE AND ALBUTEROL SULFATE 1 AMPULE: 2.5; .5 SOLUTION RESPIRATORY (INHALATION) at 21:24

## 2023-01-12 RX ADMIN — Medication 81 MG: at 08:19

## 2023-01-12 RX ADMIN — IPRATROPIUM BROMIDE AND ALBUTEROL SULFATE 1 AMPULE: 2.5; .5 SOLUTION RESPIRATORY (INHALATION) at 08:14

## 2023-01-12 RX ADMIN — IPRATROPIUM BROMIDE AND ALBUTEROL SULFATE 1 AMPULE: 2.5; .5 SOLUTION RESPIRATORY (INHALATION) at 16:36

## 2023-01-12 RX ADMIN — ATORVASTATIN CALCIUM 20 MG: 20 TABLET, FILM COATED ORAL at 08:19

## 2023-01-12 RX ADMIN — PIPERACILLIN AND TAZOBACTAM 3375 MG: 3; .375 INJECTION, POWDER, FOR SOLUTION INTRAVENOUS at 08:24

## 2023-01-12 RX ADMIN — PIPERACILLIN AND TAZOBACTAM 3375 MG: 3; .375 INJECTION, POWDER, FOR SOLUTION INTRAVENOUS at 16:53

## 2023-01-12 RX ADMIN — BUDESONIDE AND FORMOTEROL FUMARATE DIHYDRATE 2 PUFF: 80; 4.5 AEROSOL RESPIRATORY (INHALATION) at 21:24

## 2023-01-12 ASSESSMENT — ENCOUNTER SYMPTOMS
SHORTNESS OF BREATH: 1
DIARRHEA: 0
CONSTIPATION: 0
NAUSEA: 0
VOMITING: 0
WHEEZING: 0
ABDOMINAL PAIN: 0

## 2023-01-12 NOTE — PLAN OF CARE
Problem: Discharge Planning  Goal: Discharge to home or other facility with appropriate resources  Outcome: Progressing     Problem: Pain  Goal: Verbalizes/displays adequate comfort level or baseline comfort level  Outcome: Progressing     Problem: Chronic Conditions and Co-morbidities  Goal: Patient's chronic conditions and co-morbidity symptoms are monitored and maintained or improved  Outcome: Progressing     Problem: Skin/Tissue Integrity  Goal: Absence of new skin breakdown  Description: 1. Monitor for areas of redness and/or skin breakdown  2. Assess vascular access sites hourly  3. Every 4-6 hours minimum:  Change oxygen saturation probe site  4. Every 4-6 hours:  If on nasal continuous positive airway pressure, respiratory therapy assess nares and determine need for appliance change or resting period.   Outcome: Progressing     Problem: Safety - Adult  Goal: Free from fall injury  Outcome: Progressing     Problem: ABCDS Injury Assessment  Goal: Absence of physical injury  Outcome: Progressing     Problem: Respiratory - Adult  Goal: Achieves optimal ventilation and oxygenation  1/11/2023 2141 by Emilia Santacruz RN  Outcome: Progressing  1/11/2023 1945 by Hever Ramirez RCP  Outcome: Progressing

## 2023-01-12 NOTE — PROGRESS NOTES
[facility]  Progress Note    Date:   1/12/2023  Patient name:  Zulema Soriano  Date of admission:  1/8/2023 10:43 AM  MRN:   [id number]  YOB: 1955    SUBJECTIVE/Last 24 hours update:     Patient seen and examined at the bed side , no new acute events overnight. No new complains. Currently using 4 L nasal cannula saturating at 98%. BiPAP overnight. Pulmonology following. Solumedrol 40 mg iv changed prednisone 30 mg daily. Cardiology following. Stable with downward trend of troponins. Limited echo shows left ventricular ejection fraction 45%. Cleared from cardio standpoint. Notes from nursing staff and Consults had been reviewed, and the overnight progress had been checked with the nursing staff as well. HPI:   68 yo male with PMH of HTN, COPD, CHF initially presented with shortness of breath who was initially seen by [facility] Medicine Team on 1/8/23 but due to worsening of SOB Pulm was consulted and was transferred to ICU. Patient was placed on BiPAP and noted to have bilateral Pleural Effusion. In the ICU patient had thoracentesis done which showed transudative effusion, cultures negative. Patient was also placed on BiPAP and weaned off to use only at night. Patient also has chronic anemia and received 1 PRBC on 1/10/23, but no acute signs of bleeding are seen. Patient also had hypotension and coreg was held due to low HR. Patient had elevated Trops and cardiology evaluated the patient likely Type II due to demand doubt ACS. Recent Echo in October EF of 50%. REVIEW OF SYSTEMS:      Review of Systems   Respiratory:  Positive for shortness of breath (Improved). Negative for wheezing. Cardiovascular:  Negative for chest pain, palpitations and leg swelling. Gastrointestinal:  Negative for abdominal pain, constipation, diarrhea, nausea and vomiting. Genitourinary:  Negative for dysuria and flank pain. Musculoskeletal:  Negative for arthralgias. Psychiatric/Behavioral:  Negative for agitation and behavioral problems. PAST MEDICAL HISTORY:      has a past medical history of CHF (congestive heart failure) ([facility] Diabetes mellitus ([facility] Erectile dysfunction due to arterial insufficiency, Hypertension, Microalbuminuria due to type 2 diabetes mellitus ([facility] and Overweight (BMI 25.0-29.9). PAST SURGICAL HISTORY:      has a past surgical history that includes Appendectomy and Total ankle arthroplasty. SOCIAL HISTORY:      reports that he quit smoking about 13 months ago. His smoking use included cigarettes. He smoked an average of .5 packs per day. He has never used smokeless tobacco. He reports that he does not drink alcohol and does not use drugs. FAMILY HISTORY:     family history is not on file. HOME MEDICATIONS:      Prior to Admission medications    Medication Sig Start Date End Date Taking? Authorizing Provider   budesonide-formoterol (SYMBICORT) 160-4.5 MCG/ACT AERO Inhale 2 puffs into the lungs in the morning and 2 puffs in the evening. 10/14/22   Derik Zamora DO   carvedilol (COREG) 12.5 MG tablet Take 1 tablet by mouth 2 times daily (with meals) 10/14/22   Derik Zamora DO   metFORMIN (GLUCOPHAGE) 500 MG tablet Take 1 tablet by mouth in the morning and 1 tablet in the evening. Take with meals.  8/15/22   Rakan Fermin MD   albuterol (PROVENTIL) (5 MG/ML) 0.5% nebulizer solution Take 0.5 mLs by nebulization 4 times daily as needed for Wheezing 7/12/22   Hong Lo MD   amLODIPine (NORVASC) 10 MG tablet Take 1 tablet by mouth daily 7/12/22   Hong Lo MD   aspirin (HM ASPIRIN EC LOW DOSE) 81 MG EC tablet TAKE 1 TABLET BY MOUTH DAILY 7/12/22   Te Quinteros MD   atorvastatin (LIPITOR) 20 MG tablet Take 1 tablet by mouth daily 7/12/22   Hong Lo MD   ipratropium-albuterol (DUONEB) 0.5-2.5 (3) MG/3ML SOLN nebulizer solution Inhale 3 mLs into the lungs every 4 hours (while awake) 7/12/22   eT Kruger MD   furosemide (LASIX) 20 MG tablet Take 1 tablet by mouth daily 7/12/22   Karlee Wu MD   lisinopril (PRINIVIL;ZESTRIL) 20 MG tablet Take 1 tablet by mouth daily 7/12/22   Karlee Wu MD   vitamin D (CHOLECALCIFEROL) 50 MCG (2000 UT) TABS tablet Take 1 tablet by mouth daily 7/12/22   Karlee Wu MD   fluticasone-salmeterol (ADVIAR) 100-50 MCG/ACT AEPB diskus inhaler Inhale 1 puff into the lungs every 12 hours 7/12/22   Karlee Wu MD   tiotropium (Elveria Fury) 18 MCG inhalation capsule Inhale 1 capsule into the lungs daily 7/12/22   Te Kruger MD       ALLERGIES:     Patient has no known allergies. OBJECTIVE:       Vitals:    01/11/23 0719 01/11/23 2030 01/11/23 2323 01/12/23 0329   BP: (!) 157/56 (!) 160/55     Pulse: 52 51     Resp: 16 14 16 16   Temp: 97.6 °F (36.4 °C) 97.5 °F (36.4 °C)     TempSrc: Oral Oral     SpO2: 96% 98%     Weight:             Intake/Output Summary (Last 24 hours) at 1/12/2023 5999  Last data filed at 1/12/2023 5916  Gross per 24 hour   Intake --   Output 400 ml   Net -400 ml       PHYSICAL EXAM:  Physical Exam  Cardiovascular:      Rate and Rhythm: Normal rate and regular rhythm. Heart sounds: Normal heart sounds. Pulmonary:      Effort: Pulmonary effort is normal.      Comments: Shortness prior Tory phase, soft breath sounds bilaterally, currently saturating 98% on 4 L nasal cannula  Abdominal:      General: There is no distension. Palpations: Abdomen is soft. Tenderness: There is no abdominal tenderness. There is no guarding. Musculoskeletal:      Right lower leg: No edema. Left lower leg: No edema. Skin:     General: Skin is warm and dry.    Psychiatric:         Mood and Affect: Mood normal.         Behavior: Behavior normal.        DIAGNOSTICS:     Laboratory Testing:    Recent Results (from the past 24 hour(s))   POC Glucose Fingerstick    Collection Time: 01/11/23  7:18 AM   Result Value Ref Range    POC Glucose 145 (H) 75 - 110 mg/dL   POC Glucose Fingerstick    Collection Time: 01/11/23 11:23 AM   Result Value Ref Range    POC Glucose 166 (H) 75 - 110 mg/dL   POC Glucose Fingerstick    Collection Time: 01/11/23  4:40 PM   Result Value Ref Range    POC Glucose 273 (H) 75 - 110 mg/dL   POC Glucose Fingerstick    Collection Time: 01/11/23  9:20 PM   Result Value Ref Range    POC Glucose 214 (H) 75 - 110 mg/dL         Imaging/Diagonstics:    Current Facility-Administered Medications   Medication Dose Route Frequency Provider Last Rate Last Admin    amLODIPine (NORVASC) tablet 10 mg  10 mg Oral Daily MARY Davis - CNP        predniSONE (DELTASONE) tablet 30 mg  30 mg Oral Daily Pj Austin MD        0.9 % sodium chloride infusion   IntraVENous PRN Amanda Garvin MD        insulin lispro (HUMALOG) injection vial 0-8 Units  0-8 Units SubCUTAneous TID  Danni Rainey MD   4 Units at 01/11/23 1804    insulin lispro (HUMALOG) injection vial 0-4 Units  0-4 Units SubCUTAneous Nightly Danni Rainey MD        famotidine (PEPCID) tablet 20 mg  20 mg Oral BID Heidi Sow MD   20 mg at 01/11/23 2033    furosemide (LASIX) tablet 20 mg  20 mg Oral Daily Danni Rainey MD   20 mg at 01/11/23 1111    carvedilol (COREG) tablet 12.5 mg  12.5 mg Oral BID  Merry Mayers MD   12.5 mg at 01/11/23 1803    docusate sodium (COLACE) capsule 100 mg  100 mg Oral Daily Danni Rainey MD   100 mg at 01/11/23 1112    senna (SENOKOT) tablet 8.6 mg  1 tablet Oral Nightly Danni Rainey MD   8.6 mg at 01/10/23 2011    magnesium hydroxide (MILK OF MAGNESIA) 400 MG/5ML suspension 30 mL  30 mL Oral Daily Danni Rainey MD        heparin (porcine) injection 5,000 Units  5,000 Units SubCUTAneous 3 times per day Yair Durbin MD   5,000 Units at 01/12/23 0626    ipratropium-albuterol (DUONEB) nebulizer solution 1 ampule  1 ampule Inhalation Q4H WA Gary Boston MD   1 ampule at 01/11/23 1941    aspirin EC tablet 81 mg  81 mg Oral Daily Gary Boston MD   81 mg at 01/11/23 1112    atorvastatin (LIPITOR) tablet 20 mg  20 mg Oral Daily Gary Boston MD   20 mg at 01/11/23 1112    budesonide-formoterol (SYMBICORT) 80-4.5 MCG/ACT inhaler 2 puff  2 puff Inhalation BID Gary Boston MD   2 puff at 01/11/23 1941    glucose chewable tablet 16 g  4 tablet Oral PRN Gary Boston MD        dextrose bolus 10% 125 mL  125 mL IntraVENous PRN Gary Boston MD        Or    dextrose bolus 10% 250 mL  250 mL IntraVENous PRN Gary Boston MD        glucagon (rDNA) injection 1 mg  1 mg SubCUTAneous PRN Gary Boston MD        dextrose 10 % infusion   IntraVENous Continuous PRN Gary Boston MD        piperacillin-tazobactam (ZOSYN) 3,375 mg in dextrose 5 % 50 mL IVPB extended infusion (mini-bag)  3,375 mg IntraVENous Shannen Owens MD   Stopped at 01/12/23 0510    sodium chloride flush 0.9 % injection 5-40 mL  5-40 mL IntraVENous 2 times per day Shelley Saini MD   10 mL at 01/11/23 2033    sodium chloride flush 0.9 % injection 5-40 mL  5-40 mL IntraVENous PRN Shelley Saini MD        0.9 % sodium chloride infusion   IntraVENous PRN Shelley Saini MD        ondansetron (ZOFRAN-ODT) disintegrating tablet 4 mg  4 mg Oral Q8H PRN Shelley Saini MD        Or    [facility]ron [facility] injection 4 mg  4 mg IntraVENous Q6H PRN Shelley Saini MD        polyethylene glycol (GLYCOLAX) packet 17 g  17 g Oral Daily PRN Shelley Saini MD        acetaminophen (TYLENOL) tablet 650 mg  650 mg Oral Q6H PRN Shelley Saini MD        Or    acetaminophen (TYLENOL) suppository 650 mg  650 mg Rectal Q6H PRN Shelley Saini MD           ASSESSMENT:     Principal Problem:    COPD exacerbation ([address]  Resolved Problems:    * No resolved hospital problems. *      PLAN:   Acute on Chronic Respiratory Failure 2/2 COPD exacerbation  -Bipap overnight, on 4L saturating at 98%  -Continue INH use  -IV solumedrol 40 mg changed to prednisone 30 mg daily for 4 days  -Continue Symbicort and Duoneb     Bilateral Pneumonia   -CXR suggestive of Pleural Effusion and pneumonitis  -On zosyn  -Thoracentesis cultures negative     Anemia  -Likely chronic   -Last Hemoglobin 8.1 01/11/23  -Received 1 PRBC 01/10/23  -Iron studies -TIBC unable, iron saturation unable, ferritin 210, folate 4.2, vitamin B12 499, ret % 1.4     HTN   -Norvasc 10 mg qd   -Coreg 12.5 mg twice daily      T2DM  -Continue Sliding Scale  -Hypoglycemia protocol  -POCT 4 times daily AC and QH     DVT prophylaxis: On Heparin  GI Prophylaxis: Pepcid 20 mg twice daily    Dispo: Possible discharge today to home    Discussed care plan with nurse after getting her input. Plan will be discussed with the attending, Dr Justin Frost MD  Family Medicine Resident  1/12/2023 7:12 AM   Attending Physician Statement  I have discussed the care of Usha Martinez, including pertinent history and exam findings,  with the resident. I have seen and examined the patient and the key elements of all parts of the encounter have been performed by me. I agree with the assessment, plan and orders as documented by the resident. ([address] [place] [address])   Patient seen and examined along with the resident physicians. Here for Respiratory Failure secondary to COPD exacerbation. Also has Pneumonia/Pleural Effusion and Diabetes and Hypertension. Discussed care with the Lung Doctor [address] [place] [address] who cleared him for Discharge today but patient wants to stay till Tomorrow saying he does not have a ride today and his son who takes care of him is not home today. Appreciate Pulmonology and Cardiology Input. Doctor Rg Brown to take call starting today at 5.p.m. Vilma Mcdonald.
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
## 2023-01-12 NOTE — PROGRESS NOTES
Pulmonary Progress Note    CC:  Chief Complaint   Patient presents with    Respiratory Distress      Subjective: On 4 l oxygen - his home o2 is 4 l   Sob with ex ,   No wheeze   Cough no sputum       Review of Systems -  General ROS: negative for - chills, fatigue, fever or weight loss  ENT ROS: negative for - headaches, oral lesions or sore throat  Cardiovascular ROS: no chest pain , orthopnea or pnd   Gastrointestinal ROS: no abdominal pain, change in bowel habits, or black or bloody stools  Skin - no rash   Neuro - no blurry vision , no loc . No focal weakness   msk - no jt tenderness or swelling        Immunization   Immunization History   Administered Date(s) Administered    Pneumococcal Conjugate 13-valent ([id number]) 08/02/2018    Pneumococcal Polysaccharide ([id number]) 11/15/2019    Tdap (Boostrix, Adacel) 08/02/2018        Pneumococcal Vaccine     [] Up to date    [] Indicated   [] Refused  [] Contraindicated       Influenza Vaccine   [] Up to date    [] Indicated   [] Refused  [] Contraindicated     PAST MEDICAL HISTORY:       Diagnosis Date    CHF (congestive heart failure) (HCC)     COPD (chronic obstructive pulmonary disease) ([facility]     Diabetes mellitus ([facility]     Erectile dysfunction due to arterial insufficiency 12/07/2015    Hypertension     Microalbuminuria due to type 2 diabetes mellitus ([facility] 07/07/2016    Overweight (BMI 25.0-29.9) 12/07/2015         Family History:   History reviewed. No pertinent family history. SURGICAL HISTORY:   Past Surgical History:   Procedure Laterality Date    APPENDECTOMY      TOTAL ANKLE ARTHROPLASTY      LEFT              TOBACCO:   reports that he quit smoking about 13 months ago. His smoking use included cigarettes. He smoked an average of .5 packs per day. He has never used smokeless tobacco.  ETOH:   reports no history of alcohol use.           ALLERGIES:  No Known Allergies    Home Meds:   Prior to Admission medications    Medication Sig Start Date End Date Taking? Authorizing Provider   budesonide-formoterol (SYMBICORT) 160-4.5 MCG/ACT AERO Inhale 2 puffs into the lungs in the morning and 2 puffs in the evening. 10/14/22   Derik Zamora DO   carvedilol (COREG) 12.5 MG tablet Take 1 tablet by mouth 2 times daily (with meals) 10/14/22   Derik Zamora DO   metFORMIN (GLUCOPHAGE) 500 MG tablet Take 1 tablet by mouth in the morning and 1 tablet in the evening. Take with meals. 8/15/22   Alexandria Eaton MD   albuterol (PROVENTIL) (5 MG/ML) 0.5% nebulizer solution Take 0.5 mLs by nebulization 4 times daily as needed for Wheezing 7/12/22   Yenifer Garcia MD   amLODIPine (NORVASC) 10 MG tablet Take 1 tablet by mouth daily 7/12/22   Yenifer Garcia MD   aspirin (HM ASPIRIN EC LOW DOSE) 81 MG EC tablet TAKE 1 TABLET BY MOUTH DAILY 7/12/22   Te Edwards MD   atorvastatin (LIPITOR) 20 MG tablet Take 1 tablet by mouth daily 7/12/22   Yeinfer Garcia MD   ipratropium-albuterol (DUONEB) 0.5-2.5 (3) MG/3ML SOLN nebulizer solution Inhale 3 mLs into the lungs every 4 hours (while awake) 7/12/22   Te Edwards MD   furosemide (LASIX) 20 MG tablet Take 1 tablet by mouth daily 7/12/22   Yenifer Garcia MD   lisinopril (PRINIVIL;ZESTRIL) 20 MG tablet Take 1 tablet by mouth daily 7/12/22   Yenifer Garcia MD   vitamin D (CHOLECALCIFEROL) 50 MCG (2000 UT) TABS tablet Take 1 tablet by mouth daily 7/12/22   Yenifer Garcia MD   fluticasone-salmeterol (ADVIAR) 100-50 MCG/ACT AEPB diskus inhaler Inhale 1 puff into the lungs every 12 hours 7/12/22   Yenifer Garcia MD   tiotropium (SPIRIVA HANDIHALER) 18 MCG inhalation capsule Inhale 1 capsule into the lungs daily 7/12/22   Yenifer Garcia MD         Intake/Output Summary (Last 24 hours) at 1/12/2023 1124  Last data filed at 1/12/2023 3453  Gross per 24 hour   Intake --   Output 400 ml   Net -400 ml         Diet   ADULT DIET;  Regular; 4 carb choices (60 gm/meal)    Vitals:   BP (!) 191/66   Pulse 51   Temp 97.8 °F (36.6 °C)   Resp 16   Wt 147 lb 4.3 oz (66.8 kg)   SpO2 98%   BMI 24.51 kg/m²  on         I/O (24 Hours)    Patient Vitals for the past 8 hrs:   BP Temp Resp   01/12/23 0800 (!) 191/66 97.8 °F (36.6 °C) --   01/12/23 0329 -- -- 16       Intake/Output Summary (Last 24 hours) at 1/12/2023 1124  Last data filed at 1/12/2023 1416  Gross per 24 hour   Intake --   Output 400 ml   Net -400 ml     I/O last 3 completed shifts:  In: -   Out: 900 [Urine:900]   Date 01/12/23 0000 - 01/12/23 2359   Shift 8834-2846 7671-8382 1678-2609 24 Hour Total   INTAKE   Shift Total(mL/kg)       OUTPUT   Urine(mL/kg/hr) 400(0.7)   400   Shift Total(mL/kg) 400(6)   400(6)   Weight (kg) 66.8 66.8 66.8 66.8     Patient Vitals for the past 96 hrs (Last 3 readings):   Weight   01/09/23 0600 147 lb 4.3 oz (66.8 kg)          PHYSICAL EXAMINATION:  General Appearance:    Alert, cooperative, no distress but has mild tachypnea, appears stated age   Head:    Normocephalic, without obvious abnormality, atraumatic                  :    Neck:   Supple, symmetrical, trachea midline, no adenopathy;     thyroid:  no enlargement/tenderness/nodules; no carotid    bruit no JVP , no HJR   Back:     Symmetric, no curvature, ROM normal, no CVA tenderness   Lungs:       AP diameter of chest increased. Thoracic expansion and diaphragmatic excursion diminished. BS diminished and expiratory phase prolonged. No dullness to percussion bases  No bronchial breath sounds .      Chest Wall:    No tenderness or deformity      Heart:    Regular rate and rhythm, S1 and S2 normal, no murmur, rub        or gallop no rvh                           Abdomen:                                                 Pulses:                                            Lymph nodes:                    Neurologic:                  Soft, non-tender, bowel sounds active all four quadrants,     no masses, no organomegaly         2+ and symmetric all extremities            Cervical, supraclavicular not enlarged or matted or tender      CNII-XII intact, normal strength 5/5 . Sensation grossly normal  and reflexes normal 2+  throughout     Clubbing No  Lower ext edema Yes1+   [] , 2 +  [] , 3+   []  Upper ext edema No              Medications:    Scheduled Meds:   amLODIPine  10 mg Oral Daily    predniSONE  30 mg Oral Daily    insulin lispro  0-8 Units SubCUTAneous TID     insulin lispro  0-4 Units SubCUTAneous Nightly    famotidine  20 mg Oral BID    furosemide  20 mg Oral Daily    carvedilol  12.5 mg Oral BID     docusate sodium  100 mg Oral Daily    senna  1 tablet Oral Nightly    magnesium hydroxide  30 mL Oral Daily    heparin (porcine)  5,000 Units SubCUTAneous 3 times per day    ipratropium-albuterol  1 ampule Inhalation Q4H WA    aspirin  81 mg Oral Daily    atorvastatin  20 mg Oral Daily    budesonide-formoterol  2 puff Inhalation BID    piperacillin-tazobactam  3,375 mg IntraVENous Q8H    sodium chloride flush  5-40 mL IntraVENous 2 times per day       Continuous Infusions:   sodium chloride      dextrose      sodium chloride         PRN Meds:  sodium chloride, glucose, dextrose bolus **OR** dextrose bolus, glucagon (rDNA), dextrose, sodium chloride flush, sodium chloride, ondansetron **OR** ondansetron, polyethylene glycol, acetaminophen **OR** acetaminophen    Labs:  CBC:   Recent Labs     01/10/23  0416 01/10/23  1352 01/11/23  0601   WBC 6.1  --  5.6   HGB 6.2* 8.3* 8.1*   HCT 21.2* 26.6* 26.7*   MCV 97.2  --  93.0     --  163     BMP:   Recent Labs     01/10/23  0416 01/11/23  0601   * 133*   K 4.3 4.6   CL 90* 95*   CO2 33* 32*   BUN 24* 24*   CREATININE 0.98 0.98     LIVER PROFILE:   Recent Labs     01/09/23  1511 01/10/23  0416 01/11/23  0601   AST  --  13 11   ALT  --  5 <5*   LIPASE 13  --   --    BILITOT  --  0.3 0.4   ALKPHOS  --  64 57     PT/INR:   No results for input(s): PROTIME, INR in the last 72 hours. APTT: No results for input(s):  APTT in the last 72 hours. UA:  No results for input(s): NITRITE, COLORU, PHUR, LABCAST, WBCUA, RBCUA, MUCUS, TRICHOMONAS, YEAST, BACTERIA, CLARITYU, SPECGRAV, LEUKOCYTESUR, UROBILINOGEN, BILIRUBINUR, BLOODU, GLUCOSEU, AMORPHOUS in the last 72 hours. Invalid input(s): KETONESU    No results for input(s): PHART, VXD9ODJ, PO2ART in the last 72 hours. ABG   No results found for: PH, PCO2, PO2, HCO3, O2SAT  Lab Results   Component Value Date/Time    MODE [facility] 10/10/2022 04:19 AM       XR CHEST PORTABLE    Result Date: 1/9/2023  Small bilateral pleural effusions and bibasilar airspace disease, left greater than right. Mild pulmonary vascular congestion. Stable cardiomegaly. XR CHEST PORTABLE    Result Date: 1/8/2023  1. Small to moderate left and small right pleural effusion which is new from the prior study. 2.  Presumed left more so than right basilar lung atelectasis though pneumonitis cannot be excluded if suspected clinically. US THORACENTESIS Which side should the procedure be performed? Radiologist Recommendation    Result Date: 1/9/2023  Successful ultrasound guided thoracentesis. CT CHEST ABDOMEN PELVIS WO CONTRAST Additional Contrast? None    Result Date: 1/9/2023  1. Moderate bilateral pleural effusions and lower lobe atelectatic changes. 2. Cardiomegaly with minimal pericardial thickening. No evidence of lymphadenopathy. 3. No evidence of aortic aneurysm. Prominent main pulmonary artery measuring 4.4 cm. 4. Gallbladder demonstrates sludge and mild thickening. 5. Scattered pancreatic calcifications likely related to chronic pancreatitis but no acute pancreatitis. 6. Right punctate nephrolithiasis but no obstructive uropathy. 7. Mild retroperitoneal lymphadenopathy with retroperitoneal nodes measuring up to 1.1 cm. 8. Mild ascites. 9. Mild diverticulosis but no acute diverticulitis.           Assessment:   Principal Problem:    COPD exacerbation ([address]  Resolved Problems:    * No resolved hospital problems. *   Bilateral pleural effusion left greater than right and postthoracentesis of left pleural effusion  Left lower lobe atelectasis/pneumonia  Left transudate pleural effusion, culture negative  Chronic obstructive pulmonary disease  Acute CHF systolic  Acute on chronic respiratory failure hypoxic is base line   Anemia posttransfusion of 1 unit  Plan:  Ok to dc from pulmonary standpoint   On spiriva ,albuterol , advair   Levoquin for 4 more days .   Continue home oxygen   Electronically signed by Renita Cochran MD on 1/12/2023 at 11:24 AM

## 2023-01-12 NOTE — PROGRESS NOTES
Patient is stable for discharge today. At this time patient is not willing to be discharged due to not having his son home who can provide him a ride. He was offered arrangements for a ride today but he refuses. Patient says his son will be home tomorrow and would like to be discharged tomorrow. At this time we we will anticipate discharge for tomorrow.

## 2023-01-12 NOTE — PROGRESS NOTES
Congestive Heart Failure Education CHF booklet given. EF 50%  Pt refusing verbal  CHF education today. He was agreeable to CHF booklet being left behind.

## 2023-01-12 NOTE — PLAN OF CARE

## 2023-01-12 NOTE — PROGRESS NOTES
Physical Therapy  Facility/Department: [address] OBSERVATION  Physical Therapy Daily Treatment Note    Name: Taylor Payment  : 1955  MRN: [id number]  Date of Service: 2023    Discharge Recommendations:  Patient would benefit from continued therapy after discharge   PT Equipment Recommendations  Equipment Needed: No (pt reports owning a RW, writer recommends  use of RW)      Patient Diagnosis(es): The encounter diagnosis was COPD exacerbation ([facility] Past Medical History:  has a past medical history of CHF (congestive heart failure) ([facility] COPD (chronic obstructive pulmonary disease) ([facility] Erectile dysfunction due to arterial insufficiency, Hypertension, Microalbuminuria due to type 2 diabetes mellitus ([facility] and Overweight (BMI 25.0-29.9). Past Surgical History:  has a past surgical history that includes Appendectomy and Total ankle arthroplasty. Assessment   Body Structures, Functions, Activity Limitations Requiring Skilled Therapeutic Intervention: Decreased functional mobility ; Decreased strength;Decreased endurance;Decreased balance;Decreased coordination  Assessment: Pt demonstrating significantly improving functional mobility requiring CGA to ambulate 250 feet with a RW. Pt remains limited secondary to endurance deficits, mild  BLE strength deficits. Pt would benefit from additional PT upon discharge to maximize functional independence. Pt would benefit from assistance with mobility upon discharge.   Therapy Prognosis: Good  Decision Making: Medium Complexity  Requires PT Follow-Up: Yes  Activity Tolerance  Activity Tolerance: Patient limited by endurance     Plan   Physcial Therapy Plan  General Plan:  (5-6x wk)  Current Treatment Recommendations: Strengthening, Functional mobility training, Endurance training, Stair training, Gait training, Safety education & training, Balance training, Patient/Caregiver education & training, Equipment evaluation, education, & procurement, Therapeutic activities, Home exercise program, Transfer training  Safety Devices  Type of Devices: Left in chair, Gait belt, Call light within reach, Nurse notified  Restraints  Restraints Initially in Place: No     Restrictions  Restrictions/Precautions  Required Braces or Orthoses?: No  Position Activity Restriction  Other position/activity restrictions: Up with assist     Subjective   General  Patient assessed for rehabilitation services?: Yes  Response To Previous Treatment: Not applicable  Family / Caregiver Present: No  Follows Commands: Within Functional Limits  Subjective  Subjective: Pt supine in bed and agreeable to therapy, RN agreeable to therapy. Pt pleasant and cooperative throughout. Pt denies pain at rest.           Cognition   Cognition  Overall Cognitive Status: WFL     Objective                                Bed mobility  Supine to Sit: Stand by assistance  Sit to Supine:  (pt retired up to a chair at the conclusion of today's session.)  Scooting: Contact guard assistance  Bed Mobility Comments: HOB elevated ~25 degrees with use of handrails. Transfers  Sit to Stand: Stand by assistance  Stand to Sit: Stand by assistance  Comment: transfers performed 2x this date. Ambulation  Surface: Level tile  Device: Rolling Walker  Assistance: Contact guard assistance  Quality of Gait: fair stability, increased KELSEY, no LOB. Gait Deviations: Slow Patricia;Decreased step length  Distance: 250 feet  More Ambulation?: No  Stairs/Curb  Stairs?: No     Balance  Posture: Good  Sitting - Static: Good  Sitting - Dynamic: Good  Standing - Static: Fair;+  Standing - Dynamic: Fair  Comments: standing balance assessed while using a RW  Exercise Treatment: x10 BLE in standing: hip flexion, hip abduction, partial squats, heel raises.                                                      AM-PAC Score  -PAC Inpatient Mobility Raw Score : 20 (01/12/23 6685)  AM-PAC Inpatient T-Scale Score : 47.67 (01/12/23 1545)  Mobility Inpatient CMS 0-100% Score: 35.83 (01/12/23 1545)  Mobility Inpatient CMS G-Code Modifier : CJ (01/12/23 1545)            Goals  Short Term Goals  Time Frame for Short Term Goals: 10 visits  Short Term Goal 1: transfers with SBA  Short Term Goal 2: amb 150 ft with a RW x SBA  Short Term Goal 3: ascend/descend 4 steps with SBA  Short Term Goal 4: 20 min strengthening exercise program x SBA  Additional Goals?: No  Patient Goals   Patient Goals : Return home       Education  Patient Education  Education Given To: Patient  Education Provided: Transfer Training;Plan of Care; Fall Prevention Strategies  Education Method: Verbal  Barriers to Learning: None  Education Outcome: Verbalized understanding      Therapy Time   Individual Concurrent Group Co-treatment   Time In 6286         Time Out 1106         Minutes 25         Timed Code Treatment Minutes: [facility],

## 2023-01-13 VITALS
WEIGHT: 147.27 LBS | HEART RATE: 54 BPM | RESPIRATION RATE: 18 BRPM | SYSTOLIC BLOOD PRESSURE: 161 MMHG | DIASTOLIC BLOOD PRESSURE: 71 MMHG | TEMPERATURE: 97.3 F | BODY MASS INDEX: 24.51 KG/M2 | OXYGEN SATURATION: 97 %

## 2023-01-13 LAB
CULTURE: NORMAL
CULTURE: NORMAL
GLUCOSE BLD-MCNC: 105 MG/DL (ref 75–110)
GLUCOSE BLD-MCNC: 127 MG/DL (ref 75–110)
Lab: NORMAL
Lab: NORMAL
SPECIMEN DESCRIPTION: NORMAL
SPECIMEN DESCRIPTION: NORMAL

## 2023-01-13 PROCEDURE — 2580000003 HC RX 258: Performed by: STUDENT IN AN ORGANIZED HEALTH CARE EDUCATION/TRAINING PROGRAM

## 2023-01-13 PROCEDURE — 94761 N-INVAS EAR/PLS OXIMETRY MLT: CPT

## 2023-01-13 PROCEDURE — 99232 SBSQ HOSP IP/OBS MODERATE 35: CPT | Performed by: INTERNAL MEDICINE

## 2023-01-13 PROCEDURE — 6370000000 HC RX 637 (ALT 250 FOR IP): Performed by: SURGERY

## 2023-01-13 PROCEDURE — 2580000003 HC RX 258: Performed by: INTERNAL MEDICINE

## 2023-01-13 PROCEDURE — 99239 HOSP IP/OBS DSCHRG MGMT >30: CPT | Performed by: FAMILY MEDICINE

## 2023-01-13 PROCEDURE — 6370000000 HC RX 637 (ALT 250 FOR IP)

## 2023-01-13 PROCEDURE — 6360000002 HC RX W HCPCS: Performed by: INTERNAL MEDICINE

## 2023-01-13 PROCEDURE — 82947 ASSAY GLUCOSE BLOOD QUANT: CPT

## 2023-01-13 PROCEDURE — 2700000000 HC OXYGEN THERAPY PER DAY

## 2023-01-13 PROCEDURE — 94660 CPAP INITIATION&MGMT: CPT

## 2023-01-13 PROCEDURE — 94640 AIRWAY INHALATION TREATMENT: CPT

## 2023-01-13 PROCEDURE — 6370000000 HC RX 637 (ALT 250 FOR IP): Performed by: STUDENT IN AN ORGANIZED HEALTH CARE EDUCATION/TRAINING PROGRAM

## 2023-01-13 PROCEDURE — 6370000000 HC RX 637 (ALT 250 FOR IP): Performed by: NURSE PRACTITIONER

## 2023-01-13 PROCEDURE — 6370000000 HC RX 637 (ALT 250 FOR IP): Performed by: INTERNAL MEDICINE

## 2023-01-13 RX ORDER — LEVOFLOXACIN 500 MG/1
500 TABLET, FILM COATED ORAL DAILY
Status: DISCONTINUED | OUTPATIENT
Start: 2023-01-13 | End: 2023-01-13

## 2023-01-13 RX ORDER — PREDNISONE 10 MG/1
30 TABLET ORAL DAILY
Qty: 6 TABLET | Refills: 0 | Status: SHIPPED | OUTPATIENT
Start: 2023-01-13 | End: 2023-01-15

## 2023-01-13 RX ORDER — HYDRALAZINE HYDROCHLORIDE 20 MG/ML
5 INJECTION INTRAMUSCULAR; INTRAVENOUS EVERY 6 HOURS PRN
Status: DISCONTINUED | OUTPATIENT
Start: 2023-01-13 | End: 2023-01-13 | Stop reason: HOSPADM

## 2023-01-13 RX ORDER — LEVOFLOXACIN 750 MG/1
750 TABLET ORAL DAILY
Qty: 4 TABLET | Refills: 0 | Status: SHIPPED | OUTPATIENT
Start: 2023-01-13 | End: 2023-01-17

## 2023-01-13 RX ORDER — LEVOFLOXACIN 500 MG/1
500 TABLET, FILM COATED ORAL DAILY
Status: DISCONTINUED | OUTPATIENT
Start: 2023-01-13 | End: 2023-01-13 | Stop reason: HOSPADM

## 2023-01-13 RX ADMIN — AMLODIPINE BESYLATE 10 MG: 10 TABLET ORAL at 09:43

## 2023-01-13 RX ADMIN — LEVOFLOXACIN 500 MG: 500 TABLET, FILM COATED ORAL at 12:53

## 2023-01-13 RX ADMIN — FAMOTIDINE 20 MG: 20 TABLET, FILM COATED ORAL at 09:43

## 2023-01-13 RX ADMIN — Medication 81 MG: at 09:43

## 2023-01-13 RX ADMIN — PREDNISONE 30 MG: 20 TABLET ORAL at 15:33

## 2023-01-13 RX ADMIN — ATORVASTATIN CALCIUM 20 MG: 20 TABLET, FILM COATED ORAL at 09:43

## 2023-01-13 RX ADMIN — IPRATROPIUM BROMIDE AND ALBUTEROL SULFATE 1 AMPULE: 2.5; .5 SOLUTION RESPIRATORY (INHALATION) at 08:25

## 2023-01-13 RX ADMIN — BUDESONIDE AND FORMOTEROL FUMARATE DIHYDRATE 2 PUFF: 80; 4.5 AEROSOL RESPIRATORY (INHALATION) at 08:25

## 2023-01-13 RX ADMIN — IPRATROPIUM BROMIDE AND ALBUTEROL SULFATE 1 AMPULE: 2.5; .5 SOLUTION RESPIRATORY (INHALATION) at 12:10

## 2023-01-13 RX ADMIN — FUROSEMIDE 20 MG: 20 TABLET ORAL at 09:43

## 2023-01-13 RX ADMIN — SODIUM CHLORIDE: 9 INJECTION, SOLUTION INTRAVENOUS at 00:42

## 2023-01-13 RX ADMIN — CARVEDILOL 12.5 MG: 12.5 TABLET, FILM COATED ORAL at 09:43

## 2023-01-13 RX ADMIN — PIPERACILLIN AND TAZOBACTAM 3375 MG: 3; .375 INJECTION, POWDER, FOR SOLUTION INTRAVENOUS at 00:42

## 2023-01-13 ASSESSMENT — ENCOUNTER SYMPTOMS
ABDOMINAL PAIN: 0
VOMITING: 0
DIARRHEA: 0
SHORTNESS OF BREATH: 1
TROUBLE SWALLOWING: 0
RHINORRHEA: 0
NAUSEA: 0
WHEEZING: 0
CONSTIPATION: 0

## 2023-01-13 ASSESSMENT — PAIN SCALES - GENERAL: PAINLEVEL_OUTOF10: 0

## 2023-01-13 NOTE — CARE COORDINATION
Pt does not have portable 02 with him and has no way of getting it to him for transport home. Pt gets his home 02 from [facility] [place], spoke with Nikki Melton ok to give pt portable tank for transport home. Pt does not have transportation benefits per [employer] rep.  [employer] scheduled for transport home

## 2023-01-13 NOTE — PROGRESS NOTES
Pulmonary Progress Note    CC:  Chief Complaint   Patient presents with    Respiratory Distress      Subjective:  Pt seen and examined, sitting comfortably in chair. Sat well on 4 L NC, remained afebrile , remained hypertensive overnight, BP this morning 161/71, HR 54. No labs this morning. Pt was cleared for DC. Recommended Levaquin till 1/16/23. Review of Systems -  General ROS: negative for - chills, fatigue, fever or weight loss  ENT ROS: negative for - headaches, oral lesions or sore throat  Cardiovascular ROS: no chest pain , orthopnea or pnd   Gastrointestinal ROS: no abdominal pain, change in bowel habits, or black or bloody stools  Skin - no rash   Neuro - no blurry vision , no loc . No focal weakness   msk - no jt tenderness or swelling        Immunization   Immunization History   Administered Date(s) Administered    Pneumococcal Conjugate 13-valent ([id number]) 08/02/2018    Pneumococcal Polysaccharide ([id number]) 11/15/2019    Tdap (Boostrix, Adacel) 08/02/2018        Pneumococcal Vaccine     [] Up to date    [] Indicated   [] Refused  [] Contraindicated       Influenza Vaccine   [] Up to date    [] Indicated   [] Refused  [] Contraindicated     PAST MEDICAL HISTORY:       Diagnosis Date    CHF (congestive heart failure) (HCC)     COPD (chronic obstructive pulmonary disease) ([facility]     Diabetes mellitus [facility]     Erectile dysfunction due to arterial insufficiency 12/07/2015    Hypertension     Microalbuminuria due to type 2 diabetes mellitus ([facility] 07/07/2016    Overweight (BMI 25.0-29.9) 12/07/2015         Family History:   History reviewed. No pertinent family history. SURGICAL HISTORY:   Past Surgical History:   Procedure Laterality Date    APPENDECTOMY      TOTAL ANKLE ARTHROPLASTY      LEFT              TOBACCO:   reports that he quit smoking about 13 months ago. His smoking use included cigarettes. He smoked an average of .5 packs per day.  He has never used smokeless tobacco.  ETOH: reports no history of alcohol use. ALLERGIES:  No Known Allergies    Home Meds:   Prior to Admission medications    Medication Sig Start Date End Date Taking? Authorizing Provider   levoFLOXacin (LEVAQUIN) 750 MG tablet Take 1 tablet by mouth daily for 4 doses 1/13/23 1/17/23 Yes Addison Robledo MD   predniSONE (DELTASONE) 10 MG tablet Take 3 tablets by mouth daily for 2 days 1/13/23 1/15/23 Yes Addison Robledo MD   carvedilol (COREG) 12.5 MG tablet Take 1 tablet by mouth 2 times daily (with meals) 10/14/22   Quratulain DO Noah   metFORMIN (GLUCOPHAGE) 500 MG tablet Take 1 tablet by mouth in the morning and 1 tablet in the evening. Take with meals.  8/15/22   Misha Lainez MD   albuterol (PROVENTIL) (5 MG/ML) 0.5% nebulizer solution Take 0.5 mLs by nebulization 4 times daily as needed for Wheezing 7/12/22   Karlee Wu MD   amLODIPine (NORVASC) 10 MG tablet Take 1 tablet by mouth daily 7/12/22   Karlee Wu MD   aspirin (HM ASPIRIN EC LOW DOSE) 81 MG EC tablet TAKE 1 TABLET BY MOUTH DAILY 7/12/22   Te Kruger MD   atorvastatin (LIPITOR) 20 MG tablet Take 1 tablet by mouth daily 7/12/22   Karlee Wu MD   ipratropium-albuterol (DUONEB) 0.5-2.5 (3) MG/3ML SOLN nebulizer solution Inhale 3 mLs into the lungs every 4 hours (while awake) 7/12/22   Te Kruger MD   furosemide (LASIX) 20 MG tablet Take 1 tablet by mouth daily 7/12/22   Karlee Wu MD   lisinopril (PRINIVIL;ZESTRIL) 20 MG tablet Take 1 tablet by mouth daily 7/12/22   Karlee Wu MD   vitamin D (CHOLECALCIFEROL) 50 MCG (2000 UT) TABS tablet Take 1 tablet by mouth daily 7/12/22   Karlee Wu MD   fluticasone-salmeterol (ADVIAR) 100-50 MCG/ACT AEPB diskus inhaler Inhale 1 puff into the lungs every 12 hours 7/12/22   Karlee Wu MD   tiotropium (SPIRIVA HANDIHALER) 18 MCG inhalation capsule Inhale 1 capsule into the lungs daily 7/12/22   Te Kruger MD         Intake/Output Summary (Last 24 hours) at 1/13/2023 1333  Last data filed at 1/12/2023 2000  Gross per 24 hour   Intake --   Output 400 ml   Net -400 ml           Diet   ADULT DIET; Regular; 4 carb choices (60 gm/meal)    Vitals:   BP (!) 161/71   Pulse 54   Temp 97.3 °F (36.3 °C)   Resp 18   Wt 147 lb 4.3 oz (66.8 kg)   SpO2 97%   BMI 24.51 kg/m²  on         I/O (24 Hours)    Patient Vitals for the past 8 hrs:   BP Temp Pulse Resp SpO2   01/13/23 0826 (!) 161/71 97.3 °F (36.3 °C) 54 18 97 %         Intake/Output Summary (Last 24 hours) at 1/13/2023 1333  Last data filed at 1/12/2023 2000  Gross per 24 hour   Intake --   Output 400 ml   Net -400 ml       I/O last 3 completed shifts:  In: -   Out: 800 [Urine:800]       No data found. PHYSICAL EXAMINATION:  General Appearance:    Alert, cooperative, no distress but has mild tachypnea, appears stated age   Head:    Normocephalic, without obvious abnormality, atraumatic                  :    Neck:   Supple, symmetrical, trachea midline, no adenopathy;     thyroid:  no enlargement/tenderness/nodules; no carotid    bruit no JVP , no HJR   Back:     Symmetric, no curvature, ROM normal, no CVA tenderness   Lungs:       AP diameter of chest increased. Thoracic expansion and diaphragmatic excursion diminished. BS diminished and expiratory phase prolonged. No dullness to percussion bases  No bronchial breath sounds . Chest Wall:    No tenderness or deformity      Heart:    Regular rate and rhythm, S1 and S2 normal, no murmur, rub        or gallop no rvh                           Abdomen:                                                 Pulses:                                            Lymph nodes:                    Neurologic:                  Soft, non-tender, bowel sounds active all four quadrants,     no masses, no organomegaly         2+ and symmetric all extremities            Cervical, supraclavicular not enlarged or matted or tender      CNII-XII intact, normal strength 5/5 .   Sensation grossly normal  and reflexes normal 2+  throughout     Clubbing No  Lower ext edema Yes1+   [] , 2 +  [] , 3+   []  Upper ext edema No              Medications:    Scheduled Meds:   levoFLOXacin  500 mg Oral Daily    amLODIPine  10 mg Oral Daily    predniSONE  30 mg Oral Daily    insulin lispro  0-8 Units SubCUTAneous TID     insulin lispro  0-4 Units SubCUTAneous Nightly    famotidine  20 mg Oral BID    furosemide  20 mg Oral Daily    carvedilol  12.5 mg Oral BID     docusate sodium  100 mg Oral Daily    senna  1 tablet Oral Nightly    magnesium hydroxide  30 mL Oral Daily    heparin (porcine)  5,000 Units SubCUTAneous 3 times per day    ipratropium-albuterol  1 ampule Inhalation Q4H WA    aspirin  81 mg Oral Daily    atorvastatin  20 mg Oral Daily    budesonide-formoterol  2 puff Inhalation BID    sodium chloride flush  5-40 mL IntraVENous 2 times per day       Continuous Infusions:   sodium chloride      dextrose      sodium chloride 10 mL/hr at 01/13/23 0042       PRN Meds:  hydrALAZINE, sodium chloride, glucose, dextrose bolus **OR** dextrose bolus, glucagon (rDNA), dextrose, sodium chloride flush, sodium chloride, ondansetron **OR** ondansetron, polyethylene glycol, acetaminophen **OR** acetaminophen    Labs:  CBC:   Recent Labs     01/10/23  1352 01/11/23  0601   WBC  --  5.6   HGB 8.3* 8.1*   HCT 26.6* 26.7*   MCV  --  93.0   PLT  --  163       BMP:   Recent Labs     01/11/23  0601   *   K 4.6   CL 95*   CO2 32*   BUN 24*   CREATININE 0.98       LIVER PROFILE:   Recent Labs     01/11/23  0601   AST 11   ALT <5*   BILITOT 0.4   ALKPHOS 57       PT/INR:   No results for input(s): PROTIME, INR in the last 72 hours. APTT: No results for input(s): APTT in the last 72 hours.   UA:  No results for input(s): NITRITE, COLORU, PHUR, LABCAST, WBCUA, RBCUA, MUCUS, TRICHOMONAS, YEAST, BACTERIA, CLARITYU, SPECGRAV, LEUKOCYTESUR, UROBILINOGEN, BILIRUBINUR, BLOODU, GLUCOSEU, AMORPHOUS in the last 72 hours.    Invalid input(s): KETONESU    No results for input(s): PHART, KCC8LJY, PO2ART in the last 72 hours. ABG   No results found for: PH, PCO2, PO2, HCO3, O2SAT  Lab Results   Component Value Date/Time    MODE [facility] 10/10/2022 04:19 AM       XR CHEST PORTABLE    Result Date: 1/9/2023  Small bilateral pleural effusions and bibasilar airspace disease, left greater than right. Mild pulmonary vascular congestion. Stable cardiomegaly. XR CHEST PORTABLE    Result Date: 1/8/2023  1. Small to moderate left and small right pleural effusion which is new from the prior study. 2.  Presumed left more so than right basilar lung atelectasis though pneumonitis cannot be excluded if suspected clinically. US THORACENTESIS Which side should the procedure be performed? Radiologist Recommendation    Result Date: 1/9/2023  Successful ultrasound guided thoracentesis. CT CHEST ABDOMEN PELVIS WO CONTRAST Additional Contrast? None    Result Date: 1/9/2023  1. Moderate bilateral pleural effusions and lower lobe atelectatic changes. 2. Cardiomegaly with minimal pericardial thickening. No evidence of lymphadenopathy. 3. No evidence of aortic aneurysm. Prominent main pulmonary artery measuring 4.4 cm. 4. Gallbladder demonstrates sludge and mild thickening. 5. Scattered pancreatic calcifications likely related to chronic pancreatitis but no acute pancreatitis. 6. Right punctate nephrolithiasis but no obstructive uropathy. 7. Mild retroperitoneal lymphadenopathy with retroperitoneal nodes measuring up to 1.1 cm. 8. Mild ascites. 9. Mild diverticulosis but no acute diverticulitis. Assessment:   Principal Problem:    COPD exacerbation ([address]  Resolved Problems:    * No resolved hospital problems.  *   Bilateral pleural effusion left greater than right and postthoracentesis of left pleural effusion  Left lower lobe atelectasis/pneumonia  Left transudate pleural effusion, culture negative  Chronic obstructive pulmonary disease  Acute CHF systolic  Acute on chronic respiratory failure hypoxic is base line   Anemia posttransfusion of 1 unit  Plan:  Ok to dc from pulmonary standpoint   On spiriva ,albuterol , advair   Continue Levaquin till 1/16/23.   Continue home oxygen     eMg Weir MD  Internal Medicine Resident, PGY-3  [address] [facility]         1/13/2023, 1:38 PM

## 2023-01-13 NOTE — DISCHARGE SUMMARY
[address] [facility]  Discharge Summary      NAME:  Thomas Ribeiro  :  1955  MRN:  [id number]    Admit date:  2023  Discharge date:  2023    Admitting Physician:  No admitting provider for patient encounter. Primary Diagnosis on Admission:   Present on Admission:   COPD exacerbation ([address]      Secondary Diagnoses:  does not have any pertinent problems on file. Admission Condition:  poor     Discharged Condition: fair    Hospital Course: The patient was admitted for the management of cute on chronic respiratory failure secondary to COPD exacerbation and bilateral pneumonia. This patient initially presented via EMS for worsening shortness of breath and difficulty breathing. After arrival patient was found to be hypoxic and was placed on BiPAP. On chest x-ray, he had small to moderate left and small right pleural effusion which was a new study. CBC was remarkable for elevated white count of segmented neutrophils, decreased hemoglobin (has history of chronic anemia), and new UYEN. Troponins were elevated and BNP was elevated from baseline likely secondary to demand ischemia. Patient was given Rocephin in the ED and was started on Zosyn along with COPD exacerbation protocol. Pulmonology was consulted who transferred patient to the ICU. Patient had thoracocentesis approximately 350 cc removed in the ICU which was transudative. Patient was alternating between nasal cannula 5 L O2 during the day and BiPAP overnight. Patient gradually improved back to baseline, UYEN improved, chronic anemia stable after transfusion 1/10/2023. Repeat limited echo showed 45% ejection fraction. After 2 days in the ICU, patient was transferred to the floor on 01/10/2023. Patient was okay for discharge on 2020 however patient refused because his son was not home. Today on day of discharge pt feels better with no further complaints.  Vitals and Labs are at pts baseline. All consultants involved during this admission are agreeable to d/c. Consults:  cardiology, pulmonary/intensive care, and ID    Significant Diagnostic/theraputic interventions:   -Thoracocentesis in the ICU  -BiPAP  -1 PRBC 01/10/23      Lab Results   Component Value Date    WBC 5.6 01/11/2023    HGB 8.1 (L) 01/11/2023    HCT 26.7 (L) 01/11/2023     01/11/2023    CHOL 149 02/15/2022    TRIG 101 02/15/2022    HDL 58 02/15/2022    ALT <5 (L) 01/11/2023    AST 11 01/11/2023     (L) 01/11/2023    K 4.6 01/11/2023    CL 95 (L) 01/11/2023    CREATININE 0.98 01/11/2023    BUN 24 (H) 01/11/2023    CO2 32 (H) 01/11/2023    TSH 1.12 10/07/2022    PSA 0.77 06/21/2012    INR 1.1 01/09/2023    LABA1C 4.7 01/09/2023    LABMICR 74 (H) 02/17/2022         Disposition:   home    Instructions to Patient:   Patient is advised to follow-up with PCP in 1 week  -Patient is advised to follow-up with cardiology in 2 weeks    Follow up with Mala South MD in  1 week    No follow-up provider specified.     Discharge Medications:       Medication List        START taking these medications      levoFLOXacin 750 MG tablet  Commonly known as: Levaquin  Take 1 tablet by mouth daily for 4 doses     predniSONE 10 MG tablet  Commonly known as: DELTASONE  Take 3 tablets by mouth daily for 2 days            CONTINUE taking these medications      albuterol (5 MG/ML) 0.5% nebulizer solution  Commonly known as: PROVENTIL  Take 0.5 mLs by nebulization 4 times daily as needed for Wheezing     amLODIPine 10 MG tablet  Commonly known as: NORVASC  Take 1 tablet by mouth daily     aspirin 81 MG EC tablet  Commonly known as: HM Aspirin EC Low Dose  TAKE 1 TABLET BY MOUTH DAILY     atorvastatin 20 MG tablet  Commonly known as: LIPITOR  Take 1 tablet by mouth daily     carvedilol 12.5 MG tablet  Commonly known as: COREG  Take 1 tablet by mouth 2 times daily (with meals)     fluticasone-salmeterol 100-50 MCG/ACT Aepb diskus inhaler  Commonly known as: ADVAIR  Inhale 1 puff into the lungs every 12 hours     furosemide 20 MG tablet  Commonly known as: LASIX  Take 1 tablet by mouth daily     ipratropium-albuterol 0.5-2.5 (3) MG/3ML Soln nebulizer solution  Commonly known as: DUONEB  Inhale 3 mLs into the lungs every 4 hours (while awake)     lisinopril 20 MG tablet  Commonly known as: PRINIVIL;ZESTRIL  Take 1 tablet by mouth daily     metFORMIN 500 MG tablet  Commonly known as: GLUCOPHAGE  Take 1 tablet by mouth in the morning and 1 tablet in the evening. Take with meals.      Spiriva HandiHaler 18 MCG inhalation capsule  Generic drug: tiotropium  Inhale 1 capsule into the lungs daily     vitamin D 50 MCG (2000 UT) Tabs tablet  Commonly known as: CHOLECALCIFEROL  Take 1 tablet by mouth daily            STOP taking these medications      budesonide-formoterol 160-4.5 MCG/ACT Aero  Commonly known as: SYMBICORT               Where to Get Your Medications        These medications were sent to [facility] [address] [place] [address], [address] [place] [address], [address] [facility] [address]      Phone: [phone]   levoFLOXacin 750 MG tablet  predniSONE 10 MG tablet         Send Copies to: Nayeli Mcclendon MD, Mónica Augustin      Note that over 30 minutes was spent in preparing discharge papers, discussing discharge with patient and family, medication review, etc.    Signed:  Robin Voss MD  Family medicine Resident  1/13/2023 11:22 AM

## 2023-01-13 NOTE — PLAN OF CARE
Problem: Discharge Planning  Goal: Discharge to home or other facility with appropriate resources  Outcome: Progressing     Problem: Pain  Goal: Verbalizes/displays adequate comfort level or baseline comfort level  Outcome: Progressing     Problem: Chronic Conditions and Co-morbidities  Goal: Patient's chronic conditions and co-morbidity symptoms are monitored and maintained or improved  Outcome: Progressing     Problem: Skin/Tissue Integrity  Goal: Absence of new skin breakdown  Description: 1. Monitor for areas of redness and/or skin breakdown  2. Assess vascular access sites hourly  3. Every 4-6 hours minimum:  Change oxygen saturation probe site  4. Every 4-6 hours:  If on nasal continuous positive airway pressure, respiratory therapy assess nares and determine need for appliance change or resting period.   Outcome: Progressing     Problem: Safety - Adult  Goal: Free from fall injury  Outcome: Progressing  Flowsheets (Taken 1/12/2023 2000)  Free From Fall Injury: Instruct family/caregiver on patient safety     Problem: ABCDS Injury Assessment  Goal: Absence of physical injury  Outcome: Progressing  Flowsheets (Taken 1/12/2023 2000)  Absence of Physical Injury: Implement safety measures based on patient assessment     Problem: Respiratory - Adult  Goal: Achieves optimal ventilation and oxygenation  Outcome: Progressing

## 2023-01-13 NOTE — PROGRESS NOTES
[address] [facility]  Progress Note    Date:   1/13/2023  Patient name:  Thomas Ribeiro  Date of admission:  1/8/2023 10:43 AM  MRN:   [id number]  YOB: 1955    SUBJECTIVE/Last 24 hours update:     Patient seen and examined at the bed side , no new acute events overnight, no new complains. Cleared for d/c from pul and cardio. 4 more days of Levaquin as per recommendations from pulmonology. Continue Spiriva, Advair, albuterol. Currently saturating at 97% on 4 L nasal cannula O2    Notes from nursing staff and Consults had been reviewed, and the overnight progress had been checked with the nursing staff as well. HPI:   70 yo male with PMH of HTN, COPD, CHF initially presented with shortness of breath who was initially seen by [facility] Medicine Team on 1/8/23 but due to worsening of SOB Pulm was consulted and was transferred to ICU. Patient was placed on BiPAP and noted to have bilateral Pleural Effusion. In the ICU patient had thoracentesis done which showed transudative effusion, cultures negative. Patient was also placed on BiPAP and weaned off to use only at night. Patient also has chronic anemia and received 1 PRBC on 1/10/23, but no acute signs of bleeding are seen. Patient also had hypotension and coreg was held due to low HR. Patient had elevated Trops and cardiology evaluated the patient likely Type II due to demand doubt ACS. Recent Echo in October EF of 50%. REVIEW OF SYSTEMS:      Review of Systems   Constitutional:  Negative for chills and fever. HENT:  Negative for rhinorrhea, sneezing and trouble swallowing. Watery eye discharge   Respiratory:  Positive for shortness of breath. Negative for wheezing. Cardiovascular:  Negative for chest pain, palpitations and leg swelling. Gastrointestinal:  Negative for abdominal pain, constipation, diarrhea, nausea and vomiting.    Genitourinary:  Negative for dysuria and flank pain. Psychiatric/Behavioral:  Negative for agitation and behavioral problems. PAST MEDICAL HISTORY:      has a past medical history of CHF (congestive heart failure) ([facility] COPD (chronic obstructive pulmonary disease) ([facility] Diabetes mellitus [facility] Erectile dysfunction due to arterial insufficiency, Hypertension, Microalbuminuria due to type 2 diabetes mellitus ([facility] and Overweight (BMI 25.0-29.9). PAST SURGICAL HISTORY:      has a past surgical history that includes Appendectomy and Total ankle arthroplasty. SOCIAL HISTORY:      reports that he quit smoking about 13 months ago. His smoking use included cigarettes. He smoked an average of .5 packs per day. He has never used smokeless tobacco. He reports that he does not drink alcohol and does not use drugs. FAMILY HISTORY:     family history is not on file. HOME MEDICATIONS:      Prior to Admission medications    Medication Sig Start Date End Date Taking? Authorizing Provider   levoFLOXacin (LEVAQUIN) 750 MG tablet Take 1 tablet by mouth daily for 4 doses 1/12/23 1/16/23 Yes Garcia Stevens MD   carvedilol (COREG) 12.5 MG tablet Take 1 tablet by mouth 2 times daily (with meals) 10/14/22   Derik Zamora DO   metFORMIN (GLUCOPHAGE) 500 MG tablet Take 1 tablet by mouth in the morning and 1 tablet in the evening. Take with meals.  8/15/22   Nena Larsen MD   albuterol (PROVENTIL) (5 MG/ML) 0.5% nebulizer solution Take 0.5 mLs by nebulization 4 times daily as needed for Wheezing 7/12/22   Josette Joshi MD   amLODIPine (NORVASC) 10 MG tablet Take 1 tablet by mouth daily 7/12/22   Josette Joshi MD   aspirin (HM ASPIRIN EC LOW DOSE) 81 MG EC tablet TAKE 1 TABLET BY MOUTH DAILY 7/12/22   Te Thomas MD   atorvastatin (LIPITOR) 20 MG tablet Take 1 tablet by mouth daily 7/12/22   Josette Joshi MD   ipratropium-albuterol (DUONEB) 0.5-2.5 (3) MG/3ML SOLN nebulizer solution Inhale 3 mLs into the lungs every 4 hours (while awake) 7/12/22   Te Santos MD   furosemide (LASIX) 20 MG tablet Take 1 tablet by mouth daily 7/12/22   Chantal Sinha MD   lisinopril (PRINIVIL;ZESTRIL) 20 MG tablet Take 1 tablet by mouth daily 7/12/22   Chantal Sinha MD   vitamin D (CHOLECALCIFEROL) 50 MCG (2000 UT) TABS tablet Take 1 tablet by mouth daily 7/12/22   Chantal Sinha MD   fluticasone-salmeterol (ADVIAR) 100-50 MCG/ACT AEPB diskus inhaler Inhale 1 puff into the lungs every 12 hours 7/12/22   Chantal Sinha MD   tiotropium (Joaquim Dues) 18 MCG inhalation capsule Inhale 1 capsule into the lungs daily 7/12/22   Te Santos MD       ALLERGIES:     Patient has no known allergies. OBJECTIVE:       Vitals:    01/12/23 2000 01/12/23 2136 01/12/23 2348 01/13/23 0317   BP: (!) 167/78      Pulse: 54      Resp: 16 24 24 23   Temp: 98.2 °F (36.8 °C)      TempSrc: Oral      SpO2: 97%      Weight:             Intake/Output Summary (Last 24 hours) at 1/13/2023 0710  Last data filed at 1/12/2023 2000  Gross per 24 hour   Intake --   Output 400 ml   Net -400 ml       PHYSICAL EXAM:  Physical Exam  Cardiovascular:      Rate and Rhythm: Normal rate and regular rhythm. Heart sounds: Normal heart sounds. Pulmonary:      Effort: Pulmonary effort is normal.      Comments: Soft breath sounds, short inspiratory phase, decreased breath sounds  Abdominal:      General: There is no distension. Palpations: Abdomen is soft. Tenderness: There is no abdominal tenderness. There is no guarding. Musculoskeletal:      Right lower leg: No edema. Left lower leg: No edema. Skin:     General: Skin is warm and dry.    Psychiatric:         Mood and Affect: Mood normal.         Behavior: Behavior normal.        DIAGNOSTICS:     Laboratory Testing:    Recent Results (from the past 24 hour(s))   POC Glucose Fingerstick    Collection Time: 01/12/23  8:27 AM   Result Value Ref Range    POC Glucose 186 (H) 75 - 110 mg/dL POC Glucose Fingerstick    Collection Time: 01/12/23 12:05 PM   Result Value Ref Range    POC Glucose 196 (H) 75 - 110 mg/dL   POC Glucose Fingerstick    Collection Time: 01/12/23  5:30 PM   Result Value Ref Range    POC Glucose 267 (H) 75 - 110 mg/dL   POC Glucose Fingerstick    Collection Time: 01/12/23  8:52 PM   Result Value Ref Range    POC Glucose 259 (H) 75 - 110 mg/dL         Imaging/Diagonstics:    Current Facility-Administered Medications   Medication Dose Route Frequency Provider Last Rate Last Admin    hydrALAZINE (APRESOLINE) injection 5 mg  5 mg IntraVENous Q6H PRN Khari Ng MD        amLODIPine (NORVASC) tablet 10 mg  10 mg Oral Daily MARY Hay CNP   10 mg at 01/12/23 0819    predniSONE (DELTASONE) tablet 30 mg  30 mg Oral Daily Jesse Larkin MD   30 mg at 01/12/23 0820    0.9 % sodium chloride infusion   IntraVENous PRN Susan Beckford MD        insulin lispro (HUMALOG) injection vial 0-8 Units  0-8 Units SubCUTAneous TID  Ana Maria Desai MD   4 Units at 01/12/23 1755    insulin lispro (HUMALOG) injection vial 0-4 Units  0-4 Units SubCUTAneous Nightly Ana Maria Desai MD        famotidine (PEPCID) tablet 20 mg  20 mg Oral BID Cayla Araujo MD   20 mg at 01/12/23 2110    furosemide (LASIX) tablet 20 mg  20 mg Oral Daily Ana Maria Desai MD   20 mg at 01/12/23 0819    carvedilol (COREG) tablet 12.5 mg  12.5 mg Oral BID  MARY Jack - NP   12.5 mg at 01/12/23 1654    docusate sodium (COLACE) capsule 100 mg  100 mg Oral Daily Ana Maria Desai MD   100 mg at 01/11/23 1112    senna (SENOKOT) tablet 8.6 mg  1 tablet Oral Nightly Ana Maria Desai MD   8.6 mg at 01/10/23 2011    magnesium hydroxide (MILK OF MAGNESIA) 400 MG/5ML suspension 30 mL  30 mL Oral Daily Ana Maria Desai MD        heparin (porcine) injection 5,000 Units  5,000 Units SubCUTAneous 3 times per day Re Vigil MD   5,000 Units at 01/12/23 2110    ipratropium-albuterol (DUONEB) nebulizer solution 1 ampule  1 ampule Inhalation Q4H WA Gary Boston MD   1 ampule at 01/12/23 2124    aspirin EC tablet 81 mg  81 mg Oral Daily Gary Boston MD   81 mg at 01/12/23 0819    atorvastatin (LIPITOR) tablet 20 mg  20 mg Oral Daily Gary Boston MD   20 mg at 01/12/23 0819    budesonide-formoterol (SYMBICORT) 80-4.5 MCG/ACT inhaler 2 puff  2 puff Inhalation BID Gary Boston MD   2 puff at 01/12/23 2124    glucose chewable tablet 16 g  4 tablet Oral PRN Gary Boston MD        dextrose bolus 10% 125 mL  125 mL IntraVENous PRN Gary Boston MD        Or    dextrose bolus 10% 250 mL  250 mL IntraVENous PRN Gary Boston MD        glucagon (rDNA) injection 1 mg  1 mg SubCUTAneous PRN Gary Boston MD        dextrose 10 % infusion   IntraVENous Continuous PRN Gary Boston MD        piperacillin-tazobactam (ZOSYN) 3,375 mg in dextrose 5 % 50 mL IVPB extended infusion (mini-bag)  3,375 mg IntraVENous Q8H Pj Austin MD 12.5 mL/hr at 01/13/23 0042 3,375 mg at 01/13/23 0042    sodium chloride flush 0.9 % injection 5-40 mL  5-40 mL IntraVENous 2 times per day Delroy Waters MD   10 mL at 01/12/23 2110    sodium chloride flush 0.9 % injection 5-40 mL  5-40 mL IntraVENous PRN Delroy Waters MD        0.9 % sodium chloride infusion   IntraVENous PRN Delroy Waters MD 10 mL/hr at 01/13/23 0042 New Bag at 01/13/23 0042    ondansetron (ZOFRAN-ODT) disintegrating tablet 4 mg  4 mg Oral Q8H PRN Delroy Waters MD        Or    ondansetron [facility]) injection 4 mg  4 mg IntraVENous Q6H PRN Delroy Waters MD        polyethylene glycol (GLYCOLAX) packet 17 g  17 g Oral Daily PRN Delroy Waters MD        acetaminophen (TYLENOL) tablet 650 mg  650 mg Oral Q6H PRN Delroy Waters MD        Or    acetaminophen (TYLENOL) suppository 650 mg  650 mg Rectal Q6H PRN Delroy Waters MD           ASSESSMENT:     Principal Problem:    COPD exacerbation ([facility] [address]  Resolved Problems:    * No resolved hospital problems. *      PLAN:     Acute on Chronic Respiratory Failure 2/2 COPD exacerbation  -Bipap overnight, on 4L saturating at 97%  -Continue INH use  -On prednisone 30 mg daily for 4 days  -Continue Symbicort and Duoneb  -Pulmonology on board      Bilateral Pneumonia   -CXR suggestive of Pleural Effusion and pneumonitis  -On zosyn. 4 more days of Levaquin as per pulmonology  -Thoracentesis cultures negative  -Pulmonology on board      Chronic Anemia  -Likely chronic   -Last Hemoglobin 8.1 01/11/23  -Received 1 PRBC 01/10/23  -Iron studies -TIBC unable, iron saturation unable, ferritin 210, folate 4.2, vitamin B12 499, ret % 1.4     HTN   -Norvasc 10 mg qd   -Coreg 12.5 mg twice daily      T2DM  -Continue Sliding Scale  -Hypoglycemia protocol  -POCT 4 times daily AC and QH     DVT prophylaxis: On Heparin  GI Prophylaxis: Pepcid 20 mg twice daily     Dispo: Discharge to home today     Discussed care plan with nurse after getting her input.     Plan will be discussed with the attending, Dr Lynne Kay MD  Family Medicine Resident  1/13/2023 7:10 AM

## 2023-01-13 NOTE — PROGRESS NOTES
CLINICAL PHARMACY NOTE: MEDS TO BEDS    Total # of Prescriptions Filled: 2   The following medications were delivered to the patient:  Prednisone  Levofloxacin    Additional Documentation:   $1.85 collected via [employer]

## 2023-01-15 LAB
CULTURE: ABNORMAL
DIRECT EXAM: ABNORMAL
SPECIMEN DESCRIPTION: ABNORMAL

## 2023-01-16 ENCOUNTER — TELEPHONE (OUTPATIENT)
Dept: PULMONOLOGY | Age: 68
End: 2023-01-16

## 2023-01-16 NOTE — TELEPHONE ENCOUNTER
----- Message from Jaky Castrejon sent at 1/16/2023  7:06 AM EST -----  Michaeljake November, please see message from Dr Juan Royal and call to schedule. Thank you.   ----- Message -----  From: Corine Wood MD  Sent: 1/13/2023  10:30 PM EST  To: [facility] Respiratory Spec Clinical Staff     Follow-up in 2 to 4 weeks in our office.   Thank you

## 2023-01-17 ENCOUNTER — TELEPHONE (OUTPATIENT)
Dept: FAMILY MEDICINE CLINIC | Age: 68
End: 2023-01-17

## 2023-01-17 NOTE — TELEPHONE ENCOUNTER
Called patient to schedule appointment for hospital follow up, patient states he will call office back after he finds a ride.

## 2023-01-17 NOTE — ED PROVIDER NOTES
Per review of notes patient is wants to be seen for varicose veins. Should start there and then if needs to see medicine after they can refer to us.    Roxanne BELLE, RN    [facility]  Office: [phone]  Fax: [phone]         TROPONIN - Abnormal; Notable for the following components:    Troponin, High Sensitivity 173 (*)     All other components within normal limits   APTT - Abnormal; Notable for the following components:    PTT 33.0 (*)     All other components within normal limits   TROPONIN - Abnormal; Notable for the following components:    Troponin, High Sensitivity 109 (*)     All other components within normal limits   PROCALCITONIN - Abnormal; Notable for the following components:    Procalcitonin 0.09 (*)     All other components within normal limits   TROPONIN - Abnormal; Notable for the following components:    Troponin, High Sensitivity 132 (*)     All other components within normal limits   POC GLUCOSE FINGERSTICK - Abnormal; Notable for the following components:    POC Glucose 273 (*)     All other components within normal limits   POC GLUCOSE FINGERSTICK - Abnormal; Notable for the following components:    POC Glucose 263 (*)     All other components within normal limits   POC GLUCOSE FINGERSTICK - Abnormal; Notable for the following components:    POC Glucose 214 (*)     All other components within normal limits   POCT GLUCOSE - Normal   SPUTUM CULTURE   APTT   HEMOGLOBIN A1C   APTT   APTT   APTT   COMPREHENSIVE METABOLIC PANEL W/ REFLEX TO MG FOR LOW K   MAGNESIUM   BRAIN NATRIURETIC PEPTIDE   LIPID PANEL   CBC   POCT GLUCOSE   POCT GLUCOSE   POCT GLUCOSE       Xr Chest Portable    Result Date: 9/4/2019  EXAMINATION: ONE XRAY VIEW OF THE CHEST 9/4/2019 10:47 pm COMPARISON: Chest radiograph 07/19/2018 HISTORY: ORDERING SYSTEM PROVIDED HISTORY: shortness of breath TECHNOLOGIST PROVIDED HISTORY: shortness of breath Reason for Exam: sob Acuity: Unknown FINDINGS: Single view provided. Mild rotation. Stable mediastinal silhouette. The cardiac silhouette is borderline enlarged. Normal lung volumes with patchy bilateral perihilar and lower lobe pulmonary opacities and mild bronchial wall thickening.   No lobar

## 2023-01-25 DIAGNOSIS — I50.42 CHRONIC COMBINED SYSTOLIC AND DIASTOLIC CONGESTIVE HEART FAILURE (HCC): ICD-10-CM

## 2023-01-25 RX ORDER — CARVEDILOL 12.5 MG/1
12.5 TABLET ORAL 2 TIMES DAILY WITH MEALS
Qty: 120 TABLET | Refills: 3 | Status: SHIPPED | OUTPATIENT
Start: 2023-01-25

## 2023-01-25 RX ORDER — LISINOPRIL 20 MG/1
20 TABLET ORAL DAILY
Qty: 90 TABLET | Refills: 2 | Status: SHIPPED | OUTPATIENT
Start: 2023-01-25
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
## 2023-02-03 ENCOUNTER — OFFICE VISIT (OUTPATIENT)
Dept: FAMILY MEDICINE CLINIC | Age: 68
End: 2023-02-03

## 2023-02-03 VITALS
TEMPERATURE: 98.3 F | BODY MASS INDEX: 24.63 KG/M2 | OXYGEN SATURATION: 95 % | SYSTOLIC BLOOD PRESSURE: 106 MMHG | HEART RATE: 78 BPM | DIASTOLIC BLOOD PRESSURE: 52 MMHG | WEIGHT: 148 LBS

## 2023-02-03 DIAGNOSIS — I50.42 CHRONIC COMBINED SYSTOLIC AND DIASTOLIC CONGESTIVE HEART FAILURE (HCC): ICD-10-CM

## 2023-02-03 DIAGNOSIS — R53.81 DEBILITY: Primary | ICD-10-CM

## 2023-02-03 DIAGNOSIS — E11.69 TYPE 2 DIABETES MELLITUS WITH OTHER SPECIFIED COMPLICATION, WITHOUT LONG-TERM CURRENT USE OF INSULIN (HCC): ICD-10-CM

## 2023-02-03 DIAGNOSIS — Z13.9 ENCOUNTER FOR SCREENING INVOLVING SOCIAL DETERMINANTS OF HEALTH (SDOH): ICD-10-CM

## 2023-02-03 DIAGNOSIS — J44.9 CHRONIC OBSTRUCTIVE PULMONARY DISEASE, UNSPECIFIED COPD TYPE (HCC): ICD-10-CM

## 2023-02-03 ASSESSMENT — ENCOUNTER SYMPTOMS
SHORTNESS OF BREATH: 1
CHEST TIGHTNESS: 0
WHEEZING: 0
ABDOMINAL PAIN: 0

## 2023-02-03 NOTE — PROGRESS NOTES
Attending Physician Statement  I have discussed the care of Bethany Wade 69 y.o. male, including pertinent history and exam findings, with the resident Dr. Alpesh Vanessa MD.    History and Exam:   Chief Complaint   Patient presents with    Forms     Patient states he not doing good       Past Medical History:   Diagnosis Date    CHF (congestive heart failure) (HCC)     COPD (chronic obstructive pulmonary disease) ([facility]     Diabetes mellitus ([place] [address].)     Erectile dysfunction due to arterial insufficiency 12/07/2015    Hypertension     Microalbuminuria due to type 2 diabetes mellitus ([facility] 07/07/2016    Overweight (BMI 25.0-29.9) 12/07/2015     No Known Allergies   I have seen and examined the patient and the key elements of the encounter have been performed by me. BP Readings from Last 3 Encounters:   02/03/23 (!) 106/52   01/13/23 (!) 161/71   10/14/22 123/62     BP (!) 106/52 (Site: Left Upper Arm, Position: Sitting, Cuff Size: Medium Adult)   Pulse 78   Temp 98.3 °F (36.8 °C) (Oral)   Wt 148 lb (67.1 kg)   SpO2 95%   BMI 24.63 kg/m²   Lab Results   Component Value Date    WBC 5.6 01/11/2023    HGB 8.1 (L) 01/11/2023    HCT 26.7 (L) 01/11/2023     01/11/2023    CHOL 149 02/15/2022    TRIG 101 02/15/2022    HDL 58 02/15/2022    ALT <5 (L) 01/11/2023    AST 11 01/11/2023     (L) 01/11/2023    K 4.6 01/11/2023    CL 95 (L) 01/11/2023    CREATININE 0.98 01/11/2023    BUN 24 (H) 01/11/2023    CO2 32 (H) 01/11/2023    TSH 1.12 10/07/2022    PSA 0.77 06/21/2012    INR 1.1 01/09/2023    LABA1C 4.7 01/09/2023    LABMICR 74 (H) 02/17/2022     Lab Results   Component Value Date    LABPROT 6.5 06/21/2012    LABALBU 2.1 (L) 01/11/2023     Lab Results   Component Value Date    IRON 174 (H) 01/10/2023    TIBC  01/10/2023     Unable to calculate due to low iron binding result.     FERRITIN 210 01/10/2023     Lab Results   Component Value Date    LDLCALC 24 07/01/2016    LDLCHOLESTEROL 71 02/15/2022     I agree with the assessment, plan and the diagnosis of    Diagnosis Orders   1. [address] Referral for Social Determinants of Health (Primary Care Practices)      2. Chronic combined systolic and diastolic congestive heart failure [facility] Referral for Social Determinants of Health (Primary Care Practices)      3. Type 2 diabetes mellitus with other specified complication, without long-term current use of insulin ([facility]  Microalbumin, [address] Referral for Social Determinants of Health (Primary Care Practices)      4. Chronic obstructive pulmonary disease, unspecified COPD type [facility])  [facility] Referral for Social Determinants of Health (Primary Care Practices)      5. Encounter for screening involving social determinants of health [facility])         . I agree with orders as documented by the resident. More than 25 minutes spent  in face to face encounter with the patient and more than half in counseling. Patient's questions were answered. Patient Voiced understanding to the counseling. Return if symptoms worsen or fail to improve.    (GC Modifier)-Dr. Lela Wolf MD

## 2023-02-03 NOTE — PROGRESS NOTES
Visit Information    Have you changed or started any medications since your last visit including any over-the-counter medicines, vitamins, or herbal medicines? no   Have you stopped taking any of your medications? Is so, why? -  no  Are you having any side effects from any of your medications? - no    Have you seen any other physician or provider since your last visit?  no   Have you had any other diagnostic tests since your last visit?  no   Have you been seen in the emergency room and/or had an admission in a hospital since we last saw you?  no   Have you had your routine dental cleaning in the past 6 months?  no     Do you have an active MyChart account? If no, what is the barrier?   No:     Patient Care Team:  Epi Ventura MD as PCP - General (Emergency Medicine)    Medical History Review  Past Medical, Family, and Social History reviewed and does not contribute to the patient presenting condition    Health Maintenance   Topic Date Due    COVID-19 Vaccine (1) Never done    Shingles vaccine (1 of 2) Never done    Diabetic retinal exam  03/01/2016    Colorectal Cancer Screen  08/03/2019    Annual Wellness Visit (AWV)  Never done    Diabetic foot exam  09/11/2021    Flu vaccine (1) Never done    Lipids  02/15/2023    Diabetic Alb to Cr ratio (uACR) test  02/17/2023    Depression Screen  07/12/2023    A1C test (Diabetic or Prediabetic)  01/09/2024    GFR test (Diabetes, CKD 3-4, OR last GFR 15-59)  01/11/2024    DTaP/Tdap/Td vaccine (2 - Td or Tdap) 08/02/2028    Pneumococcal 65+ years Vaccine  Completed    AAA screen  Completed    Hepatitis C screen  Completed    Hepatitis A vaccine  Aged Out    Hib vaccine  Aged Out    Meningococcal (ACWY) vaccine  Aged Out

## 2023-02-03 NOTE — LETTER
[address]  [address]  [place] [address]  Phone: [phone]  Fax: [phone]    Mercy Radford MD        February 3, 2023     Patient: Gaviota Azar   YOB: 1955   Date of Visit: 2/3/2023       To Whom It May Concern: It is my medical opinion that Malorie Torrez should live in a first floor apartment due to multiple medical conditions and requiring 24-hour oxygen. If you have any questions or concerns, please don't hesitate to call.     Sincerely,        Mercy Radford MD

## 2023-02-03 NOTE — PROGRESS NOTES
[facility] [facility] Residency Program - Outpatient Note      Subjective:    Flora Riggs is a 69 y.o. male with  has a past medical history of CHF (congestive heart failure) ([address] COPD (chronic obstructive pulmonary disease) ([address] Diabetes mellitus ([address] Erectile dysfunction due to arterial insufficiency, Hypertension, Microalbuminuria due to type 2 diabetes mellitus ([address] and Overweight (BMI 25.0-29.9). Presented to the office today for:  Chief Complaint   Patient presents with    Forms     Patient states he not doing good       HPI    Patient is a 69year old male presenting to the clinic for note requesting that patients son and future daughter in law be allowed to live with him in fair housing without additional cost, as patients son is his primary caregiver. Patient has advanced COPD and is on 4L continuous O2, CHF and DM. Patient has poor mobility, can use walker or cane but says he is very limited in how far he can go due to weakness and dyspnea - mostly only able to make it from car into apartment. Son helps patient with cooking, cleaning, bathing, going to the bathroom, as patient cannot stand for extended periods of time. Son was present during visit and agrees that it's a lot of work to care for his father - there is concern about caregiver burden. Discussed about patient going to assisted living facility, patient quickly declined idea, stating that he doesn't have a lot of money and can't afford to do that. I brought up patient doing home physical therapy to regain some strength and/or have visiting nurse to help son for a few hours, he again says, the co pay will be too high and he cannot afford that. Review of Systems   Respiratory:  Positive for shortness of breath. Negative for chest tightness and wheezing. Cardiovascular:  Negative for chest pain and palpitations. Gastrointestinal:  Negative for abdominal pain.    Neurological:  Positive for weakness. Negative for light-headedness.   Psychiatric/Behavioral:  Negative for agitation.      The patient has a No family history on file.    Objective:    BP (!) 106/52 (Site: Left Upper Arm, Position: Sitting, Cuff Size: Medium Adult)   Pulse 78   Temp 98.3 °F (36.8 °C) (Oral)   Wt 148 lb (67.1 kg)   SpO2 95%   BMI 24.63 kg/m²    BP Readings from Last 3 Encounters:   02/03/23 (!) 106/52   01/13/23 (!) 161/71   10/14/22 123/62       Physical Exam  Cardiovascular:      Rate and Rhythm: Normal rate and regular rhythm.      Pulses: Normal pulses.      Heart sounds: Normal heart sounds. No murmur heard.    No gallop.   Pulmonary:      Breath sounds: No wheezing, rhonchi or rales.      Comments: On O2 via NC  Musculoskeletal:      Comments: Trace edema bilaterally   Skin:     General: Skin is warm.   Neurological:      Mental Status: He is alert.   Psychiatric:         Mood and Affect: Mood normal.       Lab Results   Component Value Date    WBC 5.6 01/11/2023    HGB 8.1 (L) 01/11/2023    HCT 26.7 (L) 01/11/2023     01/11/2023    CHOL 149 02/15/2022    TRIG 101 02/15/2022    HDL 58 02/15/2022    ALT <5 (L) 01/11/2023    AST 11 01/11/2023     (L) 01/11/2023    K 4.6 01/11/2023    CL 95 (L) 01/11/2023    CREATININE 0.98 01/11/2023    BUN 24 (H) 01/11/2023    CO2 32 (H) 01/11/2023    TSH 1.12 10/07/2022    PSA 0.77 06/21/2012    INR 1.1 01/09/2023    LABA1C 4.7 01/09/2023    LABMICR 74 (H) 02/17/2022     Lab Results   Component Value Date    CALCIUM 7.7 (L) 01/11/2023    PHOS 3.5 09/09/2019     Lab Results   Component Value Date    LDLCALC 24 07/01/2016    LDLCHOLESTEROL 71 02/15/2022       Assessment and Plan:    1. Chronic combined systolic and diastolic congestive heart failure (HCC)  Note provided to patient to allow his son to live with him in Fair Housing, as he is the primary caregiver for his father in order for him to live independently. Patient would benefit from being in an assisted living  facility but says it's too expensive, nor can he afford home PT or visiting nurse. Patient needs his son for cooking, cleaning, bathing etc.   - [facility] for Social Determinants of Health (Primary Care Practices)    2. Type 2 diabetes mellitus with other specified complication, without long-term current use of insulin (HCC)  As above  - Microalbumin, Ur; Future  - [facility] for Social Determinants of Health (Primary Care Practices)    3. Debility  As above  - [facility] [place] Referral for Social Determinants of Health (Primary Care Practices)    4. Chronic obstructive pulmonary disease, unspecified COPD type ([facility]  As above  - [facility] [place] Referral for Social Determinants of Health (Primary Care Practices)    5. Encounter for screening involving social determinants of health [facility])  As above        Requested Prescriptions      No prescriptions requested or ordered in this encounter       There are no discontinued medications. Bekah Quinteros received counseling on the following healthy behaviors: nutrition, exercise and medication adherence    Discussed use,benefit, and side effects of prescribed medications. Barriers to medication compliance addressed. All patient questions answered. Pt voiced understanding. Return if symptoms worsen or fail to improve. Disclaimer: Some orall of this note was transcribed using voice-recognition software. This may cause typographical errors occasionally. Although all effort is made to fix these errors, please do not hesitate to contact our office if there Genoveva Johns concern with the understanding of this note.

## 2023-02-03 NOTE — LETTER
[address]  [address]  [place] [address]  Phone: [phone]  Fax: [phone]    Mauri Moss MD        February 3, 2023     Patient: Shara Gracia   YOB: 1955   Date of Visit: 2/3/2023       To Whom It May Concern:     Julius Iqbal   is a patient of the [facility] and has been under the care of this clinic since 2018. I am familiar with his history and with the functional limitations imposed by his physical related illness. He meets the definition of disability under the Americans with disabilities act, the fair housing act and the rehabilitation act of 1973. Due to disability, Sofie Garza has certain limitations related to thinsg that requires patient to stand for extended periods of time, including but not limited to, bathing, cooking, cleaning, caring for himself such as going to the bathroom etc.  In order to help alleviate these difficulties and to enhance his ability to live independently, it is my opinion that Sofie Garza must be allowed his son and future daughter-in-law to live with him in order to help care for him. This accommodation is necessary for the physical health of Sofie Garza and will help mitigate the symptoms of his current health conditions, including but not limited to, debility, limited mobility, shortness of breath, limiting ability to care for himself. If you have any questions or concerns, please don't hesitate to call.     Sincerely,        Mauri Moss MD

## 2023-02-09 RX ORDER — HYDRALAZINE HYDROCHLORIDE 50 MG/1
TABLET, FILM COATED ORAL
Qty: 90 TABLET | Refills: 0 | Status: SHIPPED | OUTPATIENT
Start: 2023-02-09
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
## 2023-03-06 DIAGNOSIS — Z76.0 MEDICATION REFILL: ICD-10-CM

## 2023-03-06 RX ORDER — ATORVASTATIN CALCIUM 20 MG/1
20 TABLET, FILM COATED ORAL DAILY
Qty: 90 TABLET | Refills: 3 | Status: SHIPPED | OUTPATIENT
Start: 2023-03-06
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
## 2023-03-14 ENCOUNTER — APPOINTMENT (OUTPATIENT)
Dept: GENERAL RADIOLOGY | Age: 68
DRG: 194 | End: 2023-03-14
Payer: MEDICARE

## 2023-03-14 ENCOUNTER — HOSPITAL ENCOUNTER (INPATIENT)
Age: 68
LOS: 2 days | Discharge: HOME | DRG: 194 | End: 2023-03-16
Attending: EMERGENCY MEDICINE | Admitting: STUDENT IN AN ORGANIZED HEALTH CARE EDUCATION/TRAINING PROGRAM
Payer: MEDICARE

## 2023-03-14 DIAGNOSIS — J44.1 COPD EXACERBATION (HCC): ICD-10-CM

## 2023-03-14 DIAGNOSIS — J18.9 PNEUMONIA DUE TO INFECTIOUS ORGANISM, UNSPECIFIED LATERALITY, UNSPECIFIED PART OF LUNG: Primary | ICD-10-CM

## 2023-03-14 PROBLEM — D64.9 NORMOCYTIC ANEMIA: Status: ACTIVE | Noted: 2023-03-14

## 2023-03-14 LAB
ABSOLUTE EOS #: 0.03 K/UL (ref 0–0.44)
ABSOLUTE IMMATURE GRANULOCYTE: <0.03 K/UL (ref 0–0.3)
ABSOLUTE LYMPH #: 0.75 K/UL (ref 1.1–3.7)
ABSOLUTE MONO #: 0.32 K/UL (ref 0.1–1.2)
ABSOLUTE RETIC #: 0.02 M/UL (ref 0.03–0.08)
ANION GAP SERPL CALCULATED.3IONS-SCNC: 12 MMOL/L (ref 9–17)
BASOPHILS # BLD: 0 % (ref 0–2)
BASOPHILS ABSOLUTE: <0.03 K/UL (ref 0–0.2)
BNP SERPL-MCNC: 789 PG/ML
BUN SERPL-MCNC: 13 MG/DL (ref 8–23)
CALCIUM SERPL-MCNC: 8.1 MG/DL (ref 8.6–10.4)
CHLORIDE SERPL-SCNC: 99 MMOL/L (ref 98–107)
CO2 SERPL-SCNC: 27 MMOL/L (ref 20–31)
CREAT SERPL-MCNC: 1.06 MG/DL (ref 0.7–1.2)
EOSINOPHILS RELATIVE PERCENT: 1 % (ref 1–4)
FOLATE SERPL-MCNC: NORMAL NG/ML
GFR SERPL CREATININE-BSD FRML MDRD: >60 ML/MIN/1.73M2
GLUCOSE BLD-MCNC: 263 MG/DL (ref 75–110)
GLUCOSE SERPL-MCNC: 121 MG/DL (ref 70–99)
HCT VFR BLD AUTO: 21.4 % (ref 40.7–50.3)
HGB BLD-MCNC: 6.5 G/DL (ref 13–17)
IMMATURE GRANULOCYTES: 0 %
IMMATURE RETIC FRACT: 14.5 % (ref 2.7–18.3)
LDLC SERPL-MCNC: 185 U/L (ref 135–225)
LYMPHOCYTES # BLD: 12 % (ref 24–43)
MCH RBC QN AUTO: 28.4 PG (ref 25.2–33.5)
MCHC RBC AUTO-ENTMCNC: 30.4 G/DL (ref 28.4–34.8)
MCV RBC AUTO: 93.4 FL (ref 82.6–102.9)
MONOCYTES # BLD: 5 % (ref 3–12)
NRBC AUTOMATED: 0 PER 100 WBC
PDW BLD-RTO: 13.4 % (ref 11.8–14.4)
PLATELET # BLD AUTO: 285 K/UL (ref 138–453)
PMV BLD AUTO: 10.1 FL (ref 8.1–13.5)
POTASSIUM SERPL-SCNC: 4.2 MMOL/L (ref 3.7–5.3)
PROCALCITONIN SERPL-MCNC: 0.15 NG/ML
RBC # BLD: 2.29 M/UL (ref 4.21–5.77)
RETIC HEMOGLOBIN: 29 PG (ref 28.2–35.7)
RETICS/RBC NFR AUTO: 0.8 % (ref 0.5–1.9)
SARS-COV-2 RDRP RESP QL NAA+PROBE: NOT DETECTED
SEG NEUTROPHILS: 82 % (ref 36–65)
SEGMENTED NEUTROPHILS ABSOLUTE COUNT: 5.04 K/UL (ref 1.5–8.1)
SODIUM SERPL-SCNC: 138 MMOL/L (ref 135–144)
SPECIMEN DESCRIPTION: NORMAL
TROPONIN I SERPL DL<=0.01 NG/ML-MCNC: 74 NG/L (ref 0–22)
TROPONIN I SERPL DL<=0.01 NG/ML-MCNC: 78 NG/L (ref 0–22)
VIT B12 SERPL-MCNC: 768 PG/ML (ref 232–1245)
WBC # BLD AUTO: 6.2 K/UL (ref 3.5–11.3)

## 2023-03-14 PROCEDURE — 83615 LACTATE (LD) (LDH) ENZYME: CPT

## 2023-03-14 PROCEDURE — 86920 COMPATIBILITY TEST SPIN: CPT

## 2023-03-14 PROCEDURE — 36430 TRANSFUSION BLD/BLD COMPNT: CPT

## 2023-03-14 PROCEDURE — 2580000003 HC RX 258

## 2023-03-14 PROCEDURE — 36415 COLL VENOUS BLD VENIPUNCTURE: CPT

## 2023-03-14 PROCEDURE — 80048 BASIC METABOLIC PNL TOTAL CA: CPT

## 2023-03-14 PROCEDURE — 82728 ASSAY OF FERRITIN: CPT

## 2023-03-14 PROCEDURE — 83880 ASSAY OF NATRIURETIC PEPTIDE: CPT

## 2023-03-14 PROCEDURE — 82607 VITAMIN B-12: CPT

## 2023-03-14 PROCEDURE — 6360000002 HC RX W HCPCS

## 2023-03-14 PROCEDURE — 87635 SARS-COV-2 COVID-19 AMP PRB: CPT

## 2023-03-14 PROCEDURE — 93005 ELECTROCARDIOGRAM TRACING: CPT

## 2023-03-14 PROCEDURE — 87449 NOS EACH ORGANISM AG IA: CPT

## 2023-03-14 PROCEDURE — 86850 RBC ANTIBODY SCREEN: CPT

## 2023-03-14 PROCEDURE — 71045 X-RAY EXAM CHEST 1 VIEW: CPT

## 2023-03-14 PROCEDURE — 86900 BLOOD TYPING SEROLOGIC ABO: CPT

## 2023-03-14 PROCEDURE — 82947 ASSAY GLUCOSE BLOOD QUANT: CPT

## 2023-03-14 PROCEDURE — 2060000000 HC ICU INTERMEDIATE R&B

## 2023-03-14 PROCEDURE — 99285 EMERGENCY DEPT VISIT HI MDM: CPT

## 2023-03-14 PROCEDURE — 85025 COMPLETE CBC W/AUTO DIFF WBC: CPT

## 2023-03-14 PROCEDURE — P9016 RBC LEUKOCYTES REDUCED: HCPCS

## 2023-03-14 PROCEDURE — 86901 BLOOD TYPING SEROLOGIC RH(D): CPT

## 2023-03-14 PROCEDURE — 83550 IRON BINDING TEST: CPT

## 2023-03-14 PROCEDURE — 85045 AUTOMATED RETICULOCYTE COUNT: CPT

## 2023-03-14 PROCEDURE — 83540 ASSAY OF IRON: CPT

## 2023-03-14 PROCEDURE — 0202U NFCT DS 22 TRGT SARS-COV-2: CPT

## 2023-03-14 PROCEDURE — 96365 THER/PROPH/DIAG IV INF INIT: CPT

## 2023-03-14 PROCEDURE — 87899 AGENT NOS ASSAY W/OPTIC: CPT

## 2023-03-14 PROCEDURE — 84484 ASSAY OF TROPONIN QUANT: CPT

## 2023-03-14 PROCEDURE — 86738 MYCOPLASMA ANTIBODY: CPT

## 2023-03-14 PROCEDURE — 96375 TX/PRO/DX INJ NEW DRUG ADDON: CPT

## 2023-03-14 PROCEDURE — 82746 ASSAY OF FOLIC ACID SERUM: CPT

## 2023-03-14 PROCEDURE — 84145 PROCALCITONIN (PCT): CPT

## 2023-03-14 RX ORDER — AMLODIPINE BESYLATE 10 MG/1
10 TABLET ORAL DAILY
Status: DISCONTINUED | OUTPATIENT
Start: 2023-03-15 | End: 2023-03-16 | Stop reason: HOSPADM

## 2023-03-14 RX ORDER — INSULIN LISPRO 100 [IU]/ML
0-4 INJECTION, SOLUTION INTRAVENOUS; SUBCUTANEOUS
Status: DISCONTINUED | OUTPATIENT
Start: 2023-03-15 | End: 2023-03-16 | Stop reason: HOSPADM

## 2023-03-14 RX ORDER — ACETAMINOPHEN 650 MG/1
650 SUPPOSITORY RECTAL EVERY 6 HOURS PRN
Status: DISCONTINUED | OUTPATIENT
Start: 2023-03-14 | End: 2023-03-16 | Stop reason: HOSPADM

## 2023-03-14 RX ORDER — POTASSIUM CHLORIDE 20 MEQ/1
40 TABLET, EXTENDED RELEASE ORAL PRN
Status: DISCONTINUED | OUTPATIENT
Start: 2023-03-14 | End: 2023-03-16 | Stop reason: HOSPADM

## 2023-03-14 RX ORDER — SODIUM CHLORIDE 0.9 % (FLUSH) 0.9 %
5-40 SYRINGE (ML) INJECTION EVERY 12 HOURS SCHEDULED
Status: DISCONTINUED | OUTPATIENT
Start: 2023-03-14 | End: 2023-03-16 | Stop reason: HOSPADM

## 2023-03-14 RX ORDER — METHYLPREDNISOLONE SODIUM SUCCINATE 125 MG/2ML
125 INJECTION, POWDER, LYOPHILIZED, FOR SOLUTION INTRAMUSCULAR; INTRAVENOUS ONCE
Status: COMPLETED | OUTPATIENT
Start: 2023-03-14 | End: 2023-03-14

## 2023-03-14 RX ORDER — SODIUM CHLORIDE 9 MG/ML
INJECTION, SOLUTION INTRAVENOUS PRN
Status: DISCONTINUED | OUTPATIENT
Start: 2023-03-14 | End: 2023-03-16 | Stop reason: HOSPADM

## 2023-03-14 RX ORDER — POTASSIUM CHLORIDE 7.45 MG/ML
10 INJECTION INTRAVENOUS PRN
Status: DISCONTINUED | OUTPATIENT
Start: 2023-03-14 | End: 2023-03-16 | Stop reason: HOSPADM

## 2023-03-14 RX ORDER — SODIUM CHLORIDE 0.9 % (FLUSH) 0.9 %
5-40 SYRINGE (ML) INJECTION PRN
Status: DISCONTINUED | OUTPATIENT
Start: 2023-03-14 | End: 2023-03-16 | Stop reason: HOSPADM

## 2023-03-14 RX ORDER — ONDANSETRON 2 MG/ML
4 INJECTION INTRAMUSCULAR; INTRAVENOUS EVERY 6 HOURS PRN
Status: DISCONTINUED | OUTPATIENT
Start: 2023-03-14 | End: 2023-03-16 | Stop reason: HOSPADM

## 2023-03-14 RX ORDER — ACETAMINOPHEN 325 MG/1
650 TABLET ORAL EVERY 6 HOURS PRN
Status: DISCONTINUED | OUTPATIENT
Start: 2023-03-14 | End: 2023-03-16 | Stop reason: HOSPADM

## 2023-03-14 RX ORDER — CARVEDILOL 12.5 MG/1
12.5 TABLET ORAL 2 TIMES DAILY WITH MEALS
Status: DISCONTINUED | OUTPATIENT
Start: 2023-03-15 | End: 2023-03-15

## 2023-03-14 RX ORDER — LISINOPRIL 20 MG/1
20 TABLET ORAL DAILY
Status: DISCONTINUED | OUTPATIENT
Start: 2023-03-15 | End: 2023-03-16 | Stop reason: HOSPADM

## 2023-03-14 RX ORDER — ATORVASTATIN CALCIUM 20 MG/1
20 TABLET, FILM COATED ORAL DAILY
Status: DISCONTINUED | OUTPATIENT
Start: 2023-03-15 | End: 2023-03-16 | Stop reason: HOSPADM

## 2023-03-14 RX ORDER — FUROSEMIDE 20 MG/1
20 TABLET ORAL DAILY
Status: DISCONTINUED | OUTPATIENT
Start: 2023-03-15 | End: 2023-03-16 | Stop reason: HOSPADM

## 2023-03-14 RX ORDER — ONDANSETRON 4 MG/1
4 TABLET, ORALLY DISINTEGRATING ORAL EVERY 8 HOURS PRN
Status: DISCONTINUED | OUTPATIENT
Start: 2023-03-14 | End: 2023-03-16 | Stop reason: HOSPADM

## 2023-03-14 RX ORDER — MAGNESIUM SULFATE IN WATER 40 MG/ML
2000 INJECTION, SOLUTION INTRAVENOUS PRN
Status: DISCONTINUED | OUTPATIENT
Start: 2023-03-14 | End: 2023-03-16 | Stop reason: HOSPADM

## 2023-03-14 RX ORDER — POLYETHYLENE GLYCOL 3350 17 G/17G
17 POWDER, FOR SOLUTION ORAL DAILY PRN
Status: DISCONTINUED | OUTPATIENT
Start: 2023-03-14 | End: 2023-03-16 | Stop reason: HOSPADM

## 2023-03-14 RX ORDER — IPRATROPIUM BROMIDE AND ALBUTEROL SULFATE 2.5; .5 MG/3ML; MG/3ML
1 SOLUTION RESPIRATORY (INHALATION)
Status: DISCONTINUED | OUTPATIENT
Start: 2023-03-15 | End: 2023-03-16 | Stop reason: HOSPADM

## 2023-03-14 RX ORDER — HYDRALAZINE HYDROCHLORIDE 50 MG/1
50 TABLET, FILM COATED ORAL 3 TIMES DAILY
Status: DISCONTINUED | OUTPATIENT
Start: 2023-03-14 | End: 2023-03-16 | Stop reason: HOSPADM

## 2023-03-14 RX ORDER — DEXTROSE MONOHYDRATE 100 MG/ML
INJECTION, SOLUTION INTRAVENOUS CONTINUOUS PRN
Status: DISCONTINUED | OUTPATIENT
Start: 2023-03-14 | End: 2023-03-16 | Stop reason: HOSPADM

## 2023-03-14 RX ORDER — INSULIN LISPRO 100 [IU]/ML
0-4 INJECTION, SOLUTION INTRAVENOUS; SUBCUTANEOUS NIGHTLY
Status: DISCONTINUED | OUTPATIENT
Start: 2023-03-14 | End: 2023-03-16 | Stop reason: HOSPADM

## 2023-03-14 RX ADMIN — METHYLPREDNISOLONE SODIUM SUCCINATE 125 MG: 125 INJECTION, POWDER, FOR SOLUTION INTRAMUSCULAR; INTRAVENOUS at 14:46

## 2023-03-14 RX ADMIN — SODIUM CHLORIDE, PRESERVATIVE FREE 5 ML: 5 INJECTION INTRAVENOUS at 21:43

## 2023-03-14 RX ADMIN — PIPERACILLIN AND TAZOBACTAM 4500 MG: 4; .5 INJECTION, POWDER, LYOPHILIZED, FOR SOLUTION INTRAVENOUS; PARENTERAL at 15:03

## 2023-03-14 ASSESSMENT — ENCOUNTER SYMPTOMS
COUGH: 1
TROUBLE SWALLOWING: 0
SHORTNESS OF BREATH: 1
VOMITING: 0
DIARRHEA: 0
NAUSEA: 0
ABDOMINAL PAIN: 0
BACK PAIN: 0
COLOR CHANGE: 0
CONSTIPATION: 0

## 2023-03-14 ASSESSMENT — PAIN - FUNCTIONAL ASSESSMENT: PAIN_FUNCTIONAL_ASSESSMENT: NONE - DENIES PAIN

## 2023-03-14 NOTE — ED TRIAGE NOTES
Per EMS report patient c/o SOB that started 2 hours ago. Wears 4L O2 at home with SpO2 99% en route.  Wet cough noted on arrival.
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
## 2023-03-14 NOTE — CONSENT
Informed Consent for Blood Component Transfusion Note    I have discussed with the patient the rationale for blood component transfusion; its benefits in treating or preventing fatigue, organ damage, or death; and its risk which includes mild transfusion reactions, rare risk of blood borne infection, or more serious but rare reactions. I have discussed the alternatives to transfusion, including the risk and consequences of not receiving transfusion. The patient had an opportunity to ask questions and had agreed to proceed with transfusion of blood components.     Electronically signed by Fuad Cunningham MD on 3/14/23 at 2:33 PM EDT

## 2023-03-14 NOTE — ED NOTES
Blood infusion increased to 125cc/hr      Cuca Mcneil, RN  03/14/23 106 Barbara Huang, MARIANNA  03/14/23 [id number]

## 2023-03-14 NOTE — ED PROVIDER NOTES
Faculty Sign-Out Attestation  Handoff taken on the following patient from prior Attending Physician: Darlin Oneal    I was available and discussed any additional care issues that arose and coordinated the management plans with the resident(s) caring for the patient during my duty period. Any areas of disagreement with residents documentation of care or procedures are noted on the chart. I was personally present for the key portions of any/all procedures during my duty period. I have documented in the chart those procedures where I was not present during the key portions. XR CHEST LATERAL   Final Result   Pleural fluid posteriorly. XR CHEST PORTABLE   Final Result   Increased left pleural effusion and associated opacity, either compressive   atelectasis &/or pneumonia. Interval clearing right base.                Janet Lopez MD  Attending Physician       Janet Lopez MD  03/14/23 Vikram Vanessa

## 2023-03-14 NOTE — ED NOTES
ED to inpatient nurses report    Chief Complaint   Patient presents with    Shortness of Breath      Present to ED from home  LOC: alert and orientated to name, place, date  Vital signs   Vitals:    03/14/23 1800 03/14/23 1818 03/14/23 1828 03/14/23 1833   BP: (!) 112/53 (!) 116/54 (!) 121/56 (!) 122/51   Pulse: 58 55  55   Resp:  16     Temp:  98.1 °F (36.7 °C) 98.2 °F (36.8 °C) 98.1 °F (36.7 °C)   TempSrc:   Oral Oral   SpO2: 100% 100%  100%   Weight:       Height:          Oxygen Baseline 4L O2 NC    Current needs required   LDAs:   Peripheral IV 03/14/23 Right Antecubital (Active)   Site Assessment Clean, dry & intact 03/14/23 1144   Line Status Normal saline locked;Specimen collected 03/14/23 1144   Phlebitis Assessment No symptoms 03/14/23 1144   Infiltration Assessment 0 03/14/23 1144     Mobility: Requires assistance * 1  Pending ED orders:   Present condition:   Code Status: [unfilled]   Consults:  [x]  Hospitalist  Completed  [] yes [] no  [x]  Medicine  Completed  [] yes [] No  []  Cardiology  Completed  [] yes [] No  []  GI   Completed  [] yes [] No  []  Neurology  Completed  [] yes [] No  []  Nephrology Completed  [] yes [] No  []  Vascular  Completed  [] yes [] No   []  Surgery  Completed  [] yes [] No   []  Urology  Completed  [] yes [] No   []  Plastics  Completed  [] yes [] No   []  ENT  Completed  [] yes [] No   []  Other   Completed  [] yes [] No  Pertinent event(s)   Pertinent event(s)   Electronically signed by Stacy Gutierrez RN on 3/14/2023 at 7:29 PM     Wanda Simpson RN  03/14/23 1930

## 2023-03-14 NOTE — H&P
[address] [facility]  History & Physical Examination Note              Date:   3/14/2023  Patient name:  Raj Toscano  Date of admission:  3/14/2023 11:39 AM  MRN:   [id number]  YOB: 1955    CHIEF COMPLAINT:       Chief Complaint   Patient presents with    Shortness of Breath       History Obtained From:  Patient and chart review. HPI:     The patient is a 77 y.o.  male with history of COPD, type 2 diabetes mellitus, hypertension, HFrEF  who presented with shortness of breath with sputum production. At ER   and he is admitted to the hospital for concern for pneumonia and severe anemia. Patient presented to the hospital after having an argument with his landlord over rent money. Patient states that during his argument he became short of breath. On presentation to the emergency department patient was complaining of shortness of breath, denies any chest pain. Patient has had increased cough with sputum production over the last couple weeks. Is on 4 L of oxygen at home, in the emergency department patient is at his baseline of 4 L. Denies any fever or recent sick contacts. In the emergency department patient was found to have a hemoglobin of 6.5. Appears his baseline is around 7-8.  1 unit of blood was ordered consent obtained. Troponins were stable. EKG unremarkable. proBNP below his baseline although elevated at 789. Patient did not have a white count. COVID was negative. Chest x-ray demonstrated increased left pleural effusion and associated opacity pointing to either compressive atelectasis and/or pneumonia. Patient did have a recent hospitalization in January for COPD exacerbation. Patient was also given 1 dose of Zosyn and Solu-Medrol in the emergency department. At the time of our evaluation patient was not in any acute respiratory distress. On 4 L of oxygen.   Complaining of mild shortness of breath and cough with sputum production. Does elicit 15 to 20 pound weight loss in the last year. Denies any fever, chills, night sweats. Patient says his last colonoscopy was years ago, does not give a exact timetable. Denies any zoe blood in his stool or any dark-colored stools. Denies any hematuria, hemoptysis. No signs of any acute bleed. Denies any nausea, vomiting, diarrhea, headache. Quit smoking about a year ago, used to smoke about 1/2 packs a day. Does not use any illicit substances. Denies any alcohol use. PAST MEDICAL HISTORY:        has a past medical history of CHF (congestive heart failure) ([facility] [address] COPD (chronic obstructive pulmonary disease) ([facility] Diabetes mellitus ([facility] [address] Erectile dysfunction due to arterial insufficiency, Hypertension, Microalbuminuria due to type 2 diabetes mellitus ([facility] [address] and Overweight (BMI 25.0-29.9). PAST SURGICAL HISTORY:      has a past surgical history that includes Appendectomy and Total ankle arthroplasty. FAMILY HISTORY:     family history is not on file. HOME MEDICATIONS:     Prior to Admission medications    Medication Sig Start Date End Date Taking? Authorizing Provider   atorvastatin (LIPITOR) 20 MG tablet Take 1 tablet by mouth daily 3/6/23   Antonia Walker MD   hydrALAZINE (APRESOLINE) 50 MG tablet TAKE 1 TABLET BY MOUTH THREE TIMES A DAY 2/9/23   Khari Norris MD   carvedilol (COREG) 12.5 MG tablet Take 1 tablet by mouth 2 times daily (with meals) 1/25/23   Gary Boston MD   lisinopril (PRINIVIL;ZESTRIL) 20 MG tablet Take 1 tablet by mouth daily 1/25/23   Gary Boston MD   metFORMIN (GLUCOPHAGE) 500 MG tablet Take 1 tablet by mouth in the morning and 1 tablet in the evening. Take with meals.  8/15/22   Tyree Payne MD   albuterol (PROVENTIL) (5 MG/ML) 0.5% nebulizer solution Take 0.5 mLs by nebulization 4 times daily as needed for Wheezing 7/12/22   Te Ham MD   amLODIPine (NORVASC) 10 MG tablet Take 1 tablet by mouth daily 7/12/22   Gabe Irvin MD   aspirin (HM ASPIRIN EC LOW DOSE) 81 MG EC tablet TAKE 1 TABLET BY MOUTH DAILY 7/12/22   Te Almaguer MD   ipratropium-albuterol (DUONEB) 0.5-2.5 (3) MG/3ML SOLN nebulizer solution Inhale 3 mLs into the lungs every 4 hours (while awake) 7/12/22   Te Almaguer MD   furosemide (LASIX) 20 MG tablet Take 1 tablet by mouth daily 7/12/22   Gabe Irvin MD   vitamin D (CHOLECALCIFEROL) 50 MCG (2000 UT) TABS tablet Take 1 tablet by mouth daily 7/12/22   Gabe Irvin MD   fluticasone-salmeterol (ADVIAR) 100-50 MCG/ACT AEPB diskus inhaler Inhale 1 puff into the lungs every 12 hours 7/12/22   Gabe Irvin MD   tiotropium (Earlie Emerald) 18 MCG inhalation capsule Inhale 1 capsule into the lungs daily 7/12/22   Te Almaguer MD       ALLERGIES:      Patient has no known allergies. SOCIAL HISTORY:      reports that he quit smoking about 15 months ago. His smoking use included cigarettes. He smoked an average of .5 packs per day. He has never used smokeless tobacco. He reports that he does not drink alcohol and does not use drugs. REVIEW OF SYSTEMS:     Review of Systems   Constitutional:  Positive for appetite change and unexpected weight change. Negative for chills and fever. HENT:  Negative for hearing loss and trouble swallowing. Eyes:  Negative for visual disturbance. Respiratory:  Positive for cough and shortness of breath. Cardiovascular:  Negative for chest pain and leg swelling. Gastrointestinal:  Negative for abdominal pain, constipation, diarrhea, nausea and vomiting. Genitourinary:  Negative for difficulty urinating and hematuria. Musculoskeletal:  Negative for back pain. Skin:  Negative for color change. Neurological:  Negative for dizziness, numbness and headaches. Psychiatric/Behavioral:  Negative for behavioral problems and confusion.        PHYSICAL EXAM:     Vitals:    03/14/23 1134 03/14/23 1426   BP: (!) 118/55 (!) 111/45   Pulse: 64 53   Resp: 20    Temp: 98 °F (36.7 °C)    TempSrc: Oral    SpO2: 100% 100%   Weight: 146 lb (66.2 kg)    Height: 5' 5.5\" (1.664 m)        No intake or output data in the 24 hours ending 03/14/23 1610    Physical Exam  Constitutional:       Appearance: Normal appearance. Comments: Appears pale. HENT:      Head: Normocephalic and atraumatic. Nose: Nose normal.      Mouth/Throat:      Mouth: Mucous membranes are moist.   Eyes:      Extraocular Movements: Extraocular movements intact. Pupils: Pupils are equal, round, and reactive to light. Cardiovascular:      Rate and Rhythm: Normal rate and regular rhythm. Pulses: Normal pulses. Heart sounds: Normal heart sounds. Pulmonary:      Effort: Pulmonary effort is normal.      Breath sounds: Rales (Left lower lobe) present. Comments: On 4 L of oxygen via nasal cannula. Abdominal:      General: Bowel sounds are normal.      Palpations: Abdomen is soft. Musculoskeletal:         General: Normal range of motion. Cervical back: Neck supple. Right lower leg: Edema present. Left lower leg: Edema present. Skin:     General: Skin is warm and dry. Capillary Refill: Capillary refill takes less than 2 seconds. Neurological:      General: No focal deficit present. Mental Status: He is alert and oriented to person, place, and time. Psychiatric:         Mood and Affect: Mood normal.         Behavior: Behavior normal.        DIAGNOSTICS:      Laboratory Testing:    Recent Results (from the past 24 hour(s))   COVID-19, Rapid    Collection Time: 03/14/23 12:04 PM    Specimen: Nasopharyngeal Swab   Result Value Ref Range    Specimen Description . NASOPHARYNGEAL SWAB     SARS-CoV-2, Rapid Not Detected Not Detected   CBC with Auto Differential    Collection Time: 03/14/23 12:05 PM   Result Value Ref Range    WBC 6.2 3.5 - 11.3 k/uL    RBC 2.29 (L) 4.21 - 5.77 m/uL    Hemoglobin 6.5 (LL) 13.0 - 17.0 g/dL Hematocrit 21.4 (L) 40.7 - 50.3 %    MCV 93.4 82.6 - 102.9 fL    MCH 28.4 25.2 - 33.5 pg    MCHC 30.4 28.4 - 34.8 g/dL    RDW 13.4 11.8 - 14.4 %    Platelets 348 207 - 138 k/uL    MPV 10.1 8.1 - 13.5 fL    NRBC Automated 0.0 0.0 per 100 WBC    Seg Neutrophils 82 (H) 36 - 65 %    Lymphocytes 12 (L) 24 - 43 %    Monocytes 5 3 - 12 %    Eosinophils % 1 1 - 4 %    Basophils 0 0 - 2 %    Immature Granulocytes 0 0 %    Segs Absolute 5.04 1.50 - 8.10 k/uL    Absolute Lymph # 0.75 (L) 1.10 - 3.70 k/uL    Absolute Mono # 0.32 0.10 - 1.20 k/uL    Absolute Eos # 0.03 0.00 - 0.44 k/uL    Basophils Absolute <0.03 0.00 - 0.20 k/uL    Absolute Immature Granulocyte <0.03 0.00 - 0.30 k/uL   Basic Metabolic Panel w/ Reflex to MG    Collection Time: 03/14/23 12:05 PM   Result Value Ref Range    Glucose 121 (H) 70 - 99 mg/dL    BUN 13 8 - 23 mg/dL    Creatinine 1.06 0.70 - 1.20 mg/dL    Est, Glom Filt Rate >60 >60 mL/min/1.73m2    Calcium 8.1 (L) 8.6 - 10.4 mg/dL    Sodium 138 135 - 144 mmol/L    Potassium 4.2 3.7 - 5.3 mmol/L    Chloride 99 98 - 107 mmol/L    CO2 27 20 - 31 mmol/L    Anion Gap 12 9 - 17 mmol/L   Brain Natriuretic Peptide    Collection Time: 03/14/23 12:05 PM   Result Value Ref Range    Pro- (H) <300 pg/mL   Troponin    Collection Time: 03/14/23 12:05 PM   Result Value Ref Range    Troponin, High Sensitivity 78 (HH) 0 - 22 ng/L   Troponin    Collection Time: 03/14/23  1:12 PM   Result Value Ref Range    Troponin, High Sensitivity 74 (HH) 0 - 22 ng/L   TYPE AND SCREEN    Collection Time: 03/14/23  1:45 PM   Result Value Ref Range    Expiration Date 03/17/2023,2359     Arm Band Number [id number]     ABO/Rh O POSITIVE     Antibody Screen NEGATIVE     Unit Number [id number]     Product Code Leukocyte Reduced Red Cell     Unit Divison 00     Dispense Status ALLOCATED     Transfusion Status OK TO TRANSFUSE     Crossmatch Result COMPATIBLE          Imaging/Diagonstics:  XR CHEST LATERAL    Result Date: 3/14/2023  EXAMINATION: ONE XRAY VIEW OF THE LEFT LATERAL CHEST 3/14/2023 3:19 pm COMPARISON: Chest radiograph performed 03/14/2023. HISTORY: ORDERING SYSTEM PROVIDED HISTORY: assess pleural effusion TECHNOLOGIST PROVIDED HISTORY: assess pleural effusion FINDINGS: Lateral view of the chest demonstrates apparent pleural effusion posteriorly. There is no definite pneumothorax. The lateral cardiac silhouette appears unremarkable. The upper abdomen is unremarkable. The surrounding soft tissues are unremarkable. Pleural fluid posteriorly. XR CHEST PORTABLE    Result Date: 3/14/2023  EXAMINATION: ONE XRAY VIEW OF THE CHEST 3/14/2023 12:36 pm COMPARISON: AP chest from 01/11/2023 HISTORY: ORDERING SYSTEM PROVIDED HISTORY: r/o PNA, cough TECHNOLOGIST PROVIDED HISTORY: r/o PNA, cough Reason for Exam: uprt port chest FINDINGS: Overlying ECG monitor leads, gown snaps and respiratory tubing. Previous cervical spine ACDF hardware. Cardiomediastinal shadow stable. Increased opacity lower 1/3 left hemidiaphragm with meniscus sign laterally. Left upper lung, right lung and right CP angle clear. Bones unchanged. Increased left pleural effusion and associated opacity, either compressive atelectasis &/or pneumonia. Interval clearing right base.          ASSESSMENT:       Principal Problem:    Community acquired pneumonia  Active Problems:    Hypertension goal BP (blood pressure) < 140/80    Hyperlipidemia with target LDL less than 70    Controlled type 2 diabetes mellitus without complication, without long-term current use of insulin (HCC)    Chronic obstructive pulmonary disease (HCC)    Combined systolic and diastolic congestive heart failure ([address]  Resolved Problems:    Community acquired pneumonia, bilateral      PLAN:     Patient status: Admit the patient as Inpatient observation in the Progressive Unit     Community-acquired pneumonia  - Patient presents with shortness of breath increased sputum production  - Received 1 dose of Zosyn in the emergency department  - Patient afebrile, no white blood cell count  - Chest x-ray demonstrated increased left pleural effusion either compressive atelectasis or pneumonia, lateral chest x-ray demonstrated pleural fluid posteriorly  - Start patient on IV Rocephin and azithromycin  - Procal ordered  - Strep pneumo, mycoplasma and Legionella ordered  - Respiratory culture   - Viral panel     Severe normocytic anemia  - Patient on presentation had a hemoglobin of 6.5  - Appears his baseline is between 7 and 8  - 1 unit of packed red blood cells ordered and transfused in the emergency department  - Fecal occult blood ordered  - No signs of acute bleeding  - Iron, TIBC, ferritin, LDH, retake count, B12 and folate   - Peripheral smear ordered  - Will monitor hemoglobin levels  - Summer 2022,  Patient's baseline hemoglobin was between 10 and 12, did have an acute drop to between 7 and 8 and October 2022  - Per chart and per patient appears patient had about a 15-20 pound weight loss since February of last year    Chronic obstructive pulmonary disease  - On 4 L of oxygen at home  - Currently at baseline, saturating well  - Continue home inhalers and DuoNeb and Spiriva  - Did receive IV steroids in the ED  - No wheezing on exam, no acute respiratory distress    Combined systolic and diastolic CHF  - Last echo demonstrated an EF of 40 to 45%, in January 2023  - Continue home dose of lisinopril  - Continue home dose 20 mg of Lasix  - Currently holding his Coreg 12.5 2 times daily due to heart rate being in the 50s in the emergency department    Essential hypertension  - Continue home dose of lisinopril 20 mg  - Continue Norvasc 10 mg daily  - Home dose of hydralazine 50 mg 3 times daily, currently on hold  - We will monitor blood pressure can on hold hydralazine if becomes hypertensive    Type 2 diabetes mellitus  - Takes metformin at home, currently on hold  - Low-dose sliding scale  - POCT glucose  - Hypoglycemic protocol    Hyperlipidemia  - Continue home dose of Lipitor 20 mg daily  - Patient has been on chronic aspirin as well, currently on hold due to possible GI bleed    Diet: Cardiac, carbohydrate controlled diet  DVT prophylaxis: reason for no prophylaxis: Pneumatic compression device  CODE STATUS: Full code      Consultations:   Consults: IP CONSULT TO FAMILY MEDICINE  IP CONSULT TO SOCIAL WORK  PT/OT      Plan will be discussed with the attending, Dr. Devendra Schneider MD  Family Medicine Resident  3/14/2023 4:10 PM

## 2023-03-14 NOTE — ED NOTES
Patient reports that he has been having issues with his landlord and causing increased stress. States he had CP this morning when speaking to his landlord and that's when the SOB started during a difficult discussion. Denies CP on arrival to ED. SpO2 on home 4L O2 NC regimen is 100%. Pt has wet cough, but states that is \"chronic, but seems to be a little worse lately. \"      Anali Marshall, MARIANNA  03/14/23 3074

## 2023-03-14 NOTE — CARE COORDINATION
Case Management Assessment  Initial Evaluation    Date/Time of Evaluation: 3/14/2023 3:45 PM  Assessment Completed by: Natty Murray RN    If patient is discharged prior to next notation, then this note serves as note for discharge by case management. Patient Name: Addie Salvador                   YOB: 1955  Diagnosis: No admission diagnoses are documented for this encounter. Date / Time: 3/14/2023 11:39 AM    Patient Admission Status: Emergency   Readmission Risk (Low < 19, Mod (19-27), High > 27): Readmission Risk Score: 19.1    Current PCP: Yenifer Garcia MD  PCP verified by CM? Yes    Chart Reviewed: Yes      History Provided by: Patient  Patient Orientation: Alert and Oriented    Patient Cognition: Alert    Hospitalization in the last 30 days (Readmission):  No    If yes, Readmission Assessment in  Navigator will be completed. Advance Directives:      Code Status: Prior   Patient's Primary Decision Maker is:        Discharge Planning:    Patient lives with: Children Type of Home: Apartment  Primary Care Giver: Self  Patient Support Systems include: Children   Current Financial resources:    Current community resources:    Current services prior to admission: Durable Medical Equipment, Oxygen Therapy            Current DME: Cane, Oxygen Therapy (Comment), Walker (O2 per [facility])            Type of Home Care services:  None    ADLS  Prior functional level: Independent in ADLs/IADLs  Current functional level: Independent in ADLs/IADLs    PT AM-PAC:   /24  OT AM-PAC:   /24    Family can provide assistance at DC: Would you like Case Management to discuss the discharge plan with any other family members/significant others, and if so, who?     Plans to Return to Present Housing: Yes  Other Identified Issues/Barriers to RETURNING to current housing: none  Potential Assistance needed at discharge: Transportation            Potential DME:    Patient expects to discharge to: Apartment  Plan for transportation at discharge:      Financial    Payor: Caleb Sequeira / Plan: José Luis Guo / Product Type: *No Product type* /     Does insurance require precert for SNF: Yes    Potential assistance Purchasing Medications: No  [address]-to-Beds request:        [address] [facility] [phone]  [address] [address]  Phone: [phone] Fax: [address] [facility], [address] [facility] Cindy Loya [phone] Marina Horne [phone] [address] [facility] [address]  Phone: [phone] Fax: [phone]      Notes:    Factors facilitating achievement of predicted outcomes:     Barriers to discharge: Additional Case Management Notes: home with son, needs transportation    The Plan for Transition of Care is related to the following treatment goals of No admission diagnoses are documented for this encounter. IF APPLICABLE: The Patient and/or patient representative Félix Hernandez and his family were provided with a choice of provider and agrees with the discharge plan. Freedom of choice list with basic dialogue that supports the patient's individualized plan of care/goals and shares the quality data associated with the providers was provided to:     Patient Representative Name:       The Patient and/or Patient Representative Agree with the Discharge Plan?       Kayla Sue RN  Case Management Department  Ph: [phone] Fax:

## 2023-03-14 NOTE — ED PROVIDER NOTES
[facility] ED  Emergency Department Encounter  Emergency Medicine Resident     Pt Name:Pavan Roa  MRN: [id number]  [place] 1955  Date of evaluation: 3/14/23  PCP:  Kianna Jeffries MD  Note Started: 11:42 AM EDT      CHIEF COMPLAINT       Chief Complaint   Patient presents with    Shortness of Breath       HISTORY OF PRESENT ILLNESS  (Location/Symptom, Timing/Onset, Context/Setting, Quality, Duration, Modifying Factors, Severity.)      Raj Toscano is a 77 y.o. male who presents with complaints of shortness of breath and cough over the past 2 weeks. Patient notes an argument with his landlord over rent money today and started having chest pain. Noted chest pain improved on arrival to ED. Denies fever. Notes he wears 4 L of oxygen at home and has history of COPD. No recent sick contacts. Notes history of anemia and has required blood transfusions in past.    PAST MEDICAL / SURGICAL / SOCIAL / FAMILY HISTORY      has a past medical history of CHF (congestive heart failure) ([address] COPD (chronic obstructive pulmonary disease) ([address] Diabetes mellitus ([address] Erectile dysfunction due to arterial insufficiency, Hypertension, Microalbuminuria due to type 2 diabetes mellitus ([address] and Overweight (BMI 25.0-29.9). Reviewed with patient     has a past surgical history that includes Appendectomy and Total ankle arthroplasty. Reviewed with patient    Social History     Socioeconomic History    Marital status:      Spouse name: Not on file    Number of children: Not on file    Years of education: Not on file    Highest education level: Not on file   Occupational History    Not on file   Tobacco Use    Smoking status: Former     Packs/day: 0.50     Types: Cigarettes     Quit date: 2021     Years since quittin.2    Smokeless tobacco: Never    Tobacco comments:     reports he quit smoking in the past 2 months   Substance and Sexual Activity    Alcohol use:  No Alcohol/week: 0.0 standard drinks    Drug use: No    Sexual activity: Not on file   Other Topics Concern    Not on file   Social History Narrative    Not on file     Social Determinants of Health     Financial Resource Strain: Not on file   Food Insecurity: Not on file   Transportation Needs: Unmet Transportation Needs    Lack of Transportation (Medical): No    Lack of Transportation (Non-Medical): Yes   Physical Activity: Inactive    Days of Exercise per Week: 0 days    Minutes of Exercise per Session: 0 min   Stress: Stress Concern Present    Feeling of Stress : Very much   Social Connections: Socially Isolated    Frequency of Communication with Friends and Family: Never    Frequency of Social Gatherings with Friends and Family: More than three times a week    Attends Baptism Services: Never    Active Member of Clubs or Organizations: No    Attends Club or Organization Meetings: Never    Marital Status:    Intimate Partner Violence: Not on file   Housing Stability: Low Risk     Unable to Pay for Housing in the Last Year: No    Number of Places Lived in the Last Year: 1    Unstable Housing in the Last Year: No       History reviewed. No pertinent family history. Allergies:  Patient has no known allergies. Home Medications:  Prior to Admission medications    Medication Sig Start Date End Date Taking? Authorizing Provider   atorvastatin (LIPITOR) 20 MG tablet Take 1 tablet by mouth daily 3/6/23   Mireya Douglas MD   hydrALAZINE (APRESOLINE) 50 MG tablet TAKE 1 TABLET BY MOUTH THREE TIMES A DAY 2/9/23   karlos Milner MD   carvedilol (COREG) 12.5 MG tablet Take 1 tablet by mouth 2 times daily (with meals) 1/25/23   Gary Boston MD   lisinopril (PRINIVIL;ZESTRIL) 20 MG tablet Take 1 tablet by mouth daily 1/25/23   Gary Boston MD   metFORMIN (GLUCOPHAGE) 500 MG tablet Take 1 tablet by mouth in the morning and 1 tablet in the evening. Take with meals.  8/15/22   Roberto Hollins MD   albuterol (PROVENTIL) (5 MG/ML) 0.5% nebulizer solution Take 0.5 mLs by nebulization 4 times daily as needed for Wheezing 7/12/22   Te Escobar MD   amLODIPine (NORVASC) 10 MG tablet Take 1 tablet by mouth daily 7/12/22   Mala South MD   aspirin (HM ASPIRIN EC LOW DOSE) 81 MG EC tablet TAKE 1 TABLET BY MOUTH DAILY 7/12/22   Te Escobar MD   ipratropium-albuterol (DUONEB) 0.5-2.5 (3) MG/3ML SOLN nebulizer solution Inhale 3 mLs into the lungs every 4 hours (while awake) 7/12/22   Te Escobar MD   furosemide (LASIX) 20 MG tablet Take 1 tablet by mouth daily 7/12/22   Mala South MD   vitamin D (CHOLECALCIFEROL) 50 MCG (2000 UT) TABS tablet Take 1 tablet by mouth daily 7/12/22   Mala South MD   fluticasone-salmeterol (ADVIAR) 100-50 MCG/ACT AEPB diskus inhaler Inhale 1 puff into the lungs every 12 hours 7/12/22   Mala South MD   tiotropium (SPIRIVA HANDIHALER) 18 MCG inhalation capsule Inhale 1 capsule into the lungs daily 7/12/22   Te Escobar MD       REVIEW OF SYSTEMS    (2-9 systems for level 4, 10 or more for level 5)      Review of Systems   Constitutional:  Negative for chills and fever. HENT:  Negative for congestion and rhinorrhea. Eyes:  Negative for photophobia and visual disturbance. Respiratory: Positive for shortness of breath and wheezing. Positive for cough  Cardiovascular:  Negative for chest pain and palpitations. Gastrointestinal:  Negative for abdominal pain, nausea and vomiting. Genitourinary:  Negative for dysuria and frequency. Musculoskeletal:  Negative for back pain and neck pain. Skin:  Negative for rash and wound. Neurological:  Negative for dizziness and headaches.      PHYSICAL EXAM   (up to 7 for level 4, 8 or more for level 5)      INITIAL VITALS:   BP (!) 122/51   Pulse 55   Temp 98.1 °F (36.7 °C) (Oral)   Resp 16   Ht 5' 5.5\" (1.664 m)   Wt 146 lb (66.2 kg)   SpO2 100%   BMI 23.93 kg/m²     Physical Exam  Vitals and nursing note reviewed. Constitutional:       General: He is not in acute distress. HENT:      Head: Atraumatic. Right Ear: External ear normal.      Left Ear: External ear normal.      Nose: Nose normal.      Mouth/Throat:      Mouth: Mucous membranes are moist.      Pharynx: Oropharynx is clear. Eyes:      Conjunctiva/sclera: Conjunctivae normal.   Cardiovascular:      Rate and Rhythm: Normal rate and regular rhythm. Pulses: Normal pulses. Pulmonary:      Effort: Pulmonary effort is normal. No respiratory distress. Breath sounds: Normal breath sounds. No wheezing. Abdominal:      Palpations: Abdomen is soft. Tenderness: There is no abdominal tenderness. Musculoskeletal:         General: Normal range of motion. Cervical back: Normal range of motion. Skin:     General: Skin is warm and dry. Capillary Refill: Capillary refill takes less than 2 seconds. Neurological:      General: No focal deficit present. Mental Status: He is alert and oriented to person, place, and time. DDX/DIAGNOSTIC RESULTS / EMERGENCY DEPARTMENT COURSE / MDM     Medical Decision Making  COPD exacerbation, CHF, pneumonia, viral URI, anemia    Amount and/or Complexity of Data Reviewed  Labs: ordered. Decision-making details documented in ED Course. Radiology: ordered. ECG/medicine tests: ordered. Risk  Prescription drug management. Decision regarding hospitalization.         EKG  Sinus bradycardia, nonspecific T wave flattening in inferior leads, QTc 420, regular axis, no ST abnormality    All EKG's are interpreted by the Emergency Department Physician who either signs or Co-signs this chart in the absence of a cardiologist.    EMERGENCY DEPARTMENT COURSE:  68-year-old male, history of COPD and on 4 L nasal cannula at baseline, prior admission in January for pneumonia, type 2 diabetes, HTN, CHF, presented to ED with complaints of cough, productive sputum over the past 2 weeks associated with shortness of breath and chest pain that started when he was having argument with his landlord over money that improved on arrival to ED. On exam, speaking full sentences, on baseline oxygen requirement, lungs clear, no edema in lower extremities. Cardiac work-up obtained and show troponins at baseline, no EKG changes, chest x-ray consistent with possible pleural effusion versus pneumonia. Patient also found to have anemia with hemoglobin 6.5. Denied any blood in stool. Has required prior transfusions in the past and been diagnosed with chronic anemia. Patient also covered for possible COPD exacerbation as he notes wheezing at home and was given 125 of Solu-Medrol in ED. Patient started on Zosyn and admitted to [facility]. ED Course as of 03/14/23 1848   Tue Mar 14, 2023   1433 Troponin, High Sensitivity(!!): 74 [AR]   46 Spoke with family medicine who agreed to admit [AR]   1515 Patient currently getting lateral chest x-ray to further characterize effusion [AR]      ED Course User Index  [AR] Antoinette Santos MD       PROCEDURES:  None    CONSULTS:  IP CONSULT TO FAMILY MEDICINE  IP CONSULT TO SOCIAL WORK      FINAL IMPRESSION      1. Pneumonia due to infectious organism, unspecified laterality, unspecified part of lung    2. COPD exacerbation ([facility]          DISPOSITION / PLAN     DISPOSITION Admitted 03/14/2023 03:54:46 PM      PATIENT REFERRED TO:  No follow-up provider specified.     DISCHARGE MEDICATIONS:  New Prescriptions    No medications on file       Fuad Cunningham MD  Emergency Medicine Resident    (Please note that portions of thisnote were completed with a voice recognition program.  Efforts were made to edit the dictations but occasionally words are mis-transcribed.)        Antoinette Santos MD  Resident  03/14/23 [id number]

## 2023-03-15 ENCOUNTER — APPOINTMENT (OUTPATIENT)
Dept: GENERAL RADIOLOGY | Age: 68
DRG: 194 | End: 2023-03-15
Payer: MEDICARE

## 2023-03-15 PROBLEM — R00.1 BRADYCARDIA, SINUS: Status: ACTIVE | Noted: 2023-03-15

## 2023-03-15 LAB
ABO/RH: NORMAL
ABSOLUTE EOS #: 0 K/UL (ref 0–0.4)
ABSOLUTE EOS #: 0 K/UL (ref 0–0.4)
ABSOLUTE IMMATURE GRANULOCYTE: 0 K/UL (ref 0–0.3)
ABSOLUTE IMMATURE GRANULOCYTE: 0 K/UL (ref 0–0.3)
ABSOLUTE LYMPH #: 0.34 K/UL (ref 1–4.8)
ABSOLUTE LYMPH #: 0.43 K/UL (ref 1–4.8)
ABSOLUTE MONO #: 0 K/UL (ref 0.1–0.8)
ABSOLUTE MONO #: 0.09 K/UL (ref 0.1–0.8)
ADENOVIRUS PCR: NOT DETECTED
ALBUMIN SERPL-MCNC: 3.1 G/DL (ref 3.5–5.2)
ALBUMIN/GLOBULIN RATIO: 1.1 (ref 1–2.5)
ALP SERPL-CCNC: 159 U/L (ref 40–129)
ALT SERPL-CCNC: 10 U/L (ref 5–41)
ANION GAP SERPL CALCULATED.3IONS-SCNC: 11 MMOL/L (ref 9–17)
ANTIBODY SCREEN: NEGATIVE
ARM BAND NUMBER: NORMAL
AST SERPL-CCNC: 16 U/L
B PARAP IS1001 DNA NPH QL NAA+NON-PROBE: NOT DETECTED
B PERT DNA SPEC QL NAA+PROBE: NOT DETECTED
BASOPHILS # BLD: 0 % (ref 0–2)
BASOPHILS # BLD: 0 % (ref 0–2)
BASOPHILS ABSOLUTE: 0 K/UL (ref 0–0.2)
BASOPHILS ABSOLUTE: 0 K/UL (ref 0–0.2)
BILIRUB DIRECT SERPL-MCNC: 0.2 MG/DL
BILIRUB INDIRECT SERPL-MCNC: 0.4 MG/DL (ref 0–1)
BILIRUB SERPL-MCNC: 0.6 MG/DL (ref 0.3–1.2)
BLD PROD TYP BPU: NORMAL
BLOOD BANK BLOOD PRODUCT EXPIRATION DATE: NORMAL
BLOOD BANK ISBT PRODUCT BLOOD TYPE: 5100
BLOOD BANK PRODUCT CODE: NORMAL
BLOOD BANK UNIT TYPE AND RH: NORMAL
BPU ID: NORMAL
BUN SERPL-MCNC: 21 MG/DL (ref 8–23)
CALCIUM SERPL-MCNC: 8.2 MG/DL (ref 8.6–10.4)
CEA SERPL-MCNC: 41.2 NG/ML
CHLAMYDIA PNEUMONIAE BY PCR: NOT DETECTED
CHLORIDE SERPL-SCNC: 100 MMOL/L (ref 98–107)
CO2 SERPL-SCNC: 25 MMOL/L (ref 20–31)
CORONAVIRUS 229E PCR: NOT DETECTED
CORONAVIRUS HKU1 PCR: NOT DETECTED
CORONAVIRUS NL63 PCR: NOT DETECTED
CORONAVIRUS OC43 PCR: NOT DETECTED
CREAT SERPL-MCNC: 1.08 MG/DL (ref 0.7–1.2)
CROSSMATCH RESULT: NORMAL
DATE, STOOL #1: NORMAL
DISPENSE STATUS BLOOD BANK: NORMAL
EKG ATRIAL RATE: 51 BPM
EKG ATRIAL RATE: 58 BPM
EKG P-R INTERVAL: 134 MS
EKG P-R INTERVAL: 136 MS
EKG Q-T INTERVAL: 428 MS
EKG Q-T INTERVAL: 454 MS
EKG QRS DURATION: 92 MS
EKG QRS DURATION: 94 MS
EKG QTC CALCULATION (BAZETT): 418 MS
EKG QTC CALCULATION (BAZETT): 420 MS
EKG R AXIS: 4 DEGREES
EKG R AXIS: 9 DEGREES
EKG T AXIS: 20 DEGREES
EKG T AXIS: 6 DEGREES
EKG VENTRICULAR RATE: 51 BPM
EKG VENTRICULAR RATE: 58 BPM
EOSINOPHILS RELATIVE PERCENT: 0 % (ref 1–4)
EOSINOPHILS RELATIVE PERCENT: 0 % (ref 1–4)
EXPIRATION DATE: NORMAL
FLUAV RNA NPH QL NAA+NON-PROBE: NOT DETECTED
FLUBV RNA NPH QL NAA+NON-PROBE: NOT DETECTED
GFR SERPL CREATININE-BSD FRML MDRD: >60 ML/MIN/1.73M2
GLUCOSE BLD-MCNC: 155 MG/DL (ref 75–110)
GLUCOSE BLD-MCNC: 165 MG/DL (ref 75–110)
GLUCOSE BLD-MCNC: 181 MG/DL (ref 75–110)
GLUCOSE BLD-MCNC: 232 MG/DL (ref 75–110)
GLUCOSE SERPL-MCNC: 200 MG/DL (ref 70–99)
HCT VFR BLD AUTO: 26.9 % (ref 40.7–50.3)
HCT VFR BLD AUTO: 27 % (ref 40.7–50.3)
HEMOCCULT SP1 STL QL: NEGATIVE
HGB BLD-MCNC: 8.2 G/DL (ref 13–17)
HGB BLD-MCNC: 8.4 G/DL (ref 13–17)
HUMAN METAPNEUMOVIRUS PCR: NOT DETECTED
IMMATURE GRANULOCYTES: 0 %
IMMATURE GRANULOCYTES: 0 %
L PNEUMO1 AG UR QL IA.RAPID: NEGATIVE
LYMPHOCYTES # BLD: 10 % (ref 24–44)
LYMPHOCYTES # BLD: 7 % (ref 24–44)
MCH RBC QN AUTO: 28.8 PG (ref 25.2–33.5)
MCH RBC QN AUTO: 29 PG (ref 25.2–33.5)
MCHC RBC AUTO-ENTMCNC: 30.5 G/DL (ref 28.4–34.8)
MCHC RBC AUTO-ENTMCNC: 31.1 G/DL (ref 28.4–34.8)
MCV RBC AUTO: 93.1 FL (ref 82.6–102.9)
MCV RBC AUTO: 94.4 FL (ref 82.6–102.9)
MONOCYTES # BLD: 0 % (ref 1–7)
MONOCYTES # BLD: 2 % (ref 1–7)
MORPHOLOGY: ABNORMAL
MORPHOLOGY: ABNORMAL
MYCOPLASMA PNEUMONIAE PCR: NOT DETECTED
NRBC AUTOMATED: 0 PER 100 WBC
NRBC AUTOMATED: 0 PER 100 WBC
PARAINFLUENZA 1 PCR: NOT DETECTED
PARAINFLUENZA 2 PCR: NOT DETECTED
PARAINFLUENZA 3 PCR: NOT DETECTED
PARAINFLUENZA 4 PCR: NOT DETECTED
PATH REV BLD -IMP: NORMAL
PDW BLD-RTO: 13.5 % (ref 11.8–14.4)
PDW BLD-RTO: 13.8 % (ref 11.8–14.4)
PLATELET # BLD AUTO: 260 K/UL (ref 138–453)
PLATELET # BLD AUTO: 284 K/UL (ref 138–453)
PMV BLD AUTO: 10.1 FL (ref 8.1–13.5)
PMV BLD AUTO: 10.3 FL (ref 8.1–13.5)
POTASSIUM SERPL-SCNC: 4.6 MMOL/L (ref 3.7–5.3)
PROT SERPL-MCNC: 5.9 G/DL (ref 6.4–8.3)
RBC # BLD: 2.85 M/UL (ref 4.21–5.77)
RBC # BLD: 2.9 M/UL (ref 4.21–5.77)
RESP SYNCYTIAL VIRUS PCR: NOT DETECTED
RHINO/ENTEROVIRUS PCR: NOT DETECTED
SARS-COV-2 RNA NPH QL NAA+NON-PROBE: NOT DETECTED
SEG NEUTROPHILS: 88 % (ref 36–66)
SEG NEUTROPHILS: 93 % (ref 36–66)
SEGMENTED NEUTROPHILS ABSOLUTE COUNT: 3.78 K/UL (ref 1.8–7.7)
SEGMENTED NEUTROPHILS ABSOLUTE COUNT: 4.56 K/UL (ref 1.8–7.7)
SODIUM SERPL-SCNC: 136 MMOL/L (ref 135–144)
SOURCE: NORMAL
SPECIMEN DESCRIPTION: NORMAL
STREP PNEUMONIAE ANTIGEN: NEGATIVE
SURGICAL PATHOLOGY REPORT: NORMAL
TIME, STOOL #1: NORMAL
TRANSFUSION STATUS: NORMAL
UNIT DIVISION: 0
UNIT ISSUE DATE/TIME: NORMAL
WBC # BLD AUTO: 4.3 K/UL (ref 3.5–11.3)
WBC # BLD AUTO: 4.9 K/UL (ref 3.5–11.3)

## 2023-03-15 PROCEDURE — 93010 ELECTROCARDIOGRAM REPORT: CPT | Performed by: INTERNAL MEDICINE

## 2023-03-15 PROCEDURE — 2580000003 HC RX 258

## 2023-03-15 PROCEDURE — 85025 COMPLETE CBC W/AUTO DIFF WBC: CPT

## 2023-03-15 PROCEDURE — 97530 THERAPEUTIC ACTIVITIES: CPT

## 2023-03-15 PROCEDURE — 82270 OCCULT BLOOD FECES: CPT

## 2023-03-15 PROCEDURE — 6370000000 HC RX 637 (ALT 250 FOR IP)

## 2023-03-15 PROCEDURE — 94640 AIRWAY INHALATION TREATMENT: CPT

## 2023-03-15 PROCEDURE — 2700000000 HC OXYGEN THERAPY PER DAY

## 2023-03-15 PROCEDURE — 97161 PT EVAL LOW COMPLEX 20 MIN: CPT

## 2023-03-15 PROCEDURE — 82378 CARCINOEMBRYONIC ANTIGEN: CPT

## 2023-03-15 PROCEDURE — 84443 ASSAY THYROID STIM HORMONE: CPT

## 2023-03-15 PROCEDURE — 82947 ASSAY GLUCOSE BLOOD QUANT: CPT

## 2023-03-15 PROCEDURE — 80076 HEPATIC FUNCTION PANEL: CPT

## 2023-03-15 PROCEDURE — 99223 1ST HOSP IP/OBS HIGH 75: CPT | Performed by: STUDENT IN AN ORGANIZED HEALTH CARE EDUCATION/TRAINING PROGRAM

## 2023-03-15 PROCEDURE — 2060000000 HC ICU INTERMEDIATE R&B

## 2023-03-15 PROCEDURE — 6360000002 HC RX W HCPCS

## 2023-03-15 PROCEDURE — 94761 N-INVAS EAR/PLS OXIMETRY MLT: CPT

## 2023-03-15 PROCEDURE — 36415 COLL VENOUS BLD VENIPUNCTURE: CPT

## 2023-03-15 PROCEDURE — 71045 X-RAY EXAM CHEST 1 VIEW: CPT

## 2023-03-15 PROCEDURE — 80048 BASIC METABOLIC PNL TOTAL CA: CPT

## 2023-03-15 RX ORDER — CARVEDILOL 6.25 MG/1
6.25 TABLET ORAL 2 TIMES DAILY WITH MEALS
Qty: 60 TABLET | Refills: 3 | Status: SHIPPED | OUTPATIENT
Start: 2023-03-15

## 2023-03-15 RX ORDER — CARVEDILOL 6.25 MG/1
6.25 TABLET ORAL 2 TIMES DAILY WITH MEALS
Status: DISCONTINUED | OUTPATIENT
Start: 2023-03-15 | End: 2023-03-16 | Stop reason: HOSPADM

## 2023-03-15 RX ADMIN — IPRATROPIUM BROMIDE AND ALBUTEROL SULFATE 1 AMPULE: .5; 3 SOLUTION RESPIRATORY (INHALATION) at 16:28

## 2023-03-15 RX ADMIN — IPRATROPIUM BROMIDE AND ALBUTEROL SULFATE 1 AMPULE: .5; 3 SOLUTION RESPIRATORY (INHALATION) at 20:27

## 2023-03-15 RX ADMIN — ATORVASTATIN CALCIUM 20 MG: 20 TABLET, FILM COATED ORAL at 10:30

## 2023-03-15 RX ADMIN — CARVEDILOL 6.25 MG: 6.25 TABLET, FILM COATED ORAL at 18:20

## 2023-03-15 RX ADMIN — TIOTROPIUM BROMIDE INHALATION SPRAY 2 PUFF: 3.12 SPRAY, METERED RESPIRATORY (INHALATION) at 08:56

## 2023-03-15 RX ADMIN — AMLODIPINE BESYLATE 10 MG: 10 TABLET ORAL at 10:42

## 2023-03-15 RX ADMIN — SODIUM CHLORIDE, PRESERVATIVE FREE 10 ML: 5 INJECTION INTRAVENOUS at 10:30

## 2023-03-15 RX ADMIN — ACETAMINOPHEN 650 MG: 325 TABLET ORAL at 21:09

## 2023-03-15 RX ADMIN — CEFTRIAXONE SODIUM 1000 MG: 1 INJECTION, POWDER, FOR SOLUTION INTRAMUSCULAR; INTRAVENOUS at 10:34

## 2023-03-15 RX ADMIN — IPRATROPIUM BROMIDE AND ALBUTEROL SULFATE 1 AMPULE: .5; 3 SOLUTION RESPIRATORY (INHALATION) at 08:56

## 2023-03-15 RX ADMIN — LISINOPRIL 20 MG: 20 TABLET ORAL at 10:30

## 2023-03-15 RX ADMIN — FUROSEMIDE 20 MG: 20 TABLET ORAL at 10:30

## 2023-03-15 RX ADMIN — Medication 500 MG: at 10:34

## 2023-03-15 ASSESSMENT — ENCOUNTER SYMPTOMS
CONSTIPATION: 0
NAUSEA: 0
ABDOMINAL PAIN: 0
SHORTNESS OF BREATH: 0
VOMITING: 0
TROUBLE SWALLOWING: 0
COLOR CHANGE: 0
BACK PAIN: 0
COUGH: 1
DIARRHEA: 0

## 2023-03-15 ASSESSMENT — PAIN DESCRIPTION - ORIENTATION: ORIENTATION: MID

## 2023-03-15 ASSESSMENT — PAIN DESCRIPTION - LOCATION: LOCATION: ABDOMEN

## 2023-03-15 ASSESSMENT — PAIN - FUNCTIONAL ASSESSMENT: PAIN_FUNCTIONAL_ASSESSMENT: ACTIVITIES ARE NOT PREVENTED

## 2023-03-15 NOTE — PROGRESS NOTES
Physician Progress Note      PATIENT:               Kb Dolan  CSN #:                  [id number]  :                       1955  ADMIT DATE:       3/14/2023 11:39 AM  DISCH DATE:  RESPONDING  PROVIDER #:        Mikael Man          QUERY TEXT:    Pt admitted with Pneumonia. Noted to have a history of COPD on 4 liters of   oxygen at home. on baseline 4 liters on admission with saturation of %   Please clarify one of the following: The medical record reflects the following:  Risk Factors: age, copd, pneumonia  Clinical Indicators: admitted with Pneumonia. Noted to have a history of COPD   on 4 liters of oxygen at home. on baseline 4 liters on admission with   saturation of %  Treatment: monitoring, oxygen    Thank Phillip Lockett RN BSN  CCDS  Email [email or web address]  Cell [phone]  office hours M-F 6am to 2:30pm  Options provided:  -- Chronic respiratory failure with hypoxia  -- Chronic respiratory failure with hypercapnia  -- Chronic respiratory failure with hypoxia and hypercapnia  -- Chronic respiratory failure unclear if hypoxia or hypercapnia  -- Other - I will add my own diagnosis  -- Disagree - Not applicable / Not valid  -- Disagree - Clinically unable to determine / Unknown  -- Refer to Clinical Documentation Reviewer    PROVIDER RESPONSE TEXT:    this patient has chronic respiratory failure unclear if hypoxia or hypercapnia    Query created by:  Hilary Hurst on 3/15/2023 10:53 AM      Electronically signed by:  Mikael Man 3/15/2023 1:22 PM
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
## 2023-03-15 NOTE — CARE COORDINATION
Transitional planning-talked with patient. Needs ride home, but son is not home until tomorrow morning to let him in. Patient doesn't have any keys to get in. He also needs O2 tank from [employer]. Verified with Tato Sandy

## 2023-03-15 NOTE — PROGRESS NOTES
[facility]  Progress Note    Date:   3/15/2023  Patient name:  Tiffany Lux  Date of admission:  3/14/2023 11:39 AM  MRN:   [id number]  YOB: 1955    SUBJECTIVE/Last 24 hours update:     Patient seen and examined at the bed side. Overnight patient was noted to be bradycardic with a heart rate as low as 26 per nursing staff. Patient remained in the 40s and 50s most of the night for heart rate. Coreg was held on admission. EKG demonstrated sinus bradycardia. Cardiology was consulted. Patient remained asymptomatic during these episodes. Patient reports that his shortness of breath is improved. Currently at his baseline of 4 L of oxygen via nasal cannula. Denies any fever, chills, chest pain, chest tightness. Has not had a bowel movement yet. Notes from nursing staff and Consults had been reviewed, and the overnight progress had been checked with the nursing staff as well. HPI:   The patient is a 77 y.o.  male with history of COPD, type 2 diabetes mellitus, hypertension, HFrEF  who presented with shortness of breath with sputum production. At ER   and he is admitted to the hospital for concern for pneumonia and severe anemia. Patient presented to the hospital after having an argument with his landlord over rent money. Patient states that during his argument he became short of breath. On presentation to the emergency department patient was complaining of shortness of breath, denies any chest pain. Patient has had increased cough with sputum production over the last couple weeks. Is on 4 L of oxygen at home, in the emergency department patient is at his baseline of 4 L. Denies any fever or recent sick contacts. In the emergency department patient was found to have a hemoglobin of 6.5. Appears his baseline is around 7-8.  1 unit of blood was ordered consent obtained. Troponins were stable. EKG unremarkable. proBNP below his baseline although elevated at 789. Patient did not have a white count. COVID was negative. Chest x-ray demonstrated increased left pleural effusion and associated opacity pointing to either compressive atelectasis and/or pneumonia. Patient did have a recent hospitalization in January for COPD exacerbation. Patient was also given 1 dose of Zosyn and Solu-Medrol in the emergency department. At the time of our evaluation patient was not in any acute respiratory distress. On 4 L of oxygen. Complaining of mild shortness of breath and cough with sputum production. Does elicit 15 to 20 pound weight loss in the last year. Denies any fever, chills, night sweats. Patient says his last colonoscopy was years ago, does not give a exact timetable. Denies any zoe blood in his stool or any dark-colored stools. Denies any hematuria, hemoptysis. No signs of any acute bleed. Denies any nausea, vomiting, diarrhea, headache. Quit smoking about a year ago, used to smoke about 1/2 packs a day. Does not use any illicit substances. Denies any alcohol use. REVIEW OF SYSTEMS:      Review of Systems   Constitutional:  Positive for appetite change and unexpected weight change. Negative for chills and fever. HENT:  Negative for hearing loss and trouble swallowing. Eyes:  Negative for visual disturbance. Respiratory:  Positive for cough. Negative for shortness of breath. Cardiovascular:  Negative for chest pain and leg swelling. Gastrointestinal:  Negative for abdominal pain, constipation, diarrhea, nausea and vomiting. Genitourinary:  Negative for difficulty urinating and hematuria. Musculoskeletal:  Negative for back pain. Skin:  Negative for color change. Neurological:  Negative for dizziness, numbness and headaches. Psychiatric/Behavioral:  Negative for behavioral problems and confusion.        PAST MEDICAL HISTORY:      has a past medical history of CHF (congestive heart failure) ([facility] COPD (chronic obstructive pulmonary disease) ([facility] Diabetes mellitus ([facility] Erectile dysfunction due to arterial insufficiency, Hypertension, Microalbuminuria due to type 2 diabetes mellitus ([facility] and Overweight (BMI 25.0-29.9). PAST SURGICAL HISTORY:      has a past surgical history that includes Appendectomy and Total ankle arthroplasty. SOCIAL HISTORY:      reports that he quit smoking about 15 months ago. His smoking use included cigarettes. He smoked an average of .5 packs per day. He has never used smokeless tobacco. He reports that he does not drink alcohol and does not use drugs. FAMILY HISTORY:     family history is not on file. HOME MEDICATIONS:      Prior to Admission medications    Medication Sig Start Date End Date Taking? Authorizing Provider   atorvastatin (LIPITOR) 20 MG tablet Take 1 tablet by mouth daily 3/6/23   Alma Mas MD   hydrALAZINE (APRESOLINE) 50 MG tablet TAKE 1 TABLET BY MOUTH THREE TIMES A DAY 2/9/23   Khari Stevens MD   carvedilol (COREG) 12.5 MG tablet Take 1 tablet by mouth 2 times daily (with meals) 1/25/23   Gary Boston MD   lisinopril (PRINIVIL;ZESTRIL) 20 MG tablet Take 1 tablet by mouth daily 1/25/23   Gary Boston MD   metFORMIN (GLUCOPHAGE) 500 MG tablet Take 1 tablet by mouth in the morning and 1 tablet in the evening. Take with meals.  8/15/22   Andrei Sexton MD   albuterol (PROVENTIL) (5 MG/ML) 0.5% nebulizer solution Take 0.5 mLs by nebulization 4 times daily as needed for Wheezing 7/12/22   Ronaldo Figueredo MD   amLODIPine (NORVASC) 10 MG tablet Take 1 tablet by mouth daily 7/12/22   Ronaldo Figueredo MD   aspirin (HM ASPIRIN EC LOW DOSE) 81 MG EC tablet TAKE 1 TABLET BY MOUTH DAILY 7/12/22   Te South MD   ipratropium-albuterol (DUONEB) 0.5-2.5 (3) MG/3ML SOLN nebulizer solution Inhale 3 mLs into the lungs every 4 hours (while awake) 7/12/22   Te South MD   furosemide (LASIX) 20 MG tablet Take 1 tablet by mouth daily 7/12/22   Epi Ventura MD   vitamin D (CHOLECALCIFEROL) 50 MCG (2000 UT) TABS tablet Take 1 tablet by mouth daily 7/12/22   Epi Ventura MD   fluticasone-salmeterol (ADVIAR) 100-50 MCG/ACT AEPB diskus inhaler Inhale 1 puff into the lungs every 12 hours 7/12/22   Epi Ventura MD   tiotropium (Tawny Baldwin) 18 MCG inhalation capsule Inhale 1 capsule into the lungs daily 7/12/22   Te Camargo MD       ALLERGIES:     Patient has no known allergies. OBJECTIVE:       Vitals:    03/14/23 1945 03/14/23 2128 03/15/23 0025 03/15/23 0410   BP: (!) 120/55 (!) 126/56 (!) 129/59 (!) 127/52   Pulse: 58 54 62 (!) 46   Resp:  22 16    Temp:  98.2 °F (36.8 °C) 98.4 °F (36.9 °C) 98.1 °F (36.7 °C)   TempSrc:  Oral Oral Oral   SpO2: 99% 100% 100% 100%   Weight:       Height:             Intake/Output Summary (Last 24 hours) at 3/15/2023 0805  Last data filed at 3/15/2023 0410  Gross per 24 hour   Intake 350 ml   Output 350 ml   Net 0 ml       Physical Exam  Constitutional:       Appearance: Normal appearance. HENT:      Head: Normocephalic and atraumatic. Nose: Nose normal.      Mouth/Throat:      Mouth: Mucous membranes are moist.   Eyes:      Extraocular Movements: Extraocular movements intact. Pupils: Pupils are equal, round, and reactive to light. Cardiovascular:      Rate and Rhythm: Regular rhythm. Bradycardia present. Pulses: Normal pulses. Heart sounds: Normal heart sounds. Pulmonary:      Effort: Pulmonary effort is normal.      Breath sounds: Normal breath sounds. Comments: On 4 L of oxygen via nasal cannula  Abdominal:      General: Bowel sounds are normal.      Palpations: Abdomen is soft. Musculoskeletal:         General: Normal range of motion. Cervical back: Neck supple. Right lower leg: Edema present. Left lower leg: Edema present. Skin:     General: Skin is warm and dry.       Capillary Refill: Capillary refill takes less than 2 seconds. Neurological:      General: No focal deficit present. Mental Status: He is alert and oriented to person, place, and time. Psychiatric:         Mood and Affect: Mood normal.         Behavior: Behavior normal.         DIAGNOSTICS:     Laboratory Testing:    Recent Results (from the past 24 hour(s))   EKG 12 Lead    Collection Time: 03/14/23 11:36 AM   Result Value Ref Range    Ventricular Rate 58 BPM    Atrial Rate 58 BPM    P-R Interval 136 ms    QRS Duration 92 ms    Q-T Interval 428 ms    QTc Calculation (Bazett) 420 ms    R Axis 9 degrees    T Axis 6 degrees   COVID-19, Rapid    Collection Time: 03/14/23 12:04 PM    Specimen: Nasopharyngeal Swab   Result Value Ref Range    Specimen Description . NASOPHARYNGEAL SWAB     SARS-CoV-2, Rapid Not Detected Not Detected   CBC with Auto Differential    Collection Time: 03/14/23 12:05 PM   Result Value Ref Range    WBC 6.2 3.5 - 11.3 k/uL    RBC 2.29 (L) 4.21 - 5.77 m/uL    Hemoglobin 6.5 (LL) 13.0 - 17.0 g/dL    Hematocrit 21.4 (L) 40.7 - 50.3 %    MCV 93.4 82.6 - 102.9 fL    MCH 28.4 25.2 - 33.5 pg    MCHC 30.4 28.4 - 34.8 g/dL    RDW 13.4 11.8 - 14.4 %    Platelets 992 058 - 902 k/uL    MPV 10.1 8.1 - 13.5 fL    NRBC Automated 0.0 0.0 per 100 WBC    Seg Neutrophils 82 (H) 36 - 65 %    Lymphocytes 12 (L) 24 - 43 %    Monocytes 5 3 - 12 %    Eosinophils % 1 1 - 4 %    Basophils 0 0 - 2 %    Immature Granulocytes 0 0 %    Segs Absolute 5.04 1.50 - 8.10 k/uL    Absolute Lymph # 0.75 (L) 1.10 - 3.70 k/uL    Absolute Mono # 0.32 0.10 - 1.20 k/uL    Absolute Eos # 0.03 0.00 - 0.44 k/uL    Basophils Absolute <0.03 0.00 - 0.20 k/uL    Absolute Immature Granulocyte <0.03 0.00 - 0.30 k/uL   Basic Metabolic Panel w/ Reflex to MG    Collection Time: 03/14/23 12:05 PM   Result Value Ref Range    Glucose 121 (H) 70 - 99 mg/dL    BUN 13 8 - 23 mg/dL    Creatinine 1.06 0.70 - 1.20 mg/dL    Est, Glom Filt Rate >60 >60 mL/min/1.73m2    Calcium 8.1 (L) 8.6 - 10.4 mg/dL    Sodium 138 135 - 144 mmol/L    Potassium 4.2 3.7 - 5.3 mmol/L    Chloride 99 98 - 107 mmol/L    CO2 27 20 - 31 mmol/L    Anion Gap 12 9 - 17 mmol/L   Brain Natriuretic Peptide    Collection Time: 03/14/23 12:05 PM   Result Value Ref Range    Pro- (H) <300 pg/mL   Troponin    Collection Time: 03/14/23 12:05 PM   Result Value Ref Range    Troponin, High Sensitivity 78 (HH) 0 - 22 ng/L   Troponin    Collection Time: 03/14/23  1:12 PM   Result Value Ref Range    Troponin, High Sensitivity 74 (HH) 0 - 22 ng/L   TYPE AND SCREEN    Collection Time: 03/14/23  1:45 PM   Result Value Ref Range    Expiration Date 03/17/2023,2351     Arm Band Number [id number]     ABO/Rh O POSITIVE     Antibody Screen NEGATIVE     Unit Number [id number]     Product Code Leukocyte Reduced Red Cell     Unit Divison 00     Dispense Status TRANSFUSED     Unit Issue Date/Time [id number]     Product Code Blood Bank [id number]     Blood Bank Unit Type and Rh O POS     Blood Bank ISBT Product Blood Type 5100     Blood Bank Blood Product Expiration Date 218731831404     Transfusion Status OK TO TRANSFUSE     Crossmatch Result COMPATIBLE    Strep Pneumoniae Antigen    Collection Time: 03/14/23  9:00 PM    Specimen: Urine voided   Result Value Ref Range    Source . URINE     Strep pneumo Ag NEGATIVE    LEGIONELLA ANTIGEN, URINE    Collection Time: 03/14/23  9:00 PM    Specimen: Urine   Result Value Ref Range    Legionella Pneumophilia Ag, Urine NEGATIVE NEGATIVE   POC Glucose Fingerstick    Collection Time: 03/14/23  9:28 PM   Result Value Ref Range    POC Glucose 263 (H) 75 - 110 mg/dL   Vitamin B12 & Folate    Collection Time: 03/14/23 10:09 PM   Result Value Ref Range    Vitamin B-12 768 232 - 1245 pg/mL    Folate PENDING ng/mL   Lactate Dehydrogenase    Collection Time: 03/14/23 10:09 PM   Result Value Ref Range     135 - 225 U/L   Procalcitonin    Collection Time: 03/14/23 10:09 PM   Result Value Ref Range    Procalcitonin 0.15 (H) <0.09 ng/mL   Reticulocytes    Collection Time: 03/14/23 10:09 PM   Result Value Ref Range    Retic % 0.8 0.5 - 1.9 %    Absolute Retic # 0.020 (L) 0.030 - 0.080 M/uL    Immature Retic Fract 14.500 2.7 - 18.3 %    Retic Hemoglobin 29.0 28.2 - 35.7 pg   CBC with Auto Differential    Collection Time: 03/14/23 10:09 PM   Result Value Ref Range    WBC 4.9 3.5 - 11.3 k/uL    RBC 2.85 (L) 4.21 - 5.77 m/uL    Hemoglobin 8.2 (L) 13.0 - 17.0 g/dL    Hematocrit 26.9 (L) 40.7 - 50.3 %    MCV 94.4 82.6 - 102.9 fL    MCH 28.8 25.2 - 33.5 pg    MCHC 30.5 28.4 - 34.8 g/dL    RDW 13.8 11.8 - 14.4 %    Platelets 216 314 - 759 k/uL    MPV 10.3 8.1 - 13.5 fL    NRBC Automated 0.0 0.0 per 100 WBC    Immature Granulocytes 0 0 %    Seg Neutrophils 93 (H) 36 - 66 %    Lymphocytes 7 (L) 24 - 44 %    Monocytes 0 (L) 1 - 7 %    Eosinophils % 0 (L) 1 - 4 %    Basophils 0 0 - 2 %    Absolute Immature Granulocyte 0.00 0.00 - 0.30 k/uL    Segs Absolute 4.56 1.8 - 7.7 k/uL    Absolute Lymph # 0.34 (L) 1.0 - 4.8 k/uL    Absolute Mono # 0.00 (L) 0.1 - 0.8 k/uL    Absolute Eos # 0.00 0.0 - 0.4 k/uL    Basophils Absolute 0.00 0.0 - 0.2 k/uL    Morphology 1+ ELLIPTOCYTES    EKG 12 Lead    Collection Time: 03/14/23 11:28 PM   Result Value Ref Range    Ventricular Rate 51 BPM    Atrial Rate 51 BPM    P-R Interval 134 ms    QRS Duration 94 ms    Q-T Interval 454 ms    QTc Calculation (Bazett) 418 ms    R Axis 4 degrees    T Axis 20 degrees   Hepatic Function Panel    Collection Time: 03/15/23  5:05 AM   Result Value Ref Range    Albumin 3.1 (L) 3.5 - 5.2 g/dL    Alkaline Phosphatase 159 (H) 40 - 129 U/L    ALT 10 5 - 41 U/L    AST 16 <40 U/L    Total Bilirubin 0.6 0.3 - 1.2 mg/dL    Bilirubin, Direct 0.2 <0.3 mg/dL    Bilirubin, Indirect 0.4 0.0 - 1.0 mg/dL    Total Protein 5.9 (L) 6.4 - 8.3 g/dL    Albumin/Globulin Ratio 1.1 1.0 - 2.5   Basic Metabolic Panel w/ Reflex to MG    Collection Time: 03/15/23  5:05 AM   Result Value Ref Range Glucose 200 (H) 70 - 99 mg/dL    BUN 21 8 - 23 mg/dL    Creatinine 1.08 0.70 - 1.20 mg/dL    Est, Glom Filt Rate >60 >60 mL/min/1.73m2    Calcium 8.2 (L) 8.6 - 10.4 mg/dL    Sodium 136 135 - 144 mmol/L    Potassium 4.6 3.7 - 5.3 mmol/L    Chloride 100 98 - 107 mmol/L    CO2 25 20 - 31 mmol/L    Anion Gap 11 9 - 17 mmol/L   CBC with Auto Differential    Collection Time: 03/15/23  5:05 AM   Result Value Ref Range    WBC 4.3 3.5 - 11.3 k/uL    RBC 2.90 (L) 4.21 - 5.77 m/uL    Hemoglobin 8.4 (L) 13.0 - 17.0 g/dL    Hematocrit 27.0 (L) 40.7 - 50.3 %    MCV 93.1 82.6 - 102.9 fL    MCH 29.0 25.2 - 33.5 pg    MCHC 31.1 28.4 - 34.8 g/dL    RDW 13.5 11.8 - 14.4 %    Platelets 185 508 - 712 k/uL    MPV 10.1 8.1 - 13.5 fL    NRBC Automated 0.0 0.0 per 100 WBC    Immature Granulocytes 0 0 %    Seg Neutrophils 88 (H) 36 - 66 %    Lymphocytes 10 (L) 24 - 44 %    Monocytes 2 1 - 7 %    Eosinophils % 0 (L) 1 - 4 %    Basophils 0 0 - 2 %    Absolute Immature Granulocyte 0.00 0.00 - 0.30 k/uL    Segs Absolute 3.78 1.8 - 7.7 k/uL    Absolute Lymph # 0.43 (L) 1.0 - 4.8 k/uL    Absolute Mono # 0.09 (L) 0.1 - 0.8 k/uL    Absolute Eos # 0.00 0.0 - 0.4 k/uL    Basophils Absolute 0.00 0.0 - 0.2 k/uL    Morphology 1+ ELLIPTOCYTES          Imaging/Diagonstics:      Current Facility-Administered Medications   Medication Dose Route Frequency Provider Last Rate Last Admin    cefTRIAXone (ROCEPHIN) 1,000 mg in sterile water 10 mL IV syringe  1,000 mg IntraVENous Daily Franca Parra MD        0.9 % sodium chloride infusion   IntraVENous PRN Antoinette Santos MD        sodium chloride flush 0.9 % injection 5-40 mL  5-40 mL IntraVENous 2 times per day Franca Parra MD   5 mL at 03/14/23 2143    sodium chloride flush 0.9 % injection 5-40 mL  5-40 mL IntraVENous PRN Franca Parra MD        0.9 % sodium chloride infusion   IntraVENous PRN Franca Parra MD        ondansetron (ZOFRAN-ODT) disintegrating tablet 4 mg  4 mg Oral Q8H PRN Marti Fort MD Alexandre        Or    ondansetron (ZOFRAN) injection 4 mg  4 mg IntraVENous Q6H PRN Lori Clay MD        polyethylene glycol (GLYCOLAX) packet 17 g  17 g Oral Daily PRN Lori Clay MD        acetaminophen (TYLENOL) tablet 650 mg  650 mg Oral Q6H PRN Lori Clay MD        Or    acetaminophen (TYLENOL) suppository 650 mg  650 mg Rectal Q6H PRN Lori Clay MD        potassium chloride (KLOR-CON M) extended release tablet 40 mEq  40 mEq Oral PRN Lori Clay MD        Or    potassium bicarb-citric acid (EFFER-K) effervescent tablet 40 mEq  40 mEq Oral PRN Lori Clay MD        Or    potassium chloride 10 mEq/100 mL IVPB (Peripheral Line)  10 mEq IntraVENous PRN Lori Clay MD        magnesium sulfate 2000 mg in 50 mL IVPB premix  2,000 mg IntraVENous PRN Lori Clay MD        azithromycin (ZITHROMAX) 500 mg in sodium chloride 0.9% 250 mL IVPB  500 mg IntraVENous Daily Lori Clay MD        ipratropium-albuterol (DUONEB) nebulizer solution 1 ampule  1 ampule Inhalation Q4H Per Rodriguez MD        amLODIPine (NORVASC) tablet 10 mg  10 mg Oral Daily Lori Clay MD        atorvastatin (LIPITOR) tablet 20 mg  20 mg Oral Daily Lori Clay MD        [Held by provider] carvedilol (COREG) tablet 12.5 mg  12.5 mg Oral BID WC Lori Clay MD        furosemide (LASIX) tablet 20 mg  20 mg Oral Daily Lori Clay MD        [Held by provider] hydrALAZINE (APRESOLINE) tablet 50 mg  50 mg Oral TID Lori Clay MD        lisinopril (PRINIVIL;ZESTRIL) tablet 20 mg  20 mg Oral Daily Lori Clay MD        tiotropium (SPIRIVA RESPIMAT) 2.5 MCG/ACT inhaler 2 puff  2 puff Inhalation Daily Lori Clay MD        insulin lispro (HUMALOG) injection vial 0-4 Units  0-4 Units SubCUTAneous TID WC Lori Clay MD        insulin lispro (HUMALOG) injection vial 0-4 Units  0-4 Units SubCUTAneous Nightly Lori Clay MD        glucose chewable tablet 16 g  4 tablet Oral PRN Lorena Mercado MD Alexandre        dextrose bolus 10% 125 mL  125 mL IntraVENous PRN Fransisco Hunt MD        Or    dextrose bolus 10% 250 mL  250 mL IntraVENous PRN Fransisco Hunt MD        glucagon (rDNA) injection 1 mg  1 mg SubCUTAneous PRN Fransisco Hunt MD        dextrose 10 % infusion   IntraVENous Continuous PRN Fransisco Hunt MD           ASSESSMENT:     Principal Problem:    Community acquired pneumonia  Active Problems:    Normocytic anemia    Hypertension goal BP (blood pressure) < 140/80    Hyperlipidemia with target LDL less than 70    Controlled type 2 diabetes mellitus without complication, without long-term current use of insulin (HCC)    Chronic obstructive pulmonary disease (HCC)    Combined systolic and diastolic congestive heart failure ([facility]  Resolved Problems:    Community acquired pneumonia, bilateral      PLAN:     Community-acquired pneumonia  - Patient presents with shortness of breath increased sputum production  - Received 1 dose of Zosyn in the emergency department  - Patient afebrile, no white blood cell count  - Chest x-ray demonstrated increased left pleural effusion either compressive atelectasis or pneumonia, lateral chest x-ray demonstrated pleural fluid posteriorly  - Start patient on IV Rocephin and azithromycin  - Procal- 0.15  - Strep pneumo-negative, mycoplasma and Legionella- negative ordered  - Respiratory culture   - Viral panel  - We will repeat chest x-ray this morning    Asymptomatic bradycardia  - Overnight patient noted to have heart rate of 26  - Denied any symptoms at the time  - EKG showed sinus bradycardia  - Coreg was held on admission  - Cardiology consulted for bradycardia, appreciate recommendations  - We will check a TSH    Severe normocytic anemia  - Patient on presentation had a hemoglobin of 6.5  - Appears his baseline is between 7 and 8  - 1 unit of packed red blood cells ordered and transfused in the emergency department  - Fecal occult blood ordered  - No signs of acute bleeding  - Iron, TIBC, ferritin, LDH-within normal limits,  [id number] normal limits and folate  - Reticulocyte count within normal limits, but not adequate compensation due to the anemia status, points to bone marrow suppression  - Peripheral smear ordered  - Will monitor hemoglobin levels  - Summer 2022,  Patient's baseline hemoglobin was between 10 and 12, did have an acute drop to between 7 and 8 and October 2022  - Per chart and per patient appears patient had about a 15-20 pound weight loss since February of last year  - Hemoglobin 8.4 this morning after receiving 1 unit of blood, still awaiting results of fecal occult blood  - Awaiting CEA    Chronic obstructive pulmonary disease  - On 4 L of oxygen at home  - Currently at baseline, saturating well  - Continue home inhalers and DuoNeb and Spiriva  - Did receive IV steroids in the ED  - No wheezing on exam, no acute respiratory distress    Combined systolic and diastolic CHF  - Last echo demonstrated an EF of 40 to 45%, in January 2023  - Continue home dose of lisinopril  - Continue home dose 20 mg of Lasix  - Currently holding his Coreg 12.5 2 times daily due to heart rate being in the 50s in the emergency department    Essential hypertension  - Continue home dose of lisinopril 20 mg  - Continue Norvasc 10 mg daily  - Home dose of hydralazine 50 mg 3 times daily, currently on hold  - We will monitor blood pressure can on hold hydralazine if becomes hypertensive    Type 2 diabetes mellitus  - Takes metformin at home, currently on hold  - Low-dose sliding scale  - POCT glucose  - Hypoglycemic protocol    Hyperlipidemia  - Continue home dose of Lipitor 20 mg daily  - Patient has been on chronic aspirin as well, currently on hold due to possible GI bleed    Diet: Cardiac, carbohydrate controlled diet  DVT prophylaxis: reason for no prophylaxis: Pneumatic compression device  CODE STATUS: Full code  Plan will be discussed with the attending,  Crispin Henderson MD  Family Medicine Resident  3/15/2023 8:05 AM            Please note that this chart was generated using voice recognition Dragon dictation software.   Although every effort was made to ensure the accuracy of this automated transcription, some errors in transcription may have occurred

## 2023-03-15 NOTE — PLAN OF CARE
Problem: Discharge Planning  Goal: Discharge to home or other facility with appropriate resources  Outcome: Progressing     Problem: Safety - Adult  Goal: Free from fall injury  Outcome: Progressing     Problem: Cardiovascular - Adult  Goal: Maintains optimal cardiac output and hemodynamic stability  Outcome: Progressing  Goal: Absence of cardiac dysrhythmias or at baseline  Outcome: Progressing     Problem: Hematologic - Adult  Goal: Maintains hematologic stability  Outcome: Progressing     Problem: ABCDS Injury Assessment  Goal: Absence of physical injury  Outcome: Progressing

## 2023-03-15 NOTE — PROGRESS NOTES
Admission from ER via stretcher assisted by staff. A 78 y/o male pt, came in with complaints of SOB. He's conscious and coherent, ambulatory with assistance, pale looking in appearance. Placed on bed comfortably. Attached to cardiac monitor. Sinus rubi at 52 bpm, Has ongoing blood transfusion of PRBC and O2 support via nasal cannula at 4lpm (on Home O2 at same level), O2 saturation @ 96%-97%, noted dyspnea on exertion, orthopneic.routine admission care rendered. Care team informed about bradycardia, denies any chest discomfort and dizziness.  Plan of care continues

## 2023-03-15 NOTE — PLAN OF CARE
Problem: Discharge Planning  Goal: Discharge to home or other facility with appropriate resources  Outcome: Progressing     Problem: Safety - Adult  Goal: Free from fall injury  Outcome: Progressing     Problem: Cardiovascular - Adult  Goal: Maintains optimal cardiac output and hemodynamic stability  Outcome: Progressing  Goal: Absence of cardiac dysrhythmias or at baseline  Outcome: Progressing     Problem: Hematologic - Adult  Goal: Maintains hematologic stability  Outcome: Progressing     Problem: ABCDS Injury Assessment  Goal: Absence of physical injury  Outcome: Progressing     Problem: Chronic Conditions and Co-morbidities  Goal: Patient's chronic conditions and co-morbidity symptoms are monitored and maintained or improved  Outcome: Progressing     Problem: Respiratory - Adult  Goal: Achieves optimal ventilation and oxygenation  3/15/2023 1847 by David Salgado RN  Outcome: Progressing  3/15/2023 1633 by Ernesto Sanchez RCP  Outcome: Progressing  Flowsheets (Taken 3/15/2023 1633)  Achieves optimal ventilation and oxygenation:   Assess for changes in respiratory status   Assess for changes in mentation and behavior   Oxygen supplementation based on oxygen saturation or arterial blood gases   Encourage broncho-pulmonary hygiene including cough, deep breathe, incentive spirometry   Assess and instruct to report shortness of breath or any respiratory difficulty   Respiratory therapy support as indicated

## 2023-03-15 NOTE — PLAN OF CARE
Problem: Respiratory - Adult  Goal: Achieves optimal ventilation and oxygenation  Outcome: Progressing  Flowsheets (Taken 3/15/2023 8173)  Achieves optimal ventilation and oxygenation:   Assess for changes in respiratory status   Assess for changes in mentation and behavior   Oxygen supplementation based on oxygen saturation or arterial blood gases   Encourage broncho-pulmonary hygiene including cough, deep breathe, incentive spirometry   Assess and instruct to report shortness of breath or any respiratory difficulty   Respiratory therapy support as indicated

## 2023-03-15 NOTE — PROGRESS NOTES
Notified of patient's HR dropping to 28. Patient is completely asymptomatic. Denies chest pain, shortness of breath, is currently on 4L NC which is his baseline. Currently HR at 50 , has been since admission, home coreg was held. Ordered EKG stat , showed sinus bradycardia. Have consulted cardio and perfect served fellow, appreciate their recommendation.      Ciera Reyes DO  Family Medicine Resident PGY1

## 2023-03-15 NOTE — PROGRESS NOTES
Physical Therapy  Facility/Department: [facility]  Physical Therapy Initial Assessment    Name: Thomas Ribeiro  : 1955  MRN: [id number]  Date of Service: 3/15/2023  Chief Complaint   Patient presents with    Shortness of Breath      Discharge Recommendations:    Further therapy recommended at discharge. PT Equipment Recommendations  Equipment Needed: No  Other: Pt report owning cane and RW      Patient Diagnosis(es): The primary encounter diagnosis was Pneumonia due to infectious organism, unspecified laterality, unspecified part of lung. A diagnosis of COPD exacerbation ([facility] was also pertinent to this visit. Past Medical History:  has a past medical history of CHF (congestive heart failure) ([facility] COPD (chronic obstructive pulmonary disease) ([facility] Diabetes mellitus ([facility] Erectile dysfunction due to arterial insufficiency, Hypertension, Microalbuminuria due to type 2 diabetes mellitus ([facility] and Overweight (BMI 25.0-29.9). Past Surgical History:  has a past surgical history that includes Appendectomy and Total ankle arthroplasty. Assessment   Body Structures, Functions, Activity Limitations Requiring Skilled Therapeutic Intervention: Decreased functional mobility ; Decreased endurance;Decreased balance;Decreased strength  Assessment: Pt ambulated 300ft with RW, CGA. Further skilled therapy is reccommended to address remaining strength, balance and endurance deficits for safe DC home.   Therapy Prognosis: Fair  Decision Making: Low Complexity  Requires PT Follow-Up: Yes  Activity Tolerance  Activity Tolerance: Patient tolerated evaluation without incident     Plan   Physcial Therapy Plan  General Plan:  (5 times a week)  Current Treatment Recommendations: Strengthening, Balance training, Gait training, Stair training, Functional mobility training  Safety Devices  Type of Devices: Left in chair, Chair alarm in place, Gait belt, Nurse notified, Call light within reach  Restraints  Restraints Initially in Place: No     Restrictions  Position Activity Restriction  Other position/activity restrictions: Up with assistance     Subjective   General  Chart Reviewed: Yes  Patient assessed for rehabilitation services?: Yes  Response To Previous Treatment: Not applicable  Family / Caregiver Present: No  Follows Commands: Within Functional Limits  General Comment  Comments: Pt denies pain at time of eval  Subjective  Subjective: RN and pt agreeable to PT evaluation. Pt was supine in bed upon arrival, pt on 4L NC upon writers enterance. Social/Functional History  Social/Functional History  Lives With: Son  Type of Home: Apartment  Home Layout: One level (2nd story)  Home Access: Stairs to enter with rails  Entrance Stairs - Number of Steps: 3 external stair+ 5 stairs+ 8 stair  Bathroom Shower/Tub: Tub/Shower unit  Bathroom Toilet: Standard  Bathroom Equipment:  (\"Folding chair\" that patient utilizes in shower)  Home Equipment: Cane, Oxygen, Walker, rolling (4L O2 at all times)  Receives Help From: Family  ADL Assistance: Independent  Homemaking Assistance: Needs assistance  Homemaking Responsibilities: Yes (Light housework/ cooking; son performs a [de-identified] of all household tasks)  Ambulation Assistance: Independent (pt independently ambulates with SPC at baseline; pt reports requiring significant assist from son for stair negotiation at baseline)  Transfer Assistance: Independent  Active : No  Mode of Transportation: Cab  Occupation: Retired  Type of Occupation: [address]   Additional Comments: Pt reports son is able to provide prn assist upon discharge.   Vision/Hearing  Vision  Vision: Impaired  Vision Exceptions: Wears glasses for reading  Hearing  Hearing: Within functional limits    Cognition   Orientation  Overall Orientation Status: Within Functional Limits  Cognition  Overall Cognitive Status: WNL     Objective      Observation/Palpation  Posture: [place] Assessment  Sensation: Intact     AROM RLE (degrees)  RLE AROM: WFL  AROM LLE (degrees)  LLE AROM : WFL  AROM RUE (degrees)  RUE AROM : WFL  AROM LUE (degrees)  LUE AROM : WFL  Strength RLE  Strength RLE: WFL  R Hip Flexion: 4-/5  R Knee Flexion: 4/5  R Knee Extension: 4/5  R Ankle Dorsiflexion: 4/5  R Ankle Plantar flexion: 4/5  Strength LLE  Strength LLE: WFL  L Hip Flexion: 4-/5  L Knee Flexion: 4/5  L Knee Extension: 4/5  L Ankle Dorsiflexion: 4/5  L Ankle Plantar Flexion: 4/5  Strength RUE  Strength RUE: WFL  Strength LUE  Strength LUE: WFL           Bed mobility  Rolling to Right: Independent  Supine to Sit: Supervision  Sit to Supine: Supervision  Bed Mobility Comments: HOB elevated 45 deg  Transfers  Sit to Stand: Supervision  Stand to Sit: Supervision  Bed to Chair: Supervision  Ambulation  Surface: Level tile  Device: Rolling Walker  Other Apparatus: O2 (4L)  Assistance: Contact guard assistance  Gait Deviations: Decreased step height  Distance: 300 ft  Comments: Pt demonstrated forward flexed posture, SpO2 99% following ambulation     Balance  Posture: Fair  Sitting - Static: Good  Sitting - Dynamic: Good  Standing - Static: Fair;+  Standing - Dynamic: Fair  Comments: Standing balance assessed with RW         AM-PAC Score  AM-PAC Inpatient Mobility Raw Score : 20 (03/15/23 1015)  AM-PAC Inpatient T-Scale Score : 47.67 (03/15/23 1015)  Mobility Inpatient CMS 0-100% Score: 35.83 (03/15/23 1015)  Mobility Inpatient CMS G-Code Modifier : CJ (03/15/23 1015)        Goals  Short Term Goals  Time Frame for Short Term Goals: 14  Short Term Goal 1: Pt to demonstrate ascending/descending 5 stairs with cane, mod A  Short Term Goal 2: Pt to demonstrate improved dynamic standing balance to good for safety too return home  Short Term Goal 3: Pt to demonstrate 4+/5 LE strength globally  Short Term Goal 4: Pt to ambulate 240ft with [facility]  independently       Education  Patient Education  Education Given To: Patient  Education Provided: Role of Therapy;Plan of Care  Education Method: Verbal  Barriers to Learning: None  Education Outcome: Verbalized understanding      Therapy Time   Individual Concurrent Group Co-treatment   Time In 0924         Time Out 0950         Minutes 26         Timed Code Treatment Minutes: 23 Minutes       Selena Christine   Evaluation/treatment performed by Student PT under the supervision of co-signing PT who agrees with all evaluation/treatment and documentation.

## 2023-03-15 NOTE — PROGRESS NOTES
CLINICAL PHARMACY NOTE: MEDS TO BEDS    Total # of Prescriptions Filled: 1   The following medications were delivered to the patient:  Carvedilol    Additional Documentation:  Delivered by Marvie Klinefelter.

## 2023-03-16 VITALS
SYSTOLIC BLOOD PRESSURE: 132 MMHG | TEMPERATURE: 98 F | RESPIRATION RATE: 23 BRPM | WEIGHT: 146 LBS | DIASTOLIC BLOOD PRESSURE: 60 MMHG | OXYGEN SATURATION: 100 % | HEIGHT: 66 IN | HEART RATE: 59 BPM | BODY MASS INDEX: 23.46 KG/M2

## 2023-03-16 PROBLEM — J18.9 COMMUNITY ACQUIRED PNEUMONIA: Status: RESOLVED | Noted: 2023-03-14 | Resolved: 2023-03-16

## 2023-03-16 LAB
ABSOLUTE EOS #: 0 K/UL (ref 0–0.4)
ABSOLUTE EOS #: <0.03 K/UL (ref 0–0.44)
ABSOLUTE IMMATURE GRANULOCYTE: 0.06 K/UL (ref 0–0.3)
ABSOLUTE IMMATURE GRANULOCYTE: 0.09 K/UL (ref 0–0.3)
ABSOLUTE LYMPH #: 0.69 K/UL (ref 1–4.8)
ABSOLUTE LYMPH #: 0.72 K/UL (ref 1.1–3.7)
ABSOLUTE MONO #: 0.38 K/UL (ref 0.1–1.2)
ABSOLUTE MONO #: 0.77 K/UL (ref 0.1–0.8)
ABSOLUTE RETIC #: 0.03 M/UL (ref 0.03–0.08)
ANION GAP SERPL CALCULATED.3IONS-SCNC: 7 MMOL/L (ref 9–17)
BASOPHILS # BLD: 0 % (ref 0–2)
BASOPHILS # BLD: 0 % (ref 0–2)
BASOPHILS ABSOLUTE: 0 K/UL (ref 0–0.2)
BASOPHILS ABSOLUTE: <0.03 K/UL (ref 0–0.2)
BUN SERPL-MCNC: 22 MG/DL (ref 8–23)
CALCIUM SERPL-MCNC: 7.9 MG/DL (ref 8.6–10.4)
CHLORIDE SERPL-SCNC: 100 MMOL/L (ref 98–107)
CO2 SERPL-SCNC: 28 MMOL/L (ref 20–31)
CREAT SERPL-MCNC: 1.07 MG/DL (ref 0.7–1.2)
EOSINOPHILS RELATIVE PERCENT: 0 % (ref 1–4)
EOSINOPHILS RELATIVE PERCENT: 0 % (ref 1–4)
FERRITIN SERPL-MCNC: 219 NG/ML (ref 30–400)
GFR SERPL CREATININE-BSD FRML MDRD: >60 ML/MIN/1.73M2
GLUCOSE BLD-MCNC: 133 MG/DL (ref 75–110)
GLUCOSE BLD-MCNC: 150 MG/DL (ref 75–110)
GLUCOSE SERPL-MCNC: 187 MG/DL (ref 70–99)
HAPTOGLOB SERPL-MCNC: 246 MG/DL (ref 30–200)
HCT VFR BLD AUTO: 24.2 % (ref 40.7–50.3)
HCT VFR BLD AUTO: 28.3 % (ref 40.7–50.3)
HGB BLD-MCNC: 7.5 G/DL (ref 13–17)
HGB BLD-MCNC: 8.8 G/DL (ref 13–17)
IMMATURE GRANULOCYTES: 1 %
IMMATURE GRANULOCYTES: 1 %
IMMATURE RETIC FRACT: 14 % (ref 2.7–18.3)
IRON SATURATION: 56 % (ref 20–55)
IRON SERPL-MCNC: 92 UG/DL (ref 59–158)
LYMPHOCYTES # BLD: 8 % (ref 24–43)
LYMPHOCYTES # BLD: 8 % (ref 24–44)
MCH RBC QN AUTO: 28.7 PG (ref 25.2–33.5)
MCH RBC QN AUTO: 28.8 PG (ref 25.2–33.5)
MCHC RBC AUTO-ENTMCNC: 31 G/DL (ref 28.4–34.8)
MCHC RBC AUTO-ENTMCNC: 31.1 G/DL (ref 28.4–34.8)
MCV RBC AUTO: 92.2 FL (ref 82.6–102.9)
MCV RBC AUTO: 93.1 FL (ref 82.6–102.9)
MONOCYTES # BLD: 4 % (ref 3–12)
MONOCYTES # BLD: 9 % (ref 1–7)
MORPHOLOGY: NORMAL
NRBC AUTOMATED: 0 PER 100 WBC
NRBC AUTOMATED: 0 PER 100 WBC
PDW BLD-RTO: 13.5 % (ref 11.8–14.4)
PDW BLD-RTO: 13.9 % (ref 11.8–14.4)
PLATELET # BLD AUTO: 306 K/UL (ref 138–453)
PLATELET # BLD AUTO: 327 K/UL (ref 138–453)
PMV BLD AUTO: 10.1 FL (ref 8.1–13.5)
PMV BLD AUTO: 9.6 FL (ref 8.1–13.5)
POTASSIUM SERPL-SCNC: 4.9 MMOL/L (ref 3.7–5.3)
RBC # BLD: 2.6 M/UL (ref 4.21–5.77)
RBC # BLD: 3.07 M/UL (ref 4.21–5.77)
RETIC HEMOGLOBIN: 30.4 PG (ref 28.2–35.7)
RETICS/RBC NFR AUTO: 1 % (ref 0.5–1.9)
SEG NEUTROPHILS: 82 % (ref 36–66)
SEG NEUTROPHILS: 87 % (ref 36–65)
SEGMENTED NEUTROPHILS ABSOLUTE COUNT: 7.05 K/UL (ref 1.8–7.7)
SEGMENTED NEUTROPHILS ABSOLUTE COUNT: 7.49 K/UL (ref 1.5–8.1)
SODIUM SERPL-SCNC: 135 MMOL/L (ref 135–144)
TIBC SERPL-MCNC: 165 UG/DL (ref 250–450)
TSH SERPL-ACNC: 0.72 UIU/ML (ref 0.3–5)
UNSATURATED IRON BINDING CAPACITY: 73 UG/DL (ref 112–347)
WBC # BLD AUTO: 8.6 K/UL (ref 3.5–11.3)
WBC # BLD AUTO: 8.7 K/UL (ref 3.5–11.3)

## 2023-03-16 PROCEDURE — 82746 ASSAY OF FOLIC ACID SERUM: CPT

## 2023-03-16 PROCEDURE — 94640 AIRWAY INHALATION TREATMENT: CPT

## 2023-03-16 PROCEDURE — 85025 COMPLETE CBC W/AUTO DIFF WBC: CPT

## 2023-03-16 PROCEDURE — 83010 ASSAY OF HAPTOGLOBIN QUANT: CPT

## 2023-03-16 PROCEDURE — 83521 IG LIGHT CHAINS FREE EACH: CPT

## 2023-03-16 PROCEDURE — 36415 COLL VENOUS BLD VENIPUNCTURE: CPT

## 2023-03-16 PROCEDURE — 84155 ASSAY OF PROTEIN SERUM: CPT

## 2023-03-16 PROCEDURE — 6370000000 HC RX 637 (ALT 250 FOR IP)

## 2023-03-16 PROCEDURE — 86334 IMMUNOFIX E-PHORESIS SERUM: CPT

## 2023-03-16 PROCEDURE — 94761 N-INVAS EAR/PLS OXIMETRY MLT: CPT

## 2023-03-16 PROCEDURE — 6370000000 HC RX 637 (ALT 250 FOR IP): Performed by: INTERNAL MEDICINE

## 2023-03-16 PROCEDURE — 84165 PROTEIN E-PHORESIS SERUM: CPT

## 2023-03-16 PROCEDURE — 80048 BASIC METABOLIC PNL TOTAL CA: CPT

## 2023-03-16 PROCEDURE — 2700000000 HC OXYGEN THERAPY PER DAY

## 2023-03-16 PROCEDURE — 82947 ASSAY GLUCOSE BLOOD QUANT: CPT

## 2023-03-16 PROCEDURE — 85045 AUTOMATED RETICULOCYTE COUNT: CPT

## 2023-03-16 PROCEDURE — 99233 SBSQ HOSP IP/OBS HIGH 50: CPT | Performed by: STUDENT IN AN ORGANIZED HEALTH CARE EDUCATION/TRAINING PROGRAM

## 2023-03-16 RX ORDER — FOLIC ACID 1 MG/1
1 TABLET ORAL DAILY
Qty: 30 TABLET | Refills: 3 | Status: SHIPPED | OUTPATIENT
Start: 2023-03-16

## 2023-03-16 RX ORDER — FOLIC ACID 1 MG/1
1 TABLET ORAL DAILY
Status: DISCONTINUED | OUTPATIENT
Start: 2023-03-16 | End: 2023-03-16 | Stop reason: HOSPADM

## 2023-03-16 RX ADMIN — IPRATROPIUM BROMIDE AND ALBUTEROL SULFATE 1 AMPULE: .5; 3 SOLUTION RESPIRATORY (INHALATION) at 11:43

## 2023-03-16 RX ADMIN — FOLIC ACID 1 MG: 1 TABLET ORAL at 12:55

## 2023-03-16 RX ADMIN — AMLODIPINE BESYLATE 10 MG: 10 TABLET ORAL at 08:59

## 2023-03-16 RX ADMIN — CARVEDILOL 6.25 MG: 6.25 TABLET, FILM COATED ORAL at 08:55

## 2023-03-16 RX ADMIN — IPRATROPIUM BROMIDE AND ALBUTEROL SULFATE 1 AMPULE: .5; 3 SOLUTION RESPIRATORY (INHALATION) at 09:16

## 2023-03-16 RX ADMIN — ATORVASTATIN CALCIUM 20 MG: 20 TABLET, FILM COATED ORAL at 08:55

## 2023-03-16 RX ADMIN — FUROSEMIDE 20 MG: 20 TABLET ORAL at 08:55

## 2023-03-16 RX ADMIN — LISINOPRIL 20 MG: 20 TABLET ORAL at 08:55

## 2023-03-16 RX ADMIN — TIOTROPIUM BROMIDE INHALATION SPRAY 2 PUFF: 3.12 SPRAY, METERED RESPIRATORY (INHALATION) at 09:16

## 2023-03-16 ASSESSMENT — ENCOUNTER SYMPTOMS
BACK PAIN: 0
CONSTIPATION: 0
COUGH: 1
VOMITING: 0
ABDOMINAL PAIN: 0
COLOR CHANGE: 0
SHORTNESS OF BREATH: 0
NAUSEA: 0
TROUBLE SWALLOWING: 0
DIARRHEA: 0

## 2023-03-16 NOTE — PROGRESS NOTES
2100Pt c/o IV  position and requesting to remove it. Pt refused new IV. Pt educated on IV importance. Per family medicine okay to remove pt IV.     0500 Pt Hgb 7.5. resident was notified.

## 2023-03-16 NOTE — PLAN OF CARE
PROVIDE ADEQUATE OXYGENATION WITH ACCEPTABLE SP02/ABG'S    [x]  IDENTIFY APPROPRIATE OXYGEN THERAPY  [x]   MONITOR SP02/ABG'S AS NEEDED   [x]   PATIENT EDUCATION AS NEEDED  BRONCHOSPASM/BRONCHOCONSTRICTION     [x]         IMPROVE AERATION/BREATH SOUNDS  [x]   ADMINISTER BRONCHODILATOR THERAPY AS APPROPRIATE  [x]   ASSESS BREATH SOUNDS  []   IMPLEMENT AEROSOL/MDI PROTOCOL  [x]   PATIENT EDUCATION AS NEEDED

## 2023-03-16 NOTE — DISCHARGE SUMMARY
Department of [address] [place] [address]    Discharge Summary      NAME:  Abilio Ashley  :  1955  MRN:  [id number]    Admit date:  3/14/2023  Discharge date:  3/16/2023    Admitting Physician:  Yamini Rodriguez MD    Primary Diagnosis on Admission:   Present on Admission:   (Resolved) Community acquired pneumonia, bilateral   (Resolved) Community acquired pneumonia   Controlled type 2 diabetes mellitus without complication, without long-term current use of insulin (HCC)   Chronic obstructive pulmonary disease (HCC)   Combined systolic and diastolic congestive heart failure (HCC)   Hypertension goal BP (blood pressure) < 140/80   Hyperlipidemia with target LDL less than 70   Normocytic anemia   Bradycardia, sinus   Pneumonia due to infectious organism      Secondary Diagnoses:  does not have any pertinent problems on file. Admission Condition:  stable     Discharged Condition: stable    Hospital Course: The patient was admitted for the management of shortness of breath likely secondary to anemia  Today on day of discharge pt feels better with no further complaints. Vitals and Labs are at pts baseline. All consultants involved during this admission are agreeable to d/c. This is a 68-year-old male with a past medical history of COPD, type 2 diabetes, hypertension, HFrEF who presented with shortness of breath as well sputum production. Initially admitted for concern for pneumonia, as well as a hemoglobin of 6.5. Received 1 unit of blood. Fecal occult blood was negative. No obvious source of bleeding noted. Anemia is normocytic, patient has a elevated haptoglobin as well as elevated kappa free light chain. Reticulocyte count is within normal limits but inadequate response for degree of anemia. Peripheral blood blood smear demonstrated possible extravascular hemolysis. All other hematologic work-up, largely unremarkable.   Hematology oncology was consulted they scheduled an appointment with the patient on 3/27/2023 for further work-up and management of normocytic anemia. On admission chest x-ray demonstrated left-sided pleural effusion versus pneumonia. Infectious work-up for pneumonia was largely unremarkable, patient did receive a course Zosyn in the ED, 1 day of IV ceftriaxone and azithromycin. Repeat chest x-ray demonstrated significantly improvement in the left pleural effusion, antibiotics were discontinued. At baseline patient is on 4 L of oxygen, there was no increased oxygen requirement during the course of his hospital stay. During course of the hospitalization patient became bradycardic with lowest heart rate measured to be 28. Patient was asymptomatic during these times. EKG demonstrated sinus bradycardia. Cardiology was consulted, they halved the dose of Coreg to 6.25 mg twice daily. After this patient remained for the most part with a heart rate of 50 to 60 bpm.    Consults:  cardiology and hematology/oncology    Significant Diagnostic/theraputic interventions: 1 Unit of packed red blood cells, IV antibiotics      Disposition:   home    Instructions to Patient: Follow-up with hematology oncology on March 27 for further management and treatment of anemia.      Follow up with Katherine Yates MD in  1 week    Discharge Medications:       Medication List        START taking these medications      folic acid 1 MG tablet  Commonly known as: FOLVITE  Take 1 tablet by mouth daily            CHANGE how you take these medications      carvedilol 6.25 MG tablet  Commonly known as: COREG  Take 1 tablet by mouth 2 times daily (with meals)  What changed:   medication strength  how much to take            CONTINUE taking these medications      albuterol (5 MG/ML) 0.5% nebulizer solution  Commonly known as: PROVENTIL  Take 0.5 mLs by nebulization 4 times daily as needed for Wheezing     amLODIPine 10 MG tablet  Commonly known as: NORVASC  Take 1 tablet by mouth daily     atorvastatin 20 MG tablet  Commonly known as: LIPITOR  Take 1 tablet by mouth daily     fluticasone-salmeterol 100-50 MCG/ACT Aepb diskus inhaler  Commonly known as: ADVAIR  Inhale 1 puff into the lungs every 12 hours     furosemide 20 MG tablet  Commonly known as: LASIX  Take 1 tablet by mouth daily     hydrALAZINE 50 MG tablet  Commonly known as: APRESOLINE  TAKE 1 TABLET BY MOUTH THREE TIMES A DAY     ipratropium-albuterol 0.5-2.5 (3) MG/3ML Soln nebulizer solution  Commonly known as: DUONEB  Inhale 3 mLs into the lungs every 4 hours (while awake)     lisinopril 20 MG tablet  Commonly known as: PRINIVIL;ZESTRIL  Take 1 tablet by mouth daily     metFORMIN 500 MG tablet  Commonly known as: GLUCOPHAGE  Take 1 tablet by mouth in the morning and 1 tablet in the evening. Take with meals. Spiriva HandiHaler 18 MCG inhalation capsule  Generic drug: tiotropium  Inhale 1 capsule into the lungs daily     vitamin D 50 MCG (2000 UT) Tabs tablet  Commonly known as: CHOLECALCIFEROL  Take 1 tablet by mouth daily            STOP taking these medications      aspirin 81 MG EC tablet  Commonly known as: HM Aspirin EC Low Dose               Where to Get Your Medications        These medications were sent to [facility] [address] [facility], [address] [place] [address], [address] [facility] [address]      Phone: [phone]   carvedilol 8.20 MG tablet  folic acid 1 MG tablet         Send Copies to: Rickey Wang MD,       Note that over 30 minutes was spent in preparing discharge papers, discussing discharge with patient and family, medication review, etc.      Misti Mo MD  Family Medicine Resident  Family Medicine Inpatient Service  3/16/2023 2:00 PM          Please note that this chart was generated using voice recognition Dragon dictation software.   Although every effort was made to ensure the accuracy of this automated transcription, some errors in transcription may have occurred

## 2023-03-16 NOTE — CONSULTS
Today's Date: 3/16/2023  Patient Name: Toby Tucker  Date of admission: 3/14/2023 11:39 AM  Patient's age: 77 y.o., 1955  Admission Dx: COPD exacerbation ([facility] [address] [J44.1]  Pneumonia due to infectious organism, unspecified laterality, unspecified part of lung [J18.9]  Community acquired pneumonia, bilateral [J18.9]    Reason for Consult: {reason for consult:19586}  Requesting Physician: Fanny Julio MD    CHIEF COMPLAINT:  ***    History Obtained From:  {HISTORY SOURCE:[id number]::\"patient\"}    HISTORY OF PRESENT ILLNESS:      The patient is a 77 y.o. {Race/ethnicity:25004} male who is admitted to the hospital for ***        Admitted to the hospital with chief complaint of having shortness of breath. Patient had an argument with his landlord during the argument patient became very short of breath. Patient does complain of having increased cough and phlegm production. He is on 4 L oxygen at home. Patient hemoglobin was noted to be 6.5 at presentation. Patient BNP was elevated. Chest x-ray showed left pleural effusion associated with findings concerning for atelectasis or pneumonia. Patient did have a recent hospitalization in January for COPD exacerbation. Patient is on broad-spectrum antibiotics for suspected pneumonia. We were consulted because of hemoglobin of 6.5. Patient is noted to have a progressive decline in his H&H. Patient ejection fraction is noted to be 40 to 45%. Patient was seen by cardiology who at this point are not recommending cardiac work-up. Patient work-up in regards to the anemia shows a hemoglobin of 7.5 today. MCV 93. Patient WBC count and platelet count are within range. Patient does have a left shift. There are also small amount of immature granulocytes noted on the peripheral blood iron studies show ferritin of 219 iron saturation 56%. Patient has adequate B12 stores. Folic acid is currently pending was noted to be low back in January. .  Patient did receive 1 unit of packed RBC during the hospitalization. Patient's CEA is noted to be 41. Patient's stool occult blood test was negative. Patient CT chest and pelvis without contrast from January showed findings concerning for chronic pancreatitis. There was mild retroperitoneal lymphadenopathy noted. Mild ascites noted. Albumin is 3.1 now. Normocytic anemia with adequate iron stores  History of folic acid deficiency  Elevated CEA  Mildly enlarged retroperitoneal lymph node per CT-1/2023  Shortness of breath  COPD        Will order peripheral smear  Check reticulocyte count  Rule out hemolysis, less likely  Check monoclonal panel to rule out paraproteinemia  Check folic acid level continue folic acid supplementation in the meanwhile  Primary bone marrow disorder is also in differential may need a bone marrow if above work-up is inconclusive  Elevated CEA recommend GI evaluation can be done outpatient          ? ? Colonoscopy patient stool occult blood test was negative      Past Medical History:   has a past medical history of CHF (congestive heart failure) ([facility] [address] COPD (chronic obstructive pulmonary disease) ([facility] [address] Diabetes mellitus ([facility] [address] Erectile dysfunction due to arterial insufficiency, Hypertension, Microalbuminuria due to type 2 diabetes mellitus ([facility] [address] and Overweight (BMI 25.0-29.9). Past Surgical History:   has a past surgical history that includes Appendectomy and Total ankle arthroplasty. Medications:    Prior to Admission medications    Medication Sig Start Date End Date Taking?  Authorizing Provider   carvedilol (COREG) 6.25 MG tablet Take 1 tablet by mouth 2 times daily (with meals) 3/15/23  Yes Orestes Vega MD   atorvastatin (LIPITOR) 20 MG tablet Take 1 tablet by mouth daily 3/6/23   Trever Lema MD   hydrALAZINE (APRESOLINE) 50 MG tablet TAKE 1 TABLET BY MOUTH THREE TIMES A DAY 2/9/23   Khari Lee MD   lisinopril (PRINIVIL;ZESTRIL) 20 MG tablet Take 1 tablet by mouth daily 1/25/23   Gary Boston MD   metFORMIN (GLUCOPHAGE) 500 MG tablet Take 1 tablet by mouth in the morning and 1 tablet in the evening. Take with meals.  8/15/22   Fredirick Spurling, MD   albuterol (PROVENTIL) (5 MG/ML) 0.5% nebulizer solution Take 0.5 mLs by nebulization 4 times daily as needed for Wheezing 7/12/22   Epi Ventura MD   amLODIPine (NORVASC) 10 MG tablet Take 1 tablet by mouth daily 7/12/22   Epi Ventura MD   ipratropium-albuterol (DUONEB) 0.5-2.5 (3) MG/3ML SOLN nebulizer solution Inhale 3 mLs into the lungs every 4 hours (while awake) 7/12/22   Te Camargo MD   furosemide (LASIX) 20 MG tablet Take 1 tablet by mouth daily 7/12/22   Epi Ventura MD   vitamin D (CHOLECALCIFEROL) 50 MCG (2000 UT) TABS tablet Take 1 tablet by mouth daily 7/12/22   Epi Ventura MD   fluticasone-salmeterol (ADVIAR) 100-50 MCG/ACT AEPB diskus inhaler Inhale 1 puff into the lungs every 12 hours 7/12/22   Epi Ventura MD   tiotropium (SPIRIVA HANDIHALER) 18 MCG inhalation capsule Inhale 1 capsule into the lungs daily 7/12/22   Epi Ventura MD     Current Facility-Administered Medications   Medication Dose Route Frequency Provider Last Rate Last Admin    carvedilol (COREG) tablet 6.25 mg  6.25 mg Oral BID  Annmarie Medina MD   6.25 mg at 03/16/23 0855    0.9 % sodium chloride infusion   IntraVENous PRN Celine Munguia MD        sodium chloride flush 0.9 % injection 5-40 mL  5-40 mL IntraVENous 2 times per day Annmarie Medina MD   10 mL at 03/15/23 1030    sodium chloride flush 0.9 % injection 5-40 mL  5-40 mL IntraVENous PRN Annmarie Medina MD        0.9 % sodium chloride infusion   IntraVENous PRN Annmarie Medina MD        ondansetron (ZOFRAN-ODT) disintegrating tablet 4 mg  4 mg Oral Q8H PRN Annmarei Medina MD        Or    ondansetron (ZOFRAN) injection 4 mg  4 mg IntraVENous Q6H PRN Annmarie Medina MD        polyethylene glycol (GLYCOLAX) packet 17 g  17 g Oral Daily PRN Inder Schroeder MD        acetaminophen (TYLENOL) tablet 650 mg  650 mg Oral Q6H PRN Inder Schroeder MD   650 mg at 03/15/23 2109    Or    acetaminophen (TYLENOL) suppository 650 mg  650 mg Rectal Q6H PRN Inder Schroeder MD        potassium chloride (KLOR-CON M) extended release tablet 40 mEq  40 mEq Oral PRN Inder Schroeder MD        Or    potassium bicarb-citric acid (EFFER-K) effervescent tablet 40 mEq  40 mEq Oral PRN Inder Schroeder MD        Or    potassium chloride 10 mEq/100 mL IVPB (Peripheral Line)  10 mEq IntraVENous PRN Inder Schroeder MD        magnesium sulfate 2000 mg in 50 mL IVPB premix  2,000 mg IntraVENous PRN Inder Schroeder MD        ipratropium-albuterol (DUONEB) nebulizer solution 1 ampule  1 ampule Inhalation Q4H WA Inder Schroeder MD   1 ampule at 03/16/23 0916    amLODIPine (NORVASC) tablet 10 mg  10 mg Oral Daily Inder Schroeder MD   10 mg at 03/16/23 0859    atorvastatin (LIPITOR) tablet 20 mg  20 mg Oral Daily Inder Schroeder MD   20 mg at 03/16/23 0855    furosemide (LASIX) tablet 20 mg  20 mg Oral Daily Inder Schroeder MD   20 mg at 03/16/23 0855    [Held by provider] hydrALAZINE (APRESOLINE) tablet 50 mg  50 mg Oral TID Inder Schroeder MD        lisinopril (PRINIVIL;ZESTRIL) tablet 20 mg  20 mg Oral Daily Inder Schroeder MD   20 mg at 03/16/23 0855    tiotropium (SPIRIVA RESPIMAT) 2.5 MCG/ACT inhaler 2 puff  2 puff Inhalation Daily Inder Schroeder MD   2 puff at 03/16/23 0916    insulin lispro (HUMALOG) injection vial 0-4 Units  0-4 Units SubCUTAneous TID WC Inder Schroeder MD        insulin lispro (HUMALOG) injection vial 0-4 Units  0-4 Units SubCUTAneous Nightly Inder Schroeder MD        glucose chewable tablet 16 g  4 tablet Oral PRN Inder Schroeder MD        dextrose bolus 10% 125 mL  125 mL IntraVENous PRN Inder Schroeder MD        Or    dextrose bolus 10% 250 mL  250 mL IntraVENous PRN Inder Schroeder MD        glucagon (rDNA) injection 1 mg  1 mg SubCUTAneous PRN Inder  MD Alexandre        dextrose 10 % infusion   IntraVENous Continuous PRN Lady Mario MD           Allergies:  Patient has no known allergies. Social History:   reports that he quit smoking about 15 months ago. His smoking use included cigarettes. He smoked an average of .5 packs per day. He has never used smokeless tobacco. He reports that he does not drink alcohol and does not use drugs. Family History: family history is not on file. REVIEW OF SYSTEMS:      Constitutional: No fever or chills. No night sweats, no weight loss   Eyes: No eye discharge, double vision, or eye pain   HEENT: negative for sore mouth, sore throat, hoarseness and voice change   Respiratory: negative for cough , sputum, dyspnea, wheezing, hemoptysis, chest pain   Cardiovascular: negative for chest pain, dyspnea, palpitations, orthopnea, PND   Gastrointestinal: negative for nausea, vomiting, diarrhea, constipation, abdominal pain, Dysphagia, hematemesis and hematochezia   Genitourinary: negative for frequency, dysuria, nocturia, urinary incontinence, and hematuria   Integument: negative for rash, skin lesions, bruises.    Hematologic/Lymphatic: negative for easy bruising, bleeding, lymphadenopathy, or petechiae   Endocrine: negative for heat or cold intolerance,weight changes, change in bowel habits and hair loss   Musculoskeletal: negative for myalgias, arthralgias, pain, joint swelling,and bone pain   Neurological: negative for headaches, dizziness, seizures, weakness, numbness    PHYSICAL EXAM:        /62   Pulse 55   Temp 98.1 °F (36.7 °C) (Oral)   Resp 24   Ht 5' 5.5\" (1.664 m)   Wt 146 lb (66.2 kg)   SpO2 100%   BMI 23.93 kg/m²    Temp (24hrs), Av.2 °F (36.8 °C), Min:98.1 °F (36.7 °C), Max:98.4 °F (36.9 °C)      General appearance - well appearing, no in pain or distress   Mental status - alert and cooperative   Eyes - pupils equal and reactive, extraocular eye movements intact   Ears - bilateral TM's and external ear canals normal   Mouth - mucous membranes moist, pharynx normal without lesions   Neck - supple, no significant adenopathy   Lymphatics - no palpable lymphadenopathy, no hepatosplenomegaly   Chest - clear to auscultation, no wheezes, rales or rhonchi, symmetric air entry   Heart - normal rate, regular rhythm, normal S1, S2, no murmurs  Abdomen - soft, nontender, nondistended, no masses or organomegaly   Neurological - alert, oriented, normal speech, no focal findings or movement disorder noted   Musculoskeletal - no joint tenderness, deformity or swelling   Extremities - peripheral pulses normal, no pedal edema, no clubbing or cyanosis   Skin - normal coloration and turgor, no rashes, no suspicious skin lesions noted ,      DATA:      Labs:     Results for orders placed or performed during the hospital encounter of 03/14/23   COVID-19, Rapid    Specimen: Nasopharyngeal Swab   Result Value Ref Range    Specimen Description . NASOPHARYNGEAL SWAB     SARS-CoV-2, Rapid Not Detected Not Detected   Strep Pneumoniae Antigen    Specimen: Urine voided   Result Value Ref Range    Source . URINE     Strep pneumo Ag NEGATIVE    LEGIONELLA ANTIGEN, URINE    Specimen: Urine   Result Value Ref Range    Legionella Pneumophilia Ag, Urine NEGATIVE NEGATIVE   Respiratory Panel, Molecular, with COVID-19 (Restricted: peds pts or suitable admitted adults)    Specimen: Nasopharyngeal Swab   Result Value Ref Range    Specimen Description . NASOPHARYNGEAL SWAB     Adenovirus PCR Not Detected Not Detected    Coronavirus 229E PCR Not Detected Not Detected    Coronavirus HKU1 PCR Not Detected Not Detected    Coronavirus NL63 PCR Not Detected Not Detected    Coronavirus OC43 PCR Not Detected Not Detected    SARS-CoV-2, PCR Not Detected Not Detected    Human Metapneumovirus PCR Not Detected Not Detected    Rhino/Enterovirus PCR Not Detected Not Detected    Influenza A by PCR Not Detected Not Detected    Influenza B by PCR Not Detected Not Detected    Parainfluenza 1 PCR Not Detected Not Detected    Parainfluenza 2 PCR Not Detected Not Detected    Parainfluenza 3 PCR Not Detected Not Detected    Parainfluenza 4 PCR Not Detected Not Detected    Resp Syncytial Virus PCR Not Detected Not Detected    Bordetella Parapertussis Not Detected Not Detected    B Pertussis by PCR Not Detected Not Detected    Chlamydia pneumoniae By PCR Not Detected Not Detected    Mycoplasma pneumo by PCR Not Detected Not Detected   CBC with Auto Differential   Result Value Ref Range    WBC 6.2 3.5 - 11.3 k/uL    RBC 2.29 (L) 4.21 - 5.77 m/uL    Hemoglobin 6.5 (LL) 13.0 - 17.0 g/dL    Hematocrit 21.4 (L) 40.7 - 50.3 %    MCV 93.4 82.6 - 102.9 fL    MCH 28.4 25.2 - 33.5 pg    MCHC 30.4 28.4 - 34.8 g/dL    RDW 13.4 11.8 - 14.4 %    Platelets 689 394 - 726 k/uL    MPV 10.1 8.1 - 13.5 fL    NRBC Automated 0.0 0.0 per 100 WBC    Seg Neutrophils 82 (H) 36 - 65 %    Lymphocytes 12 (L) 24 - 43 %    Monocytes 5 3 - 12 %    Eosinophils % 1 1 - 4 %    Basophils 0 0 - 2 %    Immature Granulocytes 0 0 %    Segs Absolute 5.04 1.50 - 8.10 k/uL    Absolute Lymph # 0.75 (L) 1.10 - 3.70 k/uL    Absolute Mono # 0.32 0.10 - 1.20 k/uL    Absolute Eos # 0.03 0.00 - 0.44 k/uL    Basophils Absolute <0.03 0.00 - 0.20 k/uL    Absolute Immature Granulocyte <0.03 0.00 - 0.30 k/uL   Basic Metabolic Panel w/ Reflex to MG   Result Value Ref Range    Glucose 121 (H) 70 - 99 mg/dL    BUN 13 8 - 23 mg/dL    Creatinine 1.06 0.70 - 1.20 mg/dL    Est, Glom Filt Rate >60 >60 mL/min/1.73m2    Calcium 8.1 (L) 8.6 - 10.4 mg/dL    Sodium 138 135 - 144 mmol/L    Potassium 4.2 3.7 - 5.3 mmol/L    Chloride 99 98 - 107 mmol/L    CO2 27 20 - 31 mmol/L    Anion Gap 12 9 - 17 mmol/L   Brain Natriuretic Peptide   Result Value Ref Range    Pro- (H) <300 pg/mL   Troponin   Result Value Ref Range    Troponin, High Sensitivity 78 (HH) 0 - 22 ng/L   Troponin   Result Value Ref Range    Troponin, High Sensitivity 74 (HH) 0 - 22 ng/L   Hepatic Function Panel   Result Value Ref Range    Albumin 3.1 (L) 3.5 - 5.2 g/dL    Alkaline Phosphatase 159 (H) 40 - 129 U/L    ALT 10 5 - 41 U/L    AST 16 <40 U/L    Total Bilirubin 0.6 0.3 - 1.2 mg/dL    Bilirubin, Direct 0.2 <0.3 mg/dL    Bilirubin, Indirect 0.4 0.0 - 1.0 mg/dL    Total Protein 5.9 (L) 6.4 - 8.3 g/dL    Albumin/Globulin Ratio 1.1 1.0 - 2.5   Basic Metabolic Panel w/ Reflex to MG   Result Value Ref Range    Glucose 200 (H) 70 - 99 mg/dL    BUN 21 8 - 23 mg/dL    Creatinine 1.08 0.70 - 1.20 mg/dL    Est, Glom Filt Rate >60 >60 mL/min/1.73m2    Calcium 8.2 (L) 8.6 - 10.4 mg/dL    Sodium 136 135 - 144 mmol/L    Potassium 4.6 3.7 - 5.3 mmol/L    Chloride 100 98 - 107 mmol/L    CO2 25 20 - 31 mmol/L    Anion Gap 11 9 - 17 mmol/L   CBC with Auto Differential   Result Value Ref Range    WBC 4.3 3.5 - 11.3 k/uL    RBC 2.90 (L) 4.21 - 5.77 m/uL    Hemoglobin 8.4 (L) 13.0 - 17.0 g/dL    Hematocrit 27.0 (L) 40.7 - 50.3 %    MCV 93.1 82.6 - 102.9 fL    MCH 29.0 25.2 - 33.5 pg    MCHC 31.1 28.4 - 34.8 g/dL    RDW 13.5 11.8 - 14.4 %    Platelets 035 919 - 478 k/uL    MPV 10.1 8.1 - 13.5 fL    NRBC Automated 0.0 0.0 per 100 WBC    Immature Granulocytes 0 0 %    Seg Neutrophils 88 (H) 36 - 66 %    Lymphocytes 10 (L) 24 - 44 %    Monocytes 2 1 - 7 %    Eosinophils % 0 (L) 1 - 4 %    Basophils 0 0 - 2 %    Absolute Immature Granulocyte 0.00 0.00 - 0.30 k/uL    Segs Absolute 3.78 1.8 - 7.7 k/uL    Absolute Lymph # 0.43 (L) 1.0 - 4.8 k/uL    Absolute Mono # 0.09 (L) 0.1 - 0.8 k/uL    Absolute Eos # 0.00 0.0 - 0.4 k/uL    Basophils Absolute 0.00 0.0 - 0.2 k/uL    Morphology 1+ ELLIPTOCYTES    OCCULT BLOOD SCREEN   Result Value Ref Range    Occult Blood, Stool #1 NEGATIVE NEGATIVE    Date, Stool #1 [phone]     Time, Stool #1 Unknown    Vitamin B12 & Folate   Result Value Ref Range    Vitamin B-12 768 232 - 1245 pg/mL    Folate PENDING ng/mL   Iron and TIBC   Result Value Ref Range    Iron 92 59 - 158 ug/dL    TIBC 165 (L) 250 - 450 ug/dL    Iron Saturation 56 (H) 20 - 55 %    UIBC 73 (L) 112 - 347 ug/dL   Ferritin   Result Value Ref Range    Ferritin 219 30 - 400 ng/mL   Lactate Dehydrogenase   Result Value Ref Range     135 - 225 U/L   Path Review, Smear   Result Value Ref Range    Pathologist Review Reviewed by pathologist:  Aspen Rivera M.D. Procalcitonin   Result Value Ref Range    Procalcitonin 0.15 (H) <0.09 ng/mL   Reticulocytes   Result Value Ref Range    Retic % 0.8 0.5 - 1.9 %    Absolute Retic # 0.020 (L) 0.030 - 0.080 M/uL    Immature Retic Fract 14.500 2.7 - 18.3 %    Retic Hemoglobin 29.0 28.2 - 35.7 pg   CBC with Auto Differential   Result Value Ref Range    WBC 4.9 3.5 - 11.3 k/uL    RBC 2.85 (L) 4.21 - 5.77 m/uL    Hemoglobin 8.2 (L) 13.0 - 17.0 g/dL    Hematocrit 26.9 (L) 40.7 - 50.3 %    MCV 94.4 82.6 - 102.9 fL    MCH 28.8 25.2 - 33.5 pg    MCHC 30.5 28.4 - 34.8 g/dL    RDW 13.8 11.8 - 14.4 %    Platelets 472 122 - 234 k/uL    MPV 10.3 8.1 - 13.5 fL    NRBC Automated 0.0 0.0 per 100 WBC    Immature Granulocytes 0 0 %    Seg Neutrophils 93 (H) 36 - 66 %    Lymphocytes 7 (L) 24 - 44 %    Monocytes 0 (L) 1 - 7 %    Eosinophils % 0 (L) 1 - 4 %    Basophils 0 0 - 2 %    Absolute Immature Granulocyte 0.00 0.00 - 0.30 k/uL    Segs Absolute 4.56 1.8 - 7.7 k/uL    Absolute Lymph # 0.34 (L) 1.0 - 4.8 k/uL    Absolute Mono # 0.00 (L) 0.1 - 0.8 k/uL    Absolute Eos # 0.00 0.0 - 0.4 k/uL    Basophils Absolute 0.00 0.0 - 0.2 k/uL    Morphology 1+ ELLIPTOCYTES    SURGICAL PATHOLOGY REPORT   Result Value Ref Range    Surgical Pathology Report       [id number]  [facility]  [employer]  [facility]  ANATOMIC PATHOLOGY  [address] [place] [address],  [address]   [place], [address]  [phone]  Fax: [phone]  SURGICAL PATHOLOGY CONSULTATION    Patient Name: Opal Mejia  MR#: [id number]  Specimen #[id number]    Procedures/Addenda  PERIPHERAL BLOOD REPORT     Date Ordered:     3/15/2023 Status:  Signed Out       Date Complete:     3/15/2023     By: KENAN Rodriguez       Date Reported:     3/15/2023       INTERPRETATION  Peripheral blood:  Normochromic normocytic anemia with mild poikilocytosis (elliptocytes,  target cells) and rare microspherocytes,   suggestive of possible extravascular hemolysis. Adequate leukocytes with absolute lymphocytopenia, absolute  monocytopenia, and mild left shift in granulocytes to the band phase,  suggestive of possible acute infectious/inflammatory process. Adequate platelets. RESULTS-COMMENTS  PERIPHERAL BLOOD STUDY    CBC: Please see the electronic health record for CBC p river  ([id number], 03/14/2023, 22:09). Note: The electronic health record is reviewed.          KENAN Rodriguez                   Source:  A: Peripheral Blood     CEA   Result Value Ref Range    CEA 41.2 (H) <3.9 ng/mL   TSH with Reflex   Result Value Ref Range    TSH 0.72 0.30 - 5.00 uIU/mL   Basic Metabolic Panel w/ Reflex to MG   Result Value Ref Range    Glucose 187 (H) 70 - 99 mg/dL    BUN 22 8 - 23 mg/dL    Creatinine 1.07 0.70 - 1.20 mg/dL    Est, Glom Filt Rate >60 >60 mL/min/1.73m2    Calcium 7.9 (L) 8.6 - 10.4 mg/dL    Sodium 135 135 - 144 mmol/L    Potassium 4.9 3.7 - 5.3 mmol/L    Chloride 100 98 - 107 mmol/L    CO2 28 20 - 31 mmol/L    Anion Gap 7 (L) 9 - 17 mmol/L   CBC with Auto Differential   Result Value Ref Range    WBC 8.7 3.5 - 11.3 k/uL    RBC 2.60 (L) 4.21 - 5.77 m/uL    Hemoglobin 7.5 (L) 13.0 - 17.0 g/dL    Hematocrit 24.2 (L) 40.7 - 50.3 %    MCV 93.1 82.6 - 102.9 fL    MCH 28.8 25.2 - 33.5 pg    MCHC 31.0 28.4 - 34.8 g/dL    RDW 13.5 11.8 - 14.4 %    Platelets 757 182 - 332 k/uL    MPV 10.1 8.1 - 13.5 fL    NRBC Automated 0.0 0.0 per 100 WBC    Seg Neutrophils 87 (H) 36 - 65 %    Lymphocytes 8 (L) 24 - 43 %    Monocytes 4 3 - 12 %    Eosinophils % 0 (L) 1 - 4 %    Basophils 0 0 - 2 %    Immature Granulocytes 1 (H) 0 % Segs Absolute 7.49 1.50 - 8.10 k/uL    Absolute Lymph # 0.72 (L) 1.10 - 3.70 k/uL    Absolute Mono # 0.38 0.10 - 1.20 k/uL    Absolute Eos # <0.03 0.00 - 0.44 k/uL    Basophils Absolute <0.03 0.00 - 0.20 k/uL    Absolute Immature Granulocyte 0.06 0.00 - 0.30 k/uL   POC Glucose Fingerstick   Result Value Ref Range    POC Glucose 263 (H) 75 - 110 mg/dL   POC Glucose Fingerstick   Result Value Ref Range    POC Glucose 155 (H) 75 - 110 mg/dL   POC Glucose Fingerstick   Result Value Ref Range    POC Glucose 181 (H) 75 - 110 mg/dL   POC Glucose Fingerstick   Result Value Ref Range    POC Glucose 165 (H) 75 - 110 mg/dL   POC Glucose Fingerstick   Result Value Ref Range    POC Glucose 232 (H) 75 - 110 mg/dL   POC Glucose Fingerstick   Result Value Ref Range    POC Glucose 150 (H) 75 - 110 mg/dL   EKG 12 Lead   Result Value Ref Range    Ventricular Rate 58 BPM    Atrial Rate 58 BPM    P-R Interval 136 ms    QRS Duration 92 ms    Q-T Interval 428 ms    QTc Calculation (Bazett) 420 ms    R Axis 9 degrees    T Axis 6 degrees   EKG 12 Lead   Result Value Ref Range    Ventricular Rate 51 BPM    Atrial Rate 51 BPM    P-R Interval 134 ms    QRS Duration 94 ms    Q-T Interval 454 ms    QTc Calculation (Bazett) 418 ms    R Axis 4 degrees    T Axis 20 degrees   TYPE AND SCREEN   Result Value Ref Range    Expiration Date 03/17/2023,2359     Arm Band Number [id number]     ABO/Rh O POSITIVE     Antibody Screen NEGATIVE     Unit Number [id number]     Product Code Leukocyte Reduced Red Cell     Unit Divison 00     Dispense Status TRANSFUSED     Unit Issue Date/Time [id number]     Product Code Blood Bank [id number]     Blood Bank Unit Type and Rh O POS     Blood Bank ISBT Product Blood Type 5100     Blood Bank Blood Product Expiration Date 820811012045     Transfusion Status OK TO TRANSFUSE     Crossmatch Result COMPATIBLE          IMAGING DATA:    XR CHEST LATERAL    Result Date: 3/14/2023  EXAMINATION: ONE XRAY VIEW OF THE LEFT LATERAL CHEST 3/14/2023 3:19 pm COMPARISON: Chest radiograph performed 03/14/2023. HISTORY: ORDERING SYSTEM PROVIDED HISTORY: assess pleural effusion TECHNOLOGIST PROVIDED HISTORY: assess pleural effusion FINDINGS: Lateral view of the chest demonstrates apparent pleural effusion posteriorly. There is no definite pneumothorax. The lateral cardiac silhouette appears unremarkable. The upper abdomen is unremarkable. The surrounding soft tissues are unremarkable. Pleural fluid posteriorly. XR CHEST PORTABLE    Result Date: 3/15/2023  EXAMINATION: ONE XRAY VIEW OF THE CHEST 3/15/2023 9:27 am COMPARISON: 03/14/2023 HISTORY: ORDERING SYSTEM PROVIDED HISTORY: PNA, SOB TECHNOLOGIST PROVIDED HISTORY: PNA, SOB Reason for Exam: uprt port chest FINDINGS: Cardiac silhouette is within normal limits. Pulmonary vasculature is within normal limits. There is improvement in left pleural effusion. No airspace infiltrate identified. No pneumothorax is identified. There is fusion hardware in the lower cervical spine. Interval decrease in left pleural effusion. XR CHEST PORTABLE    Result Date: 3/14/2023  EXAMINATION: ONE XRAY VIEW OF THE CHEST 3/14/2023 12:36 pm COMPARISON: AP chest from 01/11/2023 HISTORY: ORDERING SYSTEM PROVIDED HISTORY: r/o PNA, cough TECHNOLOGIST PROVIDED HISTORY: r/o PNA, cough Reason for Exam: uprt port chest FINDINGS: Overlying ECG monitor leads, gown snaps and respiratory tubing. Previous cervical spine ACDF hardware. Cardiomediastinal shadow stable. Increased opacity lower 1/3 left hemidiaphragm with meniscus sign laterally. Left upper lung, right lung and right CP angle clear. Bones unchanged. Increased left pleural effusion and associated opacity, either compressive atelectasis &/or pneumonia. Interval clearing right base.          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 I explained to the patient the nature of this problem. I explained the significance of these abnormalities and possible etiology and management options    Patient's conditions including ***  were taken into consideration when deciding risk-benefit for therapy. Patient is at high risk for drug interaction risk of complications. Given multiple comorbid conditions patient is at high risk for complication from intervention, risk of hospitalization, medical interaction overall life expectancy. Discussed with {gen discussed with:[id number]} and Nurse. Thank you for asking us to see this patient. Karsten Morris MD          This note is created with the assistance of a speech recognition program.  While intending to generate a document that actually reflects the content of the visit, the document can still have some errors including those of syntax and sound a like substitutions which may escape proof reading. It such instances, actual meaning can be extrapolated by contextual diversion.

## 2023-03-16 NOTE — CARE COORDINATION
[place] [facility] [place] [facility]  Written by: Andria Salinas RN    Patient Name: Blas Leach  Attending Provider: Juice Damian MD  Admit Date: 3/14/2023 11:39 AM  MRN: [id number]  Account: [de-identified]                     : 1955  Discharge Date: 3-      Disposition: home    Andria Salinas RN

## 2023-03-16 NOTE — PLAN OF CARE
Problem: Safety - Adult  Goal: Free from fall injury  3/16/2023 0602 by Juan Wills RN  Outcome: Progressing  3/15/2023 1847 by Britta Murray RN  Outcome: Progressing     Problem: Cardiovascular - Adult  Goal: Maintains optimal cardiac output and hemodynamic stability  3/16/2023 0602 by Juan Wills RN  Outcome: Progressing  3/15/2023 1847 by Britta Murray RN  Outcome: Progressing  Goal: Absence of cardiac dysrhythmias or at baseline  3/15/2023 1847 by Britta Murray RN  Outcome: Progressing
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
## 2023-03-16 NOTE — CARE COORDINATION
Transitional planning-attempted to get ride for patient with his [address] with Simin Back has no transportation benefits. Set up cab with [employer] for 230PM. O2 tank from [facility] given to patient.

## 2023-03-17 LAB
ALBUMIN (CALCULATED): 3.3 G/DL (ref 3.2–5.2)
ALBUMIN PERCENT: 60 % (ref 45–65)
ALPHA 1 PERCENT: 5 % (ref 3–6)
ALPHA 2 PERCENT: 14 % (ref 6–13)
ALPHA1 GLOB SERPL ELPH-MCNC: 0.3 G/DL (ref 0.1–0.4)
ALPHA2 GLOB SERPL ELPH-MCNC: 0.7 G/DL (ref 0.5–0.9)
B-GLOBULIN SERPL ELPH-MCNC: 0.6 G/DL (ref 0.5–1.1)
BETA PERCENT: 11 % (ref 11–19)
FREE KAPPA/LAMBDA RATIO: 1.65 (ref 0.26–1.65)
GAMMA GLOB SERPL ELPH-MCNC: 0.6 G/DL (ref 0.5–1.5)
GAMMA GLOBULIN %: 10 % (ref 9–20)
KAPPA LC FREE SER-MCNC: 3 MG/DL (ref 0.37–1.94)
LAMBDA LC FREE SERPL-MCNC: 1.82 MG/DL (ref 0.57–2.63)
PATHOLOGIST: ABNORMAL
PATHOLOGIST: ABNORMAL
PROT SERPL-MCNC: 5.4 G/DL (ref 6.4–8.3)
PROTEIN ELECTROPHORESIS, SERUM: ABNORMAL
SERUM IFX INTERP: ABNORMAL
TOTAL PROT. SUM,%: 100 % (ref 98–102)
TOTAL PROT. SUM: 5.5 G/DL (ref 6.3–8.2)

## 2023-03-17 NOTE — PROGRESS NOTES
Multi-D Rounds/Checklist (leapfrog):  Lines: can any be removed?: None  ETT  (Active)     NG/OG/NJ/NE Tube Orogastric 16 fr Center mouth (Active)       Urinary Catheter 03/16/23 Baeza (Active)     CVC Triple Lumen 03/16/23 Left Internal jugular (Active)     DVT Prophylaxis: Ordered- SCDs  Vent: HOB elevated? Yes ;  mL/kg: N/A ; Vent day 2  Nutrition Ordered/appropriate: Per Primary Team- CV surgery today  Can antibiotics or other drugs be stopped? Is Nozin performed: Yes/End Date set  Inpat Anti-Infectives (From admission, onward)       Start     Ordered Stop    03/17/23 0600  ceFAZolin (ANCEF) 2000 mg in sterile water 20 mL IV syringe  2,000 mg,   IntraVENous,   ON CALL TO O.R.        Note to Pharmacy: Default Settings:Auto-dosed by Weight On Call to O.R x 1 dose  Auto-dosed: Pt Wt<119.9kg-2gm, >120kg-3gm    03/16/23 1306 03/17/23 1759                  Consults needed: None  A: Is pain control adequate? (has PRNs? Stop drip?) Yes  B: Sedation break and SBT? Yes  C: Is sedation choice appropriate? Yes  D: Delirium/CAM-ICU? Unable to screen  E: Mobility goals/appropriateness? N/A  F: Family update and plan? Wife is primary contact and is being updated daily by primary attending and nursing staff.     ABHILASH Mchugh - NP Pt returned from x ray, diaphoretic, Hypertensive, desatting in low 80's, Complained of chest tightness, SOB, switched to non-rebreather still no improvement, perfect served cardiology, sublingual nitro given, EKG done, Called rapid response.

## 2023-03-21 DIAGNOSIS — E11.69 TYPE 2 DIABETES MELLITUS WITH OTHER SPECIFIED COMPLICATION, WITHOUT LONG-TERM CURRENT USE OF INSULIN (HCC): ICD-10-CM
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
## 2023-03-22 LAB — M PNEUMO IGM SER QL IA: 0.45

## 2023-03-28 ENCOUNTER — TELEPHONE (OUTPATIENT)
Dept: ONCOLOGY | Age: 68
End: 2023-03-28

## 2023-03-28 NOTE — TELEPHONE ENCOUNTER
WRITER CALLED AND SPOKE TO GUADALUPE TO RESCHEDULE HIS HOS F/U PT APPT WAS RESCHEDULED FOR 4/3/23 @ 10:15AM PT IS AWARE OF APPT DATE AND TIME .

## 2023-03-30 LAB
FOLATE SERPL-MCNC: 12.8 NG/ML
FOLATE SERPL-MCNC: 7.4 NG/ML
VIT B12 SERPL-MCNC: 768 PG/ML (ref 232–1245)

## 2023-04-03 ENCOUNTER — TELEPHONE (OUTPATIENT)
Dept: ONCOLOGY | Age: 68
End: 2023-04-03

## 2023-04-03 NOTE — TELEPHONE ENCOUNTER
Name: Anusha Yuen  : 1955  MRN: [id number]    Oncology Navigation- Initial Note:    Intake-  Contact Type: Telephone    Diagnosis: GI- malignant    Home Disposition: Lives alone    Patient needs and barriers to care: Transportation     Referral Source: Health Professional    Receptive to Advanced Care Planning/ Palliative Care:  yes    Interventions-   General Interventions: none     Education/Screenings:  no     Currently on HRT: no     Referrals: / / Public Health+     Biopsy site status: bx not done yet,       Continuum of Care: Diagnosis/Active Treatment    Notes: Writer received a call from Dr. Ghanshyam Trevino requesting navigation and requesting pt to be rescheduled this Friday at [facility] as pt was a no show this am at [facility]. Writer called patient and introduce self as navigator. Name and contact number provided and writer explained my role in his care moving forward. Pt lives with his 34 yr old son but neither pt or son has transportation. Pt states he has no other family support besides his son. Pt expresses concern about his copays for office visits and states he has a hard time keeping up with them. Writer did inform pt of referral for Arianna Rushing and states he would be reaching out to him for transportation assist and to do a financial assessment. Pt states he quit smoking a drinking etoh last year. Pt appreciative of call and support. Will continue to follow.     Electronically signed by Tay Peter RN on 4/3/2023 at 10:52 AM

## 2023-04-03 NOTE — TELEPHONE ENCOUNTER
received referral from Nurse Navigator stating patient missed appointment due to transportation issue.  called patient who states he is on fixed income and having issues with transportation.  made referral to [facility] for 4/7.

## 2023-04-04 ENCOUNTER — TELEPHONE (OUTPATIENT)
Dept: ONCOLOGY | Age: 68
End: 2023-04-04

## 2023-04-07 ENCOUNTER — TELEPHONE (OUTPATIENT)
Dept: INFUSION THERAPY | Age: 68
End: 2023-04-07

## 2023-04-07 ENCOUNTER — TELEPHONE (OUTPATIENT)
Dept: ONCOLOGY | Age: 68
End: 2023-04-07

## 2023-04-07 ENCOUNTER — HOSPITAL ENCOUNTER (OUTPATIENT)
Age: 68
Discharge: HOME | End: 2023-04-07
Payer: MEDICARE

## 2023-04-07 ENCOUNTER — HOSPITAL ENCOUNTER (INPATIENT)
Age: 68
LOS: 1 days | Discharge: HOME | End: 2023-04-08
Attending: EMERGENCY MEDICINE | Admitting: STUDENT IN AN ORGANIZED HEALTH CARE EDUCATION/TRAINING PROGRAM
Payer: MEDICARE

## 2023-04-07 ENCOUNTER — OFFICE VISIT (OUTPATIENT)
Dept: ONCOLOGY | Age: 68
End: 2023-04-07
Payer: MEDICARE

## 2023-04-07 ENCOUNTER — APPOINTMENT (OUTPATIENT)
Dept: GENERAL RADIOLOGY | Age: 68
End: 2023-04-07
Payer: MEDICARE

## 2023-04-07 VITALS
WEIGHT: 141.1 LBS | BODY MASS INDEX: 23.12 KG/M2 | TEMPERATURE: 97.8 F | OXYGEN SATURATION: 100 % | DIASTOLIC BLOOD PRESSURE: 75 MMHG | HEART RATE: 59 BPM | SYSTOLIC BLOOD PRESSURE: 133 MMHG

## 2023-04-07 DIAGNOSIS — R97.0 ELEVATED CARCINOEMBRYONIC ANTIGEN (CEA): ICD-10-CM

## 2023-04-07 DIAGNOSIS — D64.9 NORMOCYTIC ANEMIA: ICD-10-CM

## 2023-04-07 DIAGNOSIS — D64.9 SEVERE ANEMIA: ICD-10-CM

## 2023-04-07 DIAGNOSIS — D64.9 NORMOCYTIC ANEMIA: Primary | ICD-10-CM

## 2023-04-07 DIAGNOSIS — R06.00 DYSPNEA, UNSPECIFIED TYPE: Primary | ICD-10-CM

## 2023-04-07 PROBLEM — R06.02 SHORTNESS OF BREATH: Status: ACTIVE | Noted: 2023-04-07

## 2023-04-07 LAB
ABSOLUTE EOS #: 0 K/UL (ref 0–0.4)
ABSOLUTE EOS #: 0.04 K/UL (ref 0–0.44)
ABSOLUTE IMMATURE GRANULOCYTE: <0.03 K/UL (ref 0–0.3)
ABSOLUTE LYMPH #: 0.7 K/UL (ref 1–4.8)
ABSOLUTE LYMPH #: 0.87 K/UL (ref 1.1–3.7)
ABSOLUTE MONO #: 0.28 K/UL (ref 0.1–1.2)
ABSOLUTE MONO #: 0.3 K/UL (ref 0.1–1.2)
ABSOLUTE RETIC #: 0.03 M/UL (ref 0.03–0.08)
ALBUMIN SERPL-MCNC: 3.3 G/DL (ref 3.5–5.2)
ALBUMIN/GLOBULIN RATIO: 1.4 (ref 1–2.5)
ALP SERPL-CCNC: 198 U/L (ref 40–129)
ALT SERPL-CCNC: 12 U/L (ref 5–41)
ANION GAP SERPL CALCULATED.3IONS-SCNC: 10 MMOL/L (ref 9–17)
ANION GAP SERPL CALCULATED.3IONS-SCNC: 11 MMOL/L (ref 9–17)
AST SERPL-CCNC: 20 U/L
BASOPHILS # BLD: 0 % (ref 0–2)
BASOPHILS # BLD: 0 % (ref 0–2)
BASOPHILS ABSOLUTE: 0 K/UL (ref 0–0.2)
BASOPHILS ABSOLUTE: <0.03 K/UL (ref 0–0.2)
BILIRUB SERPL-MCNC: 0.3 MG/DL (ref 0.3–1.2)
BNP SERPL-MCNC: 1998 PG/ML
BUN SERPL-MCNC: 10 MG/DL (ref 8–23)
BUN SERPL-MCNC: 11 MG/DL (ref 8–23)
CALCIUM SERPL-MCNC: 8.7 MG/DL (ref 8.6–10.4)
CALCIUM SERPL-MCNC: 9 MG/DL (ref 8.6–10.4)
CHLORIDE SERPL-SCNC: 100 MMOL/L (ref 98–107)
CHLORIDE SERPL-SCNC: 101 MMOL/L (ref 98–107)
CO2 SERPL-SCNC: 26 MMOL/L (ref 20–31)
CO2 SERPL-SCNC: 28 MMOL/L (ref 20–31)
CREAT SERPL-MCNC: 0.89 MG/DL (ref 0.7–1.2)
CREAT SERPL-MCNC: 0.96 MG/DL (ref 0.7–1.2)
EOSINOPHILS RELATIVE PERCENT: 1 % (ref 1–4)
EOSINOPHILS RELATIVE PERCENT: 1 % (ref 1–4)
GFR SERPL CREATININE-BSD FRML MDRD: >60 ML/MIN/1.73M2
GFR SERPL CREATININE-BSD FRML MDRD: >60 ML/MIN/1.73M2
GLUCOSE BLD-MCNC: 92 MG/DL (ref 75–110)
GLUCOSE SERPL-MCNC: 130 MG/DL (ref 70–99)
GLUCOSE SERPL-MCNC: 92 MG/DL (ref 70–99)
HCT VFR BLD AUTO: 20.4 % (ref 40.7–50.3)
HCT VFR BLD AUTO: 20.6 % (ref 41–53)
HCT VFR BLD AUTO: 21.6 % (ref 40.7–50.3)
HGB BLD-MCNC: 6.4 G/DL (ref 13–17)
HGB BLD-MCNC: 6.6 G/DL (ref 13–17)
HGB BLD-MCNC: 6.8 G/DL (ref 13.5–17.5)
IMMATURE GRANULOCYTES: 0 %
IMMATURE RETIC FRACT: 12.8 % (ref 2.7–18.3)
LYMPHOCYTES # BLD: 19 % (ref 24–44)
LYMPHOCYTES # BLD: 21 % (ref 24–43)
MCH RBC QN AUTO: 28.8 PG (ref 25.2–33.5)
MCH RBC QN AUTO: 29.6 PG (ref 26–34)
MCHC RBC AUTO-ENTMCNC: 30.6 G/DL (ref 28.4–34.8)
MCHC RBC AUTO-ENTMCNC: 33 G/DL (ref 31–37)
MCV RBC AUTO: 89.8 FL (ref 80–100)
MCV RBC AUTO: 94.3 FL (ref 82.6–102.9)
MONOCYTES # BLD: 7 % (ref 2–11)
MONOCYTES # BLD: 7 % (ref 3–12)
NRBC AUTOMATED: 0 PER 100 WBC
PDW BLD-RTO: 14.6 % (ref 11.8–14.4)
PDW BLD-RTO: 15.5 % (ref 12.5–15.4)
PLATELET # BLD AUTO: 197 K/UL (ref 138–453)
PLATELET # BLD AUTO: 229 K/UL (ref 140–450)
PMV BLD AUTO: 6.8 FL (ref 6–12)
PMV BLD AUTO: 9.7 FL (ref 8.1–13.5)
POTASSIUM SERPL-SCNC: 4.3 MMOL/L (ref 3.7–5.3)
POTASSIUM SERPL-SCNC: 5.2 MMOL/L (ref 3.7–5.3)
PROT SERPL-MCNC: 5.7 G/DL (ref 6.4–8.3)
RBC # BLD: 2.29 M/UL (ref 4.21–5.77)
RBC # BLD: 2.3 M/UL (ref 4.5–5.9)
RBC # BLD: ABNORMAL 10*6/UL
RETIC HEMOGLOBIN: 29.9 PG (ref 28.2–35.7)
RETICS/RBC NFR AUTO: 1.4 % (ref 0.5–1.9)
SEG NEUTROPHILS: 71 % (ref 36–65)
SEG NEUTROPHILS: 73 % (ref 36–66)
SEGMENTED NEUTROPHILS ABSOLUTE COUNT: 2.7 K/UL (ref 1.8–7.7)
SEGMENTED NEUTROPHILS ABSOLUTE COUNT: 2.86 K/UL (ref 1.5–8.1)
SODIUM SERPL-SCNC: 137 MMOL/L (ref 135–144)
SODIUM SERPL-SCNC: 139 MMOL/L (ref 135–144)
TROPONIN I SERPL DL<=0.01 NG/ML-MCNC: 69 NG/L (ref 0–22)
TROPONIN I SERPL DL<=0.01 NG/ML-MCNC: 70 NG/L (ref 0–22)
WBC # BLD AUTO: 3.7 K/UL (ref 3.5–11)
WBC # BLD AUTO: 4.1 K/UL (ref 3.5–11.3)

## 2023-04-07 PROCEDURE — 82378 CARCINOEMBRYONIC ANTIGEN: CPT

## 2023-04-07 PROCEDURE — 83540 ASSAY OF IRON: CPT

## 2023-04-07 PROCEDURE — 86901 BLOOD TYPING SEROLOGIC RH(D): CPT

## 2023-04-07 PROCEDURE — P9016 RBC LEUKOCYTES REDUCED: HCPCS

## 2023-04-07 PROCEDURE — 99215 OFFICE O/P EST HI 40 MIN: CPT | Performed by: INTERNAL MEDICINE

## 2023-04-07 PROCEDURE — 84484 ASSAY OF TROPONIN QUANT: CPT

## 2023-04-07 PROCEDURE — 71045 X-RAY EXAM CHEST 1 VIEW: CPT

## 2023-04-07 PROCEDURE — 86850 RBC ANTIBODY SCREEN: CPT

## 2023-04-07 PROCEDURE — 85018 HEMOGLOBIN: CPT

## 2023-04-07 PROCEDURE — 82728 ASSAY OF FERRITIN: CPT

## 2023-04-07 PROCEDURE — 3078F DIAST BP <80 MM HG: CPT | Performed by: INTERNAL MEDICINE

## 2023-04-07 PROCEDURE — 85025 COMPLETE CBC W/AUTO DIFF WBC: CPT

## 2023-04-07 PROCEDURE — 1123F ACP DISCUSS/DSCN MKR DOCD: CPT | Performed by: INTERNAL MEDICINE

## 2023-04-07 PROCEDURE — 85014 HEMATOCRIT: CPT

## 2023-04-07 PROCEDURE — 85045 AUTOMATED RETICULOCYTE COUNT: CPT

## 2023-04-07 PROCEDURE — 80048 BASIC METABOLIC PNL TOTAL CA: CPT

## 2023-04-07 PROCEDURE — 99211 OFF/OP EST MAY X REQ PHY/QHP: CPT | Performed by: INTERNAL MEDICINE

## 2023-04-07 PROCEDURE — 83880 ASSAY OF NATRIURETIC PEPTIDE: CPT

## 2023-04-07 PROCEDURE — 82746 ASSAY OF FOLIC ACID SERUM: CPT

## 2023-04-07 PROCEDURE — 82947 ASSAY GLUCOSE BLOOD QUANT: CPT

## 2023-04-07 PROCEDURE — 93005 ELECTROCARDIOGRAM TRACING: CPT | Performed by: STUDENT IN AN ORGANIZED HEALTH CARE EDUCATION/TRAINING PROGRAM

## 2023-04-07 PROCEDURE — 86900 BLOOD TYPING SEROLOGIC ABO: CPT

## 2023-04-07 PROCEDURE — 36415 COLL VENOUS BLD VENIPUNCTURE: CPT

## 2023-04-07 PROCEDURE — 3077F SYST BP >= 140 MM HG: CPT | Performed by: INTERNAL MEDICINE

## 2023-04-07 PROCEDURE — 80053 COMPREHEN METABOLIC PANEL: CPT

## 2023-04-07 PROCEDURE — 82607 VITAMIN B-12: CPT

## 2023-04-07 PROCEDURE — 82668 ASSAY OF ERYTHROPOIETIN: CPT

## 2023-04-07 PROCEDURE — 83550 IRON BINDING TEST: CPT

## 2023-04-07 PROCEDURE — 86920 COMPATIBILITY TEST SPIN: CPT

## 2023-04-07 PROCEDURE — 1200000000 HC SEMI PRIVATE

## 2023-04-07 RX ORDER — SODIUM CHLORIDE 9 MG/ML
INJECTION, SOLUTION INTRAVENOUS PRN
Status: DISCONTINUED | OUTPATIENT
Start: 2023-04-07 | End: 2023-04-08 | Stop reason: HOSPADM

## 2023-04-07 RX ORDER — POLYETHYLENE GLYCOL 3350 17 G/17G
17 POWDER, FOR SOLUTION ORAL DAILY PRN
Status: DISCONTINUED | OUTPATIENT
Start: 2023-04-07 | End: 2023-04-08 | Stop reason: HOSPADM

## 2023-04-07 RX ORDER — FUROSEMIDE 10 MG/ML
20 INJECTION INTRAMUSCULAR; INTRAVENOUS DAILY
Status: DISCONTINUED | OUTPATIENT
Start: 2023-04-08 | End: 2023-04-08 | Stop reason: HOSPADM

## 2023-04-07 RX ORDER — AMLODIPINE BESYLATE 10 MG/1
10 TABLET ORAL DAILY
Status: DISCONTINUED | OUTPATIENT
Start: 2023-04-08 | End: 2023-04-08

## 2023-04-07 RX ORDER — ONDANSETRON 4 MG/1
4 TABLET, ORALLY DISINTEGRATING ORAL EVERY 8 HOURS PRN
Status: DISCONTINUED | OUTPATIENT
Start: 2023-04-07 | End: 2023-04-08 | Stop reason: HOSPADM

## 2023-04-07 RX ORDER — LISINOPRIL 20 MG/1
20 TABLET ORAL DAILY
Status: DISCONTINUED | OUTPATIENT
Start: 2023-04-08 | End: 2023-04-08

## 2023-04-07 RX ORDER — ONDANSETRON 2 MG/ML
4 INJECTION INTRAMUSCULAR; INTRAVENOUS EVERY 6 HOURS PRN
Status: DISCONTINUED | OUTPATIENT
Start: 2023-04-07 | End: 2023-04-08 | Stop reason: HOSPADM

## 2023-04-07 RX ORDER — ACETAMINOPHEN 650 MG/1
650 SUPPOSITORY RECTAL EVERY 6 HOURS PRN
Status: DISCONTINUED | OUTPATIENT
Start: 2023-04-07 | End: 2023-04-08 | Stop reason: HOSPADM

## 2023-04-07 RX ORDER — CARVEDILOL 12.5 MG/1
6.25 TABLET ORAL 2 TIMES DAILY WITH MEALS
Status: CANCELLED | OUTPATIENT
Start: 2023-04-08

## 2023-04-07 RX ORDER — SODIUM CHLORIDE 0.9 % (FLUSH) 0.9 %
5-40 SYRINGE (ML) INJECTION EVERY 12 HOURS SCHEDULED
Status: DISCONTINUED | OUTPATIENT
Start: 2023-04-07 | End: 2023-04-08 | Stop reason: HOSPADM

## 2023-04-07 RX ORDER — ATORVASTATIN CALCIUM 20 MG/1
20 TABLET, FILM COATED ORAL DAILY
Status: DISCONTINUED | OUTPATIENT
Start: 2023-04-08 | End: 2023-04-08 | Stop reason: HOSPADM

## 2023-04-07 RX ORDER — FUROSEMIDE 20 MG/1
20 TABLET ORAL DAILY
Status: CANCELLED | OUTPATIENT
Start: 2023-04-08

## 2023-04-07 RX ORDER — BUDESONIDE AND FORMOTEROL FUMARATE DIHYDRATE 80; 4.5 UG/1; UG/1
2 AEROSOL RESPIRATORY (INHALATION) 2 TIMES DAILY
Status: DISCONTINUED | OUTPATIENT
Start: 2023-04-07 | End: 2023-04-08 | Stop reason: HOSPADM

## 2023-04-07 RX ORDER — ACETAMINOPHEN 325 MG/1
650 TABLET ORAL EVERY 6 HOURS PRN
Status: DISCONTINUED | OUTPATIENT
Start: 2023-04-07 | End: 2023-04-08 | Stop reason: HOSPADM

## 2023-04-07 RX ORDER — INSULIN LISPRO 100 [IU]/ML
0-4 INJECTION, SOLUTION INTRAVENOUS; SUBCUTANEOUS NIGHTLY
Status: DISCONTINUED | OUTPATIENT
Start: 2023-04-07 | End: 2023-04-08 | Stop reason: HOSPADM

## 2023-04-07 RX ORDER — SODIUM CHLORIDE 0.9 % (FLUSH) 0.9 %
5-40 SYRINGE (ML) INJECTION PRN
Status: DISCONTINUED | OUTPATIENT
Start: 2023-04-07 | End: 2023-04-08 | Stop reason: HOSPADM

## 2023-04-07 RX ORDER — IPRATROPIUM BROMIDE AND ALBUTEROL SULFATE 2.5; .5 MG/3ML; MG/3ML
3 SOLUTION RESPIRATORY (INHALATION)
Status: DISCONTINUED | OUTPATIENT
Start: 2023-04-08 | End: 2023-04-08 | Stop reason: HOSPADM

## 2023-04-07 RX ORDER — DEXTROSE MONOHYDRATE 100 MG/ML
INJECTION, SOLUTION INTRAVENOUS CONTINUOUS PRN
Status: DISCONTINUED | OUTPATIENT
Start: 2023-04-07 | End: 2023-04-08 | Stop reason: HOSPADM

## 2023-04-07 RX ORDER — HYDRALAZINE HYDROCHLORIDE 50 MG/1
50 TABLET, FILM COATED ORAL 3 TIMES DAILY
Status: DISCONTINUED | OUTPATIENT
Start: 2023-04-07 | End: 2023-04-08 | Stop reason: HOSPADM

## 2023-04-07 RX ORDER — INSULIN LISPRO 100 [IU]/ML
0-4 INJECTION, SOLUTION INTRAVENOUS; SUBCUTANEOUS
Status: DISCONTINUED | OUTPATIENT
Start: 2023-04-08 | End: 2023-04-08 | Stop reason: HOSPADM

## 2023-04-07 ASSESSMENT — ENCOUNTER SYMPTOMS
COUGH: 0
RHINORRHEA: 0
CONSTIPATION: 0
CHOKING: 0
ABDOMINAL DISTENTION: 0
BACK PAIN: 0
DIARRHEA: 0
VOMITING: 0
ABDOMINAL PAIN: 0
NAUSEA: 0
SHORTNESS OF BREATH: 1

## 2023-04-07 NOTE — ED NOTES
Pt to room 33 via EMS with c/o SOB. Pt reports that he was at his PCP office today and had lab work done. Pt reports that when he got back from having blood work drawn, that he started having some SOB . EMS was called and pt received 1 treatment. Upon arrival, pt finishing up EMS breathing treatment. Pt reports that he received a phone call earlier that his hemoglobin was low. Pt reports that he normally wears 4L NC at home continuously. Pt placed on continuous cardiac monitor, bp and pulse ox. EKG completed, IV established, blood work drawn. Pt alert and oriented x4, talking in complete sentences, respirations even and unlabored. Pt acting age appropriate.  White board updated, will continue to plan of care       Marito Minaya RN  04/07/23 2504

## 2023-04-07 NOTE — ED NOTES
Labeled blood specimens sent to lab via tube system.     [x] Lavender   [] on ice   [x] Blue   [x] Green/yellow  [] Green/black [] on ice  [x] Pink  [] Red  [] Yellow  [] on ice  [] Blood Cultures        Selena Mcfarland RN  04/07/23 2107

## 2023-04-07 NOTE — PROGRESS NOTES
reports he quit smoking in the past 2 months   Substance and Sexual Activity    Alcohol use: No     Alcohol/week: 0.0 standard drinks    Drug use: No     Family history:  Hypertension and diabetes      Current Medications:     Current Outpatient Medications   Medication Sig Dispense Refill    metFORMIN (GLUCOPHAGE) 500 MG tablet Take 1 tablet by mouth 2 times daily (with meals) 60 tablet 5    folic acid (FOLVITE) 1 MG tablet Take 1 tablet by mouth daily 30 tablet 3    carvedilol (COREG) 6.25 MG tablet Take 1 tablet by mouth 2 times daily (with meals) 60 tablet 3    atorvastatin (LIPITOR) 20 MG tablet Take 1 tablet by mouth daily 90 tablet 3    hydrALAZINE (APRESOLINE) 50 MG tablet TAKE 1 TABLET BY MOUTH THREE TIMES A DAY 90 tablet 0    lisinopril (PRINIVIL;ZESTRIL) 20 MG tablet Take 1 tablet by mouth daily 90 tablet 2    albuterol (PROVENTIL) (5 MG/ML) 0.5% nebulizer solution Take 0.5 mLs by nebulization 4 times daily as needed for Wheezing 120 each 5    amLODIPine (NORVASC) 10 MG tablet Take 1 tablet by mouth daily 30 tablet 5    ipratropium-albuterol (DUONEB) 0.5-2.5 (3) MG/3ML SOLN nebulizer solution Inhale 3 mLs into the lungs every 4 hours (while awake) 360 mL 5    furosemide (LASIX) 20 MG tablet Take 1 tablet by mouth daily 60 tablet 5    vitamin D (CHOLECALCIFEROL) 50 MCG (2000 UT) TABS tablet Take 1 tablet by mouth daily 60 tablet 1    fluticasone-salmeterol (ADVIAR) 100-50 MCG/ACT AEPB diskus inhaler Inhale 1 puff into the lungs every 12 hours 14 each 5    tiotropium (SPIRIVA HANDIHALER) 18 MCG inhalation capsule Inhale 1 capsule into the lungs daily 30 capsule 5     No current facility-administered medications for this visit. Allergies:   Patient has no known allergies. Review of Systems:    Constitutional: No fever or chills. No night sweats, no weight loss. Positive fatigue.   Eyes: No eye discharge, double vision, or eye pain   HEENT: negative for sore mouth, sore throat, hoarseness and

## 2023-04-07 NOTE — ED PROVIDER NOTES
evaluated and examined the patient in conjunction with the APC and agree with the treatment plan and disposition of the patient as recorded by the APC.     Jw Molina MD  Attending Emergency  Physician       Malik Amaral MD  04/07/23 1362
exacerbation although no fluid on chest x-ray. As we are transfusing blood, patient will be receiving additional fluids. Feel he would benefit from admission to ensure close observation of his fluid balance in addition to a reevaluation of anemia and further work-up with hematology. Patient admitted to family medicine. PROCEDURES:      CONSULTS:  IP CONSULT TO HOSPITALIST  IP CONSULT TO FAMILY MEDICINE  IP CONSULT TO SOCIAL WORK      FINAL IMPRESSION      1. Dyspnea, unspecified type    2. Severe anemia          DISPOSITION / PLAN     DISPOSITION Admitted 04/07/2023 09:58:09 PM      PATIENT REFERRED TO:  No follow-up provider specified.     DISCHARGE MEDICATIONS:  New Prescriptions    No medications on file       Ashley Gamboa DO  Emergency Medicine Resident    (Please note that portions of thisnote were completed with a voice recognition program.  Efforts were made to edit the dictations but occasionally words are mis-transcribed.)       Ashley Gamboa DO  Resident  04/07/23 7254

## 2023-04-07 NOTE — TELEPHONE ENCOUNTER
Received a call from lab stating his Hgb is 6.8. Spoke to Dr Clair Vicente and he states pt needs to get to the hospital.  Called and informed pt to call 911 and have them take him to the hospital for a blood infusion. Pt verbalizes understanding.

## 2023-04-07 NOTE — TELEPHONE ENCOUNTER
met with patient during Medical Oncology appointment. Patient reports no issues with ride today.  went over [employer] with patient. Patient states he will call with any questions or when he has it completed.  will continue to follow.

## 2023-04-07 NOTE — TELEPHONE ENCOUNTER
AVS from 04/07/2023    Labs now      Will cordinate visit based on labs from today     Labs drawn today(cbc,cmp,cea)  Writer will follow for lab results to see if rv is needed.     PT was given AVS     Electronically signed by Jose Antonio Franklin on 4/7/2023 at 2:23 PM

## 2023-04-08 VITALS
RESPIRATION RATE: 17 BRPM | TEMPERATURE: 98.4 F | BODY MASS INDEX: 22.81 KG/M2 | OXYGEN SATURATION: 100 % | SYSTOLIC BLOOD PRESSURE: 126 MMHG | HEART RATE: 57 BPM | DIASTOLIC BLOOD PRESSURE: 63 MMHG | HEIGHT: 65 IN | WEIGHT: 136.91 LBS

## 2023-04-08 DIAGNOSIS — R97.0 ELEVATED CARCINOEMBRYONIC ANTIGEN (CEA): ICD-10-CM

## 2023-04-08 DIAGNOSIS — D64.9 NORMOCYTIC ANEMIA: Primary | ICD-10-CM

## 2023-04-08 PROBLEM — R06.00 DYSPNEA: Status: ACTIVE | Noted: 2023-04-07

## 2023-04-08 PROBLEM — I50.9 ACUTE ON CHRONIC CONGESTIVE HEART FAILURE (HCC): Status: ACTIVE | Noted: 2023-01-01

## 2023-04-08 LAB
ABO/RH: NORMAL
ANTIBODY SCREEN: NEGATIVE
ARM BAND NUMBER: NORMAL
BLD PROD TYP BPU: NORMAL
BLOOD BANK BLOOD PRODUCT EXPIRATION DATE: NORMAL
BLOOD BANK ISBT PRODUCT BLOOD TYPE: 5100
BLOOD BANK PRODUCT CODE: NORMAL
BLOOD BANK UNIT TYPE AND RH: NORMAL
BPU ID: NORMAL
CEA SERPL-MCNC: 43.8 NG/ML
CROSSMATCH RESULT: NORMAL
DISPENSE STATUS BLOOD BANK: NORMAL
EKG ATRIAL RATE: 59 BPM
EKG P-R INTERVAL: 132 MS
EKG Q-T INTERVAL: 416 MS
EKG QRS DURATION: 88 MS
EKG QTC CALCULATION (BAZETT): 411 MS
EKG R AXIS: 4 DEGREES
EKG T AXIS: 26 DEGREES
EKG VENTRICULAR RATE: 59 BPM
EXPIRATION DATE: NORMAL
FERRITIN SERPL-MCNC: 170 NG/ML (ref 30–400)
FOLATE SERPL-MCNC: 13.2 NG/ML
GLUCOSE BLD-MCNC: 124 MG/DL (ref 75–110)
GLUCOSE BLD-MCNC: 91 MG/DL (ref 75–110)
HCT VFR BLD AUTO: 20.7 % (ref 40.7–50.3)
HGB BLD-MCNC: 7.1 G/DL (ref 13–17)
IRON SATURATION: 20 % (ref 20–55)
IRON SERPL-MCNC: 32 UG/DL (ref 59–158)
TIBC SERPL-MCNC: 163 UG/DL (ref 250–450)
TRANSFUSION STATUS: NORMAL
UNIT DIVISION: 0
UNIT ISSUE DATE/TIME: NORMAL
UNSATURATED IRON BINDING CAPACITY: 131 UG/DL (ref 112–347)
VIT B12 SERPL-MCNC: 738 PG/ML (ref 232–1245)

## 2023-04-08 PROCEDURE — 6370000000 HC RX 637 (ALT 250 FOR IP)

## 2023-04-08 PROCEDURE — C9113 INJ PANTOPRAZOLE SODIUM, VIA: HCPCS

## 2023-04-08 PROCEDURE — 85014 HEMATOCRIT: CPT

## 2023-04-08 PROCEDURE — 36415 COLL VENOUS BLD VENIPUNCTURE: CPT

## 2023-04-08 PROCEDURE — 94761 N-INVAS EAR/PLS OXIMETRY MLT: CPT

## 2023-04-08 PROCEDURE — 94640 AIRWAY INHALATION TREATMENT: CPT

## 2023-04-08 PROCEDURE — 82947 ASSAY GLUCOSE BLOOD QUANT: CPT

## 2023-04-08 PROCEDURE — 93010 ELECTROCARDIOGRAM REPORT: CPT | Performed by: INTERNAL MEDICINE

## 2023-04-08 PROCEDURE — 2580000003 HC RX 258

## 2023-04-08 PROCEDURE — 6360000002 HC RX W HCPCS

## 2023-04-08 PROCEDURE — 85018 HEMOGLOBIN: CPT

## 2023-04-08 PROCEDURE — 2700000000 HC OXYGEN THERAPY PER DAY

## 2023-04-08 RX ORDER — LISINOPRIL 20 MG/1
20 TABLET ORAL DAILY
Status: DISCONTINUED | OUTPATIENT
Start: 2023-04-08 | End: 2023-04-08 | Stop reason: HOSPADM

## 2023-04-08 RX ORDER — AMLODIPINE BESYLATE 10 MG/1
10 TABLET ORAL DAILY
Status: DISCONTINUED | OUTPATIENT
Start: 2023-04-08 | End: 2023-04-08 | Stop reason: HOSPADM

## 2023-04-08 RX ADMIN — IPRATROPIUM BROMIDE AND ALBUTEROL SULFATE 3 ML: .5; 2.5 SOLUTION RESPIRATORY (INHALATION) at 08:34

## 2023-04-08 RX ADMIN — LISINOPRIL 20 MG: 20 TABLET ORAL at 10:08

## 2023-04-08 RX ADMIN — SODIUM CHLORIDE, PRESERVATIVE FREE 10 ML: 5 INJECTION INTRAVENOUS at 09:00

## 2023-04-08 RX ADMIN — HYDRALAZINE HYDROCHLORIDE 50 MG: 50 TABLET, FILM COATED ORAL at 10:08

## 2023-04-08 RX ADMIN — FUROSEMIDE 20 MG: 10 INJECTION, SOLUTION INTRAMUSCULAR; INTRAVENOUS at 09:00

## 2023-04-08 RX ADMIN — ATORVASTATIN CALCIUM 20 MG: 20 TABLET, FILM COATED ORAL at 10:08

## 2023-04-08 RX ADMIN — AMLODIPINE BESYLATE 10 MG: 10 TABLET ORAL at 10:08

## 2023-04-08 RX ADMIN — IPRATROPIUM BROMIDE AND ALBUTEROL SULFATE 3 ML: .5; 2.5 SOLUTION RESPIRATORY (INHALATION) at 12:12

## 2023-04-08 RX ADMIN — BUDESONIDE AND FORMOTEROL FUMARATE DIHYDRATE 2 PUFF: 80; 4.5 AEROSOL RESPIRATORY (INHALATION) at 08:34

## 2023-04-08 RX ADMIN — SODIUM CHLORIDE, PRESERVATIVE FREE 40 MG: 5 INJECTION INTRAVENOUS at 10:08

## 2023-04-08 NOTE — PLAN OF CARE
BRONCHOSPASM/BRONCHOCONSTRICTION     [x]         IMPROVE AERATION/BREATH SOUNDS  [x]   ADMINISTER BRONCHODILATOR THERAPY AS APPROPRIATE  [x]   ASSESS BREATH SOUNDS  []   IMPLEMENT AEROSOL/MDI PROTOCOL  [x]   PATIENT EDUCATION AS NEEDED     Problem: Respiratory - Adult  Goal: Achieves optimal ventilation and oxygenation  4/8/2023 0837 by Justo Fernando RCP  Outcome: Progressing

## 2023-04-08 NOTE — CARE COORDINATION
04/08/23 1329   Readmission Assessment   Number of Days since last admission? 8-30 days   Previous Disposition Home Alone   Who is being Interviewed Patient   What was the patient's/caregiver's perception as to why they think they needed to return back to the hospital? Other (Comment)  (didn't feel well.)   Did you visit your Primary Care Physician after you left the hospital, before you returned this time? No   Why weren't you able to visit your PCP? Other (Comment)  (did not have appt between admissions)   Did you see a specialist, such as Cardiac, Pulmonary, Orthopedic Physician, etc. after you left the hospital? No   Who advised the patient to return to the hospital? Self-referral   Does the patient report anything that got in the way of taking their medications? No   In our efforts to provide the best possible care to you and others like you, can you think of anything that we could have done to help you after you left the hospital the first time, so that you might not have needed to return so soon?  Other (Comment)  (pt stated he understood his dc instructions)
Plans to Return to Present Housing: Yes  Other Identified Issues/Barriers to RETURNING to current housing: none  Potential Assistance needed at discharge: (P) Transportation            Potential DME:    Patient expects to discharge to: (P) [facility] for transportation at discharge:      Financial    Payor: Caleb Sequeira / Plan: [address] / Product Type: *No Product type* /     Does insurance require precert for SNF: Yes    Potential assistance Purchasing Medications: (P) No  Meds-to-Beds request:        [address] [place] [address] [address] [facility]  [address] [facility] [address]  Phone: [phone] Fax: [phone]    [address] [facility] [address] [facility] Cindy Loya [phone] Lisa Lin [phone]  [address] [place] [address] [address]  [address] [place]  [address]  Phone: [phone] Fax: [phone]      Notes:    Factors facilitating achievement of predicted outcomes: Family support and Motivated    Barriers to discharge: Medical complications    Additional Case Management Notes: pt refuses HC, has Home O2 4L NC- [facility] is DME supplier. Has Home O2 travel tank in room. Needs cab transport home- only has $10, son will meet him when the cab drops him off to help get him in the house. Pt  lives with son    The Plan for Transition of Care is related to the following treatment goals of Shortness of breath [R06.02]  Severe anemia [D64.9]  Dyspnea, unspecified type [O37.09]    IF APPLICABLE: The Patient and/or patient representative Eva Brown and his family were provided with a choice of provider and agrees with the discharge plan. Freedom of choice list with basic dialogue that supports the patient's individualized plan of care/goals and shares the quality data associated with the providers was provided to: (P) Patient   Patient Representative Name:       The Patient and/or Patient Representative Agree with the Discharge Plan?  (P) Yes (pt refuses

## 2023-04-08 NOTE — DISCHARGE SUMMARY
tablet  Commonly known as: LIPITOR  Take 1 tablet by mouth daily     carvedilol 6.25 MG tablet  Commonly known as: COREG  Take 1 tablet by mouth 2 times daily (with meals)     fluticasone-salmeterol 100-50 MCG/ACT Aepb diskus inhaler  Commonly known as: ADVAIR  Inhale 1 puff into the lungs every 12 hours     folic acid 1 MG tablet  Commonly known as: FOLVITE  Take 1 tablet by mouth daily     furosemide 20 MG tablet  Commonly known as: LASIX  Take 1 tablet by mouth daily     hydrALAZINE 50 MG tablet  Commonly known as: APRESOLINE  TAKE 1 TABLET BY MOUTH THREE TIMES A DAY     ipratropium-albuterol 0.5-2.5 (3) MG/3ML Soln nebulizer solution  Commonly known as: DUONEB  Inhale 3 mLs into the lungs every 4 hours (while awake)     lisinopril 20 MG tablet  Commonly known as: PRINIVIL;ZESTRIL  Take 1 tablet by mouth daily     metFORMIN 500 MG tablet  Commonly known as: GLUCOPHAGE  Take 1 tablet by mouth 2 times daily (with meals)     Spiriva HandiHaler 18 MCG inhalation capsule  Generic drug: tiotropium  Inhale 1 capsule into the lungs daily     vitamin D 50 MCG (2000 UT) Tabs tablet  Commonly known as: CHOLECALCIFEROL  Take 1 tablet by mouth daily              Send Copies to: Allison Prieto MD      Note that over 30 minutes was spent in preparing discharge papers, discussing discharge with patient and family, medication review, etc.      Shelly Chu MD  Family Medicine Resident  Family Medicine Inpatient Service  4/8/2023 11:33 AM          Please note that this chart was generated using voice recognition Dragon dictation software.   Although every effort was made to ensure the accuracy of this automated transcription, some errors in transcription may have occurred

## 2023-04-08 NOTE — H&P
heart failure) ([facility] COPD (chronic obstructive pulmonary disease) ([facility] Diabetes mellitus ([facility] Erectile dysfunction due to arterial insufficiency, Hypertension, Microalbuminuria due to type 2 diabetes mellitus ([facility] and Overweight (BMI 25.0-29.9). PAST SURGICAL HISTORY:      has a past surgical history that includes Appendectomy and Total ankle arthroplasty. FAMILY HISTORY:     family history is not on file. HOME MEDICATIONS:     Prior to Admission medications    Medication Sig Start Date End Date Taking?  Authorizing Provider   metFORMIN (GLUCOPHAGE) 500 MG tablet Take 1 tablet by mouth 2 times daily (with meals) 3/21/23   Jona Jenkins MD   folic acid (FOLVITE) 1 MG tablet Take 1 tablet by mouth daily 3/16/23   [facility], MD   carvedilol (COREG) 6.25 MG tablet Take 1 tablet by mouth 2 times daily (with meals) 3/15/23   Lady Mario MD   atorvastatin (LIPITOR) 20 MG tablet Take 1 tablet by mouth daily 3/6/23   Leticia Duque MD   hydrALAZINE (APRESOLINE) 50 MG tablet TAKE 1 TABLET BY MOUTH THREE TIMES A DAY 2/9/23   Khari Painter MD   lisinopril (PRINIVIL;ZESTRIL) 20 MG tablet Take 1 tablet by mouth daily 1/25/23   Gary Boston MD   albuterol (PROVENTIL) (5 MG/ML) 0.5% nebulizer solution Take 0.5 mLs by nebulization 4 times daily as needed for Wheezing 7/12/22   Te Edwards MD   amLODIPine (NORVASC) 10 MG tablet Take 1 tablet by mouth daily 7/12/22   Yenifer Garcia MD   ipratropium-albuterol (DUONEB) 0.5-2.5 (3) MG/3ML SOLN nebulizer solution Inhale 3 mLs into the lungs every 4 hours (while awake) 7/12/22   Te Edwards MD   furosemide (LASIX) 20 MG tablet Take 1 tablet by mouth daily 7/12/22   Yenifer Garcia MD   vitamin D (CHOLECALCIFEROL) 50 MCG (2000 UT) TABS tablet Take 1 tablet by mouth daily 7/12/22   Yenifer Garcia MD   fluticasone-salmeterol (ADVIAR) 100-50 MCG/ACT AEPB diskus inhaler Inhale 1 puff into the lungs every 12 hours 7/12/22

## 2023-04-08 NOTE — ED NOTES
Report given to stephani CABRAL at this time.  Ticket to ride printed Segun Safer to Transport     Geneva Hoskins RN  04/07/23 6764

## 2023-04-08 NOTE — PLAN OF CARE
Problem: Discharge Planning  Goal: Discharge to home or other facility with appropriate resources  Outcome: Progressing  Flowsheets  Taken 4/8/2023 0500  Discharge to home or other facility with appropriate resources:   Identify barriers to discharge with patient and caregiver   Identify discharge learning needs (meds, wound care, etc)   Refer to discharge planning if patient needs post-hospital services based on physician order or complex needs related to functional status, cognitive ability or social support system  Taken 4/7/2023 2337  Discharge to home or other facility with appropriate resources:   Identify barriers to discharge with patient and caregiver   Identify discharge learning needs (meds, wound care, etc)   Arrange for needed discharge resources and transportation as appropriate     Problem: Safety - Adult  Goal: Free from fall injury  Outcome: Progressing  Flowsheets (Taken 4/8/2023 0500)  Free From Fall Injury: Instruct family/caregiver on patient safety     Problem: Chronic Conditions and Co-morbidities  Goal: Patient's chronic conditions and co-morbidity symptoms are monitored and maintained or improved  Outcome: Progressing  Flowsheets (Taken 4/8/2023 0500)  Care Plan - Patient's Chronic Conditions and Co-Morbidity Symptoms are Monitored and Maintained or Improved:   Monitor and assess patient's chronic conditions and comorbid symptoms for stability, deterioration, or improvement   Collaborate with multidisciplinary team to address chronic and comorbid conditions and prevent exacerbation or deterioration   Update acute care plan with appropriate goals if chronic or comorbid symptoms are exacerbated and prevent overall improvement and discharge     Problem: Respiratory - Adult  Goal: Achieves optimal ventilation and oxygenation  Outcome: Progressing  Flowsheets (Taken 4/8/2023 0500)  Achieves optimal ventilation and oxygenation:   Assess for changes in respiratory status   Assess for changes in

## 2023-04-09 LAB — ERYTHROPOIETIN: 35 MU/ML (ref 4–27)

## 2023-04-12 ENCOUNTER — TELEPHONE (OUTPATIENT)
Dept: GASTROENTEROLOGY | Age: 68
End: 2023-04-12

## 2023-04-12 RX ORDER — POLYETHYLENE GLYCOL 3350 17 G/17G
POWDER, FOR SOLUTION ORAL
Qty: 238 G | Refills: 0 | Status: CANCELLED | OUTPATIENT
Start: 2023-04-12

## 2023-04-12 NOTE — PROGRESS NOTES
NC    Thank you, please reach out for any questions! Sarah Burgess RN, CCDS, [facility] Supervisor [phone]  Options provided:  -- Chronic respiratory failure with hypoxia  -- Chronic respiratory failure with hypercapnia  -- Chronic respiratory failure with hypoxia and hypercapnia  -- Other - I will add my own diagnosis  -- Disagree - Not applicable / Not valid  -- Disagree - Clinically unable to determine / Unknown  -- Refer to Clinical Documentation Reviewer    PROVIDER RESPONSE TEXT:    This patient has chronic respiratory failure with hypoxia.     Query created by: Emily Boone on 4/12/2023 8:46 AM      Electronically signed by:  Marylene Dean MD 4/12/2023 1:13 PM
Weight: 136 lb 14.5 oz (62.1 kg), 100.7 % IBW. Weight Source: Bed Scale  Current BMI (kg/m2): 22.8  Usual Body Weight: 147 lb 4.3 oz (66.8 kg) (1/9/23 bed scale per chart review)  % Weight Change (Calculated): -7                    BMI Categories: Normal Weight (BMI 18.5-24. 9)    Estimated Daily Nutrient Needs:  Energy Requirements Based On: Kcal/kg  Weight Used for Energy Requirements: Admission  Energy (kcal/day): 3096-4493 kcals/day  Weight Used for Protein Requirements: Ideal  Protein (g/day): 90 gm pro/day  Method Used for Fluid Requirements: Other (Comment)  Fluid (ml/day): per MD/diet order: 1500 mL/day    Nutrition Diagnosis:   Inadequate oral intake related to  (decreased appetite) as evidenced by poor intake prior to admission, weight loss (need for ONS)    Nutrition Interventions:   Food and/or Nutrient Delivery: Modify Current Diet, Modify Oral Nutrition Supplement (Liberalize diet as able. Provide clear liquid ONS x 2 per day.)  Nutrition Education/Counseling: No recommendation at this time  Coordination of Nutrition Care: Continue to monitor while inpatient  Plan of Care discussed with: Patient    Goals:  Previous Goal Met:  (Goal Set)  Goals: Meet at least 75% of estimated needs, prior to discharge       Nutrition Monitoring and Evaluation:   Behavioral-Environmental Outcomes: None Identified  Food/Nutrient Intake Outcomes: Food and Nutrient Intake, Supplement Intake  Physical Signs/Symptoms Outcomes: Biochemical Data, GI Status, Fluid Status or Edema, Weight, Nutrition Focused Physical Findings, Skin    Discharge Planning:     Too soon to determine     Paige Landa RD, LD  Contact: [phone]
Ely Meyer MD   fluticasone-salmeterol (ADVIAR) 100-50 MCG/ACT AEPB diskus inhaler Inhale 1 puff into the lungs every 12 hours 7/12/22   Sindi Garcia MD   tiotropium (Eloina Livier) 18 MCG inhalation capsule Inhale 1 capsule into the lungs daily 7/12/22   Te De La Garza MD       ALLERGIES:     Patient has no known allergies. OBJECTIVE:       Vitals:    04/07/23 2344 04/07/23 2345 04/08/23 0400 04/08/23 0510   BP: (!) 159/59  (!) 164/58    Pulse:   53    Resp:   22    Temp: 97.6 °F (36.4 °C)  98.2 °F (36.8 °C)    TempSrc: Oral  Oral    SpO2: 100%  100%    Weight:  136 lb 14.5 oz (62.1 kg)  136 lb 14.5 oz (62.1 kg)   Height:  5' 5\" (1.651 m)           Intake/Output Summary (Last 24 hours) at 4/8/2023 0735  Last data filed at 4/8/2023 0315  Gross per 24 hour   Intake 353 ml   Output 300 ml   Net 53 ml       PHYSICAL EXAM:  General Appearance  Alert , awake , not in acute distress  HEENT - Head is normocephalic, atraumatic. Lungs - Bilateral equal air entry , no wheezes, rales or rhonchi, aeration good  Cardiovascular - Heart sounds are normal.  Regular rhythm, normal rate without murmur, gallop or rub.   Abdomen - Soft, mild tenderness in upper abdomen, no masses or organomegaly  Neurologic - There are no new focal motor or sensory deficits  Skin - No bruising or bleeding on exposed skin area  Extremities - No cyanosis, clubbing or edema      DIAGNOSTICS:     Laboratory Testing:    Recent Results (from the past 24 hour(s))   CBC with Auto Differential    Collection Time: 04/07/23  1:19 PM   Result Value Ref Range    WBC 3.7 3.5 - 11.0 k/uL    RBC 2.30 (L) 4.5 - 5.9 m/uL    Hemoglobin 6.8 (LL) 13.5 - 17.5 g/dL    Hematocrit 20.6 (L) 41 - 53 %    MCV 89.8 80 - 100 fL    MCH 29.6 26 - 34 pg    MCHC 33.0 31 - 37 g/dL    RDW 15.5 (H) 12.5 - 15.4 %    Platelets 026 326 - 652 k/uL    MPV 6.8 6.0 - 12.0 fL    Seg Neutrophils 73 (H) 36 - 66 %    Lymphocytes 19 (L) 24 - 44 %    Monocytes 7 2 - 11 %    Eosinophils %

## 2023-04-17 ENCOUNTER — TELEPHONE (OUTPATIENT)
Dept: ONCOLOGY | Age: 68
End: 2023-04-17

## 2023-04-17 NOTE — TELEPHONE ENCOUNTER
called patient to go over transportation for appointments 4/20 and 4/28. When going over details patient states \"those ain't gonna work, my kid has a life you know. \"  emphasized importance of appointments and patient states \"they shouldn't have changed my first appointment. \" Navigator updated.

## 2023-04-17 NOTE — TELEPHONE ENCOUNTER
Name: Nancy James  : 1955  MRN: [id number]    Oncology Navigation Follow-Up Note    Contact Type:  Telephone    Notes: Writer received a call from [place], [place] stating that when he called pt to alert him of his transportation details for his upcoming procedures, the pt states those dates and times do not work for him. Writer called patient to discuss rescheduling and or having pt directly schedule so he can coordinate with his son. Pt became irritated and states his son has a life of his own and demands that writer cancel both procedures. When writer offered to have schedulers call him so he could schedule, pt refused and states I have too much going on right now and hung up with writer. Will route this note to Dr. Bassam Brady to inform and to [place], [place] to cancel transportation and to [facility], surgery scheduler to cancel colonoscopy. WRiter will alert IR to cancel bone marrow bx.     Electronically signed by Almer Scheuermann, RN on 2023 at 12:01 PM

## 2023-05-18 NOTE — ED NOTES
PT tolerating infusion of blood well. Boxed lunch given at this time.      Darya Overton, MARIANNA  04/07/23 2904 to go home

## 2023-05-19 ENCOUNTER — TELEPHONE (OUTPATIENT)
Dept: ONCOLOGY | Age: 68
End: 2023-05-19

## 2023-05-19 NOTE — TELEPHONE ENCOUNTER
Name: Patti Sanchez  : 1955  MRN: [id number]    Oncology Navigation Follow-Up Note    Contact Type:  Telephone    Notes: Writer called patient to assess how hes feeling about being rescheduled for his cancer workup. Pt states he still feels the same and doesn't want to proceed with any workup. Writer informed him I would end navigation and respect his decision but he could call me if he decides differently in the future. Pt thanked writer and we ended call.  Will route to Dr. Mariaelena Simental and Kristi Augustin to inform on above    Electronically signed by Chasity Pizano RN on 2023 at 11:05 AM

## 2023-07-24 ENCOUNTER — HOSPITAL ENCOUNTER (INPATIENT)
Age: 68
LOS: 1 days | Discharge: HOME | DRG: 374 | End: 2023-07-26
Attending: EMERGENCY MEDICINE | Admitting: FAMILY MEDICINE
Payer: COMMERCIAL

## 2023-07-24 ENCOUNTER — APPOINTMENT (OUTPATIENT)
Dept: GENERAL RADIOLOGY | Age: 68
DRG: 374 | End: 2023-07-24
Payer: COMMERCIAL

## 2023-07-24 ENCOUNTER — APPOINTMENT (OUTPATIENT)
Dept: CT IMAGING | Age: 68
DRG: 374 | End: 2023-07-24
Payer: COMMERCIAL

## 2023-07-24 DIAGNOSIS — D53.9 MACROCYTIC ANEMIA: ICD-10-CM

## 2023-07-24 DIAGNOSIS — R63.4 UNINTENTIONAL WEIGHT LOSS: ICD-10-CM

## 2023-07-24 DIAGNOSIS — D64.9 ANEMIA, UNSPECIFIED TYPE: Primary | ICD-10-CM

## 2023-07-24 DIAGNOSIS — K76.9 HEPATIC LESION: ICD-10-CM

## 2023-07-24 LAB
ANION GAP SERPL CALCULATED.3IONS-SCNC: 11 MMOL/L (ref 9–17)
BASOPHILS # BLD: <0.03 K/UL (ref 0–0.2)
BASOPHILS NFR BLD: 0 % (ref 0–2)
BNP SERPL-MCNC: 1390 PG/ML
BUN SERPL-MCNC: 18 MG/DL (ref 8–23)
CALCIUM SERPL-MCNC: 8.2 MG/DL (ref 8.6–10.4)
CHLORIDE SERPL-SCNC: 102 MMOL/L (ref 98–107)
CO2 SERPL-SCNC: 22 MMOL/L (ref 20–31)
CREAT SERPL-MCNC: 1.2 MG/DL (ref 0.7–1.2)
D DIMER PPP FEU-MCNC: 3.05 UG/ML FEU (ref 0–0.57)
EOSINOPHIL # BLD: <0.03 K/UL (ref 0–0.44)
EOSINOPHILS RELATIVE PERCENT: 0 % (ref 1–4)
ERYTHROCYTE [DISTWIDTH] IN BLOOD BY AUTOMATED COUNT: 14.2 % (ref 11.8–14.4)
GFR SERPL CREATININE-BSD FRML MDRD: >60 ML/MIN/1.73M2
GLUCOSE SERPL-MCNC: 93 MG/DL (ref 70–99)
HCT VFR BLD AUTO: 20.5 % (ref 40.7–50.3)
HGB BLD-MCNC: 6.1 G/DL (ref 13–17)
IMM GRANULOCYTES # BLD AUTO: <0.03 K/UL (ref 0–0.3)
IMM GRANULOCYTES NFR BLD: 0 %
LYMPHOCYTES NFR BLD: 0.75 K/UL (ref 1.1–3.7)
LYMPHOCYTES RELATIVE PERCENT: 15 % (ref 24–43)
MCH RBC QN AUTO: 30.2 PG (ref 25.2–33.5)
MCHC RBC AUTO-ENTMCNC: 29.8 G/DL (ref 28.4–34.8)
MCV RBC AUTO: 101.5 FL (ref 82.6–102.9)
MONOCYTES NFR BLD: 0.36 K/UL (ref 0.1–1.2)
MONOCYTES NFR BLD: 7 % (ref 3–12)
NEUTROPHILS NFR BLD: 78 % (ref 36–65)
NEUTS SEG NFR BLD: 3.96 K/UL (ref 1.5–8.1)
NRBC BLD-RTO: 0 PER 100 WBC
PLATELET # BLD AUTO: 186 K/UL (ref 138–453)
PMV BLD AUTO: 10.3 FL (ref 8.1–13.5)
POTASSIUM SERPL-SCNC: 4.6 MMOL/L (ref 3.7–5.3)
RBC # BLD AUTO: 2.02 M/UL (ref 4.21–5.77)
SODIUM SERPL-SCNC: 135 MMOL/L (ref 135–144)
TROPONIN I SERPL HS-MCNC: 102 NG/L (ref 0–22)
TROPONIN I SERPL HS-MCNC: 98 NG/L (ref 0–22)
WBC OTHER # BLD: 5.1 K/UL (ref 3.5–11.3)

## 2023-07-24 PROCEDURE — 86850 RBC ANTIBODY SCREEN: CPT

## 2023-07-24 PROCEDURE — 71045 X-RAY EXAM CHEST 1 VIEW: CPT

## 2023-07-24 PROCEDURE — 71260 CT THORAX DX C+: CPT

## 2023-07-24 PROCEDURE — 36430 TRANSFUSION BLD/BLD COMPNT: CPT

## 2023-07-24 PROCEDURE — 86920 COMPATIBILITY TEST SPIN: CPT

## 2023-07-24 PROCEDURE — 99285 EMERGENCY DEPT VISIT HI MDM: CPT

## 2023-07-24 PROCEDURE — 83880 ASSAY OF NATRIURETIC PEPTIDE: CPT

## 2023-07-24 PROCEDURE — 6360000002 HC RX W HCPCS

## 2023-07-24 PROCEDURE — 2700000000 HC OXYGEN THERAPY PER DAY

## 2023-07-24 PROCEDURE — 84484 ASSAY OF TROPONIN QUANT: CPT

## 2023-07-24 PROCEDURE — 93005 ELECTROCARDIOGRAM TRACING: CPT

## 2023-07-24 PROCEDURE — 85379 FIBRIN DEGRADATION QUANT: CPT

## 2023-07-24 PROCEDURE — 86901 BLOOD TYPING SEROLOGIC RH(D): CPT

## 2023-07-24 PROCEDURE — 96374 THER/PROPH/DIAG INJ IV PUSH: CPT

## 2023-07-24 PROCEDURE — 6360000004 HC RX CONTRAST MEDICATION

## 2023-07-24 PROCEDURE — 85027 COMPLETE CBC AUTOMATED: CPT

## 2023-07-24 PROCEDURE — 80048 BASIC METABOLIC PNL TOTAL CA: CPT

## 2023-07-24 PROCEDURE — 86900 BLOOD TYPING SEROLOGIC ABO: CPT

## 2023-07-24 RX ORDER — ALBUTEROL SULFATE 2.5 MG/3ML
2.5 SOLUTION RESPIRATORY (INHALATION)
Status: DISCONTINUED | OUTPATIENT
Start: 2023-07-24 | End: 2023-07-26

## 2023-07-24 RX ORDER — ALBUTEROL SULFATE 2.5 MG/3ML
15 SOLUTION RESPIRATORY (INHALATION)
Status: DISCONTINUED | OUTPATIENT
Start: 2023-07-24 | End: 2023-07-26

## 2023-07-24 RX ORDER — DEXAMETHASONE SODIUM PHOSPHATE 10 MG/ML
10 INJECTION, SOLUTION INTRAMUSCULAR; INTRAVENOUS ONCE
Status: COMPLETED | OUTPATIENT
Start: 2023-07-24 | End: 2023-07-24

## 2023-07-24 RX ORDER — SODIUM CHLORIDE 9 MG/ML
INJECTION, SOLUTION INTRAVENOUS PRN
Status: DISCONTINUED | OUTPATIENT
Start: 2023-07-24 | End: 2023-07-26 | Stop reason: HOSPADM

## 2023-07-24 RX ADMIN — IOPAMIDOL 75 ML: 755 INJECTION, SOLUTION INTRAVENOUS at 22:48

## 2023-07-24 RX ADMIN — DEXAMETHASONE SODIUM PHOSPHATE 10 MG: 10 INJECTION, SOLUTION INTRAMUSCULAR; INTRAVENOUS at 21:14

## 2023-07-24 ASSESSMENT — PAIN - FUNCTIONAL ASSESSMENT: PAIN_FUNCTIONAL_ASSESSMENT: NONE - DENIES PAIN

## 2023-07-24 ASSESSMENT — ENCOUNTER SYMPTOMS
VOMITING: 0
COUGH: 1
NAUSEA: 0
SHORTNESS OF BREATH: 1
ABDOMINAL PAIN: 0

## 2023-07-25 PROBLEM — D89.2 PARAPROTEINEMIA: Status: ACTIVE | Noted: 2023-07-25

## 2023-07-25 PROBLEM — R79.89 ELEVATED TROPONIN: Status: ACTIVE | Noted: 2023-01-01

## 2023-07-25 PROBLEM — I50.22 HEART FAILURE WITH MID-RANGE EJECTION FRACTION (HFMEF) (HCC): Status: ACTIVE | Noted: 2023-01-01

## 2023-07-25 PROBLEM — K76.9 HEPATIC LESION: Status: ACTIVE | Noted: 2023-01-01

## 2023-07-25 PROBLEM — D64.9 ANEMIA: Status: ACTIVE | Noted: 2023-07-25

## 2023-07-25 PROBLEM — R77.8 ELEVATED TROPONIN: Status: ACTIVE | Noted: 2023-07-25

## 2023-07-25 PROBLEM — K92.2 GASTROINTESTINAL HEMORRHAGE: Status: ACTIVE | Noted: 2023-07-25

## 2023-07-25 PROBLEM — R91.1 LESION OF LUNG: Status: ACTIVE | Noted: 2023-07-25

## 2023-07-25 PROBLEM — D64.9 SYMPTOMATIC ANEMIA: Status: ACTIVE | Noted: 2023-01-01

## 2023-07-25 PROBLEM — I42.0 DILATED CARDIOMYOPATHY (HCC): Status: ACTIVE | Noted: 2023-01-01

## 2023-07-25 PROBLEM — E87.1 ACUTE HYPONATREMIA: Status: ACTIVE | Noted: 2023-07-25

## 2023-07-25 PROBLEM — D64.9 SEVERE ANEMIA: Status: ACTIVE | Noted: 2023-01-01

## 2023-07-25 LAB
ANION GAP SERPL CALCULATED.3IONS-SCNC: 11 MMOL/L (ref 9–17)
BODY TEMPERATURE: 37
BUN SERPL-MCNC: 19 MG/DL (ref 8–23)
CALCIUM SERPL-MCNC: 7.9 MG/DL (ref 8.6–10.4)
CHLORIDE SERPL-SCNC: 100 MMOL/L (ref 98–107)
CO2 SERPL-SCNC: 21 MMOL/L (ref 20–31)
COHGB MFR BLD: 1.4 % (ref 0–5)
CREAT SERPL-MCNC: 1.2 MG/DL (ref 0.7–1.2)
FERRITIN SERPL-MCNC: 323 NG/ML (ref 30–400)
FIO2 ON VENT: ABNORMAL %
FOLATE SERPL-MCNC: 3.9 NG/ML
GFR SERPL CREATININE-BSD FRML MDRD: >60 ML/MIN/1.73M2
GLUCOSE SERPL-MCNC: 195 MG/DL (ref 70–99)
HCO3 VENOUS: 16.7 MMOL/L (ref 24–30)
HCT VFR BLD AUTO: 25.4 % (ref 40.7–50.3)
HGB BLD-MCNC: 7.9 G/DL (ref 13–17)
IRON SATN MFR SERPL: ABNORMAL % (ref 20–55)
IRON SERPL-MCNC: 151 UG/DL (ref 59–158)
METER GLUCOSE: 145 MG/DL (ref 75–110)
NEGATIVE BASE EXCESS, VEN: 8.8 MMOL/L (ref 0–2)
O2 SAT, VEN: 51.8 % (ref 60–85)
PCO2, VEN: 36.8 MM HG (ref 39–55)
PH VENOUS: 7.28 (ref 7.32–7.42)
PO2, VEN: 29.5 MM HG (ref 30–50)
POTASSIUM SERPL-SCNC: 5.1 MMOL/L (ref 3.7–5.3)
SODIUM SERPL-SCNC: 132 MMOL/L (ref 135–144)
TIBC SERPL-MCNC: ABNORMAL UG/DL (ref 250–450)
UNSATURATED IRON BINDING CAPACITY: <17 UG/DL (ref 112–347)
VIT B12 SERPL-MCNC: 558 PG/ML (ref 232–1245)

## 2023-07-25 PROCEDURE — 94760 N-INVAS EAR/PLS OXIMETRY 1: CPT

## 2023-07-25 PROCEDURE — G0378 HOSPITAL OBSERVATION PER HR: HCPCS

## 2023-07-25 PROCEDURE — 82607 VITAMIN B-12: CPT

## 2023-07-25 PROCEDURE — 36415 COLL VENOUS BLD VENIPUNCTURE: CPT

## 2023-07-25 PROCEDURE — 2580000003 HC RX 258

## 2023-07-25 PROCEDURE — 99222 1ST HOSP IP/OBS MODERATE 55: CPT | Performed by: FAMILY MEDICINE

## 2023-07-25 PROCEDURE — 6370000000 HC RX 637 (ALT 250 FOR IP): Performed by: INTERNAL MEDICINE

## 2023-07-25 PROCEDURE — 82746 ASSAY OF FOLIC ACID SERUM: CPT

## 2023-07-25 PROCEDURE — 85014 HEMATOCRIT: CPT

## 2023-07-25 PROCEDURE — 82947 ASSAY GLUCOSE BLOOD QUANT: CPT

## 2023-07-25 PROCEDURE — 83540 ASSAY OF IRON: CPT

## 2023-07-25 PROCEDURE — 94640 AIRWAY INHALATION TREATMENT: CPT

## 2023-07-25 PROCEDURE — 94761 N-INVAS EAR/PLS OXIMETRY MLT: CPT

## 2023-07-25 PROCEDURE — 2580000003 HC RX 258: Performed by: NURSE PRACTITIONER

## 2023-07-25 PROCEDURE — 99223 1ST HOSP IP/OBS HIGH 75: CPT | Performed by: INTERNAL MEDICINE

## 2023-07-25 PROCEDURE — 6370000000 HC RX 637 (ALT 250 FOR IP)

## 2023-07-25 PROCEDURE — 80048 BASIC METABOLIC PNL TOTAL CA: CPT

## 2023-07-25 PROCEDURE — 82805 BLOOD GASES W/O2 SATURATION: CPT

## 2023-07-25 PROCEDURE — 96361 HYDRATE IV INFUSION ADD-ON: CPT

## 2023-07-25 PROCEDURE — 83550 IRON BINDING TEST: CPT

## 2023-07-25 PROCEDURE — 2700000000 HC OXYGEN THERAPY PER DAY

## 2023-07-25 PROCEDURE — 86900 BLOOD TYPING SEROLOGIC ABO: CPT

## 2023-07-25 PROCEDURE — P9016 RBC LEUKOCYTES REDUCED: HCPCS

## 2023-07-25 PROCEDURE — 82728 ASSAY OF FERRITIN: CPT

## 2023-07-25 PROCEDURE — 85018 HEMOGLOBIN: CPT

## 2023-07-25 RX ORDER — FOLIC ACID 1 MG/1
1 TABLET ORAL DAILY
Status: DISCONTINUED | OUTPATIENT
Start: 2023-07-25 | End: 2023-07-26 | Stop reason: HOSPADM

## 2023-07-25 RX ORDER — FUROSEMIDE 20 MG/1
20 TABLET ORAL DAILY
Status: DISCONTINUED | OUTPATIENT
Start: 2023-07-25 | End: 2023-07-26 | Stop reason: HOSPADM

## 2023-07-25 RX ORDER — ACETAMINOPHEN 325 MG/1
650 TABLET ORAL EVERY 6 HOURS PRN
Status: DISCONTINUED | OUTPATIENT
Start: 2023-07-25 | End: 2023-07-26

## 2023-07-25 RX ORDER — AMLODIPINE BESYLATE 10 MG/1
10 TABLET ORAL DAILY
Status: DISCONTINUED | OUTPATIENT
Start: 2023-07-25 | End: 2023-07-26 | Stop reason: HOSPADM

## 2023-07-25 RX ORDER — DEXTROSE MONOHYDRATE 100 MG/ML
INJECTION, SOLUTION INTRAVENOUS CONTINUOUS PRN
Status: DISCONTINUED | OUTPATIENT
Start: 2023-07-25 | End: 2023-07-26 | Stop reason: HOSPADM

## 2023-07-25 RX ORDER — ATORVASTATIN CALCIUM 20 MG/1
20 TABLET, FILM COATED ORAL DAILY
Status: DISCONTINUED | OUTPATIENT
Start: 2023-07-25 | End: 2023-07-26 | Stop reason: HOSPADM

## 2023-07-25 RX ORDER — SODIUM CHLORIDE 0.9 % (FLUSH) 0.9 %
10 SYRINGE (ML) INJECTION PRN
Status: DISCONTINUED | OUTPATIENT
Start: 2023-07-25 | End: 2023-07-26 | Stop reason: HOSPADM

## 2023-07-25 RX ORDER — INSULIN LISPRO 100 [IU]/ML
0-4 INJECTION, SOLUTION INTRAVENOUS; SUBCUTANEOUS
Status: DISCONTINUED | OUTPATIENT
Start: 2023-07-25 | End: 2023-07-26 | Stop reason: HOSPADM

## 2023-07-25 RX ORDER — POTASSIUM CHLORIDE 20 MEQ/1
40 TABLET, EXTENDED RELEASE ORAL PRN
Status: DISCONTINUED | OUTPATIENT
Start: 2023-07-25 | End: 2023-07-26 | Stop reason: HOSPADM

## 2023-07-25 RX ORDER — POLYETHYLENE GLYCOL 3350 17 G/17G
17 POWDER, FOR SOLUTION ORAL DAILY PRN
Status: DISCONTINUED | OUTPATIENT
Start: 2023-07-25 | End: 2023-07-26 | Stop reason: HOSPADM

## 2023-07-25 RX ORDER — LISINOPRIL 20 MG/1
20 TABLET ORAL DAILY
Status: DISCONTINUED | OUTPATIENT
Start: 2023-07-25 | End: 2023-07-26 | Stop reason: HOSPADM

## 2023-07-25 RX ORDER — SODIUM CHLORIDE 9 MG/ML
INJECTION, SOLUTION INTRAVENOUS PRN
Status: DISCONTINUED | OUTPATIENT
Start: 2023-07-25 | End: 2023-07-26

## 2023-07-25 RX ORDER — POTASSIUM CHLORIDE 7.45 MG/ML
10 INJECTION INTRAVENOUS PRN
Status: DISCONTINUED | OUTPATIENT
Start: 2023-07-25 | End: 2023-07-26 | Stop reason: HOSPADM

## 2023-07-25 RX ORDER — ACETAMINOPHEN 650 MG/1
650 SUPPOSITORY RECTAL EVERY 6 HOURS PRN
Status: DISCONTINUED | OUTPATIENT
Start: 2023-07-25 | End: 2023-07-26

## 2023-07-25 RX ORDER — PANTOPRAZOLE SODIUM 40 MG/1
40 TABLET, DELAYED RELEASE ORAL
Status: DISCONTINUED | OUTPATIENT
Start: 2023-07-25 | End: 2023-07-26 | Stop reason: HOSPADM

## 2023-07-25 RX ORDER — ONDANSETRON 2 MG/ML
4 INJECTION INTRAMUSCULAR; INTRAVENOUS EVERY 6 HOURS PRN
Status: DISCONTINUED | OUTPATIENT
Start: 2023-07-25 | End: 2023-07-26 | Stop reason: HOSPADM

## 2023-07-25 RX ORDER — HYDRALAZINE HYDROCHLORIDE 50 MG/1
50 TABLET, FILM COATED ORAL 3 TIMES DAILY
Status: DISCONTINUED | OUTPATIENT
Start: 2023-07-25 | End: 2023-07-26 | Stop reason: HOSPADM

## 2023-07-25 RX ORDER — SODIUM CHLORIDE 9 MG/ML
INJECTION, SOLUTION INTRAVENOUS CONTINUOUS
Status: DISCONTINUED | OUTPATIENT
Start: 2023-07-25 | End: 2023-07-26 | Stop reason: HOSPADM

## 2023-07-25 RX ORDER — INSULIN LISPRO 100 [IU]/ML
0-4 INJECTION, SOLUTION INTRAVENOUS; SUBCUTANEOUS NIGHTLY
Status: DISCONTINUED | OUTPATIENT
Start: 2023-07-25 | End: 2023-07-26 | Stop reason: HOSPADM

## 2023-07-25 RX ORDER — BUDESONIDE AND FORMOTEROL FUMARATE DIHYDRATE 160; 4.5 UG/1; UG/1
2 AEROSOL RESPIRATORY (INHALATION)
Status: DISCONTINUED | OUTPATIENT
Start: 2023-07-25 | End: 2023-07-26 | Stop reason: HOSPADM

## 2023-07-25 RX ORDER — BISACODYL 5 MG/1
20 TABLET, DELAYED RELEASE ORAL ONCE
Status: COMPLETED | OUTPATIENT
Start: 2023-07-25 | End: 2023-07-25

## 2023-07-25 RX ORDER — SODIUM CHLORIDE 0.9 % (FLUSH) 0.9 %
5-40 SYRINGE (ML) INJECTION EVERY 12 HOURS SCHEDULED
Status: DISCONTINUED | OUTPATIENT
Start: 2023-07-25 | End: 2023-07-26 | Stop reason: HOSPADM

## 2023-07-25 RX ORDER — ONDANSETRON 4 MG/1
4 TABLET, ORALLY DISINTEGRATING ORAL EVERY 8 HOURS PRN
Status: DISCONTINUED | OUTPATIENT
Start: 2023-07-25 | End: 2023-07-26 | Stop reason: HOSPADM

## 2023-07-25 RX ORDER — MAGNESIUM SULFATE 1 G/100ML
1000 INJECTION INTRAVENOUS PRN
Status: DISCONTINUED | OUTPATIENT
Start: 2023-07-25 | End: 2023-07-26 | Stop reason: HOSPADM

## 2023-07-25 RX ORDER — IPRATROPIUM BROMIDE AND ALBUTEROL SULFATE 2.5; .5 MG/3ML; MG/3ML
1 SOLUTION RESPIRATORY (INHALATION)
Status: DISCONTINUED | OUTPATIENT
Start: 2023-07-25 | End: 2023-07-26 | Stop reason: HOSPADM

## 2023-07-25 RX ADMIN — IPRATROPIUM BROMIDE AND ALBUTEROL SULFATE 1 DOSE: .5; 3 SOLUTION RESPIRATORY (INHALATION) at 20:48

## 2023-07-25 RX ADMIN — BISACODYL 20 MG: 5 TABLET, COATED ORAL at 14:38

## 2023-07-25 RX ADMIN — LISINOPRIL 20 MG: 20 TABLET ORAL at 10:45

## 2023-07-25 RX ADMIN — SODIUM CHLORIDE: 9 INJECTION, SOLUTION INTRAVENOUS at 14:39

## 2023-07-25 RX ADMIN — HYDRALAZINE HYDROCHLORIDE 50 MG: 50 TABLET, FILM COATED ORAL at 10:45

## 2023-07-25 RX ADMIN — FOLIC ACID 1 MG: 1 TABLET ORAL at 10:50

## 2023-07-25 RX ADMIN — IPRATROPIUM BROMIDE AND ALBUTEROL SULFATE 1 DOSE: .5; 3 SOLUTION RESPIRATORY (INHALATION) at 16:44

## 2023-07-25 RX ADMIN — SODIUM CHLORIDE, PRESERVATIVE FREE 10 ML: 5 INJECTION INTRAVENOUS at 08:54

## 2023-07-25 RX ADMIN — POLYETHYLENE GLYCOL 3350, SODIUM SULFATE ANHYDROUS, SODIUM BICARBONATE, SODIUM CHLORIDE, POTASSIUM CHLORIDE 4000 ML: 236; 22.74; 6.74; 5.86; 2.97 POWDER, FOR SOLUTION ORAL at 15:01

## 2023-07-25 RX ADMIN — BUDESONIDE AND FORMOTEROL FUMARATE DIHYDRATE 2 PUFF: 160; 4.5 AEROSOL RESPIRATORY (INHALATION) at 20:48

## 2023-07-25 RX ADMIN — PANTOPRAZOLE SODIUM 40 MG: 40 TABLET, DELAYED RELEASE ORAL at 18:43

## 2023-07-25 RX ADMIN — ATORVASTATIN CALCIUM 20 MG: 20 TABLET, FILM COATED ORAL at 20:25

## 2023-07-25 RX ADMIN — FUROSEMIDE 20 MG: 20 TABLET ORAL at 10:45

## 2023-07-25 RX ADMIN — AMLODIPINE BESYLATE 10 MG: 10 TABLET ORAL at 10:45

## 2023-07-25 RX ADMIN — BUDESONIDE AND FORMOTEROL FUMARATE DIHYDRATE 2 PUFF: 160; 4.5 AEROSOL RESPIRATORY (INHALATION) at 09:36

## 2023-07-25 ASSESSMENT — ENCOUNTER SYMPTOMS
NAUSEA: 1
COUGH: 0
VOMITING: 1
BLOOD IN STOOL: 0
ABDOMINAL PAIN: 1
SORE THROAT: 0
DIARRHEA: 1
SHORTNESS OF BREATH: 1

## 2023-07-25 NOTE — ED NOTES
Resting in cot w/ eyes closed, easily awakened. No needs voiced at this time.        Daryle Sidles, RN  07/25/23 8918

## 2023-07-25 NOTE — CONSULTS
[facility] CONSULT    Patient:   Reynaldo Streeter   :    1955   Facility:   [id number]  [address]   Date:    2023  Admission Dx:  Symptomatic anemia [D64.9]  Requesting physician: Brie Mann MD  Reason for consult:  Multiple liver lesions concerning for mets, acute anemia, history of HCV   CC : Shortness of breath     SUBJECTIVE     HISTORY OF PRESENT ILLNESS   This is a 77 y.o.  male who was admitted 2023 with Symptomatic anemia [D64.9]. We have been asked to see the patient in consultation by Brie Mann MD for  multiple liver lesions concerning for mets, acute anemia, hx of HCV. 73-year-old male with multiple medical problems including COPD on 4 L nasal cannula, CHF, diabetes, presented to the ED with chief complaint of increasing exertional shortness of breath for greater than 1 week. Hgb 6.1 gm/dl on admission. Further work-up included CT chest for pulmonary embolism showing innumerable liver lesions concerning for metastatic disease prompting GI consult. Patient is somewhat of a poor historian. Information obtained from the patient as well as the chart. He was seen earlier this year by oncology for evaluation of anemia and elevated  CEA. Recommendation was for outpatient colonoscopy and consideration of bone marrow biopsy. Patient reports colonoscopy was scheduled though canceled due to transportation issues. He reports a significant weight loss since the beginning of  but cannot quantify. He reports at 1 point he was over 200 pounds. Last weight on chart 132 pounds. Patient reports he has been having intermittent black stools that started prior to 2023. He denies any NSAID use. No dysphagia or odynophagia. He reports a loss of appetite and typically has been either consuming water or cantaloupe. He denies any history of hematochezia.   He reports typical bowel habits hours. Cancer Markers:  CEA:    Lab Results   Component Value Date/Time    CEA 43.8 04/07/2023 01:19 PM     Ca 125:  No results found for:   Ca 19-9:   No results found for:   AFP: No results found for: AFP    Lactic acid:No results for input(s): LACTACIDWB in the last 72 hours. IMAGING  XR CHEST PORTABLE    Result Date: 7/24/2023  EXAMINATION: ONE XRAY VIEW OF THE CHEST 7/24/2023 6:02 pm COMPARISON: 04/07/2023. HISTORY: ORDERING SYSTEM PROVIDED HISTORY: SOB TECHNOLOGIST PROVIDED HISTORY: SOB FINDINGS: There is a small left pleural effusion. There is no confluent airspace consolidation. The right costophrenic angle is sharp. The cardiomediastinal silhouette and pulmonary vessels appear normal.  Multiple skin folds overlie the left lung. Small left pleural effusion with no other acute findings. CT CHEST PULMONARY EMBOLISM W CONTRAST    Result Date: 7/25/2023  EXAMINATION: CTA OF THE CHEST 7/24/2023 10:49 pm TECHNIQUE: CTA of the chest was performed after the administration of intravenous contrast.  Multiplanar reformatted images are provided for review. MIP images are provided for review. Automated exposure control, iterative reconstruction, and/or weight based adjustment of the mA/kV was utilized to reduce the radiation dose to as low as reasonably achievable. COMPARISON: Chest radiograph 07/24/2023. Chest CT 11/09/2023. HISTORY: ORDERING SYSTEM PROVIDED HISTORY: elevated dimer, SOB TECHNOLOGIST PROVIDED HISTORY: elevated dimer, SOB Decision Support Exception - unselect if not a suspected or confirmed emergency medical condition->Emergency Medical Condition (MA) FINDINGS: Pulmonary Arteries: Pulmonary arteries are adequately opacified for evaluation. No evidence of intraluminal filling defect to suggest pulmonary embolism. Main pulmonary artery is enlarged measuring 3.9 cm. Mediastinum: No evidence of mediastinal lymphadenopathy.   The heart and pericardium demonstrate no acute esophageal or pancreatic/biliary malignancy. Old records, labs and imaging reviewed. PLAN   1. We will plan for EGD and colonoscopy tomorrow  2. Clear liquid diet today then n.p.o. after midnight  3. Start GoLytely 4 L bowel prep  4. Monitor H&H. Transfuse to keep hemoglobin greater than 7 g/dL as clinically indicated  5. Continue Protonix 40 mg twice daily  6. Further work-up pending results of EGD and colonoscopy    Initial care time/code: Spent 60  out of 60 minutes on this date of service including chart prep, patient interaction, discussion with primary team,- Dr Lynette Donovan, documentation and coordination of care for the above conditions    This plan was formulated in collaboration with Dr. Cristopher Mendez  Thank you for allowing us to participate in the care of your patient. Electronically signed by: MARY Bains CNP on 7/25/2023 at 10:14 AM     Please note that this note was generated using a voice recognition dictation software. Although every effort was made to ensure the accuracy of this automated transcription, some errors in transcription may have occurred.

## 2023-07-25 NOTE — H&P
[facility]  History & Physical Examination Note              Date:   7/25/2023  Patient name:  Zach Byrne  Date of admission:  7/24/2023  8:28 PM  MRN:   [id number]  YOB: 1955    CHIEF COMPLAINT:     Chief Complaint   Patient presents with    Shortness of Breath     SOB x 1 week. History Obtained From:  Patient and chart review. HPI:     Mr. Romi Hanson, 78 yo M, with previous medical history of COPD on 4 L home O2, HFrEF, HTN, DM Type II (last A1c 4.7 in Jan 2023), and chronic Hep C. Also has history of sinus bradycardia. Presented to the ED at [place] on the evening of 7/24/2023 with primary complaint of shortness of breath. Patient states that his shortness of breath did not worsen and it has stayed the same however decided to come in to get checked. Patient called EMS himself to get transported. Patient denies chest pain at this time. Patient also states that he is having diarrhea for the past week and there has been times when his stool was black also in the past several months to a year and states that he has gotten multiple colonoscopies however none found in the chart. Patient CEA has been elevated in the past as well. Was 43.8 in Apr 2023. Patient never followed up with GI for colonoscopy    In the ED,   Patient received one dose of decadron 10 mg IV  Patient's Hb was 6.1 and required 1 unit of pRBC  D-dimer was elevated that lead to a CT PE being completed that was negative for an embolus however did find a lesions in the liver that was concerning for metastasis. PAST MEDICAL HISTORY:        has a past medical history of CHF (congestive heart failure) ([address]), COPD (chronic obstructive pulmonary disease) ([address]), Diabetes mellitus ([address]), Erectile dysfunction due to arterial insufficiency, Hypertension, Microalbuminuria due to type 2 diabetes mellitus ([address]), and Overweight (BMI 25.0-29.9).     PAST Transfusion Status OK TO TRANSFUSE     Crossmatch Result COMPATIBLE    Troponin    Collection Time: 07/24/23 10:27 PM   Result Value Ref Range    Troponin, High Sensitivity 98 (HH) 0 - 22 ng/L   Hemoglobin and Hematocrit    Collection Time: 07/25/23  8:28 AM   Result Value Ref Range    Hemoglobin 7.9 (L) 13.0 - 17.0 g/dL    Hematocrit 25.4 (L) 40.7 - 50.3 %   Basic Metabolic Panel    Collection Time: 07/25/23  8:28 AM   Result Value Ref Range    Glucose 195 (H) 70 - 99 mg/dL    BUN 19 8 - 23 mg/dL    Creatinine 1.2 0.7 - 1.2 mg/dL    Est, Glom Filt Rate >60 >60 mL/min/1.73m2    Calcium 7.9 (L) 8.6 - 10.4 mg/dL    Sodium 132 (L) 135 - 144 mmol/L    Potassium 5.1 3.7 - 5.3 mmol/L    Chloride 100 98 - 107 mmol/L    CO2 21 20 - 31 mmol/L    Anion Gap 11 9 - 17 mmol/L         Imaging/Diagonstics:  XR CHEST PORTABLE    Result Date: 7/24/2023  EXAMINATION: ONE XRAY VIEW OF THE CHEST 7/24/2023 6:02 pm COMPARISON: 04/07/2023. HISTORY: ORDERING SYSTEM PROVIDED HISTORY: SOB TECHNOLOGIST PROVIDED HISTORY: SOB FINDINGS: There is a small left pleural effusion. There is no confluent airspace consolidation. The right costophrenic angle is sharp. The cardiomediastinal silhouette and pulmonary vessels appear normal.  Multiple skin folds overlie the left lung. Small left pleural effusion with no other acute findings. CT CHEST PULMONARY EMBOLISM W CONTRAST    Result Date: 7/25/2023  EXAMINATION: CTA OF THE CHEST 7/24/2023 10:49 pm TECHNIQUE: CTA of the chest was performed after the administration of intravenous contrast.  Multiplanar reformatted images are provided for review. MIP images are provided for review. Automated exposure control, iterative reconstruction, and/or weight based adjustment of the mA/kV was utilized to reduce the radiation dose to as low as reasonably achievable. COMPARISON: Chest radiograph 07/24/2023. Chest CT 11/09/2023.  HISTORY: ORDERING SYSTEM PROVIDED HISTORY: elevated dimer, SOB TECHNOLOGIST ordered  Iron studies  GI consult    Shortness of breath r/o ACS w/ history of COPD on 4 L home O2  CT PE negative for embolus  On 4 L NC at this time  Troponin 102>98  Continue DuoNeb and Symbicort inpatient  Continue home lasix 20 mg oral daily w/ hold parameters  Cardio consult   Possible stress test tomorrow    Concern of Liver Mets on CT PE  Possible colonoscopy by GI  Hem/onc consulted    HTN  Amlodipine 10 QD  Hydralizine 50 TID  Lisinopril 20 QD  All above with hold parameters    HypoNa  Continue to monitor  NS @ 75 mL/h    HFrEF  EF 40-45%  Holding home Coreg at this time due to sinus rubi  Cardio on board    DVT Ppx: Low Hb at this time  GI Ppx: Protonix 40 BID  Diet: Clear Liquid  Dispo: Step-down    OT/PT/SW consults in place    CODES STATUS Full    Consultations:   Consults: IP CONSULT TO HOSPITALIST  IP CONSULT TO CARDIOLOGY  IP CONSULT TO HEM/ONC    Yann Butler MD   PGY-3  7/25/2023 9:39 AM

## 2023-07-25 NOTE — PLAN OF CARE
Problem: Discharge Planning  Goal: Discharge to home or other facility with appropriate resources  Outcome: Progressing     Problem: Respiratory - Adult  Goal: Achieves optimal ventilation and oxygenation  7/25/2023 1745 by Eulogio Amaral RN  Outcome: Progressing  7/25/2023 1647 by Aaliyah Sutton RCP  Outcome: Progressing     Problem: Safety - Adult  Goal: Free from fall injury  Outcome: Progressing     Problem: Skin/Tissue Integrity  Goal: Absence of new skin breakdown  Description: 1. Monitor for areas of redness and/or skin breakdown  2. Assess vascular access sites hourly  3. Every 4-6 hours minimum:  Change oxygen saturation probe site  4. Every 4-6 hours:  If on nasal continuous positive airway pressure, respiratory therapy assess nares and determine need for appliance change or resting period.   Outcome: Progressing

## 2023-07-25 NOTE — ED PROVIDER NOTES
[facility] ED  Emergency Department Encounter  Emergency Medicine Resident     Pt Name:Pavan Shannon  MRN: [id number]  [facility] 1955  Date of evaluation: 23  PCP:  Mariza Cobb MD  Note Started: 10:17 PM EDT      CHIEF COMPLAINT       Chief Complaint   Patient presents with    Shortness of Breath     SOB x 1 week. HISTORY OF PRESENT ILLNESS  (Location/Symptom, Timing/Onset, Context/Setting, Quality, Duration, Modifying Factors, Severity.)      Majo Salazar is a 77 y.o. male who presents with 1 week history of shortness of breath. Patient does have a history of previous episodes of shortness of breath, does have history of COPD [on 4 L O2 at home at baseline], CHF [noncompliant with Lasix], and is being investigated for anemia of unknown origin. Patient denying chest pain but is endorsing lower limb swelling, dyspnea. Dyspnea is ongoing but worse at baseline or when patient becomes agitated. Patient is also endorsing new cough, denying hemoptysis but states that it is yellow-green. Patient denies fever, chills. PAST MEDICAL / SURGICAL / SOCIAL / FAMILY HISTORY      has a past medical history of CHF (congestive heart failure) ([address]), COPD (chronic obstructive pulmonary disease) ([address]), Diabetes mellitus ([address]), Erectile dysfunction due to arterial insufficiency, Hypertension, Microalbuminuria due to type 2 diabetes mellitus ([address]), and Overweight (BMI 25.0-29.9). has a past surgical history that includes Appendectomy and Total ankle arthroplasty. Social History     Socioeconomic History    Marital status:       Spouse name: Not on file    Number of children: Not on file    Years of education: Not on file    Highest education level: Not on file   Occupational History    Not on file   Tobacco Use    Smoking status: Former     Packs/day: 0.50     Types: Cigarettes     Quit date: 2021     Years since quittin.6    Smokeless tobacco: Never    Tobacco

## 2023-07-25 NOTE — CONSENT
Informed Consent for Blood Component Transfusion Note    I have discussed with the patient the rationale for blood component transfusion; its benefits in treating or preventing fatigue, organ damage, or death; and its risk which includes mild transfusion reactions, rare risk of blood borne infection, or more serious but rare reactions. I have discussed the alternatives to transfusion, including the risk and consequences of not receiving transfusion. The patient had an opportunity to ask questions and had agreed to proceed with transfusion of blood components.     Electronically signed by Samantha Brcue MD on 7/25/23 at 12:52 AM EDT

## 2023-07-25 NOTE — PROGRESS NOTES
RT at bedside to assess pt. Pt states he feels back to \"baseline\". Pt wears 4L home O2, BS clear bilaterally. No tx indicated at this time. Will check back later.

## 2023-07-26 ENCOUNTER — ANESTHESIA EVENT (OUTPATIENT)
Dept: OPERATING ROOM | Age: 68
End: 2023-07-26
Payer: COMMERCIAL

## 2023-07-26 ENCOUNTER — ANESTHESIA (OUTPATIENT)
Dept: OPERATING ROOM | Age: 68
End: 2023-07-26
Payer: COMMERCIAL

## 2023-07-26 VITALS
SYSTOLIC BLOOD PRESSURE: 105 MMHG | TEMPERATURE: 97.9 F | BODY MASS INDEX: 21.33 KG/M2 | RESPIRATION RATE: 16 BRPM | HEART RATE: 84 BPM | WEIGHT: 128 LBS | HEIGHT: 65 IN | OXYGEN SATURATION: 98 % | DIASTOLIC BLOOD PRESSURE: 56 MMHG

## 2023-07-26 DIAGNOSIS — E11.69 TYPE 2 DIABETES MELLITUS WITH OTHER SPECIFIED COMPLICATION, WITHOUT LONG-TERM CURRENT USE OF INSULIN (HCC): ICD-10-CM

## 2023-07-26 PROBLEM — K63.89 COLONIC MASS: Status: ACTIVE | Noted: 2023-01-01

## 2023-07-26 LAB
ABO/RH: NORMAL
ANION GAP SERPL CALCULATED.3IONS-SCNC: 9 MMOL/L (ref 9–17)
ANTIBODY SCREEN: NEGATIVE
ARM BAND NUMBER: NORMAL
BLOOD BANK BLOOD PRODUCT EXPIRATION DATE: NORMAL
BLOOD BANK DISPENSE STATUS: NORMAL
BLOOD BANK ISBT PRODUCT BLOOD TYPE: 9500
BLOOD BANK PRODUCT CODE: NORMAL
BLOOD BANK SAMPLE EXPIRATION: NORMAL
BLOOD BANK UNIT TYPE AND RH: NORMAL
BPU ID: NORMAL
BUN SERPL-MCNC: 24 MG/DL (ref 8–23)
CALCIUM SERPL-MCNC: 7.7 MG/DL (ref 8.6–10.4)
CHLORIDE SERPL-SCNC: 103 MMOL/L (ref 98–107)
CO2 SERPL-SCNC: 24 MMOL/L (ref 20–31)
COMPONENT: NORMAL
CREAT SERPL-MCNC: 1.1 MG/DL (ref 0.7–1.2)
CROSSMATCH RESULT: NORMAL
GFR SERPL CREATININE-BSD FRML MDRD: >60 ML/MIN/1.73M2
GLUCOSE SERPL-MCNC: 113 MG/DL (ref 70–99)
HCT VFR BLD AUTO: 27.8 % (ref 40.7–50.3)
HGB BLD-MCNC: 9 G/DL (ref 13–17)
METER GLUCOSE: 113 MG/DL (ref 75–110)
METER GLUCOSE: 145 MG/DL (ref 75–110)
POTASSIUM SERPL-SCNC: 4.6 MMOL/L (ref 3.7–5.3)
SODIUM SERPL-SCNC: 136 MMOL/L (ref 135–144)
TRANSFUSION STATUS: NORMAL
UNIT DIVISION: 0
UNIT ISSUE DATE/TIME: NORMAL

## 2023-07-26 PROCEDURE — 2700000000 HC OXYGEN THERAPY PER DAY

## 2023-07-26 PROCEDURE — 99222 1ST HOSP IP/OBS MODERATE 55: CPT | Performed by: FAMILY MEDICINE

## 2023-07-26 PROCEDURE — 3E0H8KZ INTRODUCTION OF OTHER DIAGNOSTIC SUBSTANCE INTO LOWER GI, VIA NATURAL OR ARTIFICIAL OPENING ENDOSCOPIC: ICD-10-PCS | Performed by: INTERNAL MEDICINE

## 2023-07-26 PROCEDURE — 94640 AIRWAY INHALATION TREATMENT: CPT

## 2023-07-26 PROCEDURE — 2709999900 HC NON-CHARGEABLE SUPPLY: Performed by: INTERNAL MEDICINE

## 2023-07-26 PROCEDURE — 96361 HYDRATE IV INFUSION ADD-ON: CPT

## 2023-07-26 PROCEDURE — 6370000000 HC RX 637 (ALT 250 FOR IP): Performed by: INTERNAL MEDICINE

## 2023-07-26 PROCEDURE — 36415 COLL VENOUS BLD VENIPUNCTURE: CPT

## 2023-07-26 PROCEDURE — G0378 HOSPITAL OBSERVATION PER HR: HCPCS

## 2023-07-26 PROCEDURE — 88360 TUMOR IMMUNOHISTOCHEM/MANUAL: CPT

## 2023-07-26 PROCEDURE — 85014 HEMATOCRIT: CPT

## 2023-07-26 PROCEDURE — 7100000001 HC PACU RECOVERY - ADDTL 15 MIN: Performed by: INTERNAL MEDICINE

## 2023-07-26 PROCEDURE — 94760 N-INVAS EAR/PLS OXIMETRY 1: CPT

## 2023-07-26 PROCEDURE — 3609019800 HC COLONOSCOPY WITH SUBMUCOSAL INJECTION: Performed by: INTERNAL MEDICINE

## 2023-07-26 PROCEDURE — 45380 COLONOSCOPY AND BIOPSY: CPT | Performed by: INTERNAL MEDICINE

## 2023-07-26 PROCEDURE — 0DB38ZX EXCISION OF LOWER ESOPHAGUS, VIA NATURAL OR ARTIFICIAL OPENING ENDOSCOPIC, DIAGNOSTIC: ICD-10-PCS | Performed by: INTERNAL MEDICINE

## 2023-07-26 PROCEDURE — 6360000002 HC RX W HCPCS

## 2023-07-26 PROCEDURE — 3700000000 HC ANESTHESIA ATTENDED CARE: Performed by: INTERNAL MEDICINE

## 2023-07-26 PROCEDURE — 43239 EGD BIOPSY SINGLE/MULTIPLE: CPT | Performed by: INTERNAL MEDICINE

## 2023-07-26 PROCEDURE — 88312 SPECIAL STAINS GROUP 1: CPT

## 2023-07-26 PROCEDURE — 88305 TISSUE EXAM BY PATHOLOGIST: CPT

## 2023-07-26 PROCEDURE — 1200000000 HC SEMI PRIVATE

## 2023-07-26 PROCEDURE — 3609010300 HC COLONOSCOPY W/BIOPSY SINGLE/MULTIPLE: Performed by: INTERNAL MEDICINE

## 2023-07-26 PROCEDURE — 2580000003 HC RX 258

## 2023-07-26 PROCEDURE — 7100000000 HC PACU RECOVERY - FIRST 15 MIN: Performed by: INTERNAL MEDICINE

## 2023-07-26 PROCEDURE — 85018 HEMOGLOBIN: CPT

## 2023-07-26 PROCEDURE — 0DBK8ZX EXCISION OF ASCENDING COLON, VIA NATURAL OR ARTIFICIAL OPENING ENDOSCOPIC, DIAGNOSTIC: ICD-10-PCS | Performed by: INTERNAL MEDICINE

## 2023-07-26 PROCEDURE — 3609012400 HC EGD TRANSORAL BIOPSY SINGLE/MULTIPLE: Performed by: INTERNAL MEDICINE

## 2023-07-26 PROCEDURE — 45381 COLONOSCOPY SUBMUCOUS NJX: CPT | Performed by: INTERNAL MEDICINE

## 2023-07-26 PROCEDURE — 82947 ASSAY GLUCOSE BLOOD QUANT: CPT

## 2023-07-26 PROCEDURE — 3700000001 HC ADD 15 MINUTES (ANESTHESIA): Performed by: INTERNAL MEDICINE

## 2023-07-26 PROCEDURE — 2500000003 HC RX 250 WO HCPCS

## 2023-07-26 PROCEDURE — 80048 BASIC METABOLIC PNL TOTAL CA: CPT

## 2023-07-26 RX ORDER — PROPOFOL 10 MG/ML
INJECTION, EMULSION INTRAVENOUS CONTINUOUS PRN
Status: DISCONTINUED | OUTPATIENT
Start: 2023-07-26 | End: 2023-07-26 | Stop reason: SDUPTHER

## 2023-07-26 RX ORDER — GLYCOPYRROLATE 0.2 MG/ML
INJECTION INTRAMUSCULAR; INTRAVENOUS PRN
Status: DISCONTINUED | OUTPATIENT
Start: 2023-07-26 | End: 2023-07-26 | Stop reason: SDUPTHER

## 2023-07-26 RX ORDER — KETAMINE HCL IN NACL, ISO-OSM 100MG/10ML
SYRINGE (ML) INJECTION PRN
Status: DISCONTINUED | OUTPATIENT
Start: 2023-07-26 | End: 2023-07-26 | Stop reason: SDUPTHER

## 2023-07-26 RX ORDER — PROPOFOL 10 MG/ML
INJECTION, EMULSION INTRAVENOUS PRN
Status: DISCONTINUED | OUTPATIENT
Start: 2023-07-26 | End: 2023-07-26 | Stop reason: SDUPTHER

## 2023-07-26 RX ORDER — LIDOCAINE HYDROCHLORIDE 10 MG/ML
INJECTION, SOLUTION EPIDURAL; INFILTRATION; INTRACAUDAL; PERINEURAL PRN
Status: DISCONTINUED | OUTPATIENT
Start: 2023-07-26 | End: 2023-07-26 | Stop reason: SDUPTHER

## 2023-07-26 RX ORDER — PANTOPRAZOLE SODIUM 40 MG/1
40 TABLET, DELAYED RELEASE ORAL
Qty: 30 TABLET | Refills: 3 | Status: SHIPPED | OUTPATIENT
Start: 2023-07-26

## 2023-07-26 RX ADMIN — IPRATROPIUM BROMIDE AND ALBUTEROL SULFATE 1 DOSE: .5; 3 SOLUTION RESPIRATORY (INHALATION) at 11:58

## 2023-07-26 RX ADMIN — LISINOPRIL 20 MG: 20 TABLET ORAL at 09:54

## 2023-07-26 RX ADMIN — SODIUM CHLORIDE: 9 INJECTION, SOLUTION INTRAVENOUS at 05:43

## 2023-07-26 RX ADMIN — GLYCOPYRROLATE 0.2 MG: 0.2 INJECTION INTRAMUSCULAR; INTRAVENOUS at 08:26

## 2023-07-26 RX ADMIN — PANTOPRAZOLE SODIUM 40 MG: 40 TABLET, DELAYED RELEASE ORAL at 09:53

## 2023-07-26 RX ADMIN — IPRATROPIUM BROMIDE AND ALBUTEROL SULFATE 1 DOSE: .5; 3 SOLUTION RESPIRATORY (INHALATION) at 16:01

## 2023-07-26 RX ADMIN — Medication 20 MG: at 08:19

## 2023-07-26 RX ADMIN — HYDRALAZINE HYDROCHLORIDE 50 MG: 50 TABLET, FILM COATED ORAL at 09:54

## 2023-07-26 RX ADMIN — Medication 10 MG: at 08:26

## 2023-07-26 RX ADMIN — HYDRALAZINE HYDROCHLORIDE 50 MG: 50 TABLET, FILM COATED ORAL at 13:33

## 2023-07-26 RX ADMIN — GLYCOPYRROLATE 0.2 MG: 0.2 INJECTION INTRAMUSCULAR; INTRAVENOUS at 08:28

## 2023-07-26 RX ADMIN — FUROSEMIDE 20 MG: 20 TABLET ORAL at 09:54

## 2023-07-26 RX ADMIN — PROPOFOL 50 MCG/KG/MIN: 10 INJECTION, EMULSION INTRAVENOUS at 08:19

## 2023-07-26 RX ADMIN — LIDOCAINE HYDROCHLORIDE 50 MG: 10 INJECTION, SOLUTION EPIDURAL; INFILTRATION; INTRACAUDAL; PERINEURAL at 08:19

## 2023-07-26 RX ADMIN — PROPOFOL 70 MG: 10 INJECTION, EMULSION INTRAVENOUS at 08:19

## 2023-07-26 RX ADMIN — AMLODIPINE BESYLATE 10 MG: 10 TABLET ORAL at 09:53

## 2023-07-26 RX ADMIN — FOLIC ACID 1 MG: 1 TABLET ORAL at 09:53

## 2023-07-26 ASSESSMENT — ENCOUNTER SYMPTOMS
VOMITING: 0
NAUSEA: 0
DIARRHEA: 1
BLOOD IN STOOL: 0
COUGH: 0
SORE THROAT: 0
SHORTNESS OF BREATH: 1
ABDOMINAL PAIN: 1

## 2023-07-26 ASSESSMENT — PAIN - FUNCTIONAL ASSESSMENT: PAIN_FUNCTIONAL_ASSESSMENT: NONE - DENIES PAIN

## 2023-07-26 NOTE — CONSULTS
Comprehensive Nutrition Assessment    Type and Reason for Visit:  Positive Nutrition Screen    Nutrition Recommendations/Plan:   Liberalize diet r/t poor intake. Monitor weight, labs and intake. Malnutrition Assessment:  Malnutrition Status:  Insufficient data (07/26/23 6351)    Context:  Chronic Illness     Findings of the 6 clinical characteristics of malnutrition:  Energy Intake:  75% or less estimated energy requirements for 1 month or longer  Weight Loss:  Greater than 10% over 6 months     Body Fat Loss:  Unable to assess     Muscle Mass Loss:  Unable to assess    Fluid Accumulation:  No significant fluid accumulation     Strength:  Not Performed    Nutrition Assessment:    Patient seen for reported weight loss/poor appetite. Admitted for symptomatic anemia, hepatic lesion, lesion of lung, colonic mass. Patient reports poor appetite/intake prior to admission. Unable to assess for malnutrition as patient covered with blanket and lights off in room. Per EHR, patient with 13.5% weight loss x6 months. Patient reports he is not eating well during stay r/t swallowing difficulty which he attributes to the EGD. Recommend consult speech if swallowing difficulty does not resolve. Discussed ONS options, patient declined all options. glucose 113-145, Ca 7.7. Medication includes lasix. Nutrition Related Findings:    Labs/meds reviewed Wound Type: None       Current Nutrition Intake & Therapies:    Average Meal Intake: 1-25%, 26-50%  Average Supplements Intake: None Ordered  ADULT DIET; Regular; No Added Salt (3-4 gm)    Anthropometric Measures:  Height: 5' 5\" (165.1 cm)  Ideal Body Weight (IBW): 136 lbs (62 kg)       Current Body Weight: 128 lb 1.4 oz (58.1 kg), 94.2 % IBW. Weight Source: Stated  Current BMI (kg/m2): 21.3                          BMI Categories: Normal Weight (BMI 18.5-24. 9)    Estimated Daily Nutrient Needs:  Energy Requirements Based On: Kcal/kg  Weight Used for Energy Requirements: Current  Energy (kcal/day): 4278-6699 kcal/day  Weight Used for Protein Requirements: Current  Protein (g/day): 70 gm/day  Method Used for Fluid Requirements: 1 ml/kcal  Fluid (ml/day): 1600 mL or per MD    Nutrition Diagnosis:   Inadequate oral intake related to catabolic illness, early satiety as evidenced by weight loss, poor intake prior to admission    Nutrition Interventions:   Food and/or Nutrient Delivery: Continue Current Diet  Nutrition Education/Counseling: No recommendation at this time  Coordination of Nutrition Care: Continue to monitor while inpatient  Plan of Care discussed with: Patient    Goals:     Goals: Meet at least 75% of estimated needs, prior to discharge       Nutrition Monitoring and Evaluation:   Behavioral-Environmental Outcomes: None Identified  Food/Nutrient Intake Outcomes: Food and Nutrient Intake  Physical Signs/Symptoms Outcomes: Biochemical Data, GI Status, Skin, Weight, Fluid Status or Edema    Discharge Planning:     Too soon to determine     Megan Alvarez, [address] [facility]  Contact: [phone]

## 2023-07-26 NOTE — PROGRESS NOTES
axial image 45 appears to have a cavitary component. Mild centrilobular emphysematous disease. Minimal subsegmental atelectasis or scar in the left lung base. Upper Abdomen: Innumerable new liver lesions are present concerning for metastatic disease. Abnormal portal caval lymph node measuring 2.5 cm in short axis. Enlarged gastrohepatic lymph nodes are also noted. Small volume upper abdominal ascites. Soft Tissues/Bones: No acute bone or soft tissue abnormality. 1.  No evidence of pulmonary embolism. Trace pleural effusions, left greater than right. 2.  Innumerable liver lesions are now present, concerning for metastatic disease. 3.  Abnormal upper abdominal lymphadenopathy, concerning for neoplastic involvement. 4.  0.7 cm nodule in the superior segment of the right lower lobe appears partially cavitary, which could represent a metastatic deposit versus inflammatory process. 5.  Small volume upper abdominal ascites.        Current Facility-Administered Medications   Medication Dose Route Frequency Provider Last Rate Last Admin    sodium chloride flush 0.9 % injection 5-40 mL  5-40 mL IntraVENous 2 times per day Rolando Seaman MD   10 mL at 07/25/23 0854    sodium chloride flush 0.9 % injection 10 mL  10 mL IntraVENous PRN Rolando Seaman MD        potassium chloride (KLOR-CON M) extended release tablet 40 mEq  40 mEq Oral PRN Rolando Seaman MD        Or    potassium bicarb-citric acid (EFFER-K) effervescent tablet 40 mEq  40 mEq Oral PRN Rolando Seaman MD        Or    potassium chloride 10 mEq/100 mL IVPB (Peripheral Line)  10 mEq IntraVENous PRN Rolando Seaman MD        magnesium sulfate 1000 mg in dextrose 5% 100 mL IVPB  1,000 mg IntraVENous PRN Rolando Seaman MD        ondansetron (ZOFRAN-ODT) disintegrating tablet 4 mg  4 mg Oral Q8H PRN Rolando Seaman MD        Or    ondansetron (ZOFRAN) injection 4 mg  4 mg IntraVENous Q6H PRN Rolando Seaman MD        polyethylene glycol (GLYCOLAX) packet 17 g  17 g Oral Daily PRN Ena Antoine MD        amLODIPine (NORVASC) tablet 10 mg  10 mg Oral Daily Ena Antoine MD   10 mg at 07/26/23 0953    atorvastatin (LIPITOR) tablet 20 mg  20 mg Oral Daily Ena Antoine MD   20 mg at 07/25/23 2025    budesonide-formoterol (SYMBICORT) 160-4.5 MCG/ACT inhaler 2 puff  2 puff Inhalation BID Ena Antoine MD   2 puff at 24/58/24 4187    folic acid (FOLVITE) tablet 1 mg  1 mg Oral Daily Ena Antoine MD   1 mg at 07/26/23 0953    furosemide (LASIX) tablet 20 mg  20 mg Oral Daily Ena Antoine MD   20 mg at 07/26/23 0954    hydrALAZINE (APRESOLINE) tablet 50 mg  50 mg Oral TID Ena Antoine MD   50 mg at 07/26/23 1333    lisinopril (PRINIVIL;ZESTRIL) tablet 20 mg  20 mg Oral Daily Ena Antoine MD   20 mg at 07/26/23 0954    ipratropium 0.5 mg-albuterol 2.5 mg (DUONEB) nebulizer solution 1 Dose  1 Dose Inhalation Q4H Africa Dunn MD   1 Dose at 07/26/23 1601    0.9 % sodium chloride infusion   IntraVENous Continuous Ena Antoine MD 75 mL/hr at 07/26/23 0813 Restarted at 07/26/23 0841    pantoprazole (PROTONIX) tablet 40 mg  40 mg Oral BID AC Ena Antoine MD   40 mg at 07/26/23 0953    glucose chewable tablet 16 g  4 tablet Oral PRN Ena Anotine MD        dextrose bolus 10% 125 mL  125 mL IntraVENous PRN Ena Antoine MD        Or    dextrose bolus 10% 250 mL  250 mL IntraVENous PRN Ena Antoine MD        glucagon (rDNA) injection 1 mg  1 mg SubCUTAneous PRN Ena Antoine MD        dextrose 10 % infusion   IntraVENous Continuous PRN Ena Antoine MD        insulin lispro (HUMALOG) injection vial 0-4 Units  0-4 Units SubCUTAneous TID WC Ena Antoine MD        insulin lispro (HUMALOG) injection vial 0-4 Units  0-4 Units SubCUTAneous Nightly Ena Antoine MD        0.9 % sodium chloride infusion   IntraVENous PRN Ena Antoine MD           ASSESSMENT:     Principal Problem:    Symptomatic anemia  Active Problems:    Sinus bradycardia agree with the assessment, plan and orders as documented by the resident. (GC Modifier)    Iron deficiency anemia- HB 9 s/p Transfusion/ Start Iron tabs 325 mg daily  EGD- Severe esophagitis with esophageal nodules- s/p bx- Start PPIs  Colonoscopy- Large Colonic mass s/pbx  possible Colon cancer FU with oncology as OP  HTN -controlled  CHF rEF - CHF clinic/ FU  as op  Case d/w GI .  Plan DC home FU with PCP in 2 weeks/Oncolgy FU

## 2023-07-26 NOTE — PLAN OF CARE
Problem: Discharge Planning  Goal: Discharge to home or other facility with appropriate resources  7/26/2023 1659 by Kika Horne RN  Outcome: Completed  7/26/2023 1615 by Kika Horne RN  Outcome: Progressing     Problem: Respiratory - Adult  Goal: Achieves optimal ventilation and oxygenation  7/26/2023 1659 by Kika Horne RN  Outcome: Completed  7/26/2023 1615 by Kika Horne RN  Outcome: Progressing     Problem: Safety - Adult  Goal: Free from fall injury  7/26/2023 1659 by Kika Horne RN  Outcome: Completed  7/26/2023 1615 by Kika Horne RN  Outcome: Progressing     Problem: Skin/Tissue Integrity  Goal: Absence of new skin breakdown  Description: 1. Monitor for areas of redness and/or skin breakdown  2. Assess vascular access sites hourly  3. Every 4-6 hours minimum:  Change oxygen saturation probe site  4. Every 4-6 hours:  If on nasal continuous positive airway pressure, respiratory therapy assess nares and determine need for appliance change or resting period.   7/26/2023 1659 by Kika Horne RN  Outcome: Completed  7/26/2023 1615 by Kika Horne RN  Outcome: Progressing     Problem: ABCDS Injury Assessment  Goal: Absence of physical injury  7/26/2023 1659 by Kika Horne RN  Outcome: Completed  7/26/2023 1615 by Kika Horne RN  Outcome: Progressing     Problem: Nutrition Deficit:  Goal: Optimize nutritional status  Outcome: Completed

## 2023-07-26 NOTE — PLAN OF CARE
Problem: Discharge Planning  Goal: Discharge to home or other facility with appropriate resources  Outcome: Progressing     Problem: Respiratory - Adult  Goal: Achieves optimal ventilation and oxygenation  Outcome: Progressing     Problem: Safety - Adult  Goal: Free from fall injury  Outcome: Progressing     Problem: Skin/Tissue Integrity  Goal: Absence of new skin breakdown  Description: 1. Monitor for areas of redness and/or skin breakdown  2. Assess vascular access sites hourly  3. Every 4-6 hours minimum:  Change oxygen saturation probe site  4. Every 4-6 hours:  If on nasal continuous positive airway pressure, respiratory therapy assess nares and determine need for appliance change or resting period.   Outcome: Progressing     Problem: ABCDS Injury Assessment  Goal: Absence of physical injury  Outcome: Progressing

## 2023-07-26 NOTE — CARE COORDINATION
Case Management Assessment  Initial Evaluation    Date/Time of Evaluation: 7/26/2023 3:07 PM  Assessment Completed by: Rubén Benton RN    If patient is discharged prior to next notation, then this note serves as note for discharge by case management. Patient Name: Koko Lawson                   YOB: 1955  Diagnosis: Symptomatic anemia [D64.9]  Anemia, unspecified type [D64.9]                   Date / Time: 7/24/2023  8:28 PM    Patient Admission Status: Inpatient   Readmission Risk (Low < 19, Mod (19-27), High > 27): Readmission Risk Score: 20.9    Current PCP: Chasity Hendrix MD  PCP verified by CM? Yes Jonna Amaral MD)    Chart Reviewed: Yes      History Provided by: Patient  Patient Orientation: Alert and Oriented    Patient Cognition: Alert    Hospitalization in the last 30 days (Readmission):  No    If yes, Readmission Assessment in CM Navigator will be completed. Advance Directives:      Code Status: Full Code   Patient's Primary Decision Maker is:  self      Discharge Planning:    Patient lives with: Children Type of Home: Apartment  Primary Care Giver: Self  Patient Support Systems include: Children   Current Financial resources: Medicaid, Medicare  Current community resources: None  Current services prior to admission: Oxygen Therapy            Current DME:  wc, rw, cane            Type of Home Care services:  None    ADLS  Prior functional level: Independent in ADLs/IADLs  Current functional level: Independent in ADLs/IADLs    PT AM-PAC:   /24  OT AM-PAC:   /24    Family can provide assistance at DC: Yes  Would you like Case Management to discuss the discharge plan with any other family members/significant others, and if so, who?     Plans to Return to Present Housing: Yes  Other Identified Issues/Barriers to RETURNING to current housing: none  Potential Assistance needed at discharge: Transportation            Patient expects to discharge to: [address] [place] [address] transportation at discharge: Cab    Financial    Payor: Eric Munguia / Plan: [address] / Product Type: *No Product type* /     Does insurance require precert for SNF: Yes    Potential assistance Purchasing Medications: No  Meds-to-Beds request: Yes      [address], [address] [facility],[address] [phone]  [place] [address]  Phone: [phone] Fax: [phone], [address] [place] [address] [address] [place]  [address] [facility]  [address] [facility],[address] [address]  Phone: [phone] Fax: [phone]      Notes:    Factors facilitating achievement of predicted outcomes: Family support    Barriers to discharge: clinical status    Additional Case Management Notes: return home with son Heike Prieto    The Plan for Transition of Care is related to the following treatment goals of Symptomatic anemia [D64.9]  Anemia, unspecified type [K29.2]    IF APPLICABLE: The Patient and/or patient representative Camila Sykes and his family were provided with a choice of provider and agrees with the discharge plan.  Freedom of choice list with basic dialogue that supports the patient's individualized plan of care/goals and shares the quality data associated with the providers was provided to:     Patient     The Patient and/or Patient Representative Agree with the Discharge Plan?  yes    Alverto Pittman RN  Case Management Department

## 2023-07-26 NOTE — TELEPHONE ENCOUNTER
Last visit: [id number]  Last Med refill: [id number]  Does patient have enough medication for 72 hours: No:     [facility] for return call to schedule for medication refills     Next Visit Date:  No future appointments.     Health Maintenance   Topic Date Due    COVID-19 Vaccine (1) Never done    Shingles vaccine (1 of 2) Never done    Diabetic retinal exam  03/01/2016    Annual Wellness Visit (AWV)  Never done    Diabetic foot exam  09/11/2021    Lipids  02/15/2023    Diabetic Alb to Cr ratio (uACR) test  02/17/2023    Depression Screen  07/12/2023    Flu vaccine (1) 08/01/2023    A1C test (Diabetic or Prediabetic)  01/09/2024    GFR test (Diabetes, CKD 3-4, OR last GFR 15-59)  07/26/2024    DTaP/Tdap/Td vaccine (2 - Td or Tdap) 08/02/2028    Colorectal Cancer Screen  07/26/2033    Pneumococcal 65+ years Vaccine  Completed    AAA screen  Completed    Hepatitis C screen  Completed    Hepatitis A vaccine  Aged Out    Hib vaccine  Aged Out    Meningococcal (ACWY) vaccine  Aged Out    Prostate Specific Antigen (PSA) Screening or Monitoring  Discontinued       Hemoglobin A1C (%)   Date Value   01/09/2023 4.7   02/15/2022 6.2   08/16/2021 5.9             ( goal A1C is < 7)   No components found for: LABMICR  LDL Cholesterol (mg/dL)   Date Value   02/15/2022 71   09/06/2019 38     LDL Calculated (mg/dL)   Date Value   07/01/2016 24       (goal LDL is <100)   AST (U/L)   Date Value   04/07/2023 20     ALT (U/L)   Date Value   04/07/2023 12     BUN (mg/dL)   Date Value   07/26/2023 24 (H)     BP Readings from Last 3 Encounters:   07/26/23 119/70   04/08/23 126/63   04/07/23 133/75          (goal 120/80)    All Future Testing planned in [facility]  Lab Frequency Next Occurrence   Microalbumin, Ur Once 84/02/5355   Basic Metabolic Panel Once 12/80/0574   CBC with Auto Differential     CEA     Initiate ED RT Bronchspasm Protocol DAILY (RT)    Initiate Oxygen Therapy Protocol PRN    Nasal Cannula Oxygen DAILY (RT)    Intake and output EVERY 8

## 2023-07-26 NOTE — OP NOTE
[address]  EGD & COLONOSCOPY      Patient:   Srikanth Roberts    :    1955    Referring/PCP: Wilmer Morgan MD  Procedure:   Colonoscopy with biopsy, tattoo;     Esophagogastroduodenoscopy  with biopsy  Facility:  [address] [place] [address]  Date:     2023  Endoscopist:  Reyna Reed MD, [facility]    Indication:  iron deficiency anemia. Abnormal CT    Postprocedure diagnosis: Severe esophagitis with esophageal nodules biopsied, malignant mass in the colon. Anesthesia:  MAC    Complications: None    EBL: None from the procedure    Specimen: Sent to the lab    Description of Procedure:  Informed consent was obtained from the patient after explanation of the procedure including indications, description of the procedure,  benefits and possible risks and complications of the procedure, and alternatives. Questions were answered. The patient's history was reviewed and a directed physical examination was performed prior to the procedure. Patient was monitored throughout the procedure with pulse oximetry and periodic assessment of vital signs. Patient was sedated as noted above. With the patient in the left lateral decubitus position, the Olympus videoendoscope was placed in the patient's mouth and under direct visualization passed into the esophagus. Visualization of the esophagus, stomach, and duodenum was performed during both introduction and withdrawal of the endoscope and retroflexed view of the proximal stomach was obtained. The scope was passed to the second portion of the duodenum. The patient tolerated the procedure well and was taken to the recovery area in good condition. Findings[de-identified]   Esophagus: Grade D reflux esophagitis in the lower esophagus with circumferential ulceration and nodularity. The nodules were likely reactive and were biopsied with cold biopsy forceps.   The rest of the esophagus was otherwise normal.   Stomach: normal  Duodenum: normal    With

## 2023-07-26 NOTE — CARE COORDINATION
Discharge [address] Case Management Department  Written by: Alverto Pittman RN    Patient Name: Aniceto Castellano  Attending Provider: Chastity Alexander MD  Admit Date: 2023  8:28 PM  MRN: [id number]  Account: [de-identified]                     : 1955  Discharge Date: 23  Portable o2 tank provided by [facility] to go home  Set up [employer] white cab trip id # [id number]    Disposition: home    Alverto Pittman RN

## 2023-07-26 NOTE — PROGRESS NOTES
Occupational [address]  Occupational Therapy Not Seen Note    DATE: 2023    NAME: Ketty Schwab  MRN: [id number]   : 1955      Patient not seen this date for Occupational Therapy due to:    Surgery/Procedure: Pt off floor at OR.      Electronically signed by Unruly Martinez OT on 2023 at 9:13 AM

## 2023-07-26 NOTE — PROGRESS NOTES
Physician Progress Note      PATIENT:               Olvin Dill  CSN #:                  [id number]  :                       1955  ADMIT DATE:       2023 8:28 PM  [address] [facility] DATE:  RESPONDING  PROVIDER #:        LANDY STEIN          QUERY TEXT:    Patient admitted with anemia and noted elevated Troponin levels, per   Cardiology consult documentation of Elevated troponin type 2 from anemia. If   possible, please document in the progress notes and discharge summary if you   are evaluating and/or treating any of the following: The medical record reflects the following:  Risk Factors: 68yr old hx dilated Cardiomyopathy, HFrEF, htn  Clinical Indicators: BNP 1390 Troponin 102>98 HGB 6.1, SOB, fatigue. Per   Cardiology consult: Acute on chronic reduced HF  Treatment: Lasix, Hold coreg, anemia work up, imaging, lab monitoring    Thank you, please reach out for any questions! Lennie Mckay RN, CCDS, [facility] Supervisor [phone]  Options provided:  -- Type 2 MI  -- Demand Ischemia with MI  -- Demand Ischemia only, no MI  -- Other - I will add my own diagnosis  -- Disagree - Not applicable / Not valid  -- Disagree - Clinically unable to determine / Unknown  -- Refer to Clinical Documentation Reviewer    PROVIDER RESPONSE TEXT:    This patient has demand ischemia only, no MI.     Query created by: Lennie Mckay on 2023 10:49 AM      Electronically signed by:  Corrinne Carolin 2023 12:15 PM

## 2023-07-26 NOTE — ANESTHESIA POSTPROCEDURE EVALUATION
Department of Anesthesiology  Postprocedure Note    Patient: Srikanth Roberts  MRN: [id number]  YOB: 1955  Date of evaluation: 7/26/2023      Procedure Summary     Date: 07/26/23 Room / Location: [address]    Anesthesia Start: 0813 Anesthesia Stop: Krystal Mcguire    Procedures:       EGD BIOPSY      COLONOSCOPY WITH BIOPSY      COLONOSCOPY SUBMUCOSAL/BOTOX INJECTION Diagnosis:       Macrocytic anemia      Unintentional weight loss      (Macrocytic anemia [D53.9])      (Unintentional weight loss [R63.4])    Surgeons: Reyna Reed MD Responsible Provider: Zina Roldan MD    Anesthesia Type: MAC, TIVA ASA Status: 2          Anesthesia Type: No value filed.     Nathaly Phase I: Nathaly Score: 9    Nathaly Phase II:        Anesthesia Post Evaluation    Patient location during evaluation: PACU  Patient participation: complete - patient participated  Level of consciousness: awake and alert  Airway patency: patent  Nausea & Vomiting: no nausea and no vomiting  Complications: no  Cardiovascular status: blood pressure returned to baseline  Respiratory status: acceptable  Hydration status: euvolemic  Comments: No known anesthesia related complication  Multimodal analgesia pain management approach

## 2023-07-26 NOTE — CONSULTS
Today's Date: 7/25/2023  Patient Name: Ketty Shcwab  Date of admission: 7/24/2023  8:28 PM  Patient's age: 77 y.o., 1955  Admission Dx: Symptomatic anemia [D64.9]  Anemia, unspecified type [D64.9]    Reason for Consult: Liver lesion/metastasis  Requesting Physician: No admitting provider for patient encounter. CHIEF COMPLAINT: Severe anemia/shortness of breath    History Obtained From:  patient    HISTORY OF PRESENT ILLNESS:      The patient is a 77 y.o.  male who is admitted to the hospital for Symptomatic anemia, with hemoglobin 6.4  He does have multiple medical problems including COPD on 4 L nasal cannula, CHF, diabetes, presented to the ED with chief complaint of increasing exertional shortness of breath for greater than 1 week. Hgb 6.1 gm/dl on admission. Further work-up included CT chest for pulmonary embolism showing innumerable liver lesions concerning for metastatic disease prompting GI consult. Patient was seen by Dr. Juana Murray for severe anemia and recommended to have colonoscopy he had high CEA at 41  Recommendation was for outpatient colonoscopy and consideration of bone marrow biopsy. Patient reports colonoscopy was scheduled though canceled due to transportation issues. significant weight loss since the beginning of 2023 but cannot quantify. He reports a loss of appetite and typically has been either consuming water or cantaloupe. He denies any history of hematochezia. Last colonoscopy> 10 years ago by Dr. Yahaira Schumacher.       Oncology/hematology history    Current problems:  Normocytic anemia with adequate iron stores  History of folic acid deficiency  Paraproteinemia  Elevated CEA  Mildly enlarged retroperitoneal lymph node per CT-1/2023  Shortness of breath  COPD    Past Medical History:   has a past medical history of CHF (congestive heart failure) ([address]), COPD (chronic obstructive pulmonary disease) ([address]), Diabetes mellitus ([address]), Erectile dysfunction due to arterial CBC:   Recent Labs     07/24/23 2056 07/25/23  0828   WBC 5.1  --    HGB 6.1* 7.9*   HCT 20.5* 25.4*     --      BMP:   Recent Labs     07/24/23 2056 07/25/23  0828    132*   K 4.6 5.1   CO2 22 21   BUN 18 19   CREATININE 1.2 1.2   LABGLOM >60 >60   GLUCOSE 93 195*     PT/INR: No results for input(s): PROTIME, INR in the last 72 hours. IMAGING DATA:  CT CHEST PULMONARY EMBOLISM W CONTRAST   Final Result   1. No evidence of pulmonary embolism. Trace pleural effusions, left greater   than right. 2.  Innumerable liver lesions are now present, concerning for metastatic   disease. 3.  Abnormal upper abdominal lymphadenopathy, concerning for neoplastic   involvement. 4.  0.7 cm nodule in the superior segment of the right lower lobe appears   partially cavitary, which could represent a metastatic deposit versus   inflammatory process. 5.  Small volume upper abdominal ascites. XR CHEST PORTABLE   Final Result   Small left pleural effusion with no other acute findings. Primary Problem  Symptomatic anemia    Active Hospital Problems    Diagnosis Date Noted    Sinus bradycardia [R00.1] 03/15/2023     Priority: Medium    Symptomatic anemia [D64.9] 07/25/2023    Elevated troponin [R77.8] 07/25/2023    Dilated cardiomyopathy ([address]) [I42.0] 07/25/2023    Gastrointestinal hemorrhage [K92.2] 07/25/2023    Hepatic lesion [K76.9] 07/25/2023    Lesion of lung [R91.1] 07/25/2023    Acute hyponatremia [E87.1] 07/25/2023    Heart failure with mid-range ejection fraction (HFmEF) ([address]) [I50.22] 07/25/2023    Severe anemia [D64.9] 07/25/2023         IMPRESSION:   Severe anemia  Multiple liver lesion  High CEA  Paraproteinemia  Right lower lobe nodule  Abdominal lymphadenopathy    RECOMMENDATIONS:  I personally reviewed results of lab work-up imaging studies,outside records and other relevant clinical data. I had a detailed discussion with the patient. I explained the significance of

## 2023-07-26 NOTE — PROGRESS NOTES
CLINICAL PHARMACY NOTE: MEDS TO BEDS    Total # of Prescriptions Filled: 1   The following medications were delivered to the patient:  Protonix 40     Additional Documentation:

## 2023-07-26 NOTE — PLAN OF CARE
BRONCHOSPASM/BRONCHOCONSTRICTION     [x]         IMPROVE AERATION/BREATH SOUNDS  [x]   ADMINISTER BRONCHODILATOR THERAPY AS APPROPRIATE  [x]   ASSESS BREATH SOUNDS  [x]   IMPLEMENT AEROSOL/MDI PROTOCOL  [x]   PATIENT EDUCATION AS NEEDED   PROVIDE ADEQUATE OXYGENATION WITH ACCEPTABLE SP02/ABG'S    [x]  IDENTIFY APPROPRIATE OXYGEN THERAPY  [x]   MONITOR SP02/ABG'S AS NEEDED   [x]   PATIENT EDUCATION AS NEEDED    Problem: Respiratory - Adult  Goal: Achieves optimal ventilation and oxygenation  7/25/2023 2058 by Jessica Matta RCP  Outcome: Progressing

## 2023-07-26 NOTE — ANESTHESIA PRE PROCEDURE
Department of Anesthesiology  Preprocedure Note       Name:  Hedii Joshua   Age:  77 y.o.  :  1955                                          MRN:  [id number]         Date:  2023      Surgeon: Ronald Sarabia):  Chuy Mariscal MD    Procedure: Procedure(s):  EGD ESOPHAGOGASTRODUODENOSCOPY  COLONOSCOPY DIAGNOSTIC    Medications prior to admission:   Prior to Admission medications    Medication Sig Start Date End Date Taking?  Authorizing Provider   metFORMIN (GLUCOPHAGE) 500 MG tablet Take 1 tablet by mouth 2 times daily (with meals) 3/21/23   Florencia Mejias MD   folic acid (FOLVITE) 1 MG tablet Take 1 tablet by mouth daily 3/16/23   [facility], MD   carvedilol (COREG) 6.25 MG tablet Take 1 tablet by mouth 2 times daily (with meals) 3/15/23   Jazmine Fay MD   atorvastatin (LIPITOR) 20 MG tablet Take 1 tablet by mouth daily 3/6/23   Renita Messer MD   hydrALAZINE (APRESOLINE) 50 MG tablet TAKE 1 TABLET BY MOUTH THREE TIMES A DAY 23   Khari Torres MD   lisinopril (PRINIVIL;ZESTRIL) 20 MG tablet Take 1 tablet by mouth daily 23   Gary Boston MD   albuterol (PROVENTIL) (5 MG/ML) 0.5% nebulizer solution Take 0.5 mLs by nebulization 4 times daily as needed for Wheezing 22   Te Madrid MD   amLODIPine (NORVASC) 10 MG tablet Take 1 tablet by mouth daily 22   Alana Brown MD   ipratropium-albuterol (DUONEB) 0.5-2.5 (3) MG/3ML SOLN nebulizer solution Inhale 3 mLs into the lungs every 4 hours (while awake) 22   Te Madrid MD   furosemide (LASIX) 20 MG tablet Take 1 tablet by mouth daily 22   Alana Brown MD   vitamin D (CHOLECALCIFEROL) 50 MCG ( UT) TABS tablet Take 1 tablet by mouth daily 22   Alana Brown MD   fluticasone-salmeterol (ADVIAR) 100-50 MCG/ACT AEPB diskus inhaler Inhale 1 puff into the lungs every 12 hours 22   Te Madrid MD   tiotropium (SPIRIVA HANDIHALER) 18 MCG inhalation capsule Inhale 1

## 2023-07-27 ENCOUNTER — TELEPHONE (OUTPATIENT)
Dept: FAMILY MEDICINE CLINIC | Age: 68
End: 2023-07-27

## 2023-07-27 ENCOUNTER — TELEPHONE (OUTPATIENT)
Dept: ONCOLOGY | Age: 68
End: 2023-07-27

## 2023-07-27 LAB
EKG ATRIAL RATE: 69 BPM
EKG P-R INTERVAL: 136 MS
EKG Q-T INTERVAL: 398 MS
EKG QRS DURATION: 78 MS
EKG QTC CALCULATION (BAZETT): 426 MS
EKG R AXIS: -44 DEGREES
EKG T AXIS: 26 DEGREES
EKG VENTRICULAR RATE: 69 BPM

## 2023-07-27 PROCEDURE — 93010 ELECTROCARDIOGRAM REPORT: CPT | Performed by: INTERNAL MEDICINE

## 2023-07-27 NOTE — DISCHARGE SUMMARY
Department of [address] [place] [address]    Discharge Summary      NAME:  Reynaldo Streeter  :  1955  MRN:  [id number]    Admit date:  2023  Discharge date:  2023    Admitting Physician:  Brie Mann MD    Primary Diagnosis on Admission:   Present on Admission:   Symptomatic anemia   Elevated troponin   Dilated cardiomyopathy ([address])   Sinus bradycardia   Gastrointestinal hemorrhage   Hepatic lesion   Lesion of lung   Acute hyponatremia   Heart failure with mid-range ejection fraction (HFmEF) (HCC)   Severe anemia   Paraproteinemia   Anemia   Colonic mass      Secondary Diagnoses:  does not have any pertinent problems on file. Admission Condition:  fair     Discharged Condition: fair    Hospital Course: The patient was admitted for the management of symptomatic anemia. Mr. Uriel Elizabeth, 76 yo M, with previous medical history of COPD on 4 L home O2, HFrEF, HTN, DM Type II (last A1c 4.7 in 2023), and chronic Hep C. Also has history of sinus bradycardia. Presented to the ED at [facility] on the evening of 2023 with primary complaint of shortness of breath. Patient states that his shortness of breath did not worsen and it has stayed the same however decided to come in to get checked. Patient called EMS himself to get transported. Patient denies chest pain at this time. Patient also states that he is having diarrhea for the past week and there has been times when his stool was black also in the past several months to a year and states that he has gotten multiple colonoscopies however none found in the chart. Patient CEA has been elevated in the past as well. Was 43.8 in 2023.  Patient never followed up with GI for colonoscopy     In the ED,   Patient received one dose of decadron 10 mg IV  Patient's Hb was 6.1 and required 1 unit of pRBC  D-dimer was elevated that lead to a CT PE being completed that was negative for an embolus however did find a lesions in the liver that was concerning for metastasis. Today on day of discharge pt feels better with no further complaints. Vitals and Labs are at pts baseline. Cardio consulted for SOB and fatigue  -Continue lisinopril 20, amlodipine 10  -Continue furosemide 20 mg, Hold Coreg 6.25 for HF exacerbation status  -Compression socks  -48-hour Holter monitor on discharge    GI consulted for multiple liver lesions concerning for mets, acute anemia, hx of HCV. -EGD and colonoscopy   -Transfuse to keep hemoglobin greater than 7 g/dL     Heme/Onc  -Possible palliative chemotherapy, patient will follow up outpatient  -Outpatient PET/CT  -Myeloma work-up    All consultants involved during this admission are agreeable to d/c. Consults:  cardiology, GI, and hematology/oncology    Significant Diagnostic/theraputic interventions:   -EGD showed grade D reflux esophagitis in the lower esophagus with circumferential ulceration and nodularity.  -Colonoscopy shows large malignant mass in the ascending colon with partial obstruction, circumferential with mild contact oozing. Disposition:   home    Instructions to Patient:   -Follow up with Adriana Baca MD hem/onc    -Follow up with Awa Ybarra MD PCP  -2000 mg/day sodium restricted diet  -Moderate daily exercise encouraged as tolerated  -monitor for 3-5 lb weight increase over 1-2 days notify physician if charge noted.   -limit fluid intake to 2 liters per day  -smoking cessation    Discharge Medications:       Medication List        START taking these medications      pantoprazole 40 MG tablet  Commonly known as: PROTONIX  Take 1 tablet by mouth 2 times daily (before meals)            CONTINUE taking these medications      albuterol (5 MG/ML) 0.5% nebulizer solution  Commonly known as: PROVENTIL  Take 0.5 mLs by nebulization 4 times daily as needed for Wheezing     amLODIPine 10 MG tablet  Commonly known as: NORVASC  Take 1 tablet by mouth daily     atorvastatin 20 MG tablet  Commonly known as: LIPITOR  Take 1 tablet by mouth daily     carvedilol 6.25 MG tablet  Commonly known as: COREG  Take 1 tablet by mouth 2 times daily (with meals)     fluticasone-salmeterol 100-50 MCG/ACT Aepb diskus inhaler  Commonly known as: ADVAIR  Inhale 1 puff into the lungs every 12 hours     folic acid 1 MG tablet  Commonly known as: FOLVITE  Take 1 tablet by mouth daily     furosemide 20 MG tablet  Commonly known as: LASIX  Take 1 tablet by mouth daily     hydrALAZINE 50 MG tablet  Commonly known as: APRESOLINE  TAKE 1 TABLET BY MOUTH THREE TIMES A DAY     ipratropium 0.5 mg-albuterol 2.5 mg 0.5-2.5 (3) MG/3ML Soln nebulizer solution  Commonly known as: DUONEB  Inhale 3 mLs into the lungs every 4 hours (while awake)     lisinopril 20 MG tablet  Commonly known as: PRINIVIL;ZESTRIL  Take 1 tablet by mouth daily     metFORMIN 500 MG tablet  Commonly known as: GLUCOPHAGE  Take 1 tablet by mouth 2 times daily (with meals)     Spiriva HandiHaler 18 MCG inhalation capsule  Generic drug: tiotropium  Inhale 1 capsule into the lungs daily     vitamin D 50 MCG (2000 UT) Tabs tablet  Commonly known as: CHOLECALCIFEROL  Take 1 tablet by mouth daily               Where to Get Your Medications        These medications were sent to [facility] [address] , [facility],[address] [place] [address]      Phone: [phone]   metFORMIN 500 MG tablet       These medications were sent to [facility] [address], [address] [place] [address]      Phone: [phone]   pantoprazole 40 MG tablet         Send Copies to: Chano Ng MD,       Note that over 30 minutes was spent in preparing discharge papers, discussing discharge with patient and family, medication review, etc.    Signed:  Araceli Babin MD Family Medicine Resident  7/27/2023, 1:29 PM

## 2023-07-27 NOTE — TELEPHONE ENCOUNTER
Name: Van Kenney  : 1955  MRN: [id number]    Oncology Navigation- Initial Note:    Intake-  Contact Type: Telephone(1st attempt)    Continuum of Care: Diagnosis/Active Treatment    Notes: Writer received a referral for navigation from Dr. Rex Silvestre. Writer called patient to reintroduce self as navigator. No answer, detailed VM left requesting a return call.     Electronically signed by Nneka Johns RN on 2023 at 2:31 PM

## 2023-07-27 NOTE — TELEPHONE ENCOUNTER
Care Transitions Initial Follow Up Call    Outreach made within 2 business days of discharge: Yes    Patient: Macie Clark Patient : 1955   MRN: [id number]  Reason for Admission: There are no discharge diagnoses documented for the most recent discharge. Discharge Date: 23       Spoke with: Left message for pt to call and schedule a hospital follow up     Follow Up  No future appointments.     Jeremiah Parker

## 2023-07-27 NOTE — PROGRESS NOTES
[facility] CARE [facility] [address] [place] [address]  PROGRESS NOTE    Shift date: 07/26/23  Shift day: Wednesday   Shift # 2    Room # 0320/0320-01   Name: Caron Lind                Spiritism:    Place of Confucianism:     Referral: Routine Visit    Admit Date & Time: 7/24/2023  8:28 PM    Assessment:  Caron Lind is a 77 y.o. male in the hospital       Intervention:  Writer introduced self and title as . Patient did not appear to mind  presence and engaged in conversation. Patient appeared anxious and coping when conversing with . Patient stated he received a recent diagnosis earlier in the day.  allowed space for feelings and emotions while providing a supportive presence. Patient requested prayer which was facilitated by . Outcome:  Patient continues processing recent medical diagnosis. Plan:  Chaplains will remain available to offer spiritual and emotional support as needed.       Electronically signed by Rey Woods on 7/26/2023 at [address] [place] [address]  [phone]

## 2023-07-28 LAB — SURGICAL PATHOLOGY REPORT: NORMAL

## 2023-07-28 NOTE — ADT AUTH CERT
Attn:  Dept. Member was in observation from 7/24 to 7/26, and upgraded to inpatient on 7/26. We sent notification next day 7/27. Please review.     Thanks

## 2023-08-01 DIAGNOSIS — I50.42 CHRONIC COMBINED SYSTOLIC AND DIASTOLIC CONGESTIVE HEART FAILURE (HCC): ICD-10-CM

## 2023-08-01 RX ORDER — LISINOPRIL 20 MG/1
20 TABLET ORAL DAILY
Qty: 90 TABLET | Refills: 2 | Status: ON HOLD | OUTPATIENT
Start: 2023-08-01

## 2023-08-01 NOTE — TELEPHONE ENCOUNTER
Last visit: [id number]  Last Med refill: [id number]  Does patient have enough medication for 72 hours: No:     Pharmacy is requesting a 90 day supply due to insurance cost being cheaper at 90 days     Next Visit Date:  Future Appointments   Date Time Provider [address]   8/4/2023  3:30 PM Kimani Diaz MD [facility]   8/14/2023  2:45 PM Christo Chacon MD [address] [facility] Maintenance   Topic Date Due    COVID-19 Vaccine (1) Never done    Shingles vaccine (1 of 2) Never done    Diabetic retinal exam  03/01/2016    Annual Wellness Visit (AWV)  Never done    Diabetic foot exam  09/11/2021    Lipids  02/15/2023    Diabetic Alb to Cr ratio (uACR) test  02/17/2023    Flu vaccine (1) Never done    Depression Screen  07/12/2023    A1C test (Diabetic or Prediabetic)  01/09/2024    GFR test (Diabetes, CKD 3-4, OR last GFR 15-59)  07/26/2024    DTaP/Tdap/Td vaccine (2 - Td or Tdap) 08/02/2028    Colorectal Cancer Screen  07/26/2033    Pneumococcal 65+ years Vaccine  Completed    AAA screen  Completed    Hepatitis C screen  Completed    Hepatitis A vaccine  Aged Out    Hib vaccine  Aged Out    Meningococcal (ACWY) vaccine  Aged Out    Prostate Specific Antigen (PSA) Screening or Monitoring  Discontinued       Hemoglobin A1C (%)   Date Value   01/09/2023 4.7   02/15/2022 6.2   08/16/2021 5.9             ( goal A1C is < 7)   No components found for: LABMICR  LDL Cholesterol (mg/dL)   Date Value   02/15/2022 71   09/06/2019 38     LDL Calculated (mg/dL)   Date Value   07/01/2016 24       (goal LDL is <100)   AST (U/L)   Date Value   04/07/2023 20     ALT (U/L)   Date Value   04/07/2023 12     BUN (mg/dL)   Date Value   07/26/2023 24 (H)     BP Readings from Last 3 Encounters:   07/26/23 (!) 105/56   04/08/23 126/63   04/07/23 133/75          (goal 120/80)    All Future Testing planned in [facility]  Lab Frequency Next Occurrence   Microalbumin, Ur Once 17/66/9039   Basic Metabolic Panel Once 63/57/1470

## 2023-08-03 ENCOUNTER — TELEPHONE (OUTPATIENT)
Dept: CASE MANAGEMENT | Age: 68
End: 2023-08-03

## 2023-08-03 NOTE — TELEPHONE ENCOUNTER
Name: Ritesh Yao  : 1955  MRN: [id number]    Oncology Navigation- Initial Note:    Intake-(2nd attempt)  Contact Type: Telephone    Continuum of Care: Diagnosis/Active Treatment    Notes: Writer called patient to introduce self as navigator. No answer, detailed VM left requesting a return call.     Electronically signed by Alireza Hauser RN on 8/3/2023 at 9:49 AM

## 2023-08-04 ENCOUNTER — HOSPITAL ENCOUNTER (INPATIENT)
Age: 68
LOS: 7 days | Discharge: HOME | DRG: 375 | End: 2023-08-11
Attending: EMERGENCY MEDICINE
Payer: COMMERCIAL

## 2023-08-04 ENCOUNTER — APPOINTMENT (OUTPATIENT)
Dept: CT IMAGING | Age: 68
DRG: 375 | End: 2023-08-04
Payer: COMMERCIAL

## 2023-08-04 ENCOUNTER — APPOINTMENT (OUTPATIENT)
Dept: GENERAL RADIOLOGY | Age: 68
DRG: 375 | End: 2023-08-04
Payer: COMMERCIAL

## 2023-08-04 DIAGNOSIS — R07.9 CHEST PAIN, UNSPECIFIED TYPE: Primary | ICD-10-CM

## 2023-08-04 DIAGNOSIS — J90 PLEURAL EFFUSION: ICD-10-CM

## 2023-08-04 LAB
ALBUMIN SERPL-MCNC: 2.4 G/DL (ref 3.5–5.2)
ALBUMIN/GLOB SERPL: 1 {RATIO} (ref 1–2.5)
ALP SERPL-CCNC: 711 U/L (ref 40–129)
ALT SERPL-CCNC: 21 U/L (ref 5–41)
AMMONIA PLAS-SCNC: 24 UMOL/L (ref 16–60)
ANION GAP SERPL CALCULATED.3IONS-SCNC: 14 MMOL/L (ref 9–17)
AST SERPL-CCNC: 39 U/L
BASOPHILS # BLD: 0 K/UL (ref 0–0.2)
BASOPHILS NFR BLD: 0 % (ref 0–2)
BILIRUB SERPL-MCNC: 1.4 MG/DL (ref 0.3–1.2)
BILIRUB UR QL STRIP: ABNORMAL
BNP SERPL-MCNC: 2879 PG/ML
BUN SERPL-MCNC: 28 MG/DL (ref 8–23)
CALCIUM SERPL-MCNC: 7.9 MG/DL (ref 8.6–10.4)
CASTS #/AREA URNS LPF: NORMAL /LPF (ref 0–2)
CASTS #/AREA URNS LPF: NORMAL /LPF (ref 0–2)
CHLORIDE SERPL-SCNC: 102 MMOL/L (ref 98–107)
CLARITY UR: CLEAR
CO2 SERPL-SCNC: 20 MMOL/L (ref 20–31)
COLOR UR: ABNORMAL
CREAT SERPL-MCNC: 1.6 MG/DL (ref 0.7–1.2)
EOSINOPHIL # BLD: 0 K/UL (ref 0–0.4)
EOSINOPHILS RELATIVE PERCENT: 0 % (ref 1–4)
EPI CELLS #/AREA URNS HPF: NORMAL /HPF (ref 0–5)
ERYTHROCYTE [DISTWIDTH] IN BLOOD BY AUTOMATED COUNT: 14.6 % (ref 11.8–14.4)
GFR SERPL CREATININE-BSD FRML MDRD: 46 ML/MIN/1.73M2
GLUCOSE SERPL-MCNC: 121 MG/DL (ref 70–99)
GLUCOSE UR STRIP-MCNC: NEGATIVE MG/DL
HCT VFR BLD AUTO: 21.2 % (ref 40.7–50.3)
HCT VFR BLD AUTO: 21.9 % (ref 40.7–50.3)
HCT VFR BLD AUTO: 23 % (ref 40.7–50.3)
HGB BLD-MCNC: 6.5 G/DL (ref 13–17)
HGB BLD-MCNC: 6.9 G/DL (ref 13–17)
HGB BLD-MCNC: 7.1 G/DL (ref 13–17)
HGB UR QL STRIP.AUTO: NEGATIVE
IMM GRANULOCYTES # BLD AUTO: 0 K/UL (ref 0–0.3)
IMM GRANULOCYTES NFR BLD: 0 %
INR PPP: 1.2
KETONES UR STRIP-MCNC: ABNORMAL MG/DL
LACTIC ACID, WHOLE BLOOD: 2.4 MMOL/L (ref 0.7–2.1)
LACTIC ACID, WHOLE BLOOD: 3.3 MMOL/L (ref 0.7–2.1)
LEUKOCYTE ESTERASE UR QL STRIP: ABNORMAL
LYMPHOCYTES NFR BLD: 0.27 K/UL (ref 1–4.8)
LYMPHOCYTES RELATIVE PERCENT: 4 % (ref 24–44)
MCH RBC QN AUTO: 30.6 PG (ref 25.2–33.5)
MCHC RBC AUTO-ENTMCNC: 30.9 G/DL (ref 28.4–34.8)
MCV RBC AUTO: 99.1 FL (ref 82.6–102.9)
MONOCYTES NFR BLD: 0.07 K/UL (ref 0.1–0.8)
MONOCYTES NFR BLD: 1 % (ref 1–7)
MORPHOLOGY: NORMAL
NEUTROPHILS NFR BLD: 95 % (ref 36–66)
NEUTS SEG NFR BLD: 6.46 K/UL (ref 1.8–7.7)
NITRITE UR QL STRIP: NEGATIVE
NRBC BLD-RTO: 0 PER 100 WBC
PARTIAL THROMBOPLASTIN TIME: 31.3 SEC (ref 23–36.5)
PH UR STRIP: 5.5 [PH] (ref 5–8)
PLATELET # BLD AUTO: 141 K/UL (ref 138–453)
PMV BLD AUTO: 10.2 FL (ref 8.1–13.5)
POTASSIUM SERPL-SCNC: 4 MMOL/L (ref 3.7–5.3)
PROT SERPL-MCNC: 4.9 G/DL (ref 6.4–8.3)
PROT UR STRIP-MCNC: ABNORMAL MG/DL
PROTHROMBIN TIME: 14.8 SEC (ref 11.7–14.9)
RBC # BLD AUTO: 2.32 M/UL (ref 4.21–5.77)
RBC #/AREA URNS HPF: NORMAL /HPF (ref 0–2)
SODIUM SERPL-SCNC: 136 MMOL/L (ref 135–144)
SP GR UR STRIP: 1.04 (ref 1–1.03)
TROPONIN I SERPL HS-MCNC: 101 NG/L (ref 0–22)
TROPONIN I SERPL HS-MCNC: 110 NG/L (ref 0–22)
UROBILINOGEN UR STRIP-ACNC: NORMAL EU/DL (ref 0–1)
WBC #/AREA URNS HPF: NORMAL /HPF (ref 0–5)
WBC OTHER # BLD: 6.8 K/UL (ref 3.5–11.3)

## 2023-08-04 PROCEDURE — 86850 RBC ANTIBODY SCREEN: CPT

## 2023-08-04 PROCEDURE — 82140 ASSAY OF AMMONIA: CPT

## 2023-08-04 PROCEDURE — 71260 CT THORAX DX C+: CPT

## 2023-08-04 PROCEDURE — 85018 HEMOGLOBIN: CPT

## 2023-08-04 PROCEDURE — 96361 HYDRATE IV INFUSION ADD-ON: CPT

## 2023-08-04 PROCEDURE — 96365 THER/PROPH/DIAG IV INF INIT: CPT

## 2023-08-04 PROCEDURE — 71045 X-RAY EXAM CHEST 1 VIEW: CPT

## 2023-08-04 PROCEDURE — G0378 HOSPITAL OBSERVATION PER HR: HCPCS

## 2023-08-04 PROCEDURE — 6360000004 HC RX CONTRAST MEDICATION: Performed by: STUDENT IN AN ORGANIZED HEALTH CARE EDUCATION/TRAINING PROGRAM

## 2023-08-04 PROCEDURE — 80053 COMPREHEN METABOLIC PANEL: CPT

## 2023-08-04 PROCEDURE — P9016 RBC LEUKOCYTES REDUCED: HCPCS

## 2023-08-04 PROCEDURE — 6370000000 HC RX 637 (ALT 250 FOR IP)

## 2023-08-04 PROCEDURE — 86901 BLOOD TYPING SEROLOGIC RH(D): CPT

## 2023-08-04 PROCEDURE — 85730 THROMBOPLASTIN TIME PARTIAL: CPT

## 2023-08-04 PROCEDURE — 83880 ASSAY OF NATRIURETIC PEPTIDE: CPT

## 2023-08-04 PROCEDURE — 96375 TX/PRO/DX INJ NEW DRUG ADDON: CPT

## 2023-08-04 PROCEDURE — 93005 ELECTROCARDIOGRAM TRACING: CPT | Performed by: STUDENT IN AN ORGANIZED HEALTH CARE EDUCATION/TRAINING PROGRAM

## 2023-08-04 PROCEDURE — 2580000003 HC RX 258

## 2023-08-04 PROCEDURE — 36415 COLL VENOUS BLD VENIPUNCTURE: CPT

## 2023-08-04 PROCEDURE — 85025 COMPLETE CBC W/AUTO DIFF WBC: CPT

## 2023-08-04 PROCEDURE — 87040 BLOOD CULTURE FOR BACTERIA: CPT

## 2023-08-04 PROCEDURE — 94640 AIRWAY INHALATION TREATMENT: CPT

## 2023-08-04 PROCEDURE — 86900 BLOOD TYPING SEROLOGIC ABO: CPT

## 2023-08-04 PROCEDURE — 85610 PROTHROMBIN TIME: CPT

## 2023-08-04 PROCEDURE — 6360000002 HC RX W HCPCS: Performed by: STUDENT IN AN ORGANIZED HEALTH CARE EDUCATION/TRAINING PROGRAM

## 2023-08-04 PROCEDURE — 36430 TRANSFUSION BLD/BLD COMPNT: CPT

## 2023-08-04 PROCEDURE — 85014 HEMATOCRIT: CPT

## 2023-08-04 PROCEDURE — 99285 EMERGENCY DEPT VISIT HI MDM: CPT

## 2023-08-04 PROCEDURE — 2060000000 HC ICU INTERMEDIATE R&B

## 2023-08-04 PROCEDURE — 83605 ASSAY OF LACTIC ACID: CPT

## 2023-08-04 PROCEDURE — 84484 ASSAY OF TROPONIN QUANT: CPT

## 2023-08-04 PROCEDURE — 81001 URINALYSIS AUTO W/SCOPE: CPT

## 2023-08-04 PROCEDURE — 96366 THER/PROPH/DIAG IV INF ADDON: CPT

## 2023-08-04 PROCEDURE — 86920 COMPATIBILITY TEST SPIN: CPT

## 2023-08-04 PROCEDURE — 2580000003 HC RX 258: Performed by: STUDENT IN AN ORGANIZED HEALTH CARE EDUCATION/TRAINING PROGRAM

## 2023-08-04 PROCEDURE — 74177 CT ABD & PELVIS W/CONTRAST: CPT

## 2023-08-04 RX ORDER — PANTOPRAZOLE SODIUM 40 MG/1
40 TABLET, DELAYED RELEASE ORAL
Status: DISCONTINUED | OUTPATIENT
Start: 2023-08-05 | End: 2023-08-11 | Stop reason: HOSPADM

## 2023-08-04 RX ORDER — ONDANSETRON 4 MG/1
4 TABLET, ORALLY DISINTEGRATING ORAL EVERY 8 HOURS PRN
Status: DISCONTINUED | OUTPATIENT
Start: 2023-08-04 | End: 2023-08-11 | Stop reason: HOSPADM

## 2023-08-04 RX ORDER — POLYETHYLENE GLYCOL 3350 17 G/17G
17 POWDER, FOR SOLUTION ORAL DAILY PRN
Status: DISCONTINUED | OUTPATIENT
Start: 2023-08-04 | End: 2023-08-11 | Stop reason: HOSPADM

## 2023-08-04 RX ORDER — BUDESONIDE AND FORMOTEROL FUMARATE DIHYDRATE 80; 4.5 UG/1; UG/1
2 AEROSOL RESPIRATORY (INHALATION)
Status: DISCONTINUED | OUTPATIENT
Start: 2023-08-04 | End: 2023-08-11 | Stop reason: HOSPADM

## 2023-08-04 RX ORDER — FENTANYL CITRATE 50 UG/ML
25 INJECTION, SOLUTION INTRAMUSCULAR; INTRAVENOUS ONCE
Status: COMPLETED | OUTPATIENT
Start: 2023-08-04 | End: 2023-08-04

## 2023-08-04 RX ORDER — ALBUTEROL SULFATE 2.5 MG/3ML
2.5 SOLUTION RESPIRATORY (INHALATION) EVERY 4 HOURS PRN
Status: DISCONTINUED | OUTPATIENT
Start: 2023-08-04 | End: 2023-08-11 | Stop reason: HOSPADM

## 2023-08-04 RX ORDER — SODIUM CHLORIDE, SODIUM LACTATE, POTASSIUM CHLORIDE, AND CALCIUM CHLORIDE .6; .31; .03; .02 G/100ML; G/100ML; G/100ML; G/100ML
1000 INJECTION, SOLUTION INTRAVENOUS ONCE
Status: COMPLETED | OUTPATIENT
Start: 2023-08-04 | End: 2023-08-04

## 2023-08-04 RX ORDER — CARVEDILOL 3.12 MG/1
3.12 TABLET ORAL 2 TIMES DAILY WITH MEALS
Status: DISCONTINUED | OUTPATIENT
Start: 2023-08-04 | End: 2023-08-11 | Stop reason: HOSPADM

## 2023-08-04 RX ORDER — SODIUM CHLORIDE 9 MG/ML
INJECTION, SOLUTION INTRAVENOUS PRN
Status: DISCONTINUED | OUTPATIENT
Start: 2023-08-04 | End: 2023-08-11 | Stop reason: HOSPADM

## 2023-08-04 RX ORDER — SODIUM CHLORIDE 9 MG/ML
INJECTION, SOLUTION INTRAVENOUS CONTINUOUS
Status: DISCONTINUED | OUTPATIENT
Start: 2023-08-04 | End: 2023-08-05

## 2023-08-04 RX ORDER — FOLIC ACID 1 MG/1
1 TABLET ORAL DAILY
Status: DISCONTINUED | OUTPATIENT
Start: 2023-08-04 | End: 2023-08-11 | Stop reason: HOSPADM

## 2023-08-04 RX ORDER — ACETAMINOPHEN 650 MG/1
650 SUPPOSITORY RECTAL EVERY 6 HOURS PRN
Status: DISCONTINUED | OUTPATIENT
Start: 2023-08-04 | End: 2023-08-11 | Stop reason: HOSPADM

## 2023-08-04 RX ORDER — ATORVASTATIN CALCIUM 20 MG/1
20 TABLET, FILM COATED ORAL DAILY
Status: DISCONTINUED | OUTPATIENT
Start: 2023-08-04 | End: 2023-08-11 | Stop reason: HOSPADM

## 2023-08-04 RX ORDER — IPRATROPIUM BROMIDE AND ALBUTEROL SULFATE 2.5; .5 MG/3ML; MG/3ML
1 SOLUTION RESPIRATORY (INHALATION)
Status: DISCONTINUED | OUTPATIENT
Start: 2023-08-04 | End: 2023-08-08

## 2023-08-04 RX ORDER — SODIUM CHLORIDE 0.9 % (FLUSH) 0.9 %
5-40 SYRINGE (ML) INJECTION EVERY 12 HOURS SCHEDULED
Status: DISCONTINUED | OUTPATIENT
Start: 2023-08-04 | End: 2023-08-11 | Stop reason: HOSPADM

## 2023-08-04 RX ORDER — SODIUM CHLORIDE 9 MG/ML
INJECTION, SOLUTION INTRAVENOUS PRN
Status: DISCONTINUED | OUTPATIENT
Start: 2023-08-04 | End: 2023-08-10

## 2023-08-04 RX ORDER — SODIUM CHLORIDE 0.9 % (FLUSH) 0.9 %
5-40 SYRINGE (ML) INJECTION PRN
Status: DISCONTINUED | OUTPATIENT
Start: 2023-08-04 | End: 2023-08-11 | Stop reason: HOSPADM

## 2023-08-04 RX ORDER — ACETAMINOPHEN 325 MG/1
650 TABLET ORAL EVERY 6 HOURS PRN
Status: DISCONTINUED | OUTPATIENT
Start: 2023-08-04 | End: 2023-08-11 | Stop reason: HOSPADM

## 2023-08-04 RX ORDER — ONDANSETRON 2 MG/ML
4 INJECTION INTRAMUSCULAR; INTRAVENOUS EVERY 6 HOURS PRN
Status: DISCONTINUED | OUTPATIENT
Start: 2023-08-04 | End: 2023-08-11 | Stop reason: HOSPADM

## 2023-08-04 RX ADMIN — CEFTRIAXONE SODIUM 1000 MG: 1 INJECTION, POWDER, FOR SOLUTION INTRAMUSCULAR; INTRAVENOUS at 13:29

## 2023-08-04 RX ADMIN — FENTANYL CITRATE 25 MCG: 50 INJECTION, SOLUTION INTRAMUSCULAR; INTRAVENOUS at 12:35

## 2023-08-04 RX ADMIN — FOLIC ACID 1 MG: 1 TABLET ORAL at 22:51

## 2023-08-04 RX ADMIN — ATORVASTATIN CALCIUM 20 MG: 20 TABLET, FILM COATED ORAL at 22:52

## 2023-08-04 RX ADMIN — IOPAMIDOL 75 ML: 755 INJECTION, SOLUTION INTRAVENOUS at 13:39

## 2023-08-04 RX ADMIN — BUDESONIDE AND FORMOTEROL FUMARATE DIHYDRATE 2 PUFF: 80; 4.5 AEROSOL RESPIRATORY (INHALATION) at 20:40

## 2023-08-04 RX ADMIN — IPRATROPIUM BROMIDE AND ALBUTEROL SULFATE 1 DOSE: .5; 3 SOLUTION RESPIRATORY (INHALATION) at 20:40

## 2023-08-04 RX ADMIN — SODIUM CHLORIDE: 9 INJECTION, SOLUTION INTRAVENOUS at 22:01

## 2023-08-04 RX ADMIN — SODIUM CHLORIDE, POTASSIUM CHLORIDE, SODIUM LACTATE AND CALCIUM CHLORIDE 1000 ML: 600; 310; 30; 20 INJECTION, SOLUTION INTRAVENOUS at 12:32

## 2023-08-04 ASSESSMENT — PAIN SCALES - GENERAL: PAINLEVEL_OUTOF10: 0

## 2023-08-04 ASSESSMENT — PAIN - FUNCTIONAL ASSESSMENT: PAIN_FUNCTIONAL_ASSESSMENT: 0-10

## 2023-08-04 NOTE — ED NOTES
Pt to ED via ELS11 a/o x4 with c/o chest pain. Pt reports he has been having chest pain for a few days. Pt stated he has hx of COPD and CHF. Pt reports he wears 4 L NC at baseline. Pt also reports he has a recent diagnosis of colon and liver cancer. Pt reports he had a recent admission and since then he has been having episodes of diarrhea and emesis. Pt was given 324 ASA and 1 nitro pta. Pt reports his pain is now gone.  Pt placed on monitor, call light is in reach      HAWA Montoya RN  08/04/23 6284

## 2023-08-04 NOTE — PROGRESS NOTES
SPIRITUAL CARE DEPARTMENT - [address] [place] [address]  PROGRESS NOTE    Shift date: 08/04/23  Shift day: Friday   Shift # 2    Room # ROBERT/ROBERT   Name: Koko Lawson                Catholic:    Place of Caodaism:     Referral: Routine Visit    Admit Date & Time: 8/4/2023 11:42 AM    Assessment:  Koko Lawson is a 77 y.o. male in the hospital    Intervention:  Writer introduced self and title as . Patient did not appear to mind  presence and engaged in conversation. Patient appeared calm and coping when conversing with .  provided a supportive presence through active listening and words of affirmation. Patient requested prayer which was facilitated by . Outcome:  Patient appeared receptive to  visit. Plan:  Chaplains will remain available to offer spiritual and emotional support as needed.       Electronically signed by Isabel Cannon on 8/4/2023 at 7:23 PM.  [address] [place]  [phone]

## 2023-08-04 NOTE — ED NOTES
Pt resting on stretcher, call light in reach, provided with urinal, unable to urinate at this time per pt      Neelima Witt RN  08/04/23 0811

## 2023-08-04 NOTE — ED PROVIDER NOTES
[address] [place] [address] ED  Emergency Department Encounter  Emergency Medicine Resident     Pt Name:Pavan Mcclendon  MRN: [id number]  [address] 1955  Date of evaluation: 8/4/23  PCP:  Cheryl Brown MD  Note Started: 5:54 PM EDT      CHIEF COMPLAINT       Chief Complaint   Patient presents with    Chest Pain       HISTORY OF PRESENT ILLNESS  (Location/Symptom, Timing/Onset, Context/Setting, Quality, Duration, Modifying Factors, Severity.)      Jeannette Dancer is a 77 y.o. male who presents with concern for chest pain. Patient has colorectal cancer with known metastases to the liver. Patient states that his chest pain has been going on for a day or 2 now. Patient is not complaining of any fevers or chills, he is somewhat nauseous. Patient recently had a colonoscopy done, he is scheduled to have surgery on the 14th of this month allegedly. Patient is not complaining of any trouble breathing acutely right now. Patient is additionally complaining of some mild leg swelling, left greater than right. PAST MEDICAL / SURGICAL / SOCIAL / FAMILY HISTORY      has a past medical history of CHF (congestive heart failure) ([address]), COPD (chronic obstructive pulmonary disease) ([address]), Diabetes mellitus ([address]), Erectile dysfunction due to arterial insufficiency, Hypertension, Microalbuminuria due to type 2 diabetes mellitus ([address]), and Overweight (BMI 25.0-29.9). has a past surgical history that includes Appendectomy; Total ankle arthroplasty; Esophagogastroduodenoscopy (07/26/2023); Upper gastrointestinal endoscopy (N/A, 7/26/2023); Colonoscopy (N/A, 7/26/2023); and Colonoscopy (7/26/2023). Social History     Socioeconomic History    Marital status:       Spouse name: Not on file    Number of children: Not on file    Years of education: Not on file    Highest education level: Not on file   Occupational History    Not on file   Tobacco Use    Smoking status: Former     Packs/day: 0.50     Types: management. Decision regarding hospitalization. EMERGENCY DEPARTMENT COURSE:      ED Course as of 08/04/23 1812   Fri Aug 04, 2023   156 75-year-old male, recently diagnosed liver and colon cancer, coming in with chest pain today, hemodynamically stable at bedside, blood pressures are little soft, will give fluids, broad work-up, he is afebrile currently, does not meet SIRS criteria however given comorbidities and appearance of scleral icterus, generalized jaundice, will order sepsis labs. [VB]   0813 Patient is complaining of unilateral leg swelling and chest pain, will order PE scan. [KK]   1318 Troponin marginally elevated when compared to patient's previous troponin levels [KK]   1318 Creatinine slightly bumped [KK]   1318 Hemoglobin down to 7.1. [QQ]   9250 Patient has a left basilar effusion on chest x-ray [KK]   1319 We will give 1 g of Rocephin for empiric coverage for upper respiratory pathology [KK]   1454 Troponin downtrending [KK]   1454 BNP elevated [KK]   1454 CT CHEST PULMONARY EMBOLISM W CONTRAST [KK]   1506 Patient has a small pericardial effusion, [KK]   1507 Left greater than right pleural effusions, increased from previous imaging. Will admit to medicine. [GO]   1291 Given the extent of the patient's metastatic lesions and lymphadenopathy, radiology called and advised that the patient have a pleural effusion sampled, if this is a malignant effusion, patient has a very poor prognosis, patient will require palliative care/hospice placement, discussion of goals of care and management. Patient is stable on reevaluation at the bedside today, patient admitted to family medicine. [KK]      ED Course User Index  [address] [place] [address], DO           CONSULTS:  IP CONSULT TO INTERNAL MEDICINE  IP CONSULT TO FAMILY MEDICINE    CRITICAL CARE:  There was significant risk of life threatening deterioration of patient's condition requiring my direct management.  Critical care time 15 minutes,

## 2023-08-04 NOTE — ED NOTES
Pt assisted back into bed, verbalizes no other needs at this time      Tomas Nichole RN  08/04/23 7812

## 2023-08-04 NOTE — ED NOTES
The following labs were labeled with appropriate pt sticker and tubed to lab:     [] Blue     [] Lavender   [] on ice  [] Green/yellow  [] Green/black [] on ice  [] Ahmad Liana  [] on ice  [] Yellow  [] Red  [] Type/ Screen  [] ABG  [] VBG    [] COVID-19 swab    [] Rapid  [] PCR  [] Flu swab  [] Peds Viral Panel     [x] Urine Sample  [] Fecal Sample  [] Pelvic Cultures  [] Blood Cultures  [] X 2  [] STREP Cultures       Meenakshi Oliver RN  08/04/23 7481

## 2023-08-04 NOTE — ED NOTES
The following labs were labeled with appropriate pt sticker and tubed to lab:     [x] Blue     [x] Lavender   [x] on ice  [x] Green/yellow  [x] Green/black [] on ice  [] Ples [place]  [] on ice  [] Yellow  [] Red  [] Type/ Screen  [] ABG  [] VBG    [] COVID-19 swab    [] Rapid  [] PCR  [] Flu swab  [] Peds Viral Panel     [] Urine Sample  [] Fecal Sample  [] Pelvic Cultures  [] Blood Cultures  [] X 2  [] STREP Cultures       Kimani Velarde RN  08/04/23 0872

## 2023-08-04 NOTE — ED NOTES
Pt given warm blanket. Pt. Resting on stretcher, eyes open, RR even and non-labored  Pt.  Updated on POC  Will continue to monitor       Sean Ortiz RN  08/04/23 7021

## 2023-08-05 PROBLEM — C78.7 METASTATIC COLON CANCER TO LIVER (HCC): Status: ACTIVE | Noted: 2023-08-05

## 2023-08-05 PROBLEM — C18.9 METASTATIC COLON CANCER TO LIVER (HCC): Status: ACTIVE | Noted: 2023-08-05

## 2023-08-05 PROBLEM — R18.0 MALIGNANT ASCITES: Status: ACTIVE | Noted: 2023-01-01

## 2023-08-05 PROBLEM — J90 PLEURAL EFFUSION: Status: ACTIVE | Noted: 2023-08-05

## 2023-08-05 LAB
ALBUMIN SERPL-MCNC: 2.1 G/DL (ref 3.5–5.2)
ALBUMIN/GLOB SERPL: 0.8 {RATIO} (ref 1–2.5)
ALP SERPL-CCNC: 630 U/L (ref 40–129)
ALT SERPL-CCNC: 18 U/L (ref 5–41)
ANION GAP SERPL CALCULATED.3IONS-SCNC: 11 MMOL/L (ref 9–17)
AST SERPL-CCNC: 34 U/L
BASOPHILS # BLD: 0 K/UL (ref 0–0.2)
BASOPHILS NFR BLD: 0 % (ref 0–2)
BILIRUB DIRECT SERPL-MCNC: 1 MG/DL
BILIRUB INDIRECT SERPL-MCNC: 0.3 MG/DL (ref 0–1)
BILIRUB SERPL-MCNC: 1.3 MG/DL (ref 0.3–1.2)
BUN SERPL-MCNC: 31 MG/DL (ref 8–23)
CALCIUM SERPL-MCNC: 7.7 MG/DL (ref 8.6–10.4)
CHLORIDE SERPL-SCNC: 104 MMOL/L (ref 98–107)
CO2 SERPL-SCNC: 21 MMOL/L (ref 20–31)
CREAT SERPL-MCNC: 1.6 MG/DL (ref 0.7–1.2)
EKG ATRIAL RATE: 59 BPM
EKG P AXIS: 32 DEGREES
EKG P-R INTERVAL: 138 MS
EKG Q-T INTERVAL: 448 MS
EKG QRS DURATION: 94 MS
EKG QTC CALCULATION (BAZETT): 443 MS
EKG R AXIS: -12 DEGREES
EKG T AXIS: 26 DEGREES
EKG VENTRICULAR RATE: 59 BPM
EOSINOPHIL # BLD: 0 K/UL (ref 0–0.4)
EOSINOPHILS RELATIVE PERCENT: 0 % (ref 1–4)
ERYTHROCYTE [DISTWIDTH] IN BLOOD BY AUTOMATED COUNT: 16.2 % (ref 11.8–14.4)
GFR SERPL CREATININE-BSD FRML MDRD: 46 ML/MIN/1.73M2
GLUCOSE SERPL-MCNC: 108 MG/DL (ref 70–99)
HCT VFR BLD AUTO: 26 % (ref 40.7–50.3)
HGB BLD-MCNC: 8.1 G/DL (ref 13–17)
IMM GRANULOCYTES # BLD AUTO: 0 K/UL (ref 0–0.3)
IMM GRANULOCYTES NFR BLD: 0 %
LYMPHOCYTES NFR BLD: 0.31 K/UL (ref 1–4.8)
LYMPHOCYTES RELATIVE PERCENT: 4 % (ref 24–44)
MCH RBC QN AUTO: 29.7 PG (ref 25.2–33.5)
MCHC RBC AUTO-ENTMCNC: 31.2 G/DL (ref 28.4–34.8)
MCV RBC AUTO: 95.2 FL (ref 82.6–102.9)
MONOCYTES NFR BLD: 0.31 K/UL (ref 0.1–0.8)
MONOCYTES NFR BLD: 4 % (ref 1–7)
MORPHOLOGY: ABNORMAL
NEUTROPHILS NFR BLD: 92 % (ref 36–66)
NEUTS SEG NFR BLD: 7.08 K/UL (ref 1.8–7.7)
NRBC BLD-RTO: 0 PER 100 WBC
PLATELET # BLD AUTO: ABNORMAL K/UL (ref 138–453)
PLATELET, FLUORESCENCE: 136 K/UL (ref 138–453)
PLATELETS.RETICULATED NFR BLD AUTO: 4.1 % (ref 1.1–10.3)
POTASSIUM SERPL-SCNC: 4.4 MMOL/L (ref 3.7–5.3)
PROT SERPL-MCNC: 4.6 G/DL (ref 6.4–8.3)
RBC # BLD AUTO: 2.73 M/UL (ref 4.21–5.77)
SODIUM SERPL-SCNC: 136 MMOL/L (ref 135–144)
WBC OTHER # BLD: 7.7 K/UL (ref 3.5–11.3)

## 2023-08-05 PROCEDURE — 96361 HYDRATE IV INFUSION ADD-ON: CPT

## 2023-08-05 PROCEDURE — 6370000000 HC RX 637 (ALT 250 FOR IP)

## 2023-08-05 PROCEDURE — 2060000000 HC ICU INTERMEDIATE R&B

## 2023-08-05 PROCEDURE — 94664 DEMO&/EVAL PT USE INHALER: CPT

## 2023-08-05 PROCEDURE — 85055 RETICULATED PLATELET ASSAY: CPT

## 2023-08-05 PROCEDURE — 51798 US URINE CAPACITY MEASURE: CPT

## 2023-08-05 PROCEDURE — G0378 HOSPITAL OBSERVATION PER HR: HCPCS

## 2023-08-05 PROCEDURE — 93010 ELECTROCARDIOGRAM REPORT: CPT | Performed by: INTERNAL MEDICINE

## 2023-08-05 PROCEDURE — 99222 1ST HOSP IP/OBS MODERATE 55: CPT | Performed by: INTERNAL MEDICINE

## 2023-08-05 PROCEDURE — 2700000000 HC OXYGEN THERAPY PER DAY

## 2023-08-05 PROCEDURE — 94640 AIRWAY INHALATION TREATMENT: CPT

## 2023-08-05 PROCEDURE — 80048 BASIC METABOLIC PNL TOTAL CA: CPT

## 2023-08-05 PROCEDURE — 36415 COLL VENOUS BLD VENIPUNCTURE: CPT

## 2023-08-05 PROCEDURE — 85025 COMPLETE CBC W/AUTO DIFF WBC: CPT

## 2023-08-05 PROCEDURE — 94761 N-INVAS EAR/PLS OXIMETRY MLT: CPT

## 2023-08-05 PROCEDURE — 80076 HEPATIC FUNCTION PANEL: CPT

## 2023-08-05 PROCEDURE — 2580000003 HC RX 258

## 2023-08-05 PROCEDURE — 99223 1ST HOSP IP/OBS HIGH 75: CPT | Performed by: INTERNAL MEDICINE

## 2023-08-05 RX ORDER — LOPERAMIDE HYDROCHLORIDE 2 MG/1
2 CAPSULE ORAL 4 TIMES DAILY PRN
Status: DISCONTINUED | OUTPATIENT
Start: 2023-08-05 | End: 2023-08-11 | Stop reason: HOSPADM

## 2023-08-05 RX ADMIN — BUDESONIDE AND FORMOTEROL FUMARATE DIHYDRATE 2 PUFF: 80; 4.5 AEROSOL RESPIRATORY (INHALATION) at 08:41

## 2023-08-05 RX ADMIN — CARVEDILOL 3.12 MG: 3.12 TABLET, FILM COATED ORAL at 17:19

## 2023-08-05 RX ADMIN — ATORVASTATIN CALCIUM 20 MG: 20 TABLET, FILM COATED ORAL at 08:55

## 2023-08-05 RX ADMIN — ACETAMINOPHEN 650 MG: 325 TABLET ORAL at 22:01

## 2023-08-05 RX ADMIN — PANTOPRAZOLE SODIUM 40 MG: 40 TABLET, DELAYED RELEASE ORAL at 17:19

## 2023-08-05 RX ADMIN — LOPERAMIDE HYDROCHLORIDE 2 MG: 2 CAPSULE ORAL at 13:02

## 2023-08-05 RX ADMIN — LOPERAMIDE HYDROCHLORIDE 2 MG: 2 CAPSULE ORAL at 22:01

## 2023-08-05 RX ADMIN — FOLIC ACID 1 MG: 1 TABLET ORAL at 08:55

## 2023-08-05 RX ADMIN — CARVEDILOL 3.12 MG: 3.12 TABLET, FILM COATED ORAL at 08:55

## 2023-08-05 RX ADMIN — SODIUM CHLORIDE, PRESERVATIVE FREE 10 ML: 5 INJECTION INTRAVENOUS at 22:01

## 2023-08-05 RX ADMIN — IPRATROPIUM BROMIDE AND ALBUTEROL SULFATE 1 DOSE: .5; 3 SOLUTION RESPIRATORY (INHALATION) at 12:13

## 2023-08-05 RX ADMIN — IPRATROPIUM BROMIDE AND ALBUTEROL SULFATE 1 DOSE: .5; 3 SOLUTION RESPIRATORY (INHALATION) at 20:54

## 2023-08-05 RX ADMIN — BUDESONIDE AND FORMOTEROL FUMARATE DIHYDRATE 2 PUFF: 80; 4.5 AEROSOL RESPIRATORY (INHALATION) at 20:54

## 2023-08-05 RX ADMIN — IPRATROPIUM BROMIDE AND ALBUTEROL SULFATE 1 DOSE: .5; 3 SOLUTION RESPIRATORY (INHALATION) at 08:41

## 2023-08-05 RX ADMIN — PANTOPRAZOLE SODIUM 40 MG: 40 TABLET, DELAYED RELEASE ORAL at 08:55

## 2023-08-05 RX ADMIN — IPRATROPIUM BROMIDE AND ALBUTEROL SULFATE 1 DOSE: .5; 3 SOLUTION RESPIRATORY (INHALATION) at 16:29

## 2023-08-05 ASSESSMENT — PAIN SCALES - GENERAL: PAINLEVEL_OUTOF10: 2

## 2023-08-05 ASSESSMENT — ENCOUNTER SYMPTOMS
WHEEZING: 0
ROS SKIN COMMENTS: BRUISE
SHORTNESS OF BREATH: 1
DIARRHEA: 1
BLOOD IN STOOL: 0
VOMITING: 0
CHEST TIGHTNESS: 1
COUGH: 0
ABDOMINAL PAIN: 0
STRIDOR: 0
CONSTIPATION: 0
NAUSEA: 0

## 2023-08-05 ASSESSMENT — PAIN DESCRIPTION - LOCATION: LOCATION: GENERALIZED

## 2023-08-05 NOTE — PLAN OF CARE
Problem: Discharge Planning  Goal: Discharge to home or other facility with appropriate resources  Outcome: Progressing     Problem: Pain  Goal: Verbalizes/displays adequate comfort level or baseline comfort level  Outcome: Progressing     Problem: Safety - Adult  Goal: Free from fall injury  Outcome: Progressing     Problem: Respiratory - Adult  Goal: Achieves optimal ventilation and oxygenation  8/5/2023 1807 by Dena Pop RN  Outcome: Progressing  8/5/2023 1239 by Renae Camacho RCP  Outcome: Progressing     Problem: Nutrition Deficit:  Goal: Optimize nutritional status  Outcome: Progressing

## 2023-08-05 NOTE — PROGRESS NOTES
[facility]  Progress Note    Date:   8/5/2023  Patient name:  Jeannette Dancer  Date of admission:  8/4/2023 11:42 AM  MRN:   [id number]  YOB: 1955    SUBJECTIVE/Last 24 hours update:     Patient seen and examined at bedside today. Patient today states his chest pain has improved, shortness of breath is at baseline, he was on 3 L of oxygen this morning, he is saturating at 100%. Vitals are stable. Blood cultures collected yesterday in the ED are negative. Patient is complaining of diarrhea, has been complaining of this diarrhea since his last admission. HPI:     Patient is a 78-year-old male with history of CHF, COPD on 4 L at home presented to the ED with complaints of chest pain, shortness of breath and diarrhea. Patient was recently admitted to [facility] on 07/25/2023 for the management of anemia. At that time patient was supposed to have outpatient colonoscopy done because he had elevated CEA and was having anemia at that time. CT PE done during that visit showed innumerable liver lesions now present, concerning for metastatic disease, a 0.7 cm nodule in the superior segment of the right lower lobe also concerning for metastasis. GI was consulted for EGD and colonoscopy, EGD showed grade D reflux esophagitis in the lower esophagus with circumferential ulceration and nodularity. Colonoscopy showed a large malignant mass in the ascending colon with partial obstruction, circumferential with mild contact oozing, this was biopsied. The rest of the colon had multiple colon polyps suspicious for metastatic disease. She was placed on PPI therapy twice daily and was discharged with follow-up with medical oncology. Patient states appointment with oncology is on 14 August.  Biopsy results of the ascending colon showed invasive well to moderately differentiated adenocarcinoma, invading into the lamina propria.   Work-up in Please note that this chart was generated using voice recognition Dragon dictation software.   Although every effort was made to ensure the accuracy of this automated transcription, some errors in transcription may have occurred

## 2023-08-05 NOTE — CARE COORDINATION
Case Management Assessment  Initial Evaluation    Date/Time of Evaluation: 8/5/2023 3:49 PM  Assessment Completed by: Vikki Yuan RN    If patient is discharged prior to next notation, then this note serves as note for discharge by case management. Patient Name: Zhane Hunter                   YOB: 1955  Diagnosis: Chest pain [R07.9]  Pleural effusion [J90]  Chest pain, unspecified type [R07.9]                   Date / Time: 8/4/2023 11:42 AM    Patient Admission Status: Inpatient   Readmission Risk (Low < 19, Mod (19-27), High > 27): Readmission Risk Score: 24.7    Current PCP: Clayton Valdes MD  PCP verified by CM? Yes Eliza Marcos MD)    Chart Reviewed: Yes      History Provided by: Patient  Patient Orientation: Alert and Oriented    Patient Cognition: Alert    Hospitalization in the last 30 days (Readmission):  Yes    If yes, Readmission Assessment in CM Navigator will be completed. Advance Directives:      Code Status: Full Code   Patient's Primary Decision Maker is: Legal Next of Kin      Discharge Planning:    Patient lives with: Children Type of Home: Apartment  Primary Care Giver: Self  Patient Support Systems include: Children   Current Financial resources: Medicaid, Medicare  Current community resources:    Current services prior to admission: Durable Medical Equipment, Oxygen Therapy            Current DME: (P) Oxygen Therapy (Comment), Gustavo Ore, Wheelchair ([employer] supplies O2)            Type of Home Care services:  None    ADLS  Prior functional level: Independent in ADLs/IADLs  Current functional level: Independent in ADLs/IADLs    PT AM-PAC:   /24  OT AM-PAC:   /24    Family can provide assistance at DC: No  Would you like Case Management to discuss the discharge plan with any other family members/significant others, and if so, who?  No  Plans to Return to Present Housing: Yes  Other Identified Issues/Barriers to RETURNING to current housing:

## 2023-08-05 NOTE — PROGRESS NOTES
Consent for Blood Transfusion     I have discussed with the patient the rationale for blood component transfusion; its benefits in treating or preventing fatigue, organ damage, or death; and its risk which includes mild transfusion reactions, rare risk of blood borne infection, or more serious but rare reactions. I have discussed the alternatives to transfusion, including the risk and consequences of not receiving transfusion. The patient had an opportunity to ask questions and had agreed to proceed with transfusion of blood components.      Electronically signed by Trever Cisneros MD on 8/4/2023 at 11:15 PM

## 2023-08-05 NOTE — PLAN OF CARE
Problem: Discharge Planning  Goal: Discharge to home or other facility with appropriate resources  Outcome: Progressing  Flowsheets (Taken 8/4/2023 2000)  Discharge to home or other facility with appropriate resources:   Identify barriers to discharge with patient and caregiver   Arrange for needed discharge resources and transportation as appropriate   Identify discharge learning needs (meds, wound care, etc)     Problem: Pain  Goal: Verbalizes/displays adequate comfort level or baseline comfort level  Outcome: Progressing     Problem: Safety - Adult  Goal: Free from fall injury  Outcome: Progressing     Problem: Respiratory - Adult  Goal: Achieves optimal ventilation and oxygenation  8/5/2023 0223 by Hannah Wilks RN  Outcome: Progressing  8/4/2023 2043 by Nicholas Samano RCP  Outcome: Progressing  Flowsheets (Taken 8/4/2023 2000 by Hannah Wilks RN)  Achieves optimal ventilation and oxygenation:   Assess for changes in respiratory status   Assess for changes in mentation and behavior   Position to facilitate oxygenation and minimize respiratory effort   Oxygen supplementation based on oxygen saturation or arterial blood gases

## 2023-08-05 NOTE — CONSULTS
WITH REACTIVE CHANGES ALSO NOTED.-NO FUNGAL   ORGANISMS OR HIGH-GRADE DYSPLASIA. B.  ASCENDING COLON MASS, BIOPSY:-SUPERFICIAL FRAGMENTS OF INVASIVE   WELL TO MODERATELY DIFFERENTIATED ADENOCARCINOMA; INVADING INTO THE   LAMINA PROPRIA. IMAGING DATA:  CT chest   IMPRESSION:  1. No pulmonary embolism. Mild emphysema. 2. Small left greater than right pleural effusions and small pericardial  effusion have increased from recent CT. 3. Numerous liver lesions and partially visualized upper abdominal  lymphadenopathy, compatible with metastatic disease. 4. A 7 mm part cavitary nodule in the superior right lower lobe is unchanged  from recent CT but enlarged from 01/09/2023 and is also suspicious for a  metastasis. CT abd and pelvis   IMPRESSION:  1. Enlarged liver with numerous enhancing lesions consistent with  metastasis. Patient also has lymphadenopathy in the portal area and  retroperitoneum. 2.  Hepatic flexure of the colon shows evidence of wall thickening and slight  asymmetry suspicious for underlying colonic mass. Irregularity at the rectum. 3.  Bilateral pleural effusions larger on the left than the right. Coronary  artery disease. 4.  Moderate to moderately large volume ascitic fluid in the abdomen and  pelvis. 5.  Abnormal lesion in the spleen suggesting metastasis. 6.  Enlarged seminal vesicles. 7.  Soft tissue density in the subcutaneous tissues right upper chest  incompletely included in the field of view. This could represent  gynecomastia. Breast mass not excluded.   Primary Problem  Chest pain    Active Hospital Problems    Diagnosis Date Noted    Pleural effusion [J90] 08/05/2023    Chest pain [R07.9] 08/04/2023    Combined systolic and diastolic congestive heart failure (HCC) [I50.40] 05/03/2018    Chronic obstructive pulmonary disease ([address]) [J44.9] 12/23/2016    Hypertension goal BP (blood pressure) < 140/80 [I10] 06/05/2015    Hyperlipidemia with target LDL less than 70 [E78.5] 06/05/2015    Controlled type 2 diabetes mellitus without complication, without long-term current use of insulin ([address]) [E11.9] 06/05/2015         IMPRESSION:   Advanced colon cancer  Liver and lung mets   Ascites. Possibly malignant   DM, HTN, CHF, COPD as major comorbidity. Performance status is ECOG 2-3     RECOMMENDATIONS:  I had a very long discussion with the patient. Explaining his condition. Patient is a very and he was voicing frustration and anger about his lack of care and delay in diagnosis. I reminded him that we have been recommending endoscopic evaluation multiple times in the office and he was not able to make the appointments. The patient understands his condition and that he is too weak for chemotherapy but he is demanding treatment to start as soon as possible. We have to wait until the patient is in stable condition to consider systemic therapy. He unfortunately is in a very weakened state and borderline performance status. His performance status has improved significantly before he will be able to handle treatment. Continue supportive care and try to keep hemoglobin above 7  Continue with paracentesis diagnostically and therapeutically  Continue supportive care and electrolyte replacement. Anemia secondary to chronic illness and liver disease. No clear evidence of iron deficiency appreciated  We will arrange for systemic therapy depending on improvement of overall condition. We will continue to follow with you. Recommend palliative care involvement      Discussed with patient and Nurse. Thank you for asking us to see this patient.     RICHARD MCCRAY [facility] MD [place]  Hematologist/Medical Oncologist  Cell: [phone]

## 2023-08-05 NOTE — PROGRESS NOTES
Comprehensive Nutrition Assessment    Type and Reason for Visit:  Initial, Consult    Nutrition Recommendations/Plan:   Recommend removal of carb restriction. Liberalize diet to HUGH. Pt DOES NOT want ONS. Malnutrition Assessment:  Malnutrition Status:  Severe malnutrition (08/05/23 1440)    Context:  Chronic Illness     Findings of the 6 clinical characteristics of malnutrition:  Energy Intake:  75% or less estimated energy requirements for 1 month or longer  Weight Loss:  Greater than 20% over 1 year     Body Fat Loss:  Severe body fat loss Triceps   Muscle Mass Loss:   (Moderate muscle mass loss) Hand (interosseous)  Fluid Accumulation:  Severe Extremities   Strength:  Not Performed    Nutrition Assessment:    Pt with hx of metastatic colorectal cancer, anemia, and chronic hep C. C/o poor PO intake/appetite for past several months. C/o frequent N/V and having diarrhea for past couple of weeks. Mostly eating foods like fruit and cereal. Says prior to current symptoms he was also having difficulty eating because he was told not to eat many of his favorite foods. Pt does NOT want any oral nutrition supplements. Nutrition Related Findings:    meds/labs reviewed; N/V, diarrhea Wound Type: None       Current Nutrition Intake & Therapies:    Average Meal Intake:  (ate small bowl of cereal and fruit cup this morning for breakfast)  Average Supplements Intake: None Ordered, Refusing to take  ADULT DIET; Regular; 4 carb choices (60 gm/meal); Low Sodium (2 gm)    Anthropometric Measures:  Height: 5' 5\" (165.1 cm)  Ideal Body Weight (IBW): 136 lbs (62 kg)       Current Body Weight: 135 lb 9.3 oz (61.5 kg), 99.7 % IBW. Current BMI (kg/m2): 22.6  Usual Body Weight: 168 lb 14 oz (76.6 kg) (6/19/22; pt reported UBW)  % Weight Change (Calculated): -19.7                    BMI Categories: Normal Weight (BMI 18.5-24. 9)    Estimated Daily Nutrient Needs:  Energy Requirements Based On: Kcal/kg  Weight Used for Energy

## 2023-08-05 NOTE — PLAN OF CARE
BRONCHOSPASM/BRONCHOCONSTRICTION     [x]         IMPROVE AERATION/BREATH SOUNDS  [x]   ADMINISTER BRONCHODILATOR THERAPY AS APPROPRIATE  [x]   ASSESS BREATH SOUNDS  [x]   IMPLEMENT AEROSOL/MDI PROTOCOL  [x]   PATIENT EDUCATION AS NEEDED     Problem: Respiratory - Adult  Goal: Achieves optimal ventilation and oxygenation  8/5/2023 1239 by Marino Mcclendon RCP  Outcome: Progressing  8/5/2023 0223 by Magnus Mast RN  Outcome: Progressing

## 2023-08-05 NOTE — PROGRESS NOTES
Congestive Heart Failure Education note:      Discussed 2000mg/day sodium restricted diet; patient verbalized understanding. Moderate daily exercise encouraged as tolerated. Discussed rest breaks as needed; patient verbalized understanding. Patient instructed to weigh self at the same time of each day, using same clothes and same scale; reinforced teaching to monitor for 3-5 lb weight increase over 1-2 days notify physician if charge noted. Patient verbalized understanding. Patient instructed to limit fluid intake to 2 liters per day. Patient verbalized understanding. Signs and symptoms of CHF discussed with patient, such as feeling more tired than normal, feeling short of breath, coughing that increases when you lie down, sudden weight gain, swelling of your feet, legs or belly. Patient verbalized understanding to notify physician office if these symptoms occur. Compliance with plan of care and further disease process causes discussed with patient, patient encouraged to keep all follow up appointments. Patient verbalized understanding.

## 2023-08-05 NOTE — ACP (ADVANCE CARE PLANNING)
Date of ACP Conversation: 8/5/2023     Conversation Conducted with: Patient with Decision Making Capacity    Care Preferences    Ventilation: \"If you were in your present state of health and suddenly became very ill and were unable to breathe on your own, what would your preference be about the use of a ventilator (breathing machine) if it were available to you? \"      Would the patient desire the use of ventilator (breathing machine)?: yes    \"If your health worsens and it becomes clear that your chance of recovery is unlikely, what would your preference be about the use of a ventilator (breathing machine) if it were available to you? \"     Would the patient desire the use of ventilator (breathing machine)?: Yes      Resuscitation  \"CPR works best to restart the heart when there is a sudden event, like a heart attack, in someone who is otherwise healthy. Unfortunately, CPR does not typically restart the heart for people who have serious health conditions or who are very sick. \"    \"In the event your heart stopped as a result of an underlying serious health condition, would you want attempts to be made to restart your heart (answer \"yes\" for attempt to resuscitate) or would you prefer a natural death (answer \"no\" for do not attempt to resuscitate)? \" yes

## 2023-08-05 NOTE — CARE COORDINATION
08/05/23 1543   Readmission Assessment   Number of Days since last admission? 8-30 days   Previous Disposition Home with Family   Who is being Interviewed Patient   What was the patient's/caregiver's perception as to why they think they needed to return back to the hospital? Other (Comment)  (Having chest pain and continued diarrhea)   Did you visit your Primary Care Physician after you left the hospital, before you returned this time? No   Why weren't you able to visit your PCP? Did not have an appointment   Did you see a specialist, such as Cardiac, Pulmonary, Orthopedic Physician, etc. after you left the hospital? No   Who advised the patient to return to the hospital? Self-referral   Does the patient report anything that got in the way of taking their medications? No   In our efforts to provide the best possible care to you and others like you, can you think of anything that we could have done to help you after you left the hospital the first time, so that you might not have needed to return so soon?  Other (Comment)  (no, patient feels he was discharged to soon)

## 2023-08-06 ENCOUNTER — APPOINTMENT (OUTPATIENT)
Dept: GENERAL RADIOLOGY | Age: 68
DRG: 375 | End: 2023-08-06
Payer: COMMERCIAL

## 2023-08-06 LAB
ANION GAP SERPL CALCULATED.3IONS-SCNC: 11 MMOL/L (ref 9–17)
BASOPHILS # BLD: 0 K/UL (ref 0–0.2)
BASOPHILS NFR BLD: 0 % (ref 0–2)
BUN SERPL-MCNC: 31 MG/DL (ref 8–23)
CALCIUM SERPL-MCNC: 7.5 MG/DL (ref 8.6–10.4)
CHLORIDE SERPL-SCNC: 105 MMOL/L (ref 98–107)
CO2 SERPL-SCNC: 20 MMOL/L (ref 20–31)
CREAT SERPL-MCNC: 1.7 MG/DL (ref 0.7–1.2)
EOSINOPHIL # BLD: 0 K/UL (ref 0–0.4)
EOSINOPHILS RELATIVE PERCENT: 0 % (ref 1–4)
ERYTHROCYTE [DISTWIDTH] IN BLOOD BY AUTOMATED COUNT: 16.2 % (ref 11.8–14.4)
GFR SERPL CREATININE-BSD FRML MDRD: 43 ML/MIN/1.73M2
GLUCOSE SERPL-MCNC: 110 MG/DL (ref 70–99)
HCT VFR BLD AUTO: 24.7 % (ref 40.7–50.3)
HGB BLD-MCNC: 7.8 G/DL (ref 13–17)
IMM GRANULOCYTES # BLD AUTO: 0 K/UL (ref 0–0.3)
IMM GRANULOCYTES NFR BLD: 0 %
LYMPHOCYTES NFR BLD: 0.41 K/UL (ref 1–4.8)
LYMPHOCYTES RELATIVE PERCENT: 6 % (ref 24–44)
MCH RBC QN AUTO: 30.4 PG (ref 25.2–33.5)
MCHC RBC AUTO-ENTMCNC: 31.6 G/DL (ref 28.4–34.8)
MCV RBC AUTO: 96.1 FL (ref 82.6–102.9)
MONOCYTES NFR BLD: 0.2 K/UL (ref 0.1–0.8)
MONOCYTES NFR BLD: 3 % (ref 1–7)
MORPHOLOGY: ABNORMAL
NEUTROPHILS NFR BLD: 91 % (ref 36–66)
NEUTS SEG NFR BLD: 6.19 K/UL (ref 1.8–7.7)
NRBC BLD-RTO: 0 PER 100 WBC
PLATELET # BLD AUTO: ABNORMAL K/UL (ref 138–453)
PLATELET, FLUORESCENCE: 128 K/UL (ref 138–453)
PLATELETS.RETICULATED NFR BLD AUTO: 3.8 % (ref 1.1–10.3)
POTASSIUM SERPL-SCNC: 4.4 MMOL/L (ref 3.7–5.3)
RBC # BLD AUTO: 2.57 M/UL (ref 4.21–5.77)
SODIUM SERPL-SCNC: 136 MMOL/L (ref 135–144)
WBC OTHER # BLD: 6.8 K/UL (ref 3.5–11.3)

## 2023-08-06 PROCEDURE — 80048 BASIC METABOLIC PNL TOTAL CA: CPT

## 2023-08-06 PROCEDURE — 97166 OT EVAL MOD COMPLEX 45 MIN: CPT

## 2023-08-06 PROCEDURE — 97535 SELF CARE MNGMENT TRAINING: CPT

## 2023-08-06 PROCEDURE — 2700000000 HC OXYGEN THERAPY PER DAY

## 2023-08-06 PROCEDURE — G0378 HOSPITAL OBSERVATION PER HR: HCPCS

## 2023-08-06 PROCEDURE — 99232 SBSQ HOSP IP/OBS MODERATE 35: CPT | Performed by: INTERNAL MEDICINE

## 2023-08-06 PROCEDURE — 6370000000 HC RX 637 (ALT 250 FOR IP)

## 2023-08-06 PROCEDURE — 85025 COMPLETE CBC W/AUTO DIFF WBC: CPT

## 2023-08-06 PROCEDURE — 2060000000 HC ICU INTERMEDIATE R&B

## 2023-08-06 PROCEDURE — 85055 RETICULATED PLATELET ASSAY: CPT

## 2023-08-06 PROCEDURE — 81001 URINALYSIS AUTO W/SCOPE: CPT

## 2023-08-06 PROCEDURE — 2580000003 HC RX 258

## 2023-08-06 PROCEDURE — 36415 COLL VENOUS BLD VENIPUNCTURE: CPT

## 2023-08-06 PROCEDURE — 94640 AIRWAY INHALATION TREATMENT: CPT

## 2023-08-06 PROCEDURE — 94761 N-INVAS EAR/PLS OXIMETRY MLT: CPT

## 2023-08-06 PROCEDURE — 71045 X-RAY EXAM CHEST 1 VIEW: CPT

## 2023-08-06 RX ORDER — HEPARIN SODIUM 5000 [USP'U]/ML
5000 INJECTION, SOLUTION INTRAVENOUS; SUBCUTANEOUS EVERY 8 HOURS SCHEDULED
Status: DISCONTINUED | OUTPATIENT
Start: 2023-08-06 | End: 2023-08-06

## 2023-08-06 RX ADMIN — BUDESONIDE AND FORMOTEROL FUMARATE DIHYDRATE 2 PUFF: 80; 4.5 AEROSOL RESPIRATORY (INHALATION) at 21:38

## 2023-08-06 RX ADMIN — CARVEDILOL 3.12 MG: 3.12 TABLET, FILM COATED ORAL at 08:24

## 2023-08-06 RX ADMIN — ATORVASTATIN CALCIUM 20 MG: 20 TABLET, FILM COATED ORAL at 08:24

## 2023-08-06 RX ADMIN — SODIUM CHLORIDE, PRESERVATIVE FREE 10 ML: 5 INJECTION INTRAVENOUS at 08:25

## 2023-08-06 RX ADMIN — CARVEDILOL 3.12 MG: 3.12 TABLET, FILM COATED ORAL at 17:10

## 2023-08-06 RX ADMIN — PANTOPRAZOLE SODIUM 40 MG: 40 TABLET, DELAYED RELEASE ORAL at 17:10

## 2023-08-06 RX ADMIN — BUDESONIDE AND FORMOTEROL FUMARATE DIHYDRATE 2 PUFF: 80; 4.5 AEROSOL RESPIRATORY (INHALATION) at 09:18

## 2023-08-06 RX ADMIN — IPRATROPIUM BROMIDE AND ALBUTEROL SULFATE 1 DOSE: .5; 3 SOLUTION RESPIRATORY (INHALATION) at 09:18

## 2023-08-06 RX ADMIN — IPRATROPIUM BROMIDE AND ALBUTEROL SULFATE 1 DOSE: .5; 3 SOLUTION RESPIRATORY (INHALATION) at 16:52

## 2023-08-06 RX ADMIN — IPRATROPIUM BROMIDE AND ALBUTEROL SULFATE 1 DOSE: .5; 3 SOLUTION RESPIRATORY (INHALATION) at 21:38

## 2023-08-06 RX ADMIN — SODIUM CHLORIDE, PRESERVATIVE FREE 10 ML: 5 INJECTION INTRAVENOUS at 20:06

## 2023-08-06 RX ADMIN — PANTOPRAZOLE SODIUM 40 MG: 40 TABLET, DELAYED RELEASE ORAL at 08:24

## 2023-08-06 RX ADMIN — ACETAMINOPHEN 650 MG: 325 TABLET ORAL at 20:06

## 2023-08-06 RX ADMIN — FOLIC ACID 1 MG: 1 TABLET ORAL at 08:24

## 2023-08-06 ASSESSMENT — PAIN - FUNCTIONAL ASSESSMENT: PAIN_FUNCTIONAL_ASSESSMENT: PREVENTS OR INTERFERES SOME ACTIVE ACTIVITIES AND ADLS

## 2023-08-06 ASSESSMENT — PAIN DESCRIPTION - DESCRIPTORS: DESCRIPTORS: ACHING;DISCOMFORT

## 2023-08-06 ASSESSMENT — PAIN SCALES - GENERAL: PAINLEVEL_OUTOF10: 9

## 2023-08-06 ASSESSMENT — PAIN DESCRIPTION - LOCATION: LOCATION: ABDOMEN

## 2023-08-06 ASSESSMENT — PAIN DESCRIPTION - ONSET: ONSET: ON-GOING

## 2023-08-06 ASSESSMENT — PAIN DESCRIPTION - FREQUENCY: FREQUENCY: CONTINUOUS

## 2023-08-06 ASSESSMENT — PAIN DESCRIPTION - PAIN TYPE: TYPE: ACUTE PAIN

## 2023-08-06 NOTE — PROGRESS NOTES
Today's Date: 8/6/2023  Patient Name: Caron Lind  Date of admission: 8/4/2023 11:42 AM  Patient's age: 77 y.o., 1955  Admission Dx: Chest pain [R07.9]  Pleural effusion [J90]  Chest pain, unspecified type [R07.9]    Reason for Consult: management recommendations  Requesting Physician: No admitting provider for patient encounter. INTERIM HISTORY  The patient is seen and evaluated. Diarrhea has subsided overnight. He is feeling slightly better. Plan for paracentesis tomorrow. Discussed the case with primary team and plans for postdischarge care. The patient is adamant that he wants to proceed with chemotherapy as soon as possible. HISTORY OF PRESENT ILLNESS:    Patient is a 58-year-old male with history of CHF, COPD on 4 L at home presented to the ED with complaints of chest pain, shortness of breath and diarrhea. Patient was recently admitted to [facility] on 07/25/2023 for the management of anemia. At that time patient was supposed to have outpatient colonoscopy done because he had elevated CEA and was having anemia at that time. CT PE done during that visit showed innumerable liver lesions now present, concerning for metastatic disease, a 0.7 cm nodule in the superior segment of the right lower lobe also concerning for metastasis. GI was consulted for EGD and colonoscopy, EGD showed grade D reflux esophagitis in the lower esophagus with circumferential ulceration and nodularity. Colonoscopy showed a large malignant mass in the ascending colon with partial obstruction, circumferential with mild contact oozing, this was biopsied. The rest of the colon had multiple colon polyps suspicious for metastatic disease. She was placed on PPI therapy twice daily and was discharged with follow-up with medical oncology. Pt was actually seen by Dr Migue Nettles in April, he presented to the hospital then with iron def anemia. And he had elevated CEA .  He was referred for colonoscopy  but he

## 2023-08-06 NOTE — PLAN OF CARE
Problem: Respiratory - Adult  Goal: Achieves optimal ventilation and oxygenation  8/6/2023 0925 by Carol Jeffries, [facility]  Flowsheets (Taken 8/6/2023 2069)  Achieves optimal ventilation and oxygenation: Respiratory therapy support as indicated

## 2023-08-06 NOTE — PLAN OF CARE
Problem: Discharge Planning  Goal: Discharge to home or other facility with appropriate resources  8/6/2023 0253 by Vita Jerez RN  Outcome: Progressing  Flowsheets (Taken 8/5/2023 2000)  Discharge to home or other facility with appropriate resources:   Identify barriers to discharge with patient and caregiver   Arrange for needed discharge resources and transportation as appropriate   Identify discharge learning needs (meds, wound care, etc)   Arrange for interpreters to assist at discharge as needed   Refer to discharge planning if patient needs post-hospital services based on physician order or complex needs related to functional status, cognitive ability or social support system  8/5/2023 1807 by Remy Robbins RN  Outcome: Progressing     Problem: Pain  Goal: Verbalizes/displays adequate comfort level or baseline comfort level  8/6/2023 0253 by Vita Jerez RN  Outcome: Progressing  8/5/2023 1807 by Remy Robbins RN  Outcome: Progressing     Problem: Safety - Adult  Goal: Free from fall injury  8/6/2023 0253 by Vita Jerez RN  Outcome: Progressing  8/5/2023 1807 by Remy Robbins RN  Outcome: Progressing     Problem: Respiratory - Adult  Goal: Achieves optimal ventilation and oxygenation  8/6/2023 0253 by Vita Jerez RN  Outcome: Progressing  8/5/2023 1807 by Remy Robbins RN  Outcome: Progressing     Problem: Nutrition Deficit:  Goal: Optimize nutritional status  8/6/2023 0253 by Vita Jerez RN  Outcome: Progressing  8/5/2023 1807 by Remy Robbins RN  Outcome: Progressing

## 2023-08-06 NOTE — PLAN OF CARE
Problem: Discharge Planning  Goal: Discharge to home or other facility with appropriate resources  Outcome: Progressing     Problem: Pain  Goal: Verbalizes/displays adequate comfort level or baseline comfort level  Outcome: Progressing     Problem: Safety - Adult  Goal: Free from fall injury  Outcome: Progressing     Problem: Respiratory - Adult  Goal: Achieves optimal ventilation and oxygenation  8/6/2023 1712 by Ashley Cortés RN  Outcome: Progressing  8/6/2023 0925 by Muriel Macedo RCP  Flowsheets (Taken 8/6/2023 1019)  Achieves optimal ventilation and oxygenation: Respiratory therapy support as indicated     Problem: Nutrition Deficit:  Goal: Optimize nutritional status  Outcome: Progressing

## 2023-08-06 NOTE — PROGRESS NOTES
diagnoses. Prognosis: Guarded  Decision Making: Medium Complexity  REQUIRES OT FOLLOW-UP: Yes  Activity Tolerance  Activity Tolerance: Patient limited by fatigue;Patient limited by pain  Activity Tolerance Comments: Pt unable to progress from standing EOB d/t pain, fatigue, coughing upon standing. Plan   Occupational Therapy Plan  Times Per Week: 3-4x/wk  Current Treatment Recommendations: Strengthening, ROM, Balance training, Functional mobility training, Endurance training, Equipment evaluation, education, & procurement, Patient/Caregiver education & training, Safety education & training, Pain management, Self-Care / ADL, Home management training, Sensory integration     Restrictions  Restrictions/Precautions  Restrictions/Precautions: General Precautions, Contact Precautions, Up as Tolerated  Required Braces or Orthoses?: No  Position Activity Restriction  Other position/activity restrictions: Up w/ assist    Subjective   General  Patient assessed for rehabilitation services?: Yes  Additional Pertinent Hx: Colorectal ca w/ liver & lung mets; COPD; HTN; DM2; Hep C  Family / Caregiver Present: No  Diagnosis: Chest pain  Subjective  Subjective: Pt awake, supine in bed upon OT arrival. Pt agreeable to OT session and reports 10/10 pain in abdomen and 7/10 pain in B/L toes, LUE 5th digit. Pt appeared to demo S/Sx of depressed mood this date as evidenced by flat affect, inappropriate eye contact, soft/quiet voice, short responses to conversation, tearful at times.   General Comment  Comments: RN approved OT session     Social/Functional History  Social/Functional History  Lives With: Son (41 yo son)  Type of Home: Apartment (2nd floor)  Home Layout: Laundry in basement  Home Access: Stairs to enter with rails  Entrance Stairs - Number of Steps: 6+7+13 (Son \"carries\" Pt up steps)  Entrance Stairs - Rails: Both  Bathroom Shower/Tub: Tub/Shower unit  Bathroom Toilet: Standard  Bathroom Equipment: Shower chair (Pt uses folding chair as shower chair PRN)  Home Equipment: Oxygen, Wheelchair-manual, Walker, rolling (4L O2 @ baseline. Reports that RW is broken, needs new one.)  Receives Help From: Family (Son)  Homemaking Assistance: Needs assistance  Meal Prep: Total  Vacuuming: Total  Cleaning: Total  Shopping: Total  Homemaking Responsibilities: Yes  Laundry Responsibility: Primary  Bill Paying/Finance Responsibility: Secondary  Health Care Management: Primary  Ambulation Assistance: Needs assistance (Uses w/c for community mobility)  Transfer Assistance: Independent  Active : No  Mode of Transportation: Cab  Occupation: Retired     Objective   Pulse: 66  Heart Rate Source: Monitor  BP: (!) 144/63  BP Location: Left upper arm  BP Method: Automatic  Patient Position: Semi fowlers  MAP (Calculated): 90  Respirations: 22  SpO2: 100 %  O2 Device: Nasal cannula       Safety Devices  Type of Devices: All fall risk precautions in place;Call light within reach; Left in bed;Patient at risk for falls  Restraints  Restraints Initially in Place: No  Bed Mobility Training  Bed Mobility Training: Yes  Overall Level of Assistance: Modified independent (Pt found/retired supine in bed. Pt demoed supine <> sit w/ Mod IND, HOB elevated, hand rails and scooted to EOB w/ IND.)  Supine to Sit: Modified independent  Sit to Supine: Modified independent  Scooting: Independent  Balance  Sitting: Intact (Pt demoed Good static/dynamic sitting balance unsupported at EOB during ADLs for ~20 mins)  Standing: Without support (Pt demoed Fair static standing balance w/ SBA at bedside for ~1 min)  Transfer Training  Transfer Training: Yes (Pt demoed sit > stand w/ CGA and stand > sit w/ SBA)  Overall Level of Assistance: Contact-guard assistance  Sit to Stand: Contact-guard assistance  Stand to Sit: Stand-by assistance  Gait  Overall Level of Assistance:  (PRINCE.  Pt declined d/t fatigue, abdominal pain, productive cough upon standing.)     AROM: Within

## 2023-08-07 ENCOUNTER — APPOINTMENT (OUTPATIENT)
Dept: ULTRASOUND IMAGING | Age: 68
DRG: 375 | End: 2023-08-07
Payer: COMMERCIAL

## 2023-08-07 PROBLEM — R52 PAIN: Status: ACTIVE | Noted: 2023-08-07

## 2023-08-07 PROBLEM — Z98.890 S/P ABDOMINAL PARACENTESIS: Status: ACTIVE | Noted: 2023-08-07

## 2023-08-07 PROBLEM — R18.8 OTHER ASCITES: Status: ACTIVE | Noted: 2023-08-07

## 2023-08-07 PROBLEM — Z51.5 ENCOUNTER FOR PALLIATIVE CARE: Status: ACTIVE | Noted: 2023-08-07

## 2023-08-07 PROBLEM — Z71.89 GOALS OF CARE, COUNSELING/DISCUSSION: Status: ACTIVE | Noted: 2023-08-07

## 2023-08-07 LAB
ABO/RH: NORMAL
ANION GAP SERPL CALCULATED.3IONS-SCNC: 10 MMOL/L (ref 9–17)
ANION GAP SERPL CALCULATED.3IONS-SCNC: 13 MMOL/L (ref 9–17)
ANTIBODY SCREEN: NEGATIVE
ARM BAND NUMBER: NORMAL
BASOPHILS # BLD: 0 K/UL (ref 0–0.2)
BASOPHILS NFR BLD: 0 % (ref 0–2)
BILIRUB UR QL STRIP: NEGATIVE
BLOOD BANK BLOOD PRODUCT EXPIRATION DATE: NORMAL
BLOOD BANK DISPENSE STATUS: NORMAL
BLOOD BANK DISPENSE STATUS: NORMAL
BLOOD BANK ISBT PRODUCT BLOOD TYPE: 5100
BLOOD BANK PRODUCT CODE: NORMAL
BLOOD BANK SAMPLE EXPIRATION: NORMAL
BLOOD BANK UNIT TYPE AND RH: NORMAL
BPU ID: NORMAL
BPU ID: NORMAL
BUN SERPL-MCNC: 30 MG/DL (ref 8–23)
BUN SERPL-MCNC: 31 MG/DL (ref 8–23)
CALCIUM SERPL-MCNC: 7.4 MG/DL (ref 8.6–10.4)
CALCIUM SERPL-MCNC: 7.7 MG/DL (ref 8.6–10.4)
CASTS #/AREA URNS LPF: ABNORMAL /LPF (ref 0–8)
CHLORIDE SERPL-SCNC: 102 MMOL/L (ref 98–107)
CHLORIDE SERPL-SCNC: 103 MMOL/L (ref 98–107)
CLARITY UR: CLEAR
CO2 SERPL-SCNC: 19 MMOL/L (ref 20–31)
CO2 SERPL-SCNC: 21 MMOL/L (ref 20–31)
COLOR UR: ABNORMAL
COMPONENT: NORMAL
COMPONENT: NORMAL
CREAT SERPL-MCNC: 1.7 MG/DL (ref 0.7–1.2)
CREAT SERPL-MCNC: 1.7 MG/DL (ref 0.7–1.2)
CREAT UR-MCNC: 138.3 MG/DL (ref 39–259)
CROSSMATCH RESULT: NORMAL
CROSSMATCH RESULT: NORMAL
EOSINOPHIL # BLD: 0 K/UL (ref 0–0.4)
EOSINOPHILS RELATIVE PERCENT: 0 % (ref 1–4)
EPI CELLS #/AREA URNS HPF: ABNORMAL /HPF (ref 0–5)
ERYTHROCYTE [DISTWIDTH] IN BLOOD BY AUTOMATED COUNT: 15.9 % (ref 11.8–14.4)
GFR SERPL CREATININE-BSD FRML MDRD: 43 ML/MIN/1.73M2
GFR SERPL CREATININE-BSD FRML MDRD: 43 ML/MIN/1.73M2
GLUCOSE SERPL-MCNC: 137 MG/DL (ref 70–99)
GLUCOSE SERPL-MCNC: 159 MG/DL (ref 70–99)
GLUCOSE UR STRIP-MCNC: NEGATIVE MG/DL
HCT VFR BLD AUTO: 28.4 % (ref 40.7–50.3)
HGB BLD-MCNC: 8.9 G/DL (ref 13–17)
HGB UR QL STRIP.AUTO: NEGATIVE
IMM GRANULOCYTES # BLD AUTO: 0 K/UL (ref 0–0.3)
IMM GRANULOCYTES NFR BLD: 0 %
KETONES UR STRIP-MCNC: NEGATIVE MG/DL
LEUKOCYTE ESTERASE UR QL STRIP: NEGATIVE
LYMPHOCYTES NFR BLD: 0.59 K/UL (ref 1–4.8)
LYMPHOCYTES RELATIVE PERCENT: 6 % (ref 24–44)
MCH RBC QN AUTO: 30.3 PG (ref 25.2–33.5)
MCHC RBC AUTO-ENTMCNC: 31.3 G/DL (ref 28.4–34.8)
MCV RBC AUTO: 96.6 FL (ref 82.6–102.9)
MONOCYTES NFR BLD: 0.49 K/UL (ref 0.1–0.8)
MONOCYTES NFR BLD: 5 % (ref 1–7)
MORPHOLOGY: ABNORMAL
NEUTROPHILS NFR BLD: 89 % (ref 36–66)
NEUTS SEG NFR BLD: 8.72 K/UL (ref 1.8–7.7)
NITRITE UR QL STRIP: NEGATIVE
NRBC BLD-RTO: 0 PER 100 WBC
PH UR STRIP: 5 [PH] (ref 5–8)
PLATELET # BLD AUTO: ABNORMAL K/UL (ref 138–453)
PLATELET, FLUORESCENCE: 142 K/UL (ref 138–453)
PLATELETS.RETICULATED NFR BLD AUTO: 4 % (ref 1.1–10.3)
POTASSIUM SERPL-SCNC: 4.9 MMOL/L (ref 3.7–5.3)
POTASSIUM SERPL-SCNC: 4.9 MMOL/L (ref 3.7–5.3)
PROT FLD-MCNC: 1 G/DL
PROT UR STRIP-MCNC: ABNORMAL MG/DL
RBC # BLD AUTO: 2.94 M/UL (ref 4.21–5.77)
RBC #/AREA URNS HPF: ABNORMAL /HPF (ref 0–4)
SODIUM SERPL-SCNC: 134 MMOL/L (ref 135–144)
SODIUM SERPL-SCNC: 134 MMOL/L (ref 135–144)
SODIUM UR-SCNC: <20 MMOL/L
SP GR UR STRIP: 1.03 (ref 1–1.03)
SPECIMEN TYPE: NORMAL
TRANSFUSION STATUS: NORMAL
TRANSFUSION STATUS: NORMAL
UNIT DIVISION: 0
UNIT DIVISION: 0
UNIT ISSUE DATE/TIME: NORMAL
UROBILINOGEN UR STRIP-ACNC: NORMAL EU/DL (ref 0–1)
WBC #/AREA URNS HPF: ABNORMAL /HPF (ref 0–5)
WBC OTHER # BLD: 9.8 K/UL (ref 3.5–11.3)

## 2023-08-07 PROCEDURE — 84157 ASSAY OF PROTEIN OTHER: CPT

## 2023-08-07 PROCEDURE — 87086 URINE CULTURE/COLONY COUNT: CPT

## 2023-08-07 PROCEDURE — 83615 LACTATE (LD) (LDH) ENZYME: CPT

## 2023-08-07 PROCEDURE — 0W9G3ZZ DRAINAGE OF PERITONEAL CAVITY, PERCUTANEOUS APPROACH: ICD-10-PCS | Performed by: RADIOLOGY

## 2023-08-07 PROCEDURE — 80048 BASIC METABOLIC PNL TOTAL CA: CPT

## 2023-08-07 PROCEDURE — 2709999900 US GUIDED PARACENTESIS

## 2023-08-07 PROCEDURE — 88112 CYTOPATH CELL ENHANCE TECH: CPT

## 2023-08-07 PROCEDURE — 97162 PT EVAL MOD COMPLEX 30 MIN: CPT

## 2023-08-07 PROCEDURE — 82150 ASSAY OF AMYLASE: CPT

## 2023-08-07 PROCEDURE — 84300 ASSAY OF URINE SODIUM: CPT

## 2023-08-07 PROCEDURE — 85025 COMPLETE CBC W/AUTO DIFF WBC: CPT

## 2023-08-07 PROCEDURE — G0378 HOSPITAL OBSERVATION PER HR: HCPCS

## 2023-08-07 PROCEDURE — 99232 SBSQ HOSP IP/OBS MODERATE 35: CPT | Performed by: INTERNAL MEDICINE

## 2023-08-07 PROCEDURE — 85055 RETICULATED PLATELET ASSAY: CPT

## 2023-08-07 PROCEDURE — 76770 US EXAM ABDO BACK WALL COMP: CPT

## 2023-08-07 PROCEDURE — 6370000000 HC RX 637 (ALT 250 FOR IP)

## 2023-08-07 PROCEDURE — 2580000003 HC RX 258

## 2023-08-07 PROCEDURE — 2060000000 HC ICU INTERMEDIATE R&B

## 2023-08-07 PROCEDURE — 87075 CULTR BACTERIA EXCEPT BLOOD: CPT

## 2023-08-07 PROCEDURE — 88342 IMHCHEM/IMCYTCHM 1ST ANTB: CPT

## 2023-08-07 PROCEDURE — 97530 THERAPEUTIC ACTIVITIES: CPT

## 2023-08-07 PROCEDURE — 87205 SMEAR GRAM STAIN: CPT

## 2023-08-07 PROCEDURE — 36415 COLL VENOUS BLD VENIPUNCTURE: CPT

## 2023-08-07 PROCEDURE — 82945 GLUCOSE OTHER FLUID: CPT

## 2023-08-07 PROCEDURE — 82570 ASSAY OF URINE CREATININE: CPT

## 2023-08-07 PROCEDURE — 99222 1ST HOSP IP/OBS MODERATE 55: CPT | Performed by: NURSE PRACTITIONER

## 2023-08-07 PROCEDURE — 82042 OTHER SOURCE ALBUMIN QUAN EA: CPT

## 2023-08-07 PROCEDURE — 87070 CULTURE OTHR SPECIMN AEROBIC: CPT

## 2023-08-07 PROCEDURE — 88305 TISSUE EXAM BY PATHOLOGIST: CPT

## 2023-08-07 PROCEDURE — 94640 AIRWAY INHALATION TREATMENT: CPT

## 2023-08-07 PROCEDURE — 89051 BODY FLUID CELL COUNT: CPT

## 2023-08-07 PROCEDURE — 2700000000 HC OXYGEN THERAPY PER DAY

## 2023-08-07 RX ADMIN — ATORVASTATIN CALCIUM 20 MG: 20 TABLET, FILM COATED ORAL at 07:52

## 2023-08-07 RX ADMIN — IPRATROPIUM BROMIDE AND ALBUTEROL SULFATE 1 DOSE: .5; 3 SOLUTION RESPIRATORY (INHALATION) at 11:04

## 2023-08-07 RX ADMIN — IPRATROPIUM BROMIDE AND ALBUTEROL SULFATE 1 DOSE: .5; 3 SOLUTION RESPIRATORY (INHALATION) at 15:53

## 2023-08-07 RX ADMIN — ONDANSETRON 4 MG: 4 TABLET, ORALLY DISINTEGRATING ORAL at 10:05

## 2023-08-07 RX ADMIN — CARVEDILOL 3.12 MG: 3.12 TABLET, FILM COATED ORAL at 16:28

## 2023-08-07 RX ADMIN — IPRATROPIUM BROMIDE AND ALBUTEROL SULFATE 1 DOSE: .5; 3 SOLUTION RESPIRATORY (INHALATION) at 20:19

## 2023-08-07 RX ADMIN — PANTOPRAZOLE SODIUM 40 MG: 40 TABLET, DELAYED RELEASE ORAL at 07:53

## 2023-08-07 RX ADMIN — SODIUM CHLORIDE, PRESERVATIVE FREE 10 ML: 5 INJECTION INTRAVENOUS at 19:45

## 2023-08-07 RX ADMIN — SODIUM CHLORIDE, PRESERVATIVE FREE 10 ML: 5 INJECTION INTRAVENOUS at 07:56

## 2023-08-07 RX ADMIN — BUDESONIDE AND FORMOTEROL FUMARATE DIHYDRATE 2 PUFF: 80; 4.5 AEROSOL RESPIRATORY (INHALATION) at 20:19

## 2023-08-07 RX ADMIN — FOLIC ACID 1 MG: 1 TABLET ORAL at 07:52

## 2023-08-07 RX ADMIN — CARVEDILOL 3.12 MG: 3.12 TABLET, FILM COATED ORAL at 07:52

## 2023-08-07 RX ADMIN — PANTOPRAZOLE SODIUM 40 MG: 40 TABLET, DELAYED RELEASE ORAL at 16:29

## 2023-08-07 NOTE — PROGRESS NOTES
Physical Therapy  Facility/Department: [facility]  Physical Therapy Initial Assessment    Name: Kory Melendez  : 1955  MRN: [id number]  Date of Service: 2023  Chief Complaint   Patient presents with    Chest Pain       Discharge Recommendations:   Further therapy recommended at discharge. PT Equipment Recommendations  Equipment Needed: Yes  Mobility Devices: Roverto Pete: Rolling  Other: If discharge disposition is home, pt would benefit from acquisition rolling walker to improve safety and mobility. Patient Diagnosis(es): The primary encounter diagnosis was Chest pain, unspecified type. A diagnosis of Pleural effusion was also pertinent to this visit. Past Medical History:  has a past medical history of CHF (congestive heart failure) ([address]), COPD (chronic obstructive pulmonary disease) ([address]), Diabetes mellitus ([address]), Erectile dysfunction due to arterial insufficiency, Hypertension, Microalbuminuria due to type 2 diabetes mellitus ([address]), and Overweight (BMI 25.0-29.9). Past Surgical History:  has a past surgical history that includes Appendectomy; Total ankle arthroplasty; Esophagogastroduodenoscopy (2023); Upper gastrointestinal endoscopy (N/A, 2023); Colonoscopy (N/A, 2023); and Colonoscopy (2023). Assessment   Body Structures, Functions, Activity Limitations Requiring Skilled Therapeutic Intervention: Decreased endurance;Decreased functional mobility ; Decreased balance;Decreased strength;Decreased high-level IADLs  Assessment: Pt amb 100' CGA RW. Pt reports using RW/SPC cane at baseline for household ambulation, manual w/c for community ambulation. Pt has flight of stairs to get into apartment, reports that son carries pt up/down stairs. Pt is currenlty unsafe to return to prior living arraingements at this time. Pt would benefit from continued acute PT to address deficits.   Therapy Prognosis: Good  Decision Making: Medium Complexity  Requires PT Follow-Up:

## 2023-08-07 NOTE — BRIEF OP NOTE
Brief Postoperative Note for Paracentesis    Terrie Gordon  YOB: 1955  [id number]    Pre-operative Diagnosis:  Ascites     Post-operative Diagnosis: Same    Procedure: Ultrasound guided Paracentesis     Anesthesia: 1% Lidocaine     Surgeons/Assistants: Mela Naik PA-C    Complications: none    EBL: Minimal    Specimens: Were obtained    Ultrasound guided paracentesis performed. 1700 ml clear yellow fluid obtained. Dressing applied.      Electronically signed by FER Pritchard on 8/7/2023 at 9:17 AM

## 2023-08-07 NOTE — CONSULTS
complaints of Chest pain , Shortness of breath, and Diarrhea. Patient was recently admitted to [facility] on 07/25/2023 for the management of anemia. At that time patient was supposed to have outpatient colonoscopy done because he had elevated CEA and was having anemia at that time. CT PE done during that visit showed innumerable liver lesions now present, concerning for metastatic disease, a 0.7 cm nodule in the superior segment of the right lower lobe also concerning for metastasis. GI was consulted for EGD and colonoscopy, EGD showed grade D reflux esophagitis in the lower esophagus with circumferential ulceration and nodularity. Colonoscopy showed a large malignant mass in the ascending colon with partial obstruction, circumferential with mild contact oozing, this was biopsied. The rest of the colon had multiple colon polyps suspicious for metastatic disease. She was placed on PPI therapy twice daily and was discharged with follow-up with medical oncology. Patient is currently too weak for treatment with chemotherapy. Patient does have ascites and abdomen distention. He also has UYEN with his BUN being 30 and Cr 1.7. Patient with loose stools and C-diff and stool studies ordered. Patient had Paracentesis completed and 1700 ml of clear yellow fluid obtained. HGB 6.5 on admission and patient received 1 unit RBC repeat labs 8.1. Patient with the following consults FP Pleural effusion, Oncology due to Colorectal cancer with mets, Palliative care  Goals of care, code status discussion, family support and symptom management.     Palliative care will continue to follow and provide emotional support and updates     Active Hospital Problems    Diagnosis Date Noted    Pleural effusion [J90] 08/05/2023    Metastatic colon cancer to liver ([address]) [C18.9, C78.7] 08/05/2023    Malignant ascites [R18.0] 08/05/2023    Chest pain [R07.9] 08/04/2023    Combined systolic and diastolic congestive heart and is also suspicious for a  metastasis. Code Status: Full Code     ADVANCED CARE PLANNING:  Patient has capacity for medical decisions: yes  Health Care Power of : yes  Living Will: no     Personal, Social, and Family History  Marital Status:   Living situation:alone  Importance of luzmaria/Presybeterian/spiritual beliefs: [] Very [] Somewhat [] Not   Psychological Distress: moderate  Does patient understand diagnosis/treatment? yes  Does caregiver understand diagnosis/treatment? not asked      Assessment        REVIEW OF SYSTEMS  Constitutional: Alert and oriented x3 answers questions and follows commands   Eyes: no eye pain or blurred vision  ENT: no hearing loss, congestion, or difficulty swallowing   Respiratory: Lungs diminished oxygen per NC   Cardiovascular: no chest pain or pressure, no palpitations, no diaphoresis   Gastrointestinal: Abdomen rounded and distended and tender to touch Paracentesis today drained 1.7 liters of fluid. Genitourinary: no dysuria, frequency,hematuria , or nocturia   Musculoskeletal: generalized weakness and discomfort   Skin: no rashes or sores   Neurological: Alert and oriented x3 answers questions and follows commands   PHYSICAL ASSESSMENT:  Constitutional: A Alert and oriented x3 answers questions and follows commands   Head: Normocephalic and atraumatic. Eyes: EOM are normal. Pupils are equal, round   Neck: Normal range of motion. Neck supple. No tracheal deviation present. Cardiovascular: Normal rate and regular rhythm, S1, S2, no murmur   Pulmonary/Chest: Lungs diminished respirations relaxed on NC 4L  Abdomen: Soft.  No tenderness, not distended, no ascites, no organomegaly   Musculoskeletal: generalized weakness and discomfort   Neurological: Alert and oriented x3 answers questions and follows commands   Skin: Normal turgor, no bleeding, no bruising     Palliative Performance Scale:  ___60%  Ambulation reduced; Significant disease; Can't do

## 2023-08-07 NOTE — CARE COORDINATION
Polina Hernández RN states the attending would prefer pt goes to SNF over [facility], and he is close to discharge. Requests CM to speak with pt. Pt states he has had [facility] in the past and received a large bill for one visit. Pt is agreeable to send referrals and look in to his out of pocket cost of SNFs in the area of his home. He declines a list and requests CM to send to facilities near his home and on his insurance. Per his [employer], [address], [facility], [facility], and [facility] are reasonably close to his home. Referrals sent. Awaiting replies. Per PT, if he goes home, he will need a new RW, as his is broken. [address] with [address] called. They can accept. Pt is agreeable to to to [facility] if there is no out of pocket cost. Derrick Christopher will start precert to let pt know of any out of pocket costs. 1341 Received call from Red Cabrera at [facility], they are [facility]. 1346 Received call from Derrick Christopher with [facility]. Zero copay days 1-20 then $200/day. Pt updated and is agreeable to go to [facility]. Derrick Christopher will start precert. [facility] is first choice. Pt plans to start cancer treatment when discharged from SNF.     1441 Received call from [address] with [facility]. They can accept. Raquel Chens is pt first choice.

## 2023-08-07 NOTE — PROGRESS NOTES
ESOPHAGUS, LOWER, BIOPSY:-HYPERPLASTIC POLYP.   -FOCAL MUCOSAL EROSION WITH REACTIVE CHANGES ALSO NOTED.-NO FUNGAL   ORGANISMS OR HIGH-GRADE DYSPLASIA. B.  ASCENDING COLON MASS, BIOPSY:-SUPERFICIAL FRAGMENTS OF INVASIVE   WELL TO MODERATELY DIFFERENTIATED ADENOCARCINOMA; INVADING INTO THE   LAMINA PROPRIA. IMAGING DATA:  CT chest   IMPRESSION:  1. No pulmonary embolism. Mild emphysema. 2. Small left greater than right pleural effusions and small pericardial  effusion have increased from recent CT. 3. Numerous liver lesions and partially visualized upper abdominal  lymphadenopathy, compatible with metastatic disease. 4. A 7 mm part cavitary nodule in the superior right lower lobe is unchanged  from recent CT but enlarged from 01/09/2023 and is also suspicious for a  metastasis. CT abd and pelvis   IMPRESSION:  1. Enlarged liver with numerous enhancing lesions consistent with  metastasis. Patient also has lymphadenopathy in the portal area and  retroperitoneum. 2.  Hepatic flexure of the colon shows evidence of wall thickening and slight  asymmetry suspicious for underlying colonic mass. Irregularity at the rectum. 3.  Bilateral pleural effusions larger on the left than the right. Coronary  artery disease. 4.  Moderate to moderately large volume ascitic fluid in the abdomen and  pelvis. 5.  Abnormal lesion in the spleen suggesting metastasis. 6.  Enlarged seminal vesicles. 7.  Soft tissue density in the subcutaneous tissues right upper chest  incompletely included in the field of view. This could represent  gynecomastia. Breast mass not excluded.   Primary Problem  Chest pain    Active Hospital Problems    Diagnosis Date Noted    Pleural effusion [J90] 08/05/2023    Metastatic colon cancer to liver ([address]) [C18.9, C78.7] 08/05/2023    Malignant ascites [R18.0] 08/05/2023    Chest pain [R07.9] 08/04/2023    Combined systolic and diastolic congestive heart failure ([address]) [I50.40]

## 2023-08-07 NOTE — PLAN OF CARE
Problem: Discharge Planning  Goal: Discharge to home or other facility with appropriate resources  Outcome: Progressing     Problem: Pain  Goal: Verbalizes/displays adequate comfort level or baseline comfort level  Outcome: Progressing     Problem: Safety - Adult  Goal: Free from fall injury  Outcome: Progressing     Problem: Respiratory - Adult  Goal: Achieves optimal ventilation and oxygenation  8/7/2023 1650 by Attila Rodríguez RN  Outcome: Progressing  8/7/2023 0942 by Jeronimo Marquez RCP  Outcome: Progressing     Problem: Nutrition Deficit:  Goal: Optimize nutritional status  Outcome: Progressing     Problem: Chronic Conditions and Co-morbidities  Goal: Patient's chronic conditions and co-morbidity symptoms are monitored and maintained or improved  Outcome: Progressing

## 2023-08-07 NOTE — PLAN OF CARE
Problem: Respiratory - Adult  Goal: Achieves optimal ventilation and oxygenation  8/7/2023 0942 by Taylor Rodriguez RCP  Outcome: Progressing

## 2023-08-07 NOTE — PROGRESS NOTES
rest of the colon had multiple colon polyps suspicious for metastatic disease. She was placed on PPI therapy twice daily and was discharged with follow-up with medical oncology. Patient states appointment with oncology is on 14 August.  Biopsy results of the ascending colon showed invasive well to moderately differentiated adenocarcinoma, invading into the lamina propria. Work-up in the ED yesterday was consistent with an UYEN, troponins were flat, CT abdomen showed an enlarged liver with numerous enhancing lesions consistent with metastasis, also showed moderate to moderately large volume ascitic fluid in the abdomen and pelvis, CT of the chest was negative for pulmonary embolus, but showed pleural effusions and a 7 mm cavitary nodule unchanged from previous CT. REVIEW OF SYSTEMS:      CONSTITUTIONAL:  no fevers, no headcahes  EYES: negative for blury vision  HEENT: No headaches, No nasal congestion, no difficulty swallowing  RESPIRATORY:negative for dyspnea, no wheezing, no Cough  CARDIOVASCULAR: negative for chest pain, no palpitations  GASTROINTESTINAL: no nausea, no vomiting, no change in bowel habits, no abdominal pain   GENITOURINARY: negative for dysuria, no hematuria   MUSCULOSKELETAL: no joint pains, no muscle aches, no swelling of joints or extremities  NEUROLOGICAL: No  Weakness or numbness      PAST MEDICAL HISTORY:      has a past medical history of CHF (congestive heart failure) ([address]), COPD (chronic obstructive pulmonary disease) ([address]), Diabetes mellitus ([address]), Erectile dysfunction due to arterial insufficiency, Hypertension, Microalbuminuria due to type 2 diabetes mellitus ([address]), and Overweight (BMI 25.0-29.9). PAST SURGICAL HISTORY:      has a past surgical history that includes Appendectomy; Total ankle arthroplasty; Esophagogastroduodenoscopy (07/26/2023); Upper gastrointestinal endoscopy (N/A, 7/26/2023); Colonoscopy (N/A, 7/26/2023); and Colonoscopy (7/26/2023).        SOCIAL HISTORY: with metastasis  -Surgical pathology was collected from the ascending colon mass during last colonoscopy on 07/26/2023 showed well to moderately differentiated adenocarcinoma invading into the lamina propria   - CT abdomen showed an enlarged liver with numerous enhancing lesions consistent with metastasis, also showed moderate to moderately large volume ascitic fluid in the abdomen and pelvis, CT of the chest was negative for pulmonary embolus, but showed pleural effusions and a 7 mm cavitary nodule unchanged from previous CT  -Oncology on board   -Continue with supportive care and paracentecis and electrolyte replacement   -Will arrange systemic therapy when he becomes more stable , currently too weak to initiate therapy   -Palliative care consulted , appreciate recs         Anemia likely secondary to colorectal cancer  Hemoglobin was 6.5 on admission, received 1 unit of PRBCs, repeat hemoglobin 8.1  -Will continue to monitor         History of COPD  -Uses 4 L of oxygen at home  -Currently is at 4 L saturating well  -Symbicort inhaler and Duoneb     UYEN secondary to prerenal azotemia  -Patient has been having diarrhea since last admission  -Patient was started on 50 cc an hour fluids overnight, creatinine unchanged from yesterday, still 1.6  -Fluids held due to moderate to large ascites  -Crt today pending     Hypertension  Lisinopril held due to UYEN  Coreg resumed  -Currently also holding home Norvasc 10mg as well Hydralazine 50 mg      DVT prophylaxis, held due to low hemoglobin  GI prophylaxis, Protonix 40 mg twice daily    Dispo, likely discharge later today    Radha Hernandez MD  Family Medicine Resident  8/7/2023 9:42 AM            Please note that this chart was generated using voice recognition Dragon dictation software.   Although every effort was made to ensure the accuracy of this automated transcription, some errors in transcription may have occurred

## 2023-08-07 NOTE — PROGRESS NOTES
Paracentesis     HUNTER Nava    Pt. To ultrasound room 8  Identified, allergies confirmed  Supine position  Stable    Time out complete. Prep to abd.      1700 mL of clear pale yellow colored fluid drawn off. 2x2 with Tegaderm to puncture site. Sample sent to lab  Patient tolerated well. No problems noted.    Report given to MARIANNA Traore    Patient stable, off monitor and waiting for transport in IR holding

## 2023-08-08 LAB
ALBUMIN FLD-MCNC: 0.6 G/DL
AMYLASE FLD-CCNC: 14 U/L
ANION GAP SERPL CALCULATED.3IONS-SCNC: 10 MMOL/L (ref 9–17)
BASOPHILS # BLD: 0 K/UL (ref 0–0.2)
BASOPHILS NFR BLD: 0 % (ref 0–2)
BUN SERPL-MCNC: 29 MG/DL (ref 8–23)
CALCIUM SERPL-MCNC: 7.5 MG/DL (ref 8.6–10.4)
CASE NUMBER:: NORMAL
CHLORIDE SERPL-SCNC: 101 MMOL/L (ref 98–107)
CO2 SERPL-SCNC: 22 MMOL/L (ref 20–31)
CREAT SERPL-MCNC: 1.6 MG/DL (ref 0.7–1.2)
EOSINOPHIL # BLD: 0 K/UL (ref 0–0.44)
EOSINOPHILS RELATIVE PERCENT: 0 % (ref 1–4)
ERYTHROCYTE [DISTWIDTH] IN BLOOD BY AUTOMATED COUNT: 15.8 % (ref 11.8–14.4)
GFR SERPL CREATININE-BSD FRML MDRD: 46 ML/MIN/1.73M2
GLUCOSE FLD-MCNC: 107 MG/DL
GLUCOSE SERPL-MCNC: 106 MG/DL (ref 70–99)
HCT VFR BLD AUTO: 25.5 % (ref 40.7–50.3)
HGB BLD-MCNC: 8.3 G/DL (ref 13–17)
IMM GRANULOCYTES # BLD AUTO: 0.09 K/UL (ref 0–0.3)
IMM GRANULOCYTES NFR BLD: 1 %
LDH FLD L TO P-CCNC: 93 U/L
LYMPHOCYTES NFR BLD: 0.47 K/UL (ref 1.1–3.7)
LYMPHOCYTES RELATIVE PERCENT: 5 % (ref 24–43)
MCH RBC QN AUTO: 31.1 PG (ref 25.2–33.5)
MCHC RBC AUTO-ENTMCNC: 32.5 G/DL (ref 28.4–34.8)
MCV RBC AUTO: 95.5 FL (ref 82.6–102.9)
MICROORGANISM SPEC CULT: NORMAL
MONOCYTES NFR BLD: 0.38 K/UL (ref 0.1–1.2)
MONOCYTES NFR BLD: 4 % (ref 3–12)
MORPHOLOGY: ABNORMAL
NEUTROPHILS NFR BLD: 90 % (ref 36–65)
NEUTS SEG NFR BLD: 8.46 K/UL (ref 1.5–8.1)
NRBC BLD-RTO: 0 PER 100 WBC
PLATELET # BLD AUTO: ABNORMAL K/UL (ref 138–453)
PLATELET, FLUORESCENCE: 125 K/UL (ref 138–453)
PLATELETS.RETICULATED NFR BLD AUTO: 4.6 % (ref 1.1–10.3)
POTASSIUM SERPL-SCNC: 4.8 MMOL/L (ref 3.7–5.3)
RBC # BLD AUTO: 2.67 M/UL (ref 4.21–5.77)
SODIUM SERPL-SCNC: 133 MMOL/L (ref 135–144)
SPECIMEN DESCRIPTION: NORMAL
SPECIMEN DESCRIPTION: NORMAL
SPECIMEN TYPE: NORMAL
WBC OTHER # BLD: 9.4 K/UL (ref 3.5–11.3)

## 2023-08-08 PROCEDURE — 94640 AIRWAY INHALATION TREATMENT: CPT

## 2023-08-08 PROCEDURE — 97530 THERAPEUTIC ACTIVITIES: CPT

## 2023-08-08 PROCEDURE — 99232 SBSQ HOSP IP/OBS MODERATE 35: CPT | Performed by: INTERNAL MEDICINE

## 2023-08-08 PROCEDURE — 96361 HYDRATE IV INFUSION ADD-ON: CPT

## 2023-08-08 PROCEDURE — 85025 COMPLETE CBC W/AUTO DIFF WBC: CPT

## 2023-08-08 PROCEDURE — 80048 BASIC METABOLIC PNL TOTAL CA: CPT

## 2023-08-08 PROCEDURE — 85055 RETICULATED PLATELET ASSAY: CPT

## 2023-08-08 PROCEDURE — 6370000000 HC RX 637 (ALT 250 FOR IP)

## 2023-08-08 PROCEDURE — 97535 SELF CARE MNGMENT TRAINING: CPT

## 2023-08-08 PROCEDURE — 2700000000 HC OXYGEN THERAPY PER DAY

## 2023-08-08 PROCEDURE — 36415 COLL VENOUS BLD VENIPUNCTURE: CPT

## 2023-08-08 PROCEDURE — 94761 N-INVAS EAR/PLS OXIMETRY MLT: CPT

## 2023-08-08 PROCEDURE — 99231 SBSQ HOSP IP/OBS SF/LOW 25: CPT | Performed by: INTERNAL MEDICINE

## 2023-08-08 PROCEDURE — G0378 HOSPITAL OBSERVATION PER HR: HCPCS

## 2023-08-08 PROCEDURE — 2580000003 HC RX 258

## 2023-08-08 PROCEDURE — 2060000000 HC ICU INTERMEDIATE R&B

## 2023-08-08 RX ORDER — SODIUM CHLORIDE 9 MG/ML
INJECTION, SOLUTION INTRAVENOUS CONTINUOUS
Status: DISCONTINUED | OUTPATIENT
Start: 2023-08-08 | End: 2023-08-09

## 2023-08-08 RX ORDER — IPRATROPIUM BROMIDE AND ALBUTEROL SULFATE 2.5; .5 MG/3ML; MG/3ML
1 SOLUTION RESPIRATORY (INHALATION) 2 TIMES DAILY
Status: DISCONTINUED | OUTPATIENT
Start: 2023-08-08 | End: 2023-08-11 | Stop reason: HOSPADM

## 2023-08-08 RX ADMIN — SODIUM CHLORIDE: 9 INJECTION, SOLUTION INTRAVENOUS at 12:25

## 2023-08-08 RX ADMIN — BUDESONIDE AND FORMOTEROL FUMARATE DIHYDRATE 2 PUFF: 80; 4.5 AEROSOL RESPIRATORY (INHALATION) at 12:09

## 2023-08-08 RX ADMIN — BUDESONIDE AND FORMOTEROL FUMARATE DIHYDRATE 2 PUFF: 80; 4.5 AEROSOL RESPIRATORY (INHALATION) at 20:13

## 2023-08-08 RX ADMIN — PANTOPRAZOLE SODIUM 40 MG: 40 TABLET, DELAYED RELEASE ORAL at 07:51

## 2023-08-08 RX ADMIN — IPRATROPIUM BROMIDE AND ALBUTEROL SULFATE 1 DOSE: .5; 3 SOLUTION RESPIRATORY (INHALATION) at 12:09

## 2023-08-08 RX ADMIN — ATORVASTATIN CALCIUM 20 MG: 20 TABLET, FILM COATED ORAL at 08:14

## 2023-08-08 RX ADMIN — PANTOPRAZOLE SODIUM 40 MG: 40 TABLET, DELAYED RELEASE ORAL at 17:38

## 2023-08-08 RX ADMIN — FOLIC ACID 1 MG: 1 TABLET ORAL at 08:14

## 2023-08-08 RX ADMIN — SODIUM CHLORIDE, PRESERVATIVE FREE 10 ML: 5 INJECTION INTRAVENOUS at 19:48

## 2023-08-08 RX ADMIN — IPRATROPIUM BROMIDE AND ALBUTEROL SULFATE 1 DOSE: .5; 2.5 SOLUTION RESPIRATORY (INHALATION) at 20:13

## 2023-08-08 RX ADMIN — SODIUM CHLORIDE, PRESERVATIVE FREE 10 ML: 5 INJECTION INTRAVENOUS at 08:14

## 2023-08-08 RX ADMIN — CARVEDILOL 3.12 MG: 3.12 TABLET, FILM COATED ORAL at 08:14

## 2023-08-08 RX ADMIN — CARVEDILOL 3.12 MG: 3.12 TABLET, FILM COATED ORAL at 17:38

## 2023-08-08 NOTE — PROGRESS NOTES
Today's Date: 8/8/2023  Patient Name: Devika Davila  Date of admission: 8/4/2023 11:42 AM  Patient's age: 77 y.o., 1955  Admission Dx: Chest pain [R07.9]  Pleural effusion [J90]  Chest pain, unspecified type [R07.9]    Reason for Consult: management recommendations  Requesting Physician: No admitting provider for patient encounter. INTERIM HISTORY  The patient is seen and evaluated. Diarrhea has subsided , he feels ok. Awaiting SNF admission to [facility]    HISTORY OF PRESENT ILLNESS:    Patient is a 47-year-old male with history of CHF, COPD on 4 L at home presented to the ED with complaints of chest pain, shortness of breath and diarrhea. Patient was recently admitted to [facility] on 07/25/2023 for the management of anemia. At that time patient was supposed to have outpatient colonoscopy done because he had elevated CEA and was having anemia at that time. CT PE done during that visit showed innumerable liver lesions now present, concerning for metastatic disease, a 0.7 cm nodule in the superior segment of the right lower lobe also concerning for metastasis. GI was consulted for EGD and colonoscopy, EGD showed grade D reflux esophagitis in the lower esophagus with circumferential ulceration and nodularity. Colonoscopy showed a large malignant mass in the ascending colon with partial obstruction, circumferential with mild contact oozing, this was biopsied. The rest of the colon had multiple colon polyps suspicious for metastatic disease. She was placed on PPI therapy twice daily and was discharged with follow-up with medical oncology. Pt was actually seen by Dr Shiva Petersen in April, he presented to the hospital then with iron def anemia. And he had elevated CEA . He was referred for colonoscopy  but he never made it due to transportation issues. Pt is seen and examined, very weak.  CT abdomen showed an enlarged liver with numerous enhancing lesions consistent with 08/05/2023    Malignant ascites [R18.0] 08/05/2023    Chest pain [R07.9] 08/04/2023    Combined systolic and diastolic congestive heart failure ([address]) [I50.40] 05/03/2018    Chronic obstructive pulmonary disease ([address]) [J44.9] 12/23/2016    Hypertension goal BP (blood pressure) < 140/80 [I10] 06/05/2015    Hyperlipidemia with target LDL less than 70 [E78.5] 06/05/2015    Controlled type 2 diabetes mellitus without complication, without long-term current use of insulin ([address]) [E11.9] 06/05/2015         IMPRESSION:   Advanced colon cancer  Liver and lung mets   Ascites. Possibly malignant   DM, HTN, CHF, COPD as major comorbidity. Performance status is ECOG 2-3     RECOMMENDATIONS:  I had a very long discussion with the patient. Explaining his condition. S/P paracentesis today   The patient understands his condition and management plan. Plan to discharge to SNF . OK for SNF discharge, we will coordinate chemo as outpatient     Anemia secondary to chronic illness and liver disease. No clear evidence of iron deficiency appreciated    Will follow with you       Discussed with patient and Nurse. Thank you for asking us to see this patient.     RICHARD MCCRAY [facility] MD [place]  Hematologist/Medical Oncologist  Cell: [phone]

## 2023-08-08 NOTE — PLAN OF CARE
Problem: Respiratory - Adult  Goal: Achieves optimal ventilation and oxygenation  8/7/2023 2021 by Figueroa Sinha RCP  Outcome: Progressing   BRONCHOSPASM/BRONCHOCONSTRICTION     [x]         IMPROVE AERATION/BREATH SOUNDS  [x]   ADMINISTER BRONCHODILATOR THERAPY AS APPROPRIATE  [x]   ASSESS BREATH SOUNDS  []   IMPLEMENT AEROSOL/MDI PROTOCOL  [x]   PATIENT EDUCATION AS NEEDED

## 2023-08-08 NOTE — PROGRESS NOTES
[facility]  Progress Note    Date:   8/8/2023  Patient name:  Lilliana Schroeder  Date of admission:  8/4/2023 11:42 AM  MRN:   [id number]  YOB: 1955    SUBJECTIVE/Last 24 hours update:     Patient seen and examined at the bed side , no new acute events overnight. Overall patient states his symptoms are improving. Patient states his nausea and abdominal pain are improving. Diarrhea is also improving. Patient denies having any episodes yesterday. Creatinine is trending down, 1.6 today. Currently waiting for SNF placement, pre-CERT started. Physical therapy has worked with the patient, recommending SNF versus home with 24-hour supervision. Over the past 24 hours patient has had 775 mL of urine output. Notes from nursing staff and Consults had been reviewed, and the overnight progress had been checked with the nursing staff as well. HPI:        Patient is a 61-year-old male with history of CHF, COPD on 4 L at home presented to the ED with complaints of chest pain, shortness of breath and diarrhea. Patient was recently admitted to [place] on 07/25/2023 for the management of anemia. At that time patient was supposed to have outpatient colonoscopy done because he had elevated CEA and was having anemia at that time. CT PE done during that visit showed innumerable liver lesions now present, concerning for metastatic disease, a 0.7 cm nodule in the superior segment of the right lower lobe also concerning for metastasis. GI was consulted for EGD and colonoscopy, EGD showed grade D reflux esophagitis in the lower esophagus with circumferential ulceration and nodularity. Colonoscopy showed a large malignant mass in the ascending colon with partial obstruction, circumferential with mild contact oozing, this was biopsied. The rest of the colon had multiple colon polyps suspicious for metastatic disease.   She was placed on Specimen: Ascitic Fluid; Body Fluid   Result Value Ref Range    Specimen Description . ASCITIC FLUID     Direct Exam FEW NEUTROPHILS (A)     Direct Exam NO BACTERIA SEEN     Direct Exam       Gram stain made from cytocentrifuged specimen. Organisms and cells will be concentrated.     Culture NO GROWTH 16 HOURS    Basic Metabolic Panel w/ Reflex to MG    Collection Time: 08/07/23  3:35 PM   Result Value Ref Range    Glucose 159 (H) 70 - 99 mg/dL    BUN 31 (H) 8 - 23 mg/dL    Creatinine 1.7 (H) 0.7 - 1.2 mg/dL    Est, Glom Filt Rate 43 (L) >60 mL/min/1.73m2    Calcium 7.4 (L) 8.6 - 10.4 mg/dL    Sodium 134 (L) 135 - 144 mmol/L    Potassium 4.9 3.7 - 5.3 mmol/L    Chloride 103 98 - 107 mmol/L    CO2 21 20 - 31 mmol/L    Anion Gap 10 9 - 17 mmol/L   SODIUM, URINE, RANDOM    Collection Time: 08/07/23  4:55 PM   Result Value Ref Range    Sodium,Ur <20 mmol/L   CREATININE, RANDOM URINE    Collection Time: 08/07/23  4:55 PM   Result Value Ref Range    Creatinine, Ur 138.3 39.0 - 259.0 mg/dL   Basic Metabolic Panel w/ Reflex to MG    Collection Time: 08/08/23  7:12 AM   Result Value Ref Range    Glucose 106 (H) 70 - 99 mg/dL    BUN 29 (H) 8 - 23 mg/dL    Creatinine 1.6 (H) 0.7 - 1.2 mg/dL    Est, Glom Filt Rate 46 (L) >60 mL/min/1.73m2    Calcium 7.5 (L) 8.6 - 10.4 mg/dL    Sodium 133 (L) 135 - 144 mmol/L    Potassium 4.8 3.7 - 5.3 mmol/L    Chloride 101 98 - 107 mmol/L    CO2 22 20 - 31 mmol/L    Anion Gap 10 9 - 17 mmol/L   CBC with Auto Differential    Collection Time: 08/08/23  7:12 AM   Result Value Ref Range    WBC 9.4 3.5 - 11.3 k/uL    RBC 2.67 (L) 4.21 - 5.77 m/uL    Hemoglobin 8.3 (L) 13.0 - 17.0 g/dL    Hematocrit 25.5 (L) 40.7 - 50.3 %    MCV 95.5 82.6 - 102.9 fL    MCH 31.1 25.2 - 33.5 pg    MCHC 32.5 28.4 - 34.8 g/dL    RDW 15.8 (H) 11.8 - 14.4 %    Platelets See Reflexed IPF Result 138 - 453 k/uL    Platelet, Fluorescence 125 (L) 138 - 453 k/uL    Platelet, Immature Fraction 4.6 1.1 - 10.3 %    NRBC Automated

## 2023-08-08 NOTE — PLAN OF CARE
Problem: Discharge Planning  Goal: Discharge to home or other facility with appropriate resources  8/8/2023 0317 by Sigrid Wheeler RN  Outcome: Progressing  8/7/2023 1650 by Remy Robbins RN  Outcome: Progressing     Problem: Pain  Goal: Verbalizes/displays adequate comfort level or baseline comfort level  8/8/2023 0317 by Sigrid Wheeler RN  Outcome: Progressing  8/7/2023 1650 by Remy Robbins RN  Outcome: Progressing     Problem: Safety - Adult  Goal: Free from fall injury  8/8/2023 0317 by Sigrid Wheeler RN  Outcome: Progressing  8/7/2023 1650 by Remy Robbins RN  Outcome: Progressing     Problem: Respiratory - Adult  Goal: Achieves optimal ventilation and oxygenation  8/8/2023 0317 by Sigrid Wheeler RN  Outcome: Progressing  8/7/2023 2021 by Giovanna Easton RCP  Outcome: Progressing  8/7/2023 1650 by Remy Robbins RN  Outcome: Progressing     Problem: Nutrition Deficit:  Goal: Optimize nutritional status  8/8/2023 0317 by Sigrid Wheeler RN  Outcome: Progressing  8/7/2023 1650 by Remy Robbins RN  Outcome: Progressing     Problem: Chronic Conditions and Co-morbidities  Goal: Patient's chronic conditions and co-morbidity symptoms are monitored and maintained or improved  8/8/2023 0317 by Sigrid Wheeler RN  Outcome: Progressing  8/7/2023 1650 by Remy Robbins RN  Outcome: Progressing

## 2023-08-09 LAB
ANION GAP SERPL CALCULATED.3IONS-SCNC: 9 MMOL/L (ref 9–17)
BASOPHILS # BLD: 0 K/UL (ref 0–0.2)
BASOPHILS NFR BLD: 0 % (ref 0–2)
BODY FLD TYPE: NORMAL
BUN SERPL-MCNC: 26 MG/DL (ref 8–23)
CALCIUM SERPL-MCNC: 7.1 MG/DL (ref 8.6–10.4)
CHLORIDE SERPL-SCNC: 103 MMOL/L (ref 98–107)
CO2 SERPL-SCNC: 19 MMOL/L (ref 20–31)
CREAT SERPL-MCNC: 1.4 MG/DL (ref 0.7–1.2)
EOSINOPHIL # BLD: 0 K/UL (ref 0–0.44)
EOSINOPHILS RELATIVE PERCENT: 0 % (ref 1–4)
ERYTHROCYTE [DISTWIDTH] IN BLOOD BY AUTOMATED COUNT: 15.5 % (ref 11.8–14.4)
GFR SERPL CREATININE-BSD FRML MDRD: 54 ML/MIN/1.73M2
GLUCOSE SERPL-MCNC: 95 MG/DL (ref 70–99)
HCT VFR BLD AUTO: 24.4 % (ref 40.7–50.3)
HGB BLD-MCNC: 7.4 G/DL (ref 13–17)
IMM GRANULOCYTES # BLD AUTO: 0.08 K/UL (ref 0–0.3)
IMM GRANULOCYTES NFR BLD: 1 %
LYMPHOCYTES NFR BLD: 0.55 K/UL (ref 1.1–3.7)
LYMPHOCYTES NFR FLD: 18 %
LYMPHOCYTES RELATIVE PERCENT: 7 % (ref 24–43)
MCH RBC QN AUTO: 29.7 PG (ref 25.2–33.5)
MCHC RBC AUTO-ENTMCNC: 30.3 G/DL (ref 28.4–34.8)
MCV RBC AUTO: 98 FL (ref 82.6–102.9)
MICROORGANISM SPEC CULT: NORMAL
MICROORGANISM SPEC CULT: NORMAL
MONOCYTES NFR BLD: 0.47 K/UL (ref 0.1–1.2)
MONOCYTES NFR BLD: 6 % (ref 3–12)
MORPHOLOGY: ABNORMAL
NEUTROPHILS NFR BLD: 86 % (ref 36–65)
NEUTROPHILS NFR FLD: 38 %
NEUTS SEG NFR BLD: 6.7 K/UL (ref 1.5–8.1)
NRBC BLD-RTO: 0 PER 100 WBC
PLATELET # BLD AUTO: ABNORMAL K/UL (ref 138–453)
PLATELET, FLUORESCENCE: 98 K/UL (ref 138–453)
PLATELETS.RETICULATED NFR BLD AUTO: 3.2 % (ref 1.1–10.3)
POTASSIUM SERPL-SCNC: 4.8 MMOL/L (ref 3.7–5.3)
RBC # BLD AUTO: 2.49 M/UL (ref 4.21–5.77)
RBC # FLD: <3000 CELLS/UL
SERVICE CMNT-IMP: NORMAL
SERVICE CMNT-IMP: NORMAL
SODIUM SERPL-SCNC: 131 MMOL/L (ref 135–144)
SPECIMEN DESCRIPTION: NORMAL
SPECIMEN DESCRIPTION: NORMAL
UNIDENT CELLS NFR FLD: NORMAL %
WBC # FLD: 34 CELLS/UL
WBC OTHER # BLD: 7.8 K/UL (ref 3.5–11.3)

## 2023-08-09 PROCEDURE — 6370000000 HC RX 637 (ALT 250 FOR IP)

## 2023-08-09 PROCEDURE — 94640 AIRWAY INHALATION TREATMENT: CPT

## 2023-08-09 PROCEDURE — 36415 COLL VENOUS BLD VENIPUNCTURE: CPT

## 2023-08-09 PROCEDURE — 97530 THERAPEUTIC ACTIVITIES: CPT

## 2023-08-09 PROCEDURE — G0378 HOSPITAL OBSERVATION PER HR: HCPCS

## 2023-08-09 PROCEDURE — 85025 COMPLETE CBC W/AUTO DIFF WBC: CPT

## 2023-08-09 PROCEDURE — 80048 BASIC METABOLIC PNL TOTAL CA: CPT

## 2023-08-09 PROCEDURE — 85055 RETICULATED PLATELET ASSAY: CPT

## 2023-08-09 PROCEDURE — 96361 HYDRATE IV INFUSION ADD-ON: CPT

## 2023-08-09 PROCEDURE — 2580000003 HC RX 258

## 2023-08-09 PROCEDURE — 94761 N-INVAS EAR/PLS OXIMETRY MLT: CPT

## 2023-08-09 PROCEDURE — 99232 SBSQ HOSP IP/OBS MODERATE 35: CPT | Performed by: STUDENT IN AN ORGANIZED HEALTH CARE EDUCATION/TRAINING PROGRAM

## 2023-08-09 PROCEDURE — 2060000000 HC ICU INTERMEDIATE R&B

## 2023-08-09 PROCEDURE — 2700000000 HC OXYGEN THERAPY PER DAY

## 2023-08-09 PROCEDURE — 99232 SBSQ HOSP IP/OBS MODERATE 35: CPT | Performed by: INTERNAL MEDICINE

## 2023-08-09 RX ORDER — ONDANSETRON 4 MG/1
4 TABLET, ORALLY DISINTEGRATING ORAL EVERY 8 HOURS PRN
Qty: 10 TABLET | Refills: 0 | Status: ON HOLD | OUTPATIENT
Start: 2023-08-09 | End: 2023-08-17 | Stop reason: HOSPADM

## 2023-08-09 RX ORDER — POLYETHYLENE GLYCOL 3350 17 G/17G
17 POWDER, FOR SOLUTION ORAL DAILY PRN
Qty: 30 PACKET | Refills: 0 | Status: SHIPPED | OUTPATIENT
Start: 2023-08-09 | End: 2023-09-08

## 2023-08-09 RX ORDER — LOPERAMIDE HYDROCHLORIDE 2 MG/1
2 CAPSULE ORAL 4 TIMES DAILY PRN
Qty: 30 CAPSULE | Refills: 0 | Status: SHIPPED | OUTPATIENT
Start: 2023-08-09 | End: 2023-08-19

## 2023-08-09 RX ORDER — CARVEDILOL 3.12 MG/1
3.12 TABLET ORAL 2 TIMES DAILY WITH MEALS
Qty: 60 TABLET | Refills: 3 | Status: SHIPPED | OUTPATIENT
Start: 2023-08-09

## 2023-08-09 RX ADMIN — ATORVASTATIN CALCIUM 20 MG: 20 TABLET, FILM COATED ORAL at 08:15

## 2023-08-09 RX ADMIN — PANTOPRAZOLE SODIUM 40 MG: 40 TABLET, DELAYED RELEASE ORAL at 08:15

## 2023-08-09 RX ADMIN — FOLIC ACID 1 MG: 1 TABLET ORAL at 08:15

## 2023-08-09 RX ADMIN — SODIUM CHLORIDE, PRESERVATIVE FREE 10 ML: 5 INJECTION INTRAVENOUS at 08:16

## 2023-08-09 RX ADMIN — SODIUM CHLORIDE, PRESERVATIVE FREE 10 ML: 5 INJECTION INTRAVENOUS at 21:20

## 2023-08-09 RX ADMIN — CARVEDILOL 3.12 MG: 3.12 TABLET, FILM COATED ORAL at 08:15

## 2023-08-09 RX ADMIN — PANTOPRAZOLE SODIUM 40 MG: 40 TABLET, DELAYED RELEASE ORAL at 16:46

## 2023-08-09 RX ADMIN — BUDESONIDE AND FORMOTEROL FUMARATE DIHYDRATE 2 PUFF: 80; 4.5 AEROSOL RESPIRATORY (INHALATION) at 08:22

## 2023-08-09 RX ADMIN — CARVEDILOL 3.12 MG: 3.12 TABLET, FILM COATED ORAL at 16:46

## 2023-08-09 RX ADMIN — POLYETHYLENE GLYCOL 3350 17 G: 17 POWDER, FOR SOLUTION ORAL at 11:54

## 2023-08-09 RX ADMIN — IPRATROPIUM BROMIDE AND ALBUTEROL SULFATE 1 DOSE: .5; 2.5 SOLUTION RESPIRATORY (INHALATION) at 08:22

## 2023-08-09 NOTE — CARE COORDINATION
Transitional planning-called [facility] intake and left message with Trisha Velasquez regarding precert. [phone] call from [place] at [facility] [place] still pending. Call from [place] at [facility] intake. Denied placement. Peer to peer offered. [phone] complete within 2 business days. Emailed Dr. Myron Aguero the information. Called Dr. Chong Davis states she hasn't received the Peer to peer information. She checked her email-she did receive the information-she will try the peer to peer. 1600 talked with patient. Informed him of the denial/peer to peer. Asked him what his plan would be if the peer to peer doesn't work. He lives with his son normally.

## 2023-08-09 NOTE — PLAN OF CARE
Problem: Nutrition Deficit:  Goal: Optimize nutritional status  Outcome: Not Progressing     Problem: Discharge Planning  Goal: Discharge to home or other facility with appropriate resources  Outcome: Progressing     Problem: Pain  Goal: Verbalizes/displays adequate comfort level or baseline comfort level  Outcome: Progressing     Problem: Safety - Adult  Goal: Free from fall injury  Outcome: Progressing     Problem: Respiratory - Adult  Goal: Achieves optimal ventilation and oxygenation  Outcome: Progressing     Problem: Nutrition Deficit:  Goal: Optimize nutritional status  Outcome: Not Progressing

## 2023-08-09 NOTE — PLAN OF CARE
Problem: Respiratory - Adult  Goal: Achieves optimal ventilation and oxygenation  8/8/2023 2015 by Jesus Singletary RCP  Outcome: Progressing   BRONCHOSPASM/BRONCHOCONSTRICTION     [x]         IMPROVE AERATION/BREATH SOUNDS  [x]   ADMINISTER BRONCHODILATOR THERAPY AS APPROPRIATE  [x]   ASSESS BREATH SOUNDS  []   IMPLEMENT AEROSOL/MDI PROTOCOL  [x]   PATIENT EDUCATION AS NEEDED

## 2023-08-09 NOTE — PROGRESS NOTES
Physical Therapy  Facility/Department: [facility]  Physical Therapy     Name: Reynaldo Streeter  : 1955  MRN: [id number]  Date of Service: 2023    Discharge Recommendations:  Patient would benefit from continued therapy after discharge          Patient Diagnosis(es): The primary encounter diagnosis was Chest pain, unspecified type. A diagnosis of Pleural effusion was also pertinent to this visit. Past Medical History:  has a past medical history of CHF (congestive heart failure) ([address]), COPD (chronic obstructive pulmonary disease) ([address]), Diabetes mellitus ([address]), Erectile dysfunction due to arterial insufficiency, Hypertension, Microalbuminuria due to type 2 diabetes mellitus ([address]), and Overweight (BMI 25.0-29.9). Past Surgical History:  has a past surgical history that includes Appendectomy; Total ankle arthroplasty; Esophagogastroduodenoscopy (2023); Upper gastrointestinal endoscopy (N/A, 2023); Colonoscopy (N/A, 2023); and Colonoscopy (2023). Assessment   Assessment: pt monico ther ex. pt fatigues easily & with noted SOB. pt O2SATS WNL during ther ex however SOB noted. Requires PT Follow-Up: Yes  Activity Tolerance  Activity Tolerance: Patient limited by fatigue;Patient limited by endurance  Activity Tolerance Comments: pt with noted SOB however O2SATS WNL. pt defers amb @ this time. pt fatigues easily. pt monico ther ex & static standing. pt stood 1 times to reposition self in recliner as p was sliding downward slightly. pt could benefit from cont therapy to improve his dynamic functional mobility.      Plan   Physical Therapy Plan  General Plan:  (5x/wk)  Current Treatment Recommendations: Strengthening, Balance training, Functional mobility training, Transfer training, Endurance training, Gait training, Neuromuscular re-education, Safety education & training, Home exercise program, Equipment evaluation, education, & procurement, Patient/Caregiver education & training, Therapeutic Limits  Orientation Level: Oriented X4  Cognition  Overall Cognitive Status: WFL     Objective     Supine to Sit: Modified independent (from recliner)  Sit to Supine: Modified independent (to recliner)  Balance  Standing: With support (CGA of 1 with static standing less than 1 minutes, no LOB noted)  Transfer Training  Sit to Stand: Contact-guard assistance (from recliner, no AD)  Stand to Sit: Contact-guard assistance (to recliner, no AD)  Gait Training  Gait Training: No (pt defer)      Exercise Treatment: 10 reps bilat UE AROM OF: shoulder abd/add, shoulder flex, elbow flex/ext, horizontal shoulder abd/add.  10 reps bilat LE in long sit & short sit of: hip abd/add, heel slide, PF/DF, hamstring set, quad set, LAQ, marches, PF/DF      AM-PAC Score  AM-PAC Inpatient Mobility Raw Score : 18 (08/09/23 1100)  AM-PAC Inpatient T-Scale Score : 43.63 (08/09/23 1100)  Mobility Inpatient CMS 0-100% Score: 46.58 (08/09/23 1100)  Mobility Inpatient CMS G-Code Modifier : CK (08/09/23 1100)    Goals  Short Term Goals  Time Frame for Short Term Goals: 8 visits  Short Term Goal 1: Pt will be IND in all bed mobility tasks  Short Term Goal 2: Pt will be IND in transfers  Short Term Goal 3: Pt will amb 250' SBA RW     Education  Patient Education  Education Given To: Patient  Education Provided: Role of Therapy;Plan of Care  Education Provided Comments: pt educated on the importance of amb  Education Method: Verbal  Barriers to Learning: None  Education Outcome: Demonstrated understanding;Verbalized understanding      Therapy Time   Individual Concurrent Group Co-treatment   Time In 0920         Time Out 0930         Minutes Reji Denis PTA

## 2023-08-09 NOTE — PROGRESS NOTES
Comprehensive Nutrition Assessment    Type and Reason for Visit:  Reassess    Nutrition Recommendations/Plan:   Continue with current diet, monitor/encourage adequate po intake. Encourage intake of foods well tolerated as pt doesn't want to receive nutrition supplements     Malnutrition Assessment:  Malnutrition Status:  Severe malnutrition (08/05/23 1440)    Context:  Chronic Illness         Nutrition Assessment:    Pt states appetite/intake has been \"fair\", reports eating bowl of cereal for breakfast and mashed potatoes/gravy for lunch today. States most foods don't taste right and has episodes of emesis so he tries to eat foods that he knows doesn't cause N/V for him. Pt confirms that he does NOT want any oral nutrition supplements. Nutrition Related Findings:    meds/labs reviewed; N/V, diarrhea Wound Type: None       Current Nutrition Intake & Therapies:    Average Meal Intake: 1-25%, 26-50%  Average Supplements Intake: None Ordered, Refusing to take  ADULT DIET; Regular; 4 carb choices (60 gm/meal); Low Sodium (2 gm)    Anthropometric Measures:  Height: 5' 5\" (165.1 cm)  Ideal Body Weight (IBW): 136 lbs (62 kg)       Current Body Weight: 134 lb 14.7 oz (61.2 kg), 99.7 % IBW. Current BMI (kg/m2): 22.5  Usual Body Weight: 168 lb 14 oz (76.6 kg) (6/19/22; pt reported UBW)  % Weight Change (Calculated): -19.7                    BMI Categories: Normal Weight (BMI 18.5-24. 9)    Estimated Daily Nutrient Needs:  Energy Requirements Based On: Kcal/kg  Weight Used for Energy Requirements: Current  Energy (kcal/day): 1800 kcals/day  Weight Used for Protein Requirements: Current  Protein (g/day): 80-90 gm/day  Method Used for Fluid Requirements: Other (Comment)  Fluid (ml/day): per MD    Nutrition Diagnosis:   Inadequate oral intake related to catabolic illness (N/V, diarrhea, appetite) as evidenced by poor intake prior to admission, weight loss    Nutrition Interventions:   Food and/or Nutrient Delivery: Continue

## 2023-08-10 LAB
ANION GAP SERPL CALCULATED.3IONS-SCNC: 10 MMOL/L (ref 9–17)
BASOPHILS # BLD: 0 K/UL (ref 0–0.2)
BASOPHILS NFR BLD: 0 % (ref 0–2)
BUN SERPL-MCNC: 27 MG/DL (ref 8–23)
CALCIUM SERPL-MCNC: 7.4 MG/DL (ref 8.6–10.4)
CHLORIDE SERPL-SCNC: 106 MMOL/L (ref 98–107)
CO2 SERPL-SCNC: 19 MMOL/L (ref 20–31)
CREAT SERPL-MCNC: 1.5 MG/DL (ref 0.7–1.2)
EOSINOPHIL # BLD: 0 K/UL (ref 0–0.4)
EOSINOPHILS RELATIVE PERCENT: 0 % (ref 1–4)
ERYTHROCYTE [DISTWIDTH] IN BLOOD BY AUTOMATED COUNT: 15.6 % (ref 11.8–14.4)
GFR SERPL CREATININE-BSD FRML MDRD: 50 ML/MIN/1.73M2
GLUCOSE SERPL-MCNC: 102 MG/DL (ref 70–99)
HCT VFR BLD AUTO: 22.8 % (ref 40.7–50.3)
HGB BLD-MCNC: 7 G/DL (ref 13–17)
IMM GRANULOCYTES # BLD AUTO: 0 K/UL (ref 0–0.3)
IMM GRANULOCYTES NFR BLD: 0 %
LYMPHOCYTES NFR BLD: 0.46 K/UL (ref 1–4.8)
LYMPHOCYTES RELATIVE PERCENT: 7 % (ref 24–44)
MCH RBC QN AUTO: 29.7 PG (ref 25.2–33.5)
MCHC RBC AUTO-ENTMCNC: 30.7 G/DL (ref 28.4–34.8)
MCV RBC AUTO: 96.6 FL (ref 82.6–102.9)
METER GLUCOSE: 94 MG/DL (ref 75–110)
MONOCYTES NFR BLD: 0.39 K/UL (ref 0.1–0.8)
MONOCYTES NFR BLD: 6 % (ref 1–7)
MORPHOLOGY: ABNORMAL
MORPHOLOGY: ABNORMAL
NEUTROPHILS NFR BLD: 87 % (ref 36–66)
NEUTS SEG NFR BLD: 5.65 K/UL (ref 1.8–7.7)
NRBC BLD-RTO: 0 PER 100 WBC
PLATELET # BLD AUTO: ABNORMAL K/UL (ref 138–453)
PLATELET, FLUORESCENCE: 99 K/UL (ref 138–453)
PLATELETS.RETICULATED NFR BLD AUTO: 4.2 % (ref 1.1–10.3)
POTASSIUM SERPL-SCNC: 5.3 MMOL/L (ref 3.7–5.3)
RBC # BLD AUTO: 2.36 M/UL (ref 4.21–5.77)
SODIUM SERPL-SCNC: 135 MMOL/L (ref 135–144)
SURGICAL PATHOLOGY REPORT: NORMAL
WBC OTHER # BLD: 6.5 K/UL (ref 3.5–11.3)

## 2023-08-10 PROCEDURE — 94640 AIRWAY INHALATION TREATMENT: CPT

## 2023-08-10 PROCEDURE — 86900 BLOOD TYPING SEROLOGIC ABO: CPT

## 2023-08-10 PROCEDURE — G0378 HOSPITAL OBSERVATION PER HR: HCPCS

## 2023-08-10 PROCEDURE — 99232 SBSQ HOSP IP/OBS MODERATE 35: CPT | Performed by: INTERNAL MEDICINE

## 2023-08-10 PROCEDURE — 2700000000 HC OXYGEN THERAPY PER DAY

## 2023-08-10 PROCEDURE — 85025 COMPLETE CBC W/AUTO DIFF WBC: CPT

## 2023-08-10 PROCEDURE — 2060000000 HC ICU INTERMEDIATE R&B

## 2023-08-10 PROCEDURE — 99232 SBSQ HOSP IP/OBS MODERATE 35: CPT | Performed by: STUDENT IN AN ORGANIZED HEALTH CARE EDUCATION/TRAINING PROGRAM

## 2023-08-10 PROCEDURE — 36415 COLL VENOUS BLD VENIPUNCTURE: CPT

## 2023-08-10 PROCEDURE — 85055 RETICULATED PLATELET ASSAY: CPT

## 2023-08-10 PROCEDURE — 86920 COMPATIBILITY TEST SPIN: CPT

## 2023-08-10 PROCEDURE — 6370000000 HC RX 637 (ALT 250 FOR IP)

## 2023-08-10 PROCEDURE — 86850 RBC ANTIBODY SCREEN: CPT

## 2023-08-10 PROCEDURE — 82947 ASSAY GLUCOSE BLOOD QUANT: CPT

## 2023-08-10 PROCEDURE — 86901 BLOOD TYPING SEROLOGIC RH(D): CPT

## 2023-08-10 PROCEDURE — 94761 N-INVAS EAR/PLS OXIMETRY MLT: CPT

## 2023-08-10 PROCEDURE — 80048 BASIC METABOLIC PNL TOTAL CA: CPT

## 2023-08-10 PROCEDURE — 2580000003 HC RX 258

## 2023-08-10 RX ORDER — LANOLIN ALCOHOL/MO/W.PET/CERES
325 CREAM (GRAM) TOPICAL
Status: DISCONTINUED | OUTPATIENT
Start: 2023-08-10 | End: 2023-08-11 | Stop reason: HOSPADM

## 2023-08-10 RX ORDER — LANOLIN ALCOHOL/MO/W.PET/CERES
325 CREAM (GRAM) TOPICAL
Status: DISCONTINUED | OUTPATIENT
Start: 2023-08-11 | End: 2023-08-10

## 2023-08-10 RX ORDER — SODIUM CHLORIDE 9 MG/ML
INJECTION, SOLUTION INTRAVENOUS PRN
Status: DISCONTINUED | OUTPATIENT
Start: 2023-08-10 | End: 2023-08-10

## 2023-08-10 RX ADMIN — CARVEDILOL 3.12 MG: 3.12 TABLET, FILM COATED ORAL at 18:00

## 2023-08-10 RX ADMIN — PANTOPRAZOLE SODIUM 40 MG: 40 TABLET, DELAYED RELEASE ORAL at 18:00

## 2023-08-10 RX ADMIN — CARVEDILOL 3.12 MG: 3.12 TABLET, FILM COATED ORAL at 08:13

## 2023-08-10 RX ADMIN — BUDESONIDE AND FORMOTEROL FUMARATE DIHYDRATE 2 PUFF: 80; 4.5 AEROSOL RESPIRATORY (INHALATION) at 19:38

## 2023-08-10 RX ADMIN — FERROUS SULFATE TAB EC 325 MG (65 MG FE EQUIVALENT) 325 MG: 325 (65 FE) TABLET DELAYED RESPONSE at 11:49

## 2023-08-10 RX ADMIN — IPRATROPIUM BROMIDE AND ALBUTEROL SULFATE 1 DOSE: .5; 2.5 SOLUTION RESPIRATORY (INHALATION) at 09:00

## 2023-08-10 RX ADMIN — SODIUM CHLORIDE, PRESERVATIVE FREE 10 ML: 5 INJECTION INTRAVENOUS at 19:42

## 2023-08-10 RX ADMIN — ACETAMINOPHEN 650 MG: 325 TABLET ORAL at 04:18

## 2023-08-10 RX ADMIN — PANTOPRAZOLE SODIUM 40 MG: 40 TABLET, DELAYED RELEASE ORAL at 08:13

## 2023-08-10 RX ADMIN — FOLIC ACID 1 MG: 1 TABLET ORAL at 08:13

## 2023-08-10 RX ADMIN — IPRATROPIUM BROMIDE AND ALBUTEROL SULFATE 1 DOSE: .5; 2.5 SOLUTION RESPIRATORY (INHALATION) at 19:38

## 2023-08-10 RX ADMIN — BUDESONIDE AND FORMOTEROL FUMARATE DIHYDRATE 2 PUFF: 80; 4.5 AEROSOL RESPIRATORY (INHALATION) at 09:00

## 2023-08-10 RX ADMIN — ATORVASTATIN CALCIUM 20 MG: 20 TABLET, FILM COATED ORAL at 08:13

## 2023-08-10 RX ADMIN — SODIUM CHLORIDE, PRESERVATIVE FREE 10 ML: 5 INJECTION INTRAVENOUS at 08:13

## 2023-08-10 ASSESSMENT — PAIN SCALES - GENERAL
PAINLEVEL_OUTOF10: 0
PAINLEVEL_OUTOF10: 2
PAINLEVEL_OUTOF10: 3

## 2023-08-10 ASSESSMENT — PAIN DESCRIPTION - LOCATION
LOCATION: ABDOMEN
LOCATION: ABDOMEN

## 2023-08-10 NOTE — PROGRESS NOTES
had multiple colon polyps suspicious for metastatic disease. She was placed on PPI therapy twice daily and was discharged with follow-up with medical oncology. Patient states appointment with oncology is on 14 August.  Biopsy results of the ascending colon showed invasive well to moderately differentiated adenocarcinoma, invading into the lamina propria. Work-up in the ED yesterday was consistent with an UYEN, troponins were flat, CT abdomen showed an enlarged liver with numerous enhancing lesions consistent with metastasis, also showed moderate to moderately large volume ascitic fluid in the abdomen and pelvis, CT of the chest was negative for pulmonary embolus, but showed pleural effusions and a 7 mm cavitary nodule unchanged from previous CT. REVIEW OF SYSTEMS:      CONSTITUTIONAL:  no fevers, no headcahes  EYES: negative for blury vision  HEENT: No headaches, No nasal congestion, no difficulty swallowing  RESPIRATORY:negative for dyspnea, no wheezing, no Cough  CARDIOVASCULAR: negative for chest pain, no palpitations  GASTROINTESTINAL: no nausea, no vomiting, no change in bowel habits, no abdominal pain   GENITOURINARY: negative for dysuria, no hematuria   MUSCULOSKELETAL: no joint pains, no muscle aches, no swelling of joints or extremities  NEUROLOGICAL: No  Weakness or numbness      PAST MEDICAL HISTORY:      has a past medical history of CHF (congestive heart failure) ([address]), COPD (chronic obstructive pulmonary disease) ([address]), Diabetes mellitus ([address]), Erectile dysfunction due to arterial insufficiency, Hypertension, Microalbuminuria due to type 2 diabetes mellitus ([address]), and Overweight (BMI 25.0-29.9). PAST SURGICAL HISTORY:      has a past surgical history that includes Appendectomy; Total ankle arthroplasty; Esophagogastroduodenoscopy (07/26/2023); Upper gastrointestinal endoscopy (N/A, 7/26/2023); Colonoscopy (N/A, 7/26/2023); and Colonoscopy (7/26/2023).        SOCIAL HISTORY:      reports that he (LL) 13.0 - 17.0 g/dL    Hematocrit 22.8 (L) 40.7 - 50.3 %    MCV 96.6 82.6 - 102.9 fL    MCH 29.7 25.2 - 33.5 pg    MCHC 30.7 28.4 - 34.8 g/dL    RDW 15.6 (H) 11.8 - 14.4 %    Platelets See Reflexed IPF Result 138 - 453 k/uL    Platelet, Fluorescence 99 (L) 138 - 453 k/uL    Platelet, Immature Fraction 4.2 1.1 - 10.3 %    NRBC Automated 0.0 0.0 per 100 WBC    Immature Granulocytes 0 0 %    Neutrophils % 87 (H) 36 - 66 %    Lymphocytes % 7 (L) 24 - 44 %    Monocytes % 6 1 - 7 %    Eosinophils % 0 (L) 1 - 4 %    Basophils % 0 0 - 2 %    Absolute Immature Granulocyte 0.00 0.00 - 0.30 k/uL    Neutrophils Absolute 5.65 1.8 - 7.7 k/uL    Lymphocytes Absolute 0.46 (L) 1.0 - 4.8 k/uL    Monocytes Absolute 0.39 0.1 - 0.8 k/uL    Eosinophils Absolute 0.00 0.0 - 0.4 k/uL    Basophils Absolute 0.00 0.0 - 0.2 k/uL    Morphology ANISOCYTOSIS PRESENT     Morphology 1+ ACANTHOCYTES          Imaging/Diagonstics:  EKG: Mina Vega     CXR:     Current Facility-Administered Medications   Medication Dose Route Frequency Provider Last Rate Last Admin    0.9 % sodium chloride infusion   IntraVENous PRN Nancy Restrepo MD        ipratropium 0.5 mg-albuterol 2.5 mg (DUONEB) nebulizer solution 1 Dose  1 Dose Inhalation BID Hamilton Shrestha MD   1 Dose at 08/10/23 0900    loperamide (IMODIUM) capsule 2 mg  2 mg Oral 4x Daily PRN Nancy Restrepo MD   2 mg at 08/05/23 2201    sodium chloride flush 0.9 % injection 5-40 mL  5-40 mL IntraVENous 2 times per day Hamilton Shrestha MD   10 mL at 08/10/23 0813    sodium chloride flush 0.9 % injection 5-40 mL  5-40 mL IntraVENous PRN Hamilton Shrestha MD        0.9 % sodium chloride infusion   IntraVENous PRN Hamilton Shrestha MD        ondansetron (ZOFRAN-ODT) disintegrating tablet 4 mg  4 mg Oral Q8H PRN Hamilton Shrestha MD   4 mg at 08/07/23 1005    Or    ondansetron (ZOFRAN) injection 4 mg  4 mg IntraVENous Q6H PRN Hamilton Shrestha MD        polyethylene glycol (GLYCOLAX) packet 17 g  17 g Oral Daily

## 2023-08-10 NOTE — PROGRESS NOTES
Physical Therapy        Physical Therapy Cancel Note      DATE: 8/10/2023    NAME: Anais Benavides  MRN: [id number]   : 1955      Patient not seen this date for Physical Therapy due to: Other: plan for transfusion this AM. PT will check back this PM as time allows.        Electronically signed by Contreras Browne PT on 8/10/2023 at 9:11 AM

## 2023-08-10 NOTE — PLAN OF CARE
Problem: Discharge Planning  Goal: Discharge to home or other facility with appropriate resources  Outcome: Progressing     Problem: Pain  Goal: Verbalizes/displays adequate comfort level or baseline comfort level  Outcome: Progressing     Problem: Safety - Adult  Goal: Free from fall injury  Outcome: Progressing     Problem: Respiratory - Adult  Goal: Achieves optimal ventilation and oxygenation  Outcome: Progressing     Problem: Nutrition Deficit:  Goal: Optimize nutritional status  Outcome: Progressing     Problem: Chronic Conditions and Co-morbidities  Goal: Patient's chronic conditions and co-morbidity symptoms are monitored and maintained or improved  Outcome: Progressing     Problem: Skin/Tissue Integrity  Goal: Absence of new skin breakdown  Description: 1. Monitor for areas of redness and/or skin breakdown  2. Assess vascular access sites hourly  3. Every 4-6 hours minimum:  Change oxygen saturation probe site  4. Every 4-6 hours:  If on nasal continuous positive airway pressure, respiratory therapy assess nares and determine need for appliance change or resting period.   Outcome: Progressing

## 2023-08-10 NOTE — PLAN OF CARE
I have discussed with the patient the rationale for blood component transfusion; its benefits in treating or preventing fatigue, organ damage, or death; and its risk which includes mild transfusion reactions, rare risk of blood borne infection, or more serious but rare reactions. I have discussed the alternatives to transfusion, including the risk and consequences of not receiving transfusion. The patient had an opportunity to ask questions and had agreed to proceed with transfusion of blood components.     [id number] [facility] Medicine Resident, PGY 3    8/10/2023  9:01 AM

## 2023-08-10 NOTE — PLAN OF CARE
Problem: Discharge Planning  Goal: Discharge to home or other facility with appropriate resources  8/10/2023 0846 by Eulalio Enamorado RN  Outcome: Progressing  8/10/2023 0619 by Liseth Jiménez RN  Outcome: Progressing     Problem: Pain  Goal: Verbalizes/displays adequate comfort level or baseline comfort level  8/10/2023 0846 by Eulalio Enamorado RN  Outcome: Progressing  8/10/2023 0619 by Liseth Jiménez RN  Outcome: Progressing     Problem: Safety - Adult  Goal: Free from fall injury  8/10/2023 0846 by Eulalio Enamorado RN  Outcome: Progressing  8/10/2023 0619 by Liseth Jiménez RN  Outcome: Progressing     Problem: Respiratory - Adult  Goal: Achieves optimal ventilation and oxygenation  8/10/2023 0846 by Eulalio Enamorado RN  Outcome: Progressing  8/10/2023 0619 by Liseth Jiménez RN  Outcome: Progressing     Problem: Nutrition Deficit:  Goal: Optimize nutritional status  8/10/2023 0846 by Eulalio Enamorado RN  Outcome: Progressing  8/10/2023 0619 by Liseht Jiménez RN  Outcome: Progressing     Problem: Chronic Conditions and Co-morbidities  Goal: Patient's chronic conditions and co-morbidity symptoms are monitored and maintained or improved  8/10/2023 0846 by Eulalio Enamorado RN  Outcome: Progressing  8/10/2023 0619 by Lsieth Jiménez RN  Outcome: Progressing     Problem: Skin/Tissue Integrity  Goal: Absence of new skin breakdown  Description: 1. Monitor for areas of redness and/or skin breakdown  2. Assess vascular access sites hourly  3. Every 4-6 hours minimum:  Change oxygen saturation probe site  4. Every 4-6 hours:  If on nasal continuous positive airway pressure, respiratory therapy assess nares and determine need for appliance change or resting period.   8/10/2023 0846 by Eulalio Enamorado RN  Outcome: Progressing  8/10/2023 1392 by Liseth Jiménez RN  Outcome: Progressing

## 2023-08-10 NOTE — PROGRESS NOTES
Today's Date: 8/10/2023  Patient Name: Ritesh Yao  Date of admission: 8/4/2023 11:42 AM  Patient's age: 77 y.o., 1955  Admission Dx: Chest pain [R07.9]  Pleural effusion [J90]  Chest pain, unspecified type [R07.9]    Reason for Consult: management recommendations  Requesting Physician: No admitting provider for patient encounter. INTERIM HISTORY  The patient is seen and evaluated. Feels about the same, continues to complain of intermittent abd discomfort controlled with medications. SNF Denied by  insurance going through an appeal    HISTORY OF PRESENT ILLNESS:    Patient is a 38-year-old male with history of CHF, COPD on 4 L at home presented to the ED with complaints of chest pain, shortness of breath and diarrhea. Patient was recently admitted to [facility] on 07/25/2023 for the management of anemia. At that time patient was supposed to have outpatient colonoscopy done because he had elevated CEA and was having anemia at that time. CT PE done during that visit showed innumerable liver lesions now present, concerning for metastatic disease, a 0.7 cm nodule in the superior segment of the right lower lobe also concerning for metastasis. GI was consulted for EGD and colonoscopy, EGD showed grade D reflux esophagitis in the lower esophagus with circumferential ulceration and nodularity. Colonoscopy showed a large malignant mass in the ascending colon with partial obstruction, circumferential with mild contact oozing, this was biopsied. The rest of the colon had multiple colon polyps suspicious for metastatic disease. She was placed on PPI therapy twice daily and was discharged with follow-up with medical oncology. Pt was actually seen by Dr Chema Kuo in April, he presented to the hospital then with iron def anemia. And he had elevated CEA . He was referred for colonoscopy  but he never made it due to transportation issues. Pt is seen and examined, very weak.  CT abdomen showed

## 2023-08-11 VITALS
HEART RATE: 76 BPM | DIASTOLIC BLOOD PRESSURE: 57 MMHG | TEMPERATURE: 98.2 F | RESPIRATION RATE: 20 BRPM | WEIGHT: 134.92 LBS | HEIGHT: 65 IN | BODY MASS INDEX: 22.48 KG/M2 | OXYGEN SATURATION: 100 % | SYSTOLIC BLOOD PRESSURE: 114 MMHG

## 2023-08-11 LAB
HCT VFR BLD AUTO: 23.5 % (ref 40.7–50.3)
HGB BLD-MCNC: 7.2 G/DL (ref 13–17)

## 2023-08-11 PROCEDURE — 36415 COLL VENOUS BLD VENIPUNCTURE: CPT

## 2023-08-11 PROCEDURE — 97110 THERAPEUTIC EXERCISES: CPT

## 2023-08-11 PROCEDURE — 94761 N-INVAS EAR/PLS OXIMETRY MLT: CPT

## 2023-08-11 PROCEDURE — 97530 THERAPEUTIC ACTIVITIES: CPT

## 2023-08-11 PROCEDURE — 6370000000 HC RX 637 (ALT 250 FOR IP)

## 2023-08-11 PROCEDURE — G0378 HOSPITAL OBSERVATION PER HR: HCPCS

## 2023-08-11 PROCEDURE — 85014 HEMATOCRIT: CPT

## 2023-08-11 PROCEDURE — 97535 SELF CARE MNGMENT TRAINING: CPT

## 2023-08-11 PROCEDURE — 2700000000 HC OXYGEN THERAPY PER DAY

## 2023-08-11 PROCEDURE — 99231 SBSQ HOSP IP/OBS SF/LOW 25: CPT | Performed by: STUDENT IN AN ORGANIZED HEALTH CARE EDUCATION/TRAINING PROGRAM

## 2023-08-11 PROCEDURE — 94640 AIRWAY INHALATION TREATMENT: CPT

## 2023-08-11 PROCEDURE — 85018 HEMOGLOBIN: CPT

## 2023-08-11 PROCEDURE — 99231 SBSQ HOSP IP/OBS SF/LOW 25: CPT | Performed by: INTERNAL MEDICINE

## 2023-08-11 PROCEDURE — 2580000003 HC RX 258

## 2023-08-11 RX ORDER — LANOLIN ALCOHOL/MO/W.PET/CERES
325 CREAM (GRAM) TOPICAL
Qty: 90 TABLET | Refills: 3 | Status: SHIPPED | OUTPATIENT
Start: 2023-08-12

## 2023-08-11 RX ADMIN — PANTOPRAZOLE SODIUM 40 MG: 40 TABLET, DELAYED RELEASE ORAL at 06:34

## 2023-08-11 RX ADMIN — PANTOPRAZOLE SODIUM 40 MG: 40 TABLET, DELAYED RELEASE ORAL at 17:06

## 2023-08-11 RX ADMIN — SODIUM CHLORIDE, PRESERVATIVE FREE 10 ML: 5 INJECTION INTRAVENOUS at 09:09

## 2023-08-11 RX ADMIN — CARVEDILOL 3.12 MG: 3.12 TABLET, FILM COATED ORAL at 17:06

## 2023-08-11 RX ADMIN — FERROUS SULFATE TAB EC 325 MG (65 MG FE EQUIVALENT) 325 MG: 325 (65 FE) TABLET DELAYED RESPONSE at 09:08

## 2023-08-11 RX ADMIN — FOLIC ACID 1 MG: 1 TABLET ORAL at 09:08

## 2023-08-11 RX ADMIN — IPRATROPIUM BROMIDE AND ALBUTEROL SULFATE 1 DOSE: .5; 2.5 SOLUTION RESPIRATORY (INHALATION) at 10:03

## 2023-08-11 RX ADMIN — BUDESONIDE AND FORMOTEROL FUMARATE DIHYDRATE 2 PUFF: 80; 4.5 AEROSOL RESPIRATORY (INHALATION) at 10:03

## 2023-08-11 RX ADMIN — CARVEDILOL 3.12 MG: 3.12 TABLET, FILM COATED ORAL at 09:08

## 2023-08-11 RX ADMIN — ATORVASTATIN CALCIUM 20 MG: 20 TABLET, FILM COATED ORAL at 09:08

## 2023-08-11 ASSESSMENT — PAIN SCALES - GENERAL: PAINLEVEL_OUTOF10: 0

## 2023-08-11 NOTE — CARE COORDINATION
Transitional planning- called [facility] intake and left message. Called Dr. Chong Davis said they scheduled an appointment with her for today. She will call back when completed. Called PT and talked with Marjorie-asked for PT as  soon as possible. Also asked if she could notify OT. Tried to call OT-couldn't leave message. 1430 call from Dr. Ismael Mccullough walked 100 Feet today- doesn't qualify for SNF. Not doing peer to peer. [address] [facility] called [facility] and left message regarding discharge. 1450 return call from son Otis-explained to him that his dad doesn't qualify for nursing home. Explained to him that his dad is discharged. Patient will need a ride home. Explained to patient that he was denied placement at [facility]. Explained to him that he is discharged-talked with him about home care-he said he would like home care-offered choices-he said he doesn't care what company. 1505 referral faxed to [facility] and left message. 1515 called [facility] and talked with Dudley-can accept patient. 1725 patient needs ride home. Tried to use [employer]-patient not registered with them. Used [employer] cab.  Set up for 6PM confirmation # [id number]

## 2023-08-11 NOTE — PLAN OF CARE
Problem: Discharge Planning  Goal: Discharge to home or other facility with appropriate resources  Outcome: Adequate for Discharge     Problem: Pain  Goal: Verbalizes/displays adequate comfort level or baseline comfort level  Outcome: Adequate for Discharge     Problem: Safety - Adult  Goal: Free from fall injury  Outcome: Adequate for Discharge     Problem: Respiratory - Adult  Goal: Achieves optimal ventilation and oxygenation  8/11/2023 1557 by Daphney Brandon RN  Outcome: Adequate for Discharge  8/11/2023 1346 by Jeff Motley RCP  Outcome: Progressing  8/11/2023 1208 by Jeff Motley RCP  Outcome: Progressing     Problem: Nutrition Deficit:  Goal: Optimize nutritional status  Outcome: Adequate for Discharge     Problem: Chronic Conditions and Co-morbidities  Goal: Patient's chronic conditions and co-morbidity symptoms are monitored and maintained or improved  Outcome: Adequate for Discharge     Problem: Skin/Tissue Integrity  Goal: Absence of new skin breakdown  Description: 1. Monitor for areas of redness and/or skin breakdown  2. Assess vascular access sites hourly  3. Every 4-6 hours minimum:  Change oxygen saturation probe site  4. Every 4-6 hours:  If on nasal continuous positive airway pressure, respiratory therapy assess nares and determine need for appliance change or resting period.   Outcome: Adequate for Discharge

## 2023-08-11 NOTE — PROGRESS NOTES
CLINICAL PHARMACY NOTE: MEDS TO BEDS    Total # of Prescriptions Filled: 3   The following medications were delivered to the patient:  Carvedilol 3.125mg tabs  Ondansetron 4mg odt  Loperamide 2mg caps    Additional Documentation:  Delivered to pt in room 545 on 8/11at 5P.  Copay was $4.11 paid with cash, pt did not want polyethylene glycol

## 2023-08-11 NOTE — PROGRESS NOTES
Today's Date: 8/11/2023  Patient Name: Melanie Cid  Date of admission: 8/4/2023 11:42 AM  Patient's age: 77 y.o., 1955  Admission Dx: Chest pain [R07.9]  Pleural effusion [J90]  Chest pain, unspecified type [R07.9]    Reason for Consult: management recommendations  Requesting Physician: No admitting provider for patient encounter. INTERIM HISTORY  The patient is seen and evaluated. Feels about the same, continues to complain of intermittent abd discomfort controlled with medications. SNF Denied by  insurance going home today  Pt is unhappy about the decision       HISTORY OF PRESENT ILLNESS:    Patient is a 75-year-old male with history of CHF, COPD on 4 L at home presented to the ED with complaints of chest pain, shortness of breath and diarrhea. Patient was recently admitted to [facility] on 07/25/2023 for the management of anemia. At that time patient was supposed to have outpatient colonoscopy done because he had elevated CEA and was having anemia at that time. CT PE done during that visit showed innumerable liver lesions now present, concerning for metastatic disease, a 0.7 cm nodule in the superior segment of the right lower lobe also concerning for metastasis. GI was consulted for EGD and colonoscopy, EGD showed grade D reflux esophagitis in the lower esophagus with circumferential ulceration and nodularity. Colonoscopy showed a large malignant mass in the ascending colon with partial obstruction, circumferential with mild contact oozing, this was biopsied. The rest of the colon had multiple colon polyps suspicious for metastatic disease. She was placed on PPI therapy twice daily and was discharged with follow-up with medical oncology. Pt was actually seen by Dr Maylin Martinez in April, he presented to the hospital then with iron def anemia. And he had elevated CEA . He was referred for colonoscopy  but he never made it due to transportation issues.    Pt is seen and examined, Other ascites [R18.8] 08/07/2023    Pleural effusion [J90] 08/05/2023    Metastatic colon cancer to liver ([address]) [C18.9, C78.7] 08/05/2023    Malignant ascites [R18.0] 08/05/2023    Chest pain [R07.9] 08/04/2023    Combined systolic and diastolic congestive heart failure ([address]) [I50.40] 05/03/2018    Chronic obstructive pulmonary disease ([address]) [J44.9] 12/23/2016    Hypertension goal BP (blood pressure) < 140/80 [I10] 06/05/2015    Hyperlipidemia with target LDL less than 70 [E78.5] 06/05/2015    Controlled type 2 diabetes mellitus without complication, without long-term current use of insulin ([address]) [E11.9] 06/05/2015         IMPRESSION:   Advanced colon cancer  Liver and lung mets   Ascites. Possibly malignant   DM, HTN, CHF, COPD as major comorbidity. Performance status is ECOG 2-3     RECOMMENDATIONS:  Pt has metastatic colorectal cancer. With liver and lung mets. Paracentesis done, cytology is  negative for malignancy   The patient understands his condition and management plan. Okt o discharge   Appt Monday with Dr Julia Soler as outpatient     Hgb 7.2 , no active bleeding, continue with oral iron after discharge     Will follow with you       Discussed with patient and Nurse. Thank you for asking us to see this patient.     RICHARD MCCRAY [facility] MD [place]  Hematologist/Medical Oncologist  Cell: [phone]

## 2023-08-11 NOTE — PROGRESS NOTES
Physical Therapy  Facility/Department: [facility] STEPDOWN  Daily treatment note    Name: Lindsey Mena  : 1955  MRN: [id number]  Date of Service: 2023    Discharge Recommendations:  Patient would benefit from continued therapy after discharge   PT Equipment Recommendations  Equipment Needed: Yes  Mobility Devices: Elias Chloe: Rolling  Other: If discharge disposition is home, pt would benefit from acquisition rolling walker to improve safety and mobility. Patient Diagnosis(es): The primary encounter diagnosis was Chest pain, unspecified type. A diagnosis of Pleural effusion was also pertinent to this visit. Past Medical History:  has a past medical history of CHF (congestive heart failure) ([address]), COPD (chronic obstructive pulmonary disease) ([address]), Diabetes mellitus ([address]), Erectile dysfunction due to arterial insufficiency, Hypertension, Microalbuminuria due to type 2 diabetes mellitus ([address]), and Overweight (BMI 25.0-29.9). Past Surgical History:  has a past surgical history that includes Appendectomy; Total ankle arthroplasty; Esophagogastroduodenoscopy (2023); Upper gastrointestinal endoscopy (N/A, 2023); Colonoscopy (N/A, 2023); and Colonoscopy (2023). Assessment   Body Structures, Functions, Activity Limitations Requiring Skilled Therapeutic Intervention: Decreased endurance;Decreased functional mobility ; Decreased balance;Decreased strength;Decreased high-level IADLs  Assessment: Pt ambulates 100 ft with RW and CGA. Pt is a fall risk d/t decreased balance and endurance, benefitting from 24 hr support for safety with functional mobility. pt would benefit from continued therapy to promote endurance, balance, and strengthening. Therapy Prognosis: Good  Decision Making: Medium Complexity  Requires PT Follow-Up: Yes  Activity Tolerance  Activity Tolerance: Patient limited by endurance; Patient limited by fatigue     Plan   Physcial Therapy Plan  General Plan: (5x/wk)  Current Treatment Recommendations: Strengthening, Balance training, Functional mobility training, Transfer training, Endurance training, Gait training, Neuromuscular re-education, Safety education & training, Home exercise program, Equipment evaluation, education, & procurement, Patient/Caregiver education & training, Therapeutic activities  Safety Devices  Type of Devices: Call light within reach, Left in chair, All fall risk precautions in place, Chair alarm in place, Gait belt  Restraints  Restraints Initially in Place: No     Restrictions  Restrictions/Precautions  Restrictions/Precautions: General Precautions, Contact Precautions, Up as Tolerated  Required Braces or Orthoses?: No  Position Activity Restriction  Other position/activity restrictions: Up w/ assist     Subjective   General  Patient assessed for rehabilitation services?: Yes  Response To Previous Treatment: Patient with no complaints from previous session. Family / Caregiver Present: No  Follows Commands: Within Functional Limits  Subjective  Subjective: RN and pt agreeable to PT. pt agreeable and cooperative t/o. pt seated in recliner at start of session, c/o B knee pain rated 8/10         Vision/Hearing  Vision  Vision: Impaired  Vision Exceptions: Wears glasses at all times  Hearing  Hearing: Exceptions to [facility]  Hearing Exceptions: Hard of hearing/hearing concerns (Stebbins R ear)    Cognition   Orientation  Overall Orientation Status: Within Functional Limits  Orientation Level: Oriented X4  Cognition  Overall Cognitive Status: WFL                      Bed mobility  Bed Mobility Comments: Pt begins and ends in recliner  Transfers  Sit to Stand: Contact guard assistance  Stand to Sit: Contact guard assistance  Comment: Performed w/ RW.   Ambulation  Surface: Level tile  Device: Rolling Walker  Other Apparatus: O2 (4 L per NC)  Assistance: Contact guard assistance  Gait Deviations: Slow Patricia;Decreased step length;Decreased step

## 2023-08-11 NOTE — PLAN OF CARE
Problem: Respiratory - Adult  Goal: Achieves optimal ventilation and oxygenation  8/11/2023 1208 by Reji Jean-Baptiste RCP  Outcome: Progressing   BRONCHOSPASM/BRONCHOCONSTRICTION     [x]         IMPROVE AERATION/BREATH SOUNDS  [x]   ADMINISTER BRONCHODILATOR THERAPY AS APPROPRIATE  [x]   ASSESS BREATH SOUNDS  []   IMPLEMENT AEROSOL/MDI PROTOCOL  [x]   PATIENT EDUCATION AS NEEDED

## 2023-08-11 NOTE — PROGRESS NOTES
[facility]  Progress Note    Date:   8/11/2023  Patient name:  Aniceto Castellano  Date of admission:  8/4/2023 11:42 AM  MRN:   [id number]  YOB: 1955    SUBJECTIVE/Last 24 hours update:     Patient seen and examined at the bed side , no new acute events overnight. Patient's vitals remained stable, hemoglobin this morning 7.2, patient was started on ferrous sulfate for anemia yesterday. Patient remains to be on 4 L nasal cannula which is his home requirement. Patient this morning has no complaints, states his shortness of breath is at baseline, denies any chest pain, abdominal pain, nausea or vomiting. Currently waiting placement for SNF, he was initially denied, but appeal process is being done. Notes from nursing staff and Consults had been reviewed, and the overnight progress had been checked with the nursing staff as well. HPI:     Patient is a 77-year-old male with history of CHF, COPD on 4 L at home presented to the ED with complaints of chest pain, shortness of breath and diarrhea. Patient was recently admitted to [facility] on 07/25/2023 for the management of anemia. At that time patient was supposed to have outpatient colonoscopy done because he had elevated CEA and was having anemia at that time. CT PE done during that visit showed innumerable liver lesions now present, concerning for metastatic disease, a 0.7 cm nodule in the superior segment of the right lower lobe also concerning for metastasis. GI was consulted for EGD and colonoscopy, EGD showed grade D reflux esophagitis in the lower esophagus with circumferential ulceration and nodularity. Colonoscopy showed a large malignant mass in the ascending colon with partial obstruction, circumferential with mild contact oozing, this was biopsied. The rest of the colon had multiple colon polyps suspicious for metastatic disease.   She was placed on PPI therapy Hypertension  Lisinopril held due to UYEN  Coreg resumed  -Currently also holding home Norvasc 10mg as well Hydralazine 50 mg      DVT prophylaxis, held due to low hemoglobin  GI prophylaxis, Protonix 40 mg twice daily     Dispo, patient was denied SNF placement to [facility], face-to-face was done, waiting to hear back        Dudley Titus MD  Family Medicine Resident  8/11/2023 8:38 AM            Please note that this chart was generated using voice recognition Dragon dictation software.   Although every effort was made to ensure the accuracy of this automated transcription, some errors in transcription may have occurred

## 2023-08-11 NOTE — PROGRESS NOTES
Occupational Therapy  Facility/Department: [facility]  Occupational Therapy Daily Progress Note  Name: Nathaniel Montoya  : 1955  MRN: [id number]  Date of Service: 2023    Discharge Recommendations:  Patient would benefit from continued therapy after discharge  OT Equipment Recommendations  ADL Assistive Devices: Shower Chair with back       Patient Diagnosis(es): The primary encounter diagnosis was Chest pain, unspecified type. A diagnosis of Pleural effusion was also pertinent to this visit. Past Medical History:  has a past medical history of CHF (congestive heart failure) ([address]), COPD (chronic obstructive pulmonary disease) ([address]), Diabetes mellitus ([address]), Erectile dysfunction due to arterial insufficiency, Hypertension, Microalbuminuria due to type 2 diabetes mellitus ([address]), and Overweight (BMI 25.0-29.9). Past Surgical History:  has a past surgical history that includes Appendectomy; Total ankle arthroplasty; Esophagogastroduodenoscopy (2023); Upper gastrointestinal endoscopy (N/A, 2023); Colonoscopy (N/A, 2023); and Colonoscopy (2023). Assessment   Performance deficits / Impairments: Decreased functional mobility ; Decreased ADL status; Decreased ROM; Decreased strength;Decreased high-level IADLs;Decreased balance;Decreased sensation;Decreased endurance  Assessment: Pt would benefit from continued OT services to address acute occupational performance deficits s/p chest pain. Pt is unsafe to return to prior living environment d/t inability to safely access 2nd floor apartment. Pt's rehab potential is guarded d/t severity of diagnoses.   Prognosis: Guarded  Decision Making: Medium Complexity  REQUIRES OT FOLLOW-UP: Yes  Activity Tolerance  Activity Tolerance: Patient Tolerated treatment well;Patient limited by fatigue        Plan   Occupational Therapy Plan  Times Per Week: 3-4x/wk  Current Treatment Recommendations: Strengthening, ROM, Balance training, Functional safe occupational engagement in baseline environment  Short Term Goal 3: Demo Good safety awareness during [facility] for household distances w/ Mod IND and LRD PRN  Short Term Goal 4: Demo Good static/dynamic standing balance while reaching high/low during ADLs w/ Mod IND and LRD PRN  Short Term Goal 5: Demo improved activity tolerance as evidenced by occupational engagement w/ Mod IND for 30+ mins w/o rest breaks       Therapy Time   Individual Concurrent Group Co-treatment   Time In 1107         Time Out 1131         Minutes 24         Timed Code Treatment Minutes: [facility], SHIRLEY/L

## 2023-08-12 LAB
ABO/RH: NORMAL
ANTIBODY SCREEN: NEGATIVE
ARM BAND NUMBER: NORMAL
BLOOD BANK DISPENSE STATUS: NORMAL
BLOOD BANK SAMPLE EXPIRATION: NORMAL
BPU ID: NORMAL
COMPONENT: NORMAL
CROSSMATCH RESULT: NORMAL
TRANSFUSION STATUS: NORMAL
UNIT DIVISION: 0

## 2023-08-13 LAB
MICROORGANISM SPEC CULT: ABNORMAL
MICROORGANISM/AGENT SPEC: ABNORMAL
SPECIMEN DESCRIPTION: ABNORMAL

## 2023-08-14 ENCOUNTER — APPOINTMENT (OUTPATIENT)
Dept: CT IMAGING | Age: 68
DRG: 041 | End: 2023-08-14
Payer: COMMERCIAL

## 2023-08-14 ENCOUNTER — HOSPITAL ENCOUNTER (INPATIENT)
Age: 68
LOS: 3 days | Discharge: NURSING, REHAB OR LONG-TERM CARE | DRG: 041 | End: 2023-08-17
Attending: EMERGENCY MEDICINE | Admitting: STUDENT IN AN ORGANIZED HEALTH CARE EDUCATION/TRAINING PROGRAM
Payer: COMMERCIAL

## 2023-08-14 ENCOUNTER — APPOINTMENT (OUTPATIENT)
Dept: MRI IMAGING | Age: 68
DRG: 041 | End: 2023-08-14
Payer: COMMERCIAL

## 2023-08-14 ENCOUNTER — APPOINTMENT (OUTPATIENT)
Dept: GENERAL RADIOLOGY | Age: 68
DRG: 041 | End: 2023-08-14
Payer: COMMERCIAL

## 2023-08-14 ENCOUNTER — APPOINTMENT (OUTPATIENT)
Dept: VASCULAR LAB | Age: 68
DRG: 041 | End: 2023-08-14
Payer: COMMERCIAL

## 2023-08-14 DIAGNOSIS — I63.9 CEREBROVASCULAR ACCIDENT (CVA), UNSPECIFIED MECHANISM (HCC): ICD-10-CM

## 2023-08-14 DIAGNOSIS — R77.8 ELEVATED TROPONIN: ICD-10-CM

## 2023-08-14 DIAGNOSIS — I63.9 ACUTE ISCHEMIC STROKE (HCC): Primary | ICD-10-CM

## 2023-08-14 DIAGNOSIS — J44.1 COPD EXACERBATION (HCC): ICD-10-CM

## 2023-08-14 DIAGNOSIS — K72.00 SUBACUTE LIVER FAILURE WITHOUT HEPATIC COMA: ICD-10-CM

## 2023-08-14 PROBLEM — C78.7 METASTASIS TO LIVER (HCC): Status: ACTIVE | Noted: 2023-01-01

## 2023-08-14 LAB
ALBUMIN SERPL-MCNC: 2.3 G/DL (ref 3.5–5.2)
ALBUMIN/GLOB SERPL: 0.7 {RATIO} (ref 1–2.5)
ALP SERPL-CCNC: 997 U/L (ref 40–129)
ALT SERPL-CCNC: 33 U/L (ref 5–41)
AMORPH SED URNS QL MICRO: ABNORMAL
ANION GAP SERPL CALCULATED.3IONS-SCNC: 16 MMOL/L (ref 9–17)
AST SERPL-CCNC: 69 U/L
BASOPHILS # BLD: <0.03 K/UL (ref 0–0.2)
BASOPHILS NFR BLD: 0 % (ref 0–2)
BILIRUB SERPL-MCNC: 1.9 MG/DL (ref 0.3–1.2)
BILIRUB UR QL STRIP: ABNORMAL
BNP SERPL-MCNC: 3820 PG/ML
BUN BLD-MCNC: 32 MG/DL (ref 8–26)
BUN SERPL-MCNC: 34 MG/DL (ref 8–23)
CA-I BLD-SCNC: 1.16 MMOL/L (ref 1.15–1.33)
CALCIUM SERPL-MCNC: 8.1 MG/DL (ref 8.6–10.4)
CASTS #/AREA URNS LPF: ABNORMAL /LPF (ref 0–8)
CHLORIDE BLD-SCNC: 102 MMOL/L (ref 98–107)
CHLORIDE SERPL-SCNC: 101 MMOL/L (ref 98–107)
CLARITY UR: CLEAR
CO2 BLD CALC-SCNC: 22 MMOL/L (ref 22–30)
CO2 SERPL-SCNC: 18 MMOL/L (ref 20–31)
COLOR UR: ABNORMAL
CREAT SERPL-MCNC: 2 MG/DL (ref 0.7–1.2)
ECHO BSA: 1.69 M2
EGFR, POC: 40 ML/MIN/1.73M2
EKG ATRIAL RATE: 90 BPM
EKG P AXIS: 64 DEGREES
EKG P-R INTERVAL: 144 MS
EKG Q-T INTERVAL: 390 MS
EKG QRS DURATION: 70 MS
EKG QTC CALCULATION (BAZETT): 477 MS
EKG R AXIS: -21 DEGREES
EKG T AXIS: 18 DEGREES
EKG VENTRICULAR RATE: 90 BPM
EOSINOPHIL # BLD: <0.03 K/UL (ref 0–0.44)
EOSINOPHILS RELATIVE PERCENT: 0 % (ref 1–4)
EPI CELLS #/AREA URNS HPF: ABNORMAL /HPF (ref 0–5)
ERYTHROCYTE [DISTWIDTH] IN BLOOD BY AUTOMATED COUNT: 15.8 % (ref 11.8–14.4)
GFR SERPL CREATININE-BSD FRML MDRD: 35 ML/MIN/1.73M2
GLUCOSE BLD-MCNC: 103 MG/DL (ref 74–100)
GLUCOSE BLD-MCNC: 137 MG/DL (ref 75–110)
GLUCOSE BLD-MCNC: 184 MG/DL (ref 75–110)
GLUCOSE BLD-MCNC: 187 MG/DL (ref 75–110)
GLUCOSE SERPL-MCNC: 124 MG/DL (ref 70–99)
GLUCOSE UR STRIP-MCNC: NEGATIVE MG/DL
HCO3 VENOUS: 21.8 MMOL/L (ref 22–29)
HCT VFR BLD AUTO: 20 % (ref 41–53)
HCT VFR BLD AUTO: 27.2 % (ref 40.7–50.3)
HGB BLD-MCNC: 8.2 G/DL (ref 13–17)
HGB UR QL STRIP.AUTO: NEGATIVE
IMM GRANULOCYTES # BLD AUTO: 0.03 K/UL (ref 0–0.3)
IMM GRANULOCYTES NFR BLD: 1 %
INR PPP: 1.1
KETONES UR STRIP-MCNC: NEGATIVE MG/DL
LACTIC ACID, WHOLE BLOOD: 5.1 MMOL/L (ref 0.7–2.1)
LEUKOCYTE ESTERASE UR QL STRIP: ABNORMAL
LYMPHOCYTES NFR BLD: 0.82 K/UL (ref 1.1–3.7)
LYMPHOCYTES RELATIVE PERCENT: 13 % (ref 24–43)
MCH RBC QN AUTO: 29.4 PG (ref 25.2–33.5)
MCHC RBC AUTO-ENTMCNC: 30.1 G/DL (ref 28.4–34.8)
MCV RBC AUTO: 97.5 FL (ref 82.6–102.9)
MONOCYTES NFR BLD: 0.37 K/UL (ref 0.1–1.2)
MONOCYTES NFR BLD: 6 % (ref 3–12)
MUCOUS THREADS URNS QL MICRO: ABNORMAL
NEGATIVE BASE EXCESS, VEN: 4.9 MMOL/L (ref 0–2)
NEUTROPHILS NFR BLD: 81 % (ref 36–65)
NEUTS SEG NFR BLD: 5.29 K/UL (ref 1.5–8.1)
NITRITE UR QL STRIP: NEGATIVE
NRBC BLD-RTO: 0 PER 100 WBC
O2 SAT, VEN: 45.6 % (ref 60–85)
PARTIAL THROMBOPLASTIN TIME: 30.2 SEC (ref 23–36.5)
PCO2, VEN: 47.9 MM HG (ref 41–51)
PH UR STRIP: 5 [PH] (ref 5–8)
PH VENOUS: 7.27 (ref 7.32–7.43)
PLATELET # BLD AUTO: ABNORMAL K/UL (ref 138–453)
PLATELET, FLUORESCENCE: 137 K/UL (ref 138–453)
PLATELETS.RETICULATED NFR BLD AUTO: 4 % (ref 1.1–10.3)
PO2, VEN: 28.8 MM HG (ref 30–50)
POC ANION GAP: 14 MMOL/L (ref 7–16)
POC CREATININE: 1.8 MG/DL (ref 0.51–1.19)
POC HEMOGLOBIN (CALC): 6.7 G/DL (ref 13.5–17.5)
POC LACTIC ACID: 2.1 MMOL/L (ref 0.56–1.39)
POTASSIUM BLD-SCNC: 4.9 MMOL/L (ref 3.5–4.5)
POTASSIUM SERPL-SCNC: 5.3 MMOL/L (ref 3.7–5.3)
PROT SERPL-MCNC: 5.7 G/DL (ref 6.4–8.3)
PROT UR STRIP-MCNC: ABNORMAL MG/DL
PROTHROMBIN TIME: 14 SEC (ref 11.7–14.9)
RBC # BLD AUTO: 2.79 M/UL (ref 4.21–5.77)
RBC # BLD: ABNORMAL 10*6/UL
RBC #/AREA URNS HPF: ABNORMAL /HPF (ref 0–4)
SODIUM BLD-SCNC: 137 MMOL/L (ref 138–146)
SODIUM SERPL-SCNC: 135 MMOL/L (ref 135–144)
SP GR UR STRIP: 1.02 (ref 1–1.03)
TROPONIN I SERPL HS-MCNC: 132 NG/L (ref 0–22)
TROPONIN I SERPL HS-MCNC: 145 NG/L (ref 0–22)
TROPONIN I SERPL HS-MCNC: 98 NG/L (ref 0–22)
UROBILINOGEN UR STRIP-ACNC: NORMAL EU/DL (ref 0–1)
WBC #/AREA URNS HPF: ABNORMAL /HPF (ref 0–5)
WBC OTHER # BLD: 6.5 K/UL (ref 3.5–11.3)

## 2023-08-14 PROCEDURE — 93010 ELECTROCARDIOGRAM REPORT: CPT | Performed by: INTERNAL MEDICINE

## 2023-08-14 PROCEDURE — 2060000000 HC ICU INTERMEDIATE R&B

## 2023-08-14 PROCEDURE — 93005 ELECTROCARDIOGRAM TRACING: CPT

## 2023-08-14 PROCEDURE — 99223 1ST HOSP IP/OBS HIGH 75: CPT | Performed by: INTERNAL MEDICINE

## 2023-08-14 PROCEDURE — 6360000002 HC RX W HCPCS

## 2023-08-14 PROCEDURE — 92523 SPEECH SOUND LANG COMPREHEN: CPT

## 2023-08-14 PROCEDURE — 99221 1ST HOSP IP/OBS SF/LOW 40: CPT | Performed by: PSYCHIATRY & NEUROLOGY

## 2023-08-14 PROCEDURE — 85730 THROMBOPLASTIN TIME PARTIAL: CPT

## 2023-08-14 PROCEDURE — 94640 AIRWAY INHALATION TREATMENT: CPT

## 2023-08-14 PROCEDURE — 70450 CT HEAD/BRAIN W/O DYE: CPT

## 2023-08-14 PROCEDURE — 84484 ASSAY OF TROPONIN QUANT: CPT

## 2023-08-14 PROCEDURE — 92610 EVALUATE SWALLOWING FUNCTION: CPT

## 2023-08-14 PROCEDURE — 6370000000 HC RX 637 (ALT 250 FOR IP)

## 2023-08-14 PROCEDURE — 85014 HEMATOCRIT: CPT

## 2023-08-14 PROCEDURE — 82330 ASSAY OF CALCIUM: CPT

## 2023-08-14 PROCEDURE — 99222 1ST HOSP IP/OBS MODERATE 55: CPT | Performed by: PSYCHIATRY & NEUROLOGY

## 2023-08-14 PROCEDURE — 93970 EXTREMITY STUDY: CPT | Performed by: SURGERY

## 2023-08-14 PROCEDURE — 80053 COMPREHEN METABOLIC PANEL: CPT

## 2023-08-14 PROCEDURE — 85055 RETICULATED PLATELET ASSAY: CPT

## 2023-08-14 PROCEDURE — 99223 1ST HOSP IP/OBS HIGH 75: CPT | Performed by: STUDENT IN AN ORGANIZED HEALTH CARE EDUCATION/TRAINING PROGRAM

## 2023-08-14 PROCEDURE — 6360000004 HC RX CONTRAST MEDICATION: Performed by: STUDENT IN AN ORGANIZED HEALTH CARE EDUCATION/TRAINING PROGRAM

## 2023-08-14 PROCEDURE — 83605 ASSAY OF LACTIC ACID: CPT

## 2023-08-14 PROCEDURE — 93970 EXTREMITY STUDY: CPT

## 2023-08-14 PROCEDURE — 83880 ASSAY OF NATRIURETIC PEPTIDE: CPT

## 2023-08-14 PROCEDURE — 85025 COMPLETE CBC W/AUTO DIFF WBC: CPT

## 2023-08-14 PROCEDURE — 84520 ASSAY OF UREA NITROGEN: CPT

## 2023-08-14 PROCEDURE — 70498 CT ANGIOGRAPHY NECK: CPT

## 2023-08-14 PROCEDURE — 94761 N-INVAS EAR/PLS OXIMETRY MLT: CPT

## 2023-08-14 PROCEDURE — 99285 EMERGENCY DEPT VISIT HI MDM: CPT

## 2023-08-14 PROCEDURE — 82947 ASSAY GLUCOSE BLOOD QUANT: CPT

## 2023-08-14 PROCEDURE — 80051 ELECTROLYTE PANEL: CPT

## 2023-08-14 PROCEDURE — 81001 URINALYSIS AUTO W/SCOPE: CPT

## 2023-08-14 PROCEDURE — 87086 URINE CULTURE/COLONY COUNT: CPT

## 2023-08-14 PROCEDURE — 70547 MR ANGIOGRAPHY NECK W/O DYE: CPT

## 2023-08-14 PROCEDURE — 82803 BLOOD GASES ANY COMBINATION: CPT

## 2023-08-14 PROCEDURE — 82565 ASSAY OF CREATININE: CPT

## 2023-08-14 PROCEDURE — 71046 X-RAY EXAM CHEST 2 VIEWS: CPT

## 2023-08-14 PROCEDURE — 70551 MRI BRAIN STEM W/O DYE: CPT

## 2023-08-14 PROCEDURE — 70544 MR ANGIOGRAPHY HEAD W/O DYE: CPT

## 2023-08-14 PROCEDURE — 85610 PROTHROMBIN TIME: CPT

## 2023-08-14 PROCEDURE — 2700000000 HC OXYGEN THERAPY PER DAY

## 2023-08-14 PROCEDURE — 2580000003 HC RX 258

## 2023-08-14 RX ORDER — ACETAMINOPHEN 325 MG/1
650 TABLET ORAL EVERY 6 HOURS PRN
Status: DISCONTINUED | OUTPATIENT
Start: 2023-08-14 | End: 2023-08-17 | Stop reason: HOSPADM

## 2023-08-14 RX ORDER — BUDESONIDE AND FORMOTEROL FUMARATE DIHYDRATE 80; 4.5 UG/1; UG/1
2 AEROSOL RESPIRATORY (INHALATION)
Status: DISCONTINUED | OUTPATIENT
Start: 2023-08-14 | End: 2023-08-17 | Stop reason: HOSPADM

## 2023-08-14 RX ORDER — ASPIRIN 325 MG
325 TABLET ORAL ONCE
Status: COMPLETED | OUTPATIENT
Start: 2023-08-14 | End: 2023-08-14

## 2023-08-14 RX ORDER — SODIUM CHLORIDE 0.9 % (FLUSH) 0.9 %
5-40 SYRINGE (ML) INJECTION PRN
Status: DISCONTINUED | OUTPATIENT
Start: 2023-08-14 | End: 2023-08-17 | Stop reason: HOSPADM

## 2023-08-14 RX ORDER — ACETAMINOPHEN 650 MG/1
650 SUPPOSITORY RECTAL EVERY 6 HOURS PRN
Status: DISCONTINUED | OUTPATIENT
Start: 2023-08-14 | End: 2023-08-17 | Stop reason: HOSPADM

## 2023-08-14 RX ORDER — POLYETHYLENE GLYCOL 3350 17 G/17G
17 POWDER, FOR SOLUTION ORAL DAILY PRN
Status: DISCONTINUED | OUTPATIENT
Start: 2023-08-14 | End: 2023-08-17 | Stop reason: HOSPADM

## 2023-08-14 RX ORDER — SODIUM CHLORIDE 0.9 % (FLUSH) 0.9 %
5-40 SYRINGE (ML) INJECTION EVERY 12 HOURS SCHEDULED
Status: DISCONTINUED | OUTPATIENT
Start: 2023-08-14 | End: 2023-08-17 | Stop reason: HOSPADM

## 2023-08-14 RX ORDER — SODIUM CHLORIDE 9 MG/ML
INJECTION, SOLUTION INTRAVENOUS CONTINUOUS
Status: DISCONTINUED | OUTPATIENT
Start: 2023-08-14 | End: 2023-08-16

## 2023-08-14 RX ORDER — CLOPIDOGREL 300 MG/1
300 TABLET, FILM COATED ORAL ONCE
Status: COMPLETED | OUTPATIENT
Start: 2023-08-14 | End: 2023-08-14

## 2023-08-14 RX ORDER — PREDNISONE 20 MG/1
60 TABLET ORAL ONCE
Status: COMPLETED | OUTPATIENT
Start: 2023-08-14 | End: 2023-08-14

## 2023-08-14 RX ORDER — IPRATROPIUM BROMIDE AND ALBUTEROL SULFATE 2.5; .5 MG/3ML; MG/3ML
1 SOLUTION RESPIRATORY (INHALATION) EVERY 4 HOURS PRN
Status: DISCONTINUED | OUTPATIENT
Start: 2023-08-14 | End: 2023-08-17 | Stop reason: HOSPADM

## 2023-08-14 RX ORDER — POTASSIUM CHLORIDE 20 MEQ/1
40 TABLET, EXTENDED RELEASE ORAL PRN
Status: DISCONTINUED | OUTPATIENT
Start: 2023-08-14 | End: 2023-08-14

## 2023-08-14 RX ORDER — HYDRALAZINE HYDROCHLORIDE 20 MG/ML
10 INJECTION INTRAMUSCULAR; INTRAVENOUS EVERY 6 HOURS PRN
Status: DISCONTINUED | OUTPATIENT
Start: 2023-08-14 | End: 2023-08-17 | Stop reason: HOSPADM

## 2023-08-14 RX ORDER — ONDANSETRON 4 MG/1
4 TABLET, ORALLY DISINTEGRATING ORAL EVERY 8 HOURS PRN
Status: DISCONTINUED | OUTPATIENT
Start: 2023-08-14 | End: 2023-08-17 | Stop reason: HOSPADM

## 2023-08-14 RX ORDER — ATORVASTATIN CALCIUM 40 MG/1
40 TABLET, FILM COATED ORAL NIGHTLY
Status: DISCONTINUED | OUTPATIENT
Start: 2023-08-14 | End: 2023-08-17 | Stop reason: HOSPADM

## 2023-08-14 RX ORDER — LABETALOL HYDROCHLORIDE 5 MG/ML
10 INJECTION, SOLUTION INTRAVENOUS EVERY 6 HOURS PRN
Status: DISCONTINUED | OUTPATIENT
Start: 2023-08-14 | End: 2023-08-14 | Stop reason: SDUPTHER

## 2023-08-14 RX ORDER — INSULIN LISPRO 100 [IU]/ML
0-4 INJECTION, SOLUTION INTRAVENOUS; SUBCUTANEOUS
Status: DISCONTINUED | OUTPATIENT
Start: 2023-08-14 | End: 2023-08-17 | Stop reason: HOSPADM

## 2023-08-14 RX ORDER — INSULIN LISPRO 100 [IU]/ML
0-4 INJECTION, SOLUTION INTRAVENOUS; SUBCUTANEOUS NIGHTLY
Status: DISCONTINUED | OUTPATIENT
Start: 2023-08-14 | End: 2023-08-17 | Stop reason: HOSPADM

## 2023-08-14 RX ORDER — LABETALOL HYDROCHLORIDE 5 MG/ML
10 INJECTION, SOLUTION INTRAVENOUS EVERY 6 HOURS PRN
Status: DISCONTINUED | OUTPATIENT
Start: 2023-08-14 | End: 2023-08-14

## 2023-08-14 RX ORDER — CLOPIDOGREL BISULFATE 75 MG/1
75 TABLET ORAL DAILY
Status: DISCONTINUED | OUTPATIENT
Start: 2023-08-15 | End: 2023-08-17 | Stop reason: HOSPADM

## 2023-08-14 RX ORDER — ASPIRIN 81 MG/1
81 TABLET, CHEWABLE ORAL DAILY
Status: DISCONTINUED | OUTPATIENT
Start: 2023-08-15 | End: 2023-08-17 | Stop reason: HOSPADM

## 2023-08-14 RX ORDER — ONDANSETRON 2 MG/ML
4 INJECTION INTRAMUSCULAR; INTRAVENOUS EVERY 6 HOURS PRN
Status: DISCONTINUED | OUTPATIENT
Start: 2023-08-14 | End: 2023-08-17 | Stop reason: HOSPADM

## 2023-08-14 RX ORDER — SODIUM CHLORIDE 9 MG/ML
INJECTION, SOLUTION INTRAVENOUS PRN
Status: DISCONTINUED | OUTPATIENT
Start: 2023-08-14 | End: 2023-08-17 | Stop reason: HOSPADM

## 2023-08-14 RX ORDER — POTASSIUM CHLORIDE 7.45 MG/ML
10 INJECTION INTRAVENOUS PRN
Status: DISCONTINUED | OUTPATIENT
Start: 2023-08-14 | End: 2023-08-14

## 2023-08-14 RX ORDER — HEPARIN SODIUM 5000 [USP'U]/ML
5000 INJECTION, SOLUTION INTRAVENOUS; SUBCUTANEOUS EVERY 8 HOURS SCHEDULED
Status: DISCONTINUED | OUTPATIENT
Start: 2023-08-14 | End: 2023-08-17 | Stop reason: HOSPADM

## 2023-08-14 RX ORDER — PANTOPRAZOLE SODIUM 40 MG/1
40 TABLET, DELAYED RELEASE ORAL
Status: DISCONTINUED | OUTPATIENT
Start: 2023-08-15 | End: 2023-08-17 | Stop reason: HOSPADM

## 2023-08-14 RX ORDER — MAGNESIUM SULFATE IN WATER 40 MG/ML
2000 INJECTION, SOLUTION INTRAVENOUS PRN
Status: DISCONTINUED | OUTPATIENT
Start: 2023-08-14 | End: 2023-08-14

## 2023-08-14 RX ORDER — DEXTROSE MONOHYDRATE 100 MG/ML
INJECTION, SOLUTION INTRAVENOUS CONTINUOUS PRN
Status: DISCONTINUED | OUTPATIENT
Start: 2023-08-14 | End: 2023-08-17 | Stop reason: HOSPADM

## 2023-08-14 RX ADMIN — HEPARIN SODIUM 5000 UNITS: 5000 INJECTION INTRAVENOUS; SUBCUTANEOUS at 21:30

## 2023-08-14 RX ADMIN — PREDNISONE 60 MG: 20 TABLET ORAL at 04:38

## 2023-08-14 RX ADMIN — SODIUM CHLORIDE: 9 INJECTION, SOLUTION INTRAVENOUS at 12:38

## 2023-08-14 RX ADMIN — CLOPIDOGREL BISULFATE 300 MG: 300 TABLET, FILM COATED ORAL at 05:56

## 2023-08-14 RX ADMIN — HEPARIN SODIUM 5000 UNITS: 5000 INJECTION INTRAVENOUS; SUBCUTANEOUS at 13:46

## 2023-08-14 RX ADMIN — SODIUM CHLORIDE, PRESERVATIVE FREE 10 ML: 5 INJECTION INTRAVENOUS at 21:02

## 2023-08-14 RX ADMIN — ATORVASTATIN CALCIUM 40 MG: 40 TABLET, FILM COATED ORAL at 21:02

## 2023-08-14 RX ADMIN — IOPAMIDOL 90 ML: 755 INJECTION, SOLUTION INTRAVENOUS at 05:58

## 2023-08-14 RX ADMIN — BUDESONIDE AND FORMOTEROL FUMARATE DIHYDRATE 2 PUFF: 80; 4.5 AEROSOL RESPIRATORY (INHALATION) at 20:23

## 2023-08-14 RX ADMIN — ASPIRIN 325 MG: 325 TABLET ORAL at 05:56

## 2023-08-14 ASSESSMENT — PAIN - FUNCTIONAL ASSESSMENT
PAIN_FUNCTIONAL_ASSESSMENT: NONE - DENIES PAIN
PAIN_FUNCTIONAL_ASSESSMENT: NONE - DENIES PAIN

## 2023-08-14 ASSESSMENT — ENCOUNTER SYMPTOMS
CHEST TIGHTNESS: 0
BACK PAIN: 0
SORE THROAT: 0
COUGH: 1
BLOOD IN STOOL: 0
NAUSEA: 0
DIARRHEA: 0
RHINORRHEA: 0
SHORTNESS OF BREATH: 1
NAUSEA: 1
SINUS PAIN: 0
DIARRHEA: 1
ABDOMINAL PAIN: 0
PHOTOPHOBIA: 0
VOMITING: 0
WHEEZING: 0

## 2023-08-14 ASSESSMENT — PAIN SCALES - GENERAL
PAINLEVEL_OUTOF10: 0
PAINLEVEL_OUTOF10: 0

## 2023-08-14 NOTE — ED NOTES
Report called to Car, 3 RN. All questions answered. Transporting by Sumeet Judd.      Florentin Chavez, MARIANNA  08/14/23 5722

## 2023-08-14 NOTE — CONSULTS
[facility] NEUROLOGY & NEUROSCIENCE  IN-PATIENT SERVICE    NEUROLOGY CONSULT  NOTE    Date:   8/14/2023  Patient name:  Mary Saldana  Date of admission:  8/14/2023  YOB: 1955    Chief Complaint:     Chief Complaint   Patient presents with    Altered Mental Status       Reason for Consult:      AMS    History of Present Illness: The patient is a 77 y.o. male who presents with AMS and left upper extremity shaking. Past medical history of previous strokes stage IV liver cancer, hyperlipidemia, hypertension, COPD, diabetes, cardiomyopathy. Patient is a poor historian unable to give an accurate last known well. Was previously taking aspirin but says that it was discontinued. Currently on Lipitor. Family reportedly noted that he was having episodes of staring off in space. NIH 7 for right facial droop and bilateral lower extremity weakness. CT head was obtained which shows chronic encephalomalacia of right occipital and left cerebellum as well as new hypodensity of the right parietal lobe. CTA unable to be obtained due to creatinine of 1.8. Initial /88, .         Past Medical History:     Past Medical History:   Diagnosis Date    CHF (congestive heart failure) (HCC)     COPD (chronic obstructive pulmonary disease) (HCC)     Diabetes mellitus ([address])     Erectile dysfunction due to arterial insufficiency 12/07/2015    Hypertension     Microalbuminuria due to type 2 diabetes mellitus ([address]) 07/07/2016    Overweight (BMI 25.0-29.9) 12/07/2015        Past Surgical History:     Past Surgical History:   Procedure Laterality Date    APPENDECTOMY      COLONOSCOPY N/A 7/26/2023    COLONOSCOPY WITH BIOPSY performed by August Cason MD at [facility]  7/26/2023    COLONOSCOPY SUBMUCOSAL/BOTOX INJECTION performed by August Cason MD at [address]  07/26/2023    COLONOSCOPY DIAGNOSTIC    TOTAL ANKLE ARTHROPLASTY      LEFT    UPPER barrier technique and local anesthesia was achieved with lidocaine. Pre-procedure ultrasound shows a dominant fluid pocket in the right lowerquadrant. Using ultrasound guidance (image attached to the medical record), the fluid pocket was accessed with a 5 [place] Yueh needle with aspiration of clear yellow fluid. boosk vacuum machine was connected and paracentesis was performed and approximately 1700 mL were removed. Post procedural ultrasound demonstrated no significant residual fluid. A sample was collected and sent for laboratory analysis. Estimated blood loss was minimal. The patient tolerated the procedure well and left the department in good condition. FINDINGS: Limited ultrasound of the abdomen demonstrates ascites. A total of 1700 mL of clear yellow fluid was removed. Successful ultrasound-guided paracentesis. Assessment and summary:     Nakita Grande is a 77 y.o. right handed male with a history of stage IV lung cancer, previous infarct, hyperlipidemia, diabetes, hypertension who presents with AMS. CT head shows new right parietal hypodensity concerning for subacute infarct      Plan:      F/u MRI brain and MRA h&N  Lipitor 20mg nightly  Load dapt, aspirin and plavix for 21 days  30 minute EEG    Thank you for your consultation.      Electronically signed by   Carmen Zeng MD  PGY 4Neurology Resident  8/14/2023  5:48 AM

## 2023-08-14 NOTE — H&P
Range    Lactic Acid, Whole Blood 5.1 (H) 0.7 - 2.1 mmol/L   Protime-INR    Collection Time: 08/14/23  2:59 AM   Result Value Ref Range    Protime 14.0 11.7 - 14.9 sec    INR 1.1    APTT    Collection Time: 08/14/23  2:59 AM   Result Value Ref Range    PTT 30.2 23.0 - 36.5 sec   Urinalysis with Microscopic    Collection Time: 08/14/23  3:00 AM   Result Value Ref Range    Color, UA Dark Yellow (A) Yellow    Turbidity UA Clear Clear    Glucose, Ur NEGATIVE NEGATIVE mg/dL    Bilirubin Urine MOD (A) NEGATIVE    Ketones, Urine NEGATIVE NEGATIVE mg/dL    Specific Gravity, UA 1.018 1.005 - 1.030    Urine Hgb NEGATIVE NEGATIVE    pH, UA 5.0 5.0 - 8.0    Protein, UA TRACE (A) NEGATIVE mg/dL    Urobilinogen, Urine Normal 0.0 - 1.0 EU/dL    Nitrite, Urine NEGATIVE NEGATIVE    Leukocyte Esterase, Urine TRACE (A) NEGATIVE    WBC, UA 0 TO 2 0 - 5 /HPF    RBC, UA 5 TO 10 0 - 4 /HPF    Casts UA  0 - 8 /LPF     2 TO 5 HYALINE Reference range defined for non-centrifuged specimen.     Epithelial Cells UA 0 TO 2 0 - 5 /HPF    Mucus, UA 1+ (A) None    Amorphous, UA 1+ (A) None   Troponin    Collection Time: 08/14/23  4:17 AM   Result Value Ref Range    Troponin, High Sensitivity 132 (HH) 0 - 22 ng/L   Venous Blood Gas, POC    Collection Time: 08/14/23  4:22 AM   Result Value Ref Range    pH, Jose D 7.266 (L) 7.320 - 7.430    pCO2, Jose D 47.9 41.0 - 51.0 mm Hg    pO2, Jose D 28.8 (L) 30.0 - 50.0 mm Hg    HCO3, Venous 21.8 (L) 22.0 - 29.0 mmol/L    Negative Base Excess, Jose D 4.9 (H) 0.0 - 2.0 mmol/L    O2 Sat, Jose D 45.6 (L) 60.0 - 85.0 %   ELECTROLYTES PLUS    Collection Time: 08/14/23  4:22 AM   Result Value Ref Range    POC Sodium 137 (L) 138 - 146 mmol/L    POC Potassium 4.9 (H) 3.5 - 4.5 mmol/L    POC Chloride 102 98 - 107 mmol/L    POC TCO2 22 22 - 30 mmol/L    POC Anion Gap 14 7 - 16 mmol/L   Hemoglobin and hematocrit, blood    Collection Time: 08/14/23  4:22 AM   Result Value Ref Range    POC Hemoglobin (calc) 6.7 (LL) 13.5 - 17.5 g/dL    POC - Neurology consulted, plan for MRI brain   - Continue Aspirin and Plavix   - NPO except for meds  - SPL  - Neuro check     Colon CA with Metastasis   - Hem Onc consulted   - Palliative care consulted     UYEN, hx of CKD  - BUN/Cr on admission 34/2  - baseline Cr of 1.5  - Gentle Hydration while NPO with 50 ml/hr NS   - Monitor daily BMP     COPD  - On 4L NC ( baseline)  - Continue Breathing treatment     DM II   - LDSS  - Hypoglycemia protocol   - POCT x4 daily   - Last A1C 6.2    Essential HTN  - Hold home PO AntiHTN  - IV Lopressor/Hydralazine pr for SBP>220 or DBP>180      DVT prophylaxis: heparin (porcine) injection 5,000 TID  GI prophylaxis: Protonix 40 mg daily      Consultations:   Consults: IP CONSULT TO CARDIOLOGY  IP CONSULT TO FAMILY MEDICINE  IP CONSULT TO SOCIAL WORK  IP CONSULT TO HEM/ONC  IP CONSULT TO PALLIATIVE CARE  PT/OT      Above plan discussed with the patient who agreed to the above plan     Plan will be discussed with the attending, Dr Dina Talbot MD  Family Medicine Resident  8/14/2023 10:32 AM

## 2023-08-14 NOTE — ED NOTES
Pt to ED 24 via EMS c/o shakiness in his arms and per his family he seemed altered to them. Pt on arrival has even RR and non-labored breathing and is A&Ox4. Pt has a hx of diabetes and stage 4 liver cancer. Pt states he is on home meds but doesn't take them like he should. Pt placed on full monitor, EKG complete, line established and blood drawn. Resident at the bedside to assess, will continue with plan of care.      Pan Rangel RN  08/14/23 1092

## 2023-08-14 NOTE — CARE COORDINATION
08/14/23 1350   Readmission Assessment   Number of Days since last admission? 1-7 days   Previous Disposition Home with Home Health   Who is being Interviewed Patient   What was the patient's/caregiver's perception as to why they think they needed to return back to the hospital? Other (Comment)  (Shortness of breath)   Did you visit your Primary Care Physician after you left the hospital, before you returned this time? No   Why weren't you able to visit your PCP? Did not have an appointment   Did you see a specialist, such as Cardiac, Pulmonary, Orthopedic Physician, etc. after you left the hospital? No   Who advised the patient to return to the hospital? [facility] Staff   Does the patient report anything that got in the way of taking their medications? No   In our efforts to provide the best possible care to you and others like you, can you think of anything that we could have done to help you after you left the hospital the first time, so that you might not have needed to return so soon?  Other (Comment)  (Patient felt dc too soon from hospital)

## 2023-08-14 NOTE — PROGRESS NOTES
Patient assessed at bedside. No treatments indicated at this time. BS clear and RR even and non-labored.
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
## 2023-08-14 NOTE — ACP (ADVANCE CARE PLANNING)
Advance Care Planning     Advance Care Planning Activator (Inpatient)  Conversation Note      Date of ACP Conversation: 8/14/2023     Conversation Conducted with: Patient with Decision Making Capacity    ACP Activator: Jose Alberto Burger, [address] Decision Maker:     Current Designated Health Care Decision Maker:  Nery Zhou           Care Preferences    Ventilation: \"If you were in your present state of health and suddenly became very ill and were unable to breathe on your own, what would your preference be about the use of a ventilator (breathing machine) if it were available to you? \"      Would the patient desire the use of ventilator (breathing machine)?: yes    \"If your health worsens and it becomes clear that your chance of recovery is unlikely, what would your preference be about the use of a ventilator (breathing machine) if it were available to you? \"     Would the patient desire the use of ventilator (breathing machine)?: Yes      Resuscitation  \"CPR works best to restart the heart when there is a sudden event, like a heart attack, in someone who is otherwise healthy. Unfortunately, CPR does not typically restart the heart for people who have serious health conditions or who are very sick. \"    \"In the event your heart stopped as a result of an underlying serious health condition, would you want attempts to be made to restart your heart (answer \"yes\" for attempt to resuscitate) or would you prefer a natural death (answer \"no\" for do not attempt to resuscitate)? \" yes       [] Yes   [] No   Educated Patient / Shelah Link regarding differences between Advance Directives and portable DNR orders.       Conversation Outcomes:  ACP discussion completed

## 2023-08-14 NOTE — CONSULTS
Department of Neurology/Telestroke/Stroke  Resident Consult Note  Stroke Alert @ 455am  Arrival at bedside @ 5am    Reason for Consult:  ED Stroke Alert  Requesting Physician:  Dr. Mo Patterson  Endovascular Neurosurgeon:   Ricardo East MD    Stroke Team:  Armando Montague MD      History Obtained From:  patient    CHIEF COMPLAINT:       AMS    HISTORY OF PRESENT ILLNESS:       The patient is a 77 y.o. male who presents with AMS and left upper extremity shaking. Past medical history of previous strokes stage IV liver cancer, hyperlipidemia, hypertension, COPD, diabetes, cardiomyopathy. Patient is a poor historian unable to give an accurate last known well. Was previously taking aspirin but says that it was discontinued. Currently on Lipitor. Family reportedly noted that he was having episodes of staring off in space. NIH 7 for right facial droop and bilateral lower extremity weakness. CT head was obtained which shows chronic encephalomalacia of right occipital and left cerebellum as well as new hypodensity of the right parietal lobe. CTA unable to be obtained due to creatinine of 1.8. Initial /88, .         LKW: Unknown  NIHSS: 7  Modified Reji Scale: 3  SBP: 147  Glucose: 103  CT head without contrast: New right parietal hypodensity  TNK: Not a candidate due to unclear last normal  Endovascular: pending MRA       PAST MEDICAL HISTORY :       Past Medical History:        Diagnosis Date    CHF (congestive heart failure) (HCC)     COPD (chronic obstructive pulmonary disease) (HCC)     Diabetes mellitus ([address])     Erectile dysfunction due to arterial insufficiency 12/07/2015    Hypertension     Microalbuminuria due to type 2 diabetes mellitus ([address]) 07/07/2016    Overweight (BMI 25.0-29.9) 12/07/2015       Past Surgical History:        Procedure Laterality Date    APPENDECTOMY      COLONOSCOPY N/A 7/26/2023    COLONOSCOPY WITH BIOPSY performed by Nasima Veliz MD at [address] [place] [address]  7/26/2023 06/19/2022 06:56 AM    LABGLOM 35 08/14/2023 02:59 AM    GLUCOSE 124 08/14/2023 02:59 AM     Hemoglobin A1C   Date Value Ref Range Status   01/09/2023 4.7 4.0 - 6.0 % Final     Lab Results   Component Value Date    LDLCALC 24 07/01/2016    LDLCHOLESTEROL 71 02/15/2022       Radiology Review:    1.) CT brain without contrast: (Reviewed at 0500)    Parietal hypodensity    2.) CTA Head/Neck:  Unable to be obtained          Assessment:       Jeannette Dancer is a 77 y.o. right handed male with a history of stage IV lung cancer, previous infarct, hyperlipidemia, diabetes, hypertension who presents with AMS. CT head shows new right parietal hypodensity concerning for subacute infarct      Subacute right parietal infarct  Concern for new onset seizures        Last Known Well (date and time)   Unclear     Candidate for IV Tenecteplase therapy    Yes []  Risks including 6% of sich/death, benefits of potential improved thrombolysis, and alternatives to IV thrombolytics discussed with patient and/or family.     No   [x] due to the following exclusion criteria: Last well more than 4.5 hrs   Candidate for Thrombectomy   Yes []    No  [] due to the following exclusion criteria: No LVO on Imaging      Disposition   [] General Neurology Care Status - prefer 1st floor (1C)   [] Internal Medicine General Care Status   [] NICU Status - (1B)     [] MICU Status   [] Observation Status    Stroke admission order set:  [] [id number] - FAISAL Intercerebral Hemorrhage Admission  [] [id number] - FAISAL Sub Arachnoid Hemorrhage Admission  [] [id number] - FAISAL Ischemic Stroke TPA Treatment Focused  [] [id number] - IP Ischemic Stroke ICU Post Alteplase (TPA) Admission   [x] [id number] - GEN Ischemic Stroke Non-Thrombolytic Focused      Plan:       Imaging  - CTH WO: New parietal hypodensity, chronic encephalomalacia right occipital lobe and left cerebellum  -MRI brain without and MRA head and neck  - ECHO    Medications  - ASA 81mg now then daily,   -

## 2023-08-14 NOTE — CARE COORDINATION
Met with pt to assess for support and needs. Pt states that he lives with his son Albina Harrell. Albina Harrell is his main support system and they share responsibilities around the home. Pt denies past or present concerns with depression and denies SI. Patient Health Questionnaire-9 (PHQ-9)    Over the last 2 weeks, how often have you been bothered by any of the following problems? 1. Little Interest or pleasure in doing things? [x] Not at all  [] Several Days  [] More than half the day  []  Nearly every day    2. Feeling down, depressed or hopeless? [x] Not at all  [] Several Days  [] More than half the day  []  Nearly every day    3. Trouble falling or staying asleep, or sleeping too much? [x] Not at all  [] Several Days  [] More than half the day  []  Nearly every day    4. Feeling tired or having little energy? [x] Not at all  [] Several Days  [] More than half the day  []  Nearly every day    5. Poor apettite or overeating? [x] Not at all  [] Several Days  [] More than half the day  []  Nearly every day    6. Feeling bad about yourself-or that you are a failure or have let yourself or your family down? [x] Not at all  [] Several Days  [] More than half the day  []  Nearly every day    7. Trouble concentrating on things, such as reading the newspaper or watching television? [x] Not at all  [] Several Days  [] More than half the day  []  Nearly every day    8. Moving or speaking so slowly that other people could have noticed? Or the opposite-being so fidgety or restless that you have been moving around a lot more than usual?   [x] Not at all  [] Several Days  [] More than half the day  []  Nearly every day    9. Thoughts that you would be better off dead or of hurting yourself in some way?    [x] Not at all  [] Several Days  [] More than half the day  []  Nearly every day    Total Score: 0    If you checked off any problems, how difficult have these problems made it for you to do your work, take care of
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
## 2023-08-14 NOTE — ED PROVIDER NOTES
[address] [place] [address] ED  Emergency Department  Emergency Medicine Resident Sign-out     Care of Lilliana Schroeder was assumed from Dr. Ree Mcgee and is being seen for Altered Mental Status  . The patient's initial evaluation and plan have been discussed with the prior provider who initially evaluated the patient.      EMERGENCY DEPARTMENT COURSE / MEDICAL DECISION MAKING:       MEDICATIONS GIVEN:  Orders Placed This Encounter   Medications    predniSONE (DELTASONE) tablet 60 mg    iopamidol (ISOVUE-370) 76 % injection 90 mL    aspirin tablet 325 mg    aspirin chewable tablet 81 mg    clopidogrel (PLAVIX) tablet 300 mg    clopidogrel (PLAVIX) tablet 75 mg       LABS / RADIOLOGY:     Labs Reviewed   CBC WITH AUTO DIFFERENTIAL - Abnormal; Notable for the following components:       Result Value    RBC 2.79 (*)     Hemoglobin 8.2 (*)     Hematocrit 27.2 (*)     RDW 15.8 (*)     Platelet, Fluorescence 137 (*)     Neutrophils % 81 (*)     Lymphocytes % 13 (*)     Eosinophils % 0 (*)     Immature Granulocytes 1 (*)     Lymphocytes Absolute 0.82 (*)     All other components within normal limits   COMPREHENSIVE METABOLIC PANEL - Abnormal; Notable for the following components:    Glucose 124 (*)     BUN 34 (*)     Creatinine 2.0 (*)     Est, Glom Filt Rate 35 (*)     Calcium 8.1 (*)     CO2 18 (*)     Alkaline Phosphatase 997 (*)     AST 69 (*)     Total Bilirubin 1.9 (*)     Total Protein 5.7 (*)     Albumin 2.3 (*)     Albumin/Globulin Ratio 0.7 (*)     All other components within normal limits   TROPONIN - Abnormal; Notable for the following components:    Troponin, High Sensitivity 98 (*)     All other components within normal limits   TROPONIN - Abnormal; Notable for the following components:    Troponin, High Sensitivity 132 (*)     All other components within normal limits   BRAIN NATRIURETIC PEPTIDE - Abnormal; Notable for the following components:    Pro-BNP 3,820 (*)     All other components within normal evidence of mediastinal lymphadenopathy. The heart and pericardium demonstrate no acute abnormality. There is no acute abnormality of the thoracic aorta. Calcified coronary atheromatous plaque. Lungs/pleura: Trace pleural effusions, left greater than right. Mild respiratory motion artifact is noted. 0.7 cm nodule in the superior segment of the right lower lobe on axial image 45 appears to have a cavitary component. Mild centrilobular emphysematous disease. Minimal subsegmental atelectasis or scar in the left lung base. Upper Abdomen: Innumerable new liver lesions are present concerning for metastatic disease. Abnormal portal caval lymph node measuring 2.5 cm in short axis. Enlarged gastrohepatic lymph nodes are also noted. Small volume upper abdominal ascites. Soft Tissues/Bones: No acute bone or soft tissue abnormality. 1.  No evidence of pulmonary embolism. Trace pleural effusions, left greater than right. 2.  Innumerable liver lesions are now present, concerning for metastatic disease. 3.  Abnormal upper abdominal lymphadenopathy, concerning for neoplastic involvement. 4.  0.7 cm nodule in the superior segment of the right lower lobe appears partially cavitary, which could represent a metastatic deposit versus inflammatory process. 5.  Small volume upper abdominal ascites. US GUIDED PARACENTESIS    Result Date: 8/7/2023  PROCEDURE: ULTRASOUND-GUIDED PARACENTESIS 8/7/2023 HISTORY: ORDERING SYSTEM PROVIDED HISTORY: ascites TECHNOLOGIST PROVIDED HISTORY: ascites Does fluid need testing? If yes, please place LAB Orders. ->Yes Is the procedure for Diagnostic or Therapeutic reasons? ->Diagnostic Is the procedure for Diagnostic or Therapeutic reasons? ->Therapeutic TECHNIQUE: This procedure was performed by Chely Crane PA-C under indirect supervision of . Informed consent was obtained after a detailed explanation of the procedure including the risks, benefits, and alternatives.  Universal
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
## 2023-08-15 ENCOUNTER — HOSPITAL ENCOUNTER (INPATIENT)
Age: 68
Discharge: HOME | DRG: 041 | End: 2023-08-17
Payer: COMMERCIAL

## 2023-08-15 DIAGNOSIS — R52 PAIN: Primary | ICD-10-CM

## 2023-08-15 PROBLEM — Z71.89 DNR (DO NOT RESUSCITATE) DISCUSSION: Status: ACTIVE | Noted: 2023-08-15

## 2023-08-15 PROBLEM — I63.9 CEREBROVASCULAR ACCIDENT (CVA) (HCC): Status: ACTIVE | Noted: 2023-08-15

## 2023-08-15 PROBLEM — E11.9 TYPE 2 DIABETES MELLITUS WITHOUT COMPLICATION (HCC): Status: ACTIVE | Noted: 2023-08-15

## 2023-08-15 PROBLEM — Z71.89 ACP (ADVANCE CARE PLANNING): Status: ACTIVE | Noted: 2023-01-01

## 2023-08-15 LAB
ANION GAP SERPL CALCULATED.3IONS-SCNC: 8 MMOL/L (ref 9–17)
BASOPHILS # BLD: 0 K/UL (ref 0–0.2)
BASOPHILS NFR BLD: 0 % (ref 0–2)
BUN SERPL-MCNC: 35 MG/DL (ref 8–23)
CALCIUM SERPL-MCNC: 7.5 MG/DL (ref 8.6–10.4)
CHLORIDE SERPL-SCNC: 107 MMOL/L (ref 98–107)
CO2 SERPL-SCNC: 20 MMOL/L (ref 20–31)
CREAT SERPL-MCNC: 1.8 MG/DL (ref 0.7–1.2)
ECHO BSA: 1.69 M2
ECHO BSA: 1.69 M2
EKG ATRIAL RATE: 85 BPM
EKG P AXIS: 55 DEGREES
EKG P-R INTERVAL: 148 MS
EKG Q-T INTERVAL: 382 MS
EKG QRS DURATION: 86 MS
EKG QTC CALCULATION (BAZETT): 454 MS
EKG R AXIS: -29 DEGREES
EKG T AXIS: 42 DEGREES
EKG VENTRICULAR RATE: 85 BPM
EOSINOPHIL # BLD: 0 K/UL (ref 0–0.44)
EOSINOPHILS RELATIVE PERCENT: 0 % (ref 1–4)
ERYTHROCYTE [DISTWIDTH] IN BLOOD BY AUTOMATED COUNT: 16.2 % (ref 11.8–14.4)
GFR SERPL CREATININE-BSD FRML MDRD: 40 ML/MIN/1.73M2
GLUCOSE BLD-MCNC: 109 MG/DL (ref 75–110)
GLUCOSE BLD-MCNC: 109 MG/DL (ref 75–110)
GLUCOSE BLD-MCNC: 134 MG/DL (ref 75–110)
GLUCOSE BLD-MCNC: 140 MG/DL (ref 75–110)
GLUCOSE BLD-MCNC: 152 MG/DL (ref 75–110)
GLUCOSE BLD-MCNC: 161 MG/DL (ref 75–110)
GLUCOSE BLD-MCNC: 97 MG/DL (ref 75–110)
GLUCOSE SERPL-MCNC: 132 MG/DL (ref 70–99)
HCT VFR BLD AUTO: 23.1 % (ref 40.7–50.3)
HCT VFR BLD AUTO: 35.8 % (ref 40.7–50.3)
HGB BLD-MCNC: 10.3 G/DL (ref 13–17)
HGB BLD-MCNC: 7 G/DL (ref 13–17)
IMM GRANULOCYTES # BLD AUTO: 0 K/UL (ref 0–0.3)
IMM GRANULOCYTES NFR BLD: 0 %
LYMPHOCYTES NFR BLD: 0.37 K/UL (ref 1.1–3.7)
LYMPHOCYTES RELATIVE PERCENT: 8 % (ref 24–43)
MCH RBC QN AUTO: 30 PG (ref 25.2–33.5)
MCHC RBC AUTO-ENTMCNC: 30.3 G/DL (ref 28.4–34.8)
MCV RBC AUTO: 99.1 FL (ref 82.6–102.9)
MICROORGANISM SPEC CULT: NORMAL
MONOCYTES NFR BLD: 0.18 K/UL (ref 0.1–1.2)
MONOCYTES NFR BLD: 4 % (ref 3–12)
MORPHOLOGY: ABNORMAL
MORPHOLOGY: ABNORMAL
NEUTROPHILS NFR BLD: 88 % (ref 36–65)
NEUTS SEG NFR BLD: 4.05 K/UL (ref 1.5–8.1)
NRBC BLD-RTO: 0 PER 100 WBC
PLATELET # BLD AUTO: ABNORMAL K/UL (ref 138–453)
PLATELET, FLUORESCENCE: 114 K/UL (ref 138–453)
PLATELETS.RETICULATED NFR BLD AUTO: 5 % (ref 1.1–10.3)
POTASSIUM SERPL-SCNC: 5.3 MMOL/L (ref 3.7–5.3)
POTASSIUM SERPL-SCNC: 5.7 MMOL/L (ref 3.7–5.3)
RBC # BLD AUTO: 2.33 M/UL (ref 4.21–5.77)
SODIUM SERPL-SCNC: 135 MMOL/L (ref 135–144)
SPECIMEN DESCRIPTION: NORMAL
WBC OTHER # BLD: 4.6 K/UL (ref 3.5–11.3)

## 2023-08-15 PROCEDURE — 93010 ELECTROCARDIOGRAM REPORT: CPT | Performed by: INTERNAL MEDICINE

## 2023-08-15 PROCEDURE — 85055 RETICULATED PLATELET ASSAY: CPT

## 2023-08-15 PROCEDURE — 6370000000 HC RX 637 (ALT 250 FOR IP)

## 2023-08-15 PROCEDURE — 85014 HEMATOCRIT: CPT

## 2023-08-15 PROCEDURE — 82272 OCCULT BLD FECES 1-3 TESTS: CPT

## 2023-08-15 PROCEDURE — 93312 ECHO TRANSESOPHAGEAL: CPT | Performed by: INTERNAL MEDICINE

## 2023-08-15 PROCEDURE — 99222 1ST HOSP IP/OBS MODERATE 55: CPT

## 2023-08-15 PROCEDURE — 86901 BLOOD TYPING SEROLOGIC RH(D): CPT

## 2023-08-15 PROCEDURE — 6370000000 HC RX 637 (ALT 250 FOR IP): Performed by: STUDENT IN AN ORGANIZED HEALTH CARE EDUCATION/TRAINING PROGRAM

## 2023-08-15 PROCEDURE — 86920 COMPATIBILITY TEST SPIN: CPT

## 2023-08-15 PROCEDURE — 2580000003 HC RX 258

## 2023-08-15 PROCEDURE — 6360000002 HC RX W HCPCS

## 2023-08-15 PROCEDURE — 99232 SBSQ HOSP IP/OBS MODERATE 35: CPT | Performed by: INTERNAL MEDICINE

## 2023-08-15 PROCEDURE — 99152 MOD SED SAME PHYS/QHP 5/>YRS: CPT | Performed by: STUDENT IN AN ORGANIZED HEALTH CARE EDUCATION/TRAINING PROGRAM

## 2023-08-15 PROCEDURE — 33285 INSJ SUBQ CAR RHYTHM MNTR: CPT | Performed by: INTERNAL MEDICINE

## 2023-08-15 PROCEDURE — 84132 ASSAY OF SERUM POTASSIUM: CPT

## 2023-08-15 PROCEDURE — 85018 HEMOGLOBIN: CPT

## 2023-08-15 PROCEDURE — 2060000000 HC ICU INTERMEDIATE R&B

## 2023-08-15 PROCEDURE — 94640 AIRWAY INHALATION TREATMENT: CPT

## 2023-08-15 PROCEDURE — P9016 RBC LEUKOCYTES REDUCED: HCPCS

## 2023-08-15 PROCEDURE — 95816 EEG AWAKE AND DROWSY: CPT

## 2023-08-15 PROCEDURE — 36430 TRANSFUSION BLD/BLD COMPNT: CPT

## 2023-08-15 PROCEDURE — 80048 BASIC METABOLIC PNL TOTAL CA: CPT

## 2023-08-15 PROCEDURE — 93312 ECHO TRANSESOPHAGEAL: CPT

## 2023-08-15 PROCEDURE — 95816 EEG AWAKE AND DROWSY: CPT | Performed by: PSYCHIATRY & NEUROLOGY

## 2023-08-15 PROCEDURE — 99232 SBSQ HOSP IP/OBS MODERATE 35: CPT | Performed by: PSYCHIATRY & NEUROLOGY

## 2023-08-15 PROCEDURE — 99153 MOD SED SAME PHYS/QHP EA: CPT | Performed by: STUDENT IN AN ORGANIZED HEALTH CARE EDUCATION/TRAINING PROGRAM

## 2023-08-15 PROCEDURE — 97129 THER IVNTJ 1ST 15 MIN: CPT

## 2023-08-15 PROCEDURE — 86900 BLOOD TYPING SEROLOGIC ABO: CPT

## 2023-08-15 PROCEDURE — 6360000002 HC RX W HCPCS: Performed by: STUDENT IN AN ORGANIZED HEALTH CARE EDUCATION/TRAINING PROGRAM

## 2023-08-15 PROCEDURE — C1764 EVENT RECORDER, CARDIAC: HCPCS | Performed by: STUDENT IN AN ORGANIZED HEALTH CARE EDUCATION/TRAINING PROGRAM

## 2023-08-15 PROCEDURE — 33285 INSJ SUBQ CAR RHYTHM MNTR: CPT | Performed by: STUDENT IN AN ORGANIZED HEALTH CARE EDUCATION/TRAINING PROGRAM

## 2023-08-15 PROCEDURE — 82947 ASSAY GLUCOSE BLOOD QUANT: CPT

## 2023-08-15 PROCEDURE — 0JH602Z INSERTION OF MONITORING DEVICE INTO CHEST SUBCUTANEOUS TISSUE AND FASCIA, OPEN APPROACH: ICD-10-PCS | Performed by: INTERNAL MEDICINE

## 2023-08-15 PROCEDURE — 94761 N-INVAS EAR/PLS OXIMETRY MLT: CPT

## 2023-08-15 PROCEDURE — 85025 COMPLETE CBC W/AUTO DIFF WBC: CPT

## 2023-08-15 PROCEDURE — 36415 COLL VENOUS BLD VENIPUNCTURE: CPT

## 2023-08-15 PROCEDURE — 86850 RBC ANTIBODY SCREEN: CPT

## 2023-08-15 PROCEDURE — 2700000000 HC OXYGEN THERAPY PER DAY

## 2023-08-15 RX ORDER — LIDOCAINE HYDROCHLORIDE 20 MG/ML
SOLUTION OROPHARYNGEAL PRN
Status: COMPLETED | OUTPATIENT
Start: 2023-08-15 | End: 2023-08-15

## 2023-08-15 RX ORDER — FENTANYL CITRATE 50 UG/ML
INJECTION, SOLUTION INTRAMUSCULAR; INTRAVENOUS PRN
Status: COMPLETED | OUTPATIENT
Start: 2023-08-15 | End: 2023-08-15

## 2023-08-15 RX ORDER — SODIUM CHLORIDE 9 MG/ML
INJECTION, SOLUTION INTRAVENOUS PRN
Status: DISCONTINUED | OUTPATIENT
Start: 2023-08-15 | End: 2023-08-17 | Stop reason: HOSPADM

## 2023-08-15 RX ORDER — MIDAZOLAM HYDROCHLORIDE 1 MG/ML
INJECTION INTRAMUSCULAR; INTRAVENOUS PRN
Status: COMPLETED | OUTPATIENT
Start: 2023-08-15 | End: 2023-08-15

## 2023-08-15 RX ADMIN — SODIUM CHLORIDE, PRESERVATIVE FREE 10 ML: 5 INJECTION INTRAVENOUS at 20:22

## 2023-08-15 RX ADMIN — BUDESONIDE AND FORMOTEROL FUMARATE DIHYDRATE 2 PUFF: 80; 4.5 AEROSOL RESPIRATORY (INHALATION) at 08:17

## 2023-08-15 RX ADMIN — MIDAZOLAM 1 MG: 1 INJECTION INTRAMUSCULAR; INTRAVENOUS at 12:44

## 2023-08-15 RX ADMIN — PANTOPRAZOLE SODIUM 40 MG: 40 TABLET, DELAYED RELEASE ORAL at 14:55

## 2023-08-15 RX ADMIN — INSULIN HUMAN 10 UNITS: 100 INJECTION, SOLUTION PARENTERAL at 08:05

## 2023-08-15 RX ADMIN — HEPARIN SODIUM 5000 UNITS: 5000 INJECTION INTRAVENOUS; SUBCUTANEOUS at 06:40

## 2023-08-15 RX ADMIN — MIDAZOLAM 1 MG: 1 INJECTION INTRAMUSCULAR; INTRAVENOUS at 12:46

## 2023-08-15 RX ADMIN — SODIUM CHLORIDE: 9 INJECTION, SOLUTION INTRAVENOUS at 06:45

## 2023-08-15 RX ADMIN — LIDOCAINE HYDROCHLORIDE 15 ML: 20 SOLUTION ORAL; TOPICAL at 12:33

## 2023-08-15 RX ADMIN — CLOPIDOGREL BISULFATE 75 MG: 75 TABLET ORAL at 14:51

## 2023-08-15 RX ADMIN — DEXTROSE MONOHYDRATE 250 ML: 100 INJECTION, SOLUTION INTRAVENOUS at 08:11

## 2023-08-15 RX ADMIN — FENTANYL CITRATE 25 MCG: 50 INJECTION, SOLUTION INTRAMUSCULAR; INTRAVENOUS at 12:44

## 2023-08-15 RX ADMIN — SODIUM ZIRCONIUM CYCLOSILICATE 10 G: 10 POWDER, FOR SUSPENSION ORAL at 08:13

## 2023-08-15 RX ADMIN — FENTANYL CITRATE 25 MCG: 50 INJECTION, SOLUTION INTRAMUSCULAR; INTRAVENOUS at 12:36

## 2023-08-15 RX ADMIN — HEPARIN SODIUM 5000 UNITS: 5000 INJECTION INTRAVENOUS; SUBCUTANEOUS at 23:18

## 2023-08-15 RX ADMIN — BUDESONIDE AND FORMOTEROL FUMARATE DIHYDRATE 2 PUFF: 80; 4.5 AEROSOL RESPIRATORY (INHALATION) at 20:18

## 2023-08-15 RX ADMIN — HEPARIN SODIUM 5000 UNITS: 5000 INJECTION INTRAVENOUS; SUBCUTANEOUS at 14:51

## 2023-08-15 RX ADMIN — MIDAZOLAM 1 MG: 1 INJECTION INTRAMUSCULAR; INTRAVENOUS at 12:36

## 2023-08-15 RX ADMIN — FENTANYL CITRATE 25 MCG: 50 INJECTION, SOLUTION INTRAMUSCULAR; INTRAVENOUS at 12:45

## 2023-08-15 RX ADMIN — ATORVASTATIN CALCIUM 40 MG: 40 TABLET, FILM COATED ORAL at 20:21

## 2023-08-15 RX ADMIN — ASPIRIN 81 MG 81 MG: 81 TABLET ORAL at 14:51

## 2023-08-15 ASSESSMENT — PAIN SCALES - GENERAL
PAINLEVEL_OUTOF10: 0
PAINLEVEL_OUTOF10: 0

## 2023-08-15 NOTE — PROGRESS NOTES
TRANSFER - OUT REPORT:    Verbal report given to shanae Lay being transferred to car 3 for routine progression of patient care       Report consisted of patient's Situation, Background, Assessment and   Recommendations(SBAR). Information from the following report(s) Nurse Handoff Report was reviewed with the receiving nurse. Opportunity for questions and clarification was provided.       Patient transported with:   Monitor  O2 @ 3 liters  Registered Nurse  [facility]

## 2023-08-15 NOTE — PROGRESS NOTES
[address]  Progress Note    Date:   8/15/2023  Patient name:  Jeannette Dancer  Date of admission:  8/14/2023  2:41 AM  MRN:   [id number]  YOB: 1955    SUBJECTIVE/Last 24 hours update:        -The patient was seen and examined at bedside this morning.   -Labs, chart, and VS reviewed.   -No acute events overnight.   -Venous duplex of LE was normal.   -Plan for ZOLTAN and loop recorder with cardiology      Notes from nursing staff and Consults had been reviewed, and the overnight progress had been checked with the nursing staff as well. HPI:     The patient is a 77 y.o.  male with hx of recently diagnosed Colon CA/Liver CA with Metastasis, Essential HTN, DM II,  COPD on 4 L NC, Dilated Cardiomyopathy, Hepatitis C with Cirrhosis presented the ED with Stroke like symptoms including LUE shakiness/weakness and reported AMS/staring episodes by family. At ER: Pt was vitally stable, Code Stroke called, CT showed Acute infract involving the Rt parietal lobe, pt was loaded with Aspirin and Plavix. Other Labs showed trendng Troponin 98, 132, 145 ( baseline 100s), EKG unremarkable Cardiology consulted who recs no additional cardiac workup,  , AST 69, Pro-BNP 3,820 ( baseline 1500s), BUN 34, Cr 2, GFR 35, Lactic acid 5.1,2.1, CXR, showed small pleural effusion on the Rt Post. Lobe. The patient is admitted on 8/14 is to the hospital for Further mx of Acute stroke     REVIEW OF SYSTEMS:      Review of Systems   Constitutional:  Negative for activity change, chills and fever. HENT:  Positive for congestion. Respiratory:  Positive for cough and shortness of breath. Negative for wheezing. Cardiovascular:  Negative for chest pain and palpitations. Gastrointestinal:  Negative for abdominal pain, diarrhea, nausea and vomiting. Neurological:  Positive for weakness.  Negative for tremors, seizures, light-headedness, numbness and

## 2023-08-15 NOTE — PROGRESS NOTES
Physician Progress Note      PATIENT:               Clement Poole  CSN #:                  [id number]  :                       1955  ADMIT DATE:       2023 2:41 AM  DISCH DATE:  RESPONDING  PROVIDER #:        Rhonda BLEVINS          QUERY TEXT:    Pt admitted with Ischemic stroke. Pt noted to use 4L O2 NC at home, also   noted to maintain 4L while here in the hospital. If possible, please document   in the progress notes and discharge summary if you are evaluating and/or   treating any of the following: The medical record reflects the following:  Risk Factors: COPD  Clinical Indicators: noted to use 4L O2 NC at home, also noted to maintain 4L   while here in the hospital  Treatment: Supplemental O2 PRN, frequent respiratory assessments and monitor   vital signs      Thank you for your time, Author César AGGARWAL, RN CDS. Please call/text/PS with any   questions; [phone]. Options provided:  -- Chronic respiratory failure with hypoxia  -- Chronic respiratory failure with hypercapnia  -- Chronic respiratory failure with hypoxia and hypercapnia  -- Other - I will add my own diagnosis  -- Disagree - Not applicable / Not valid  -- Disagree - Clinically unable to determine / Unknown  -- Refer to Clinical Documentation Reviewer    PROVIDER RESPONSE TEXT:    Provider disagreed with this query.     Query created by: Marcus Rainey on 8/15/2023 5:43 PM      Electronically signed by:  Max Bustillos 8/15/2023 7:50 PM

## 2023-08-15 NOTE — ACP (ADVANCE CARE PLANNING)
Advance Care Planning     Advance Care Planning (ACP) Physician/NP/PA Conversation    Date of Conversation: 8/14/2023  Conducted with: Patient with [address]: Named in Advance Directive or Healthcare Power of  (name) [facility] Decision Maker:  Alka Cavanaugh - [phone]      Care Preferences:    Hospitalization: \"If your health worsens and it becomes clear that your chance of recovery is unlikely, what would be your preference regarding hospitalization? \"  The patient would prefer hospitalization. Ventilation: \"If you were unable to breath on your own and your chance of recovery was unlikely, what would be your preference about the use of a ventilator (breathing machine) if it was available to you? \"  The patient would NOT desire the use of a ventilator. Resuscitation: \"In the event your heart stopped as a result of an underlying serious health condition, would you want attempts made to restart your heart, or would you prefer a natural death? \"  No, do NOT attempt to resuscitate.         Conversation Outcomes / Follow-Up Plan:  ACP complete - no further action today  Reviewed DNR/DNI and patient elects DNR order - completed portable DNR form & placed order    Length of Voluntary ACP Conversation in minutes:  16 minutes    MARY Lorenzo - CNP

## 2023-08-15 NOTE — PROCEDURES
EEG REPORT       Patient: Faith Melgar Age: 77 y.o. MRN: [id number]      Referring Provider: No ref. provider found    History: This routine 30 minute scalp EEG was recorded with video- monitoring for a 77 y.o.. male who presented with encephalopathy. This EEG was performed to evaluate for focal and epileptiform abnormalities.      Mac Zhou   Current Facility-Administered Medications   Medication Dose Route Frequency Provider Last Rate Last Admin    0.9 % sodium chloride infusion   IntraVENous PRN La Yap MD        aspirin chewable tablet 81 mg  81 mg Oral Daily La Yap MD        clopidogrel (PLAVIX) tablet 75 mg  75 mg Oral Daily La Yap MD        sodium chloride flush 0.9 % injection 5-40 mL  5-40 mL IntraVENous 2 times per day La Yap MD   10 mL at 08/14/23 2102    sodium chloride flush 0.9 % injection 5-40 mL  5-40 mL IntraVENous PRN La Yap MD        0.9 % sodium chloride infusion   IntraVENous PRN La Yap MD        ondansetron (ZOFRAN-ODT) disintegrating tablet 4 mg  4 mg Oral Q8H PRN La Yap MD        Or    ondansetron [facility]) injection 4 mg  4 mg IntraVENous Q6H PRN La Yap MD        polyethylene glycol (GLYCOLAX) packet 17 g  17 g Oral Daily PRN La Yap MD        acetaminophen (TYLENOL) tablet 650 mg  650 mg Oral Q6H PRN La Yap MD        Or    acetaminophen (TYLENOL) suppository 650 mg  650 mg Rectal Q6H PRN La Yap MD        0.9 % sodium chloride infusion   IntraVENous Continuous La Yap MD 50 mL/hr at 08/15/23 0912 Rate Verify at 08/15/23 0912    heparin (porcine) injection 5,000 Units  5,000 Units SubCUTAneous 3 times per day La Yap MD   5,000 Units at 08/15/23 0640    albuterol (PROVENTIL) nebulizer solution 2.5 mg  2.5 mg Nebulization Q4H PRN La Yap MD        atorvastatin (LIPITOR) tablet 40 mg  40 mg Oral Nightly Domenico Aguirre, MD   40 mg at 08/14/23 2102    budesonide-formoterol (SYMBICORT) 80-4.5 MCG/ACT inhaler 2 puff  2 puff Inhalation BID RT Trever Cisneros MD   2 puff at 08/15/23 0817    ipratropium 0.5 mg-albuterol 2.5 mg (DUONEB) nebulizer solution 1 Dose  1 Dose Nebulization Q4H PRN Trever Cisneros MD        glucose chewable tablet 16 g  4 tablet Oral PRN Trever Cisneros MD        dextrose bolus 10% 125 mL  125 mL IntraVENous PRN Trever Cisneros MD        Or    dextrose bolus 10% 250 mL  250 mL IntraVENous PRN Trever Cisneros MD        glucagon (rDNA) injection 1 mg  1 mg SubCUTAneous PRN Trever Cisneros MD        dextrose 10 % infusion   IntraVENous Continuous PRN Trever Cisneros MD        insulin lispro (HUMALOG) injection vial 0-4 Units  0-4 Units SubCUTAneous TID WC Trever Cisneros MD        insulin lispro (HUMALOG) injection vial 0-4 Units  0-4 Units SubCUTAneous Nightly Trever Cisneros MD        pantoprazole (PROTONIX) tablet 40 mg  40 mg Oral QAM AC Trever Cisneros MD        hydrALAZINE (APRESOLINE) injection 10 mg  10 mg IntraVENous Q6H PRN Trever Cisneros MD         Facility-Administered Medications Ordered in Other Encounters   Medication Dose Route Frequency Provider Last Rate Last Admin    fentaNYL (SUBLIMAZE) injection   IntraVENous PRN Lyle Puga MD   25 mcg at 08/15/23 1236    midazolam (VERSED) injection   IntraVENous PRN Lyle Puga MD   1 mg at 08/15/23 1236       Technical Description: This is a 21 channel digital EEG recording with time-locked video. Electrodes were placed in accordance with the 10-20 International System of Electrode Placement. Single lead EKG monitoring as well as temporal electrodes were included. The patient was not sleep deprived. This recording was obtained during wakefulness.   EEG Description: The dominant background activity during maximal recorded wakefulness there was a posterior dominant rhythm reaching up to 8 Hz admixed

## 2023-08-15 NOTE — PRE SEDATION
[facility]  Pre- Procedure Sedation          Patient Name:  Erich Escobedo  MRN:    [id number]  YOB: 1955  Date of evaluation:  8/15/2023      Indication: Stroke      Procedure: ZOLTAN/Loop recorder insertion       Pre Procedure Conscious Sedation Data:  ASA Class:                  [] I [x] II [] III [] IV     Mallampati Class:       [] I [x] II [] III [] IV      The risks, benefits, and alternatives of the prcoedure were discussed in detail with the patient. The patient agrees to proceed with the procedure, verbalizes understanding and signed consent.        Gordon Balderas MD  Fellow, [address] [place]

## 2023-08-15 NOTE — PROGRESS NOTES
[facility] Neurology   [address] [facility]  General Neurology Progress Note   [address]                Date:   8/15/2023  Patient name:  Faith Melgar  Date of admission:  8/14/2023  2:41 AM  MRN:   [id number]  Account:  [de-identified]  YOB: 1955  PCP:    Sidney Soulier, MD  Room:   [address] [place] [address]  Code Status:    Full Code  Chief Complaint:   Chief Complaint   Patient presents with    Altered Mental Status       Interval history      The patient was seen and examined at bedside this morning. Labs, chart, and VS reviewed. No acute events overnight. Venous duplex of LE was normal. Plan for ZOLTAN and loop recorder with cardiology      HPI:    Faith Melgar is a 77 y.o. male who presents with AMS and left upper extremity shaking. Past medical history of previous strokes stage IV liver cancer, hyperlipidemia, hypertension, COPD, diabetes, cardiomyopathy. Patient is a poor historian unable to give an accurate last known well. Was previously taking aspirin but says that it was discontinued. Currently on Lipitor. Family reportedly noted that he was having episodes of staring off in space. NIH 7 for right facial droop and bilateral lower extremity weakness. CT head was obtained which shows chronic encephalomalacia of right occipital and left cerebellum as well as new hypodensity of the right parietal lobe. CTA unable to be obtained due to creatinine of 1.8. Initial /88, .     Review of Systems    Past Medical History:   Past Medical History:   Diagnosis Date    CHF (congestive heart failure) (HCC)     COPD (chronic obstructive pulmonary disease) (HCC)     Diabetes mellitus ([address])     Erectile dysfunction due to arterial insufficiency 12/07/2015    Hypertension     Microalbuminuria due to type 2 diabetes mellitus ([address]) 07/07/2016    Overweight (BMI 25.0-29.9) 12/07/2015     Past Surgical History:   Past Surgical History:   Procedure Laterality Date    APPENDECTOMY mmol/L    CO2 20 20 - 31 mmol/L    Anion Gap 8 (L) 9 - 17 mmol/L   CBC with Auto Differential    Collection Time: 08/15/23  3:44 AM   Result Value Ref Range    WBC 4.6 3.5 - 11.3 k/uL    RBC 2.33 (L) 4.21 - 5.77 m/uL    Hemoglobin 7.0 (LL) 13.0 - 17.0 g/dL    Hematocrit 23.1 (L) 40.7 - 50.3 %    MCV 99.1 82.6 - 102.9 fL    MCH 30.0 25.2 - 33.5 pg    MCHC 30.3 28.4 - 34.8 g/dL    RDW 16.2 (H) 11.8 - 14.4 %    Platelets See Reflexed IPF Result 138 - 453 k/uL    Platelet, Fluorescence 114 (L) 138 - 453 k/uL    Platelet, Immature Fraction 5.0 1.1 - 10.3 %    NRBC Automated 0.0 0.0 per 100 WBC    Immature Granulocytes 0 0 %    Neutrophils % 88 (H) 36 - 65 %    Lymphocytes % 8 (L) 24 - 43 %    Monocytes % 4 3 - 12 %    Eosinophils % 0 (L) 1 - 4 %    Basophils % 0 0 - 2 %    Absolute Immature Granulocyte 0.00 0.00 - 0.30 k/uL    Neutrophils Absolute 4.05 1.50 - 8.10 k/uL    Lymphocytes Absolute 0.37 (L) 1.10 - 3.70 k/uL    Monocytes Absolute 0.18 0.10 - 1.20 k/uL    Eosinophils Absolute 0.00 0.00 - 0.44 k/uL    Basophils Absolute 0.00 0.00 - 0.20 k/uL    Morphology ANISOCYTOSIS PRESENT     Morphology 1+ ACANTHOCYTES    POC Glucose Fingerstick    Collection Time: 08/15/23  7:43 AM   Result Value Ref Range    POC Glucose 109 75 - 110 mg/dL   Potassium    Collection Time: 08/15/23  8:22 AM   Result Value Ref Range    Potassium 5.3 3.7 - 5.3 mmol/L   POC Glucose Fingerstick    Collection Time: 08/15/23  9:07 AM   Result Value Ref Range    POC Glucose 161 (H) 75 - 110 mg/dL   POC Glucose Fingerstick    Collection Time: 08/15/23  9:31 AM   Result Value Ref Range    POC Glucose 140 (H) 75 - 110 mg/dL         Radiology:    MRI Brain without contrast (8/14): Evolving subacute cortical infarct throughout the right parietal lobe. 2. No significant stenosis within the proximal major arterial vessels of the head.  There are few scattered punctate areas of mild restricted diffusion throughout the bilateral frontal deep white matter

## 2023-08-15 NOTE — CONSULTS
Palliative Care Inpatient Consult    NAME:  Akua BRUSH [address] [place] [address] RECORD NUMBER:  [id number]  AGE: 77 y.o. GENDER: male  : 1955  TODAY'S DATE:  8/15/2023    Reasons for Consultation:    Symptom and/or pain management  Provision of information regarding PC and/or hospice philosophies  Complex, time-intensive communication and interdisciplinary psychosocial support  Clarification of goals of care and/or assistance with difficult decision-making  Guidance in regards to resources and transition(s)    Members of PC team contributing to this consultation are : Mo Saul, Palliative Care APRN-CNP    Summary:   Pt wishes to change code status to [facility]   Son Shun Salmeron updated on this     Plan      Palliative Interaction: Visited patient at bedside. Patient had just returned from ZOLTAN, resting comfortably in bed. We discussed his recent cancer diagnosis. Patient expressing frustration stating that he \"just wanted to receive his chemotherapy treatment and he couldn't\" I explained that typically patients do not receive treatment while admitted and that he will be following up outpatient after discharge, as per oncology notes. I also explained the goals of this treatment with him again, reiterating that treatment for his cancer is palliative and not curative. Patient stated that he understood this. Patient also expressed concerns on how he would get to his treatments. I explained that our social work services and oncology navigators would be a good resource for help with this. Patient has previously been in contact with both  and the oncology navigators. I reviewed code status with the patient. Patient reporting that he would not want CPR or to be intubated. Patient wishes to change code status to [facility]. Patient would like me to call his son Shun Salmeron and update him regarding this. Patients yandel Salmeron is the patients primary contact and HCPOA.  I called and updated patients son of our discussion and Stenosis of the internal carotid arteries measured  using NASCET criteria. Automated exposure control, iterative reconstruction,  and/or weight based adjustment of the mA/kV was utilized to reduce the  radiation dose to as low as reasonably achievable. COMPARISON:  CT scan of brain without contrast on 10/7/2022. CT scan of brain without  contrast on 8/14/2023 at 3:57 a.m. HISTORY:  ORDERING SYSTEM PROVIDED HISTORY: new acute stroke identified on head CT  TECHNOLOGIST PROVIDED HISTORY: New acute stroke identified on head CT  Decision Support Exception - unselect if not a suspected or confirmed  emergency medical condition->Emergency Medical Condition (MA)    FINDINGS:    CTA NECK:    AORTIC ARCH/ARCH VESSELS: No dissection or arterial injury. No significant  stenosis of the brachiocephalic or subclavian arteries. CAROTID ARTERIES: No dissection, arterial injury, or hemodynamically  significant stenosis by NASCET criteria. In the proximal segment of right  ICA, superior to carotid bulb, there are mild calcified plaques with subtle,  less than 20% stenosis. In the upper portion of cervical segment of right  ICA there are mild calcified plaques with minimal stenosis. In the proximal  segment of left ICA there are minimal calcified plaques, without obvious  stenosis. VERTEBRAL ARTERIES: No dissection, arterial injury, or significant stenosis. In the proximal V1 segment of the right vertebral artery there are calcified  plaques with about 40% stenosis. At the origin of the left vertebral artery  there are mild calcified plaques with minimal stenosis. In the intracranial  V4 segments of bilateral vertebral arteries there are minimal calcified  plaques without significant stenosis. SOFT TISSUES: Moderate centrilobular pulmonary emphysema in visualized upper  portions of upper lobes of both lungs.   Small pleural effusion versus pleural  thickening at the posterior aspect of visualized lower portion of Head: Normocephalic and atraumatic. Eyes: EOM are normal. Pupils are equal, round   Neck: Normal range of motion. Neck supple. No tracheal deviation present. Cardiovascular: Normal rate and regular rhythm, S1, S2, no murmur   Pulmonary/Chest: Effort normal and breath sounds normal. No rales or wheezes. Abdomen: Soft. No tenderness, not distended, no ascites, no organomegaly   Musculoskeletal: Normal range of motion. No edema lower ext. Neurological: CN II-XII grossly intact, no focal neurological deficits   Skin: Normal turgor, no bleeding, no bruising     Palliative Performance Scale:  ___60%  Ambulation reduced; Significant disease; Can't do hobbies/housework; intake normal or reduced; occasional assist; LOC full/confusion  __x_50%  Mainly sit/lie; Extensive disease; Can't do any work; Considerable assist; intake normal or reduced; LOC full/confusion  ___40%  Mainly in bed; Extensive disease; Mainly assist; intake normal or reduced; LOC full/confusion   ___30%  Bed Bound; Extensive disease; Total care; intake reduced; LOCfull/confusion  ___20%  Bed Bound; Extensive disease; Total care; intake minimal; Drowsy/coma  ___10%  Bed Bound; Extensive disease; Total care; Mouth care only; Drowsy/coma  ___0       Death        Thank you for allowing Palliative Care to participate in the care of Mr. Virgen Stringer . This note has been dictated by dragon, typing errors may be a possibility. The total time I spent in seeing the patient, discussing goals of care, advanced directives, code status and other major issues was more than 60 minutes      Electronically signed by   MARY Beasley - CNP  Palliative Care Team  on 8/15/2023 at 11:31 AM    Palliative Care can be reached via perfect serve.

## 2023-08-15 NOTE — CONSULTS
_                         Today's Date: 8/14/2023  Patient Name: Viktoriya Schrader  Date of admission: 8/14/2023  2:41 AM  Patient's age: 77 y.o., 1955  Admission Dx: Elevated troponin [R77.8]  COPD exacerbation ([address]) [J44.1]  Acute ischemic stroke ([address]) [I63.9]  Subacute liver failure without hepatic coma [K72.00]  Cerebrovascular accident (CVA), unspecified mechanism ([address]) [I63.9]      Requesting Physician: Glenroy Ricci MD    CHIEF COMPLAINT: Altered mental status. Left upper extremity weakness. Right parietal infarct. Consult for metastic colon cancer. History Obtained From:  patient, electronic medical record    HISTORY OF PRESENT ILLNESS:      The patient is a 77 y.o.  male who is admitted to the hospital for further management of altered mental status with new right parietal infarct. Patient with history of metastatic colon cancer, hypertension, diabetes type 2, hepatitis C with cirrhosis, COPD on 4 L nasal cannula presents with altered mental status and weakness in the left upper extremity with shaking that appears to now resolved. Patient found to have new right parietal infarct. Patient was just discharged from the hospital few days ago. He was recently diagnosed with metastic colon cancer. Past Medical History:   has a past medical history of CHF (congestive heart failure) ([address]), COPD (chronic obstructive pulmonary disease) ([address]), Diabetes mellitus ([address]), Erectile dysfunction due to arterial insufficiency, Hypertension, Microalbuminuria due to type 2 diabetes mellitus ([address]), and Overweight (BMI 25.0-29.9). Past Surgical History:   has a past surgical history that includes Appendectomy; Total ankle arthroplasty; Esophagogastroduodenoscopy (07/26/2023); Upper gastrointestinal endoscopy (N/A, 7/26/2023); Colonoscopy (N/A, 7/26/2023); and Colonoscopy (7/26/2023). Family History: family history is not on file.   Social soft  tissues. Impression: 1. There is a new area of hypoattenuation and loss of gray-white matter  differentiation involving the right parietal lobe concerning for acute versus  subacute ischemia/infarction. 2.  Redemonstrated areas of encephalomalacia in the right occipital lobe and  left cerebellum. Discussed with Dr. Maria Lofton by Dr. Mendy Bruce at 4:50 am on 8/14/2023            IMPRESSION:    Primary Problem  Acute ischemic stroke [facility])    Active Hospital Problems    Diagnosis Date Noted    Acute ischemic stroke ([address]) [I63.9] 08/14/2023    Subacute liver failure without hepatic coma [K72.00] 08/14/2023    Combined systolic and diastolic congestive heart failure ([address]) [I50.40] 05/03/2018    Chronic obstructive pulmonary disease ([address]) [J44.9] 12/23/2016    Hypertension goal BP (blood pressure) < 140/80 [I10] 06/05/2015    Hyperlipidemia with target LDL less than 70 [E78.5] 06/05/2015    Controlled type 2 diabetes mellitus without complication, without long-term current use of insulin ([address]) [E11.9] 06/05/2015   Advanced colon cancer  Liver and lung metastasis  Ascites possibly malignant. RECOMMENDATIONS:  Records and labs and images were reviewed and discussed with the patient. Patient with metastic colon cancer with liver and lung metastasis. I discussed with the patient further management for stage IV colon cancer. He understands that treatment is palliative and not curative. Further care for colorectal cancer will be as outpatient shortly after discharge. In the interim we will monitor labs and will interfere as needed. Patient's questions were answered to the best of his satisfaction and he verbalized full understanding and agreement. Thank you for allowing us to participate in the care of this pleasant patient.           Juli Denver, MD, MD [facility]                            This note is created with the assistance of a speech recognition program.  While intending to generate a document that actually reflects the content of the visit, the document can still have some errors including those of syntax and sound a like substitutions which may escape proof reading. It such instances, actual meaning can be extrapolated by contextual diversion.

## 2023-08-16 PROBLEM — N18.31 STAGE 3A CHRONIC KIDNEY DISEASE (HCC): Status: ACTIVE | Noted: 2023-08-16

## 2023-08-16 PROBLEM — E53.8 LOW FOLATE: Status: ACTIVE | Noted: 2023-08-16

## 2023-08-16 LAB
ABO/RH: NORMAL
ANION GAP SERPL CALCULATED.3IONS-SCNC: 12 MMOL/L (ref 9–17)
ANTIBODY SCREEN: NEGATIVE
ARM BAND NUMBER: NORMAL
BASOPHILS # BLD: 0 K/UL (ref 0–0.2)
BASOPHILS NFR BLD: 0 % (ref 0–2)
BLOOD BANK BLOOD PRODUCT EXPIRATION DATE: NORMAL
BLOOD BANK DISPENSE STATUS: NORMAL
BLOOD BANK ISBT PRODUCT BLOOD TYPE: 5100
BLOOD BANK PRODUCT CODE: NORMAL
BLOOD BANK SAMPLE EXPIRATION: NORMAL
BLOOD BANK UNIT TYPE AND RH: NORMAL
BPU ID: NORMAL
BUN SERPL-MCNC: 35 MG/DL (ref 8–23)
CALCIUM SERPL-MCNC: 7.3 MG/DL (ref 8.6–10.4)
CHLORIDE SERPL-SCNC: 106 MMOL/L (ref 98–107)
CO2 SERPL-SCNC: 16 MMOL/L (ref 20–31)
COMPONENT: NORMAL
CREAT SERPL-MCNC: 1.7 MG/DL (ref 0.7–1.2)
CROSSMATCH RESULT: NORMAL
DATE, STOOL #1: ABNORMAL
EOSINOPHIL # BLD: 0 K/UL (ref 0–0.4)
EOSINOPHILS RELATIVE PERCENT: 0 % (ref 1–4)
ERYTHROCYTE [DISTWIDTH] IN BLOOD BY AUTOMATED COUNT: 17.2 % (ref 11.8–14.4)
GFR SERPL CREATININE-BSD FRML MDRD: 43 ML/MIN/1.73M2
GLUCOSE BLD-MCNC: 116 MG/DL (ref 75–110)
GLUCOSE BLD-MCNC: 137 MG/DL (ref 75–110)
GLUCOSE BLD-MCNC: 148 MG/DL (ref 75–110)
GLUCOSE BLD-MCNC: 161 MG/DL (ref 75–110)
GLUCOSE SERPL-MCNC: 125 MG/DL (ref 70–99)
HCT VFR BLD AUTO: 31.3 % (ref 40.7–50.3)
HEMOCCULT SP1 STL QL: POSITIVE
HGB BLD-MCNC: 9.8 G/DL (ref 13–17)
IMM GRANULOCYTES # BLD AUTO: 0 K/UL (ref 0–0.3)
IMM GRANULOCYTES NFR BLD: 0 %
LYMPHOCYTES NFR BLD: 0.31 K/UL (ref 1–4.8)
LYMPHOCYTES RELATIVE PERCENT: 4 % (ref 24–44)
MCH RBC QN AUTO: 30 PG (ref 25.2–33.5)
MCHC RBC AUTO-ENTMCNC: 31.3 G/DL (ref 28.4–34.8)
MCV RBC AUTO: 95.7 FL (ref 82.6–102.9)
MONOCYTES NFR BLD: 0.31 K/UL (ref 0.1–0.8)
MONOCYTES NFR BLD: 4 % (ref 1–7)
MORPHOLOGY: ABNORMAL
NEUTROPHILS NFR BLD: 92 % (ref 36–66)
NEUTS SEG NFR BLD: 7.08 K/UL (ref 1.8–7.7)
NRBC BLD-RTO: 0 PER 100 WBC
PLATELET # BLD AUTO: ABNORMAL K/UL (ref 138–453)
PLATELET, FLUORESCENCE: 123 K/UL (ref 138–453)
PLATELETS.RETICULATED NFR BLD AUTO: 4.4 % (ref 1.1–10.3)
POTASSIUM SERPL-SCNC: 5.1 MMOL/L (ref 3.7–5.3)
RBC # BLD AUTO: 3.27 M/UL (ref 4.21–5.77)
SODIUM SERPL-SCNC: 134 MMOL/L (ref 135–144)
TIME, STOOL #1: 1345
TRANSFUSION STATUS: NORMAL
UNIT DIVISION: 0
UNIT ISSUE DATE/TIME: NORMAL
WBC OTHER # BLD: 7.7 K/UL (ref 3.5–11.3)

## 2023-08-16 PROCEDURE — 80048 BASIC METABOLIC PNL TOTAL CA: CPT

## 2023-08-16 PROCEDURE — 99232 SBSQ HOSP IP/OBS MODERATE 35: CPT | Performed by: PSYCHIATRY & NEUROLOGY

## 2023-08-16 PROCEDURE — 6370000000 HC RX 637 (ALT 250 FOR IP)

## 2023-08-16 PROCEDURE — 51798 US URINE CAPACITY MEASURE: CPT

## 2023-08-16 PROCEDURE — 85025 COMPLETE CBC W/AUTO DIFF WBC: CPT

## 2023-08-16 PROCEDURE — 97166 OT EVAL MOD COMPLEX 45 MIN: CPT

## 2023-08-16 PROCEDURE — 99233 SBSQ HOSP IP/OBS HIGH 50: CPT | Performed by: INTERNAL MEDICINE

## 2023-08-16 PROCEDURE — 82947 ASSAY GLUCOSE BLOOD QUANT: CPT

## 2023-08-16 PROCEDURE — 6360000002 HC RX W HCPCS

## 2023-08-16 PROCEDURE — 94761 N-INVAS EAR/PLS OXIMETRY MLT: CPT

## 2023-08-16 PROCEDURE — 97530 THERAPEUTIC ACTIVITIES: CPT

## 2023-08-16 PROCEDURE — 36415 COLL VENOUS BLD VENIPUNCTURE: CPT

## 2023-08-16 PROCEDURE — 99232 SBSQ HOSP IP/OBS MODERATE 35: CPT | Performed by: INTERNAL MEDICINE

## 2023-08-16 PROCEDURE — 94640 AIRWAY INHALATION TREATMENT: CPT

## 2023-08-16 PROCEDURE — 97535 SELF CARE MNGMENT TRAINING: CPT

## 2023-08-16 PROCEDURE — APPSS30 APP SPLIT SHARED TIME 16-30 MINUTES: Performed by: NURSE PRACTITIONER

## 2023-08-16 PROCEDURE — 6370000000 HC RX 637 (ALT 250 FOR IP): Performed by: INTERNAL MEDICINE

## 2023-08-16 PROCEDURE — 2060000000 HC ICU INTERMEDIATE R&B

## 2023-08-16 PROCEDURE — 2580000003 HC RX 258

## 2023-08-16 PROCEDURE — 51701 INSERT BLADDER CATHETER: CPT

## 2023-08-16 PROCEDURE — 85055 RETICULATED PLATELET ASSAY: CPT

## 2023-08-16 PROCEDURE — 2700000000 HC OXYGEN THERAPY PER DAY

## 2023-08-16 RX ORDER — FOLIC ACID 1 MG/1
1 TABLET ORAL DAILY
Status: DISCONTINUED | OUTPATIENT
Start: 2023-08-16 | End: 2023-08-17 | Stop reason: HOSPADM

## 2023-08-16 RX ORDER — MIRTAZAPINE 15 MG/1
15 TABLET, FILM COATED ORAL NIGHTLY
Status: DISCONTINUED | OUTPATIENT
Start: 2023-08-16 | End: 2023-08-17 | Stop reason: HOSPADM

## 2023-08-16 RX ORDER — GABAPENTIN 100 MG/1
100 CAPSULE ORAL 3 TIMES DAILY
Status: DISCONTINUED | OUTPATIENT
Start: 2023-08-16 | End: 2023-08-17 | Stop reason: HOSPADM

## 2023-08-16 RX ORDER — OXYCODONE HYDROCHLORIDE 5 MG/1
5 TABLET ORAL ONCE
Status: DISCONTINUED | OUTPATIENT
Start: 2023-08-16 | End: 2023-08-17 | Stop reason: HOSPADM

## 2023-08-16 RX ORDER — LIDOCAINE HYDROCHLORIDE 20 MG/ML
JELLY TOPICAL PRN
Status: DISCONTINUED | OUTPATIENT
Start: 2023-08-16 | End: 2023-08-17 | Stop reason: HOSPADM

## 2023-08-16 RX ADMIN — CLOPIDOGREL BISULFATE 75 MG: 75 TABLET ORAL at 08:42

## 2023-08-16 RX ADMIN — HEPARIN SODIUM 5000 UNITS: 5000 INJECTION INTRAVENOUS; SUBCUTANEOUS at 22:12

## 2023-08-16 RX ADMIN — HEPARIN SODIUM 5000 UNITS: 5000 INJECTION INTRAVENOUS; SUBCUTANEOUS at 05:43

## 2023-08-16 RX ADMIN — LIDOCAINE HYDROCHLORIDE: 20 JELLY TOPICAL at 20:02

## 2023-08-16 RX ADMIN — SODIUM CHLORIDE, PRESERVATIVE FREE 10 ML: 5 INJECTION INTRAVENOUS at 20:07

## 2023-08-16 RX ADMIN — HEPARIN SODIUM 5000 UNITS: 5000 INJECTION INTRAVENOUS; SUBCUTANEOUS at 14:49

## 2023-08-16 RX ADMIN — ACETAMINOPHEN 650 MG: 325 TABLET ORAL at 12:41

## 2023-08-16 RX ADMIN — ASPIRIN 81 MG 81 MG: 81 TABLET ORAL at 08:42

## 2023-08-16 RX ADMIN — BUDESONIDE AND FORMOTEROL FUMARATE DIHYDRATE 2 PUFF: 80; 4.5 AEROSOL RESPIRATORY (INHALATION) at 08:01

## 2023-08-16 RX ADMIN — FOLIC ACID 1 MG: 1 TABLET ORAL at 15:16

## 2023-08-16 RX ADMIN — PANTOPRAZOLE SODIUM 40 MG: 40 TABLET, DELAYED RELEASE ORAL at 08:42

## 2023-08-16 RX ADMIN — ATORVASTATIN CALCIUM 40 MG: 40 TABLET, FILM COATED ORAL at 21:02

## 2023-08-16 RX ADMIN — MIRTAZAPINE 15 MG: 15 TABLET, FILM COATED ORAL at 21:02

## 2023-08-16 RX ADMIN — BUDESONIDE AND FORMOTEROL FUMARATE DIHYDRATE 2 PUFF: 80; 4.5 AEROSOL RESPIRATORY (INHALATION) at 22:08

## 2023-08-16 RX ADMIN — GABAPENTIN 100 MG: 100 CAPSULE ORAL at 21:02

## 2023-08-16 RX ADMIN — GABAPENTIN 100 MG: 100 CAPSULE ORAL at 15:16

## 2023-08-16 ASSESSMENT — PAIN DESCRIPTION - DESCRIPTORS: DESCRIPTORS: THROBBING

## 2023-08-16 ASSESSMENT — PAIN DESCRIPTION - LOCATION: LOCATION: BACK;NECK

## 2023-08-16 ASSESSMENT — PAIN - FUNCTIONAL ASSESSMENT: PAIN_FUNCTIONAL_ASSESSMENT: PREVENTS OR INTERFERES SOME ACTIVE ACTIVITIES AND ADLS

## 2023-08-16 ASSESSMENT — PAIN SCALES - GENERAL
PAINLEVEL_OUTOF10: 3
PAINLEVEL_OUTOF10: 0

## 2023-08-16 ASSESSMENT — PAIN DESCRIPTION - ORIENTATION: ORIENTATION: POSTERIOR

## 2023-08-16 NOTE — CARE COORDINATION
Transitional planning. Informed by pt's RN that pt has DC orders. Left HIPAA compliant message for Renaldo Banda with [facility], requesting CB, they will need to start pre-cert if they have not done so yet. [address], informed her of need for PT note. She will contact pt's PT and let them know. [address] Reji Hawkins with [facility] called, she will start pre-cert as soon as PT note is placed. Informed her that pt is dc'd. Neurology, Palliative Care, Cardiology & Hem/Onc following pt.

## 2023-08-16 NOTE — PROGRESS NOTES
Neurology Nurse Practitioner Progress Note      INTERVAL HISTORY: This is a 77 y.o.  male admitted 8/14/2023 for altered mentation and left arm shaking. This is a follow-up neurology progress note. The patient was examined and the chart was reviewed. Discussed with the pt & RN. There were no acute events overnight. No new motor, sensory, visual or bulbar symptoms. Pt was A&Ox3. HPI: Mary Saldana is a 77 y.o. male with H/O metastatic colon CA, HTN, DM, CHF, CKD, COPD, who was admitted 8/14/2023 for altered mentation & left arm shaking. As per medical records family noted that patient had some shaking of left upper extremity while he was reclining in his chair. They also had some concern for staring episodes. However patient reported that he remembered the entire episode including the arm shaking; he denied being confused. He denied any prior history of seizures. Patient complained of chest pain, shortness of breath, lightheadedness during the episode. Family called the EMS who found the patient alert and oriented x4. Patient was brought to [facility] ED for evaluation. Stroke alert was called; neurology on board for continuation of care.      oxyCODONE  5 mg Oral Once    folic acid  1 mg Oral Daily    gabapentin  100 mg Oral TID    mirtazapine  15 mg Oral Nightly    aspirin  81 mg Oral Daily    clopidogrel  75 mg Oral Daily    sodium chloride flush  5-40 mL IntraVENous 2 times per day    heparin (porcine)  5,000 Units SubCUTAneous 3 times per day    atorvastatin  40 mg Oral Nightly    budesonide-formoterol  2 puff Inhalation BID RT    insulin lispro  0-4 Units SubCUTAneous TID WC    insulin lispro  0-4 Units SubCUTAneous Nightly    pantoprazole  40 mg Oral QAM AC       Past Medical History:   Diagnosis Date    CHF (congestive heart failure) (HCC)     COPD (chronic obstructive pulmonary disease) ([address])     Diabetes mellitus ([address])     Erectile dysfunction due to arterial insufficiency 12/07/2015    Hypertension CHOLHDLRATIO 2.9 09/04/2018         DIAGNOSTIC DATA:  CT HEAD (8/14/2023): New area of hypoattenuation & loss of gray-white matter differentiation involving R parietal lobe concerning for acute vs subacute ischemia/infarction. Re-demonstrated areas of encephalomalacia in R occipital lobe & L cerebellum      CTA HEAD & NECK (8/14/2023): In the areas of infarction in the lateral portion of R parietal lobe & R medial occipitoparietal area, the small branches of the cerebral arteries are not visualized      MRA HEAD & NECK (8/14/2023): No significant LVO      MRI BRAIN (8/14/2023): Evolving subacute cortical infarct throughout R parietal lobe. There are few scattered punctate areas of mild restricted diffusion throughout b/l frontal deep white matter suggestive of subacute infarcts, likely reflecting embolic phenomenon. Additional tiny subacute appearing cortical infarct within R cerebellar hemisphere      ZOLTAN (8/15/2023): EF 50-55%. Mild MR. Negative bubble study. No thrombus noted      EEG (8/15/2023): This EEG was abnormal. Disorganized and slow background suggested mild encephalopathy of non specific etiology                          IMPRESSION: 77 y.o.  male admitted with  AMS with LUE shaking & staring spells. EEG - mild encephalopathy. Pt was A&Ox3    MRI brain - subacute infarcts throughout R parietal lobe; punctate infarcts throughout b/l frontal lobes & punctate subacute infarct R cerebellar hemisphere; MRA head/neck - negative; ZOLTAN - negative. Pt s/p loop recorder placement     Folate 3.9; on supplement    Colon CA with liver and lung metastasis    Comorbid conditions - HTN, DM, CHF, CKD, COPD, MRSA           PLAN:  Continue ASA 81 mg QD + Plavix 75 mg QD x 3 weeks; continue Lipitor 40 mg HS    Continue PT/OT    Will follow    Please note that this note was generated using a voice recognition dictation software.  Although every effort was made to ensure the accuracy of this automated transcription, some

## 2023-08-16 NOTE — PROGRESS NOTES
[address]  Progress Note    Date:   8/16/2023  Patient name:  Viktoriya Schulte  Date of admission:  8/14/2023  2:41 AM  MRN:   [id number]  YOB: 1955    SUBJECTIVE/Last 24 hours update:     -The patient was seen and examined at bedside.   -Patient clinically stable.    -No acute events reported overnight.    -Patient has no new complaints.    -Patient denies any upper or lower GI bleed.    -Labs, chart, and VS reviewed. -Venous duplex of LE was normal.   -ZOLTAN results are normal and loop recorder placed by cardiology. Notes from nursing staff and Consults had been reviewed, and the overnight progress had been checked with the nursing staff as well. HPI:        The patient is a 77 y.o.  male with hx of recently diagnosed Colon CA/Liver CA with Metastasis, Essential HTN, DM II,  COPD on 4 L NC, Dilated Cardiomyopathy, Hepatitis C with Cirrhosis presented the ED with Stroke like symptoms including LUE shakiness/weakness and reported AMS/staring episodes by family. At ER: Pt was vitally stable, Code Stroke called, CT showed Acute infract involving the Rt parietal lobe, pt was loaded with Aspirin and Plavix. Other Labs showed trendng Troponin 98, 132, 145 ( baseline 100s), EKG unremarkable Cardiology consulted who recs no additional cardiac workup,  , AST 69, Pro-BNP 3,820 ( baseline 1500s), BUN 34, Cr 2, GFR 35, Lactic acid 5.1,2.1, CXR, showed small pleural effusion on the Rt Post. Lobe. The patient is admitted on 8/14 is to the hospital for Further mx of Acute stroke       REVIEW OF SYSTEMS:     Review of Systems   Constitutional:  Negative for activity change, chills and fever. HENT:  Positive for congestion. Respiratory:  Positive for cough and shortness of breath. Negative for wheezing. Cardiovascular:  Negative for chest pain and palpitations.    Gastrointestinal:  Negative for abdominal pain, diarrhea, mg  40 mg Oral Nightly Lyn Aguirre MD   40 mg at 08/15/23 2021    budesonide-formoterol (SYMBICORT) 80-4.5 MCG/ACT inhaler 2 puff  2 puff Inhalation BID RT Nohemy Bell MD   2 puff at 08/16/23 0801    ipratropium 0.5 mg-albuterol 2.5 mg (DUONEB) nebulizer solution 1 Dose  1 Dose Nebulization Q4H PRN Nohemy Bell MD        glucose chewable tablet 16 g  4 tablet Oral PRN Nohemy Bell MD        dextrose bolus 10% 125 mL  125 mL IntraVENous PRN Nohemy Bell MD        Or    dextrose bolus 10% 250 mL  250 mL IntraVENous PRN Nohemy Bell MD        glucagon (rDNA) injection 1 mg  1 mg SubCUTAneous PRN Nohemy Bell MD        dextrose 10 % infusion   IntraVENous Continuous PRN Nohemy Bell MD        insulin lispro (HUMALOG) injection vial 0-4 Units  0-4 Units SubCUTAneous TID WC Nohemy Bell MD        insulin lispro (HUMALOG) injection vial 0-4 Units  0-4 Units SubCUTAneous Nightly Nohemy Bell MD        pantoprazole (PROTONIX) tablet 40 mg  40 mg Oral QAM AC Nohemy Bell MD   40 mg at 08/16/23 6368    hydrALAZINE (APRESOLINE) injection 10 mg  10 mg IntraVENous Q6H PRN Nohemy Bell MD           ASSESSMENT:     Principal Problem:    Acute ischemic stroke ([address])  Active Problems:    Hypertension goal BP (blood pressure) < 140/80    Hyperlipidemia with target LDL less than 70    Controlled type 2 diabetes mellitus without complication, without long-term current use of insulin (HCC)    Chronic obstructive pulmonary disease (HCC)    Combined systolic and diastolic congestive heart failure ([address])    Malignant neoplasm of colon ([address])    Subacute liver failure without hepatic coma    Metastasis to liver ([address])    DNR (do not resuscitate) discussion    ACP (advance care planning)    Type 2 diabetes mellitus without complication ([address])    Cerebrovascular accident (CVA) ([address])  Resolved Problems:    * No resolved hospital problems.  *      PLAN:     Patient

## 2023-08-16 NOTE — PROGRESS NOTES
Occupational Therapy  Facility/Department: [facility] CAR 3- MICU  Occupational Therapy Initial Assessment    Name: Kory Melendez  : 1955  MRN: [id number]  Date of Service: 2023    Chief Complaint   Patient presents with    Altered Mental Status       Discharge Recommendations:  Patient would benefit from continued therapy after discharge  OT Equipment Recommendations  Equipment Needed: Yes  Mobility Devices: ADL Assistive Devices  ADL Assistive Devices: Hand-held Shower;Sock-Aid Hard;Shower Chair with back; Reacher;Long-handled Sponge       Patient Diagnosis(es): The primary encounter diagnosis was Acute ischemic stroke ([address]). Diagnoses of COPD exacerbation ([address]), Subacute liver failure without hepatic coma, Elevated troponin, and Cerebrovascular accident (CVA), unspecified mechanism ([address]) were also pertinent to this visit. Past Medical History:  has a past medical history of CHF (congestive heart failure) ([address]), COPD (chronic obstructive pulmonary disease) ([address]), Diabetes mellitus ([address]), Erectile dysfunction due to arterial insufficiency, Hypertension, Microalbuminuria due to type 2 diabetes mellitus ([address]), and Overweight (BMI 25.0-29.9). Past Surgical History:  has a past surgical history that includes Appendectomy; Total ankle arthroplasty; Esophagogastroduodenoscopy (2023); Upper gastrointestinal endoscopy (N/A, 2023); Colonoscopy (N/A, 2023); and Colonoscopy (2023). Assessment   Performance deficits / Impairments: Decreased functional mobility ; Decreased endurance;Decreased ADL status; Decreased balance;Decreased strength;Decreased safe awareness;Decreased high-level IADLs;Decreased cognition  Assessment: Pt supine upon arrival and agreeable to OT. Pt reports 10/10 pain this date and nursing notified, however pt does not report increase pain and/or display pain during meaningful activity during session. Pt completes bed mobility MOD A for trunk and BLE progression.  Pt completes

## 2023-08-16 NOTE — PROGRESS NOTES
_                         Today's Date: 8/15/2023  Patient Name: Luan Daly  Date of admission: 8/14/2023  2:41 AM  Patient's age: 77 y.o., 1955  Admission Dx: Elevated troponin [R77.8]  COPD exacerbation ([address]) [J44.1]  Acute ischemic stroke ([address]) [I63.9]  Subacute liver failure without hepatic coma [K72.00]  Cerebrovascular accident (CVA), unspecified mechanism ([address]) [I63.9]      Requesting Physician: Jeremiah Magaña MD    CHIEF COMPLAINT: Altered mental status. Left upper extremity weakness. Right parietal infarct. Consult for metastic colon cancer. SUBJECTIVE:  . Patient was seen and examined. Clinically stable. No events overnight. No abdominal pain. No GI bleeding. BRIEF CASE HISTORY:    The patient is a 77 y.o.  male who is admitted to the hospital for further management of altered mental status with new right parietal infarct. Patient with history of metastatic colon cancer, hypertension, diabetes type 2, hepatitis C with cirrhosis, COPD on 4 L nasal cannula presents with altered mental status and weakness in the left upper extremity with shaking that appears to now resolved. Patient found to have new right parietal infarct. Patient was just discharged from the hospital few days ago. He was recently diagnosed with metastic colon cancer. Past Medical History:   has a past medical history of CHF (congestive heart failure) ([address]), COPD (chronic obstructive pulmonary disease) ([address]), Diabetes mellitus ([address]), Erectile dysfunction due to arterial insufficiency, Hypertension, Microalbuminuria due to type 2 diabetes mellitus ([address]), and Overweight (BMI 25.0-29.9). Past Surgical History:   has a past surgical history that includes Appendectomy; Total ankle arthroplasty; Esophagogastroduodenoscopy (07/26/2023); Upper gastrointestinal endoscopy (N/A, 7/26/2023); Colonoscopy (N/A, 7/26/2023); and Colonoscopy (7/26/2023). 08/14/23  0422 08/15/23  0344 08/15/23  0822     --  135  --    K 5.3  --  5.7* 5.3   CO2 18*  --  20  --    BUN 34*  --  35*  --    CREATININE 2.0* 1.8* 1.8*  --    LABGLOM 35*  --  40*  --    GLUCOSE 124*  --  132*  --      PT/INR:   Recent Labs     08/14/23  0259   PROTIME 14.0   INR 1.1     APTT:  Recent Labs     08/14/23 0259   APTT 30.2     LIVER PROFILE:  Recent Labs     08/14/23 0259   AST 69*   ALT 33   LABALBU 2.3*     Transesophageal echocardiogram (ZOLTAN) with contrast and 3D PRN  Table formatting from the original result was not included. Left Ventricle: Low normal left ventricular systolic function with a   visually estimated EF of 50 - 55%. Aortic Valve: Trileaflet valve. Mitral Valve: Mild regurgitation with multiple jets. Left Atrium: No left atrial appendage thrombus noted. Interatrial Septum: No interatrial shunt visualized with color Doppler. Grade 0 Absence of bubbles. Agitated saline study was negative with and   without provocation. Pericardium: Small (<1 cm) pericardial effusion present. [facility]    Procedure Note  LOOP RECORDER IMPLANT    Pranav Reinoso (71 y.o., male)  1955    8/15/2023    Procedure: Loop Recorder Implant    Operators:  Primary: Dr. Omar Kruse # Serial #   Recorder  [id number]     Sedation monitoring  Loop recorder Implant    Indication: CVA    Procedure  After the usual preparation of the left neck and chest, the patient was   draped in the usual sterile manner. Local anesthesia was infused below   the left clavicle from the midline laterally. An incision was made below   the left breast, lateral to midline. The incision was dilated. The Loop   recorder [address] was advanced using the standard   techniques, and positioned successfully with no bleeding or compliaction.     R wave 0.05    Impression / Device:  Successful Implantation of: [employer] Loop Recporder    Plan:  Telemetry MD                            [facility]                            This note is created with the assistance of a speech recognition program.  While intending to generate a document that actually reflects the content of the visit, the document can still have some errors including those of syntax and sound a like substitutions which may escape proof reading. It such instances, actual meaning can be extrapolated by contextual diversion.

## 2023-08-16 NOTE — PROGRESS NOTES
_                         Today's Date: 8/16/2023  Patient Name: Aniceto Castellano  Date of admission: 8/14/2023  2:41 AM  Patient's age: 77 y.o., 1955  Admission Dx: Elevated troponin [R77.8]  COPD exacerbation ([address]) [J44.1]  Acute ischemic stroke ([address]) [I63.9]  Subacute liver failure without hepatic coma [K72.00]  Cerebrovascular accident (CVA), unspecified mechanism ([address]) [I63.9]      Requesting Physician: Robert Noe MD    CHIEF COMPLAINT: Altered mental status. Left upper extremity weakness. Right parietal infarct. Consult for metastic colon cancer. SUBJECTIVE:  Patient examined at bedside today. Labs and charts reviewed. Vitals reviewed. Patient clinically stable. No acute events reported overnight. Patient has no new complaints. Patient denies any upper or lower GI bleed. Patient is s/p 1 PRBC transfusion 8/15/23-hemoglobin 7. Hemoglobin posttransfusion has been stable. Fecal occult blood test positive. BRIEF CASE HISTORY:    The patient is a 77 y.o.  male who is admitted to the hospital for further management of altered mental status with new right parietal infarct. Patient with history of metastatic colon cancer, hypertension, diabetes type 2, hepatitis C with cirrhosis, COPD on 4 L nasal cannula presents with altered mental status and weakness in the left upper extremity with shaking that appears to now resolved. Patient found to have new right parietal infarct. Patient was just discharged from the hospital few days ago. He was recently diagnosed with metastic colon cancer. Past Medical History:   has a past medical history of CHF (congestive heart failure) ([address]), COPD (chronic obstructive pulmonary disease) ([address]), Diabetes mellitus ([address]), Erectile dysfunction due to arterial insufficiency, Hypertension, Microalbuminuria due to type 2 diabetes mellitus ([address]), and Overweight (BMI 25.0-29.9).     Past No

## 2023-08-17 VITALS
BODY MASS INDEX: 22.98 KG/M2 | HEIGHT: 65 IN | RESPIRATION RATE: 23 BRPM | OXYGEN SATURATION: 97 % | HEART RATE: 98 BPM | SYSTOLIC BLOOD PRESSURE: 133 MMHG | DIASTOLIC BLOOD PRESSURE: 92 MMHG | WEIGHT: 137.9 LBS | TEMPERATURE: 98 F

## 2023-08-17 LAB
ANION GAP SERPL CALCULATED.3IONS-SCNC: 10 MMOL/L (ref 9–17)
BASOPHILS # BLD: 0 K/UL (ref 0–0.2)
BASOPHILS NFR BLD: 0 % (ref 0–2)
BUN SERPL-MCNC: 36 MG/DL (ref 8–23)
CALCIUM SERPL-MCNC: 7.5 MG/DL (ref 8.6–10.4)
CHLORIDE SERPL-SCNC: 104 MMOL/L (ref 98–107)
CO2 SERPL-SCNC: 19 MMOL/L (ref 20–31)
CREAT SERPL-MCNC: 1.6 MG/DL (ref 0.7–1.2)
EOSINOPHIL # BLD: 0 K/UL (ref 0–0.4)
EOSINOPHILS RELATIVE PERCENT: 0 % (ref 1–4)
ERYTHROCYTE [DISTWIDTH] IN BLOOD BY AUTOMATED COUNT: 17.1 % (ref 11.8–14.4)
GFR SERPL CREATININE-BSD FRML MDRD: 46 ML/MIN/1.73M2
GLUCOSE SERPL-MCNC: 100 MG/DL (ref 70–99)
HCT VFR BLD AUTO: 32.3 % (ref 40.7–50.3)
HGB BLD-MCNC: 10.3 G/DL (ref 13–17)
IMM GRANULOCYTES # BLD AUTO: 0 K/UL (ref 0–0.3)
IMM GRANULOCYTES NFR BLD: 0 %
LYMPHOCYTES NFR BLD: 0.49 K/UL (ref 1–4.8)
LYMPHOCYTES RELATIVE PERCENT: 6 % (ref 24–44)
MCH RBC QN AUTO: 29 PG (ref 25.2–33.5)
MCHC RBC AUTO-ENTMCNC: 31.9 G/DL (ref 28.4–34.8)
MCV RBC AUTO: 91 FL (ref 82.6–102.9)
MONOCYTES NFR BLD: 0.49 K/UL (ref 0.1–0.8)
MONOCYTES NFR BLD: 6 % (ref 1–7)
MORPHOLOGY: ABNORMAL
MORPHOLOGY: ABNORMAL
NEUTROPHILS NFR BLD: 88 % (ref 36–66)
NEUTS SEG NFR BLD: 7.12 K/UL (ref 1.8–7.7)
NRBC BLD-RTO: 0 PER 100 WBC
PLATELET # BLD AUTO: 106 K/UL (ref 138–453)
PMV BLD AUTO: 12.2 FL (ref 8.1–13.5)
POTASSIUM SERPL-SCNC: 4.7 MMOL/L (ref 3.7–5.3)
RBC # BLD AUTO: 3.55 M/UL (ref 4.21–5.77)
SODIUM SERPL-SCNC: 133 MMOL/L (ref 135–144)
WBC OTHER # BLD: 8.1 K/UL (ref 3.5–11.3)

## 2023-08-17 PROCEDURE — 80048 BASIC METABOLIC PNL TOTAL CA: CPT

## 2023-08-17 PROCEDURE — 85025 COMPLETE CBC W/AUTO DIFF WBC: CPT

## 2023-08-17 PROCEDURE — 99222 1ST HOSP IP/OBS MODERATE 55: CPT | Performed by: STUDENT IN AN ORGANIZED HEALTH CARE EDUCATION/TRAINING PROGRAM

## 2023-08-17 PROCEDURE — 99232 SBSQ HOSP IP/OBS MODERATE 35: CPT | Performed by: INTERNAL MEDICINE

## 2023-08-17 PROCEDURE — 6370000000 HC RX 637 (ALT 250 FOR IP)

## 2023-08-17 PROCEDURE — 2700000000 HC OXYGEN THERAPY PER DAY

## 2023-08-17 PROCEDURE — 6360000002 HC RX W HCPCS

## 2023-08-17 PROCEDURE — 97129 THER IVNTJ 1ST 15 MIN: CPT

## 2023-08-17 PROCEDURE — 97116 GAIT TRAINING THERAPY: CPT

## 2023-08-17 PROCEDURE — 97530 THERAPEUTIC ACTIVITIES: CPT

## 2023-08-17 PROCEDURE — 97162 PT EVAL MOD COMPLEX 30 MIN: CPT

## 2023-08-17 PROCEDURE — 6370000000 HC RX 637 (ALT 250 FOR IP): Performed by: INTERNAL MEDICINE

## 2023-08-17 PROCEDURE — 94640 AIRWAY INHALATION TREATMENT: CPT

## 2023-08-17 PROCEDURE — 36415 COLL VENOUS BLD VENIPUNCTURE: CPT

## 2023-08-17 RX ORDER — CLOPIDOGREL BISULFATE 75 MG/1
75 TABLET ORAL DAILY
Qty: 30 TABLET | Refills: 3 | Status: SHIPPED | OUTPATIENT
Start: 2023-08-18

## 2023-08-17 RX ORDER — FUROSEMIDE 20 MG/1
20 TABLET ORAL ONCE
Status: COMPLETED | OUTPATIENT
Start: 2023-08-17 | End: 2023-08-17

## 2023-08-17 RX ORDER — MIRTAZAPINE 15 MG/1
15 TABLET, FILM COATED ORAL NIGHTLY
Qty: 30 TABLET | Refills: 3 | Status: SHIPPED | OUTPATIENT
Start: 2023-08-17

## 2023-08-17 RX ORDER — ASPIRIN 81 MG/1
81 TABLET, CHEWABLE ORAL DAILY
Qty: 30 TABLET | Refills: 3 | Status: SHIPPED | OUTPATIENT
Start: 2023-08-18

## 2023-08-17 RX ORDER — CARVEDILOL 3.12 MG/1
3.12 TABLET ORAL 2 TIMES DAILY WITH MEALS
Status: DISCONTINUED | OUTPATIENT
Start: 2023-08-17 | End: 2023-08-17 | Stop reason: HOSPADM

## 2023-08-17 RX ADMIN — HEPARIN SODIUM 5000 UNITS: 5000 INJECTION INTRAVENOUS; SUBCUTANEOUS at 14:30

## 2023-08-17 RX ADMIN — GABAPENTIN 100 MG: 100 CAPSULE ORAL at 14:00

## 2023-08-17 RX ADMIN — FOLIC ACID 1 MG: 1 TABLET ORAL at 08:08

## 2023-08-17 RX ADMIN — HEPARIN SODIUM 5000 UNITS: 5000 INJECTION INTRAVENOUS; SUBCUTANEOUS at 06:12

## 2023-08-17 RX ADMIN — ASPIRIN 81 MG 81 MG: 81 TABLET ORAL at 08:08

## 2023-08-17 RX ADMIN — CARVEDILOL 3.12 MG: 3.12 TABLET, FILM COATED ORAL at 16:29

## 2023-08-17 RX ADMIN — BUDESONIDE AND FORMOTEROL FUMARATE DIHYDRATE 2 PUFF: 80; 4.5 AEROSOL RESPIRATORY (INHALATION) at 07:44

## 2023-08-17 RX ADMIN — FUROSEMIDE 20 MG: 20 TABLET ORAL at 14:00

## 2023-08-17 RX ADMIN — GABAPENTIN 100 MG: 100 CAPSULE ORAL at 08:08

## 2023-08-17 RX ADMIN — CLOPIDOGREL BISULFATE 75 MG: 75 TABLET ORAL at 08:09

## 2023-08-17 RX ADMIN — PANTOPRAZOLE SODIUM 40 MG: 40 TABLET, DELAYED RELEASE ORAL at 06:12

## 2023-08-17 ASSESSMENT — ENCOUNTER SYMPTOMS
VOMITING: 0
DIARRHEA: 0
NAUSEA: 1
ABDOMINAL PAIN: 0
ABDOMINAL DISTENTION: 0
SHORTNESS OF BREATH: 0
BACK PAIN: 0

## 2023-08-17 ASSESSMENT — PAIN SCALES - GENERAL
PAINLEVEL_OUTOF10: 0
PAINLEVEL_OUTOF10: 0

## 2023-08-17 NOTE — CONSULTS
106*     BMP:   Recent Labs     08/15/23  0344 08/15/23  0822 08/16/23  0354 08/17/23  0602     --  134* 133*   K 5.7* 5.3 5.1 4.7     --  106 104   CO2 20  --  16* 19*   BUN 35*  --  35* 36*   CREATININE 1.8*  --  1.7* 1.6*   GLUCOSE 132*  --  125* 100*      HbA1c:   Lab Results   Component Value Date    LABA1C 4.7 01/09/2023     BNP: No results for input(s): BNP in the last 72 hours. PT/INR: No results for input(s): PROTIME, INR in the last 72 hours. APTT: No results for input(s): APTT in the last 72 hours. CARDIAC ENZYMES: No results for input(s): CKMB, CKMBINDEX, TROPONINT in the last 72 hours. Invalid input(s): CKTOTAL;3  FASTING LIPID PANEL:  Lab Results   Component Value Date    CHOL 149 02/15/2022    HDL 58 02/15/2022    TRIG 101 02/15/2022     LIVER PROFILE: No results for input(s): AST, ALT, ALB, BILIDIR, BILITOT, ALKPHOS in the last 72 hours. Radiology:  XR CHEST (2 VW)    Result Date: 8/14/2023  EXAMINATION: TWO XRAY VIEWS OF THE CHEST 8/14/2023 2:34 am COMPARISON: Portable chest x-ray on 08/06/2023. HISTORY: ORDERING SYSTEM PROVIDED HISTORY: cardiac w/u TECHNOLOGIST PROVIDED HISTORY: cardiac w/u FINDINGS: There is mild cardiomegaly with left ventricular prominence. Pulmonary vascularity is not cephalized or congested. Right lung appears to be clear without any focal abnormality. No evidence of right-sided pleural effusion, pleural thickening or pneumothorax. As on the PA view. But on the lateral view there is finding suggestive of pleural effusion or pleural thickening in the right posterior CP gutter. At the base of left lung there are significant confluent opacities, indicating pleural effusion, and/or pleural thickening in the posterior CP gutter as on the lateral view. There are suspected mild atelectatic/fibrotic changes in the base of left lung. On the lateral view evidence of moderate pulmonary hyperinflation, suggesting COPD.  Moderate multilevel spondylosis in the NASCET criteria. In the proximal segment of right ICA, superior to carotid bulb, there are mild calcified plaques with subtle, less than 20% stenosis. In the upper portion of cervical segment of right ICA there are mild calcified plaques with minimal stenosis. In the proximal segment of left ICA there are minimal calcified plaques, without obvious stenosis. VERTEBRAL ARTERIES: No dissection, arterial injury, or significant stenosis. In the proximal V1 segment of the right vertebral artery there are calcified plaques with about 40% stenosis. At the origin of the left vertebral artery there are mild calcified plaques with minimal stenosis. In the intracranial V4 segments of bilateral vertebral arteries there are minimal calcified plaques without significant stenosis. SOFT TISSUES: Moderate centrilobular pulmonary emphysema in visualized upper portions of upper lobes of both lungs. Small pleural effusion versus pleural thickening at the posterior aspect of visualized lower portion of right pulmonary upper lobe. No cervical or superior mediastinal lymphadenopathy. The larynx and pharynx are unremarkable. No acute abnormality of the salivary and thyroid glands. BONES: No acute osseous abnormality. CTA HEAD: ANTERIOR CIRCULATION: In the area of the new acute infarction in the lateral portion of right parietal lobe, the branches of the right middle cerebral artery are not visualized. In the rest of the right MCA vascular territory no additional compromise is demonstrated. In the rest of the intracranial anterior circulation of bilateral internal carotid arteries there is no additional vascular compromise demonstrated. No evidence of aneurysm. VERTEBROBASILAR SYSTEM CIRCULATION: In the area of the old right medial occipitoparietal infarction small arterial branches are not visualized. In the rest of the vertebrobasilar arterial circulation, there is no obvious vascular compromise demonstrated.  No evidence of well aerated. BONES/SOFT TISSUES: The bone marrow signal intensity appears normal. The soft tissues demonstrate no acute abnormality. ANTERIOR CIRCULATION: No significant stenosis of the intracranial internal carotid, anterior cerebral, or middle cerebral arteries. POSTERIOR CIRCULATION: No significant stenosis of the vertebral, basilar, or posterior cerebral arteries. 1. Evolving subacute cortical infarct throughout the right parietal lobe. 2. No significant stenosis within the proximal major arterial vessels of the head. Transesophageal echocardiogram (ZOLTAN) with contrast and 3D PRN    Result Date: 8/15/2023  Table formatting from the original result was not included. Left Ventricle: Low normal left ventricular systolic function with a visually estimated EF of 50 - 55%. Aortic Valve: Trileaflet valve. Mitral Valve: Mild regurgitation with multiple jets. Left Atrium: No left atrial appendage thrombus noted. Interatrial Septum: No interatrial shunt visualized with color Doppler. Grade 0 Absence of bubbles. Agitated saline study was negative with and without provocation. Pericardium: Small (<1 cm) pericardial effusion present. [facility] Procedure Note LOOP RECORDER IMPLANT Marco Camacho (41 y.o., male) 1955 8/15/2023 Procedure: Loop Recorder Implant Operators: Primary: Dr. Shiv Floyd # Serial # Recorder  [id number] Sedation monitoring Loop recorder Implant Indication: CVA Procedure After the usual preparation of the left neck and chest, the patient was draped in the usual sterile manner. Local anesthesia was infused below the left clavicle from the midline laterally. An incision was made below the left breast, lateral to midline. The incision was dilated. The Loop recorder [address] was advanced using the standard techniques, and positioned successfully with no bleeding or compliaction.  R wave 0.05 Impression / Device: Successful Implantation of:

## 2023-08-17 NOTE — PROGRESS NOTES
_                         Today's Date: 8/17/2023  Patient Name: Caron Lind  Date of admission: 8/14/2023  2:41 AM  Patient's age: 77 y.o., 1955  Admission Dx: Elevated troponin [R77.8]  COPD exacerbation ([address]) [J44.1]  Acute ischemic stroke ([address]) [I63.9]  Subacute liver failure without hepatic coma [K72.00]  Cerebrovascular accident (CVA), unspecified mechanism ([address]) [I63.9]      Requesting Physician: Ronaldo Kdid MD    CHIEF COMPLAINT: Altered mental status. Left upper extremity weakness. Right parietal infarct. Consult for metastic colon cancer. SUBJECTIVE:  . Patient was seen and examined. Clinically stable. No events overnight. No abdominal pain. No GI bleeding. BRIEF CASE HISTORY:    The patient is a 77 y.o.  male who is admitted to the hospital for further management of altered mental status with new right parietal infarct. Patient with history of metastatic colon cancer, hypertension, diabetes type 2, hepatitis C with cirrhosis, COPD on 4 L nasal cannula presents with altered mental status and weakness in the left upper extremity with shaking that appears to now resolved. Patient found to have new right parietal infarct. Patient was just discharged from the hospital few days ago. He was recently diagnosed with metastic colon cancer. Past Medical History:   has a past medical history of CHF (congestive heart failure) ([address]), COPD (chronic obstructive pulmonary disease) ([address]), Diabetes mellitus ([address]), Erectile dysfunction due to arterial insufficiency, Hypertension, Microalbuminuria due to type 2 diabetes mellitus ([address]), and Overweight (BMI 25.0-29.9). Past Surgical History:   has a past surgical history that includes Appendectomy; Total ankle arthroplasty; Esophagogastroduodenoscopy (07/26/2023); Upper gastrointestinal endoscopy (N/A, 7/26/2023); Colonoscopy (N/A, 7/26/2023); and Colonoscopy (7/26/2023).

## 2023-08-17 NOTE — CARE COORDINATION
Transitional planning    Called [facility] and spoke to Daren [phone]. She relates pre cert was submitted last night. She will call when she hears anything. 0108 unable to talk to patient at this time. Patient involved with therapy at bedside. 1330 received VM that patient has auth to go to SNF    [phone] spoke to patient about discharge plan. He is in agreement to go to [facility]. CM told him insurance has approved. CM will arrange transportation. Patient requests CM let his son Naima Vera know what is going on.     [phone] called Julien Homans [phone] at [facility] and told her that CM received message. They can accept patient today. Fax [phone], report # [phone]. Need HENS    1405 called patient's son Naima Vera and updated him with plan. [facility] completed    1455 Updated patient that  [facility]    Mick Ferguson /Aliya [phone] left VM that  is at 5:30.    1503 called son Naima Vera and told him  is at 5:30 pm today to go to [facility] at [phone]. [address] to Julien Homans and she can pull completed HENS. CM will fax  [facility] when completed.      [phone] [facility] AVS/VALERY to [place] at [phone]

## 2023-08-17 NOTE — PROGRESS NOTES
[address] [facility]  Progress Note    Date:   8/17/2023  Patient name:  Lindsey Mena  Date of admission:  8/14/2023  2:41 AM  MRN:   [id number]  YOB: 1955    SUBJECTIVE/Last 24 hours update:     -The patient was seen and examined at bedside.   -Patient clinically stable.    -No acute events reported overnight.    -Patient has no new complaints.    -Patient denies any upper or lower GI bleed.    -Labs, chart, and VS reviewed. -Venous duplex of LE was normal.   -ZOLTAN results are normal and loop recorder placed by cardiology. Notes from nursing staff and Consults had been reviewed, and the overnight progress had been checked with the nursing staff as well. HPI:     The patient is a 77 y.o.  male with hx of recently diagnosed Colon CA/Liver CA with Metastasis, Essential HTN, DM II,  COPD on 4 L NC, Dilated Cardiomyopathy, Hepatitis C with Cirrhosis presented the ED with Stroke like symptoms including LUE shakiness/weakness and reported AMS/staring episodes by family, Stroke alert was called; neurology on board for continuation of care. At ER: Pt was vitally stable, Code Stroke called, CT showed Acute infract involving the Rt parietal lobe, pt was loaded with Aspirin and Plavix. Other Labs showed trendng Troponin 98, 132, 145 ( baseline 100s), EKG unremarkable Cardiology consulted who recs no additional cardiac workup,  , AST 69, Pro-BNP 3,820 ( baseline 1500s), BUN 34, Cr 2, GFR 35, Lactic acid 5.1,2.1, CXR, showed small pleural effusion on the Rt Post. Lobe. The patient is admitted on 8/14 is to the hospital for Further mx of Acute stroke. REVIEW OF SYSTEMS:      Review of Systems   Constitutional:  Positive for activity change, appetite change and fatigue. HENT: Negative. Cardiovascular: Negative. Negative for chest pain and palpitations. Gastrointestinal:  Positive for nausea.  Negative for abdominal

## 2023-08-17 NOTE — PROGRESS NOTES
Physical Therapy  Facility/Department: [facility] CAR 3- MICU  Physical Therapy Initial Assessment    Name: Frederick Figueroa  : 1955  MRN: [id number]  Date of Service: 2023  Chief Complaint   Patient presents with    Altered Mental Status         Discharge Recommendations:  Patient would benefit from continued therapy after discharge  This patient may benefit from a Physical Medicine and Rehab consult. PT Equipment Recommendations  Equipment Needed: No      Patient Diagnosis(es): The primary encounter diagnosis was Acute ischemic stroke ([address]). Diagnoses of COPD exacerbation ([address]), Subacute liver failure without hepatic coma, Elevated troponin, and Cerebrovascular accident (CVA), unspecified mechanism ([address]) were also pertinent to this visit. Past Medical History:  has a past medical history of CHF (congestive heart failure) ([address]), COPD (chronic obstructive pulmonary disease) ([address]), Diabetes mellitus ([address]), Erectile dysfunction due to arterial insufficiency, Hypertension, Microalbuminuria due to type 2 diabetes mellitus ([address]), and Overweight (BMI 25.0-29.9). Past Surgical History:  has a past surgical history that includes Appendectomy; Total ankle arthroplasty; Esophagogastroduodenoscopy (2023); Upper gastrointestinal endoscopy (N/A, 2023); Colonoscopy (N/A, 2023); and Colonoscopy (2023). Assessment   Body Structures, Functions, Activity Limitations Requiring Skilled Therapeutic Intervention: Decreased functional mobility ; Decreased body mechanics; Decreased safe awareness;Decreased endurance;Decreased strength;Decreased ADL status; Decreased posture;Decreased balance  Assessment: Pt requires CGA AMB 12ft w/ a RW; Min A w/ bed mobility; Jackie transfers. Pt has decreased aerobic capacity, reports SOB during functional mobility testing, decrease UE/LE functional strength and has fair- dynamic standing balance. Pt is a fall risk at this time.  Pt will require skilled PT services to address the

## 2023-08-17 NOTE — CONSULTS
Comprehensive Nutrition Assessment    Type and Reason for Visit:  Initial, Consult (Poor Intake/Appetite for 5 or more days)    Nutrition Recommendations/Plan:   Continue current diet. Encourage intakes as tolerated. Pt does NOT want ONS. Will continue to offer. Will monitor labs, weights, and plan of care. Malnutrition Assessment:  Malnutrition Status:  Severe malnutrition (08/17/23 1442)    Context:  Chronic Illness     Findings of the 6 clinical characteristics of malnutrition:  Energy Intake:  75% or less estimated energy requirements for 1 month or longer  Weight Loss:   (18.4% x over past 1 year)     Body Fat Loss:  Severe body fat loss Triceps   Muscle Mass Loss:   (Moderate muscle mass loss) Temples (temporalis), Clavicles (pectoralis & deltoids), Hand (interosseous)  Fluid Accumulation:  Mild (to Moderate) Extremities, Generalized   Strength:  Not Performed    Nutrition Assessment:    Consulted for poor intake/appetite x 5 or more days. Admitted with AMS and shakiness. PMH: CHF, COPD, DM, colorectal cancer with liver mets. Pt passed a BSSE for a regular diet with thin liquids on 8/14. At visit pt sleeping. RN reports pt ate most of his cereal for breakfast but did not eat much of anything else from his meal tray. Pt with poor PO intakes and a decreased appetite. Per chart review from previous admission, pt does not like oral supplements. Weight history reviewed - weight loss of 18.4% x over past year noted. Labs reviewed: Na 133 mmol/L, BUN 36 mg/dL, CR 1.6 mg/dL. Meds include: Folic Acid, Lasix, Remeron. Nutrition Related Findings:    Labs/Meds reviewed. Last BM 8/16. Decreased appetite/PO intakes. Wound Type: Pressure Injury, Stage II (to sacrum)       Current Nutrition Intake & Therapies:    Average Meal Intake: 1-25%, 26-50%  Average Supplements Intake: None Ordered  ADULT DIET;  Regular    Anthropometric Measures:  Height: 5' 5\" (165.1 cm)  Ideal Body Weight (IBW): 136 lbs (62

## 2023-08-17 NOTE — PLAN OF CARE
Problem: Discharge Planning  Goal: Discharge to home or other facility with appropriate resources  8/14/2023 2235 by Toshia Beach RN  Outcome: Progressing  8/14/2023 1230 by Jose Rainey RN  Outcome: Progressing     Problem: Safety - Adult  Goal: Free from fall injury  8/14/2023 2235 by Toshia Beach RN  Outcome: Progressing  8/14/2023 1230 by Jose Rainey RN  Outcome: Progressing     Problem: Pain  Goal: Verbalizes/displays adequate comfort level or baseline comfort level  8/14/2023 2235 by Toshia Beach RN  Outcome: Progressing  8/14/2023 1230 by Jose Rainey RN  Outcome: Progressing     Problem: Skin/Tissue Integrity  Goal: Absence of new skin breakdown  Description: 1. Monitor for areas of redness and/or skin breakdown  2. Assess vascular access sites hourly  3. Every 4-6 hours minimum:  Change oxygen saturation probe site  4. Every 4-6 hours:  If on nasal continuous positive airway pressure, respiratory therapy assess nares and determine need for appliance change or resting period. 8/14/2023 2235 by Toshia Beach RN  Outcome: Progressing  8/14/2023 1230 by Jose Rainey RN  Outcome: Progressing     Problem: Safety - Medical Restraint  Goal: Remains free of injury from restraints (Restraint for Interference with Medical Device)  Description: INTERVENTIONS:  1. Determine that other, less restrictive measures have been tried or would not be effective before applying the restraint  2. Evaluate the patient's condition at the time of restraint application  3. Inform patient/family regarding the reason for restraint  4.  Q2H: Monitor safety, psychosocial status, comfort, nutrition and hydration  Outcome: Progressing     Problem: Respiratory - Adult  Goal: Achieves optimal ventilation and oxygenation  8/14/2023 2235 by Toshia Baech RN  Outcome: Progressing  8/14/2023 2132 by Rach Croft RCP  Outcome: Progressing
Problem: Discharge Planning  Goal: Discharge to home or other facility with appropriate resources  8/17/2023 0757 by Traci Gomez RN  Outcome: Progressing       Problem: Safety - Adult  Goal: Free from fall injury  8/17/2023 0757 by Traci Gomez RN  Outcome: Progressing    Problem: Pain  Goal: Verbalizes/displays adequate comfort level or baseline comfort level  8/17/2023 0757 by Traci Gomez RN  Outcome: Progressing       Problem: Skin/Tissue Integrity  Goal: Absence of new skin breakdown  Description: 1. Monitor for areas of redness and/or skin breakdown  2. Assess vascular access sites hourly  3. Every 4-6 hours minimum:  Change oxygen saturation probe site  4. Every 4-6 hours:  If on nasal continuous positive airway pressure, respiratory therapy assess nares and determine need for appliance change or resting period.   8/17/2023 0757 by Traci Gomez RN          Problem: ABCDS Injury Assessment  Goal: Absence of physical injury  8/17/2023 0757 by Traci Gomez RN  Outcome: Progressing
Problem: Discharge Planning  Goal: Discharge to home or other facility with appropriate resources  Outcome: Not Progressing     Problem: Safety - Adult  Goal: Free from fall injury  Outcome: Not Progressing     Problem: Pain  Goal: Verbalizes/displays adequate comfort level or baseline comfort level  Outcome: Not Progressing     Problem: Skin/Tissue Integrity  Goal: Absence of new skin breakdown  Description: 1. Monitor for areas of redness and/or skin breakdown  2. Assess vascular access sites hourly  3. Every 4-6 hours minimum:  Change oxygen saturation probe site  4. Every 4-6 hours:  If on nasal continuous positive airway pressure, respiratory therapy assess nares and determine need for appliance change or resting period. Outcome: Not Progressing     Problem: Safety - Medical Restraint  Goal: Remains free of injury from restraints (Restraint for Interference with Medical Device)  Description: INTERVENTIONS:  1. Determine that other, less restrictive measures have been tried or would not be effective before applying the restraint  2. Evaluate the patient's condition at the time of restraint application  3. Inform patient/family regarding the reason for restraint  4.  Q2H: Monitor safety, psychosocial status, comfort, nutrition and hydration  Outcome: Not Progressing     Problem: Respiratory - Adult  Goal: Achieves optimal ventilation and oxygenation  8/16/2023 1804 by Chelle Troy RN  Outcome: Not Progressing  8/16/2023 1143 by Jhonny Haynes RCP  Outcome: Progressing     Problem: Chronic Conditions and Co-morbidities  Goal: Patient's chronic conditions and co-morbidity symptoms are monitored and maintained or improved  Outcome: Not Progressing     Problem: ABCDS Injury Assessment  Goal: Absence of physical injury  Outcome: Not Progressing
Problem: Discharge Planning  Goal: Discharge to home or other facility with appropriate resources  Outcome: Progressing     Problem: Safety - Adult  Goal: Free from fall injury  Outcome: Progressing     Problem: Pain  Goal: Verbalizes/displays adequate comfort level or baseline comfort level  Outcome: Progressing     Problem: Skin/Tissue Integrity  Goal: Absence of new skin breakdown  Description: 1. Monitor for areas of redness and/or skin breakdown  2. Assess vascular access sites hourly  3. Every 4-6 hours minimum:  Change oxygen saturation probe site  4. Every 4-6 hours:  If on nasal continuous positive airway pressure, respiratory therapy assess nares and determine need for appliance change or resting period.   Outcome: Progressing
Problem: Discharge Planning  Goal: Discharge to home or other facility with appropriate resources  Outcome: Progressing     Problem: Safety - Adult  Goal: Free from fall injury  Outcome: Progressing     Problem: Pain  Goal: Verbalizes/displays adequate comfort level or baseline comfort level  Outcome: Progressing     Problem: Skin/Tissue Integrity  Goal: Absence of new skin breakdown  Description: 1. Monitor for areas of redness and/or skin breakdown  2. Assess vascular access sites hourly  3. Every 4-6 hours minimum:  Change oxygen saturation probe site  4. Every 4-6 hours:  If on nasal continuous positive airway pressure, respiratory therapy assess nares and determine need for appliance change or resting period. Outcome: Progressing     Problem: Safety - Medical Restraint  Goal: Remains free of injury from restraints (Restraint for Interference with Medical Device)  Description: INTERVENTIONS:  1. Determine that other, less restrictive measures have been tried or would not be effective before applying the restraint  2. Evaluate the patient's condition at the time of restraint application  3. Inform patient/family regarding the reason for restraint  4.  Q2H: Monitor safety, psychosocial status, comfort, nutrition and hydration  Outcome: Progressing     Problem: Respiratory - Adult  Goal: Achieves optimal ventilation and oxygenation  8/15/2023 1244 by Aleksandar Ivey RN  Outcome: Progressing     Problem: Chronic Conditions and Co-morbidities  Goal: Patient's chronic conditions and co-morbidity symptoms are monitored and maintained or improved  Outcome: Progressing
Problem: Respiratory - Adult  Goal: Achieves optimal ventilation and oxygenation  8/15/2023 2040 by Edwin Nayak RCP  Outcome: Progressing
Problem: Respiratory - Adult  Goal: Achieves optimal ventilation and oxygenation  8/16/2023 2210 by Cheryle Hoffman, RCP  Outcome: Progressing
Problem: Respiratory - Adult  Goal: Achieves optimal ventilation and oxygenation  Outcome: Progressing
Problem: Respiratory - Adult  Goal: Achieves optimal ventilation and oxygenation  Outcome: Progressing
Problem: Skin/Tissue Integrity  Goal: Absence of new skin breakdown  Description: 1. Monitor for areas of redness and/or skin breakdown  2. Assess vascular access sites hourly  3. Every 4-6 hours minimum:  Change oxygen saturation probe site  4. Every 4-6 hours:  If on nasal continuous positive airway pressure, respiratory therapy assess nares and determine need for appliance change or resting period.   8/14/2023 2235 by Silverio Lindsey RN  Outcome: Progressing     Problem: Respiratory - Adult  Goal: Achieves optimal ventilation and oxygenation  8/15/2023 0819 by Annalee Toure RCP  Outcome: Progressing  Flowsheets (Taken 8/15/2023 0819)  Achieves optimal ventilation and oxygenation:   Assess for changes in respiratory status   Assess for changes in mentation and behavior   Position to facilitate oxygenation and minimize respiratory effort   Oxygen supplementation based on oxygen saturation or arterial blood gases   Encourage broncho-pulmonary hygiene including cough, deep breathe, incentive spirometry   Assess the need for suctioning and aspirate as needed   Assess and instruct to report shortness of breath or any respiratory difficulty   Respiratory therapy support as indicated  8/14/2023 2235 by Silverio Lindsey RN  Outcome: Progressing  8/14/2023 2132 by Priya Bhagat RCP  Outcome: Progressing
Pt needs to F/U with neurology within 4-6 weeks post-stroke    No further neurological testing is required at this time. Pt is neurologically stable for D/C    We will sign off. Please, call with any questions or concerns.  Thank you
Will plan for ZOLTAN and loop as per neuro request, timing to be determined. Keep pt npo for now.          Samira hull
Liberty Jones RN  Outcome: Progressing  8/16/2023 2122 by Maryuri Schuster RN  Outcome: Progressing  8/16/2023 1804 by Edy Loo RN  Outcome: Not Progressing     Problem: ABCDS Injury Assessment  Goal: Absence of physical injury  8/16/2023 2124 by Maryuri Schuster RN  Outcome: Progressing  8/16/2023 2122 by Maryuri Schuster RN  Outcome: Progressing  8/16/2023 1804 by Edy Loo RN  Outcome: Not Progressing

## 2023-08-21 ENCOUNTER — TELEPHONE (OUTPATIENT)
Dept: FAMILY MEDICINE CLINIC | Age: 68
End: 2023-08-21

## 2023-08-21 NOTE — TELEPHONE ENCOUNTER
Care Transitions Initial Follow Up Call    Outreach made within 2 business days of discharge: Yes    Patient: Heidi Joshua Patient : 1955   MRN: [id number]  Reason for Admission: There are no discharge diagnoses documented for the most recent discharge.   Discharge Date: 23       Spoke with: Patient is in a skilled nursing facility      Follow Up  Future Appointments   Date Time Provider [facility] [address]   2023  9:00 AM SCHEDULE, WAYNE ARGUELLES CARELINK WAYNE ROBLESE WAYNE Moncada

## 2023-08-22 ENCOUNTER — TELEPHONE (OUTPATIENT)
Dept: ONCOLOGY | Age: 68
End: 2023-08-22

## 2023-08-23 NOTE — DISCHARGE SUMMARY
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, without long-term current use of insulin (HCC)   Chronic obstructive pulmonary disease (HCC)   Combined systolic and diastolic congestive heart failure ([address])   Subacute liver failure without hepatic coma   Malignant neoplasm of colon ([address])   Metastasis to liver ([address])   DNR (do not resuscitate) discussion   ACP (advance care planning)   Type 2 diabetes mellitus without complication ([address])   Cerebrovascular accident (CVA) ([address])   Low folate   Stage 3a chronic kidney disease ([address])      Secondary Diagnoses:  does not have any pertinent problems on file. Admission Condition:  poor     Discharged Condition: fair    Hospital Course: The patient was admitted for the management of Acute stroke. Acute Ischemic Stroke  - CT showed Acute Rt parietal lobe infract   - Neurology consulted,  MRI brain showed Evolving subacute cortical infarct throughout the right parietal lobe  - Continue Aspirin and Plavix      Colon CA with Metastasis   - Remeron to stimulate appetite. Neuropathy 2/2 low folate  - Folate Replaced  -Ordered gabapentin   - Nutrition consulted      COPD  - On 4L NC ( baseline)  - Continue Breathing treatment        Today on day of discharge pt feels better with no further complaints. Vitals and Labs are at pts baseline. All consultants involved during this admission are agreeable to d/c.     Consults:  cardiology, heme-onc, palliative care     Significant Diagnostic/theraputic interventions: these medications      loperamide 2 MG capsule  Commonly known as: IMODIUM  Take 1 capsule by mouth 4 times daily as needed for Diarrhea  Ask about: Should I take this medication? Where to Get Your Medications        These medications were sent to [facility] , [facility],[place] [id number] [address]  [address] [place] [address]      Phone: [phone]   aspirin 81 MG chewable tablet  clopidogrel 75 MG tablet  mirtazapine 15 MG tablet         Send Copies to: Karen Gonzalez MD      Note that over 30 minutes was spent in preparing discharge papers, discussing discharge with patient and family, medication review, etc.      Bayron Schroeder MD  Family Medicine Resident  Family Medicine Inpatient Service  8/23/2023 5:44 PM          Please note that this chart was generated using voice recognition Dragon dictation software.   Although every effort was made to ensure the accuracy of this automated transcription, some errors in transcription may have occurred

## 2023-08-23 NOTE — PROGRESS NOTES
Physician Progress Note      PATIENT:               Leah Huber  CSN #:                  [id number]  :                       1955  ADMIT DATE:       2023 2:41 AM  DISCH DATE:        2023 6:30 PM  RESPONDING  PROVIDER #:        Yvan Franklin MD          QUERY TEXT:    Pt admitted with Ischemic stroke. Pt noted to use 4L O2 NC at home, also   noted to maintain 4L while here in the hospital. If possible, please document   in the progress notes and discharge summary if you are evaluating and/or   treating any of the following: The medical record reflects the following:  Risk Factors: COPD  Clinical Indicators: noted to use 4L O2 NC at home, also noted to maintain 4L   while here in the hospital  Treatment: Supplemental O2 PRN, frequent respiratory assessments and monitor   vital signs      Thank you for your time, Kalpana Dickens MSN, RN CDS. Please call/text/PS with any   questions; [phone]. Options provided:  -- Chronic respiratory failure with hypoxia  -- Chronic respiratory failure with hypercapnia  -- Chronic respiratory failure with hypoxia and hypercapnia  -- Other - I will add my own diagnosis  -- Disagree - Not applicable / Not valid  -- Disagree - Clinically unable to determine / Unknown  -- Refer to Clinical Documentation Reviewer    PROVIDER RESPONSE TEXT:    This patient has chronic respiratory failure with hypoxia.     Query created by: Jonathan Arnett on 2023 11:04 AM      Electronically signed by:  Yvan Franklin MD 2023 1:51 PM

## 2023-08-24 PROBLEM — R77.8 ELEVATED TROPONIN: Status: RESOLVED | Noted: 2023-07-25 | Resolved: 2023-08-24

## 2023-08-24 PROBLEM — R79.89 ELEVATED TROPONIN: Status: RESOLVED | Noted: 2023-01-01 | Resolved: 2023-08-24

## 2023-09-06 PROBLEM — R52 PAIN: Status: RESOLVED | Noted: 2023-01-01 | Resolved: 2023-09-06

## 2023-09-08 RX ORDER — CARVEDILOL 12.5 MG/1
12.5 TABLET ORAL 2 TIMES DAILY WITH MEALS
Qty: 60 TABLET | Refills: 3 | Status: SHIPPED | OUTPATIENT
Start: 2023-09-08

## 2023-09-14 RX ORDER — CARVEDILOL 3.12 MG/1
3.12 TABLET ORAL 2 TIMES DAILY WITH MEALS
Qty: 180 TABLET | Refills: 0 | Status: SHIPPED | OUTPATIENT
Start: 2023-09-14

## 2023-10-23 DIAGNOSIS — I50.42 CHRONIC COMBINED SYSTOLIC AND DIASTOLIC CONGESTIVE HEART FAILURE (HCC): ICD-10-CM

## 2023-10-23 RX ORDER — LISINOPRIL 20 MG/1
20 TABLET ORAL DAILY
Qty: 90 TABLET | Refills: 2 | OUTPATIENT
Start: 2023-10-23

## (undated) DEVICE — FORCEPS BX L240CM WRK CHN 2.8MM STD CAP W/ NDL MIC MESH

## (undated) DEVICE — NEEDLE SCLERO 23GA L240CM OD0.64MM ID0.32MM CLR INTERJECT